# Patient Record
Sex: FEMALE | Race: WHITE | NOT HISPANIC OR LATINO | Employment: OTHER | URBAN - METROPOLITAN AREA
[De-identification: names, ages, dates, MRNs, and addresses within clinical notes are randomized per-mention and may not be internally consistent; named-entity substitution may affect disease eponyms.]

---

## 2017-01-05 ENCOUNTER — ALLSCRIPTS OFFICE VISIT (OUTPATIENT)
Dept: OTHER | Facility: OTHER | Age: 67
End: 2017-01-05

## 2017-01-05 DIAGNOSIS — M32.9 SYSTEMIC LUPUS ERYTHEMATOSUS (HCC): ICD-10-CM

## 2017-01-05 DIAGNOSIS — Z79.899 OTHER LONG TERM (CURRENT) DRUG THERAPY: ICD-10-CM

## 2017-01-19 RX ORDER — ACETAMINOPHEN 325 MG/1
650 TABLET ORAL ONCE
Status: COMPLETED | OUTPATIENT
Start: 2017-01-20 | End: 2017-01-20

## 2017-01-19 RX ORDER — DIPHENHYDRAMINE HCL 25 MG
25 TABLET ORAL ONCE
Status: COMPLETED | OUTPATIENT
Start: 2017-01-20 | End: 2017-01-20

## 2017-01-19 RX ORDER — SODIUM CHLORIDE 9 MG/ML
20 INJECTION, SOLUTION INTRAVENOUS CONTINUOUS
Status: DISCONTINUED | OUTPATIENT
Start: 2017-01-20 | End: 2017-01-23 | Stop reason: HOSPADM

## 2017-01-20 ENCOUNTER — HOSPITAL ENCOUNTER (OUTPATIENT)
Dept: INFUSION CENTER | Facility: CLINIC | Age: 67
Discharge: HOME | End: 2017-01-20
Payer: COMMERCIAL

## 2017-01-20 VITALS
HEART RATE: 72 BPM | BODY MASS INDEX: 34.83 KG/M2 | SYSTOLIC BLOOD PRESSURE: 128 MMHG | OXYGEN SATURATION: 96 % | TEMPERATURE: 98 F | DIASTOLIC BLOOD PRESSURE: 82 MMHG | HEIGHT: 60 IN | WEIGHT: 177.4 LBS | RESPIRATION RATE: 16 BRPM

## 2017-01-20 PROCEDURE — 96413 CHEMO IV INFUSION 1 HR: CPT

## 2017-01-20 RX ADMIN — SODIUM CHLORIDE 20 ML/HR: 0.9 INJECTION, SOLUTION INTRAVENOUS at 10:25

## 2017-01-20 RX ADMIN — ACETAMINOPHEN 650 MG: 325 TABLET ORAL at 10:24

## 2017-01-20 RX ADMIN — DIPHENHYDRAMINE HYDROCHLORIDE 25 MG: 25 TABLET ORAL at 10:24

## 2017-01-26 ENCOUNTER — ALLSCRIPTS OFFICE VISIT (OUTPATIENT)
Dept: OTHER | Facility: OTHER | Age: 67
End: 2017-01-26

## 2017-02-20 RX ORDER — ACETAMINOPHEN 325 MG/1
650 TABLET ORAL ONCE
Status: COMPLETED | OUTPATIENT
Start: 2017-02-21 | End: 2017-02-21

## 2017-02-20 RX ORDER — SODIUM CHLORIDE 9 MG/ML
20 INJECTION, SOLUTION INTRAVENOUS CONTINUOUS
Status: DISCONTINUED | OUTPATIENT
Start: 2017-02-21 | End: 2017-02-24 | Stop reason: HOSPADM

## 2017-02-20 RX ORDER — DIPHENHYDRAMINE HCL 25 MG
25 TABLET ORAL ONCE
Status: COMPLETED | OUTPATIENT
Start: 2017-02-21 | End: 2017-02-21

## 2017-02-21 ENCOUNTER — HOSPITAL ENCOUNTER (OUTPATIENT)
Dept: INFUSION CENTER | Facility: CLINIC | Age: 67
Discharge: HOME | End: 2017-02-21
Payer: COMMERCIAL

## 2017-02-21 VITALS
BODY MASS INDEX: 34.18 KG/M2 | SYSTOLIC BLOOD PRESSURE: 167 MMHG | HEART RATE: 85 BPM | WEIGHT: 175 LBS | DIASTOLIC BLOOD PRESSURE: 74 MMHG | TEMPERATURE: 97.8 F | RESPIRATION RATE: 18 BRPM

## 2017-02-21 PROCEDURE — 96413 CHEMO IV INFUSION 1 HR: CPT

## 2017-02-21 RX ADMIN — SODIUM CHLORIDE 20 ML/HR: 0.9 INJECTION, SOLUTION INTRAVENOUS at 11:20

## 2017-02-21 RX ADMIN — DIPHENHYDRAMINE HYDROCHLORIDE 25 MG: 25 TABLET ORAL at 11:32

## 2017-02-21 RX ADMIN — ACETAMINOPHEN 650 MG: 325 TABLET ORAL at 11:32

## 2017-02-21 NOTE — PROGRESS NOTES
Pt tolerated her benlysta without any adverse reactions  Pt was observed for 30 minutes post infusion  Next appointment confirmed   Pt refused avs

## 2017-02-25 ENCOUNTER — APPOINTMENT (EMERGENCY)
Dept: RADIOLOGY | Facility: HOSPITAL | Age: 67
DRG: 194 | End: 2017-02-25
Payer: COMMERCIAL

## 2017-02-25 ENCOUNTER — HOSPITAL ENCOUNTER (INPATIENT)
Facility: HOSPITAL | Age: 67
LOS: 2 days | Discharge: HOME | DRG: 194 | End: 2017-02-27
Attending: EMERGENCY MEDICINE | Admitting: INTERNAL MEDICINE
Payer: COMMERCIAL

## 2017-02-25 DIAGNOSIS — E86.0 DEHYDRATION: ICD-10-CM

## 2017-02-25 DIAGNOSIS — J18.9 PNEUMONIA OF RIGHT MIDDLE LOBE DUE TO INFECTIOUS ORGANISM: ICD-10-CM

## 2017-02-25 DIAGNOSIS — I95.9 HYPOTENSION: Primary | ICD-10-CM

## 2017-02-25 PROBLEM — R19.7 DIARRHEA: Status: ACTIVE | Noted: 2017-02-25

## 2017-02-25 PROBLEM — R55 SYNCOPE: Status: ACTIVE | Noted: 2017-02-25

## 2017-02-25 LAB
ANION GAP SERPL CALCULATED.3IONS-SCNC: 9 MMOL/L (ref 4–13)
ATRIAL RATE: 62 BPM
BACTERIA UR QL AUTO: ABNORMAL /HPF
BASOPHILS # BLD AUTO: 0.02 THOUSANDS/ΜL (ref 0–0.1)
BASOPHILS NFR BLD AUTO: 0 % (ref 0–1)
BILIRUB UR QL STRIP: NEGATIVE
BUN SERPL-MCNC: 15 MG/DL (ref 5–25)
CALCIUM SERPL-MCNC: 7.4 MG/DL (ref 8.3–10.1)
CHLORIDE SERPL-SCNC: 103 MMOL/L (ref 100–108)
CLARITY UR: CLEAR
CLARITY, POC: NORMAL
CO2 SERPL-SCNC: 22 MMOL/L (ref 21–32)
COLOR UR: ABNORMAL
COLOR, POC: YELLOW
CREAT SERPL-MCNC: 1.19 MG/DL (ref 0.6–1.3)
EOSINOPHIL # BLD AUTO: 0.01 THOUSAND/ΜL (ref 0–0.61)
EOSINOPHIL NFR BLD AUTO: 0 % (ref 0–6)
ERYTHROCYTE [DISTWIDTH] IN BLOOD BY AUTOMATED COUNT: 14.6 % (ref 11.6–15.1)
EXT BILIRUBIN, UA: NORMAL
EXT BLOOD URINE: NORMAL
EXT GLUCOSE, UA: NORMAL
EXT KETONES: NORMAL
EXT NITRITE, UA: POSITIVE
EXT PH, UA: 6.5
EXT PROTEIN, UA: NORMAL
EXT SPECIFIC GRAVITY, UA: 1
EXT UROBILINOGEN: 0.2
FLUAV AG SPEC QL IA: NEGATIVE
FLUAV AG SPEC QL: DETECTED
FLUBV AG SPEC QL IA: NEGATIVE
FLUBV AG SPEC QL: ABNORMAL
GFR SERPL CREATININE-BSD FRML MDRD: 45.7 ML/MIN/1.73SQ M
GLUCOSE SERPL-MCNC: 114 MG/DL (ref 65–140)
GLUCOSE UR STRIP-MCNC: NEGATIVE MG/DL
HCT VFR BLD AUTO: 32.9 % (ref 34.8–46.1)
HGB BLD-MCNC: 10.5 G/DL (ref 11.5–15.4)
HGB UR QL STRIP.AUTO: ABNORMAL
KETONES UR STRIP-MCNC: NEGATIVE MG/DL
L PNEUMO1 AG UR QL IA.RAPID: NEGATIVE
LACTATE SERPL-SCNC: 1.4 MMOL/L (ref 0.5–2)
LEUKOCYTE ESTERASE UR QL STRIP: ABNORMAL
LYMPHOCYTES # BLD AUTO: 2 THOUSANDS/ΜL (ref 0.6–4.47)
LYMPHOCYTES NFR BLD AUTO: 25 % (ref 14–44)
MCH RBC QN AUTO: 27.6 PG (ref 26.8–34.3)
MCHC RBC AUTO-ENTMCNC: 31.9 G/DL (ref 31.4–37.4)
MCV RBC AUTO: 87 FL (ref 82–98)
MONOCYTES # BLD AUTO: 0.85 THOUSAND/ΜL (ref 0.17–1.22)
MONOCYTES NFR BLD AUTO: 11 % (ref 4–12)
NEUTROPHILS # BLD AUTO: 4.99 THOUSANDS/ΜL (ref 1.85–7.62)
NEUTS SEG NFR BLD AUTO: 64 % (ref 43–75)
NITRITE UR QL STRIP: NEGATIVE
NON-SQ EPI CELLS URNS QL MICRO: ABNORMAL /HPF
OTHER STN SPEC: ABNORMAL
P AXIS: 68 DEGREES
PH UR STRIP.AUTO: 5.5 [PH] (ref 4.5–8)
PLATELET # BLD AUTO: 228 THOUSANDS/UL (ref 149–390)
PLATELET # BLD AUTO: 232 THOUSANDS/UL (ref 149–390)
PMV BLD AUTO: 10 FL (ref 8.9–12.7)
PMV BLD AUTO: 10.2 FL (ref 8.9–12.7)
POTASSIUM SERPL-SCNC: 3.9 MMOL/L (ref 3.5–5.3)
PR INTERVAL: 174 MS
PROT UR STRIP-MCNC: NEGATIVE MG/DL
QRS AXIS: 78 DEGREES
QRSD INTERVAL: 94 MS
QT INTERVAL: 434 MS
QTC INTERVAL: 440 MS
RBC # BLD AUTO: 3.8 MILLION/UL (ref 3.81–5.12)
RBC #/AREA URNS AUTO: ABNORMAL /HPF
RSV B RNA SPEC QL NAA+PROBE: ABNORMAL
S PNEUM AG UR QL: NEGATIVE
SODIUM SERPL-SCNC: 134 MMOL/L (ref 136–145)
SP GR UR STRIP.AUTO: 1.01 (ref 1–1.03)
T WAVE AXIS: 34 DEGREES
TROPONIN I SERPL-MCNC: <0.02 NG/ML
UROBILINOGEN UR QL STRIP.AUTO: 0.2 E.U./DL
VENTRICULAR RATE: 62 BPM
WBC # BLD AUTO: 7.87 THOUSAND/UL (ref 4.31–10.16)
WBC # BLD EST: NORMAL 10*3/UL
WBC #/AREA URNS AUTO: ABNORMAL /HPF

## 2017-02-25 PROCEDURE — 87798 DETECT AGENT NOS DNA AMP: CPT | Performed by: EMERGENCY MEDICINE

## 2017-02-25 PROCEDURE — 94760 N-INVAS EAR/PLS OXIMETRY 1: CPT

## 2017-02-25 PROCEDURE — 84484 ASSAY OF TROPONIN QUANT: CPT | Performed by: PHYSICIAN ASSISTANT

## 2017-02-25 PROCEDURE — 87086 URINE CULTURE/COLONY COUNT: CPT | Performed by: PHYSICIAN ASSISTANT

## 2017-02-25 PROCEDURE — 87040 BLOOD CULTURE FOR BACTERIA: CPT | Performed by: EMERGENCY MEDICINE

## 2017-02-25 PROCEDURE — 80048 BASIC METABOLIC PNL TOTAL CA: CPT | Performed by: EMERGENCY MEDICINE

## 2017-02-25 PROCEDURE — 36415 COLL VENOUS BLD VENIPUNCTURE: CPT | Performed by: EMERGENCY MEDICINE

## 2017-02-25 PROCEDURE — 96374 THER/PROPH/DIAG INJ IV PUSH: CPT

## 2017-02-25 PROCEDURE — 81001 URINALYSIS AUTO W/SCOPE: CPT | Performed by: PHYSICIAN ASSISTANT

## 2017-02-25 PROCEDURE — 87493 C DIFF AMPLIFIED PROBE: CPT | Performed by: PHYSICIAN ASSISTANT

## 2017-02-25 PROCEDURE — 96361 HYDRATE IV INFUSION ADD-ON: CPT

## 2017-02-25 PROCEDURE — 81002 URINALYSIS NONAUTO W/O SCOPE: CPT | Performed by: EMERGENCY MEDICINE

## 2017-02-25 PROCEDURE — 85049 AUTOMATED PLATELET COUNT: CPT | Performed by: PHYSICIAN ASSISTANT

## 2017-02-25 PROCEDURE — 87449 NOS EACH ORGANISM AG IA: CPT | Performed by: PHYSICIAN ASSISTANT

## 2017-02-25 PROCEDURE — 99285 EMERGENCY DEPT VISIT HI MDM: CPT

## 2017-02-25 PROCEDURE — 93005 ELECTROCARDIOGRAM TRACING: CPT | Performed by: EMERGENCY MEDICINE

## 2017-02-25 PROCEDURE — 71010 HB CHEST X-RAY 1 VIEW FRONTAL: CPT

## 2017-02-25 PROCEDURE — 87400 INFLUENZA A/B EACH AG IA: CPT | Performed by: EMERGENCY MEDICINE

## 2017-02-25 PROCEDURE — 84484 ASSAY OF TROPONIN QUANT: CPT | Performed by: EMERGENCY MEDICINE

## 2017-02-25 PROCEDURE — 83605 ASSAY OF LACTIC ACID: CPT | Performed by: EMERGENCY MEDICINE

## 2017-02-25 PROCEDURE — 94664 DEMO&/EVAL PT USE INHALER: CPT

## 2017-02-25 PROCEDURE — 85025 COMPLETE CBC W/AUTO DIFF WBC: CPT | Performed by: EMERGENCY MEDICINE

## 2017-02-25 RX ORDER — SODIUM CHLORIDE 9 MG/ML
75 INJECTION, SOLUTION INTRAVENOUS CONTINUOUS
Status: DISCONTINUED | OUTPATIENT
Start: 2017-02-25 | End: 2017-02-26

## 2017-02-25 RX ORDER — B-COMPLEX WITH VITAMIN C
1 TABLET ORAL
Status: DISCONTINUED | OUTPATIENT
Start: 2017-02-25 | End: 2017-02-27 | Stop reason: HOSPADM

## 2017-02-25 RX ORDER — BUPROPION HYDROCHLORIDE 100 MG/1
100 TABLET ORAL 3 TIMES DAILY
Status: CANCELLED | OUTPATIENT
Start: 2017-02-25

## 2017-02-25 RX ORDER — GUAIFENESIN 600 MG
600 TABLET, EXTENDED RELEASE 12 HR ORAL 2 TIMES DAILY
Status: DISCONTINUED | OUTPATIENT
Start: 2017-02-25 | End: 2017-02-27 | Stop reason: HOSPADM

## 2017-02-25 RX ORDER — DIPHENHYDRAMINE HCL 25 MG
25 TABLET ORAL
Status: DISCONTINUED | OUTPATIENT
Start: 2017-02-25 | End: 2017-02-27 | Stop reason: HOSPADM

## 2017-02-25 RX ORDER — LEVOTHYROXINE SODIUM 0.03 MG/1
25 TABLET ORAL DAILY
Status: CANCELLED | OUTPATIENT
Start: 2017-02-25

## 2017-02-25 RX ORDER — ONDANSETRON 2 MG/ML
4 INJECTION INTRAMUSCULAR; INTRAVENOUS EVERY 6 HOURS PRN
Status: DISCONTINUED | OUTPATIENT
Start: 2017-02-25 | End: 2017-02-27 | Stop reason: HOSPADM

## 2017-02-25 RX ORDER — LEVOTHYROXINE SODIUM 0.03 MG/1
25 TABLET ORAL
Status: DISCONTINUED | OUTPATIENT
Start: 2017-02-25 | End: 2017-02-27 | Stop reason: HOSPADM

## 2017-02-25 RX ORDER — QUETIAPINE FUMARATE 100 MG/1
300 TABLET, FILM COATED ORAL
Status: DISCONTINUED | OUTPATIENT
Start: 2017-02-25 | End: 2017-02-27 | Stop reason: HOSPADM

## 2017-02-25 RX ORDER — LORAZEPAM 0.5 MG/1
0.5 TABLET ORAL EVERY 6 HOURS PRN
Status: DISCONTINUED | OUTPATIENT
Start: 2017-02-25 | End: 2017-02-26

## 2017-02-25 RX ORDER — ASPIRIN 81 MG/1
81 TABLET, CHEWABLE ORAL DAILY
Status: DISCONTINUED | OUTPATIENT
Start: 2017-02-25 | End: 2017-02-27 | Stop reason: HOSPADM

## 2017-02-25 RX ORDER — ONDANSETRON 2 MG/ML
4 INJECTION INTRAMUSCULAR; INTRAVENOUS EVERY 6 HOURS PRN
Status: DISCONTINUED | OUTPATIENT
Start: 2017-02-25 | End: 2017-02-25 | Stop reason: SDUPTHER

## 2017-02-25 RX ORDER — BUPROPION HYDROCHLORIDE 100 MG/1
100 TABLET ORAL 3 TIMES DAILY
Status: DISCONTINUED | OUTPATIENT
Start: 2017-02-25 | End: 2017-02-27 | Stop reason: HOSPADM

## 2017-02-25 RX ORDER — UBIDECARENONE 75 MG
50 CAPSULE ORAL DAILY
Status: DISCONTINUED | OUTPATIENT
Start: 2017-02-25 | End: 2017-02-27 | Stop reason: HOSPADM

## 2017-02-25 RX ADMIN — CEFTRIAXONE SODIUM 1000 MG: 10 INJECTION, POWDER, FOR SOLUTION INTRAVENOUS at 03:02

## 2017-02-25 RX ADMIN — DIPHENHYDRAMINE HYDROCHLORIDE 25 MG: 25 TABLET ORAL at 23:20

## 2017-02-25 RX ADMIN — LEVOTHYROXINE SODIUM 25 MCG: 25 TABLET ORAL at 07:35

## 2017-02-25 RX ADMIN — QUETIAPINE FUMARATE 300 MG: 100 TABLET ORAL at 21:18

## 2017-02-25 RX ADMIN — ENOXAPARIN SODIUM 40 MG: 40 INJECTION SUBCUTANEOUS at 08:03

## 2017-02-25 RX ADMIN — LORAZEPAM 0.5 MG: 0.5 TABLET ORAL at 22:06

## 2017-02-25 RX ADMIN — Medication 1 TABLET: at 08:04

## 2017-02-25 RX ADMIN — ASPIRIN 81 MG: 81 TABLET, CHEWABLE ORAL at 08:03

## 2017-02-25 RX ADMIN — SODIUM CHLORIDE 1000 ML: 0.9 INJECTION, SOLUTION INTRAVENOUS at 02:06

## 2017-02-25 RX ADMIN — SODIUM CHLORIDE 75 ML/HR: 0.9 INJECTION, SOLUTION INTRAVENOUS at 05:04

## 2017-02-25 RX ADMIN — OYSTER SHELL CALCIUM WITH VITAMIN D 1 TABLET: 500; 200 TABLET, FILM COATED ORAL at 07:35

## 2017-02-25 RX ADMIN — GUAIFENESIN 600 MG: 600 TABLET, EXTENDED RELEASE ORAL at 08:04

## 2017-02-25 RX ADMIN — VITAM B12 50 MCG: 100 TAB at 08:05

## 2017-02-25 RX ADMIN — BUPROPION HYDROCHLORIDE 100 MG: 100 TABLET, FILM COATED ORAL at 08:04

## 2017-02-25 RX ADMIN — BUPROPION HYDROCHLORIDE 100 MG: 100 TABLET, FILM COATED ORAL at 17:09

## 2017-02-25 RX ADMIN — GUAIFENESIN 600 MG: 600 TABLET, EXTENDED RELEASE ORAL at 17:09

## 2017-02-25 RX ADMIN — SODIUM CHLORIDE 75 ML/HR: 0.9 INJECTION, SOLUTION INTRAVENOUS at 18:50

## 2017-02-25 RX ADMIN — AZITHROMYCIN FOR INJECTION INJECTION, POWDER, LYOPHILIZED, FOR SOLUTION 500 MG: 500 INJECTION INTRAVENOUS at 03:35

## 2017-02-25 RX ADMIN — BUPROPION HYDROCHLORIDE 100 MG: 100 TABLET, FILM COATED ORAL at 21:18

## 2017-02-25 RX ADMIN — SODIUM CHLORIDE 1000 ML: 0.9 INJECTION, SOLUTION INTRAVENOUS at 02:05

## 2017-02-26 ENCOUNTER — APPOINTMENT (INPATIENT)
Dept: RADIOLOGY | Facility: HOSPITAL | Age: 67
DRG: 194 | End: 2017-02-26
Payer: COMMERCIAL

## 2017-02-26 PROBLEM — J10.1 INFLUENZA A: Status: ACTIVE | Noted: 2017-02-26

## 2017-02-26 LAB
ANION GAP SERPL CALCULATED.3IONS-SCNC: 7 MMOL/L (ref 4–13)
BACTERIA UR CULT: NORMAL
BUN SERPL-MCNC: 3 MG/DL (ref 5–25)
C DIFF TOX GENS STL QL NAA+PROBE: NORMAL
CALCIUM SERPL-MCNC: 7.5 MG/DL (ref 8.3–10.1)
CHLORIDE SERPL-SCNC: 109 MMOL/L (ref 100–108)
CO2 SERPL-SCNC: 23 MMOL/L (ref 21–32)
CREAT SERPL-MCNC: 0.75 MG/DL (ref 0.6–1.3)
ERYTHROCYTE [DISTWIDTH] IN BLOOD BY AUTOMATED COUNT: 14.8 % (ref 11.6–15.1)
GFR SERPL CREATININE-BSD FRML MDRD: >60 ML/MIN/1.73SQ M
GLUCOSE SERPL-MCNC: 81 MG/DL (ref 65–140)
GLUCOSE SERPL-MCNC: 87 MG/DL (ref 65–140)
HCT VFR BLD AUTO: 30.5 % (ref 34.8–46.1)
HGB BLD-MCNC: 9.6 G/DL (ref 11.5–15.4)
MCH RBC QN AUTO: 27.3 PG (ref 26.8–34.3)
MCHC RBC AUTO-ENTMCNC: 31.5 G/DL (ref 31.4–37.4)
MCV RBC AUTO: 87 FL (ref 82–98)
PLATELET # BLD AUTO: 236 THOUSANDS/UL (ref 149–390)
PMV BLD AUTO: 9.9 FL (ref 8.9–12.7)
POTASSIUM SERPL-SCNC: 3.6 MMOL/L (ref 3.5–5.3)
RBC # BLD AUTO: 3.52 MILLION/UL (ref 3.81–5.12)
SODIUM SERPL-SCNC: 139 MMOL/L (ref 136–145)
WBC # BLD AUTO: 4.98 THOUSAND/UL (ref 4.31–10.16)

## 2017-02-26 PROCEDURE — 71020 HB CHEST X-RAY 2VW FRONTAL&LATL: CPT

## 2017-02-26 PROCEDURE — 82948 REAGENT STRIP/BLOOD GLUCOSE: CPT

## 2017-02-26 PROCEDURE — 80048 BASIC METABOLIC PNL TOTAL CA: CPT | Performed by: INTERNAL MEDICINE

## 2017-02-26 PROCEDURE — 85027 COMPLETE CBC AUTOMATED: CPT | Performed by: INTERNAL MEDICINE

## 2017-02-26 RX ORDER — TEMAZEPAM 15 MG/1
15 CAPSULE ORAL
Status: DISCONTINUED | OUTPATIENT
Start: 2017-02-26 | End: 2017-02-27 | Stop reason: HOSPADM

## 2017-02-26 RX ORDER — OSELTAMIVIR PHOSPHATE 6 MG/ML
75 FOR SUSPENSION ORAL EVERY 12 HOURS SCHEDULED
Status: DISCONTINUED | OUTPATIENT
Start: 2017-02-26 | End: 2017-02-26

## 2017-02-26 RX ORDER — OSELTAMIVIR PHOSPHATE 75 MG/1
75 CAPSULE ORAL EVERY 12 HOURS SCHEDULED
Status: DISCONTINUED | OUTPATIENT
Start: 2017-02-26 | End: 2017-02-27 | Stop reason: HOSPADM

## 2017-02-26 RX ADMIN — CEFTRIAXONE SODIUM 1000 MG: 10 INJECTION, POWDER, FOR SOLUTION INTRAVENOUS at 02:36

## 2017-02-26 RX ADMIN — BUPROPION HYDROCHLORIDE 100 MG: 100 TABLET, FILM COATED ORAL at 17:11

## 2017-02-26 RX ADMIN — Medication 1 TABLET: at 08:45

## 2017-02-26 RX ADMIN — GUAIFENESIN 600 MG: 600 TABLET, EXTENDED RELEASE ORAL at 08:45

## 2017-02-26 RX ADMIN — ENOXAPARIN SODIUM 40 MG: 40 INJECTION SUBCUTANEOUS at 08:45

## 2017-02-26 RX ADMIN — BUPROPION HYDROCHLORIDE 100 MG: 100 TABLET, FILM COATED ORAL at 08:45

## 2017-02-26 RX ADMIN — ASPIRIN 81 MG: 81 TABLET, CHEWABLE ORAL at 08:45

## 2017-02-26 RX ADMIN — BUPROPION HYDROCHLORIDE 100 MG: 100 TABLET, FILM COATED ORAL at 21:35

## 2017-02-26 RX ADMIN — QUETIAPINE FUMARATE 300 MG: 100 TABLET ORAL at 21:35

## 2017-02-26 RX ADMIN — AZITHROMYCIN MONOHYDRATE 500 MG: 500 INJECTION, POWDER, LYOPHILIZED, FOR SOLUTION INTRAVENOUS at 02:36

## 2017-02-26 RX ADMIN — TEMAZEPAM 15 MG: 15 CAPSULE ORAL at 22:05

## 2017-02-26 RX ADMIN — OYSTER SHELL CALCIUM WITH VITAMIN D 1 TABLET: 500; 200 TABLET, FILM COATED ORAL at 08:45

## 2017-02-26 RX ADMIN — GUAIFENESIN 600 MG: 600 TABLET, EXTENDED RELEASE ORAL at 17:11

## 2017-02-26 RX ADMIN — VITAM B12 50 MCG: 100 TAB at 08:46

## 2017-02-26 RX ADMIN — OSELTAMIVIR PHOSPHATE 75 MG: 75 CAPSULE ORAL at 12:47

## 2017-02-26 RX ADMIN — OSELTAMIVIR PHOSPHATE 75 MG: 75 CAPSULE ORAL at 21:35

## 2017-02-26 RX ADMIN — LEVOTHYROXINE SODIUM 25 MCG: 25 TABLET ORAL at 05:17

## 2017-02-27 VITALS
BODY MASS INDEX: 33.22 KG/M2 | RESPIRATION RATE: 16 BRPM | HEIGHT: 61 IN | DIASTOLIC BLOOD PRESSURE: 78 MMHG | OXYGEN SATURATION: 92 % | WEIGHT: 175.93 LBS | SYSTOLIC BLOOD PRESSURE: 128 MMHG | HEART RATE: 69 BPM | TEMPERATURE: 98.3 F

## 2017-02-27 PROBLEM — E86.0 DEHYDRATION: Status: RESOLVED | Noted: 2017-02-25 | Resolved: 2017-02-27

## 2017-02-27 PROBLEM — I95.9 HYPOTENSION: Status: RESOLVED | Noted: 2017-02-25 | Resolved: 2017-02-27

## 2017-02-27 PROBLEM — J18.9 RIGHT MIDDLE LOBE PNEUMONIA: Status: RESOLVED | Noted: 2017-02-25 | Resolved: 2017-02-27

## 2017-02-27 PROBLEM — R19.7 DIARRHEA: Status: RESOLVED | Noted: 2017-02-25 | Resolved: 2017-02-27

## 2017-02-27 PROBLEM — R55 SYNCOPE: Status: RESOLVED | Noted: 2017-02-25 | Resolved: 2017-02-27

## 2017-02-27 RX ORDER — GUAIFENESIN 600 MG
600 TABLET, EXTENDED RELEASE 12 HR ORAL 2 TIMES DAILY
Qty: 10 TABLET | Refills: 0 | Status: SHIPPED | OUTPATIENT
Start: 2017-02-27 | End: 2017-03-04

## 2017-02-27 RX ORDER — GUAIFENESIN/DEXTROMETHORPHAN 100-10MG/5
10 SYRUP ORAL EVERY 4 HOURS PRN
Qty: 118 ML | Refills: 0 | Status: SHIPPED | OUTPATIENT
Start: 2017-02-27 | End: 2017-03-29

## 2017-02-27 RX ORDER — OSELTAMIVIR PHOSPHATE 75 MG/1
75 CAPSULE ORAL EVERY 12 HOURS SCHEDULED
Qty: 8 CAPSULE | Refills: 0 | Status: SHIPPED | OUTPATIENT
Start: 2017-02-27 | End: 2017-03-03

## 2017-02-27 RX ORDER — GUAIFENESIN/DEXTROMETHORPHAN 100-10MG/5
10 SYRUP ORAL EVERY 4 HOURS PRN
Status: DISCONTINUED | OUTPATIENT
Start: 2017-02-27 | End: 2017-02-27 | Stop reason: HOSPADM

## 2017-02-27 RX ADMIN — ASPIRIN 81 MG: 81 TABLET, CHEWABLE ORAL at 09:01

## 2017-02-27 RX ADMIN — Medication 1 TABLET: at 09:01

## 2017-02-27 RX ADMIN — OSELTAMIVIR PHOSPHATE 75 MG: 75 CAPSULE ORAL at 09:01

## 2017-02-27 RX ADMIN — BUPROPION HYDROCHLORIDE 100 MG: 100 TABLET, FILM COATED ORAL at 09:01

## 2017-02-27 RX ADMIN — LEVOTHYROXINE SODIUM 25 MCG: 25 TABLET ORAL at 05:44

## 2017-02-27 RX ADMIN — VITAM B12 50 MCG: 100 TAB at 09:02

## 2017-02-27 RX ADMIN — CEFTRIAXONE SODIUM 1000 MG: 10 INJECTION, POWDER, FOR SOLUTION INTRAVENOUS at 03:17

## 2017-02-27 RX ADMIN — OYSTER SHELL CALCIUM WITH VITAMIN D 1 TABLET: 500; 200 TABLET, FILM COATED ORAL at 09:01

## 2017-02-27 RX ADMIN — GUAIFENESIN 600 MG: 600 TABLET, EXTENDED RELEASE ORAL at 09:01

## 2017-02-27 RX ADMIN — AZITHROMYCIN MONOHYDRATE 500 MG: 500 INJECTION, POWDER, LYOPHILIZED, FOR SOLUTION INTRAVENOUS at 03:54

## 2017-03-02 LAB
BACTERIA BLD CULT: NORMAL
BACTERIA BLD CULT: NORMAL

## 2017-03-16 RX ORDER — SODIUM CHLORIDE 9 MG/ML
20 INJECTION, SOLUTION INTRAVENOUS CONTINUOUS
Status: DISCONTINUED | OUTPATIENT
Start: 2017-03-17 | End: 2017-03-20 | Stop reason: HOSPADM

## 2017-03-16 RX ORDER — ACETAMINOPHEN 325 MG/1
650 TABLET ORAL ONCE
Status: COMPLETED | OUTPATIENT
Start: 2017-03-17 | End: 2017-03-17

## 2017-03-16 RX ORDER — DIPHENHYDRAMINE HCL 25 MG
25 TABLET ORAL ONCE
Status: COMPLETED | OUTPATIENT
Start: 2017-03-17 | End: 2017-03-17

## 2017-03-17 ENCOUNTER — HOSPITAL ENCOUNTER (OUTPATIENT)
Dept: INFUSION CENTER | Facility: CLINIC | Age: 67
Discharge: HOME | End: 2017-03-17
Payer: COMMERCIAL

## 2017-03-17 VITALS
DIASTOLIC BLOOD PRESSURE: 80 MMHG | HEART RATE: 68 BPM | TEMPERATURE: 98 F | SYSTOLIC BLOOD PRESSURE: 131 MMHG | RESPIRATION RATE: 16 BRPM

## 2017-03-17 PROCEDURE — 96413 CHEMO IV INFUSION 1 HR: CPT

## 2017-03-17 RX ADMIN — SODIUM CHLORIDE 20 ML/HR: 0.9 INJECTION, SOLUTION INTRAVENOUS at 11:30

## 2017-03-17 RX ADMIN — ACETAMINOPHEN 650 MG: 325 TABLET ORAL at 11:34

## 2017-03-17 RX ADMIN — DIPHENHYDRAMINE HYDROCHLORIDE 25 MG: 25 TABLET ORAL at 11:35

## 2017-03-17 NOTE — PROGRESS NOTES
Pt is here for her benlysta  She offers no complaints at this time  She states she was admitted to the hospital several weeks ago for pneumonia and the flu and was discharged on February 28  Anastacio Osuna  She has no signs or symptoms of infections at this time

## 2017-03-20 ENCOUNTER — HOSPITAL ENCOUNTER (OUTPATIENT)
Dept: RADIOLOGY | Facility: HOSPITAL | Age: 67
Discharge: HOME | End: 2017-03-20
Payer: COMMERCIAL

## 2017-03-20 ENCOUNTER — OFFICE VISIT (OUTPATIENT)
Dept: URGENT CARE | Facility: MEDICAL CENTER | Age: 67
End: 2017-03-20
Payer: COMMERCIAL

## 2017-03-20 DIAGNOSIS — Z12.31 ENCOUNTER FOR SCREENING MAMMOGRAM FOR MALIGNANT NEOPLASM OF BREAST: ICD-10-CM

## 2017-03-20 DIAGNOSIS — Z12.39 ENCOUNTER FOR OTHER SCREENING FOR MALIGNANT NEOPLASM OF BREAST: ICD-10-CM

## 2017-03-20 PROCEDURE — G0202 SCR MAMMO BI INCL CAD: HCPCS

## 2017-03-20 PROCEDURE — G0382 LEV 3 HOSP TYPE B ED VISIT: HCPCS

## 2017-04-19 RX ORDER — SODIUM CHLORIDE 9 MG/ML
20 INJECTION, SOLUTION INTRAVENOUS CONTINUOUS
Status: DISCONTINUED | OUTPATIENT
Start: 2017-04-20 | End: 2017-04-23 | Stop reason: HOSPADM

## 2017-04-19 RX ORDER — ACETAMINOPHEN 325 MG/1
650 TABLET ORAL ONCE
Status: DISCONTINUED | OUTPATIENT
Start: 2017-04-20 | End: 2017-04-23 | Stop reason: HOSPADM

## 2017-04-19 RX ORDER — DIPHENHYDRAMINE HCL 25 MG
25 TABLET ORAL ONCE
Status: DISCONTINUED | OUTPATIENT
Start: 2017-04-20 | End: 2017-04-23 | Stop reason: HOSPADM

## 2017-04-20 ENCOUNTER — HOSPITAL ENCOUNTER (OUTPATIENT)
Dept: INFUSION CENTER | Facility: CLINIC | Age: 67
Discharge: HOME | End: 2017-04-20
Payer: COMMERCIAL

## 2017-04-20 VITALS
TEMPERATURE: 97.7 F | SYSTOLIC BLOOD PRESSURE: 112 MMHG | HEART RATE: 74 BPM | RESPIRATION RATE: 22 BRPM | OXYGEN SATURATION: 95 % | DIASTOLIC BLOOD PRESSURE: 70 MMHG

## 2017-05-08 ENCOUNTER — GENERIC CONVERSION - ENCOUNTER (OUTPATIENT)
Dept: OTHER | Facility: OTHER | Age: 67
End: 2017-05-08

## 2017-05-26 ENCOUNTER — ALLSCRIPTS OFFICE VISIT (OUTPATIENT)
Dept: OTHER | Facility: OTHER | Age: 67
End: 2017-05-26

## 2017-05-26 DIAGNOSIS — M32.9 SYSTEMIC LUPUS ERYTHEMATOSUS (HCC): ICD-10-CM

## 2017-05-26 DIAGNOSIS — M35.00 SICCA SYNDROME (HCC): ICD-10-CM

## 2017-05-26 DIAGNOSIS — M15.9 POLYOSTEOARTHRITIS: ICD-10-CM

## 2017-05-26 DIAGNOSIS — Z79.899 OTHER LONG TERM (CURRENT) DRUG THERAPY: ICD-10-CM

## 2017-07-18 ENCOUNTER — ALLSCRIPTS OFFICE VISIT (OUTPATIENT)
Dept: OTHER | Facility: OTHER | Age: 67
End: 2017-07-18

## 2017-07-18 ENCOUNTER — TRANSCRIBE ORDERS (OUTPATIENT)
Dept: ADMINISTRATIVE | Facility: HOSPITAL | Age: 67
End: 2017-07-18

## 2017-07-18 DIAGNOSIS — M35.00 SICCA SYNDROME (HCC): ICD-10-CM

## 2017-07-18 DIAGNOSIS — R06.09 OTHER DYSPNEA AND RESPIRATORY ABNORMALITY: Primary | ICD-10-CM

## 2017-07-18 DIAGNOSIS — R70.0 ELEVATED ERYTHROCYTE SEDIMENTATION RATE: ICD-10-CM

## 2017-07-18 DIAGNOSIS — R06.09 OTHER FORMS OF DYSPNEA: ICD-10-CM

## 2017-07-18 DIAGNOSIS — M32.9 SYSTEMIC LUPUS ERYTHEMATOSUS (HCC): ICD-10-CM

## 2017-07-18 DIAGNOSIS — R09.89 OTHER DYSPNEA AND RESPIRATORY ABNORMALITY: Primary | ICD-10-CM

## 2017-08-29 ENCOUNTER — ALLSCRIPTS OFFICE VISIT (OUTPATIENT)
Dept: OTHER | Facility: OTHER | Age: 67
End: 2017-08-29

## 2017-09-14 ENCOUNTER — ALLSCRIPTS OFFICE VISIT (OUTPATIENT)
Dept: OTHER | Facility: OTHER | Age: 67
End: 2017-09-14

## 2017-09-14 DIAGNOSIS — D64.9 ANEMIA: ICD-10-CM

## 2017-09-14 DIAGNOSIS — K91.2 POSTSURGICAL MALABSORPTION, NOT ELSEWHERE CLASSIFIED: ICD-10-CM

## 2017-09-18 ENCOUNTER — LAB CONVERSION - ENCOUNTER (OUTPATIENT)
Dept: OTHER | Facility: OTHER | Age: 67
End: 2017-09-18

## 2017-09-18 LAB
ALBUMIN SERPL BCP-MCNC: 3.4 G/DL (ref 3.8–4.8)
ALPHA 1 (HISTORICAL): 0.3 G/DL (ref 0.2–0.3)
ALPHA 2 (HISTORICAL): 0.8 G/DL (ref 0.5–0.9)
BETA-1 (HISTORICAL): 0.4 G/DL (ref 0.4–0.6)
BETA-2 (HISTORICAL): 0.4 G/DL (ref 0.2–0.5)
FERRITIN SERPL-MCNC: 13 NG/ML (ref 20–288)
FOLATE SERPL-MCNC: 13.9 NG/ML
GAMMA GLOBULIN (HISTORICAL): 4.3 G/DL (ref 0.8–1.7)
IMMUNOGLOBULIN KAPPA FREE LIGHT CHAIN (HISTORICAL): 174.4 MG/L (ref 3.3–19.4)
IMMUNOGLOBULIN LAMBDA FREE LIGHT CHAIN (HISTORICAL): 96.4 MG/L (ref 5.7–26.3)
INTERPRETATION (HISTORICAL): ABNORMAL
IRON SERPL-MCNC: 36 MCG/DL (ref 45–160)
KAPPA/LAMBDA FREE LIGHT CHAIN RATIO (HISTORICAL): 1.81 (ref 0.26–1.65)
LDH (HISTORICAL): 144 U/L (ref 120–250)
TOTAL PROTEIN (HISTORICAL): 9.6 G/DL (ref 6.1–8.1)
VIT B12 SERPL-MCNC: 330 PG/ML (ref 200–1100)

## 2017-09-25 ENCOUNTER — HOSPITAL ENCOUNTER (OUTPATIENT)
Dept: NON INVASIVE DIAGNOSTICS | Facility: CLINIC | Age: 67
Discharge: HOME | End: 2017-09-25
Attending: INTERNAL MEDICINE
Payer: COMMERCIAL

## 2017-09-25 DIAGNOSIS — R06.09 OTHER FORMS OF DYSPNEA: ICD-10-CM

## 2017-09-25 LAB
CHEST PAIN STATEMENT: NORMAL
MAX DIASTOLIC BP: 82 MMHG
MAX HEART RATE: 136 BPM
MAX PREDICTED HEART RATE: 155 BPM
MAX. SYSTOLIC BP: 150 MMHG
PROTOCOL NAME: NORMAL
TARGET HR FORMULA: NORMAL
TEST INDICATION: NORMAL
TIME IN EXERCISE PHASE: 255 S

## 2017-09-25 PROCEDURE — 93306 TTE W/DOPPLER COMPLETE: CPT

## 2017-09-25 PROCEDURE — 93350 STRESS TTE ONLY: CPT

## 2017-09-26 ENCOUNTER — GENERIC CONVERSION - ENCOUNTER (OUTPATIENT)
Dept: OTHER | Facility: OTHER | Age: 67
End: 2017-09-26

## 2017-09-27 ENCOUNTER — TRANSCRIBE ORDERS (OUTPATIENT)
Dept: ADMINISTRATIVE | Facility: HOSPITAL | Age: 67
End: 2017-09-27

## 2017-09-27 ENCOUNTER — GENERIC CONVERSION - ENCOUNTER (OUTPATIENT)
Dept: OTHER | Facility: OTHER | Age: 67
End: 2017-09-27

## 2017-09-27 DIAGNOSIS — C64.9 ADENOCARCINOMA, RENAL CELL, UNSPECIFIED LATERALITY (HCC): Primary | ICD-10-CM

## 2017-10-04 ENCOUNTER — HOSPITAL ENCOUNTER (OUTPATIENT)
Dept: CT IMAGING | Facility: HOSPITAL | Age: 67
Discharge: HOME | End: 2017-10-04
Attending: INTERNAL MEDICINE
Payer: COMMERCIAL

## 2017-10-04 DIAGNOSIS — D64.9 ANEMIA: ICD-10-CM

## 2017-10-04 PROCEDURE — 74177 CT ABD & PELVIS W/CONTRAST: CPT

## 2017-10-04 PROCEDURE — 71260 CT THORAX DX C+: CPT

## 2017-10-04 RX ADMIN — IOHEXOL 100 ML: 350 INJECTION, SOLUTION INTRAVENOUS at 16:44

## 2017-10-10 ENCOUNTER — ALLSCRIPTS OFFICE VISIT (OUTPATIENT)
Dept: OTHER | Facility: OTHER | Age: 67
End: 2017-10-10

## 2017-10-11 ENCOUNTER — GENERIC CONVERSION - ENCOUNTER (OUTPATIENT)
Dept: OTHER | Facility: OTHER | Age: 67
End: 2017-10-11

## 2017-10-11 ENCOUNTER — TRANSCRIBE ORDERS (OUTPATIENT)
Dept: ADMINISTRATIVE | Facility: HOSPITAL | Age: 67
End: 2017-10-11

## 2017-10-11 DIAGNOSIS — R93.89 ABNORMAL RADIOLOGICAL FINDINGS IN SKIN AND SUBCUTANEOUS TISSUE: Primary | ICD-10-CM

## 2017-10-11 DIAGNOSIS — R06.89 HYPOVENTILATION, IDIOPATHIC: ICD-10-CM

## 2017-10-12 RX ORDER — SODIUM CHLORIDE 9 MG/ML
20 INJECTION, SOLUTION INTRAVENOUS CONTINUOUS
Status: DISCONTINUED | OUTPATIENT
Start: 2017-10-13 | End: 2017-10-16 | Stop reason: HOSPADM

## 2017-10-13 ENCOUNTER — HOSPITAL ENCOUNTER (OUTPATIENT)
Dept: INFUSION CENTER | Facility: CLINIC | Age: 67
Discharge: HOME | End: 2017-10-13
Payer: COMMERCIAL

## 2017-10-13 VITALS
DIASTOLIC BLOOD PRESSURE: 72 MMHG | OXYGEN SATURATION: 96 % | RESPIRATION RATE: 18 BRPM | SYSTOLIC BLOOD PRESSURE: 112 MMHG | TEMPERATURE: 97.7 F | HEART RATE: 81 BPM

## 2017-10-13 PROCEDURE — 96365 THER/PROPH/DIAG IV INF INIT: CPT

## 2017-10-13 RX ADMIN — FERUMOXYTOL 510 MG: 510 INJECTION INTRAVENOUS at 14:10

## 2017-10-13 RX ADMIN — SODIUM CHLORIDE 20 ML/HR: 0.9 INJECTION, SOLUTION INTRAVENOUS at 13:54

## 2017-10-18 ENCOUNTER — GENERIC CONVERSION - ENCOUNTER (OUTPATIENT)
Dept: OTHER | Facility: OTHER | Age: 67
End: 2017-10-18

## 2017-10-19 RX ORDER — SODIUM CHLORIDE 9 MG/ML
20 INJECTION, SOLUTION INTRAVENOUS CONTINUOUS
Status: DISCONTINUED | OUTPATIENT
Start: 2017-10-20 | End: 2017-10-23 | Stop reason: HOSPADM

## 2017-10-20 ENCOUNTER — HOSPITAL ENCOUNTER (OUTPATIENT)
Dept: INFUSION CENTER | Facility: CLINIC | Age: 67
Discharge: HOME | End: 2017-10-20
Payer: COMMERCIAL

## 2017-10-20 VITALS
HEART RATE: 80 BPM | DIASTOLIC BLOOD PRESSURE: 67 MMHG | SYSTOLIC BLOOD PRESSURE: 139 MMHG | TEMPERATURE: 98.3 F | RESPIRATION RATE: 18 BRPM

## 2017-10-20 PROCEDURE — 96365 THER/PROPH/DIAG IV INF INIT: CPT

## 2017-10-20 RX ADMIN — SODIUM CHLORIDE 20 ML/HR: 0.9 INJECTION, SOLUTION INTRAVENOUS at 15:30

## 2017-10-20 RX ADMIN — FERUMOXYTOL 510 MG: 510 INJECTION INTRAVENOUS at 15:38

## 2017-10-20 NOTE — PROGRESS NOTES
Pt states she has been feeling fatigued  She is currently resting comfortably  Vitals stable  Call bell in reach, will continue to monitor

## 2017-10-20 NOTE — PROGRESS NOTES
Pt tolerated treatment well without any adverse reactions  Aware of next appointments  AVS printed and given to patient

## 2017-10-23 ENCOUNTER — HOSPITAL ENCOUNTER (OUTPATIENT)
Dept: PULMONOLOGY | Facility: HOSPITAL | Age: 67
Discharge: HOME | End: 2017-10-23
Attending: INTERNAL MEDICINE
Payer: COMMERCIAL

## 2017-10-23 ENCOUNTER — GENERIC CONVERSION - ENCOUNTER (OUTPATIENT)
Dept: OTHER | Facility: OTHER | Age: 67
End: 2017-10-23

## 2017-10-23 DIAGNOSIS — R93.89 ABNORMAL RADIOLOGICAL FINDINGS IN SKIN AND SUBCUTANEOUS TISSUE: ICD-10-CM

## 2017-10-23 DIAGNOSIS — R06.89 HYPOVENTILATION, IDIOPATHIC: ICD-10-CM

## 2017-10-23 PROCEDURE — 94060 EVALUATION OF WHEEZING: CPT

## 2017-10-23 PROCEDURE — 94729 DIFFUSING CAPACITY: CPT

## 2017-10-23 PROCEDURE — 94726 PLETHYSMOGRAPHY LUNG VOLUMES: CPT

## 2017-10-23 PROCEDURE — 94760 N-INVAS EAR/PLS OXIMETRY 1: CPT

## 2017-10-23 RX ORDER — ALBUTEROL SULFATE 2.5 MG/3ML
2.5 SOLUTION RESPIRATORY (INHALATION) ONCE
Status: COMPLETED | OUTPATIENT
Start: 2017-10-23 | End: 2017-10-23

## 2017-10-23 RX ADMIN — ALBUTEROL SULFATE 2.5 MG: 2.5 SOLUTION RESPIRATORY (INHALATION) at 13:43

## 2017-10-26 NOTE — CONSULTS
Assessment  1  Anemia (285 9) (D64 9)    Plan  Anemia    · Drink plenty of fluids ; Status:Complete;   Done: 74AMC6737   Ordered; Eilleen Fu; Ordered By:Chantel Schilling;   · Follow-up visit in 3 weeks Evaluation and Treatment  Follow-up  [place] office  Status: Hold For - Scheduling  Requested for: 87Rdj6632   Ordered; For: Anemia; Ordered By: Burgess Stuart Performed:  Due: 26JEL1033   · (1) FERRITIN; Status:Active; Requested for:41Rsw9398;    Perform:[facility]; Due:62Oxf3101; Ordered; Eilleen Fu; Ordered By:Nate Schilling;   · (1) FREE LIGHT CHAINS, SERUM; Status:Active; Requested for:35Ysk6799;    Perform:[facility]; Due:67Alq7194; Ordered; Eilleen Fu; Ordered By:Nate Schilling;   · (1) IRON SATURATION %, TIBC; Status:Active; Requested for:87Wom2068;    Perform:[facility]; Due:89Bak3858; Ordered; Eilleen Fu; Ordered By:Nate Schilling;   · (1) LD (LDH); Status:Active; Requested for:20Tdn6048;    Perform:[facility]; Due:42Yir6807; Ordered; Eilleen Fu; Ordered By:Nate Schilling;   · (1) PROTEIN ELECTRO, SERUM; Status:Active; Requested for:29Mpj4892;    Perform:[facility]; Due:44Bie7105; Ordered; Eilleen Fu; Ordered By:Chantel Schilling; Anemia, Postgastrectomy malabsorption    · (1) FOLATE; Status:Active; Requested for:65Gih9629;    Perform:[facility]; Due:96Dod3660; Ordered; For:Anemia, Postgastrectomy malabsorption; Ordered By:Nate Schilling;   · (1) VITAMIN B12; Status:Active; Requested for:88Rnj6854;    Perform:[facility]; Due:04Zzd0999; Ordered; For:Anemia, Postgastrectomy malabsorption; Ordered By:Chantel Schilling;    Discussion/Summary    In summary, this is a 63-year-old female history of elevated sedimentation rate as outlined  certainly could be due to her back on rheumatologic condition, although based on disease activity it seems a little bit high  Part of this is also attributed to age  she has anemia with hemoglobin in the 9  0?10 0 range   This could be easily attributed to anemia of chronic inflammation  her status post bariatric surgery  Substrate deficiency is considered and evaluated  Replacement would follow accordingly  Hopefully this would have some positive impact on quality of life, energy, etc or other lymphoproliferative disorders are certainly a consideration  LDH is requested  Physical exam showed no palpable adenopathy or organomegaly  CAT scan could be considered  reviewed the above considerations with the patient and her   They voiced understanding and agreement  We made arrangements for blood work  I'll see her back thereafter to review results and make further recommendations  The patient was counseled regarding diagnostic results,-- instructions for management,-- patient and family education,-- impressions  total time of encounter was 40 minutes  Chief Complaint  Evaluation regarding elevated ESR  History of Present Illness  Patient has had lupus for some years  She was treated in 2016 with improvement in disease-related fatigue  this year  She had a sedimentation rate of greater than 100  Repeat in August 2017, was 74 she's had hemoglobin in the 9  0?10 0 range with normal white count, differential and platelets  Review of Systems    Constitutional: fever,-- recent 20 lbs past 6 mos  lb weight gain-- and-- intermittent fevers to 102 for a day , recurs every few weeks  Eyes: No complaints of eye pain, no red eyes, no eyesight problems, no discharge, no dry eyes, no itching of eyes  ENT: no complaints of earache, no loss of hearing, no nose bleeds, no nasal discharge, no sore throat, no hoarseness  Cardiovascular: No complaints of slow heart rate, no fast heart rate, no chest pain, no palpitations, no leg claudication, no lower extremity edema  The patient presents with complaints of intermittent episodes of cough  Episodes started about 4 months ago  Gastrointestinal: nausea-- and-- [id number] polyps  Active Problems  1  Acid reflux disease (530 81) (K21 9)   2  Bipolar I disorder, single manic episode (296 00) (F30 9)   3  Depression (311) (F32 9)   4  Dyspnea on exertion (786 09) (R06 09)   5  Elevated sedimentation rate (790 1) (R70 0)   6  Encounter for long term current azathioprine therapy (V58 69) (Z79 899)   7  Encounter for screening for malignant neoplasm of breast (V76 10) (Z12 39)   8  Generalized osteoarthritis (715 00) (M15 9)   9  Heart burn (787 1) (R12)   10  Obesity (278 00) (E66 9)   11  On belimumab therapy (V58 69) (Z79 899)   12  Osteoporosis screening (V82 81) (Z13 820)   13  Periorbital edema (782 3) (R60 0)   14  Polyarthralgia (719 49) (M25 50)   15  Postgastrectomy malabsorption (579 3) (K91 2,Z90 3)   16  Right hamstring muscle strain (843 8) (S76 311A)   17  S/P gastric bypass (V45 86) (Z98 84)   18  Sjogren's syndrome (710 2) (M35 00)   19  Systemic lupus erythematosus (710 0) (M32 9)   20  Thyroid disorder (246 9) (E07 9)   21  Vaginal atrophy (627 3) (N95 2)   22  Visit for screening mammogram (V76 12) (Z12 31)   23  Vitamin D deficiency (268 9) (E55 9)   24  Well female exam with routine gynecological exam (V72 31) (Z01 419)    Past Medical History  1  History of Closed head injury (959 01) (S09 90XA)   2  History of Contusion of chest wall (922 1) (S20 219A)   3  History of Contusion of right knee (924 11) (S80 01XA)   4  History of Contusion of right leg (924 5) (S80 11XA)   5  History of Contusion of right lower leg (924 10) (S80 11XA)   6  History of Elbow contusion (923 11) (S50 00XA)   7  History of Morbid or severe obesity due to excess calories (278 01) (E66 01)   8  History of Pain in elbow joint (719 42) (M25 529)    Surgical History  1  History of Ear Surgery   2  History of Elbow Surgery   3  History of Exploratory Laparoscopy   4  History of Gastric Surgery For Morbid Obesity Gastric Bypass   5  History of Hernia Repair   6  History of Hysterectomy   7   History of Tonsillectomy    Family History  Mother    1  Family history of Congenital Heart Disease   2  Family history of Congestive Heart Failure   3  Family history of Diabetes Mellitus (V18 0)   4  Family history of Hypertension (V17 49)  Father    5  Family history of Congenital Heart Disease   6  Family history of Diabetes Mellitus (V18 0)   7  Family history of Stroke Syndrome (V17 1)  Son    8  Family history of kidney stones (V18 69) (Z84 1)  Maternal Half Sister    5  Family history of leukemia (V16 6) (Z80 6)  Brother    8  Family history of Congenital Heart Disease   11  Family history of Congestive Heart Failure  Maternal Half Brother    15  Family history of CAD (coronary artery disease)  Family History    13  Family history of Crohn's disease (V18 59) (Z83 79)   14  Family history of psoriasis (V19 4) (Z84 0)   15  Family history of rheumatoid arthritis (V17 7) (Z82 61)   16  Family history of systemic lupus erythematosus (V19 4) (Z82 69)    The family history was reviewed and updated today  Social History   · Denied: History of Drug Use   · Never A Smoker   · Never Drank Alcohol  The social history was reviewed and updated today  Current Meds   1  Acetaminophen-Codeine #3 300-30 MG Oral Tablet; take 1 tablet 3 times daily as   needed for pain; Therapy: 34OLJ7466 to (Evaluate:62Urt6105); Last Rx:07Oxt9491 Ordered   2  Aspirin 81 MG TABS; TAKE 1 TABLET DAILY; Therapy: (Recorded:65Gxf3717) to Recorded   3  Biotin 5000 MCG Oral Tablet Recorded   4  BuPROPion HCl - 100 MG Oral Tablet; TAKE 3 TABLET Daily; Therapy: 92GXU0085 to Recorded   5  Citracal Petites/Vitamin D TABS; Take 1 tablet twice daily; Therapy: (Recorded:36Wnq3796) to Recorded   6  Fiber Select Gummies CHEW; Take 1 tablet daily; Therapy: (Recorded:75Qdd6833) to Recorded   7  Restoril 30 MG Oral Capsule; TAKE 1 CAPSULE AT BEDTIME AS NEEDED FOR SLEEP; Therapy: (Recorded:90Glf5674) to Recorded   8   RisperiDONE 0 5 MG Oral Tablet; take 1-2 tabs at night; Therapy: (Recorded:47Opu6250) to Recorded   9  SEROquel 25 MG Oral Tablet; TAKE TABLET  PRN; Therapy: 32MWY8431 to Recorded   10  SEROquel 300 MG Oral Tablet; TAKE TABLET Bedtime; Therapy: (Recorded:40Iiz7501) to Recorded   11  Synthroid 75 MCG Oral Tablet; TAKE 1 TABLET DAILY AS DIRECTED; Therapy: (Recorded:40Dwl2149) to Recorded    Allergies  1  Cipro TABS   2  Cymbalta CPEP    Vitals  Vital Signs    Recorded: 14Sep2017 11:45AM   Temperature 99 F   Heart Rate 83   Respiration 16   Systolic 104   Diastolic 76   Height 5 ft 0 24 in   Weight 184 lb    BMI Calculated 35 65   BSA Calculated 1 81   O2 Saturation 94   Pain Scale 0     Physical Exam    Constitutional   General appearance: No acute distress, well appearing and well nourished  Eyes   Conjunctiva and lids: No swelling, erythema or discharge  Ears, Nose, Mouth, and Throat   External inspection of ears and nose: Normal     Oropharynx: Normal with no erythema, edema, exudate or lesions  Pulmonary   Auscultation of lungs: Clear to auscultation  Cardiovascular   Auscultation of heart: Normal rate and rhythm, normal S1 and S2, without murmurs  Examination of extremities for edema and/or varicosities: Normal     Abdomen   Abdomen: Non-tender, no masses  Liver and spleen: No hepatomegaly or splenomegaly  Lymphatic   Palpation of lymph nodes in neck: No lymphadenopathy  Musculoskeletal   Gait and station: Normal     Skin   Skin and subcutaneous tissue: Normal without rashes or lesions  Neurologic   Cranial nerves: Cranial nerves 2-12 intact  Psychiatric   Orientation to person, place, and time: Normal          Future Appointments    Date/Time Provider Specialty Site   10/10/2017 03:20 PM KIRK Ricci , PhD [facility]   Electronically signed by : KIRK Perkins  Sep 14 2017 12:28PM EST                       (Author)

## 2017-10-29 NOTE — PROGRESS NOTES
Plan  Abnormal chest CT, Dyspnea on exertion    · Complete PFT; Status:Active; Requested for:10Oct2017;   Perform:[facility]; Due:56Bem4100; Last Updated Deep Mosqueda; 10/10/2017 3:59:35 PM;Ordered; For:Abnormal chest CT, Dyspnea on exertion; Ordered By:Prasanna Barnard; Abnormal chest CT, Sjogren's syndrome, Systemic lupus erythematosus    · *1 -  [facility]  Areas of groundglass density in the lungwith associated the few lung cysts and scattered small lung nodules  Hx of SLE andSjogren's  Status: Active  Requested for: 22PUD5971  Ordered; For: Abnormal chest CT, Sjogren's syndrome, Systemic lupus erythematosus; Ordered By: Ronan Posadas  Performed:   Due: 48PTG6748; Last Updated By: Marylen Carbo; 10/10/2017 3:59:35 PM  () Care Summary provided  : Yes    Discussion/Summary  Cardiology Discussion Summary Free Text Note Form [facility]:   73 y/o woman w/ SLE and Sjogren's p/f evaluation of HASKINS  HASKINS: Likely multifactorial including deconditioning, obesity, and interstitial pulmonary process  Given her family history and inflammatory disease, we did a stress echo with exercise which showed no WMA, no arrhythmia w/ exercise, and normal RV w/ and w/o exercise  She achieved 6 1 METS and stopped due to moderate fatigue, leg pain, and mild dyspnea  TTE showed normal LVEF w/ normal RV and grade 1 diastolic dysfunction  There is no indication for further cardiac testing  her inflammatory disease (SLE and Sjogren's) history w/ elevated ESR, worsening HASKINS hx and benign cardiac workup with abnormal CT chest (groundglass density in the lungs with associated few lung cysts and scattered small lung nodules) we will refer her to pulmonary for further workup and management  patient in the hallway for several minutes, HR increased to 120s, but O2 saturation went from 97% at start to 93% at the end  Patient exhibited mild cough, fatigue, and HASKINS   diet and exercise as toleratedreferral givenwith   Tonie (enlarged nodes likely inflammatory, but will defer to Dr Kevin Hickman, Rheumatology, and Pulmonary)  ESR: Unclear etiologywith rheum and heme/onc    # Sjogren's: per RheumSLE: per RheumAnemia: Per Heme/Onc    1  F/U prn       1 Amended By: Kelsey Cortes; Oct 10 2017 4:03 PM EST    Chief Complaint  Chief Complaint Free Text Note Form: F/U      History of Present Illness  Cardiology HPI Free Text Note Form [facility]: Very pleasant 74year old woman w/ PMHx of systemic lupus, Sjogren's syndrome and elevated ESR>120 who is referred for cardiac evaluation  She was started on belimumab therapy, but this has only helped a little  In addition, on therapy, per the patient, her ESR did not improve dramatically  Her SLE is considered to be mildly active currently  Remote history of steroids, but none within the past 10 years  states that her mother  of heart problems at 61 and father had CVA, both had HTN and diabetes  She states that she gets SOB going up 8 of her 13 steps at home  This has been steadily getting worse over the past year  She could go up all 13 steps without too much difficulty a year ago  She does not stop, but does slow done  She states she can walk about 1/4 mile, but one year ago could walk about 2 to 3x that distance  She reports recent onset of mild chest discomfort at rest  She does not believe it is with exercise  She endorses mild LH with rapid positional changes  She denies palpitations, presyncope, or syncope  She also denies orthopnea, PND, or LE edema  returns for f/u  She had stress echo and TTE  She achieved 6 1 METS and stopped due to moderate fatigue, leg pain, and mild dyspnea  TTE showed normal LVEF w/ normal RV and grade 1 diastolic dysfunction  Today she states her Sjogren's is really bothering her (dry eyes, nose, and mouth)   She saw Dr Kevin Hickman who started her on IV iron and did a CT C/A/P  CT chest shows groundglass density in the lung with associated the few lung cysts and scattered small lung nodules  Review of Systems  Cardiology Female ROS:    Cardiac: as noted in HPI  Skin: No complaints of nonhealing sores or skin rash  Genitourinary: No complaints of recurrent urinary tract infections, frequent urination at night, difficult urination, blood in urine, kidney stones, loss of bladder control, kidney problems, denies any birth control or hormone replacement, is not post menopausal, not currently pregnant  Psychological: No complaints of feeling depressed, anxiety, panic attacks, or difficulty concentrating  General: No complaints of trouble sleeping, lack of energy, fatigue, appetite changes, weight changes, fever, frequent infections, or night sweats  Respiratory: shortness of breath  HEENT: No complaints of serious problems, hearing problems, nose problems, throat problems, or snoring  Gastrointestinal: No complaints of liver problems, nausea, vomiting, heartburn, constipation, bloody stools, diarrhea, problems swallowing, adbominal pain, or rectal bleeding  Hematologic: No complaints of bleeding disorders, anemia, blood clots, or excessive brusing  Neurological: No complaints of numbness, tingling, dizziness, weakness, seizures, headaches, syncope or fainting, AM fatigue, daytime sleepiness, no witnessed apnea episodes  Musculoskeletal: No complaints of arthritis, back pain, or painfull swelling  ROS Reviewed:   ROS reviewed  Active Problems  Problems   1  Acid reflux disease (530 81) (K21 9)  2  Anemia (285 9) (D64 9)  3  Bipolar I disorder, single manic episode (296 00) (F30 9)  4  Depression (311) (F32 9)  5  Dyspnea on exertion (786 09) (R06 09)  6  Elevated sedimentation rate (790 1) (R70 0)  7  Encounter for long term current azathioprine therapy (V58 69) (Z79 899)  8  Encounter for screening for malignant neoplasm of breast (V76 10) (Z12 39)  9  Generalized osteoarthritis (715 00) (M15 9)  10  Heart burn (787 1) (R12)  11   Obesity (278 00) (E66 9)  12  On belimumab therapy (V58 69) (Z79 899)  13  Osteoporosis screening (V82 81) (Z13 820)  14  Periorbital edema (782 3) (R60 0)  15  Polyarthralgia (719 49) (M25 50)  16  Postgastrectomy malabsorption (579 3) (K91 2,Z90 3)  17  Right hamstring muscle strain (843 8) (S76 311A)  18  S/P gastric bypass (V45 86) (Z98 84)  19  Sjogren's syndrome (710 2) (M35 00)  20  Systemic lupus erythematosus (710 0) (M32 9)  21  Thyroid disorder (246 9) (E07 9)  22  Vaginal atrophy (627 3) (N95 2)  23  Visit for screening mammogram (V76 12) (Z12 31)  24  Vitamin D deficiency (268 9) (E55 9)  25  Well female exam with routine gynecological exam (V72 31) (Z01 419)    Past Medical History  Problems   1  History of Closed head injury (959 01) (S09 90XA)  2  History of Contusion of chest wall (922 1) (S20 219A)  3  History of Contusion of right knee (924 11) (S80 01XA)  4  History of Contusion of right leg (924 5) (S80 11XA)  5  History of Contusion of right lower leg (924 10) (S80 11XA)  6  History of Elbow contusion (923 11) (S50 00XA)  7  History of Morbid or severe obesity due to excess calories (278 01) (E66 01)  8  History of Pain in elbow joint (719 42) (M25 529)  Active Problems And Past Medical History Reviewed: The active problems and past medical history were reviewed and updated today  Surgical History  Problems   1  History of Ear Surgery  2  History of Elbow Surgery  3  History of Exploratory Laparoscopy  4  History of Gastric Surgery For Morbid Obesity Gastric Bypass  5  History of Hernia Repair  6  History of Hysterectomy  7  History of Tonsillectomy  Surgical History Reviewed: The surgical history was reviewed and updated today  Family History  Mother   1  Family history of Congenital Heart Disease  2  Family history of Congestive Heart Failure  3  Family history of Diabetes Mellitus (V18 0)  4  Family history of Hypertension (V17 49)  Father   5  Family history of Congenital Heart Disease  6  Family history of Diabetes Mellitus (V18 0)  7  Family history of Stroke Syndrome (V17 1)  Son   8  Family history of kidney stones (V18 69) (Z84 1)  Maternal Half Sister   5  Family history of leukemia (V16 6) (Z80 6)  Brother   8  Family history of Congenital Heart Disease  11  Family history of Congestive Heart Failure  Maternal Half Brother   15  Family history of CAD (coronary artery disease)  Family History   13  Family history of Crohn's disease (V18 59) (Z83 79)  14  Family history of psoriasis (V19 4) (Z84 0)  15  Family history of rheumatoid arthritis (V17 7) (Z82 61)  16  Family history of systemic lupus erythematosus (V19 4) (Z82 69)  Family History Reviewed: The family history was reviewed and updated today  Social History  Problems    · Denied: History of Drug Use   · Never A Smoker   · Never Drank Alcohol  Social History Reviewed: The social history was reviewed and updated today  The social history was reviewed and is unchanged  Current Meds  1  Acetaminophen-Codeine #3 300-30 MG Oral Tablet; take 1 tablet 3 times daily as needed for pain; Therapy: 98TZD1543 to (Evaluate:21Lso2451); Last Rx:15Hns8903 Ordered  2  Aspirin 81 MG TABS; TAKE 1 TABLET DAILY; Therapy: (Recorded:30Nvr2571) to Recorded  3  Biotin 5000 MCG Oral Tablet Recorded  4  BuPROPion HCl - 100 MG Oral Tablet; TAKE 3 TABLET Daily; Therapy: 53ASJ9255 to Recorded  5  Citracal Petites/Vitamin D TABS; Take 1 tablet twice daily; Therapy: (Recorded:79Luy5765) to Recorded  6  Fiber Select Gummies CHEW; Take 1 tablet daily; Therapy: (Recorded:99Jqr2661) to Recorded  7  Restoril 30 MG Oral Capsule; TAKE 1 CAPSULE AT BEDTIME AS NEEDED FOR SLEEP; Therapy: (Recorded:60Mkm4817) to Recorded  8  RisperiDONE 0 5 MG Oral Tablet; take 1-2 tabs at night; Therapy: (Recorded:31Hke7773) to Recorded  9  SEROquel 25 MG Oral Tablet; TAKE TABLET  PRN; Therapy: 31SKT7114 to Recorded  10  SEROquel 300 MG Oral Tablet; TAKE TABLET Bedtime;   Therapy: (Recorded:66Nwo7886) to Recorded  11  Synthroid 75 MCG Oral Tablet; TAKE 1 TABLET DAILY AS DIRECTED; Therapy: (Recorded:26Glu7695) to Recorded  Medication List Reviewed: The medication list was reviewed and updated today  Allergies  Medication   1  Cipro TABS  2  Cymbalta CPEP    Vitals  Vital Signs    Recorded: 27TWN7365 03:33PM   Heart Rate 76, R Radial   Pulse Quality Normal, R Radial   Systolic 438, LUE, Sitting   Diastolic 76, LUE, Sitting   Height 5 ft    Weight 184 lb 4 oz   BMI Calculated 35 98   BSA Calculated 1 8   O2 Saturation 95       Physical Exam   Constitutional  General appearance: No acute distress, well appearing and well nourished  Eyes  Conjunctiva and Sclera examination: Conjunctiva pink, sclera anicteric  Ears, Nose, Mouth, and Throat - Oropharynx: Clear, nares are clear, mucous membranes are moist   Neck  Neck and thyroid: Normal, supple, trachea midline, no thyromegaly  Pulmonary  Respiratory effort: No increased work of breathing or signs of respiratory distress  Auscultation of lungs: Clear to auscultation, no rales, no rhonchi, no wheezing, good air movement  Cardiovascular  Auscultation of heart: Normal rate and rhythm, normal S1 and S2, no murmurs  Carotid pulses: Normal, 2+ bilaterally  Peripheral vascular exam: Normal pulses throughout, no tenderness, erythema or swelling  Pedal pulses: Normal, 2+ bilaterally  Examination of extremities for edema and/or varicosities: Normal    Abdomen  Abdomen: Non-tender and no distention  Liver and spleen: No hepatomegaly or splenomegaly  Musculoskeletal Gait and station: Normal gait  -- Digits and nails: Normal without clubbing or cyanosis  -- Inspection/palpation of joints, bones, and muscles: Normal, ROM normal    Skin - Skin and subcutaneous tissue: Normal without rashes or lesions  Skin is warm and well perfused, normal turgor  Neurologic - Cranial nerves: II - XII intact  -- Speech: Normal    Psychiatric - Orientation to person, place, and time: Normal -- Mood and affect: Normal       Results/Data  Diagnostic Studies Reviewed Cardio: I personally reviewed the recording/images in the office today  My interpretation follows  ECG Report: NSR w/ LAD and iRBBB      Future Appointments    Date/Time Provider Specialty Site   01/31/2018 01:00 PM Rosalinda Todd, [facility] [address] [facility] [facility]       Signatures   Electronically signed by : KIRK East  Oct 10 2017  4:03PM EST                       (Author)    Electronically signed by : KIRK East  Oct 10 2017  4:04PM EST                       (Author)

## 2017-11-22 ENCOUNTER — ALLSCRIPTS OFFICE VISIT (OUTPATIENT)
Dept: OTHER | Facility: OTHER | Age: 67
End: 2017-11-22

## 2017-11-23 NOTE — CONSULTS
Plan  Breath, shortness    · Proventil  (90 Base) MCG/ACT Inhalation Aerosol Solution; INHALE 1 TO 2PUFFS EVERY 4 TO 6 HOURS AS NEEDED   Rx By: Nae Hernandez; Dispense: 0 Days ; #:1 X 6 7 GM Inhaler; Refill: 6;For: Breath, shortness; PRASHANT = N; Verified Transmission to [id number]; Last Updated By: System[employer]; 11/22/2017 9:10:24 AM   · Bronchoscopy Diagnostic; Status:Active; Requested for:22Nov2017;    Perform:[facility]; [phone]; Ordered;shortness; Ordered By:Chaitanya Luna; Results/Data  Results Free Text Form [facility]:   Results  CT Scan LUNGS: There is groundglass density seen within the left lingular region, in the right upper lobe in the central aspect  A few lung cysts are seen with the cyst seen in the left lingular region, right upper lobe, in the right lower lobe  There are small lung nodules  A left upper lobe lung nodule seen measuring about 4 mm, located at the level of the main pulmonary artery  A right upper lobe lung nodule seen in image 19 of series 2additional nodule seen in the right upper lobe in image 18 of series 2is a suggestion of beading of the peribronchovascular interstitialThere is no pleural plaquing seen in the no pleural thickening seen  PFT Results v2:     Spirometry:   Post Bronchodilator Spirometry:   Lung Volumes:   DLCO:   PFT Interpretation:  mild restrictionto complete dlco       Discussion/Summary  Discussion Summary:   Patient is 53-year-old female with past medical history significant for Sjogren syndrome, lupus who presents for evaluation of shortness of Breath  It is unclear at this time exactly what is causing her shortness of breath that she has multiple possibilities including the Sjogren syndrome and the lupus  It is interesting to note that she reports her symptoms have been present for approximately 1 year which corresponds to her discontinuation of the methotrexate  Patient denies correlation but there is the possibility   On the patient's imaging she has multilobar ground-glass opacities concerning for inflammation versus infection  Less likely infection given the relative stability of the patient in the 6 weeks since the scan is completed  The pulmonary function testing was reviewed with the patient which demonstrated restrictive lung disease but unfortunately DLCO was unable to be completed  This is possibly consistent with a vanishing lung syndrome associated with lupus but unfortunate no previous pulmonary function tests available  There is also possibility of interstitial lung disease versus infection in setting of immunocompromised  this time will proceed with bronchoscopy with BAL to rule out infectious process in setting of ground-glass opacities  If there is no signs infection will initiate trial of prednisone and monitor her symptoms  patient did report significant proven her ability to exhale after taking albuterol during the pulmonary function testing  Will give patient a prescription for albuterol with instructions to use as needed  in 2 weeks with follow-up in office 7-10 days later  Goals and Barriers: The patient has the current Goals: Improvement in breathing  The patent has the current Barriers: Denies  Patient's Capacity to Self-Care: Patient is able to Self-Care  Medication SE Review and Pt Understands Tx: The treatment plan was reviewed with the patient/guardian   The patient/guardian understands and agrees with the treatment plan   Self Referrals:   Self Referrals: No      Active Problems     · Abnormal chest CT (793 2) (R93 8)   · Acid reflux disease (530 81) (K21 9)   · Anemia (285 9) (D64 9)   · Bipolar I disorder, single manic episode (296 00) (F30 9)   · Depression (311) (F32 9)   · Dyspnea on exertion (786 09) (R06 09)   · Elevated sedimentation rate (790 1) (R70 0)   · Encounter for long term current azathioprine therapy (V58 69) (E82 529)   · Encounter for screening for malignant neoplasm of breast (V76 10) (Z12 39)   · Generalized osteoarthritis (715 00) (M15 9)   · Heart burn (787 1) (R12)   · Obesity (278 00) (E66 9)   · On belimumab therapy (V58 69) (E02 948)   · Osteoporosis screening (V82 81) (R05 393)   · Periorbital edema (782 3) (R60 0)   · Polyarthralgia (719 49) (M25 50)   · Postgastrectomy malabsorption (579 3) (K91 2,Z90 3)   · Right hamstring muscle strain (843 8) (S76 311A)   · S/P gastric bypass (V45 86) (Z98 84)   · Sjogren's syndrome (710 2) (M35 00)   · Systemic lupus erythematosus (710 0) (M32 9)   · Thyroid disorder (246 9) (E07 9)   · Vaginal atrophy (627 3) (N95 2)   · Visit for screening mammogram (V76 12) (Z12 31)   · Vitamin D deficiency (268 9) (E55 9)   · Well female exam with routine gynecological exam (V72 31) (Z01 419)    Chief Complaint  Chief Complaint Free Text Note Form: having a lot of SOB      History of Present Illness  HPI: Pt is a 74yo F w/ pmhx sig SLE, sjogrens syndrome who presents for follow up of shortness of breath   patient reports being short of breath for about the past year, prior to that she did not notice any difficulties  She notices the dyspnea primarily going up stairs - she can make up 8/13 stairs  She is able to walk about 1/4 mile now and feels like her breathing is getting worse; each day is constant w/o much variation  She has a chronic, dry cough for the past 3-4 months but denies hemoptysis or wheezing  Pt reports only occasional chest pain associated with walking increased distances or climbing stairs  Denies pleuritic pain  She denies congestion  She has a fevers, chills but denies night sweats,weight loss, rashes, skin problems, edema  She has dry mouth,eyes and nose   has a remote use of inhaler and reported good results from her PFTs  She has no triggers for her shortness of breath  her autoimmune condition, she was previously taking methotrexate which she stopped about a year ago   She is unsure if there is a relationship between discontinuing methotrexate and worsening of her shortness of breath  Her last steroid usage was 5-6 years ago  She currently does not have a rheumatologist   of 12 point review of systems is negative  - never smoker-denies- denies  HR/gift shop: denies dust/gas/fumescrafting/reading; denies exposuresdenieslocaldeniesHistory: denies history of lung or autoimmune disease      Review of Systems  ROS Reviewed:   ROS reviewed  Past Medical History  1  History of Closed head injury (959 01) (S09 90XA)   2  History of Contusion of chest wall (922 1) (S20 219A)   3  History of Contusion of right knee (924 11) (S80 01XA)   4  History of Contusion of right leg (924 5) (S80 11XA)   5  History of Contusion of right lower leg (924 10) (S80 11XA)   6  History of Elbow contusion (923 11) (S50 00XA)   7  History of Morbid or severe obesity due to excess calories (278 01) (E66 01)   8  History of Pain in elbow joint (719 42) (M25 529)  Active Problems And Past Medical History Reviewed: The active problems and past medical history were reviewed and updated today  Surgical History    1  History of Ear Surgery   2  History of Elbow Surgery   3  History of Exploratory Laparoscopy   4  History of Gastric Surgery For Morbid Obesity Gastric Bypass   5  History of Hernia Repair   6  History of Hysterectomy   7  History of Tonsillectomy  Surgical History Reviewed: The surgical history was reviewed and updated today  Family History  Mother    1  Family history of Congenital Heart Disease   2  Family history of Congestive Heart Failure   3  Family history of Diabetes Mellitus (V18 0)   4  Family history of Hypertension (V17 49)  Father    5  Family history of Congenital Heart Disease   6  Family history of Diabetes Mellitus (V18 0)   7  Family history of Stroke Syndrome (V17 1)  Son    8  Family history of kidney stones (V18 69) (Z84 1)  Maternal Half Sister    5  Family history of leukemia (V16 6) (Z80 6)  Brother    8   Family history of Congenital Heart Disease   11  Family history of Congestive Heart Failure  Maternal Half Brother    15  Family history of CAD (coronary artery disease)  Family History    13  Family history of Crohn's disease (V18 59) (Z83 79)   14  Family history of psoriasis (V19 4) (Z84 0)   15  Family history of rheumatoid arthritis (V17 7) (Z82 61)   16  Family history of systemic lupus erythematosus (V19 4) (Z82 69)  Family History Reviewed: The family history was reviewed and updated today  Social History     · Denied: History of Drug Use   · Never A Smoker   · Never Drank Alcohol  Social History Reviewed: The social history was reviewed and updated today  The social history was reviewed and is unchanged  Current Meds   1  Acetaminophen-Codeine #3 300-30 MG Oral Tablet; take 1 tablet 3 times daily as needed for pain; Therapy: 59ADL8764 to (Evaluate:26Qqy2261); Last Rx:48Rvf4231 Ordered   2  Aspirin 81 MG TABS; TAKE 1 TABLET DAILY; Therapy: (Recorded:64Zyq8871) to Recorded   3  Biotin 5000 MCG Oral Tablet Recorded   4  BuPROPion HCl - 100 MG Oral Tablet; TAKE 3 TABLET Daily; Therapy: 12YJH3265 to Recorded   5  Citracal Petites/Vitamin D TABS; Take 1 tablet twice daily; Therapy: (Recorded:74Zfz5186) to Recorded   6  Fiber Select Gummies CHEW; Take 1 tablet daily; Therapy: (Recorded:07Oiy5973) to Recorded   7  Restoril 30 MG Oral Capsule; TAKE 1 CAPSULE AT BEDTIME AS NEEDED FOR SLEEP; Therapy: (Recorded:23Atg9408) to Recorded   8  RisperiDONE 0 5 MG Oral Tablet; take 1-2 tabs at night; Therapy: (Recorded:18Beh0994) to Recorded   9  SEROquel 25 MG Oral Tablet; TAKE TABLET  PRN; Therapy: 67ACH1846 to Recorded   10  SEROquel 300 MG Oral Tablet; TAKE TABLET Bedtime; Therapy: (Recorded:41Tos6922) to Recorded   11  Synthroid 75 MCG Oral Tablet; TAKE 1 TABLET DAILY AS DIRECTED; Therapy: (Recorded:63Bkr3049) to Recorded  Medication List Reviewed: The medication list was reviewed and updated today  Allergies  1  Cipro TABS   2  Cymbalta CPEP    Vitals  Vital Signs    Recorded: 22Nov2017 08:09AM   Temperature 98 5 F   Heart Rate 70   Respiration 16   Systolic 684   Diastolic 80   Height 5 ft    Weight 178 lb    BMI Calculated 34 76   BSA Calculated 1 78   O2 Saturation 94, RA       Physical Exam   Constitutional  General appearance: No acute distress, well appearing and well nourished  Eyes  Examination of pupil and irises: Abnormal-- dry eyes  Ears, Nose, Mouth, and Throat petechia  Oropharynx: Abnormal  -- previous cleft palate surgery  Neck  Neck: Supple, symmetric, trachea midline, no masses  Pulmonary  Chest: Normal    Respiratory effort: No increased work of breathing or signs of respiratory distress  Auscultation of lungs: Clear to auscultation, no rales, no crackles, no wheezing  Cardiovascular  Auscultation of heart: Normal rate and rhythm, normal S1 and S2, no murmurs  Examination of extremities for edema and/or varicosities: Normal    Abdomen  Abdomen: Soft, non-tender  Lymphatic  Palpation of lymph nodes in neck: No lymphadenopathy  Musculoskeletal  Gait and station: Normal    Digits and nails: Normal without clubbing or cyanosis  Neurologic  Mental Status: Normal  Not confused, no evidence of dementia, good comprehension, good concentration  Motor: Abnormal  -- 4+/5  Skin  Skin and subcutaneous tissue: Limited exam shows no rash  Palpation of skin and subcutaneous tissue: Normal turgor  Psychiatric  Orientation to person, place and time: Normal    Mood and affect: Normal        Procedure  6 minutes walk test: No episodes of desaturation maintain saturations between 95 and 97%  Distance covered was 270 meters        Future Appointments    Date/Time Provider Specialty Site   01/31/2018 01:00 PM Sudha Ferrer [facility] Hematology Oncology [facility] [place]       Signatures   Electronically signed by : KIRK Mendoza ; Nov 22 2017  9:34AM EST (Author)

## 2017-12-07 ENCOUNTER — ANESTHESIA EVENT (OUTPATIENT)
Dept: GASTROENTEROLOGY | Facility: HOSPITAL | Age: 67
End: 2017-12-07
Payer: COMMERCIAL

## 2017-12-08 ENCOUNTER — HOSPITAL ENCOUNTER (OUTPATIENT)
Facility: HOSPITAL | Age: 67
Setting detail: OUTPATIENT SURGERY
Discharge: HOME | End: 2017-12-08
Attending: INTERNAL MEDICINE | Admitting: INTERNAL MEDICINE
Payer: COMMERCIAL

## 2017-12-08 ENCOUNTER — ANESTHESIA (OUTPATIENT)
Dept: GASTROENTEROLOGY | Facility: HOSPITAL | Age: 67
End: 2017-12-08
Payer: COMMERCIAL

## 2017-12-08 VITALS
HEIGHT: 61 IN | SYSTOLIC BLOOD PRESSURE: 118 MMHG | TEMPERATURE: 97.6 F | HEART RATE: 66 BPM | BODY MASS INDEX: 32.85 KG/M2 | RESPIRATION RATE: 18 BRPM | DIASTOLIC BLOOD PRESSURE: 66 MMHG | WEIGHT: 174 LBS | OXYGEN SATURATION: 97 %

## 2017-12-08 DIAGNOSIS — R91.1 LUNG NODULE: ICD-10-CM

## 2017-12-08 PROCEDURE — 88184 FLOWCYTOMETRY/ TC 1 MARKER: CPT | Performed by: INTERNAL MEDICINE

## 2017-12-08 PROCEDURE — 87070 CULTURE OTHR SPECIMN AEROBIC: CPT | Performed by: INTERNAL MEDICINE

## 2017-12-08 PROCEDURE — 87015 SPECIMEN INFECT AGNT CONCNTJ: CPT | Performed by: INTERNAL MEDICINE

## 2017-12-08 PROCEDURE — 87116 MYCOBACTERIA CULTURE: CPT | Performed by: INTERNAL MEDICINE

## 2017-12-08 PROCEDURE — 88112 CYTOPATH CELL ENHANCE TECH: CPT | Performed by: INTERNAL MEDICINE

## 2017-12-08 PROCEDURE — 87102 FUNGUS ISOLATION CULTURE: CPT | Performed by: INTERNAL MEDICINE

## 2017-12-08 PROCEDURE — 88305 TISSUE EXAM BY PATHOLOGIST: CPT | Performed by: INTERNAL MEDICINE

## 2017-12-08 PROCEDURE — 87206 SMEAR FLUORESCENT/ACID STAI: CPT | Performed by: INTERNAL MEDICINE

## 2017-12-08 PROCEDURE — 87252 VIRUS INOCULATION TISSUE: CPT | Performed by: INTERNAL MEDICINE

## 2017-12-08 PROCEDURE — 88185 FLOWCYTOMETRY/TC ADD-ON: CPT | Performed by: INTERNAL MEDICINE

## 2017-12-08 PROCEDURE — 88185 FLOWCYTOMETRY/TC ADD-ON: CPT

## 2017-12-08 PROCEDURE — 87106 FUNGI IDENTIFICATION YEAST: CPT | Performed by: INTERNAL MEDICINE

## 2017-12-08 RX ORDER — LIDOCAINE HYDROCHLORIDE 10 MG/ML
INJECTION, SOLUTION EPIDURAL; INFILTRATION; INTRACAUDAL; PERINEURAL AS NEEDED
Status: DISCONTINUED | OUTPATIENT
Start: 2017-12-08 | End: 2017-12-08 | Stop reason: HOSPADM

## 2017-12-08 RX ORDER — SODIUM CHLORIDE, SODIUM LACTATE, POTASSIUM CHLORIDE, CALCIUM CHLORIDE 600; 310; 30; 20 MG/100ML; MG/100ML; MG/100ML; MG/100ML
125 INJECTION, SOLUTION INTRAVENOUS CONTINUOUS
Status: DISCONTINUED | OUTPATIENT
Start: 2017-12-08 | End: 2017-12-08 | Stop reason: HOSPADM

## 2017-12-08 RX ORDER — PROPOFOL 10 MG/ML
INJECTION, EMULSION INTRAVENOUS AS NEEDED
Status: DISCONTINUED | OUTPATIENT
Start: 2017-12-08 | End: 2017-12-08 | Stop reason: SURG

## 2017-12-08 RX ADMIN — PROPOFOL 20 MG: 10 INJECTION, EMULSION INTRAVENOUS at 08:15

## 2017-12-08 RX ADMIN — SODIUM CHLORIDE, SODIUM LACTATE, POTASSIUM CHLORIDE, AND CALCIUM CHLORIDE: .6; .31; .03; .02 INJECTION, SOLUTION INTRAVENOUS at 08:11

## 2017-12-08 RX ADMIN — PROPOFOL 10 MG: 10 INJECTION, EMULSION INTRAVENOUS at 08:20

## 2017-12-08 RX ADMIN — PROPOFOL 100 MG: 10 INJECTION, EMULSION INTRAVENOUS at 08:10

## 2017-12-08 NOTE — ANESTHESIA PREPROCEDURE EVALUATION
Review of Systems/Medical History  Patient summary reviewed  Chart reviewed      Cardiovascular  Exercise tolerance: good,     Pulmonary  Pneumonia, ,        GI/Hepatic    No GERD ,        Negative  ROS        Endo/Other  History of thyroid disease , hyperthyroidism,      GYN  Negative gynecology ROS          Hematology  Negative hematology ROS      Musculoskeletal  Negative musculoskeletal ROS        Neurology  Negative neurology ROS      Psychology   No anxiety, Depression , bipolar disorder,            Physical Exam    Airway    Mallampati score: II  TM Distance: >3 FB  Neck ROM: full     Dental   No notable dental hx     Cardiovascular  Cardiovascular exam normal    Pulmonary  Pulmonary exam normal     Other Findings        Anesthesia Plan  ASA Score- 3       Anesthesia Type- IV sedation with anesthesia with ASA Monitors  Additional Monitors:   Airway Plan: ETT  Induction- intravenous  Informed Consent- Anesthetic plan and risks discussed with patient

## 2017-12-08 NOTE — ANESTHESIA POSTPROCEDURE EVALUATION
Post-Op Assessment Note      CV Status:  Stable    Mental Status:  Alert and awake    Hydration Status:  Stable    PONV Controlled:  None    Airway Patency:  Patent    Post Op Vitals Reviewed: Yes          Staff: Anesthesiologist           BP      Temp     Pulse     Resp      SpO2

## 2017-12-08 NOTE — DISCHARGE INSTRUCTIONS
Flexible Bronchoscopy   WHAT YOU NEED TO KNOW:   A flexible bronchoscopy is a procedure to look inside your respiratory system  Healthcare providers use a bronchoscope, which is a soft, bendable tube with a light and tiny camera on the end  Pictures of your respiratory system appear on a monitor during the procedure  DISCHARGE INSTRUCTIONS:   Medicines:   · Pain medicine: You may be given medicine to take away or decrease pain  Do not wait until the pain is severe before you take your medicine  · Take your medicine as directed  Contact your healthcare provider if you think your medicine is not helping or if you have side effects  Tell him or her if you are allergic to any medicine  Keep a list of the medicines, vitamins, and herbs you take  Include the amounts, and when and why you take them  Bring the list or the pill bottles to follow-up visits  Carry your medicine list with you in case of an emergency  Follow up with your healthcare provider as directed: Ask when you should expect the results of your procedure  Write down your questions so you remember to ask them during your visits  Self-care:   · Do not cough or clear your throat for a few days  This will help prevent bleeding  · Do not smoke  Smoking can cause severe damage to your airway  Ask your healthcare provider for information if you need help quitting  Contact your healthcare provider if:   · You have a fever  · Your pain does not go away, even after you take medicine  · You have new symptoms or your symptoms are worse than before  · You have questions or concerns about your condition or care  Seek care immediately or call 911 if:   · You cannot swallow  · You cough up blood  · You are confused and dizzy or feel like you are going to faint  · You have shortness of breath  · Your lips or nails turn blue      · You have chest pain or feel like your heart is beating faster than normal   © 2017 [address] [facility] is for End User's use only and may not be sold, redistributed or otherwise used for commercial purposes  All illustrations and images included in CareNotes® are the copyrighted property of A D A M , Inc  or Jacky Bog[employer]  The above information is an  only  It is not intended as medical advice for individual conditions or treatments  Talk to your doctor, nurse or pharmacist before following any medical regimen to see if it is safe and effective for you

## 2017-12-08 NOTE — OP NOTE
**** PULMONARY REPORT ****     PATIENT NAME: Jennifer Gillette ------- VISIT ID :  Patient ID:   [id number] YOB: 1950     INTRODUCTION: Bronchoscopy - A 74 female patient presents for outpatient   Bronchoscopy at [place] [address]  INDICATIONS:  Abnormal CT scan  CONSENT: The benefits, risks, and alternatives to the procedure were   discussed and informed consent was obtained from the patient  PREPARATION: EKG, pulse, pulse oximetry and blood pressure were monitored   throughout the procedure  Airway Assessment Classification: Airway class 2   - Visualization of the soft palate, fauces and uvula  ASA Classification:   Class 3 - Patient has severe systemic disturbance that may or may not be   related to the disorder requiring surgery  MEDICATIONS: See anesthesiologist's report  PROCEDURE:  The bronchoscope was passed via the left naris and advanced   into the tracheobronchial tree  The vocal cords are symmetric and move   normally with respiration  The trachea is of normal caliber  The main   dalia is full in appearance and normal  The tracheobronchial tree was   examined bilaterally to at least the first subsegmental level  Anatomy is   normal  Mucosa is normal  There are no secretions noted  There are no   endobronchial abnormalities  The bronchoscope was passed with ease through   the oral cavity; it was extended to the pharynx, larynx, trachea, right   bronchial tree, and left bronchial tree  The views were excellent  The   patient's toleration of the procedure was excellent  FINDINGS:   Right bronchial tree: There was a diffuse erythema present in   the right anterior segment (RB3) and right anterior segment (Sammuel Echevaria)  A   bronchoalveolar lavage was performed in the right upper lobe dalia (RC1)  Left bronchial tree: There was a diffuse erythema present in the left   bronchial tree  ESTIMATED BLOOD LOSS:  None       COMPLICATIONS:  There were no unplanned events  IMPRESSIONS: Erythema in the right anterior segment (RB3) and right   anterior segment (RB3a), bronchoalveolar lavage  Erythema in the left   bronchial tree  Full posterior oropharynx  Diffuse erythema throughout   airway, some thick secretions noted  BAL completed in RUL w/ cloudy return  RECOMMENDATIONS: Will wait for culture and laboratry results  Will refer   for sleep study  PROCEDURE CODES:     ICD-9 Codes: 695 9 Unspecified erythematous conditions 695 9 Unspecified   erythematous conditions     ICD-10 Codes: R09 89 Other specified symptoms and signs involving the   circulatory and respiratory systems R09 89 Other specified symptoms and   signs involving the circulatory and respiratory systems     PERFORMED BY:KIRK Richard  on 12/08/2017  Assisted By: The procedure was assisted by [facility]  Version 1, electronically signed by KIRK Ambrocio  on 12/08/2017   at 08:32

## 2017-12-10 LAB
BACTERIA BRONCH AEROBE CULT: NORMAL
BACTERIA BRONCH AEROBE CULT: NORMAL
GRAM STN SPEC: NORMAL
P JIROVECII AG SPEC QL IF: NORMAL

## 2017-12-11 LAB
SCAN RESULT: NORMAL
SCAN RESULT: NORMAL

## 2017-12-15 LAB — FUNGUS SPEC CULT: ABNORMAL

## 2017-12-18 LAB
VIRUS SPEC CULT: NORMAL
VIRUS SPEC CULT: NORMAL

## 2017-12-26 LAB — LEGIONELLA SPEC CULT: NORMAL

## 2018-01-09 NOTE — RESULT NOTES
Verified Results  ECHO COMPLETE WITH CONTRAST IF INDICATED 2017 01:46PM Maureen Ma Order Number: [id number]    - Patient Instructions: To schedule this appointment, please contact Central Scheduling at [phone]  Test Name Result Flag Reference   ECHO COMPLETE WITH CONTRAST IF INDICATED (Report)     [facility], [place], [address] [place] [address]   [phone]     Transthoracic Echocardiogram   2D, M-mode, Doppler, and Color Doppler     Study date: 25-Sep-2017     Patient: Layvonne Shone   MR number: [id number]   Account number: [de-identified]   : 1950   Age: 74 years   Gender: Female   Status: Outpatient   Location: [address] [place] [address]   Height: 61 in   Weight: 175 lb   BP: 124/ 84 mmHg     Indications: Dyspnea     Diagnoses: R06 00 - Dyspnea, unspecified     Sonographer: YANI Kingsley   Primary Physician: Mireya Cheema MD   Referring Physician: Mary Knox MD PhD   Group: [id number]   Interpreting Physician: Sylvie Palacio MD     SUMMARY     LEFT VENTRICLE:   Systolic function was normal  Ejection fraction was estimated to be 65 %  There were no regional wall motion abnormalities  Wall thickness was increased  Concentric hypertrophy was present  Doppler parameters were consistent with abnormal left ventricular relaxation (grade 1 diastolic dysfunction)  MITRAL VALVE:   There was trace regurgitation  AORTIC VALVE:   There was trace regurgitation  HISTORY: PRIOR HISTORY: Lupus, Sjogren's, GERD, Gastric Bypass     PROCEDURE: The study was performed in the [address] [place] [address]  This was a routine study  The transthoracic approach was used  The study included complete 2D imaging, M-mode, complete spectral Doppler, and color Doppler  The   heart rate was 76 bpm, at the start of the study  Images were obtained from the parasternal, apical, subcostal, and suprasternal notch acoustic windows  Echocardiographic views were limited due to lung interference  This was a technically   difficult study  LEFT VENTRICLE: Size was normal  Systolic function was normal  Ejection fraction was estimated to be 65 %  There were no regional wall motion abnormalities  Wall thickness was increased  Concentric hypertrophy was present  DOPPLER: There   was an increased relative contribution of atrial contraction to ventricular filling  Doppler parameters were consistent with abnormal left ventricular relaxation (grade 1 diastolic dysfunction)  VENTRICULAR SEPTUM: There was sigmoid septal appearance  RIGHT VENTRICLE: The size was normal  Systolic function was normal      LEFT ATRIUM: Size was normal      RIGHT ATRIUM: Size was normal      MITRAL VALVE: Valve structure was normal  There was mild-moderate thickening  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  There was normal leaflet separation  There was systolic bowing, but without diagnostic evidence   for prolapse  DOPPLER: There was no evidence for stenosis  There was trace regurgitation  AORTIC VALVE: The valve was trileaflet  Leaflets exhibited mildly increased thickness, normal cuspal separation, and sclerosis  DOPPLER: There was no evidence for stenosis  There was trace regurgitation  TRICUSPID VALVE: The valve structure was normal  There was normal leaflet separation  DOPPLER: There was no evidence for stenosis  There was no regurgitation  PULMONIC VALVE: Leaflets exhibited normal thickness, no calcification, and normal cuspal separation  DOPPLER: The transpulmonic velocity was within the normal range  There was trace regurgitation  PERICARDIUM: There was no pericardial effusion  The pericardium was normal in appearance  AORTA: The root exhibited normal size  SYSTEMIC VEINS: IVC: The inferior vena cava was normal in size and course   Respirophasic changes were normal      SYSTEM MEASUREMENT TABLES     2D   %FS: 37 37 %   AV Diam: 3 08 cm   EDV(Teich): 63 94 ml   EF Biplane: 63 79 %   EF(Cube): 75 43 %   EF(Teich): 68 08 %   ESV(Cube): 14 03 ml   ESV(Teich): 20 41 ml   IVSd: 1 18 cm   LA Area: 14 78 cm2   LA Diam: 3 92 cm   LVEDV MOD A2C: 87 44 ml   LVEDV MOD A4C: 86 07 ml   LVEDV MOD BP: 87 13 ml   LVEF MOD A2C: 54 96 %   LVEF MOD A4C: 73 2 %   LVESV MOD A2C: 39 38 ml   LVESV MOD A4C: 23 07 ml   LVESV MOD BP: 31 55 ml   LVIDd: 3 85 cm   LVIDs: 2 41 cm   LVLd A2C: 7 28 cm   LVLd A4C: 7 17 cm   LVLs A2C: 5 96 cm   LVLs A4C: 5 4 cm   LVPWd: 1 17 cm   RA Area: 10 38 cm2   RV Diam: 2 67 cm   SV MOD A2C: 48 05 ml   SV MOD A4C: 63 ml   SV(Cube): 43 07 ml   SV(Teich): 43 53 ml     MM   TAPSE: 1 93 cm     PW   E': 0 06 m/s   E/E': 8 37   MV A Anthony: 0 56 m/s   MV Dec Fleming: 2 24 m/s2   MV DecT: 221 3 ms   MV E Anthony: 0 5 m/s   MV E/A Ratio: 0 89     [employer] [facility] Accredited [facility]     Prepared and electronically signed by     Zora Mello MD   Signed 25-Sep-2017 15:32:16     ECHO STRESS TEST W CONTRAST IF INDICATED [id number] 01:45PM [facility] Order Number: [id number]    - Patient Instructions: To schedule this appointment, please contact Central Scheduling at [phone]  Test Name Result Flag Reference   ECHO STRESS TEST W CONTRAST IF INDICATED (Report)     [facility] [address] [place] [address]   [phone]     Exercise Stress Echocardiography     Study date: 25-Sep-2017     Patient: Yuri Valdovinos   MR number: [id number]   Account number: [de-identified]   : 1950   Age: 74 years   Gender: Female   Study date: 25-Sep-2017   Status: Outpatient   [facility]   Height: 61 in   Weight: 182 lb   BP: 110// 82 mmHg     Indications: Detection of coronary artery disease       Diagnosis: R06 09 - Other forms of dyspnea     Sonographer: YANI Crum   Primary Physician: Sofya Da Silva MD   Referring Physician: Anny Lagunas MD PhD   Group: [id number]   RN: Jackson Watts RN   Report Prepared By: Jackson Watts RN   EKG Technician: Erna Suarez   Interpreting Physician: Iveth Rosen MD     IMPRESSIONS:   Normal study after maximal exercise  SUMMARY     STRESS RESULTS:   Duration of exercise was 4 min and 15 sec  Maximal work rate was 6 1 METs  Functional capacity was decreased  Maximal heart rate during stress was 136 bpm ( 87 % of maximal predicted heart rate)  Target heart rate was achieved  There was no chest pain during stress  ECG CONCLUSIONS:   The stress ECG was negative for ischemia  BASELINE:   There were no regional wall motion abnormalities  PEAK STRESS:   There were no regional wall motion abnormalities  ECHO CONCLUSIONS:   There was no echocardiographic evidence for stress-induced ischemia  HISTORY: pulse ox pre stress test 98%   pulse ox post stress test 95% The patient is a 74year old  female  Chest pain status: no chest pain  Other symptoms: decreased effort tolerance  Coronary artery disease risk factors: family history of coronary artery disease and   post-menopausal state  Cardiovascular history: none significant  Co-morbidity: history of lung disease ( recent pneumonia) and obesity  Medications: aspirin and thyroid medications  PHYSICAL EXAM: Baseline physical exam screening: normal and no wheezes audible  REST ECG: Normal sinus rhythm with nonspecific T wave inversions  PROCEDURE: The study was performed in the stress lab  The study was performed in the [address] [place] [address]  Treadmill exercise testing was performed, using the Franck protocol  Stress and rest echocardiographic evaluation was   performed from multiple acoustic windows for evaluation of ventricular function       FRANCK PROTOCOL:   HR bpm SBP mmHg DBP mmHg Symptoms   Baseline 93 110 82 none   Stage 1 109 140 82 mild dyspnea, moderate fatigue, leg pain   Stage 2 125 -- -- same as above   Recovery 1 120 -- -- --   Recovery 2 109 150 82 subsiding   Recovery 3 97 104 90 subsiding   Recovery 5 88 130 80 none     MEDICATIONS GIVEN: No medications or fluids given  STRESS RESULTS: Duration of exercise was 4 min and 15 sec  The patient exercised to protocol stage 2  Maximal work rate was 6 1 METs  Functional capacity was decreased  Maximal heart rate during stress was 136 bpm ( 87 % of maximal   predicted heart rate)  Target heart rate was achieved  The heart rate response to stress was normal  Maximal systolic blood pressure during stress was 150 mmHg  There was normal resting blood pressure with an appropriate response to   stress  The rate-pressure product for the peak heart rate and blood pressure was 02180  There was no chest pain during stress  The stress test was terminated due to moderate dyspnea and moderate fatigue and leg pain  ECG CONCLUSIONS: The stress ECG was negative for ischemia  There were no stress arrhythmias or conduction abnormalities  STRESS 2D ECHO RESULTS:     BASELINE: There were no regional wall motion abnormalities  Left ventricular size was normal  Overall left ventricular systolic function was normal      PEAK STRESS: There were no regional wall motion abnormalities  There was an appropriate reduction in left ventricular size  There was an appropriate augmentation in LV function  ECHO CONCLUSIONS: There was no echocardiographic evidence for stress-induced ischemia  The image quality was good       Prepared and electronically signed by     Lianna Mendoza MD   Signed [id number] 14:53:48

## 2018-01-10 NOTE — MISCELLANEOUS
Message   Recorded as Task   Date: 10/17/2017 01:45 PM, Created By: Vincent Umanzor   Task Name: Call Back   Assigned To: Salena Torres   Regarding Patient: Marcie Spaulding, Status: Active   Comment:    Sheila Kaye - 17 Oct 3985 7:86 PM     TASK CREATED  pt called because she spoke with you regarding dr Favian Gil discussing her case with other doctors and she said she is waiting for your call to see if she needs to see dr Favian Gil and what is going to happen next  please call pt on [phone]   Spoke with patient on 10/17/17 - Dr Isabela Finley did discuss [facility]  They agreed that no intervention was needed at this time  Patient will have repeat scan in 2-3 months prior to her next visit  Pending those results plan will be made  Observation at this time  Patient agreeable and verbalized understanding      Active Problems    1  Abnormal chest CT (793 2) (R93 8)   2  Acid reflux disease (530 81) (K21 9)   3  Anemia (285 9) (D64 9)   4  Bipolar I disorder, single manic episode (296 00) (F30 9)   5  Depression (311) (F32 9)   6  Dyspnea on exertion (786 09) (R06 09)   7  Elevated sedimentation rate (790 1) (R70 0)   8  Encounter for long term current azathioprine therapy (V58 69) (Z79 899)   9  Encounter for screening for malignant neoplasm of breast (V76 10) (Z12 39)   10  Generalized osteoarthritis (715 00) (M15 9)   11  Heart burn (787 1) (R12)   12  Obesity (278 00) (E66 9)   13  On belimumab therapy (V58 69) (Z79 899)   14  Osteoporosis screening (V82 81) (Z13 820)   15  Periorbital edema (782 3) (R60 0)   16  Polyarthralgia (719 49) (M25 50)   17  Postgastrectomy malabsorption (579 3) (K91 2,Z90 3)   18  Right hamstring muscle strain (843 8) (S76 311A)   19  S/P gastric bypass (V45 86) (Z98 84)   20  Sjogren's syndrome (710 2) (M35 00)   21  Systemic lupus erythematosus (710 0) (M32 9)   22  Thyroid disorder (246 9) (E07 9)   23  Vaginal atrophy (627 3) (N95 2)   24   Visit for screening mammogram (V76 12) (Z12 31)   25  Vitamin D deficiency (268 9) (E55 9)   26  Well female exam with routine gynecological exam (V72 31) (Z01 419)    Current Meds   1  Acetaminophen-Codeine #3 300-30 MG Oral Tablet; take 1 tablet 3 times daily as   needed for pain; Therapy: 57HWN1812 to (Evaluate:44Wut2349); Last Rx:[id number] Ordered   2  Aspirin 81 MG TABS; TAKE 1 TABLET DAILY; Therapy: (Recorded:58Rqk1495) to Recorded   3  Biotin 5000 MCG Oral Tablet Recorded   4  BuPROPion HCl - 100 MG Oral Tablet; TAKE 3 TABLET Daily; Therapy: 85GXB2527 to Recorded   5  Citracal Petites/Vitamin D TABS; Take 1 tablet twice daily; Therapy: (Recorded:25Oli6186) to Recorded   6  Fiber Select Gummies CHEW; Take 1 tablet daily; Therapy: (Recorded:80Ljn8014) to Recorded   7  Restoril 30 MG Oral Capsule (Temazepam); TAKE 1 CAPSULE AT BEDTIME AS   NEEDED FOR SLEEP; Therapy: (Recorded:60Pgz3512) to Recorded   8  RisperiDONE 0 5 MG Oral Tablet; take 1-2 tabs at night; Therapy: (Recorded:53Gdw7810) to Recorded   9  SEROquel 25 MG Oral Tablet (QUEtiapine Fumarate); TAKE TABLET  PRN; Therapy: 93DHR3393 to Recorded   10  SEROquel 300 MG Oral Tablet (QUEtiapine Fumarate); TAKE TABLET Bedtime; Therapy: (Recorded:38Loc3293) to Recorded   11  Synthroid 75 MCG Oral Tablet (Levothyroxine Sodium); TAKE 1 TABLET DAILY AS    DIRECTED; Therapy: (Recorded:07Lme2820) to Recorded    Allergies    1  Cipro TABS   2   Cymbalta CPEP    Signatures   Electronically signed by : Alberto Alves, ; Oct 18 2017  8:15AM EST                       (Author)

## 2018-01-10 NOTE — PROGRESS NOTES
Assessment    1  Systemic lupus erythematosus (710 0) (M32 9)   2  Sjogren's syndrome (710 2) (M35 00)   3  Encounter for long term current azathioprine therapy (V58 69) (Z79 899)   4  Generalized osteoarthritis (715 00) (M15 9)   5  Vitamin D deficiency (268 9) (E55 9)   6  Bipolar I disorder, single manic episode (296 00) (F30 9)   7  Acid reflux disease (530 81) (K21 9)   8  Thyroid disorder (246 9) (E07 9)    Plan    1  Benlysta 120 MG Intravenous Solution Reconstituted; Infuse week 0,2,4 then every   1 month   2  (1) C3 COMPLEMENT; Status:Active; Requested for:72Ymo6413;    3  (1) C4 COMPLEMENT; Status:Active; Requested for:07Vvc1414;    4  (1) CBC/PLT/DIFF; Status:Active; Requested for:09Ooa4536;    5  (1) CHRONIC HEPATITIS PANEL; Status:Active; Requested for:61Ulm4806;    6  (1) COMPREHENSIVE METABOLIC PANEL; Status:Active; Requested for:17Ghv9487;    7  (1) C-REACTIVE PROTEIN; Status:Active; Requested for:84Icc3849;    8  (1) DNA (DS) ANTIBODY; Status:Active; Requested for:63Qac7035;    9  (1) HIV AG/AB COMBO, 4TH GEN; [Do Not Release]; Status:Active; Requested   for:38Nsx5185;    10  (1) QUANTIFERON - TB GOLD; Status:Active; Requested for:34Ufh8065;    11  (1) SED RATE; Status:Active; Requested for:64Zzd3211;     12  TraMADol HCl - 50 MG Oral Tablet; Take 1 to 2 tablets up to 4 times a day as    needed for pain (maximum 8 tablets/day)    13  *1 - [facility] Physical Therapy  Please evaluate and treat 2-3 times a week    for 4-6 weeks, transition to home exercise program  Right quad strain  Thank you     Status: Active  Requested for: 57Nun5632  Care Summary provided  : Yes    14  Call [phone] if: The symptoms seem worse ; Status:Complete;   Done:    70ANO7744   15  Call [phone] if: You have questions or concerns about your problem ;    Status:Complete;   Done: 08WLJ4100   16  Call [phone] if: Your ankle swelling is getting worse ; Status:Complete;   Done:    41ENT6027   17  Follow-up visit in 2 months Evaluation and Treatment  Follow-up  Status: Complete     Done: 38RCI1744    Discussion/Summary    This is a 30-year-old female presenting today for follow-up for lupus  At this time patient states that she's been having significant fatigue  She states that she has been utilizing azathioprine however has resulted in nausea and dizziness  She is not noticed any changes in her symptoms since starting this medication  She does utilize tramadol however takes this rarely for her pain  It has not provided much pain relief  Patient is complaining of right anterior thigh pain and she did follow-up with Dr Katie Pimentel who recommended physical therapy  She also has pain in both knees, ankles, wrists, and hands  She does notice swelling in her hands  She also has morning stiffness lasting 3-4 hours as well as difficulty with sleep  She states that she is fatigue throughout the whole day  She states that she has been trialed on prednisone as well as methotrexate and hydroxychloroquine in the past without relief  Patient's physical exam reveals tenderness of bilateral shoulders, wrists, hips, and ankles  She also has tenderness with palpation of the MCPs and PIPs bilaterally  She does have crepitus of bilateral knees  Patient's labs reveal a sedimentation rate is elevated at 109 however normal CRP  She does have low C4  CMP reveals an elevated total protein  Patient's last ds DNA was 2  Patient's CBC was within normal range  At this time, patient's history and physical is most consistent with lupus which appears to be very active at this time despite the use of azathioprine  Because patient has had failure of methotrexate, prednisone, as well as Plaquenil, we will plan to proceed with Benlysta infusions  We will however obtain a CBC, CMP, CRP, ESR, HIV and hepatitis panel, double-stranded DNA, C3, C4, and a QuantiFERON TB Gold before proceeding  We will discontinue azathioprine at this time   We will plan to see patient back in 2 months time for further evaluation however she will call in the interim if she has any further questions or concerns  The patient, patient's family was counseled regarding diagnostic results, instructions for management, prognosis, patient and family education, risks and benefits of treatment options, importance of compliance with treatment  Counseling  Rheumatology Counseling Documentation: The following educational handouts were provided: Benlysta  Chief Complaint  F/U SLE   Patient is here today for follow up of chronic conditions described in HPI  History of Present Illness  Patient is in the office today for follow up for SLE  Feeling fatigue significantly  uses Tramadol rarely as it does not help much  Pain in the right anterior thigh and saw Dr Felicia Morris and started PT  Pain in the knees, ankles, wrists,and and hands  Swelling in the hands  +Am stiffness x 3-4 hours  +Difficulty sleeping  +No non restorative sleep  AZA causes nausea and dizziness  Was on prednisone in the past, MTX, HCQ  RAPID3: 18 5 /30      Review of Systems    Constitutional: fatigue, chills and anorexia, but no fever, no recent weight gain and no recent weight loss  HEENT: dryness of the eyes, dryness mouth, mouth sores and feeling congested, but no blurred vision, no double vision and no sore throat    The patient presents with complaints of eye pain, described as sharp  Cardiovascular: swelling in the arms or legs, but no chest pain or pressure and no dyspnea on exertion  Respiratory: no unusual or persistent cough, no shortness of breath and no pleurisy  Gastrointestinal: nausea and heartburn, but no vomiting, no diarrhea, no constipation, no melena and no BRBPR    The patient presents with complaints of bilateral lower quadrant abdominal pain  Genitourinary: No foamy urine, but no dysuria and no hematuria  Musculoskeletal: as noted in HPI     Integumentary rash and alopecia, but no Raynaud's, no nail changes and no photosensitivity  Endocrine no polyuria and no polydipsia  Hematologic/Lymphatic: a tendency for easy bruising, but no unusual bleeding  Neurological: weakness, but no headache and no tingling    The patient presents with complaints of vertigo or dizziness, described as lightheadedness  Psychiatric: insomnia and non-restorative sleep  Active Problems    1  Acid reflux disease (530 81) (K21 9)   2  Bipolar I disorder, single manic episode (296 00) (F30 9)   3  Depression (311) (F32 9)   4  Elevated sedimentation rate (790 1) (R70 0)   5  Encounter for long term current azathioprine therapy (V58 69) (Z79 899)   6  Encounter for screening for malignant neoplasm of breast (V76 10) (Z12 39)   7  Generalized osteoarthritis (715 00) (M15 9)   8  Heart burn (787 1) (R12)   9  Obesity (278 00) (E66 9)   10  Osteoporosis screening (V82 81) (Z13 820)   11  Polyarthralgia (719 49) (M25 50)   12  Postgastrectomy malabsorption (579 3) (K91 2,Z90 3)   13  Right hamstring muscle strain (843 8) (S76 311A)   14  S/P gastric bypass (V45 86) (Z98 84)   15  Sjogren's syndrome (710 2) (M35 00)   16  Systemic lupus erythematosus (710 0) (M32 9)   17  Thyroid disorder (246 9) (E07 9)   18  Vaginal atrophy (627 3) (N95 2)   19  Vitamin D deficiency (268 9) (E55 9)   20  Well female exam with routine gynecological exam (V72 31) (Z01 419)    Past Medical History    1  History of Closed head injury (959 01) (S09 90XA)   2  History of Contusion of chest wall (922 1) (S20 219A)   3  History of Contusion of right knee (924 11) (S80 01XA)   4  History of Contusion of right leg (924 5) (S80 11XA)   5  History of Contusion of right lower leg (924 10) (S80 11XA)   6  History of Elbow contusion (923 11) (S50 00XA)   7  History of Morbid or severe obesity due to excess calories (278 01) (E66 01)   8   History of Pain in elbow joint (679 42) (M28 779)    The active problems and past medical history were reviewed and updated today  Surgical History    1  History of Ear Surgery   2  History of Elbow Surgery   3  History of Exploratory Laparoscopy   4  History of Gastric Surgery For Morbid Obesity Gastric Bypass   5  History of Hernia Repair   6  History of Hysterectomy   7  History of Tonsillectomy    The surgical history was reviewed and updated today  Family History  Mother    1  Family history of Congenital Heart Disease   2  Family history of Congestive Heart Failure   3  Family history of Diabetes Mellitus (V18 0)   4  Family history of Hypertension (V17 49)  Father    5  Family history of Congenital Heart Disease   6  Family history of Diabetes Mellitus (V18 0)   7  Family history of Stroke Syndrome (V17 1)  Maternal Half Sister    8  Family history of leukemia (V16 6) (Z80 6)  Brother    5  Family history of Congenital Heart Disease   10  Family history of Congestive Heart Failure  Maternal Half Brother    6  Family history of CAD (coronary artery disease)  Family History    12  Family history of Crohn's disease (V18 59) (Z83 79)   13  Family history of psoriasis (V19 4) (Z84 0)   14  Family history of rheumatoid arthritis (V17 7) (Z82 61)   15  Family history of systemic lupus erythematosus (V19 4) (Z82 69)    The family history was reviewed and updated today  Social History    · Denied: History of Drug Use   · Never A Smoker   · Never Drank Alcohol  The social history was reviewed and updated today  The social history was reviewed and is unchanged  Current Meds   1  Aspirin 81 MG TABS; TAKE 1 TABLET DAILY; Therapy: (Recorded:46Unt6559) to Recorded   2  BuPROPion HCl - 100 MG Oral Tablet; TAKE 1 TABLET EVERY 12 HOURS DAILY; Therapy: 13PUZ3118 to (Evaluate:76Mxa1803) Recorded   3  Citracal Petites/Vitamin D TABS; Take 1 tablet twice daily; Therapy: (Recorded:96Rvp1668) to Recorded   4   Estrace 0 1 MG/GM Vaginal Cream; Apply pea size amount to vaginal area, 1-3 times per   week; Therapy: 01Aug2016 to (Last Rx:20Mls9742)  Requested for: 01Aug2016 Ordered   5  Fiber Select Gummies CHEW; Take 1 tablet daily; Therapy: (Recorded:57Vfg3590) to Recorded   6  Multiple Vitamins Oral Tablet; TAKE 1 TABLET DAILY; Therapy: (Recorded:51Gjk2293) to Recorded   7  Restoril 30 MG Oral Capsule; TAKE 1 CAPSULE AT BEDTIME AS NEEDED FOR SLEEP; Therapy: (Recorded:03Wxz2769) to Recorded   8  SEROquel 300 MG Oral Tablet; TAKE 1 TABLET AT BEDTIME; Therapy: (Recorded:17Bao3378) to Recorded   9  Synthroid 75 MCG Oral Tablet; TAKE 1 TABLET DAILY AS DIRECTED; Therapy: (Recorded:35Fti0418) to Recorded   10  TraMADol HCl - 50 MG Oral Tablet; Take 1 to 2 tablets up to 4 times a day as needed    for pain (maximum 8 tablets/day); Therapy: 24Aug2015 to (Evaluate:22Nov2015); Last Rx:82Esb0243 Ordered   11  Vagifem 10 MCG Vaginal Tablet; Use as directed 3x per week; Therapy: 24TJC4280 to (Evaluate:88Wus2580) Recorded   12  Vitamin D 2000 UNIT Oral Capsule; take 1 capsule daily; Therapy: (Recorded:88Qir3287) to Recorded    The medication list was reviewed and updated today  Allergies    1  Cipro TABS   2  Cymbalta CPEP    Vitals  Signs   Recorded: 97BNE2467 72:80KS   Systolic: 567  Diastolic: 80  Heart Rate: 84  Weight: 174 lb 2 oz  BMI Calculated: 33 45  BSA Calculated: 1 77    Physical Exam    Constitutional   General appearance: Abnormal   overweight  Eyes   Conjunctiva and lids: Abnormal   Conjunctiva Findings: bilateral hyperemia  Pupils and irises: Equal, round and reactive to light  Ears, Nose, Mouth, and Throat   External inspection of ears and nose: Normal     Oropharynx: Abnormal   Oral mucosa was dry and was erythematous  Pulmonary   Respiratory effort: No increased work of breathing or signs of respiratory distress  Auscultation of lungs: Clear to auscultation  Cardiovascular   Auscultation of heart: Normal rate and rhythm, normal S1 and S2, without murmurs      Examination of extremities for edema and/or varicosities: Normal     Lymphatic   Palpation of lymph nodes in neck: No lymphadenopathy  Psychiatric   Orientation to person, place, and time: Normal     Mood and affect: Normal         Right elbow tenderness  Right glenohumeral joint tenderness  Left wrist tenderness  Left glenohumeral joint tenderness  Right ankle tenderness and swelling  Right hip tenderness  Left ankle tenderness and swelling  Left hip tenderness  Right Upper Extremity: Right Hand: Right Hand Appearance: Vivian's node at the all PIPs digit  Right Wrist: Right Elbow: Right Shoulder:   Left Upper Extremity: Left Hand: Appearance: all PIPs PIP Vivian's node(s)  Left Wrist: Left Elbow: Left Shoulder:   Musculoskeletal - Joints, bones, and muscles: Abnormal  Palpation - bilateral knee crepitus  The patient has tenderness in all MCP and PIP joints of the right hand  The patient has tenderness in all MCP and PIP joints of the left hand  The patient has tenderness in all MTP joints of the right foot  The patient has tenderness in all MTP joints of the left foot  Attending Note  Collaborating Physician: I interviewed and examined the patient, I discussed the case with the Advanced Practitioner and reviewed the note and I agree with the Advanced Practitioner note  Agree with Advanced Practitioner Note Except: If she has an inadequate response to ARELI E  [facility], we may consider adding CellCept to her regimen  This was avoided as an oral DMARD at this time because of her ongoing GI issues  We will proceed with a precertification for the Benlysta infusions  Future Appointments    Date/Time Provider Specialty Site   01/26/2017 10:30 AM KIRK Chester   General Surgery [facility]   09/29/2016 11:00 AM ALEXANDRU Victor Rheumatology [facility]     Signatures   Electronically signed by : ALEXANDRU Recinos; Aug  4 2016  4:12PM EST (Author)    Electronically signed by : Jennifer Jamison DO; Aug  4 2016  4:20PM EST                       (Author)

## 2018-01-12 VITALS
DIASTOLIC BLOOD PRESSURE: 78 MMHG | BODY MASS INDEX: 32.94 KG/M2 | HEART RATE: 70 BPM | SYSTOLIC BLOOD PRESSURE: 100 MMHG | RESPIRATION RATE: 17 BRPM | HEIGHT: 61 IN | TEMPERATURE: 98.3 F | WEIGHT: 174.5 LBS

## 2018-01-12 VITALS
TEMPERATURE: 98.5 F | BODY MASS INDEX: 34.95 KG/M2 | RESPIRATION RATE: 16 BRPM | HEART RATE: 70 BPM | SYSTOLIC BLOOD PRESSURE: 118 MMHG | DIASTOLIC BLOOD PRESSURE: 80 MMHG | HEIGHT: 60 IN | WEIGHT: 178 LBS | OXYGEN SATURATION: 94 %

## 2018-01-12 NOTE — RESULT NOTES
Verified Results  * DXA BONE DENSITY SPINE HIP AND PELVIS 27Oct2016 03:37PM Debra Zimmerman Order Number: [id number]    - Patient Instructions: To schedule this appointment, please contact Central Scheduling at [phone]   Order Number: [id number]    - Patient Instructions: To schedule this appointment, please contact Central Scheduling at [phone]  Test Name Result Flag Reference   DXA BONE DENSITY SPINE HIP AND PELVIS (Report)     CENTRAL DXA SCAN     CLINICAL HISTORY:  61year old post-menopausal  female risk factors include history of steroid use for Sjogren's and lupus  Hypothyroidism  Gastric bypass  TECHNIQUE: Bone densitometry was performed using a [employer] Horizon A bone densitometer  Regions of interest appear properly placed  There are no obvious fractures or other confounding variables which could limit the study  Degenerative changes of the    lumbar spine and hip  This will falsely elevate the bone mineral densities in these regions  COMPARISON: Several, most recent March 31, 2014     RESULTS:    LUMBAR SPINE: L1-L4:   BMD 0 841 gm/cm2   T-score -1 9   Z-score -0 2     LEFT TOTAL HIP:   BMD 0 777 gm/cm2   T-score -1 4   Z-score -0 2     LEFT FEMORAL NECK:   BMD 0 677 gm/cm2   T-score -1 6   Z-score -0 1             IMPRESSION:   1  Based on the [facility] classification, the T-score of -1 9 in the lumbar spine is consistent with low bone mineral density  2  When compared to the prior examination, there has been a 2 % DECREASE in the total bone mineral density of the lumbar spine and a 14 % DECREASE in the total bone mineral density of the left hip  3  Any secondary causes of low bone mineral density should be excluded prior to treatment, if clinically indicated     4  A daily intake of at least 1200 mg calcium and 800 to 1000 IU of Vitamin D, as well as weight bearing and muscle strengthening exercise, fall prevention and avoidance of tobacco and excessive alcohol intake as basic preventive measures are suggested  5  Repeat DXA in 18 - 24 months, on the same machine, as clinically indicated  The 10 year risk of hip fracture is 1%, with the 10 year risk of major osteoporotic fracture being 10%, as calculated by the [facility] fracture risk assessment tool (FRAX)  The current NOF guidelines recommend treating patients with FRAX 10 year risk score of   >3% for hip fracture and >20% for major osteoporotic fracture        WHO CLASSIFICATION:   Normal (a T-score of -1 0 or higher)   Low bone mineral density (a T-score of less than -1 0 but higher than -2 5)   Osteoporosis (a T-score of -2 5 or less)   Severe osteoporosis (a T-score of -2 5 or less with a fragility fracture)             Workstation performed: [id number]     Signed by:   Urban Barrett DO   10/28/16       Signatures   Electronically signed by : KIRK Jarvis ; Oct 31 2016  9:35AM EST                       (Author)

## 2018-01-12 NOTE — MISCELLANEOUS
Message   Recorded as Task   Date: 10/11/2017 10:11 AM, Created By: Jose M Broussard   Task Name: Call Back   Assigned To: Salena Torres   Regarding Patient: Vahe Baird, Status: Active   Comment:    Alivia Hurst - 11 Oct 2017 10:11 AM     TASK CREATED  Caller: Self; Other; 21 [phone] (Mobile Phone)  PT'S HEART CHECKED OUT OF WITH DR ORTIZ [facility]; HOWEVER, HE TOLD HER THAT THE CT SCAN SHE HAD DONE LAST WEEK AT [facility] SHOWS ABNORMALITIES ON HER LUNG AND LYMPHNODES  IS DR BARLOW AWARE AND HOW SOON DOES HE WANT TO SEE HER?  PLEASE CALL, TXS   Returned call to patient - Dr Joe Allen will be discussing [facility] CT [facility] - will update patient after I hear from Dr Paty Koenig    1  Abnormal chest CT (793 2) (R93 8)   2  Acid reflux disease (530 81) (K21 9)   3  Anemia (285 9) (D64 9)   4  Bipolar I disorder, single manic episode (296 00) (F30 9)   5  Depression (311) (F32 9)   6  Dyspnea on exertion (786 09) (R06 09)   7  Elevated sedimentation rate (790 1) (R70 0)   8  Encounter for long term current azathioprine therapy (V58 69) (Z79 899)   9  Encounter for screening for malignant neoplasm of breast (V76 10) (Z12 39)   10  Generalized osteoarthritis (715 00) (M15 9)   11  Heart burn (787 1) (R12)   12  Obesity (278 00) (E66 9)   13  On belimumab therapy (V58 69) (Z79 899)   14  Osteoporosis screening (V82 81) (Z13 820)   15  Periorbital edema (782 3) (R60 0)   16  Polyarthralgia (719 49) (M25 50)   17  Postgastrectomy malabsorption (579 3) (K91 2,Z90 3)   18  Right hamstring muscle strain (843 8) (S76 311A)   19  S/P gastric bypass (V45 86) (Z98 84)   20  Sjogren's syndrome (710 2) (M35 00)   21  Systemic lupus erythematosus (710 0) (M32 9)   22  Thyroid disorder (246 9) (E07 9)   23  Vaginal atrophy (627 3) (N95 2)   24  Visit for screening mammogram (V76 12) (Z12 31)   25  Vitamin D deficiency (268 9) (E55 9)   26   Well female exam with routine gynecological exam (V72 31) (Z01 419)    Current Meds   1  Acetaminophen-Codeine #3 300-30 MG Oral Tablet; take 1 tablet 3 times daily as   needed for pain; Therapy: 61XIG5029 to (Evaluate:13Nds4433); Last Rx:71Shm7337 Ordered   2  Aspirin 81 MG TABS; TAKE 1 TABLET DAILY; Therapy: (Recorded:47Pne3428) to Recorded   3  Biotin 5000 MCG Oral Tablet Recorded   4  BuPROPion HCl - 100 MG Oral Tablet; TAKE 3 TABLET Daily; Therapy: 10QYR9595 to Recorded   5  Citracal Petites/Vitamin D TABS; Take 1 tablet twice daily; Therapy: (Recorded:88Ncx0627) to Recorded   6  Fiber Select Gummies CHEW; Take 1 tablet daily; Therapy: (Recorded:75Duo7340) to Recorded   7  Restoril 30 MG Oral Capsule (Temazepam); TAKE 1 CAPSULE AT BEDTIME AS   NEEDED FOR SLEEP; Therapy: (Recorded:04Lxo4622) to Recorded   8  RisperiDONE 0 5 MG Oral Tablet; take 1-2 tabs at night; Therapy: (Recorded:75Cyc4720) to Recorded   9  SEROquel 25 MG Oral Tablet (QUEtiapine Fumarate); TAKE TABLET  PRN; Therapy: 55YZS3733 to Recorded   10  SEROquel 300 MG Oral Tablet (QUEtiapine Fumarate); TAKE TABLET Bedtime; Therapy: (Recorded:60Ygg9619) to Recorded   11  Synthroid 75 MCG Oral Tablet (Levothyroxine Sodium); TAKE 1 TABLET DAILY AS    DIRECTED; Therapy: (Recorded:65Enf3172) to Recorded    Allergies    1  Cipro TABS   2   Cymbalta CPEP    Signatures   Electronically signed by : María Burton, ; Oct 11 2017 12:12PM EST                       (Author)

## 2018-01-13 VITALS
BODY MASS INDEX: 34.36 KG/M2 | WEIGHT: 182 LBS | HEIGHT: 61 IN | SYSTOLIC BLOOD PRESSURE: 124 MMHG | DIASTOLIC BLOOD PRESSURE: 84 MMHG | HEART RATE: 90 BPM

## 2018-01-13 NOTE — CONSULTS
Chief Complaint  Evaluation regarding elevated ESR  History of Present Illness  Patient has had lupus for some years  She was treated in 2016 with improvement in disease-related fatigue  Earlier this year  She had a sedimentation rate of greater than 100  Repeat in August 2017, was 72  Additionally she's had hemoglobin in the 9 0-10 0 range with normal white count, differential and platelets  Review of Systems    Constitutional: fever, recent 20 lbs past 6 mos  lb weight gain and intermittent fevers to 102 for a day , recurs every few weeks  Eyes: No complaints of eye pain, no red eyes, no eyesight problems, no discharge, no dry eyes, no itching of eyes  ENT: no complaints of earache, no loss of hearing, no nose bleeds, no nasal discharge, no sore throat, no hoarseness  Cardiovascular: No complaints of slow heart rate, no fast heart rate, no chest pain, no palpitations, no leg claudication, no lower extremity edema  The patient presents with complaints of intermittent episodes of cough  Episodes started about 4 months ago  Gastrointestinal: nausea and 3019-imjzqzdtpct-op polyps  Active Problems    1  Acid reflux disease (530 81) (K21 9)   2  Bipolar I disorder, single manic episode (296 00) (F30 9)   3  Depression (311) (F32 9)   4  Dyspnea on exertion (786 09) (R06 09)   5  Elevated sedimentation rate (790 1) (R70 0)   6  Encounter for long term current azathioprine therapy (V58 69) (Z79 899)   7  Encounter for screening for malignant neoplasm of breast (V76 10) (Z12 39)   8  Generalized osteoarthritis (715 00) (M15 9)   9  Heart burn (787 1) (R12)   10  Obesity (278 00) (E66 9)   11  On belimumab therapy (V58 69) (Z79 899)   12  Osteoporosis screening (V82 81) (Z13 820)   13  Periorbital edema (782 3) (R60 0)   14  Polyarthralgia (719 49) (M25 50)   15  Postgastrectomy malabsorption (579 3) (K91 2,Z90 3)   16  Right hamstring muscle strain (843 8) (S76 311A)   17   S/P gastric bypass (V45 86) (Z98 84)   18  Sjogren's syndrome (710 2) (M35 00)   19  Systemic lupus erythematosus (710 0) (M32 9)   20  Thyroid disorder (246 9) (E07 9)   21  Vaginal atrophy (627 3) (N95 2)   22  Visit for screening mammogram (V76 12) (Z12 31)   23  Vitamin D deficiency (268 9) (E55 9)   24   Well female exam with routine gynecological exam (V72 31) (Z01 419)    Past Medical History    · History of Closed head injury (959 01) (S09 90XA)   · History of Contusion of chest wall (922 1) (S20 219A)   · History of Contusion of right knee (924 11) (S80 01XA)   · History of Contusion of right leg (924 5) (S80 11XA)   · History of Contusion of right lower leg (924 10) (S80 11XA)   · History of Elbow contusion (923 11) (S50 00XA)   · History of Morbid or severe obesity due to excess calories (278 01) (E66 01)   · History of Pain in elbow joint (719 42) (M25 529)    Surgical History    · History of Ear Surgery   · History of Elbow Surgery   · History of Exploratory Laparoscopy   · History of Gastric Surgery For Morbid Obesity Gastric Bypass   · History of Hernia Repair   · History of Hysterectomy   · History of Tonsillectomy    Family History    · Family history of Congenital Heart Disease   · Family history of Congestive Heart Failure   · Family history of Diabetes Mellitus (V18 0)   · Family history of Hypertension (V17 49)    · Family history of Congenital Heart Disease   · Family history of Diabetes Mellitus (V18 0)   · Family history of Stroke Syndrome (V17 1)    · Family history of kidney stones (V18 69) (Z84 1)    · Family history of leukemia (V16 6) (Z80 6)    · Family history of Congenital Heart Disease   · Family history of Congestive Heart Failure    · Family history of CAD (coronary artery disease)    · Family history of Crohn's disease (V18 59) (Z83 79)   · Family history of psoriasis (V19 4) (Z84 0)   · Family history of rheumatoid arthritis (V17 7) (Z82 61)   · Family history of systemic lupus erythematosus (V19 4) (Z82 69)    The family history was reviewed and updated today  Social History    · Denied: History of Drug Use   · Never A Smoker   · Never Drank Alcohol  The social history was reviewed and updated today  Current Meds   1  Acetaminophen-Codeine #3 300-30 MG Oral Tablet; take 1 tablet 3 times daily as   needed for pain; Therapy: 74XTR8900 to (Evaluate:85Ulj0285); Last Rx:67Erh9270 Ordered   2  Aspirin 81 MG TABS; TAKE 1 TABLET DAILY; Therapy: (Recorded:10Bmu5115) to Recorded   3  Biotin 5000 MCG Oral Tablet Recorded   4  BuPROPion HCl - 100 MG Oral Tablet; TAKE 3 TABLET Daily; Therapy: 92NGY7974 to Recorded   5  Citracal Petites/Vitamin D TABS; Take 1 tablet twice daily; Therapy: (Recorded:12Tzn2651) to Recorded   6  Fiber Select Gummies CHEW; Take 1 tablet daily; Therapy: (Recorded:85Kst0459) to Recorded   7  Restoril 30 MG Oral Capsule; TAKE 1 CAPSULE AT BEDTIME AS NEEDED FOR SLEEP; Therapy: (Recorded:12Uov3685) to Recorded   8  RisperiDONE 0 5 MG Oral Tablet; take 1-2 tabs at night; Therapy: (Recorded:08Qhy6630) to Recorded   9  SEROquel 25 MG Oral Tablet; TAKE TABLET  PRN; Therapy: 26UAY4850 to Recorded   10  SEROquel 300 MG Oral Tablet; TAKE TABLET Bedtime; Therapy: (Recorded:10Ecy7712) to Recorded   11  Synthroid 75 MCG Oral Tablet; TAKE 1 TABLET DAILY AS DIRECTED; Therapy: (Recorded:48Ogx9306) to Recorded    Allergies    1  Cipro TABS   2  Cymbalta CPEP    Vitals   Recorded: 14Sep2017 11:45AM   Temperature 99 F   Heart Rate 83   Respiration 16   Systolic 540   Diastolic 76   Height 5 ft 0 24 in   Weight 184 lb    BMI Calculated 35 65   BSA Calculated 1 81   O2 Saturation 94   Pain Scale 0     Physical Exam    Constitutional   General appearance: No acute distress, well appearing and well nourished  Eyes   Conjunctiva and lids: No swelling, erythema or discharge      Ears, Nose, Mouth, and Throat   External inspection of ears and nose: Normal     Oropharynx: Normal with no erythema, edema, exudate or lesions  Pulmonary   Auscultation of lungs: Clear to auscultation  Cardiovascular   Auscultation of heart: Normal rate and rhythm, normal S1 and S2, without murmurs  Examination of extremities for edema and/or varicosities: Normal     Abdomen   Abdomen: Non-tender, no masses  Liver and spleen: No hepatomegaly or splenomegaly  Lymphatic   Palpation of lymph nodes in neck: No lymphadenopathy  Musculoskeletal   Gait and station: Normal     Skin   Skin and subcutaneous tissue: Normal without rashes or lesions  Neurologic   Cranial nerves: Cranial nerves 2-12 intact  Psychiatric   Orientation to person, place, and time: Normal          Assessment    1  Anemia (285 9) (D64 9)    Plan  Anemia    · Drink plenty of fluids ; Status:Complete;   Done: 30LMA0920   Ordered; Jacqueline Valdovinos; Ordered By:Leigh Ann Schillnig;   · Follow-up visit in 3 weeks Evaluation and Treatment  Follow-up  [place] office  Status: Hold For - Scheduling  Requested for: 06Ist4914   Ordered; For: Anemia; Ordered By: Berto France Performed:  Due: 06JZR9322   · (1) FERRITIN; Status:Active; Requested for:84Nil4709;    Perform:[facility]; Due:86Axd5584; Ordered; Jacqueline Aruna; Ordered By:Nate Schilling;   · (1) FREE LIGHT CHAINS, SERUM; Status:Active; Requested for:84Gzb1221;    Perform:[facility]; Due:23Aka4054; Ordered; Jacqueline Valdovinos; Ordered By:Nate Schilling;   · (1) IRON SATURATION %, TIBC; Status:Active; Requested for:65Zcy6978;    Perform:[facility]; Due:09Tgn3033; Ordered; Jacqueline Valdovinos; Ordered By:Nate Schilling;   · (1) LD (LDH); Status:Active; Requested for:00Oen0905;    Perform:[facility]; Due:10Gvg2224; Ordered; Jacqueline Juniper; Ordered By:Nate Schilling;   · (1) PROTEIN ELECTRO, SERUM; Status:Active; Requested for:21Tln8896;    Perform:[facility]; Due:22Bty6133; Ordered; Jacquelniejosephine Valdovinos; Ordered By:Leigh Ann Schilling;   Anemia, Postgastrectomy malabsorption    · (1) FOLATE; Status:Active; Requested for:94Kff7220;    Perform:[facility]; Due:69Thp2808; Ordered; For:Anemia, Postgastrectomy malabsorption; Ordered By:Nate Schilling;   · (1) VITAMIN B12; Status:Active; Requested for:66Kre9178;    Perform:[facility]; Due:34Xed7568; Ordered; For:Anemia, Postgastrectomy malabsorption; Ordered By:Cheryl Schilling;    Discussion/Summary    In summary, this is a 51-year-old female history of elevated sedimentation rate as outlined  This certainly could be due to her back on rheumatologic condition, although based on disease activity it seems a little bit high  Part of this is also attributed to age  Further, she has anemia with hemoglobin in the 9 0-10 0 range  This could be easily attributed to anemia of chronic inflammation  Given her status post bariatric surgery  Substrate deficiency is considered and evaluated  Replacement would follow accordingly  Hopefully this would have some positive impact on quality of life, energy, etc   Myeloma or other lymphoproliferative disorders are certainly a consideration  LDH is requested  Physical exam showed no palpable adenopathy or organomegaly  CAT scan could be considered  I reviewed the above considerations with the patient and her   They voiced understanding and agreement  We made arrangements for blood work  I'll see her back thereafter to review results and make further recommendations  The patient was counseled regarding diagnostic results, instructions for management, patient and family education, impressions  total time of encounter was 40 minutes        Signatures   Electronically signed by : KIRK Soriano ,DO; Sep 14 2017 12:28PM EST                       (Author)

## 2018-01-14 VITALS
WEIGHT: 184 LBS | SYSTOLIC BLOOD PRESSURE: 138 MMHG | DIASTOLIC BLOOD PRESSURE: 76 MMHG | RESPIRATION RATE: 16 BRPM | HEIGHT: 60 IN | HEART RATE: 83 BPM | TEMPERATURE: 99 F | BODY MASS INDEX: 36.12 KG/M2 | OXYGEN SATURATION: 94 %

## 2018-01-14 VITALS
OXYGEN SATURATION: 95 % | SYSTOLIC BLOOD PRESSURE: 124 MMHG | HEART RATE: 76 BPM | BODY MASS INDEX: 36.17 KG/M2 | WEIGHT: 184.25 LBS | DIASTOLIC BLOOD PRESSURE: 76 MMHG | HEIGHT: 60 IN

## 2018-01-15 NOTE — MISCELLANEOUS
Provider Comments  Provider Comments:   Pt  was a no show for her Rheumatology re-evaluation today, [birth year]        Signatures   Electronically signed by : Brandy Brooks DO; Aug 29 2017  1:30PM EST                       (Author)

## 2018-01-16 NOTE — PROGRESS NOTES
Assessment    1  Systemic lupus erythematosus (710 0) (M32 9)   2  Sjogren's syndrome (710 2) (M35 00)   3  Encounter for long term current azathioprine therapy (V58 69) (Z79 899)   4  Generalized osteoarthritis (715 00) (M15 9)   5  Vitamin D deficiency (268 9) (E55 9)   6  Bipolar I disorder, single manic episode (296 00) (F30 9)   7  Acid reflux disease (530 81) (K21 9)   8  Thyroid disorder (246 9) (E07 9)    Plan    1  Benlysta 120 MG Intravenous Solution Reconstituted; INFUSE 790MG   INTRAVENOUSLY AT WEEK 2   2  Acetaminophen-Codeine #3 300-30 MG Oral Tablet; TAKE 1 TABLET 3 TIMES DAILY   AS NEEDED FOR PAIN  ONGOING THERAPY   3  1 - Shona Gutierrez MD  (Otolaryngology) Physician Referral  Consult  Status: Hold For -   Scheduling  Requested for: 32NIU5533  Care Summary provided  : Yes    4  Call [phone] if: The pain seems worse ; Status:Complete;   Done: 29GQU4526   5  Call [phone] if: The symptoms seem worse ; Status:Complete;   Done:   41QZS2310   6  Call [phone] if: You have questions or concerns about your problem ;   Status:Complete;   Done: 76LOC6139   7  (1) C3 COMPLEMENT; Status:Active; Requested for:06Oct2016;    8  (1) C4 COMPLEMENT; Status:Active; Requested for:06Oct2016;    9  (1) CBC/PLT/DIFF; Status:Active; Requested for:06Oct2016;    10  (1) C-REACTIVE PROTEIN; Status:Active; Requested for:06Oct2016;    11  (1) DNA (DS) ANTIBODY; Status:Active; Requested for:06Oct2016;    12  (1) SED RATE; Status:Active; Requested for:06Oct2016;    13  (1) URINALYSIS (will reflex a microscopy if leukocytes, occult blood, protein or nitrites are    not within normal limits); Status:Active; Requested for:06Oct2016;    14  (1) URINE PROTEIN CREATININE RATIO; Status:Active; Requested for:06Oct2016;     Discussion/Summary    This is a 79-year-old female presenting today for follow-up for systemic lupus as well as Sjogren's syndrome  The patient states that she has had 3 loading doses of Benlysta   She states that she did have delayed in the infusions due to insurance coverage and cost  Her next infusion will be in late October  She states that she has noticed some improvement of her dry mouth and dry nose  She continues to have joint pain depending on the day primarily in the hands, ankles, knees, and wrists  She does notice swelling in the hands and the ankles on occasion  She states that she did experience some nausea and diarrhea with infusions however this did resolve  She states that she has not noticed any change in her fatigue since starting the infusions however  She does have difficulty with sleep as well as nonrestorative sleep  At this time she is utilizing tramadol as needed for pain with limited relief  On physical examination, patient does have synovitis and tenderness of the ankles bilaterally  She also has tenderness of the MCPs and PIPs bilaterally as well as tenderness of the right CMC and left wrist  She also has tenderness of the right shoulder as well as the lumbar spine, bilateral greater trochanteric bursae, and bilateral knees  She has tenderness of her feet as well as crepitus of bilateral knees  At this time patient's history and physical examination is most consistent with lupus as well as Sjogren's syndrome which appears to be active  She will continue with Benlysta infusions at this time  Patient was given Tylenol with Codeine for pain as needed and place of tramadol  We will plan to continue with her infusions however if patient does not receive relief of her pain or improvement of her fatigue symptoms we will consider addition of medication such as CellCept  We will plan to obtain a C3, C4, double-stranded DNA, CBC, CMP, CRP, ESR, UA and urine protein creatinine ratio  She will return in 2 months time however she will call in the interim if she has any further questions or concerns        Chief Complaint  F/U SLE/Sjogren's   Patient is here today for follow up of chronic conditions described in HPI  History of Present Illness  Patient is in the office today for follow up for SLE/Sjogren's  Had 3 loading Benlysta treatments  There was a delay in loading doses due to insurance and cost  Last infusion on September 30  Has noticed improved with dryness mostly including dry mouth and dry nose  Pain may have improved depending on the day  Had some nausea and diarrhea with infusions  Has done nothing fro fatigue thus far  +Difficulty with sleep  +non restorative sleep  +Severe fatigue  pain in the hands, ankles, knees, and wrists  Swelling in the hands and the ankles as well  Tramadol with minimal relief  RAPID3: /30      Review of Systems    Constitutional: recent 13 lb weight gain, fatigue, chills and anorexia, but no fever and no recent weight loss  HEENT: erythema eye(s), dryness mouth, feeling congested and sore throat, but no blurred vision, no double vision, no amaurosis fugax and no eye pain    The patient presents with complaints of mild dryness of the eyes  Cardiovascular: dyspnea on exertion and swelling in the arms or legs, but no chest pain or pressure  Respiratory: no shortness of breath and no pleurisy    The patient presents with complaints of unusual or persistent cough, described as dry  Gastrointestinal: diarrhea, but no abdominal pain, no nausea, no vomiting, no heartburn, no constipation, no melena and no BRBPR  Genitourinary: No foamy urine, but no hematuria    The patient presents with complaints of occasional episodes of dysuria  Musculoskeletal: as noted in HPI  Integumentary rash, but no Raynaud's, no alopecia, no nail changes and no photosensitivity  Endocrine no polyuria and no polydipsia  Hematologic/Lymphatic: no unusual bleeding    The patient presents with complaints of a tendency for easy bruising  Symptoms are unchanged  Neurological: weakness, but no headache, no vertigo or dizziness and no tingling     Psychiatric: insomnia and non-restorative sleep  Active Problems    1  Acid reflux disease (530 81) (K21 9)   2  Bipolar I disorder, single manic episode (296 00) (F30 9)   3  Depression (311) (F32 9)   4  Elevated sedimentation rate (790 1) (R70 0)   5  Encounter for long term current azathioprine therapy (V58 69) (Z79 899)   6  Encounter for screening for malignant neoplasm of breast (V76 10) (Z12 39)   7  Generalized osteoarthritis (715 00) (M15 9)   8  Heart burn (787 1) (R12)   9  Obesity (278 00) (E66 9)   10  Osteoporosis screening (V82 81) (Z13 820)   11  Polyarthralgia (719 49) (M25 50)   12  Postgastrectomy malabsorption (579 3) (K91 2,Z90 3)   13  Right hamstring muscle strain (843 8) (S76 311A)   14  S/P gastric bypass (V45 86) (Z98 84)   15  Sjogren's syndrome (710 2) (M35 00)   16  Systemic lupus erythematosus (710 0) (M32 9)   17  Thyroid disorder (246 9) (E07 9)   18  Vaginal atrophy (627 3) (N95 2)   19  Vitamin D deficiency (268 9) (E55 9)   20  Well female exam with routine gynecological exam (V72 31) (Z01 419)    Past Medical History    1  History of Closed head injury (959 01) (S09 90XA)   2  History of Contusion of chest wall (922 1) (S20 219A)   3  History of Contusion of right knee (924 11) (S80 01XA)   4  History of Contusion of right leg (924 5) (S80 11XA)   5  History of Contusion of right lower leg (924 10) (S80 11XA)   6  History of Elbow contusion (923 11) (S50 00XA)   7  History of Morbid or severe obesity due to excess calories (278 01) (E66 01)   8  History of Pain in elbow joint (719 42) (M25 529)    The active problems and past medical history were reviewed and updated today  Surgical History    1  History of Ear Surgery   2  History of Elbow Surgery   3  History of Exploratory Laparoscopy   4  History of Gastric Surgery For Morbid Obesity Gastric Bypass   5  History of Hernia Repair   6  History of Hysterectomy   7  History of Tonsillectomy    The surgical history was reviewed and updated today  Family History  Mother    1  Family history of Congenital Heart Disease   2  Family history of Congestive Heart Failure   3  Family history of Diabetes Mellitus (V18 0)   4  Family history of Hypertension (V17 49)  Father    5  Family history of Congenital Heart Disease   6  Family history of Diabetes Mellitus (V18 0)   7  Family history of Stroke Syndrome (V17 1)  Maternal Half Sister    8  Family history of leukemia (V16 6) (Z80 6)  Brother    5  Family history of Congenital Heart Disease   10  Family history of Congestive Heart Failure  Maternal Half Brother    6  Family history of CAD (coronary artery disease)  Family History    12  Family history of Crohn's disease (V18 59) (Z83 79)   13  Family history of psoriasis (V19 4) (Z84 0)   14  Family history of rheumatoid arthritis (V17 7) (Z82 61)   15  Family history of systemic lupus erythematosus (V19 4) (Z82 69)    The family history was reviewed and updated today  Social History    · Denied: History of Drug Use   · Never A Smoker   · Never Drank Alcohol  The social history was reviewed and updated today  The social history was reviewed and is unchanged  Current Meds   1  Aspirin 81 MG TABS; TAKE 1 TABLET DAILY; Therapy: (Recorded:12Lky7626) to Recorded   2  Benlysta 120 MG Intravenous Solution Reconstituted; INFUSE 790MG INTRAVENOUSLY   AT WEEK 2;   Therapy: 61XLM7606 to (Last Rx:86Knw7327)  Requested for: 80CJQ9637 Ordered   3  BuPROPion HCl - 100 MG Oral Tablet; TAKE 1 TABLET EVERY 12 HOURS DAILY; Therapy: 53TNA5832 to (Evaluate:35Uva6652) Recorded   4  Citracal Petites/Vitamin D TABS; Take 1 tablet twice daily; Therapy: (Recorded:51Vnb5130) to Recorded   5  Estrace 0 1 MG/GM Vaginal Cream; Apply pea size amount to vaginal area, 1-3 times per   week; Therapy: 17Nrw0890 to (Last Rx:27Gyk1681)  Requested for: 92Jah6934 Ordered   6  Fiber Select Gummies CHEW; Take 1 tablet daily; Therapy: (Recorded:93Cjs0997) to Recorded   7   Multiple Vitamins Oral Tablet; TAKE 1 TABLET DAILY; Therapy: (Recorded:09Ajx9736) to Recorded   8  Restoril 30 MG Oral Capsule; TAKE 1 CAPSULE AT BEDTIME AS NEEDED FOR SLEEP; Therapy: (Recorded:30Ryp2795) to Recorded   9  SEROquel 300 MG Oral Tablet; TAKE 1 TABLET AT BEDTIME; Therapy: (Recorded:87Bkv7302) to Recorded   10  Synthroid 75 MCG Oral Tablet; TAKE 1 TABLET DAILY AS DIRECTED; Therapy: (Recorded:12Byf8873) to Recorded   11  Vagifem 10 MCG Vaginal Tablet; Use as directed 3x per week; Therapy: 05LBW0981 to (Evaluate:89Vdf3759) Recorded   12  Vitamin D 2000 UNIT Oral Capsule; take 1 capsule daily; Therapy: (Recorded:29Nzs9027) to Recorded    The medication list was reviewed and updated today  Allergies    1  Cipro TABS   2  Cymbalta CPEP    Vitals  Signs   Recorded: 35QKT4740 03:29YL   Systolic: 354  Diastolic: 84  Heart Rate: 82  Weight: 177 lb   BMI Calculated: 34  BSA Calculated: 1 78    Physical Exam    Constitutional   General appearance: Abnormal   overweight  Eyes   Conjunctiva and lids: No swelling, erythema or discharge  Pupils and irises: Equal, round and reactive to light  Ears, Nose, Mouth, and Throat   External inspection of ears and nose: Normal     Oropharynx: Abnormal   The posterior pharynx was erythematous  Oral mucosa was dry  Pulmonary   Respiratory effort: No increased work of breathing or signs of respiratory distress  Auscultation of lungs: Abnormal   diffuse rhonchi bilaterally  Cardiovascular   Auscultation of heart: Normal rate and rhythm, normal S1 and S2, without murmurs  Examination of extremities for edema and/or varicosities: Normal     Lymphatic   Palpation of lymph nodes in neck: No lymphadenopathy  Psychiatric   Orientation to person, place, and time: Normal     Mood and affect: Normal       Right thumb CMC tenderness  Right glenohumeral joint tenderness  Left wrist tenderness  Right ankle tenderness and swelling  Right knee tenderness  Right hip tenderness  Left ankle tenderness and swelling  Left knee tenderness  Left hip tenderness  Musculoskeletal - Joints, bones, and muscles: Abnormal  Palpation - left lower lumbar tenderness and bilateral knee crepitus  The patient has tenderness in all MCP and PIP joints of the right hand  The patient has tenderness in all MCP and PIP joints of the left hand  The patient has tenderness in all MTP joints of the right foot  The patient has tenderness in all MTP joints of the left foot  Attending Note  Collaborating Physician: I did not interview and examine the patient, I discussed the case with the Advanced Practitioner and reviewed the note and I agree with the Advanced Practitioner note  Future Appointments    Date/Time Provider Specialty Site   01/26/2017 10:30 AM KIRK Adame   General Surgery [facility] [place]   12/09/2016 10:20 AM Ronel Mcallister DO Rheumatology  [address] [facility]     Signatures   Electronically signed by : ALEXANDRU Madrigal; Oct  6 2016 11:35AM EST                       (Author)    Electronically signed by : Madisyn Santiago DO; Oct 11 2016  9:22AM EST                       (Author)

## 2018-01-16 NOTE — PROGRESS NOTES
Assessment    1  Systemic lupus erythematosus (710 0) (M32 9)   2  Sjogren's syndrome (710 2) (M35 00)   3  Generalized osteoarthritis (715 00) (M15 9)   4  Vitamin D deficiency (268 9) (E55 9)   5  On belimumab therapy (V58 69) (Z79 899)   6  Bipolar I disorder, single manic episode (296 00) (F30 9)   7  Acid reflux disease (530 81) (K21 9)    Plan    1  (1) C3 COMPLEMENT; Status:Active; Requested MQF:15MMS4571;    2  (1) C4 COMPLEMENT; Status:Active; Requested OUO:25HVL4836;    3  (1) CBC/PLT/DIFF; Status:Active; Requested EMC:30ZJJ7982;    4  (1) COMPREHENSIVE METABOLIC PANEL; Status:Active; Requested CKR:16RMI3171;    5  (1) C-REACTIVE PROTEIN; Status:Active; Requested OBK:46ZEU1919;    6  (1) DNA (DS) ANTIBODY; Status:Active; Requested PEN:44UXS3898;    7  (1) SED RATE; Status:Active; Requested THN:30JJI9126;    8  (1) TSH WITH FT4 REFLEX; Status:Active; Requested IQO:68WYY9527;    9  (1) URINALYSIS (will reflex a microscopy if leukocytes, occult blood, protein or nitrites are   not within normal limits); Status:Active; Requested UML:71LRF1321;    10  (1) URINE PROTEIN CREATININE RATIO; Status:Active; Requested IBU:96EXM8825;    11  Follow-up visit in 3 months Evaluation and Treatment  Follow-up  Status: Complete     Done: 28OSZ2828    16  Call [phone] if: The pain seems worse ; Status:Complete;   Done: 58LJC1167   36  Call [phone] if: The symptoms seem worse ; Status:Complete;   Done:    92QTS5519   82  Call [phone] if: You have questions or concerns about your problem ;    Status:Complete;   Done: 95IAR1108    Discussion/Summary    Ms Mary Kinsey has had significant improvement in her fatigue since starting on Benlysta  She does report a little bit of nausea and diarrhea following the infusion, but this is quite tolerable  She does feel that the infusion begins to wear off approximately 3-3-1/2 weeks after her previous dose   She reports also significant improvement in her sicca symptoms, as well as swelling in her joints  She denies any significant joint pain at this time  She denies any morning stiffness  She does report difficulty sleeping, but this is not due to pain  She also reports nonrestorative sleep  On exam, there is no active synovitis  She does have mild osteoarthritic changes of the hands, as well as crepitus of the bilateral knees  Her mucus memories do appear dry  No recent rheumatologic labs were available for review today  At this time, her SLE does appear well controlled with the use of Benlysta infusions 10 mg/kg every 4 weeks  I will not make any changes to her medications at this time  I would like to obtain an updated CBC, CMP, ESR, CRP, double-stranded DNA, C3, C4, urinalysis, urine protein creatinine ratio, and TSH  I will reevaluate her in 3 months  She will call in the interim if there are any questions or concerns  Patient is able to Self-Care  Counseling  Rheumatology Counseling Documentation: The patient and patient's family was counseled regarding instructions for management, impressions and risks and benefits of treatment options  Chief Complaint  F/U SLE   Patient is here today for follow up of chronic conditions described in HPI  History of Present Illness  Pt  returns for F/U for SLE  Loves the [place]  Feels a little nausea and diarrhea after infusion, but has improved fatigue significantly  Feels like she is due for the next infusion at week 3  Not as much swelling in joints either  Sicca symptoms have improved as well  Did not have labs since starting Benlysta  No significant joint pain at this time  No AM stiffness  + difficulty sleeping -> not due to pain  + non-restorative sleep  RAPID3: 11 7/30      Review of Systems    Constitutional: recent 17 lb weight gain and chills, but no fever, no recent weight loss and no anorexia    The patient presents with complaints of fatigue  Symptoms are improving     HEENT: + nasal sore, dryness of the eyes, erythema eye(s), dryness mouth and feeling congested, but no blurred vision, no double vision, no amaurosis fugax, no mouth sores and no sore throat    The patient presents with complaints of bilateral eye pain  Cardiovascular: dyspnea on exertion, but no swelling in the arms or legs    The patient presents with complaints of daytime episodes of anterior mid-chest chest pain or pressure, radiating to the right arm (? due to sleep position)  Respiratory: pleurisy, but no sputum and no shortness of breath    The patient presents with complaints of occasional episodes of unusual or persistent cough, described as loose and dry  Gastrointestinal: heartburn, but no abdominal pain, no vomiting, no constipation, no melena and no BRBPR    The patient presents with complaints of occasional episodes of nausea (after Benlysta)  The patient presents with complaints of occasional episodes of diarrhea (after Benlysta)   Genitourinary: no foamy urine, but no dysuria and no hematuria  Musculoskeletal: as noted in HPI  Integumentary alopecia, but no rash, no Raynaud's, no nail changes and no photosensitivity  Endocrine no polyuria and no polydipsia  Hematologic/Lymphatic: a tendency for easy bruising, but no unusual bleeding  Neurological: no headache, no tingling and no weakness    The patient presents with complaints of occasional episodes of vertigo or dizziness, described as lightheadedness  Symptoms are made worse by standing and getting up quickly  Psychiatric: insomnia and non-restorative sleep  Active Problems    1  Acid reflux disease (530 81) (K21 9)   2  Bipolar I disorder, single manic episode (296 00) (F30 9)   3  Depression (311) (F32 9)   4  Elevated sedimentation rate (790 1) (R70 0)   5  Encounter for long term current azathioprine therapy (V58 69) (Z79 899)   6  Encounter for screening for malignant neoplasm of breast (V76 10) (Z12 39)   7  Generalized osteoarthritis (715 00) (M15 9)   8  Heart burn (787 1) (R12)   9  Obesity (278 00) (E66 9)   10  On belimumab therapy (V58 69) (Z79 899)   11  Osteoporosis screening (V82 81) (Z13 820)   12  Polyarthralgia (719 49) (M25 50)   13  Postgastrectomy malabsorption (579 3) (K91 2,Z90 3)   14  Right hamstring muscle strain (843 8) (S76 311A)   15  S/P gastric bypass (V45 86) (Z98 84)   16  Sjogren's syndrome (710 2) (M35 00)   17  Systemic lupus erythematosus (710 0) (M32 9)   18  Thyroid disorder (246 9) (E07 9)   19  Vaginal atrophy (627 3) (N95 2)   20  Vitamin D deficiency (268 9) (E55 9)   21  Well female exam with routine gynecological exam (V72 31) (Z01 419)    Past Medical History    1  History of Closed head injury (959 01) (S09 90XA)   2  History of Contusion of chest wall (922 1) (S20 219A)   3  History of Contusion of right knee (924 11) (S80 01XA)   4  History of Contusion of right leg (924 5) (S80 11XA)   5  History of Contusion of right lower leg (924 10) (S80 11XA)   6  History of Elbow contusion (923 11) (S50 00XA)   7  History of Morbid or severe obesity due to excess calories (278 01) (E66 01)   8  History of Pain in elbow joint (719 42) (M25 529)    The active problems and past medical history were reviewed and updated today  Surgical History    1  History of Ear Surgery   2  History of Elbow Surgery   3  History of Exploratory Laparoscopy   4  History of Gastric Surgery For Morbid Obesity Gastric Bypass   5  History of Hernia Repair   6  History of Hysterectomy   7  History of Tonsillectomy    The surgical history was reviewed and updated today  Family History  Mother    1  Family history of Congenital Heart Disease   2  Family history of Congestive Heart Failure   3  Family history of Diabetes Mellitus (V18 0)   4  Family history of Hypertension (V17 49)  Father    5  Family history of Congenital Heart Disease   6  Family history of Diabetes Mellitus (V18 0)   7   Family history of Stroke Syndrome (V17 1)  Maternal [de-identified] Sister 8  Family history of leukemia (V16 6) (Z80 6)  Brother    5  Family history of Congenital Heart Disease   10  Family history of Congestive Heart Failure  Maternal Half Brother    6  Family history of CAD (coronary artery disease)  Family History    12  Family history of Crohn's disease (V18 59) (Z83 79)   13  Family history of psoriasis (V19 4) (Z84 0)   14  Family history of rheumatoid arthritis (V17 7) (Z82 61)   15  Family history of systemic lupus erythematosus (V19 4) (Z82 69)    The family history was reviewed and updated today  Social History    · Denied: History of Drug Use   · Never A Smoker   · Never Drank Alcohol  The social history was reviewed and updated today  The social history was reviewed and is unchanged  Current Meds   1  Acetaminophen-Codeine #3 300-30 MG Oral Tablet; TAKE 1 TABLET 3 TIMES DAILY AS   NEEDED FOR PAIN  ONGOING THERAPY; Therapy: 50XJX6878 to (Evaluate:05Nov2016); Last Rx:06Oct2016 Ordered   2  Aspirin 81 MG TABS; TAKE 1 TABLET DAILY; Therapy: (Recorded:76Dmw3179) to Recorded   3  Benlysta 400 MG Intravenous Solution Reconstituted; INFUSE 790MG INTRAVENOUSLY   AT WEEK 4 THEN EVERY 4 WEEKS; Therapy: 35DED4601 to (Evaluate:25Apr2017)  Requested for: 51VIV2027; Last   Rx:03Jan2017 Ordered   4  BuPROPion HCl - 100 MG Oral Tablet; TAKE 1 TABLET EVERY 12 HOURS DAILY; Therapy: 35JCR4288 to (Evaluate:28Moe4089) Recorded   5  Citracal Petites/Vitamin D TABS; Take 1 tablet twice daily; Therapy: (Recorded:08Yxf9377) to Recorded   6  Estrace 0 1 MG/GM Vaginal Cream; Apply pea size amount to vaginal area, 1-3 times per   week; Therapy: 87Qpc2184 to (Last Rx:01Aug2016)  Requested for: 01Aug2016 Ordered   7  Fiber Select Gummies CHEW; Take 1 tablet daily; Therapy: (Recorded:54Uas4920) to Recorded   8  Multiple Vitamins Oral Tablet; TAKE 1 TABLET DAILY; Therapy: (Recorded:46Foh4969) to Recorded   9   Restoril 30 MG Oral Capsule; TAKE 1 CAPSULE AT BEDTIME AS NEEDED FOR SLEEP; Therapy: (Recorded:34Ilt3861) to Recorded   10  SEROquel 300 MG Oral Tablet; TAKE 1 TABLET AT BEDTIME; Therapy: (Recorded:28Ahh8571) to Recorded   11  Synthroid 75 MCG Oral Tablet; TAKE 1 TABLET DAILY AS DIRECTED; Therapy: (Recorded:43Fct4539) to Recorded   12  Vagifem 10 MCG Vaginal Tablet; Use as directed 3x per week; Therapy: 98HNU8586 to (Evaluate:32Aak6895) Recorded   13  Vitamin D 2000 UNIT Oral Capsule; take 1 capsule daily; Therapy: (Recorded:23Fuo4468) to Recorded    The medication list was reviewed and updated today  Allergies    1  Cipro TABS   2  Cymbalta CPEP    Vitals  Signs   Recorded: 41LIH6276 03:26PM   Heart Rate: 80  Systolic: 974  Diastolic: 82  Weight: 814 lb   BMI Calculated: 34 58  BSA Calculated: 1 8    Physical Exam    Constitutional   General appearance: Abnormal   obese  Eyes   Conjunctiva and lids: Abnormal   Eye Lids: right upper eyelid blepharitis and right lower eyelid blepharitis  Pupils and irises: Equal, round and reactive to light  Ears, Nose, Mouth, and Throat   External inspection of ears and nose: Normal     Oropharynx: Abnormal   (+ poor dentition) Oral mucosa was dry  Pulmonary   Respiratory effort: No increased work of breathing or signs of respiratory distress  Auscultation of lungs: Clear to auscultation  Cardiovascular   Auscultation of heart: Normal rate and rhythm, normal S1 and S2, without murmurs  Examination of extremities for edema and/or varicosities: Normal     Lymphatic   Palpation of lymph nodes in neck: No lymphadenopathy  Psychiatric   Orientation to person, place, and time: Normal     Mood and affect: Normal         Right elbow restricted ROM  Right Upper Extremity: Right Hand: Right Hand Appearance: Vivian's node at the all PIPs digit  Right Wrist: Right Elbow: Right Shoulder:   Left Upper Extremity: Left Hand: Appearance: all PIPs PIP Vivian's node(s)   Left Wrist: Left Elbow: Left Shoulder: Musculoskeletal - Joints, bones, and muscles: Abnormal  Palpation - bilateral knee crepitus  Skin - Skin and subcutaneous tissue: Abnormal  Scalp and Hair: thin hair  Neurologic - Sensation: Normal      The patient has tenderness in all MTP joints of the right foot  The patient has tenderness in all MTP joints of the left foot  Future Appointments    Date/Time Provider Specialty Site   01/26/2017 10:30 AM KIRK Begum   General Surgery [facility] [place]   04/06/2017 11:00 AM Peg Mondragon DO Rheumatology [facility]     Signatures   Electronically signed by : India Alves DO; Jan 5 2017  5:16PM EST                       (Author)

## 2018-01-17 NOTE — RESULT NOTES
Dear Nicki Bowling,   Your test results have returned and are listed below:      Plan  Systemic lupus erythematosus    · (1) CBC/ PLT (NO DIFF); Status:Active; Requested for:27Apr2016;    · (1) COMPREHENSIVE METABOLIC PANEL; Status:Active; Requested for:28Apr2016;     Discussion/Summary  Your results have some minor abnormalities, but nothing that is concerning  Unfortunately, we never received the results of your complete blood count (CBC) or comprehensive metabolix panel(CMP)  Enclosed is a lab slip for these two labs  Please get this does as soon as possible  Thank you Please keep your regularly scheduled follow-up appointment  If you do not have a follow-up scheduled, please call the office to schedule a follow-up visit  If you have any questions, please don't hesitate to call the office  Sincerely,      Signatures   Electronically signed by : SAJI Snyder; Apr 28 2016  2:49PM EST                       (Author)    Electronically signed by :  KIRK Desai ; May  5 2016  1:31PM EST

## 2018-01-17 NOTE — MISCELLANEOUS
Provider Comments  Provider Comments:   Dear Lana Hernandez had a scheduled appointment at our office for 05/08/2017 but were unable to keep  We attempted to call you back but were unable to reach you  It is very important that you follow up with us so that we can assess your physical and nutritional safety after your surgery  Please call our office at [phone] to reschedule your appointment         Sincerely,     [facility]          Signatures   Electronically signed by : Silke Erazo, ; May  8 2017 10:11AM EST                       (Author)

## 2018-01-17 NOTE — PROGRESS NOTES
Assessment    1  Systemic lupus erythematosus (710 0) (M32 9)   2  Sjogren's syndrome (710 2) (M35 00)   3  Generalized osteoarthritis (715 00) (M15 9)   4  Vitamin D deficiency (268 9) (E55 9)   5  Elevated sedimentation rate (790 1) (R70 0)   6  On belimumab therapy (V58 69) (Z79 899)   7  Bipolar I disorder, single manic episode (296 00) (F30 9)   8  Acid reflux disease (530 81) (K21 9)    Plan     1  (1) C3 COMPLEMENT; Status:Active; Requested for:70Pql1221;    2  (1) C4 COMPLEMENT; Status:Active; Requested for:35Jrl0023;    3  (1) CBC/PLT/DIFF; Status:Active; Requested for:77Mac7720;    4  (1) COMPREHENSIVE METABOLIC PANEL; Status:Active; Requested for:17Xub6218;    5  (1) C-REACTIVE PROTEIN; Status:Active; Requested for:85Gbn4264;    6  (1) DNA (DS) ANTIBODY; Status:Active; Requested for:75Euk6500;    7  (1) SED RATE; Status:Active; Requested for:16Dix9721;     8  Call [phone] if: The symptoms seem worse ; Status:Complete;   Done:   56KKW8565   9  Call [phone] if: You have questions or concerns about your problem ;   Status:Complete;   Done: 50HTV4880    *1 - [facility]  History of SLE with markedly elevated ESR despite treatment  Please evaluate for occult malignancy  Status: Hold For - Scheduling  Requested for: [id number]  Ordered; For: Generalized osteoarthritis, On belimumab therapy, Sjogren's syndrome, Systemic lupus erythematosus; Ordered By: Estrellita Ma  Performed:   Due: 07OWQ5074  Follow-up visit in 3 months Evaluation and Treatment  Follow-up  Status: Hold For - Scheduling  Requested for: 32BWG0371  Ordered; For: Generalized osteoarthritis, On belimumab therapy, Sjogren's syndrome, Systemic lupus erythematosus; Ordered By: Estrellita Ma  Performed:   Due: 96NUK6944     Discussion/Summary    Ms Omid Romero has not been utilizing Tylenol 3 for her joint pain, as she does report it worsens her dry mouth   She continues to have pain in her bilateral knees, right more so than left, as well as occasionally in her ankles and wrists  She states that she was in the hospital in February for influenza and pneumonia  She did not receive her Benlysta infusion in April due to an ulceration on her eyelid  She states that she is now turned 74, and can no longer receive the financial assistance from [employer]  She continues to feel the Ray works somewhat for her symptoms, but they are not as well-controlled as before  She does report swelling in her wrists and her hands  She denies any other obvious joint swelling  She reports morning stiffness typically lasting one and a half hours before improvement  She does report difficulty sleeping at night, but she does not attribute this to pain  She also reports nonrestorative sleep and fatigue  On exam, there is mild synovitis of the bilateral ankles and wrists  There is no other active synovitis  She does have crepitus of the bilateral knees  Review of laboratory studies from January 2017 revealed an essentially normal CBC, CMP, and TSH  ESR was markedly elevated at 122, but CRP was normal  Urinalysis was negative for blood or protein  Urine protein to creatinine ratio was normal  Double-stranded DNA antibody was normal, as were C3 and C4  At this time, her SLE does appear mildly active despite Benlysta infusions  She continues to have a markedly elevated ESR, which has never improved with Benlysta, so I do question whether or not something else is contributing to its elevation  I will refer her to Hematology and Oncology for further evaluation to evaluate for a possible underlying malignancy  If no other cause for elevated ESR is found, we may consider further systemic therapy for her SLE with CellCept  I would like to recheck a CBC, CMP, ESR, CRP, double-stranded DNA, C3, and C4 at this time  I will reevaluate her in 3 months or sooner if necessary  She will call in the interim if she has any questions or concerns  Patient is able to Self-Care  Counseling  Rheumatology Counseling Documentation: The patient was counseled regarding diagnostic results, instructions for management, impressions and risks and benefits of treatment options  The following educational handouts were provided: Mycophenolate  Chief Complaint  F/U SLE   Patient is here today for follow up of chronic conditions described in HPI  History of Present Illness  Pt  returns for F/U for SLE  Not taking T#3 as it increases her dry mouth  c/o pain in knees (R > L) and occasionally ankles  Also with occasional pain in wrists  Was in hospital in February for influenza and PNA  Did not receive Benlysta in April due to issue with eye  Turned 74 in May and now cut off from financial assistance  Still feels Benlysta works as well, but fatigue not as well controlled as before  Not sure if Edel Duckworth is as helpful as she hoped it would be  + swelling in wrists and hands  No other obvious joint swelling  + AM stiffness x 1 5 hours  + difficulty sleeping -> not due to pain  + non-restorative sleep  + fatigue  RAPID3: not completed      Review of Systems    Constitutional: recent 22 lb weight gain, but no fever, no chills and no recent weight loss  HEENT: + nasal sore, dryness of the eyes, erythema eye(s) and dryness mouth, but no blurred vision, no double vision, no amaurosis fugax, no eye pain, no mouth sores, not feeling congested and no sore throat  Cardiovascular: dyspnea on exertion, but no chest pain or pressure and no swelling in the arms or legs  Respiratory: no shortness of breath and no pleurisy    The patient presents with complaints of unusual or persistent cough (+ green/yellow sputum)  Gastrointestinal: abdominal pain, nausea and heartburn, but no vomiting, no constipation, no melena and no BRBPR    The patient presents with complaints of occasional episodes of diarrhea     Genitourinary: no foamy urine, but no hematuria    The patient presents with complaints of episodes of dysuria  Symptoms are resolved  Musculoskeletal: as noted in HPI  Integumentary rash and alopecia, but no Raynaud's, no nail changes and no photosensitivity  Endocrine heat or cold intolerance, but no polyuria and no polydipsia  Hematologic/Lymphatic: a tendency for easy bruising, but no unusual bleeding  Neurological: tingling, but no headache and no weakness    The patient presents with complaints of vertigo or dizziness, described as lightheadedness  Symptoms are made worse by getting up quickly  Psychiatric: insomnia and non-restorative sleep  Active Problems    1  Acid reflux disease (530 81) (K21 9)   2  Bipolar I disorder, single manic episode (296 00) (F30 9)   3  Depression (311) (F32 9)   4  Elevated sedimentation rate (790 1) (R70 0)   5  Encounter for long term current azathioprine therapy (V58 69) (Z79 899)   6  Encounter for screening for malignant neoplasm of breast (V76 10) (Z12 39)   7  Generalized osteoarthritis (715 00) (M15 9)   8  Heart burn (787 1) (R12)   9  Obesity (278 00) (E66 9)   10  On belimumab therapy (V58 69) (Z79 899)   11  Osteoporosis screening (V82 81) (Z13 820)   12  Periorbital edema (782 3) (R60 0)   13  Polyarthralgia (719 49) (M25 50)   14  Postgastrectomy malabsorption (579 3) (K91 2,Z90 3)   15  Right hamstring muscle strain (843 8) (S76 311A)   16  S/P gastric bypass (V45 86) (Z98 84)   17  Sjogren's syndrome (710 2) (M35 00)   18  Systemic lupus erythematosus (710 0) (M32 9)   19  Thyroid disorder (246 9) (E07 9)   20  Vaginal atrophy (627 3) (N95 2)   21  Visit for screening mammogram (V76 12) (Z12 31)   22  Vitamin D deficiency (268 9) (E55 9)   23  Well female exam with routine gynecological exam (V72 31) (Z01 419)    Past Medical History    1  History of Closed head injury (959 01) (S09 90XA)   2  History of Contusion of chest wall (922 1) (S20 219A)   3   History of Contusion of right knee (654 11) (S80 01XA)   4  History of Contusion of right leg (924 5) (S80 11XA)   5  History of Contusion of right lower leg (924 10) (S80 11XA)   6  History of Elbow contusion (923 11) (S50 00XA)   7  History of Morbid or severe obesity due to excess calories (278 01) (E66 01)   8  History of Pain in elbow joint (719 42) (M25 529)    The active problems and past medical history were reviewed and updated today  Surgical History    1  History of Ear Surgery   2  History of Elbow Surgery   3  History of Exploratory Laparoscopy   4  History of Gastric Surgery For Morbid Obesity Gastric Bypass   5  History of Hernia Repair   6  History of Hysterectomy   7  History of Tonsillectomy    The surgical history was reviewed and updated today  Family History  Mother    1  Family history of Congenital Heart Disease   2  Family history of Congestive Heart Failure   3  Family history of Diabetes Mellitus (V18 0)   4  Family history of Hypertension (V17 49)  Father    5  Family history of Congenital Heart Disease   6  Family history of Diabetes Mellitus (V18 0)   7  Family history of Stroke Syndrome (V17 1)  Son    8  Family history of kidney stones (V18 69) (Z84 1)  Maternal Half Sister    5  Family history of leukemia (V16 6) (Z80 6)  Brother    8  Family history of Congenital Heart Disease   11  Family history of Congestive Heart Failure  Maternal Half Brother    15  Family history of CAD (coronary artery disease)  Family History    13  Family history of Crohn's disease (V18 59) (Z83 79)   14  Family history of psoriasis (V19 4) (Z84 0)   15  Family history of rheumatoid arthritis (V17 7) (Z82 61)   16  Family history of systemic lupus erythematosus (V19 4) (Z82 69)    The family history was reviewed and updated today  Social History    · Denied: History of Drug Use   · Never A Smoker   · Never Drank Alcohol  The social history was reviewed and updated today  The social history was reviewed and is unchanged        Current Meds   1  Acetaminophen-Codeine #3 300-30 MG Oral Tablet; TAKE 1 TABLET 3 TIMES DAILY AS   NEEDED FOR PAIN  ONGOING THERAPY; Therapy: 15RUI1560 to (Evaluate:05Nov2016); Last Rx:06Oct2016 Ordered   2  Aspirin 81 MG TABS; TAKE 1 TABLET DAILY; Therapy: (Recorded:16Ruj0022) to Recorded   3  Benlysta 400 MG Intravenous Solution Reconstituted; INFUSE 820MG INTRAVENOUSLY   EVERY 4 WEEKS; Therapy: 81HLC4901 to (Evaluate:58Upl9776)  Requested for: 59BDE9228; Last   Rx:18Jan2017 Ordered   4  Biotin 5000 MCG Oral Tablet Recorded   5  BuPROPion HCl - 100 MG Oral Tablet; TAKE 1 TABLET EVERY 12 HOURS DAILY; Therapy: 13MXZ3451 to (Evaluate:50Gov2906) Recorded   6  Citracal Petites/Vitamin D TABS; Take 1 tablet twice daily; Therapy: (Recorded:99Pdp8224) to Recorded   7  Fiber Select Gummies CHEW; Take 1 tablet daily; Therapy: (Recorded:25Czc5493) to Recorded   8  Multiple Vitamins Oral Tablet; TAKE 1 TABLET DAILY; Therapy: (Recorded:43Wjm2477) to Recorded   9  Omeprazole 20 MG Oral Capsule Delayed Release; TAKE 1 CAPSULE DAILY EVERY   MORNING BEFORE BREAKFAST; Therapy: 12WQE8523 to (Evaluate:37Kwk8534); Last Rx:26Jan2017 Ordered   10  Restoril 30 MG Oral Capsule; TAKE 1 CAPSULE AT BEDTIME AS NEEDED FOR SLEEP; Therapy: (Recorded:41Trz0231) to Recorded   11  SEROquel 300 MG Oral Tablet; TAKE 1 TABLET AT BEDTIME; Therapy: (Recorded:43Szz4748) to Recorded   12  Synthroid 75 MCG Oral Tablet; TAKE 1 TABLET DAILY AS DIRECTED; Therapy: (Recorded:00Yir3032) to Recorded   13  Vitamin D 2000 UNIT Oral Capsule; take 1 capsule daily; Therapy: (Recorded:51Gyr7842) to Recorded    The medication list was reviewed and updated today  Allergies    1  Cipro TABS   2  Cymbalta CPEP    Vitals  Signs   Recorded: 48NOB8720 01:17PM   Heart Rate: 80  Systolic: 442  Diastolic: 72  Weight: 665 lb   BMI Calculated: 34 58  BSA Calculated: 1 82    Physical Exam    Constitutional   General appearance: Abnormal   obese     Eyes Conjunctiva and lids: Abnormal   Eye Lids: right upper eyelid blepharitis and right lower eyelid blepharitis  Pupils and irises: Equal, round and reactive to light  Ears, Nose, Mouth, and Throat   External inspection of ears and nose: Normal     Oropharynx: Abnormal   (+ poor dentition) Oral mucosa was dry  Pulmonary   Respiratory effort: No increased work of breathing or signs of respiratory distress  Auscultation of lungs: Clear to auscultation  Cardiovascular   Auscultation of heart: Normal rate and rhythm, normal S1 and S2, without murmurs  Examination of extremities for edema and/or varicosities: Normal     Lymphatic   Palpation of lymph nodes in neck: No lymphadenopathy  Psychiatric   Orientation to person, place, and time: Normal     Mood and affect: Normal         Right wrist tenderness  Right elbow restricted ROM  Left wrist tenderness  Right Upper Extremity: Right Hand: Right Hand Appearance: Vivian's node at the all PIPs digit  Right Wrist: Right Elbow: Right Shoulder:   Left Upper Extremity: Left Hand: Appearance: all PIPs PIP Vivian's node(s)  Left Wrist: Left Elbow: Left Shoulder:   Musculoskeletal - Joints, bones, and muscles: Abnormal  Palpation - bilateral knee crepitus  Muscle strength/tone: Normal  Motor Strength Findings: normal upper extremity strength and normal lower extremity strength  Right upper extremity: shoulder flexion 5/5, shoulder extension 5/5, biceps 5/5, triceps 5/5, but normal hand   Left upper extremity shoulder flexion 5/5, shoulder extension 5/5, biceps 5/5, triceps 5/5, but normal hand   Skin - Skin and subcutaneous tissue: Abnormal  Scalp and Hair: thin hair     Neurologic - Sensation: Normal       Future Appointments    Date/Time Provider Specialty Site   06/12/2017 01:15 PM KIRK Rojo DO, [facility] Hematology Oncology [address] [place] [address] ONCOLOGY   08/29/2017 01:00 PM Karla Page DO [facility]     Signatures   Electronically signed by : Philippe Gonzalez DO; May 26 2017  4:21PM EST                       (Author)

## 2018-01-18 ENCOUNTER — GENERIC CONVERSION - ENCOUNTER (OUTPATIENT)
Dept: OTHER | Facility: OTHER | Age: 68
End: 2018-01-18

## 2018-01-19 DIAGNOSIS — D64.9 ANEMIA: ICD-10-CM

## 2018-01-22 VITALS
DIASTOLIC BLOOD PRESSURE: 72 MMHG | SYSTOLIC BLOOD PRESSURE: 100 MMHG | HEART RATE: 80 BPM | WEIGHT: 183 LBS | BODY MASS INDEX: 34.58 KG/M2

## 2018-01-22 VITALS
BODY MASS INDEX: 34.57 KG/M2 | WEIGHT: 180 LBS | HEART RATE: 80 BPM | DIASTOLIC BLOOD PRESSURE: 82 MMHG | SYSTOLIC BLOOD PRESSURE: 128 MMHG

## 2018-01-22 VITALS
RESPIRATION RATE: 15 BRPM | TEMPERATURE: 100.2 F | DIASTOLIC BLOOD PRESSURE: 86 MMHG | HEIGHT: 60 IN | BODY MASS INDEX: 35.93 KG/M2 | OXYGEN SATURATION: 95 % | SYSTOLIC BLOOD PRESSURE: 124 MMHG | HEART RATE: 84 BPM | WEIGHT: 183 LBS

## 2018-01-23 LAB
MYCOBACTERIUM SPEC CULT: NORMAL
RHODAMINE-AURAMINE STN SPEC: NORMAL

## 2018-01-24 VITALS
BODY MASS INDEX: 34.18 KG/M2 | DIASTOLIC BLOOD PRESSURE: 78 MMHG | OXYGEN SATURATION: 94 % | SYSTOLIC BLOOD PRESSURE: 104 MMHG | HEIGHT: 60 IN | TEMPERATURE: 99.1 F | RESPIRATION RATE: 16 BRPM | WEIGHT: 174.13 LBS | HEART RATE: 82 BPM

## 2018-01-25 LAB
ALBUMIN SERPL-MCNC: 3.4 G/DL (ref 3.6–5.1)
ALBUMIN/GLOB SERPL: 0.5 (CALC) (ref 1–2.5)
ALP SERPL-CCNC: 99 U/L (ref 33–130)
ALT SERPL-CCNC: 9 U/L (ref 6–29)
AST SERPL-CCNC: 21 U/L (ref 10–35)
BASOPHILS # BLD AUTO: 61 CELLS/UL (ref 0–200)
BASOPHILS NFR BLD AUTO: 1.1 %
BILIRUB SERPL-MCNC: 0.5 MG/DL (ref 0.2–1.2)
BUN SERPL-MCNC: 14 MG/DL (ref 7–25)
BUN/CREAT SERPL: ABNORMAL (CALC) (ref 6–22)
CALCIUM SERPL-MCNC: 8.6 MG/DL (ref 8.6–10.4)
CHLORIDE SERPL-SCNC: 102 MMOL/L (ref 98–110)
CO2 SERPL-SCNC: 24 MMOL/L (ref 20–31)
CREAT SERPL-MCNC: 0.81 MG/DL (ref 0.5–0.99)
EOSINOPHIL # BLD AUTO: 99 CELLS/UL (ref 15–500)
EOSINOPHIL NFR BLD AUTO: 1.8 %
ERYTHROCYTE [DISTWIDTH] IN BLOOD BY AUTOMATED COUNT: 14.8 % (ref 11–15)
FERRITIN SERPL-MCNC: 53 NG/ML (ref 20–288)
GLOBULIN SER CALC-MCNC: 6.3 G/DL (CALC) (ref 1.9–3.7)
GLUCOSE SERPL-MCNC: 85 MG/DL (ref 65–99)
HCT VFR BLD AUTO: 35.8 % (ref 35–45)
HGB BLD-MCNC: 11.9 G/DL (ref 11.7–15.5)
IRON SATN MFR SERPL: 42 % (CALC) (ref 11–50)
IRON SERPL-MCNC: 99 MCG/DL (ref 45–160)
LYMPHOCYTES # BLD AUTO: 2151 CELLS/UL (ref 850–3900)
LYMPHOCYTES NFR BLD AUTO: 39.1 %
MCH RBC QN AUTO: 30.6 PG (ref 27–33)
MCHC RBC AUTO-ENTMCNC: 33.2 G/DL (ref 32–36)
MCV RBC AUTO: 92 FL (ref 80–100)
MONOCYTES # BLD AUTO: 594 CELLS/UL (ref 200–950)
MONOCYTES NFR BLD AUTO: 10.8 %
NEUTROPHILS # BLD AUTO: 2596 CELLS/UL (ref 1500–7800)
NEUTROPHILS NFR BLD AUTO: 47.2 %
PLATELET # BLD AUTO: 282 THOUSAND/UL (ref 140–400)
PMV BLD REES-ECKER: 10.4 FL (ref 7.5–12.5)
POTASSIUM SERPL-SCNC: 4.2 MMOL/L (ref 3.5–5.3)
PROT SERPL-MCNC: 9.7 G/DL (ref 6.1–8.1)
RBC # BLD AUTO: 3.89 MILLION/UL (ref 3.8–5.1)
SL AMB EGFR AFRICAN AMERICAN: 88 ML/MIN/1.73M2
SL AMB EGFR NON AFRICAN AMERICAN: 76 ML/MIN/1.73M2
SODIUM SERPL-SCNC: 131 MMOL/L (ref 135–146)
TIBC SERPL-MCNC: 233 MCG/DL (CALC) (ref 250–450)
WBC # BLD AUTO: 5.5 THOUSAND/UL (ref 3.8–10.8)

## 2018-02-01 ENCOUNTER — OFFICE VISIT (OUTPATIENT)
Dept: HEMATOLOGY ONCOLOGY | Facility: CLINIC | Age: 68
End: 2018-02-01
Payer: COMMERCIAL

## 2018-02-01 VITALS
TEMPERATURE: 98.8 F | HEART RATE: 88 BPM | HEIGHT: 60 IN | RESPIRATION RATE: 15 BRPM | WEIGHT: 177.6 LBS | DIASTOLIC BLOOD PRESSURE: 74 MMHG | BODY MASS INDEX: 34.87 KG/M2 | SYSTOLIC BLOOD PRESSURE: 122 MMHG

## 2018-02-01 DIAGNOSIS — R59.1 LYMPHADENOPATHY: ICD-10-CM

## 2018-02-01 DIAGNOSIS — D50.9 IRON DEFICIENCY ANEMIA, UNSPECIFIED IRON DEFICIENCY ANEMIA TYPE: Primary | ICD-10-CM

## 2018-02-01 PROCEDURE — 99214 OFFICE O/P EST MOD 30 MIN: CPT | Performed by: INTERNAL MEDICINE

## 2018-02-01 RX ORDER — RISPERIDONE 0.5 MG/1
TABLET, FILM COATED ORAL AS NEEDED
COMMUNITY
End: 2020-07-11

## 2018-02-01 RX ORDER — ACETAMINOPHEN AND CODEINE PHOSPHATE 300; 30 MG/1; MG/1
1 TABLET ORAL 3 TIMES DAILY PRN
COMMUNITY
Start: 2016-10-06 | End: 2018-12-21

## 2018-02-01 RX ORDER — QUETIAPINE FUMARATE 25 MG/1
25 TABLET, FILM COATED ORAL AS NEEDED
COMMUNITY
Start: 2017-07-18 | End: 2020-10-21 | Stop reason: ALTCHOICE

## 2018-02-01 RX ORDER — QUETIAPINE FUMARATE 300 MG/1
TABLET, FILM COATED ORAL
COMMUNITY
End: 2018-12-20 | Stop reason: ALTCHOICE

## 2018-02-01 RX ORDER — TEMAZEPAM 30 MG/1
2 CAPSULE ORAL
COMMUNITY

## 2018-02-01 NOTE — PROGRESS NOTES
Aziza Perales  1950  [address] [facility] [place]  [address] [facility] [address] [facility] [address] [phone]    No chief complaint on file  No history exists  History of Present Illness:  -Kay Castleman, MD:  -Previous Therapy (if not listed in Oncology History):  -Current Therapy (if not listed in Oncology History):  -Disease Status:  -Interval History:  -Tumor Markers:    Review of Systems:  Review of Systems   Constitutional: Negative for appetite change, diaphoresis, fatigue and fever  HENT: Negative for sinus pain  Dry eyes and dry mouth attributed to Sjogren's  Eyes: Positive for visual disturbance  Negative for discharge  Respiratory: Negative for cough and shortness of breath  Cardiovascular: Negative for chest pain  Gastrointestinal: Negative for abdominal pain, constipation and diarrhea  Endocrine: Negative for cold intolerance  Genitourinary: Negative for difficulty urinating and hematuria  Musculoskeletal: Negative for joint swelling  Skin: Negative for rash  Allergic/Immunologic: Negative for environmental allergies  Neurological: Negative for dizziness and headaches  Hematological: Negative for adenopathy  Psychiatric/Behavioral: Negative for agitation  Patient Active Problem List   Diagnosis    Bipolar 1 disorder ([facility] [address] [facility]    Hypothyroidism    Systemic lupus erythematosus ([facility] [facility]    Pneumonia of right middle lobe due to infectious organism ([facility] [address] )    Influenza A     Past Medical History:   Diagnosis Date    Bipolar 1 disorder ([facility] [facility]     Hypothyroidism     Psychiatric disorder     Sjogren's syndrome ([facility] [facility]     Systemic lupus erythematosus ([facility] [address] )      Past Surgical History:   Procedure Laterality Date    FRACTURE SURGERY Right     elbow    GASTRIC BYPASS      OH BRONCHOSCOPY,DIAGNOSTIC N/A 12/8/2017    Procedure: BRONCHOSCOPY;  Surgeon: Daniel Antoine MD;  [facility];   Service: Pulmonary     Family History   Problem Relation Age of Onset    Heart disease Mother     Diabetes Mother     Heart disease Father     Stroke Father     Diabetes Father     Cancer Father      Social History     Social History    Marital status: /Civil Union     Spouse name: N/A    Number of children: N/A    Years of education: N/A     Occupational History    Not on file  Social History Main Topics    Smoking status: Never Smoker    Smokeless tobacco: Never Used    Alcohol use No    Drug use: No    Sexual activity: Not on file     Other Topics Concern    Not on file     Social History Narrative    No narrative on file       Current Outpatient Prescriptions:     acetaminophen-codeine (TYLENOL #3) 300-30 mg per tablet, Take 1 tablet by mouth 3 (three) times a day as needed, Disp: , Rfl:     aspirin 81 MG tablet, Take 81 mg by mouth daily  , Disp: , Rfl:     buPROPion (WELLBUTRIN) 100 mg tablet, Take 100 mg by mouth 3 (three) times a day , Disp: , Rfl:     calcium citrate-vitamin D (CITRACAL+D) 315-200 MG-UNIT per tablet, Take 1 tablet by mouth daily  , Disp: , Rfl:     levothyroxine 25 mcg tablet, Take 25 mcg by mouth daily  , Disp: , Rfl:     LORazepam (ATIVAN) 0 5 mg tablet, Take 0 5 mg by mouth every 6 (six) hours as needed for anxiety  , Disp: , Rfl:     multivitamin-iron-minerals-folic acid (CENTRUM) chewable tablet, Chew 1 tablet daily  , Disp: , Rfl:     QUEtiapine (SEROQUEL) 25 mg tablet, Take by mouth, Disp: , Rfl:     QUEtiapine (SEROquel) 300 mg tablet, Take 300 mg by mouth daily at bedtime  , Disp: , Rfl:     QUEtiapine (SEROQUEL) 300 mg tablet, Take by mouth, Disp: , Rfl:     risperiDONE (RisperDAL) 0 5 mg tablet, Take by mouth, Disp: , Rfl:     temazepam (RESTORIL) 30 mg capsule, Take 1 capsule by mouth, Disp: , Rfl:     VENTOLIN  (90 Base) MCG/ACT inhaler, Inhale 90 puffs, Disp: , Rfl: 6  Allergies   Allergen Reactions    Ciprofloxacin Hives and Swelling    Cymbalta [Duloxetine Hcl] Other (See Comments)     Hallucinations, fatigue, faints     Vitals:    02/01/18 1203   BP: 122/74   Pulse: 88   Resp: 15   Temp: 98 8 °F (37 1 °C)         Physical Exam   Constitutional: She is oriented to person, place, and time  She appears well-developed  HENT:   Head: Normocephalic  Eyes: Pupils are equal, round, and reactive to light  Neck: Neck supple  Cardiovascular: Normal rate  No murmur heard  Pulmonary/Chest: No respiratory distress  She has no wheezes  She has no rales  Abdominal: Soft  She exhibits no distension  There is no tenderness  There is no rebound  Musculoskeletal: She exhibits no edema  Lymphadenopathy:     She has no cervical adenopathy  Neurological: She is alert and oriented to person, place, and time  She displays normal reflexes  Skin: Skin is warm  No rash noted  Psychiatric: She has a normal mood and affect  Thought content normal            Performance Status: ECOG/Zubrod/WHO: 0 - Asymptomatic    Labs:  CBC, Coags, BMP, Mg, Phos     Imaging  No results found  I reviewed the above laboratory and imaging data  Discussion/Summary:  In summary, this is a 72-year-old female history of iron deficiency anemia as outlined  Anemia has improved  Iron studies have normalized  Status post parental iron replacement, Feraheme  Clinically she is doing fairly well  She had a recent bronchoscopy with bronchial lavage which was nondiagnostic  The etiology of her lymphadenopathy remains unclear  This could be representative of a lymphoproliferative disorder or autoimmune process  Unfortunately her rheumatologist has left the area  We will take the liberty of making arrangements for new rheumatologist evaluation  Repeat iron studies and CT scan just prior to her next visit in 3 months is requested  We reviewed that if her lymphadenopathy increased biopsy either by EBUS or mediastinoscopy might be applicable

## 2018-05-05 LAB
ALBUMIN SERPL-MCNC: 3.2 G/DL (ref 3.6–5.1)
ALBUMIN/GLOB SERPL: 0.5 (CALC) (ref 1–2.5)
ALP SERPL-CCNC: 115 U/L (ref 33–130)
ALT SERPL-CCNC: 12 U/L (ref 6–29)
AST SERPL-CCNC: 23 U/L (ref 10–35)
BASOPHILS # BLD AUTO: 83 CELLS/UL (ref 0–200)
BASOPHILS NFR BLD AUTO: 1.5 %
BILIRUB SERPL-MCNC: 0.6 MG/DL (ref 0.2–1.2)
BUN SERPL-MCNC: 17 MG/DL (ref 7–25)
BUN/CREAT SERPL: ABNORMAL (CALC) (ref 6–22)
CALCIUM SERPL-MCNC: 8.4 MG/DL (ref 8.6–10.4)
CHLORIDE SERPL-SCNC: 101 MMOL/L (ref 98–110)
CO2 SERPL-SCNC: 23 MMOL/L (ref 20–31)
CREAT SERPL-MCNC: 0.75 MG/DL (ref 0.5–0.99)
EOSINOPHIL # BLD AUTO: 143 CELLS/UL (ref 15–500)
EOSINOPHIL NFR BLD AUTO: 2.6 %
ERYTHROCYTE [DISTWIDTH] IN BLOOD BY AUTOMATED COUNT: 13.2 % (ref 11–15)
GLOBULIN SER CALC-MCNC: 6.4 G/DL (CALC) (ref 1.9–3.7)
GLUCOSE SERPL-MCNC: 89 MG/DL (ref 65–99)
HCT VFR BLD AUTO: 35.2 % (ref 35–45)
HGB BLD-MCNC: 11.9 G/DL (ref 11.7–15.5)
IRON SATN MFR SERPL: 43 % (CALC) (ref 11–50)
IRON SERPL-MCNC: 101 MCG/DL (ref 45–160)
LYMPHOCYTES # BLD AUTO: 2184 CELLS/UL (ref 850–3900)
LYMPHOCYTES NFR BLD AUTO: 39.7 %
MCH RBC QN AUTO: 31.2 PG (ref 27–33)
MCHC RBC AUTO-ENTMCNC: 33.8 G/DL (ref 32–36)
MCV RBC AUTO: 92.1 FL (ref 80–100)
MONOCYTES # BLD AUTO: 523 CELLS/UL (ref 200–950)
MONOCYTES NFR BLD AUTO: 9.5 %
NEUTROPHILS # BLD AUTO: 2569 CELLS/UL (ref 1500–7800)
NEUTROPHILS NFR BLD AUTO: 46.7 %
PLATELET # BLD AUTO: 255 THOUSAND/UL (ref 140–400)
PMV BLD REES-ECKER: 10.4 FL (ref 7.5–12.5)
POTASSIUM SERPL-SCNC: 4.6 MMOL/L (ref 3.5–5.3)
PROT SERPL-MCNC: 9.6 G/DL (ref 6.1–8.1)
RBC # BLD AUTO: 3.82 MILLION/UL (ref 3.8–5.1)
SL AMB EGFR AFRICAN AMERICAN: 97 ML/MIN/1.73M2
SL AMB EGFR NON AFRICAN AMERICAN: 84 ML/MIN/1.73M2
SODIUM SERPL-SCNC: 129 MMOL/L (ref 135–146)
TIBC SERPL-MCNC: 233 MCG/DL (CALC) (ref 250–450)
WBC # BLD AUTO: 5.5 THOUSAND/UL (ref 3.8–10.8)

## 2018-05-24 ENCOUNTER — APPOINTMENT (EMERGENCY)
Dept: RADIOLOGY | Facility: HOSPITAL | Age: 68
End: 2018-05-24
Payer: COMMERCIAL

## 2018-05-24 ENCOUNTER — HOSPITAL ENCOUNTER (EMERGENCY)
Facility: HOSPITAL | Age: 68
Discharge: HOME | End: 2018-05-24
Attending: EMERGENCY MEDICINE | Admitting: EMERGENCY MEDICINE
Payer: COMMERCIAL

## 2018-05-24 VITALS
DIASTOLIC BLOOD PRESSURE: 58 MMHG | HEART RATE: 74 BPM | RESPIRATION RATE: 17 BRPM | TEMPERATURE: 97.4 F | OXYGEN SATURATION: 93 % | SYSTOLIC BLOOD PRESSURE: 121 MMHG

## 2018-05-24 DIAGNOSIS — S92.909A FOOT FRACTURE: ICD-10-CM

## 2018-05-24 DIAGNOSIS — S82.899A ANKLE FRACTURE: Primary | ICD-10-CM

## 2018-05-24 PROCEDURE — 73610 X-RAY EXAM OF ANKLE: CPT

## 2018-05-24 PROCEDURE — 99283 EMERGENCY DEPT VISIT LOW MDM: CPT

## 2018-05-24 RX ORDER — ACETAMINOPHEN 325 MG/1
975 TABLET ORAL ONCE
Status: COMPLETED | OUTPATIENT
Start: 2018-05-24 | End: 2018-05-24

## 2018-05-24 RX ORDER — HYDROCODONE BITARTRATE AND ACETAMINOPHEN 5; 325 MG/1; MG/1
1 TABLET ORAL 3 TIMES DAILY
Qty: 15 TABLET | Refills: 0 | Status: SHIPPED | OUTPATIENT
Start: 2018-05-24 | End: 2018-06-03

## 2018-05-24 RX ADMIN — ACETAMINOPHEN 975 MG: 325 TABLET ORAL at 12:18

## 2018-05-24 NOTE — ED NOTES
Attempted to use crutches  Pt was unable to use crutches  Attempted to use walker  Pt was successful with walker  Dr Rolando Sullivan aware that pt is able to use walker  Pt given instructions on walker and verbalized understanding       [facility]  05/24/18 9376

## 2018-05-30 ENCOUNTER — OFFICE VISIT (OUTPATIENT)
Dept: OBGYN CLINIC | Facility: CLINIC | Age: 68
End: 2018-05-30
Payer: COMMERCIAL

## 2018-05-30 VITALS
BODY MASS INDEX: 32.28 KG/M2 | DIASTOLIC BLOOD PRESSURE: 63 MMHG | WEIGHT: 171 LBS | SYSTOLIC BLOOD PRESSURE: 96 MMHG | HEIGHT: 61 IN | HEART RATE: 87 BPM

## 2018-05-30 DIAGNOSIS — S92.352A CLOSED FRACTURE OF BASE OF FIFTH METATARSAL BONE OF LEFT FOOT: ICD-10-CM

## 2018-05-30 DIAGNOSIS — S82.62XA CLOSED AVULSION FRACTURE OF LATERAL MALLEOLUS OF LEFT FIBULA, INITIAL ENCOUNTER: Primary | ICD-10-CM

## 2018-05-30 PROBLEM — R06.00 DYSPNEA ON EXERTION: Status: ACTIVE | Noted: 2017-07-18

## 2018-05-30 PROBLEM — D64.9 ANEMIA: Status: ACTIVE | Noted: 2017-09-14

## 2018-05-30 PROBLEM — R06.09 DYSPNEA ON EXERTION: Status: ACTIVE | Noted: 2017-07-18

## 2018-05-30 PROBLEM — R60.0 PERIORBITAL EDEMA: Status: ACTIVE | Noted: 2017-03-20

## 2018-05-30 PROBLEM — R93.89 ABNORMAL CHEST CT: Status: ACTIVE | Noted: 2017-10-10

## 2018-05-30 PROBLEM — R06.02 BREATH, SHORTNESS: Status: ACTIVE | Noted: 2017-11-22

## 2018-05-30 PROCEDURE — 99214 OFFICE O/P EST MOD 30 MIN: CPT | Performed by: ORTHOPAEDIC SURGERY

## 2018-05-30 PROCEDURE — 27786 TREATMENT OF ANKLE FRACTURE: CPT | Performed by: ORTHOPAEDIC SURGERY

## 2018-05-30 PROCEDURE — 28470 CLTX METATARSAL FX WO MNP EA: CPT | Performed by: ORTHOPAEDIC SURGERY

## 2018-05-30 RX ORDER — LATANOPROST 50 UG/ML
SOLUTION/ DROPS OPHTHALMIC
Refills: 3 | COMMUNITY
Start: 2018-04-27 | End: 2020-09-22 | Stop reason: ALTCHOICE

## 2018-05-30 RX ORDER — CHLORHEXIDINE GLUCONATE 0.12 MG/ML
RINSE ORAL
Refills: 0 | COMMUNITY
Start: 2018-04-05 | End: 2020-07-16 | Stop reason: ALTCHOICE

## 2018-05-30 RX ORDER — BUPROPION HYDROCHLORIDE 300 MG/1
300 TABLET ORAL
COMMUNITY
End: 2018-12-20 | Stop reason: ALTCHOICE

## 2018-05-30 RX ORDER — HYDROCODONE BITARTRATE AND ACETAMINOPHEN 5; 325 MG/1; MG/1
1 TABLET ORAL EVERY 12 HOURS PRN
Qty: 15 TABLET | Refills: 0 | Status: SHIPPED | OUTPATIENT
Start: 2018-05-30 | End: 2018-06-21 | Stop reason: ALTCHOICE

## 2018-05-30 NOTE — PROGRESS NOTES
Patient Name:  Sveta North  MRN:  [id number]    Assessment & Plan    Left distal fibula avulsion fracture and fifth metatarsal fracture 5/24/18  1  Short-leg cast applied  2  Nonweightbearing left lower extremity with assistive device to ambulate  3  Follow-up in 4 weeks with x-rays left foot and left ankle out of cast   Plan to transition to Cam walker at that time as appropriate  Chief Complaint    Left ankle and foot pain      History of the Present Illness    16-year-old female presents with left ankle and foot pain after a fall on 5/24/18  She reports sudden onset of severe pain in the lateral aspect of her ankle and midfoot with associated swelling  She has developed bruising since the injury as well  Pain is worse with weight-bearing  No numbness or tingling  She was initially evaluated in the ER where x-ray showed for 2 fractures in her ankle and foot  She was placed in a short-leg posterior splint and has been nonweightbearing  Physical Exam    BP 96/63   Pulse 87   Ht 5' 1" (1 549 m)   Wt 77 6 kg (171 lb)   LMP  (LMP Unknown)   BMI 32 31 kg/m²     Left ankle/foot:  Mild soft tissue swelling  Moderate ecchymosis  Skin is intact  Tenderness to palpation distal fibula and base of the fifth metatarsal   Range of motion limited by pain  No gross instability  Constitutional:  Well-developed and well-nourished  Eyes:  Anicteric sclerae  Neck:  Supple  Lungs:  Unlabored breathing  Cardiovascular:  Capillary refill is less than 2 seconds in the left foot  Neurologic:  Sensation intact to light touch SP, DP, and tibial nerve distribution on the left  Psychiatric:  Mood and affect are appropriate  Data Review    I have personally reviewed pertinent films in PACS, and my interpretation follows  X-rays left ankle 5/24/18:  Nondisplaced distal fibula avulsion fracture and nondisplaced base of the fifth metatarsal fracture        Past Medical History:   Diagnosis Date    [facility] )     Hypothyroidism     Psychiatric disorder     Sjogren's syndrome ([facility] )     [facility] lupus erythematosus ([facility] )        Past Surgical History:   Procedure Laterality Date    FRACTURE SURGERY Right     elbow    GASTRIC BYPASS      [place] BRONCHOSCOPY,DIAGNOSTIC N/A 12/8/2017    Procedure: BRONCHOSCOPY;  Surgeon: Maico Argueta MD;  [facility]; Service: Pulmonary       Allergies   Allergen Reactions    Ciprofloxacin Hives and Swelling    Cymbalta [Duloxetine Hcl] Other (See Comments)     Hallucinations, fatigue, faints       Current Outpatient Prescriptions on File Prior to Visit   Medication Sig Dispense Refill    acetaminophen-codeine (TYLENOL #3) 300-30 mg per tablet Take 1 tablet by mouth 3 (three) times a day as needed      aspirin 81 MG tablet Take 81 mg by mouth daily   buPROPion (WELLBUTRIN) 100 mg tablet Take 100 mg by mouth 3 (three) times a day   calcium citrate-vitamin D (CITRACAL+D) 315-200 MG-UNIT per tablet Take 1 tablet by mouth daily   HYDROcodone-acetaminophen (NORCO) 5-325 mg per tablet Take 1 tablet by mouth 3 (three) times a day for 10 days Max Daily Amount: 3 tablets 15 tablet 0    levothyroxine 25 mcg tablet Take 25 mcg by mouth daily   LORazepam (ATIVAN) 0 5 mg tablet Take 0 5 mg by mouth every 6 (six) hours as needed for anxiety   multivitamin-iron-minerals-folic acid (CENTRUM) chewable tablet Chew 1 tablet daily   QUEtiapine (SEROQUEL) 25 mg tablet Take by mouth      QUEtiapine (SEROquel) 300 mg tablet Take 300 mg by mouth daily at bedtime   QUEtiapine (SEROQUEL) 300 mg tablet Take by mouth      risperiDONE (RisperDAL) 0 5 mg tablet Take by mouth      temazepam (RESTORIL) 30 mg capsule Take 1 capsule by mouth daily at bedtime         No current facility-administered medications on file prior to visit          Social History   Substance Use Topics    Smoking status: Never Smoker    Smokeless tobacco: Never Used  Alcohol use No       Family History   Problem Relation Age of Onset    Heart disease Mother     Diabetes Mother     Heart disease Father     Stroke Father     Diabetes Father     Cancer Father          Review of Systems    General:  Negative for fever, lethargy/malaise, or night sweats  Eyes:  Negative for blurry vision or double vision  ENT:  Negative for hearing change, nasal discharge, or sore throat  Hematological:  Negative for bleeding problems or blood clots  Endocrine:  Negative for excessive thirst or temperature intolerance  Respiratory:  Negative for cough or wheezing  Cardiovascular:  Negative for chest pain, dyspnea on exertion, or palpitations  Gastrointestinal:  Negative for abdominal pain, diarrhea, or nausea/vomiting  Musculoskeletal:  As stated in the HPI and otherwise negative  Neurological:  Negative for confusion, headaches, or seizures  Psychological:  Negative for hallucinations or mood swings  Dermatological:  Negative for itching or rash        Fracture / Dislocation Treatment  Date/Time: 5/30/2018 9:18 AM  Performed by: Henrique Dumont by: Flavio Flowers   Injury location: ankle  Location details: left ankle  Injury type: fracture  Fracture type: lateral malleolus  Manipulation performed: no    Cast type: short leg  Fracture / Dislocation Treatment  Date/Time: 5/30/2018 9:19 AM  Performed by: Henrique Dumont by: Flavio Flowers   Injury location: foot  Location details: left foot  Injury type: fracture  Fracture type: fifth metatarsal  Manipulation performed: no    Cast type: short leg

## 2018-06-07 ENCOUNTER — HOSPITAL ENCOUNTER (OUTPATIENT)
Dept: CT IMAGING | Facility: HOSPITAL | Age: 68
Discharge: HOME | End: 2018-06-07
Attending: INTERNAL MEDICINE
Payer: COMMERCIAL

## 2018-06-07 DIAGNOSIS — R59.1 LYMPHADENOPATHY: ICD-10-CM

## 2018-06-07 PROCEDURE — 74177 CT ABD & PELVIS W/CONTRAST: CPT

## 2018-06-07 PROCEDURE — 71260 CT THORAX DX C+: CPT

## 2018-06-07 RX ADMIN — IOHEXOL 100 ML: 350 INJECTION, SOLUTION INTRAVENOUS at 16:07

## 2018-06-21 ENCOUNTER — OFFICE VISIT (OUTPATIENT)
Dept: HEMATOLOGY ONCOLOGY | Facility: CLINIC | Age: 68
End: 2018-06-21
Payer: COMMERCIAL

## 2018-06-21 VITALS
RESPIRATION RATE: 17 BRPM | SYSTOLIC BLOOD PRESSURE: 132 MMHG | BODY MASS INDEX: 32.59 KG/M2 | DIASTOLIC BLOOD PRESSURE: 76 MMHG | HEART RATE: 90 BPM | WEIGHT: 172.6 LBS | OXYGEN SATURATION: 93 % | HEIGHT: 61 IN | TEMPERATURE: 97.4 F

## 2018-06-21 DIAGNOSIS — D50.8 IRON DEFICIENCY ANEMIA FOLLOWING BARIATRIC SURGERY: ICD-10-CM

## 2018-06-21 DIAGNOSIS — K95.89 IRON DEFICIENCY ANEMIA FOLLOWING BARIATRIC SURGERY: ICD-10-CM

## 2018-06-21 DIAGNOSIS — R59.1 LYMPHADENOPATHY: Primary | ICD-10-CM

## 2018-06-21 PROCEDURE — 99215 OFFICE O/P EST HI 40 MIN: CPT | Performed by: INTERNAL MEDICINE

## 2018-06-21 RX ORDER — LORAZEPAM 1 MG/1
1 TABLET ORAL AS NEEDED
Refills: 0 | COMMUNITY
Start: 2018-06-12 | End: 2020-10-21 | Stop reason: ALTCHOICE

## 2018-06-21 NOTE — LETTER
June 21, 2018     [address] [place] [id number]    Patient: Kaya Hanna   YOB: 1950   Date of Visit: 6/21/2018       Dear Dr Marilu Barrett:    Thank you for referring Kaya Hanna to me for evaluation  Below are my notes for this consultation  If you have questions, please do not hesitate to call me  I look forward to following your patient along with you  Sincerely,        Wally Peck DO        CC: MD Wally Blake DO  6/21/2018  6:01 PM  Sign at close encounter  [place]  [address] [place] [id number] [address] [facility], [phone]  06/21/18    Discussion:   In summary, this is a 63-year-old female history of iron deficiency anemia secondary to status post bariatric surgery as well as diffuse lymphadenopathy  Recent CT scan of the chest abdomen pelvis shows some progression in pulmonary findings, nodularity, etc   No change in diffuse lymphadenopathy is noted, however compared to the prior study of 8 months earlier  I suspect her lymphadenopathy is secondary to autoimmune process  We reviewed that lymphoma could not be ruled out at this time, however  The patient favors a wait and watch approach and I think that that is reasonable given her recent stability  I suggested that she confer with pulmonary and Rheumatology regarding her pulmonary nodules and any treatment that may be applicable for this process  Previous bronchoscopy showed inflammatory changes without specific diagnosis established  I presume that this is felt to be consistent with a diagnosis of Sjogren's/autoimmune process although the cytologic findings seem nonspecific  CBC chemistry and iron studies are currently normal  She does not require parental iron replacement at this time  I discussed the above with the patient    The patient and her  voiced understanding and agreement   ______________________________________________________________________    Chief Complaint   Patient presents with    Follow-up       HPI:   No history exists  Interval History:  ***  {performance status:19948}    Review of Systems    Past Medical History:   Diagnosis Date    [facility] disorder ([facility] )     Hypothyroidism     Psychiatric disorder     Sjogren's syndrome ([facility] )     Systemic lupus erythematosus ([facility] )      Patient Active Problem List   Diagnosis    Bipolar 1 disorder ([facility] )    Hypothyroidism    Systemic lupus erythematosus ([facility] )    Pneumonia of right middle lobe due to infectious organism ([facility] )    Influenza A    Abnormal chest CT    Acid reflux disease    Anemia    Breath, shortness    Depression    Dyspnea on exertion    ESR raised    Generalized osteoarthritis    Heart burn    Obesity    Periorbital edema    Polyarthralgia    Postgastrectomy malabsorption    Sjogren's syndrome (HCC)    Vaginal atrophy    Vitamin D deficiency    Closed avulsion fracture of lateral malleolus of left fibula    Closed fracture of base of fifth metatarsal bone of left foot       Current Outpatient Prescriptions:     acetaminophen-codeine (TYLENOL #3) 300-30 mg per tablet, Take 1 tablet by mouth 3 (three) times a day as needed, Disp: , Rfl:     aspirin 81 MG tablet, Take 81 mg by mouth daily  , Disp: , Rfl:     buPROPion (WELLBUTRIN XL) 300 mg 24 hr tablet, Take 300 mg by mouth, Disp: , Rfl:     buPROPion (WELLBUTRIN) 100 mg tablet, Take 100 mg by mouth 3 (three) times a day , Disp: , Rfl:     calcium citrate-vitamin D (CITRACAL+D) 315-200 MG-UNIT per tablet, Take 1 tablet by mouth daily  , Disp: , Rfl:     chlorhexidine (PERIDEX) 0 12 % solution, Use as directed, Disp: , Rfl: 0    latanoprost (XALATAN) 0 005 % ophthalmic solution, INSTILL 1 DROP BY OPHTHALMIC ROUTE EVERY DAY INTO BOTH EYES IN THE EVENING, Disp: , Rfl: 3    levothyroxine 25 mcg tablet, Take 25 mcg by mouth daily  , Disp: , Rfl:     LORazepam (ATIVAN) 1 mg tablet, Take 1 mg by mouth daily at bedtime, Disp: , Rfl: 0    multivitamin-iron-minerals-folic acid (CENTRUM) chewable tablet, Chew 1 tablet daily  , Disp: , Rfl:     QUEtiapine (SEROQUEL) 25 mg tablet, Take by mouth, Disp: , Rfl:     QUEtiapine (SEROquel) 300 mg tablet, Take 300 mg by mouth daily at bedtime  , Disp: , Rfl:     QUEtiapine (SEROQUEL) 300 mg tablet, Take by mouth, Disp: , Rfl:     risperiDONE (RisperDAL) 0 5 mg tablet, Take by mouth, Disp: , Rfl:     temazepam (RESTORIL) 30 mg capsule, Take 2 capsules by mouth daily at bedtime  , Disp: , Rfl:     VENTOLIN  (90 Base) MCG/ACT inhaler, Inhale 1 puff, Disp: , Rfl: 6  Allergies   Allergen Reactions    Ciprofloxacin Hives and Swelling    Cymbalta [Duloxetine Hcl] Other (See Comments)     Hallucinations, fatigue, faints     Past Surgical History:   Procedure Laterality Date    FRACTURE SURGERY Right     elbow    GASTRIC BYPASS      [place] BRONCHOSCOPY,DIAGNOSTIC N/A 12/8/2017    Procedure: BRONCHOSCOPY;  Surgeon: Yasmine Rogers MD;  [facility]; Service: Pulmonary     Social History     Objective:  Vitals:    06/21/18 1541   BP: 132/76   BP Location: Left arm   Patient Position: Sitting   Pulse: 90   Resp: 17   Temp: (!) 97 4 °F (36 3 °C)   TempSrc: Tympanic   SpO2: 93%   Weight: 78 3 kg (172 lb 9 6 oz)   Height: 5' 1" (1 549 m)     Physical Exam      Labs: I personally reviewed the labs and imaging pertinent to this patient care

## 2018-06-21 NOTE — LETTER
June 21, 2018     [address] [place] [id number]    Patient: Ivon Chery   YOB: 1950   Date of Visit: 6/21/2018       Dear Dr Selwyn Diamond:    Thank you for referring Ivon Chery to me for evaluation  Below are my notes for this consultation  If you have questions, please do not hesitate to call me  I look forward to following your patient along with you  Sincerely,        Abdirahman Rooney DO        CC: MD Abdirahman Howard DO  6/21/2018  6:01 PM  Sign at close encounter  [address] [id number] [address] [place] [address], [phone]  06/21/18    Discussion:   In summary, this is a 62-year-old female history of iron deficiency anemia secondary to status post bariatric surgery as well as diffuse lymphadenopathy  Recent CT scan of the chest abdomen pelvis shows some progression in pulmonary findings, nodularity, etc   No change in diffuse lymphadenopathy is noted, however compared to the prior study of 8 months earlier  I suspect her lymphadenopathy is secondary to autoimmune process  We reviewed that lymphoma could not be ruled out at this time, however  The patient favors a wait and watch approach and I think that that is reasonable given her recent stability  I suggested that she confer with pulmonary and Rheumatology regarding her pulmonary nodules and any treatment that may be applicable for this process  Previous bronchoscopy showed inflammatory changes without specific diagnosis established  I presume that this is felt to be consistent with a diagnosis of Sjogren's/autoimmune process although the cytologic findings seem nonspecific  CBC chemistry and iron studies are currently normal  She does not require parental iron replacement at this time  I discussed the above with the patient    The patient and her  voiced understanding and agreement   ______________________________________________________________________    Chief Complaint   Patient presents with    Follow-up       HPI:   No history exists  Interval History:  ***  {performance status:19948}    Review of Systems    Past Medical History:   Diagnosis Date    [facility] ([facility] )     Hypothyroidism     Psychiatric disorder     Sjogren's syndrome ([facility] )     Systemic lupus erythematosus ([facility] )      Patient Active Problem List   Diagnosis    Bipolar 1 disorder ([facility] )    Hypothyroidism    Systemic lupus erythematosus ([facility] )    Pneumonia of right middle lobe due to infectious organism ([facility] )    Influenza A    Abnormal chest CT    Acid reflux disease    Anemia    Breath, shortness    Depression    Dyspnea on exertion    ESR raised    Generalized osteoarthritis    Heart burn    Obesity    Periorbital edema    Polyarthralgia    Postgastrectomy malabsorption    Sjogren's syndrome (HCC)    Vaginal atrophy    Vitamin D deficiency    Closed avulsion fracture of lateral malleolus of left fibula    Closed fracture of base of fifth metatarsal bone of left foot       Current Outpatient Prescriptions:     acetaminophen-codeine (TYLENOL #3) 300-30 mg per tablet, Take 1 tablet by mouth 3 (three) times a day as needed, Disp: , Rfl:     aspirin 81 MG tablet, Take 81 mg by mouth daily  , Disp: , Rfl:     buPROPion (WELLBUTRIN XL) 300 mg 24 hr tablet, Take 300 mg by mouth, Disp: , Rfl:     buPROPion (WELLBUTRIN) 100 mg tablet, Take 100 mg by mouth 3 (three) times a day , Disp: , Rfl:     calcium citrate-vitamin D (CITRACAL+D) 315-200 MG-UNIT per tablet, Take 1 tablet by mouth daily  , Disp: , Rfl:     chlorhexidine (PERIDEX) 0 12 % solution, Use as directed, Disp: , Rfl: 0    latanoprost (XALATAN) 0 005 % ophthalmic solution, INSTILL 1 DROP BY OPHTHALMIC ROUTE EVERY DAY INTO BOTH EYES IN THE EVENING, Disp: , Rfl: 3    levothyroxine 25 mcg tablet, Take 25 mcg by mouth daily  , Disp: , Rfl:     LORazepam (ATIVAN) 1 mg tablet, Take 1 mg by mouth daily at bedtime, Disp: , Rfl: 0    multivitamin-iron-minerals-folic acid (CENTRUM) chewable tablet, Chew 1 tablet daily  , Disp: , Rfl:     QUEtiapine (SEROQUEL) 25 mg tablet, Take by mouth, Disp: , Rfl:     QUEtiapine (SEROquel) 300 mg tablet, Take 300 mg by mouth daily at bedtime  , Disp: , Rfl:     QUEtiapine (SEROQUEL) 300 mg tablet, Take by mouth, Disp: , Rfl:     risperiDONE (RisperDAL) 0 5 mg tablet, Take by mouth, Disp: , Rfl:     temazepam (RESTORIL) 30 mg capsule, Take 2 capsules by mouth daily at bedtime  , Disp: , Rfl:     VENTOLIN  (90 Base) MCG/ACT inhaler, Inhale 1 puff, Disp: , Rfl: 6  Allergies   Allergen Reactions    Ciprofloxacin Hives and Swelling    Cymbalta [Duloxetine Hcl] Other (See Comments)     Hallucinations, fatigue, faints     Past Surgical History:   Procedure Laterality Date    FRACTURE SURGERY Right     elbow    GASTRIC BYPASS      [place] BRONCHOSCOPY,DIAGNOSTIC N/A 12/8/2017    Procedure: BRONCHOSCOPY;  Surgeon: Dahiana Mullen MD;  [facility]; Service: Pulmonary     Social History     Objective:  Vitals:    06/21/18 1541   BP: 132/76   BP Location: Left arm   Patient Position: Sitting   Pulse: 90   Resp: 17   Temp: (!) 97 4 °F (36 3 °C)   TempSrc: Tympanic   SpO2: 93%   Weight: 78 3 kg (172 lb 9 6 oz)   Height: 5' 1" (1 549 m)     Physical Exam      Labs: I personally reviewed the labs and imaging pertinent to this patient care

## 2018-06-21 NOTE — PROGRESS NOTES
[facility] HEMATOLOGY ONCOLOGY Vladislav Yuen  [address] [place] [id number] [phone]    [address] [place] [phone]  06/21/18    Discussion:   In summary, this is a 61-year-old female history of iron deficiency anemia secondary to status post bariatric surgery as well as diffuse lymphadenopathy  Recent CT scan of the chest abdomen pelvis shows some progression in pulmonary findings, nodularity, etc   No change in diffuse lymphadenopathy is noted, however compared to the prior study of 8 months earlier  I suspect her lymphadenopathy is secondary to autoimmune process  We reviewed that lymphoma could not be ruled out at this time, however  The patient favors a wait and watch approach and I think that that is reasonable given her recent stability  I suggested that she confer with pulmonary and Rheumatology regarding her pulmonary nodules and any treatment that may be applicable for this process  Previous bronchoscopy showed inflammatory changes without specific diagnosis established  I presume that this is felt to be consistent with a diagnosis of Sjogren's/autoimmune process although the cytologic findings seem nonspecific  CBC chemistry and iron studies are currently normal  She does not require parental iron replacement at this time  I discussed the above with the patient  The patient and her  voiced understanding and agreement   ______________________________________________________________________    Chief Complaint   Patient presents with    Follow-up       HPI:   No history exists  Interval History:  Clinically stable  Mechanical fracture of the foot  1 - Symptomatic but completely ambulatory    Review of Systems   Constitutional: Negative for appetite change, diaphoresis, fatigue and fever  HENT: Negative for sinus pain  Eyes: Negative for discharge  Respiratory: Negative for cough and shortness of breath  Cardiovascular: Negative for chest pain  Gastrointestinal: Negative for abdominal pain, constipation and diarrhea  Endocrine: Negative for cold intolerance  Genitourinary: Negative for difficulty urinating and hematuria  Musculoskeletal: Negative for joint swelling  Skin: Negative for rash  Allergic/Immunologic: Negative for environmental allergies  Neurological: Negative for dizziness and headaches  Hematological: Negative for adenopathy  Psychiatric/Behavioral: Negative for agitation  Past Medical History:   Diagnosis Date    Bipolar 1 disorder ([place] [address] )     Hypothyroidism     Psychiatric disorder     Sjogren's syndrome ([place] [address] )     Systemic lupus erythematosus ([place] [address] )      Patient Active Problem List   Diagnosis    Bipolar 1 disorder ([place] [address] )    Hypothyroidism    Systemic lupus erythematosus ([place] [address] )    Pneumonia of right middle lobe due to infectious organism ([place] [address] )    Influenza A    Abnormal chest CT    Acid reflux disease    Anemia    Breath, shortness    Depression    Dyspnea on exertion    ESR raised    Generalized osteoarthritis    Heart burn    Obesity    Periorbital edema    Polyarthralgia    Postgastrectomy malabsorption    Sjogren's syndrome (HCC)    Vaginal atrophy    Vitamin D deficiency    Closed avulsion fracture of lateral malleolus of left fibula    Closed fracture of base of fifth metatarsal bone of left foot       Current Outpatient Prescriptions:     acetaminophen-codeine (TYLENOL #3) 300-30 mg per tablet, Take 1 tablet by mouth 3 (three) times a day as needed, Disp: , Rfl:     aspirin 81 MG tablet, Take 81 mg by mouth daily  , Disp: , Rfl:     buPROPion (WELLBUTRIN XL) 300 mg 24 hr tablet, Take 300 mg by mouth, Disp: , Rfl:     buPROPion (WELLBUTRIN) 100 mg tablet, Take 100 mg by mouth 3 (three) times a day , Disp: , Rfl:     calcium citrate-vitamin D (CITRACAL+D) 315-200 MG-UNIT per tablet, Take 1 tablet by mouth daily  , Disp: , Rfl:    chlorhexidine (PERIDEX) 0 12 % solution, Use as directed, Disp: , Rfl: 0    latanoprost (XALATAN) 0 005 % ophthalmic solution, INSTILL 1 DROP BY OPHTHALMIC ROUTE EVERY DAY INTO BOTH EYES IN THE EVENING, Disp: , Rfl: 3    levothyroxine 25 mcg tablet, Take 25 mcg by mouth daily  , Disp: , Rfl:     LORazepam (ATIVAN) 1 mg tablet, Take 1 mg by mouth daily at bedtime, Disp: , Rfl: 0    multivitamin-iron-minerals-folic acid (CENTRUM) chewable tablet, Chew 1 tablet daily  , Disp: , Rfl:     QUEtiapine (SEROQUEL) 25 mg tablet, Take by mouth, Disp: , Rfl:     QUEtiapine (SEROquel) 300 mg tablet, Take 300 mg by mouth daily at bedtime  , Disp: , Rfl:     QUEtiapine (SEROQUEL) 300 mg tablet, Take by mouth, Disp: , Rfl:     risperiDONE (RisperDAL) 0 5 mg tablet, Take by mouth, Disp: , Rfl:     temazepam (RESTORIL) 30 mg capsule, Take 2 capsules by mouth daily at bedtime  , Disp: , Rfl:     VENTOLIN  (90 Base) MCG/ACT inhaler, Inhale 1 puff, Disp: , Rfl: 6  Allergies   Allergen Reactions    Ciprofloxacin Hives and Swelling    Cymbalta [Duloxetine Hcl] Other (See Comments)     Hallucinations, fatigue, faints     Past Surgical History:   Procedure Laterality Date    FRACTURE SURGERY Right     elbow    GASTRIC BYPASS      [place] BRONCHOSCOPY,DIAGNOSTIC N/A 12/8/2017    Procedure: BRONCHOSCOPY;  Surgeon: Evelyn Ramirez MD;  [facility]; Service: Pulmonary     Social History     Objective:  Vitals:    06/21/18 1541   BP: 132/76   BP Location: Left arm   Patient Position: Sitting   Pulse: 90   Resp: 17   Temp: (!) 97 4 °F (36 3 °C)   TempSrc: Tympanic   SpO2: 93%   Weight: 78 3 kg (172 lb 9 6 oz)   Height: 5' 1" (1 549 m)     Physical Exam   Constitutional: She is oriented to person, place, and time  She appears well-developed  HENT:   Head: Normocephalic  Eyes: Pupils are equal, round, and reactive to light  Neck: Neck supple  Cardiovascular: Normal rate  No murmur heard    Pulmonary/Chest: No respiratory distress  She has no wheezes  She has no rales  Abdominal: Soft  She exhibits no distension  There is no tenderness  There is no rebound  Musculoskeletal: She exhibits no edema  Lymphadenopathy:     She has no cervical adenopathy  Neurological: She is alert and oriented to person, place, and time  She displays normal reflexes  Skin: Skin is warm  No rash noted  Psychiatric: She has a normal mood and affect  Thought content normal          Labs: I personally reviewed the labs and imaging pertinent to this patient care

## 2018-06-21 NOTE — LETTER
June 21, 2018     [address] [place] [address]    Patient: King Celestino   YOB: 1950   Date of Visit: 6/21/2018       Dear Dr Juan Hollingsworth:    Thank you for referring King Celestino to me for evaluation  Below are my notes for this consultation  If you have questions, please do not hesitate to call me  I look forward to following your patient along with you  Sincerely,        Peg Walsh DO        CC: MD Peg Melo DO  6/21/2018  6:03 PM  Sign at close encounter  [address] [address] [address] [place] [address], [phone]  06/21/18    Discussion:   In summary, this is a 61-year-old female history of iron deficiency anemia secondary to status post bariatric surgery as well as diffuse lymphadenopathy  Recent CT scan of the chest abdomen pelvis shows some progression in pulmonary findings, nodularity, etc   No change in diffuse lymphadenopathy is noted, however compared to the prior study of 8 months earlier  I suspect her lymphadenopathy is secondary to autoimmune process  We reviewed that lymphoma could not be ruled out at this time, however  The patient favors a wait and watch approach and I think that that is reasonable given her recent stability  I suggested that she confer with pulmonary and Rheumatology regarding her pulmonary nodules and any treatment that may be applicable for this process  Previous bronchoscopy showed inflammatory changes without specific diagnosis established  I presume that this is felt to be consistent with a diagnosis of Sjogren's/autoimmune process although the cytologic findings seem nonspecific  CBC chemistry and iron studies are currently normal  She does not require parental iron replacement at this time  I discussed the above with the patient    The patient and her  voiced understanding and agreement   ______________________________________________________________________    Chief Complaint   Patient presents with    Follow-up       HPI:   No history exists  Interval History:  Clinically stable  Mechanical fracture of the foot  1 - Symptomatic but completely ambulatory    Review of Systems   Constitutional: Negative for appetite change, diaphoresis, fatigue and fever  HENT: Negative for sinus pain  Eyes: Negative for discharge  Respiratory: Negative for cough and shortness of breath  Cardiovascular: Negative for chest pain  Gastrointestinal: Negative for abdominal pain, constipation and diarrhea  Endocrine: Negative for cold intolerance  Genitourinary: Negative for difficulty urinating and hematuria  Musculoskeletal: Negative for joint swelling  Skin: Negative for rash  Allergic/Immunologic: Negative for environmental allergies  Neurological: Negative for dizziness and headaches  Hematological: Negative for adenopathy  Psychiatric/Behavioral: Negative for agitation         Past Medical History:   Diagnosis Date    [facility]ipolar [facility] disorder ([facility] )     Hypothyroidism     Psychiatric disorder     Sjogren's syndrome ([facility] )     Systemic lupus erythematosus ([facility] )      Patient Active Problem List   Diagnosis    Bipolar 1 disorder ([facility] )    Hypothyroidism    [facility]    Pneumonia of right middle lobe due to infectious organism ([facility] )    Influenza A    Abnormal chest CT    Acid reflux disease    Anemia    Breath, shortness    Depression    Dyspnea on exertion    ESR raised    Generalized osteoarthritis    Heart burn    Obesity    Periorbital edema    Polyarthralgia    Postgastrectomy malabsorption    Sjogren's syndrome (HCC)    Vaginal atrophy    Vitamin D deficiency    Closed avulsion fracture of lateral malleolus of left fibula    Closed fracture of base of fifth metatarsal bone of left foot       Current Outpatient Prescriptions:     acetaminophen-codeine (TYLENOL #3) 300-30 mg per tablet, Take 1 tablet by mouth 3 (three) times a day as needed, Disp: , Rfl:     aspirin 81 MG tablet, Take 81 mg by mouth daily  , Disp: , Rfl:     buPROPion (WELLBUTRIN XL) 300 mg 24 hr tablet, Take 300 mg by mouth, Disp: , Rfl:     buPROPion (WELLBUTRIN) 100 mg tablet, Take 100 mg by mouth 3 (three) times a day , Disp: , Rfl:     calcium citrate-vitamin D (CITRACAL+D) 315-200 MG-UNIT per tablet, Take 1 tablet by mouth daily  , Disp: , Rfl:     chlorhexidine (PERIDEX) 0 12 % solution, Use as directed, Disp: , Rfl: 0    latanoprost (XALATAN) 0 005 % ophthalmic solution, INSTILL 1 DROP BY OPHTHALMIC ROUTE EVERY DAY INTO BOTH EYES IN THE EVENING, Disp: , Rfl: 3    levothyroxine 25 mcg tablet, Take 25 mcg by mouth daily  , Disp: , Rfl:     LORazepam (ATIVAN) 1 mg tablet, Take 1 mg by mouth daily at bedtime, Disp: , Rfl: 0    multivitamin-iron-minerals-folic acid (CENTRUM) chewable tablet, Chew 1 tablet daily  , Disp: , Rfl:     QUEtiapine (SEROQUEL) 25 mg tablet, Take by mouth, Disp: , Rfl:     QUEtiapine (SEROquel) 300 mg tablet, Take 300 mg by mouth daily at bedtime  , Disp: , Rfl:     QUEtiapine (SEROQUEL) 300 mg tablet, Take by mouth, Disp: , Rfl:     risperiDONE (RisperDAL) 0 5 mg tablet, Take by mouth, Disp: , Rfl:     temazepam (RESTORIL) 30 mg capsule, Take 2 capsules by mouth daily at bedtime  , Disp: , Rfl:     VENTOLIN  (90 Base) MCG/ACT inhaler, Inhale 1 puff, Disp: , Rfl: 6  Allergies   Allergen Reactions    Ciprofloxacin Hives and Swelling    Cymbalta [Duloxetine Hcl] Other (See Comments)     Hallucinations, fatigue, faints     Past Surgical History:   Procedure Laterality Date    FRACTURE SURGERY Right     elbow    GASTRIC BYPASS      VT BRONCHOSCOPY,DIAGNOSTIC N/A 12/8/2017    Procedure: BRONCHOSCOPY;  Surgeon: Earl Munoz MD;  [facility];   Service: Pulmonary     Social History Objective:  Vitals:    06/21/18 1541   BP: 132/76   BP Location: Left arm   Patient Position: Sitting   Pulse: 90   Resp: 17   Temp: (!) 97 4 °F (36 3 °C)   TempSrc: Tympanic   SpO2: 93%   Weight: 78 3 kg (172 lb 9 6 oz)   Height: 5' 1" (1 549 m)     Physical Exam   Constitutional: She is oriented to person, place, and time  She appears well-developed  HENT:   Head: Normocephalic  Eyes: Pupils are equal, round, and reactive to light  Neck: Neck supple  Cardiovascular: Normal rate  No murmur heard  Pulmonary/Chest: No respiratory distress  She has no wheezes  She has no rales  Abdominal: Soft  She exhibits no distension  There is no tenderness  There is no rebound  Musculoskeletal: She exhibits no edema  Lymphadenopathy:     She has no cervical adenopathy  Neurological: She is alert and oriented to person, place, and time  She displays normal reflexes  Skin: Skin is warm  No rash noted  Psychiatric: She has a normal mood and affect  Thought content normal          Labs: I personally reviewed the labs and imaging pertinent to this patient care

## 2018-06-26 ENCOUNTER — OFFICE VISIT (OUTPATIENT)
Dept: OBGYN CLINIC | Facility: CLINIC | Age: 68
End: 2018-06-26

## 2018-06-26 ENCOUNTER — APPOINTMENT (OUTPATIENT)
Dept: RADIOLOGY | Facility: CLINIC | Age: 68
End: 2018-06-26
Payer: COMMERCIAL

## 2018-06-26 DIAGNOSIS — S92.352A CLOSED FRACTURE OF BASE OF FIFTH METATARSAL BONE OF LEFT FOOT: Primary | ICD-10-CM

## 2018-06-26 DIAGNOSIS — M25.572 PAIN, JOINT, ANKLE AND FOOT, LEFT: ICD-10-CM

## 2018-06-26 DIAGNOSIS — S82.62XD CLOSED AVULSION FRACTURE OF LATERAL MALLEOLUS OF LEFT FIBULA WITH ROUTINE HEALING, SUBSEQUENT ENCOUNTER: ICD-10-CM

## 2018-06-26 PROCEDURE — 73630 X-RAY EXAM OF FOOT: CPT

## 2018-06-26 PROCEDURE — 99024 POSTOP FOLLOW-UP VISIT: CPT | Performed by: ORTHOPAEDIC SURGERY

## 2018-06-26 PROCEDURE — 73610 X-RAY EXAM OF ANKLE: CPT

## 2018-06-26 NOTE — PROGRESS NOTES
Patient Name:  Micheal Hicks  MRN:  [id number]    Assessment & Plan    Left distal fibula avulsion fracture and left fifth metatarsal fracture 5/24/18  1  Short leg cast removed  2  CAM walker for ambulation  3  Gradually increase weight-bearing as tolerated in the boot  Avoid painful weight-bearing  4  Follow-up in 5 weeks with x-rays left foot  Subjective    Patient returns today for her left ankle and foot  She was placed in a short-leg cast on 5/30/18 which she has been tolerating well  She has been nonweightbearing on her left lower extremity  She has minimal pain in the cast     General ROS:  Negative for fever, lethargy/malaise, or night sweats  Neurological ROS:  Negative for confusion or numbness/tingling  Objective    LMP  (LMP Unknown)     Left ankle/foot:  No excessive soft tissue swelling  Mild tenderness to palpation base of the fifth metatarsal   Non tender to palpation over the ankle  Skin is intact without erythema  Sensation intact to light touch throughout the left foot  Capillary refill is brisk distally  Data Review    I have personally reviewed pertinent films in PACS, and my interpretation follows  X-rays left ankle and foot:  Stable alignment of distal fibula avulsion fracture and base of the fifth metatarsal fracture

## 2018-08-03 ENCOUNTER — OFFICE VISIT (OUTPATIENT)
Dept: OBGYN CLINIC | Facility: CLINIC | Age: 68
End: 2018-08-03

## 2018-08-03 ENCOUNTER — APPOINTMENT (OUTPATIENT)
Dept: RADIOLOGY | Facility: CLINIC | Age: 68
End: 2018-08-03
Payer: COMMERCIAL

## 2018-08-03 VITALS
WEIGHT: 174 LBS | DIASTOLIC BLOOD PRESSURE: 87 MMHG | SYSTOLIC BLOOD PRESSURE: 135 MMHG | BODY MASS INDEX: 32.85 KG/M2 | HEIGHT: 61 IN | HEART RATE: 83 BPM

## 2018-08-03 DIAGNOSIS — S82.62XD CLOSED AVULSION FRACTURE OF LATERAL MALLEOLUS OF LEFT FIBULA WITH ROUTINE HEALING, SUBSEQUENT ENCOUNTER: Primary | ICD-10-CM

## 2018-08-03 DIAGNOSIS — S82.62XD CLOSED AVULSION FRACTURE OF LATERAL MALLEOLUS OF LEFT FIBULA WITH ROUTINE HEALING, SUBSEQUENT ENCOUNTER: ICD-10-CM

## 2018-08-03 DIAGNOSIS — S92.352A CLOSED FRACTURE OF BASE OF FIFTH METATARSAL BONE OF LEFT FOOT: ICD-10-CM

## 2018-08-03 PROCEDURE — 73630 X-RAY EXAM OF FOOT: CPT

## 2018-08-03 PROCEDURE — 99024 POSTOP FOLLOW-UP VISIT: CPT | Performed by: ORTHOPAEDIC SURGERY

## 2018-08-03 NOTE — PROGRESS NOTES
Patient Name:  Brandon Wilson  MRN:  [id number]    Assessment & Plan    Left distal fibula avulsion fracture and left fifth metatarsal fracture 5/24/18  1  Weightbearing as tolerated left lower extremity in normal shoes  2  Activities as tolerated  3  Follow-up as needed      Subjective    51-year-old female returns to the office today for follow-up regarding her Left distal fibula avulsion fracture and left fifth metatarsal fracture 5/24/18  Today she notes continued improvement with regards to her left foot and ankle  She notes soreness with prolonged standing  She denies any significant swelling  She notes mild weakness  She denies numbness and tingling  No fevers or chills  She has returned to normal shoes without difficulty      Objective    /87   Pulse 83   Ht 5' 1" (1 549 m)   Wt 78 9 kg (174 lb)   LMP  (LMP Unknown)   BMI 32 88 kg/m²     Left foot and ankle:  No gross deformity  Skin intact  No erythema ecchymosis or swelling  No tenderness to palpation at the base of the fifth metatarsal   No tenderness to palpation lateral malleolus  Ankle range of motion is intact and full without discomfort  Ankle joint is stable  Toes warm and mobile  Sensation intact left lower extremity  2+ DP pulse  Data Review    I have personally reviewed pertinent films in PACS, and my interpretation follows        X-rays performed today of the left foot reveals a fully healed fifth metatarsal base fracture      Scribe Attestation    I,:   Julio Bone PA-C am acting as a scribe while in the presence of the attending physician :        I,:   Bakari Ramirez MD personally performed the services described in this documentation    as scribed in my presence :

## 2018-10-03 ENCOUNTER — TELEPHONE (OUTPATIENT)
Dept: BARIATRICS | Facility: CLINIC | Age: 68
End: 2018-10-03

## 2018-10-03 NOTE — TELEPHONE ENCOUNTER
----- Message from Dipak Beard RN sent at 10/3/2018 11:08 AM EDT -----  Regardin year follow up  Please contact patient to schedule follow up appointment and document  Thank You

## 2018-12-04 ENCOUNTER — HOSPITAL ENCOUNTER (OUTPATIENT)
Dept: CT IMAGING | Facility: HOSPITAL | Age: 68
Discharge: HOME | End: 2018-12-04
Attending: INTERNAL MEDICINE
Payer: COMMERCIAL

## 2018-12-04 DIAGNOSIS — R59.1 LYMPHADENOPATHY: ICD-10-CM

## 2018-12-04 PROCEDURE — 74177 CT ABD & PELVIS W/CONTRAST: CPT

## 2018-12-04 PROCEDURE — 71260 CT THORAX DX C+: CPT

## 2018-12-04 RX ADMIN — IOHEXOL 100 ML: 350 INJECTION, SOLUTION INTRAVENOUS at 13:19

## 2018-12-20 ENCOUNTER — OFFICE VISIT (OUTPATIENT)
Dept: HEMATOLOGY ONCOLOGY | Facility: CLINIC | Age: 68
End: 2018-12-20
Payer: COMMERCIAL

## 2018-12-20 ENCOUNTER — OFFICE VISIT (OUTPATIENT)
Dept: URGENT CARE | Facility: CLINIC | Age: 68
End: 2018-12-20
Payer: COMMERCIAL

## 2018-12-20 VITALS
RESPIRATION RATE: 18 BRPM | DIASTOLIC BLOOD PRESSURE: 72 MMHG | TEMPERATURE: 99.6 F | SYSTOLIC BLOOD PRESSURE: 119 MMHG | OXYGEN SATURATION: 97 % | HEART RATE: 86 BPM

## 2018-12-20 VITALS
HEIGHT: 61 IN | HEART RATE: 78 BPM | OXYGEN SATURATION: 97 % | RESPIRATION RATE: 17 BRPM | BODY MASS INDEX: 31.45 KG/M2 | DIASTOLIC BLOOD PRESSURE: 72 MMHG | TEMPERATURE: 98.2 F | SYSTOLIC BLOOD PRESSURE: 132 MMHG | WEIGHT: 166.6 LBS

## 2018-12-20 DIAGNOSIS — M25.571 ACUTE RIGHT ANKLE PAIN: Primary | ICD-10-CM

## 2018-12-20 DIAGNOSIS — R59.1 LYMPHADENOPATHY: Primary | ICD-10-CM

## 2018-12-20 DIAGNOSIS — S82.61XA CLOSED AVULSION FRACTURE OF LATERAL MALLEOLUS OF RIGHT FIBULA, INITIAL ENCOUNTER: ICD-10-CM

## 2018-12-20 PROBLEM — R93.89 ABNORMAL CHEST CT: Status: RESOLVED | Noted: 2017-10-10 | Resolved: 2018-12-20

## 2018-12-20 PROCEDURE — 99214 OFFICE O/P EST MOD 30 MIN: CPT | Performed by: INTERNAL MEDICINE

## 2018-12-20 PROCEDURE — 29515 APPLICATION SHORT LEG SPLINT: CPT | Performed by: PHYSICIAN ASSISTANT

## 2018-12-20 PROCEDURE — G0382 LEV 3 HOSP TYPE B ED VISIT: HCPCS | Performed by: PHYSICIAN ASSISTANT

## 2018-12-20 NOTE — LETTER
December 20, 2018     [address] [place] [address]    Patient: Masood Arceo   YOB: 1950   Date of Visit: 12/20/2018       Dear Dr Abdifatah Sabillon:    Thank you for referring Masood Arceo to me for evaluation  Below are my notes for this consultation  If you have questions, please do not hesitate to call me  I look forward to following your patient along with you  Sincerely,        Joes Hidalgo DO        CC: MD Jose Pugh DO  12/20/2018 10:05 AM  Sign at close encounter  [address] [facility]  [address]  [address] [address] [phone]  [address] [place] [address], [phone]  12/20/18    Discussion:   In summary, this is a 82-year-old female history of autoimmune processes as well as pulmonary nodules, pulmonary infiltrates,  lymphadenopathy  Recent CT chest abdomen pelvis shows stable lymphadenopathy but increasing bilateral ground-glass opacities  She has not had any blood work recently  We reviewed the above findings and their significance  We reviewed that lymphoma could not entirely be ruled out at this time but given the stability of her lymphadenopathy and progression of pulmonary findings, I feel that it is more likely that this represents autoimmune processes, atypical infections less likely  I encouraged her to set up an appointment with Pulmonary and Rheumatology for further evaluation  Once a diagnosis is established, treatment would follow accordingly  She was agreeable to follow-up in our office on an as-needed basis, should a heme aunt diagnosis be established through this process  I discussed the above with the patient  The patient and her  voiced understanding and agreement   ______________________________________________________________________    Chief Complaint   Patient presents with    Follow-up       HPI:   No history exists         Interval History:  Clinically stable  1 - Symptomatic but completely ambulatory    Review of Systems   Constitutional: Negative for appetite change, diaphoresis, fatigue and fever  HENT: Negative for sinus pain  Eyes: Negative for discharge  Respiratory: Negative for cough and shortness of breath  Cardiovascular: Negative for chest pain  Gastrointestinal: Negative for abdominal pain, constipation and diarrhea  Endocrine: Negative for cold intolerance  Genitourinary: Negative for difficulty urinating and hematuria  Musculoskeletal: Negative for joint swelling  Skin: Negative for rash  Allergic/Immunologic: Negative for environmental allergies  Neurological: Negative for dizziness and headaches  Hematological: Negative for adenopathy  Psychiatric/Behavioral: Negative for agitation         Past Medical History:   Diagnosis Date    [facility] disorder ([facility] )     Hypothyroidism     Psychiatric disorder     Sjogren's syndrome ([facility] )     Systemic lupus erythematosus ([facility] )      Patient Active Problem List   Diagnosis    Bipolar 1 disorder ([place] [address] )    Hypothyroidism    Systemic lupus erythematosus ([facility] )    Pneumonia of right middle lobe due to infectious organism ([facility] )    Influenza A    Abnormal chest CT    Acid reflux disease    Anemia    Breath, shortness    Depression    Dyspnea on exertion    ESR raised    Generalized osteoarthritis    Heart burn    Obesity    Periorbital edema    Polyarthralgia    Postgastrectomy malabsorption    Sjogren's syndrome (HCC)    Vaginal atrophy    Vitamin D deficiency    Closed avulsion fracture of lateral malleolus of left fibula    Closed fracture of base of fifth metatarsal bone of left foot    Lymphadenopathy    Iron deficiency anemia following bariatric surgery       Current Outpatient Prescriptions:     acetaminophen-codeine (TYLENOL #3) 300-30 mg per tablet, Take 1 tablet by mouth 3 (three) times a day as needed, Disp: , Rfl:     aspirin 81 MG tablet, Take 81 mg by mouth daily  , Disp: , Rfl:     buPROPion (WELLBUTRIN) 100 mg tablet, Take 100 mg by mouth 3 (three) times a day , Disp: , Rfl:     calcium citrate-vitamin D (CITRACAL+D) 315-200 MG-UNIT per tablet, Take 1 tablet by mouth daily  , Disp: , Rfl:     chlorhexidine (PERIDEX) 0 12 % solution, Use as directed, Disp: , Rfl: 0    latanoprost (XALATAN) 0 005 % ophthalmic solution, INSTILL 1 DROP BY OPHTHALMIC ROUTE EVERY DAY INTO BOTH EYES IN THE EVENING, Disp: , Rfl: 3    levothyroxine 25 mcg tablet, Take 25 mcg by mouth daily  , Disp: , Rfl:     LORazepam (ATIVAN) 1 mg tablet, Take 1 mg by mouth daily at bedtime, Disp: , Rfl: 0    multivitamin-iron-minerals-folic acid (CENTRUM) chewable tablet, Chew 1 tablet daily  , Disp: , Rfl:     QUEtiapine (SEROQUEL) 25 mg tablet, Take 25 mg by mouth as needed  , Disp: , Rfl:     QUEtiapine (SEROquel) 300 mg tablet, Take 300 mg by mouth daily at bedtime  , Disp: , Rfl:     risperiDONE (RisperDAL) 0 5 mg tablet, Take by mouth as needed  , Disp: , Rfl:     temazepam (RESTORIL) 30 mg capsule, Take 2 capsules by mouth daily at bedtime  , Disp: , Rfl:     VENTOLIN  (90 Base) MCG/ACT inhaler, Inhale 1 puff, Disp: , Rfl: 6  Allergies   Allergen Reactions    Ciprofloxacin Hives and Swelling    Cymbalta [Duloxetine Hcl] Other (See Comments)     Hallucinations, fatigue, faints     Past Surgical History:   Procedure Laterality Date    FRACTURE SURGERY Right     elbow    GASTRIC BYPASS      ND BRONCHOSCOPY,DIAGNOSTIC N/A 12/8/2017    Procedure: BRONCHOSCOPY;  Surgeon: Thong Vaca MD;  [facility];   Service: Pulmonary     Social History     Objective:  Vitals:    12/20/18 0920   BP: 132/72   BP Location: Left arm   Patient Position: Sitting   Pulse: 78   Resp: 17   Temp: 98 2 °F (36 8 °C)   TempSrc: Tympanic   SpO2: 97%   Weight: 75 6 kg (166 lb 9 6 oz)   Height: 5' 1" (1 549 m)     Physical Exam   Constitutional: She is oriented to person, place, and time  She appears well-developed  HENT:   Head: Normocephalic  Eyes: Pupils are equal, round, and reactive to light  Neck: Neck supple  Cardiovascular: Normal rate  No murmur heard  Pulmonary/Chest: No respiratory distress  She has no wheezes  She has no rales  Abdominal: Soft  She exhibits no distension  There is no tenderness  There is no rebound  Musculoskeletal: She exhibits no edema  Lymphadenopathy:     She has no cervical adenopathy  Neurological: She is alert and oriented to person, place, and time  She displays normal reflexes  Skin: Skin is warm  No rash noted  Psychiatric: She has a normal mood and affect  Thought content normal          Labs: I personally reviewed the labs and imaging pertinent to this patient care

## 2018-12-20 NOTE — PROGRESS NOTES
[facility] HEMATOLOGY ONCOLOGY [place] [facility]  [address] [address] [phone]  [facility], [phone]  12/20/18    Discussion:   In summary, this is a 80-year-old female history of autoimmune processes as well as pulmonary nodules, pulmonary infiltrates,  lymphadenopathy  Recent CT chest abdomen pelvis shows stable lymphadenopathy but increasing bilateral ground-glass opacities  She has not had any blood work recently  We reviewed the above findings and their significance  We reviewed that lymphoma could not entirely be ruled out at this time but given the stability of her lymphadenopathy and progression of pulmonary findings, I feel that it is more likely that this represents autoimmune processes, atypical infections less likely  I encouraged her to set up an appointment with Pulmonary and Rheumatology for further evaluation  Once a diagnosis is established, treatment would follow accordingly  She was agreeable to follow-up in our office on an as-needed basis, should a heme aunt diagnosis be established through this process  I discussed the above with the patient  The patient and her  voiced understanding and agreement   ______________________________________________________________________    Chief Complaint   Patient presents with    Follow-up       HPI:   No history exists  Interval History:  Clinically stable  1 - Symptomatic but completely ambulatory    Review of Systems   Constitutional: Negative for appetite change, diaphoresis, fatigue and fever  HENT: Negative for sinus pain  Eyes: Negative for discharge  Respiratory: Negative for cough and shortness of breath  Cardiovascular: Negative for chest pain  Gastrointestinal: Negative for abdominal pain, constipation and diarrhea  Endocrine: Negative for cold intolerance  Genitourinary: Negative for difficulty urinating and hematuria     Musculoskeletal: Negative for joint swelling  Skin: Negative for rash  Allergic/Immunologic: Negative for environmental allergies  Neurological: Negative for dizziness and headaches  Hematological: Negative for adenopathy  Psychiatric/Behavioral: Negative for agitation  Past Medical History:   Diagnosis Date    Bipolar 1 disorder ([place] [address] )     Hypothyroidism     Psychiatric disorder     Sjogren's syndrome ([place] [address] )     Systemic lupus erythematosus ([place] [address] )      Patient Active Problem List   Diagnosis    Bipolar 1 disorder ([place] [address] )    Hypothyroidism    Systemic lupus erythematosus ([place] [address] )    Pneumonia of right middle lobe due to infectious organism ([place] [address] )    Influenza A    Abnormal chest CT    Acid reflux disease    Anemia    Breath, shortness    Depression    Dyspnea on exertion    ESR raised    Generalized osteoarthritis    Heart burn    Obesity    Periorbital edema    Polyarthralgia    Postgastrectomy malabsorption    Sjogren's syndrome (HCC)    Vaginal atrophy    Vitamin D deficiency    Closed avulsion fracture of lateral malleolus of left fibula    Closed fracture of base of fifth metatarsal bone of left foot    Lymphadenopathy    Iron deficiency anemia following bariatric surgery       Current Outpatient Prescriptions:     acetaminophen-codeine (TYLENOL #3) 300-30 mg per tablet, Take 1 tablet by mouth 3 (three) times a day as needed, Disp: , Rfl:     aspirin 81 MG tablet, Take 81 mg by mouth daily  , Disp: , Rfl:     buPROPion (WELLBUTRIN) 100 mg tablet, Take 100 mg by mouth 3 (three) times a day , Disp: , Rfl:     calcium citrate-vitamin D (CITRACAL+D) 315-200 MG-UNIT per tablet, Take 1 tablet by mouth daily  , Disp: , Rfl:     chlorhexidine (PERIDEX) 0 12 % solution, Use as directed, Disp: , Rfl: 0    latanoprost (XALATAN) 0 005 % ophthalmic solution, INSTILL 1 DROP BY OPHTHALMIC ROUTE EVERY DAY INTO BOTH EYES IN THE EVENING, Disp: , Rfl: 3    levothyroxine 25 mcg tablet, Take 25 mcg by mouth daily , Disp: , Rfl:     LORazepam (ATIVAN) 1 mg tablet, Take 1 mg by mouth daily at bedtime, Disp: , Rfl: 0    multivitamin-iron-minerals-folic acid (CENTRUM) chewable tablet, Chew 1 tablet daily  , Disp: , Rfl:     QUEtiapine (SEROQUEL) 25 mg tablet, Take 25 mg by mouth as needed  , Disp: , Rfl:     QUEtiapine (SEROquel) 300 mg tablet, Take 300 mg by mouth daily at bedtime  , Disp: , Rfl:     risperiDONE (RisperDAL) 0 5 mg tablet, Take by mouth as needed  , Disp: , Rfl:     temazepam (RESTORIL) 30 mg capsule, Take 2 capsules by mouth daily at bedtime  , Disp: , Rfl:     VENTOLIN  (90 Base) MCG/ACT inhaler, Inhale 1 puff, Disp: , Rfl: 6  Allergies   Allergen Reactions    Ciprofloxacin Hives and Swelling    Cymbalta [Duloxetine Hcl] Other (See Comments)     Hallucinations, fatigue, faints     Past Surgical History:   Procedure Laterality Date    FRACTURE SURGERY Right     elbow    GASTRIC BYPASS      MO BRONCHOSCOPY,DIAGNOSTIC N/A 12/8/2017    Procedure: BRONCHOSCOPY;  Surgeon: Beto Hancock MD;  [facility]; Service: Pulmonary     Social History     Objective:  Vitals:    12/20/18 0920   BP: 132/72   BP Location: Left arm   Patient Position: Sitting   Pulse: 78   Resp: 17   Temp: 98 2 °F (36 8 °C)   TempSrc: Tympanic   SpO2: 97%   Weight: 75 6 kg (166 lb 9 6 oz)   Height: 5' 1" (1 549 m)     Physical Exam   Constitutional: She is oriented to person, place, and time  She appears well-developed  HENT:   Head: Normocephalic  Eyes: Pupils are equal, round, and reactive to light  Neck: Neck supple  Cardiovascular: Normal rate  No murmur heard  Pulmonary/Chest: No respiratory distress  She has no wheezes  She has no rales  Abdominal: Soft  She exhibits no distension  There is no tenderness  There is no rebound  Musculoskeletal: She exhibits no edema  Lymphadenopathy:     She has no cervical adenopathy  Neurological: She is alert and oriented to person, place, and time  She displays normal reflexes  Skin: Skin is warm  No rash noted  Psychiatric: She has a normal mood and affect  Thought content normal          Labs: I personally reviewed the labs and imaging pertinent to this patient care

## 2018-12-20 NOTE — PATIENT INSTRUCTIONS
X-ray right ankle performed in office-there appears to be a closed avulsion fracture of the lateral malleolus of the right fibula  Official radiology report pending at this time  Short leg splint applied in office  Follow up with orthopedic physician as soon as possible  Rest, elevate leg  Ibuprofen as needed for pain  Follow up with PCP in 3-5 days  Proceed to  ER if symptoms worsen

## 2018-12-20 NOTE — PROGRESS NOTES
[address] [facility]        NAME: Palmer Quinones is a 79 y o  female  : 1950    MRN: [id number]  DATE: 2018  TIME: 2:49 PM    Assessment and Plan   Acute right ankle pain [M25 571]  1  Acute right ankle pain  XR ankle 3+ vw right   2  Closed avulsion fracture of lateral malleolus of right fibula, initial encounter  Splint application         Patient Instructions   X-ray right ankle performed in office-there appears to be a closed avulsion fracture of the lateral malleolus of the right fibula  Official radiology report pending at this time  Short leg splint applied in office  Follow up with orthopedic physician as soon as possible  Rest, elevate leg  Ibuprofen as needed for pain  Follow up with PCP in 3-5 days  Proceed to  ER if symptoms worsen  Chief Complaint     Chief Complaint   Patient presents with    Ankle Injury     fell this morning foot and ankle pain          History of Present Illness   The patient is a 60-year-old female who presents with right ankle pain and swelling after an injury that occurred approximately 1 hr ago  She states that she was leaning forward to give someone a hug and fell twisting her right ankle  She has pain and swelling of the right ankle  Negative wound or abrasion  She is able to ambulate but with difficulty secondary to the pain  She denies numbness or tingling  Negative weakness  She does admit to a recent left foot fracture but otherwise denies recent injury to her right lower extremity  She denies head injury  Negative syncope or loss of consciousness  HPI    Review of Systems   Review of Systems   Constitutional: Negative for fever  Musculoskeletal:        Right ankle pain  Skin: Negative for wound  All other systems reviewed and are negative  Current Medications       Current Outpatient Prescriptions:     aspirin 81 MG tablet, Take 81 mg by mouth daily  , Disp: , Rfl:     buPROPion (WELLBUTRIN) 100 mg tablet, Take 100 mg by mouth 3 (three) times a day , Disp: , Rfl:     calcium citrate-vitamin D (CITRACAL+D) 315-200 MG-UNIT per tablet, Take 1 tablet by mouth daily  , Disp: , Rfl:     levothyroxine 25 mcg tablet, Take 25 mcg by mouth daily  , Disp: , Rfl:     LORazepam (ATIVAN) 1 mg tablet, Take 1 mg by mouth daily at bedtime, Disp: , Rfl: 0    multivitamin-iron-minerals-folic acid (CENTRUM) chewable tablet, Chew 1 tablet daily  , Disp: , Rfl:     QUEtiapine (SEROQUEL) 25 mg tablet, Take 25 mg by mouth as needed  , Disp: , Rfl:     QUEtiapine (SEROquel) 300 mg tablet, Take 300 mg by mouth daily at bedtime  , Disp: , Rfl:     risperiDONE (RisperDAL) 0 5 mg tablet, Take by mouth as needed  , Disp: , Rfl:     temazepam (RESTORIL) 30 mg capsule, Take 2 capsules by mouth daily at bedtime  , Disp: , Rfl:     VENTOLIN  (90 Base) MCG/ACT inhaler, Inhale 1 puff, Disp: , Rfl: 6    acetaminophen-codeine (TYLENOL #3) 300-30 mg per tablet, Take 1 tablet by mouth 3 (three) times a day as needed, Disp: , Rfl:     chlorhexidine (PERIDEX) 0 12 % solution, Use as directed, Disp: , Rfl: 0    latanoprost (XALATAN) 0 005 % ophthalmic solution, INSTILL 1 DROP BY OPHTHALMIC ROUTE EVERY DAY INTO BOTH EYES IN THE EVENING, Disp: , Rfl: 3    Current Allergies     Allergies as of 12/20/2018 - Reviewed 12/20/2018   Allergen Reaction Noted    Ciprofloxacin Hives and Swelling 08/23/2016    Cymbalta [duloxetine hcl] Other (See Comments) 08/23/2016            The following portions of the patient's history were reviewed and updated as appropriate: allergies, current medications, past family history, past medical history, past social history, past surgical history and problem list      Past Medical History:   Diagnosis Date    Bipolar 1 disorder ([address] )     Hypothyroidism     Psychiatric disorder     S[facility]ogren's syndrome [facility] [facility]     Systemic lupus erythematosus ([facility] [address] )        Past Surgical History:   Procedure Laterality Date    FRACTURE SURGERY Right     elbow    GASTRIC BYPASS      VT BRONCHOSCOPY,DIAGNOSTIC N/A 12/8/2017    Procedure: BRONCHOSCOPY;  Surgeon: Arpit Medel MD;  [facility]; Service: Pulmonary       Family History   Problem Relation Age of Onset    Heart disease Mother     Diabetes Mother     Heart disease Father     Stroke Father     Diabetes Father     Cancer Father          Medications have been verified  Objective   /72 (BP Location: Right arm, Patient Position: Sitting, Cuff Size: Standard)   Pulse 86   Temp 99 6 °F (37 6 °C) (Tympanic)   Resp 18   LMP  (LMP Unknown)   SpO2 97%          Physical Exam     Physical Exam   Constitutional: She is oriented to person, place, and time  She appears well-developed and well-nourished  She appears distressed (She appears to be in pain )  HENT:   Head: Normocephalic and atraumatic  Pulmonary/Chest: Effort normal  No respiratory distress  Musculoskeletal:        Right ankle: She exhibits decreased range of motion and swelling  She exhibits no ecchymosis, no deformity, no laceration and normal pulse  Tenderness  Lateral malleolus and medial malleolus tenderness found  No AITFL, no CF ligament, no posterior TFL, no head of 5th metatarsal and no proximal fibula tenderness found  Achilles tendon normal  Achilles tendon exhibits no pain and no defect  Neurological: She is alert and oriented to person, place, and time  Skin: Skin is warm and dry  No rash noted  She is not diaphoretic  No erythema  No pallor  Psychiatric: She has a normal mood and affect  Her behavior is normal    Nursing note and vitals reviewed      Splint application  Date/Time: 12/20/2018 2:48 PM  Performed by: John Sommers by: Jeffrey Saha     Consent:     Consent obtained:  Verbal    Consent given by:  Patient    Risks discussed:  Discoloration, numbness, pain and swelling    Alternatives discussed:  No treatment  Pre-procedure details:     Sensation: Normal  Procedure details:     Laterality:  Right    Location:  Ankle    Ankle:  R ankle    Splint type:  Short leg    Supplies:  Ortho-Glass and skin protective strip

## 2018-12-21 ENCOUNTER — TELEPHONE (OUTPATIENT)
Dept: HEMATOLOGY ONCOLOGY | Facility: CLINIC | Age: 68
End: 2018-12-21

## 2018-12-21 ENCOUNTER — OFFICE VISIT (OUTPATIENT)
Dept: OBGYN CLINIC | Facility: CLINIC | Age: 68
End: 2018-12-21
Payer: COMMERCIAL

## 2018-12-21 VITALS
HEIGHT: 61 IN | DIASTOLIC BLOOD PRESSURE: 85 MMHG | BODY MASS INDEX: 31.48 KG/M2 | HEART RATE: 76 BPM | SYSTOLIC BLOOD PRESSURE: 125 MMHG

## 2018-12-21 DIAGNOSIS — S82.62XD CLOSED AVULSION FRACTURE OF LATERAL MALLEOLUS OF LEFT FIBULA WITH ROUTINE HEALING, SUBSEQUENT ENCOUNTER: Primary | ICD-10-CM

## 2018-12-21 PROCEDURE — 99213 OFFICE O/P EST LOW 20 MIN: CPT | Performed by: ORTHOPAEDIC SURGERY

## 2018-12-21 NOTE — TELEPHONE ENCOUNTER
Patient called to let us know that what she thought it was just a twist ankle turned into a broken ankle and she has a cast  She made a joke she said "at least  it is a beautiful green one for li " She also asked me to say  Li to Dr Grey Jones and his Team

## 2018-12-21 NOTE — PROGRESS NOTES
KIRK Angel  Attending, Orthopaedic Surgery  [facility] [address] [facility]        ORTHOPAEDIC FOOT AND ANKLE CLINIC VISIT     Assessment:     Encounter Diagnosis   Name Primary?  Closed avulsion fracture of lateral malleolus of left fibula with routine healing, subsequent encounter Yes              Plan:   · The patient verbalized understanding of exam findings and treatment plan  We engaged in the shared decision-making process and treatment options were discussed at length with the patient  Surgical and conservative management discussed today along with risks and benefits  · NWB in a [facility] for 3 weeks then 3 weeks in a boot  · 3 week follow-up              History of Present Illness:   Chief Complaint:   Chief Complaint   Patient presents with    Right Ankle - Pain     Jeremías Lepe is a 79 y o  female who is being seen for right ankle injury  She twisted it 5 days ago  Pain is localized at lateral ankle with minimal radiating and described as sharp and severe  Patient denies numbness, tingling or radicular pain  Denies history of neuropathy  Patient does not smoke, does not have diabetes and does not take blood thinners  Patient denies family history of anesthesia complications and has not had any complications with anesthesia  Pain/symptom timing:  Worse during the day when active  Pain/symptom context:  Worse with activites and work  Pain/symptom modifying factors:  Rest makes better, activities make worse  Pain/symptom associated signs/symptoms: none    Prior treatment   · NSAIDsYes    · Injections No   · Bracing/Orthotics Yes   · Physical Therapy No     Orthopedic Surgical History:   None    Past Medical, Surgical and Social History:  Past Medical History:  has a past medical history of Bipolar 1 disorder ([facility] [address] ); Hypothyroidism; Psychiatric disorder; Sjogren's syndrome ([facility] [address] ); and Systemic lupus erythematosus ([facility] [address] )    Problem List:  does not have any pertinent problems on file  Past Surgical History:  has a past surgical history that includes Gastric bypass; Fracture surgery (Right); and pr bronchoscopy,diagnostic (N/A, 12/8/2017)  Family History: family history includes Cancer in her father; Diabetes in her father and mother; Heart disease in her father and mother; Stroke in her father  Social History:  reports that she has never smoked  She has never used smokeless tobacco  She reports that she does not drink alcohol or use drugs  Current Medications: has a current medication list which includes the following prescription(s): aspirin, bupropion, calcium citrate-vitamin d, chlorhexidine, latanoprost, levothyroxine, lorazepam, multivitamin-iron-minerals-folic acid, quetiapine, quetiapine, risperidone, temazepam, and ventolin hfa  Allergies: is allergic to ciprofloxacin and cymbalta [duloxetine hcl]  Review of Systems:  General- denies fever/chills  HEENT- denies hearing loss or sore throat  Eyes- denies eye pain or visual disturbances, denies red eyes  Respiratory- denies cough or SOB  Cardio- denies chest pain or palpitations  GI- denies abdominal pain  Endocrine- denies urinary frequency  Urinary- denies pain with urination  Musculoskeletal- Negative except noted above  Skin- denies rashes or wounds  Neurological- denies dizziness or headache  Psychiatric- denies anxiety or difficulty concentrating    Physical Exam:   /85 (BP Location: Right arm, Patient Position: Sitting, Cuff Size: Adult)   Pulse 76   Ht 5' 1" (1 549 m)   LMP  (LMP Unknown)   BMI 31 48 kg/m²   General/Constitutional: No apparent distress: well-nourished and well developed    Eyes: normal ocular motion  Lymphatic: No appreciable lymphadenopathy  Respiratory: Non-labored breathing  Vascular: No edema, swelling or tenderness, except as noted in detailed exam   Integumentary: No impressive skin lesions present, except as noted in detailed exam   Neuro: No ataxia or tremors noted  Psych: Normal mood and affect, oriented to person, place and time  Appropriate affect  Musculoskeletal: Normal, except as noted in detailed exam and in HPI  Examination    Right    Gait Antalgic   Musculoskeletal Tender to palpation at lateral ankle    Skin Normal       Nails Normal    Range of Motion  0 degrees dorsiflexion, 20 degrees plantarflexion  Subtalar motion: normal    Stability Stable    Muscle Strength 5/5 tibialis anterior  5/5 gastrocnemius-soleus  5/5 posterior tibialis  5/5 peroneal/eversion strength  5/5 EHL  5/5 FHL    Neurologic Normal    Sensation Intact to light touch throughout sural, saphenous, superficial peroneal, deep peroneal and medial/lateral plantar nerve distributions  [place]-Chaya 5 07 filament (10g) testing deferred  Cardiovascular Brisk capillary refill < 2 seconds,intact DP and PT pulses    Special Tests None      Imaging Studies:   3 views of the right ankle were taken, reviewed and interpreted independently that demonstrate avulsion fracture distal fibula with minimal displacement  Reviewed by me personally  Jay Rower Lachman, MD  Foot & Ankle Surgery   Department of [address] [place] [address]      I personally performed the service  Jay Rower Lachman, MD

## 2018-12-27 ENCOUNTER — OFFICE VISIT (OUTPATIENT)
Dept: URGENT CARE | Facility: CLINIC | Age: 68
End: 2018-12-27
Payer: COMMERCIAL

## 2018-12-27 VITALS
BODY MASS INDEX: 31.34 KG/M2 | HEIGHT: 61 IN | HEART RATE: 83 BPM | SYSTOLIC BLOOD PRESSURE: 167 MMHG | RESPIRATION RATE: 16 BRPM | DIASTOLIC BLOOD PRESSURE: 94 MMHG | OXYGEN SATURATION: 95 % | TEMPERATURE: 99.2 F | WEIGHT: 166 LBS

## 2018-12-27 DIAGNOSIS — J06.9 ACUTE URI: Primary | ICD-10-CM

## 2018-12-27 PROCEDURE — G0382 LEV 3 HOSP TYPE B ED VISIT: HCPCS | Performed by: FAMILY MEDICINE

## 2018-12-27 NOTE — PROGRESS NOTES
[id number] [facility]        NAME: Bautista Al is a 79 y o  female  : 1950    MRN: [id number]  DATE: 2018  TIME: 4:13 PM    Assessment and Plan   Acute URI [J06 9]  1  Acute URI       Bacterial sinusitis, strep pharyngitis and pneumonia unlikely per clinical evaluation  Likely secondary to postnasal drip from rhinitis  Patient advised on supportive therapy including remaining well hydrated, avoiding cold fluids and gargling with warm salt water twice daily  Instructed to use neti pot nasal rinse immediately followed by Flonase; both twice daily for the next 5 days  May f/u with PCP in a few days if Sxs worsen  Patient Instructions     Follow up with PCP in 3-5 days  Proceed to  ER if symptoms worsen  Chief Complaint     Chief Complaint   Patient presents with    Earache     bilateral ear pain and chest congestion x 1 month         History of Present Illness       68-year-old female with pertinent history of lupus who presents today due to 2 weeks of cough associated with nasal congestion, otalgia, rhinorrhea and sinus pressure  No obvious sick contacts  Was evaluated about a month ago by her PCP and given amoxicillin and a cough suppressant  Reports that her symptoms progressed from the chest towards the head  Otherwise denies any dizziness, headaches, nausea, chest pain, shortness of breath or environmental allergies  Review of Systems   Review of Systems   Constitutional: Positive for chills  Negative for fever  HENT: Positive for congestion, ear pain, rhinorrhea and sinus pressure  Respiratory: Positive for cough  Negative for shortness of breath  Cardiovascular: Negative for chest pain  Gastrointestinal: Negative for nausea  Allergic/Immunologic: Negative for environmental allergies  Neurological: Negative for dizziness and headaches       68-year-old female who    Current Medications       Current Outpatient Prescriptions:     aspirin 81 MG tablet, Take 81 mg by mouth daily  , Disp: , Rfl:     buPROPion (WELLBUTRIN) 100 mg tablet, Take 100 mg by mouth 3 (three) times a day , Disp: , Rfl:     calcium citrate-vitamin D (CITRACAL+D) 315-200 MG-UNIT per tablet, Take 1 tablet by mouth daily  , Disp: , Rfl:     chlorhexidine (PERIDEX) 0 12 % solution, Use as directed, Disp: , Rfl: 0    latanoprost (XALATAN) 0 005 % ophthalmic solution, INSTILL 1 DROP BY OPHTHALMIC ROUTE EVERY DAY INTO BOTH EYES IN THE EVENING, Disp: , Rfl: 3    levothyroxine 25 mcg tablet, Take 25 mcg by mouth daily  , Disp: , Rfl:     LORazepam (ATIVAN) 1 mg tablet, Take 1 mg by mouth daily at bedtime, Disp: , Rfl: 0    multivitamin-iron-minerals-folic acid (CENTRUM) chewable tablet, Chew 1 tablet daily  , Disp: , Rfl:     QUEtiapine (SEROQUEL) 25 mg tablet, Take 25 mg by mouth as needed  , Disp: , Rfl:     QUEtiapine (SEROquel) 300 mg tablet, Take 300 mg by mouth daily at bedtime  , Disp: , Rfl:     risperiDONE (RisperDAL) 0 5 mg tablet, Take by mouth as needed  , Disp: , Rfl:     temazepam (RESTORIL) 30 mg capsule, Take 2 capsules by mouth daily at bedtime  , Disp: , Rfl:     VENTOLIN  (90 Base) MCG/ACT inhaler, Inhale 1 puff, Disp: , Rfl: 6    Current Allergies     Allergies as of 12/27/2018 - Reviewed 12/27/2018   Allergen Reaction Noted    Ciprofloxacin Hives and Swelling 08/23/2016    Cymbalta [duloxetine hcl] Other (See Comments) 08/23/2016            The following portions of the patient's history were reviewed and updated as appropriate: allergies, current medications, past family history, past medical history, past social history, past surgical history and problem list      Past Medical History:   Diagnosis Date    B[facility] 1 disorder ([facility] [address] )     Hypothyroidism     Psychiatric disorder     Sjogren's syndrome ([facility] [address] )     Systemic lupus erythematosus ([facility] [address] )        Past Surgical History:   Procedure Laterality Date    FRACTURE SURGERY Right     elbow    GASTRIC BYPASS      ND BRONCHOSCOPY,DIAGNOSTIC N/A 12/8/2017    Procedure: BRONCHOSCOPY;  Surgeon: Karla Saucedo MD;  [facility]; Service: Pulmonary       Family History   Problem Relation Age of Onset    Heart disease Mother     Diabetes Mother     Heart disease Father     Stroke Father     Diabetes Father     Cancer Father          Medications have been verified  Objective   /94   Pulse 83   Temp 99 2 °F (37 3 °C) (Tympanic)   Resp 16   Ht 5' 1" (1 549 m)   Wt 75 3 kg (166 lb)   LMP  (LMP Unknown)   SpO2 95%   BMI 31 37 kg/m²        Physical Exam     Physical Exam   Constitutional: She is oriented to person, place, and time  She appears well-developed and well-nourished  No distress  HENT:   Head: Normocephalic and atraumatic  Left Ear: External ear normal    Eyes: Pupils are equal, round, and reactive to light  Conjunctivae are normal  Right eye exhibits no discharge  Left eye exhibits no discharge  Neck: No thyromegaly present  Cardiovascular: Normal rate and normal heart sounds  No murmur heard  Pulmonary/Chest: Effort normal and breath sounds normal  No respiratory distress  She has no wheezes  She has no rales  Lymphadenopathy:     She has no cervical adenopathy  Neurological: She is alert and oriented to person, place, and time  Skin: Skin is warm  She is not diaphoretic  No erythema  Psychiatric: She has a normal mood and affect  Her behavior is normal  Judgment and thought content normal    Vitals reviewed

## 2019-01-18 ENCOUNTER — APPOINTMENT (OUTPATIENT)
Dept: RADIOLOGY | Facility: CLINIC | Age: 69
End: 2019-01-18
Payer: COMMERCIAL

## 2019-01-18 ENCOUNTER — OFFICE VISIT (OUTPATIENT)
Dept: OBGYN CLINIC | Facility: CLINIC | Age: 69
End: 2019-01-18
Payer: COMMERCIAL

## 2019-01-18 VITALS
SYSTOLIC BLOOD PRESSURE: 138 MMHG | HEART RATE: 80 BPM | HEIGHT: 61 IN | DIASTOLIC BLOOD PRESSURE: 82 MMHG | BODY MASS INDEX: 31.37 KG/M2

## 2019-01-18 DIAGNOSIS — M25.571 RIGHT ANKLE PAIN, UNSPECIFIED CHRONICITY: ICD-10-CM

## 2019-01-18 DIAGNOSIS — M25.571 RIGHT ANKLE PAIN, UNSPECIFIED CHRONICITY: Primary | ICD-10-CM

## 2019-01-18 DIAGNOSIS — S82.61XD CLOSED AVULSION FRACTURE OF LATERAL MALLEOLUS OF RIGHT FIBULA WITH ROUTINE HEALING, SUBSEQUENT ENCOUNTER: ICD-10-CM

## 2019-01-18 PROCEDURE — 73610 X-RAY EXAM OF ANKLE: CPT

## 2019-01-18 PROCEDURE — 99213 OFFICE O/P EST LOW 20 MIN: CPT | Performed by: ORTHOPAEDIC SURGERY

## 2019-01-18 NOTE — PROGRESS NOTES
KIRK Pierre  Attending, Orthopaedic Surgery  [facility] [address] [facility]      ORTHOPAEDIC FOOT AND ANKLE CLINIC VISIT     Assessment:     Encounter Diagnoses   Name Primary?  Right ankle pain, unspecified chronicity Yes    Closed avulsion fracture of lateral malleolus of right fibula with routine healing, subsequent encounter             Plan:   · The patient verbalized understanding of exam findings and treatment plan  We engaged in the shared decision-making process and treatment options were discussed at length with the patient  Surgical and conservative management discussed today along with risks and benefits  · Patient is to  continue wearing the cam  walking boot for 2 more weeks, then start to  weaning out of the cam  boot for 1 week, then start transitioning into a sneaker for 1 week  · Physical therapy should continue  Return in about 4 weeks (around 2/15/2019) for Recheck right ankle   History of Present Illness:   Chief Complaint:   Chief Complaint   Patient presents with    Right Ankle - Follow-up     Thi Moon is a 79 y o  female who is being seen in follow-up for closed avulsion fracture malleolus  Right fibula  When we last saw she we recommended NWB and in a [address]  And patient states she has been WB 50% on her cast for 5 days now  Pain has  improved  Residual pain is localized at right lateral malleolus of the fibula with minimal radiating and described as sharp and severe        Pain/symptom timing:  Worse during the day when active  Pain/symptom context:  Worse with activites and work  Pain/symptom modifying factors:  Rest makes better, activities make worse  Pain/symptom associated signs/symptoms: none    Prior treatment   · NSAIDs Yes   · Injections No   · Bracing/Orthotics Yes  · Physical Therapy No    Orthopedic Surgical History:   None     Past Medical, Surgical and Social History:  Past Medical History:  has a past medical history of Bipolar 1 disorder ([facility] ); Hypothyroidism; Psychiatric disorder; Sjogren's syndrome ([facility] ); and Systemic lupus erythematosus ([facility] )  Problem List:  does not have any pertinent problems on file  Past Surgical History:  has a past surgical history that includes Gastric bypass; Fracture surgery (Right); and pr bronchoscopy,diagnostic (N/A, 12/8/2017)  Family History: family history includes Cancer in her father; Diabetes in her father and mother; Heart disease in her father and mother; Stroke in her father  Social History:  reports that she has never smoked  She has never used smokeless tobacco  She reports that she does not drink alcohol or use drugs  Current Medications: has a current medication list which includes the following prescription(s): aspirin, bupropion, calcium citrate-vitamin d, chlorhexidine, latanoprost, levothyroxine, lorazepam, multivitamin-iron-minerals-folic acid, quetiapine, quetiapine, risperidone, temazepam, and ventolin hfa  Allergies: is allergic to ciprofloxacin and cymbalta [duloxetine hcl]  Review of Systems:  General- denies fever/chills  HEENT- denies hearing loss or sore throat  Eyes- denies eye pain or visual disturbances, denies red eyes  Respiratory- denies cough or SOB  Cardio- denies chest pain or palpitations  GI- denies abdominal pain  Endocrine- denies urinary frequency  Urinary- denies pain with urination  Musculoskeletal- Negative except noted above  Skin- denies rashes or wounds  Neurological- denies dizziness or headache  Psychiatric- denies anxiety or difficulty concentrating    Physical Exam:   /82 (BP Location: Right arm, Patient Position: Sitting, Cuff Size: Adult)   Pulse 80   Ht 5' 1" (1 549 m)   LMP  (LMP Unknown)   BMI 31 37 kg/m²   General/Constitutional: No apparent distress: well-nourished and well developed    Eyes: normal ocular motion  Lymphatic: No appreciable lymphadenopathy  Respiratory: Non-labored breathing  Vascular: No edema, swelling or tenderness, except as noted in detailed exam   Integumentary: No impressive skin lesions present, except as noted in detailed exam   Neuro: No ataxia or tremors noted  Psych: Normal mood and affect, oriented to person, place and time  Appropriate affect  Musculoskeletal: Normal, except as noted in detailed exam and in HPI  Examination    Right    Gait Normal and Antalgic   Musculoskeletal Tender to palpation at lateral ankle    Skin Normal       Nails Normal    Range of Motion  20 degrees dorsiflexion, 30 degrees plantarflexion  Subtalar motion: normal    Stability Stable    Muscle Strength 5/5 tibialis anterior  5/5 gastrocnemius-soleus  5/5 posterior tibialis  5/5 peroneal/eversion strength  5/5 EHL  5/5 FHL    Neurologic Normal    Sensation Intact to light touch throughout sural, saphenous, superficial peroneal, deep peroneal and medial/lateral plantar nerve distributions  [place]-Chaya 5 07 filament (10g) testing deferred  Cardiovascular Brisk capillary refill < 2 seconds,intact DP and PT pulses    Special Tests None      Imaging Studies:     3 view of the Right ankle were taken, reviewed and interpreted independently that demonstrates sign of healing over the fracture site        Celedonio Lemmings Lachman, MD  Foot & Ankle Surgery   Department of [address] [place] [address]      I personally performed the service  Celedonio Lemmings Lachman, MD

## 2019-01-22 ENCOUNTER — EVALUATION (OUTPATIENT)
Dept: PHYSICAL THERAPY | Facility: CLINIC | Age: 69
End: 2019-01-22
Payer: COMMERCIAL

## 2019-01-22 DIAGNOSIS — S82.61XD CLOSED AVULSION FRACTURE OF LATERAL MALLEOLUS OF RIGHT FIBULA WITH ROUTINE HEALING, SUBSEQUENT ENCOUNTER: Primary | ICD-10-CM

## 2019-01-22 PROCEDURE — 97161 PT EVAL LOW COMPLEX 20 MIN: CPT

## 2019-01-22 NOTE — PROGRESS NOTES
PT Evaluation     Today's date: 2019  Patient name: Kristina Cardoza  : 1950  MRN: [id number]  Referring provider: Bob Welch MD  Dx:   Encounter Diagnosis     ICD-10-CM    1  Closed avulsion fracture of lateral malleolus of right fibula with routine healing, subsequent encounter S82  61XD Ambulatory referral to Physical Therapy       Start Time: 5007  Stop Time: 1700  Total time in clinic (min): 45 minutes    Assessment  Assessment details: Patient is a 80 yo female s/p R closed avulsion fx of lateral malleolus of R fibula about 5 weeks ago after a fall  Patient presents with decreased R ankle ROM, R ankle strength, decreased balance and increased swelling  Patient does not have pain with a majority of ADLs, however feels increased difficulty with sit<>stands and balance activities  PT will address the noted impairments by performing eccentric strengthening, stretching, balance, functional activities and manual techniques to allow the patient to return to her PLOF  PT recommended 2x/week for 3-4 weeks c a good prognosis 2* PLOF  Impairments: abnormal or restricted ROM, activity intolerance, impaired balance, impaired physical strength, lacks appropriate home exercise program, pain with function, safety issue and weight-bearing intolerance    Symptom irritability: lowUnderstanding of Dx/Px/POC: good   Prognosis: good    Goals  STG: In two weeks the patient will:    1  Be (I) with her HEP  2  Increase R ankle strength to 5/5 MMT score to assist c prolonged activities  3  Increase R ankle ROM by 5 degrees to assist c stairs and amb  4  Walk 200ft out of the boot without pain  LTG: In four weeks, the patient will:    1  Increase FOTO score to 78 to demonstrate improvements in symptoms and function  2  Demonstrate full R ankle AROM without pain  3  Perform 30-45 mins of activity without pain     4  Increase hip, knee and ankle strength to 5/5 MMT score to assist c work related activities  5  Participate in a full day of work without of pain  Plan  Patient would benefit from: skilled physical therapy  Referral necessary: No  Planned modality interventions: cryotherapy  Planned therapy interventions: abdominal trunk stabilization, balance, balance/weight bearing training, home exercise program, therapeutic exercise, therapeutic activities, stretching, strengthening, postural training, patient education, neuromuscular re-education, Betancourt taping, massage, manual therapy and joint mobilization  Frequency: 2x week  Duration in visits: 8  Duration in weeks: 4  Plan of Care beginning date: 2019  Plan of Care expiration date: 2019  Treatment plan discussed with: patient        Subjective Evaluation    History of Present Illness  Onset date: 5 weeks ago  Mechanism of injury: Patient noted about 5 weeks ago she went to shake a doctors hand and fell as she stood up from the chair  Patient noted swelling after, however thought it was a sprain  Patient then noted increased swelling after an hour and went to the urgent care  They noted a fx of the R distal fibula after taking an x-ray  Patient was in a boot for 4 weeks and has directions to wean out of the boot within 4 weeks  Patient noted she does not have much pain when weight bearing and is able to move the ankle without pain  Patient does not have pain with ADLs, however feels uncomfortable with her balance 2* the 2nd ankle fracture in 6 months     Not a recurrent problem   Quality of life: good    Pain  Current pain ratin (seated)  At best pain ratin  At worst pain ratin (prolonged activities)  Location: R distal fibula  Quality: dull ache (no numbness or tingling)  Relieving factors: rest  Aggravating factors: standing and walking  Progression: improved    Social Support  Steps to enter house: no  Lives in: [facility]  Lives with: spouse    Employment status: working ([employer])  Hand dominance: right      Diagnostic Tests  Abnormal x-ray: improving  Patient Goals  Patient goals for therapy: improved balance, increased motion, increased strength and return to work  Patient goal: "improve balance and learn to sit <>stand with proper balance and mechanics "        Objective     Palpation     Right Tenderness of the peroneus and posterior tibialis  Tenderness     Right Ankle/Foot   Tenderness in the fibula, lateral malleolus and medial malleolus  Neurological Testing     Sensation     Ankle/Foot   Left Ankle/Foot   Intact: light touch    Right Ankle/Foot   Intact: light touch     Active Range of Motion   Left Ankle/Foot   Normal active range of motion    Right Ankle/Foot   Dorsiflexion (ke): 8 degrees   Plantar flexion: 20 degrees   Inversion: 18 degrees   Eversion: 22 degrees     Passive Range of Motion   Left Ankle/Foot  Normal passive range of motion    Right Ankle/Foot    Dorsiflexion (ke): 10 degrees   Plantar flexion: 40 degrees   Inversion: 30 degrees   Eversion: 30 degrees     Strength/Myotome Testing     Left Ankle/Foot   Dorsiflexion: 4+  Plantar flexion: 4+  Inversion: 4+  Eversion: 4+    Right Ankle/Foot   Dorsiflexion: 4+  Plantar flexion: 4  Inversion: 4+  Eversion: 4- (Pain)    Additional Strength Details  Hip flexion: 4-/5  Hip abd: 4/5  Hip add: 4+/5  Knee extension: 4+/5  Knee flexion: 4/5    Swelling   Left Ankle/Foot   Figure 8: 49 cm    Right Ankle/Foot   Figure 8: 51 5 cm    General Comments     Ankle/Foot Comments   FTEO: 30 seconds no sway   FTEC: 30 second no sway on firm surface    Tandem stance on firm surface, eyes open: 8 seconds with moderate sway         Flowsheet Rows      Most Recent Value   PT/OT G-Codes   Current Score  68   Projected Score  78   FOTO information reviewed  Yes   Assessment Type  Evaluation   G code set  Mobility: Walking & Moving Around          Precautions: Lupus, R closed avulsion fx of lateral malleolus of R fibula (5 weeks ago)    Patient is to continue wearing the cam walking boot for 2 more weeks, then start to  weaning out of the cam  boot for 1 week, then start transitioning into a sneaker for 1 week    Daily Treatment Diary     Manual  1/22            STM to R post tib and fib long nv                                                                    Exercise Diary  1/22            bike nv            Gastroc/soleus stretch nv            Heel raises nv            Ankle ABCs nv            Ankle 4 way nv            Tandem stance nv            FTEO/FTEC nv                                                                                                                                                                                         Modalities  1/22            CP PRN np

## 2019-01-29 ENCOUNTER — OFFICE VISIT (OUTPATIENT)
Dept: PHYSICAL THERAPY | Facility: CLINIC | Age: 69
End: 2019-01-29
Payer: COMMERCIAL

## 2019-01-29 DIAGNOSIS — S82.61XD CLOSED AVULSION FRACTURE OF LATERAL MALLEOLUS OF RIGHT FIBULA WITH ROUTINE HEALING, SUBSEQUENT ENCOUNTER: Primary | ICD-10-CM

## 2019-01-29 PROCEDURE — 97140 MANUAL THERAPY 1/> REGIONS: CPT

## 2019-01-29 PROCEDURE — 97110 THERAPEUTIC EXERCISES: CPT

## 2019-01-29 PROCEDURE — 97112 NEUROMUSCULAR REEDUCATION: CPT

## 2019-01-29 NOTE — PROGRESS NOTES
Daily Note     Today's date: 2019  Patient name: Amaris Kenney  : 1950  MRN: [id number]  Referring provider: Nati Jarrett MD  Dx:   Encounter Diagnosis     ICD-10-CM    1  Closed avulsion fracture of lateral malleolus of right fibula with routine healing, subsequent encounter S82  61XD         1:1 with PTA CR 10:05-10:50  Subjective: Patient arrives to PT without CAM boot  Advised to continue use when weightbearing for another week  Patient has also been performing balance exercises that she found on the Internet including SLS without CAM  Medial ankle pain reported  Instructed to perform exercises that are provided by PT only  Objective: See treatment diary below      Assessment: Tolerated treatment well  Patient demonstrated fatigue post treatment and would benefit from continued PT  Pain free manuals and exercise  Assess an progress as able as weight bearing status progresses  "Ache" reported post treatment as this has been the longest she has been weightbearing out of CAM         Plan: Continue per plan of care  Precautions: Lupus, R closed avulsion fx of lateral malleolus of R fibula (5 weeks ago)    Patient is to  continue wearing the cam walking boot for 2 more weeks, then start to  weaning out of the cam  boot for 1 week, then start transitioning into a sneaker for 1 week    Daily Treatment Diary     Manual              STM to R post tib and fib long 10 mins                                          Exercise Diary             bike nv 5 mins           Gastroc/soleus stretch nv 30"x3 ea  Heel raises nv 2x10           Ankle ABCs nv x1           Ankle 4 way nv RTB  2x10           Tandem stance nv 20"x4 ea           FTEO/FTEC nv 30"x3 ea                                                                                                                                                                                          Modalities              CP PRN np

## 2019-01-31 ENCOUNTER — OFFICE VISIT (OUTPATIENT)
Dept: PHYSICAL THERAPY | Facility: CLINIC | Age: 69
End: 2019-01-31
Payer: COMMERCIAL

## 2019-01-31 DIAGNOSIS — S82.61XD CLOSED AVULSION FRACTURE OF LATERAL MALLEOLUS OF RIGHT FIBULA WITH ROUTINE HEALING, SUBSEQUENT ENCOUNTER: Primary | ICD-10-CM

## 2019-01-31 PROCEDURE — 97112 NEUROMUSCULAR REEDUCATION: CPT

## 2019-01-31 PROCEDURE — 97140 MANUAL THERAPY 1/> REGIONS: CPT

## 2019-01-31 PROCEDURE — 97110 THERAPEUTIC EXERCISES: CPT

## 2019-01-31 NOTE — PROGRESS NOTES
Daily Note     Today's date: 2019  Patient name: Aziza Perales  : 1950  MRN: [id number]  Referring provider: Nikki Bob MD  Dx:   Encounter Diagnosis     ICD-10-CM    1  Closed avulsion fracture of lateral malleolus of right fibula with routine healing, subsequent encounter S82  61XD         1:1 with PTA CR 3:30-4:30  Subjective: Continues to report medial pain versus lateral        Objective: See treatment diary below      Assessment: Tolerated treatment well  Patient demonstrated fatigue post treatment and would benefit from continued PT  Progressed TB resistance without issue  No sway with FT/EO and minimal with FT/EC therefore progressed to foam        Plan: Continue per plan of care  Precautions: Lupus, R closed avulsion fx of lateral malleolus of R fibula (5 weeks ago)    Patient is to  continue wearing the cam walking boot for 2 more weeks, then start to  weaning out of the cam  boot for 1 week, then start transitioning into a sneaker for 1 week    Daily Treatment Diary   Manual            STM to R post tib and fib long 10 mins 10 mins 10 mins                           Exercise Diary            bike nv 5 mins 5 mins          Gastroc/soleus stretch nv 30"x3 ea  30"x3 ea  Heel raises nv 2x10 3x10          Ankle ABCs nv x1 x1          Ankle 4 way nv RTB  2x10 GTB  2x10 ea  Tandem stance nv 20"x4 ea 20"x4 ea  FTEO/FTEC nv 30"x3 ea   FT/EC  30"x3          FT/EO on foam   30"x3          FA/EC on foam   30"x3                                                                                                                                                             Modalities              CP PRN np

## 2019-02-05 ENCOUNTER — APPOINTMENT (OUTPATIENT)
Dept: PHYSICAL THERAPY | Facility: CLINIC | Age: 69
End: 2019-02-05
Payer: COMMERCIAL

## 2019-02-07 ENCOUNTER — APPOINTMENT (OUTPATIENT)
Dept: PHYSICAL THERAPY | Facility: CLINIC | Age: 69
End: 2019-02-07
Payer: COMMERCIAL

## 2019-02-07 ENCOUNTER — OFFICE VISIT (OUTPATIENT)
Dept: PHYSICAL THERAPY | Facility: CLINIC | Age: 69
End: 2019-02-07
Payer: COMMERCIAL

## 2019-02-07 DIAGNOSIS — S82.61XD CLOSED AVULSION FRACTURE OF LATERAL MALLEOLUS OF RIGHT FIBULA WITH ROUTINE HEALING, SUBSEQUENT ENCOUNTER: Primary | ICD-10-CM

## 2019-02-07 PROCEDURE — 97112 NEUROMUSCULAR REEDUCATION: CPT

## 2019-02-07 PROCEDURE — 97110 THERAPEUTIC EXERCISES: CPT

## 2019-02-07 NOTE — PROGRESS NOTES
Daily Note     Today's date: 2019  Patient name: Emmanuel Collins  : 1950  MRN: [id number]  Referring provider: Lenard Obando MD  Dx:   Encounter Diagnosis     ICD-10-CM    1  Closed avulsion fracture of lateral malleolus of right fibula with routine healing, subsequent encounter S82  61XD        Start Time: 09  Stop Time: 1000  Total time in clinic (min): 30 minutes    Subjective: Patient noted no pain at the start of the session and presented to therapy without the cam boot  Patient noted she wore the boot this morning for 3 hours  She stated she is doing a rotating cycle of wearing the boot and regular shoes  Patient noted she does not feel that well today  Objective: See treatment diary below      Precautions: Lupus, R closed avulsion fx of lateral malleolus of R fibula (5 weeks ago)    Patient is to  continue wearing the cam walking boot for 2 more weeks, then start to  weaning out of the cam  boot for 1 week, then start transitioning into a sneaker for 1 week    Daily Treatment Diary   Manual           STM to R post tib and fib long 10 mins 10 mins 10 mins np                          Exercise Diary   27         bike nv 5 mins 5 mins 5 mins         Gastroc/soleus stretch nv 30"x3 ea  30"x3 ea  3x30" ea         Heel raises nv 2x10 3x10 3x10         Ankle ABCs nv x1 x1 nv         Ankle 4 way nv RTB  2x10 GTB  2x10 ea  nv         Tandem stance nv 20"x4 ea 20"x4 ea  30"x2 ea         FTEO/FTEC nv 30"x3 ea  FT/EC  30"x3 3x30"         FT/EO on foam   30"x3 nv         FA/EC on foam   30"x3 3x30"                                                                                                                                                            Modalities              CP PRN np                                                Assessment: PT held manual techniques 2* patient not feeling well   PT moderately educated the patient on proper exercises to perform at home and how to wean off the boot  Patient agreed that she was only going to perform exercises at home prescribed by PT  Patient would benefit from continued PT to allow the patient to return to her PLOF  Plan: Continue per plan of care

## 2019-02-12 ENCOUNTER — OFFICE VISIT (OUTPATIENT)
Dept: PHYSICAL THERAPY | Facility: CLINIC | Age: 69
End: 2019-02-12
Payer: COMMERCIAL

## 2019-02-12 DIAGNOSIS — S82.61XD CLOSED AVULSION FRACTURE OF LATERAL MALLEOLUS OF RIGHT FIBULA WITH ROUTINE HEALING, SUBSEQUENT ENCOUNTER: Primary | ICD-10-CM

## 2019-02-12 DIAGNOSIS — S82.62XD CLOSED AVULSION FRACTURE OF LATERAL MALLEOLUS OF LEFT FIBULA WITH ROUTINE HEALING, SUBSEQUENT ENCOUNTER: ICD-10-CM

## 2019-02-12 PROCEDURE — 97112 NEUROMUSCULAR REEDUCATION: CPT | Performed by: PHYSICAL THERAPIST

## 2019-02-12 PROCEDURE — G8979 MOBILITY GOAL STATUS: HCPCS | Performed by: PHYSICAL THERAPIST

## 2019-02-12 PROCEDURE — 97110 THERAPEUTIC EXERCISES: CPT | Performed by: PHYSICAL THERAPIST

## 2019-02-12 PROCEDURE — G8980 MOBILITY D/C STATUS: HCPCS | Performed by: PHYSICAL THERAPIST

## 2019-02-12 NOTE — PROGRESS NOTES
PT Discharge    Today's date: 2019  Patient name: Herbert Knox  : 1950  MRN: [id number]  Referring provider: Cortney Schwab MD  Dx:   Encounter Diagnosis     ICD-10-CM    1  Closed avulsion fracture of lateral malleolus of right fibula with routine healing, subsequent encounter S82  61XD                   Assessment  Assessment details: Herbert Knox has been compliant with attending PT and home exercise program since initial eval   Isabela Henny  has made improvements in objective data since initial evalulation and has achieved all goals  Patient reports having returned to their prior level of function  Patient provided with updated Home Exercise Program, all questions answered, and verbalized understanding, agreeing to plan of care  Thus it was mutually decided to discontinue this episode of care and transition to Home Exercise Program       Symptom irritability: lowUnderstanding of Dx/Px/POC: good   Prognosis: good    Goals  STG: In two weeks the patient will:    1  Be (I) with her HEP  -MET  2  Increase R ankle strength to 5/5 MMT score to assist c prolonged activities  -MET  3  Increase R ankle ROM by 5 degrees to assist c stairs and amb  -MET  4  Walk 200ft out of the boot without pain  -MET      LTG: In four weeks, the patient will:    1  Increase FOTO score to 78 to demonstrate improvements in symptoms and function  -MET  2  Demonstrate full R ankle AROM without pain  -MET  3  Perform 30-45 mins of activity without pain  -MET  4  Increase hip, knee and ankle strength to 5/5 MMT score to assist c work related activities  -MET  5  Participate in a full day of work without of pain  -MET      Plan  Referral necessary: No  Planned therapy interventions: home exercise program  Treatment plan discussed with: patient        Subjective Evaluation    History of Present Illness  Onset date: 5 weeks ago  Mechanism of injury: Patient reports that she has no pain currently    She reports that she feels her balance is no longer an issue  Not a recurrent problem   Quality of life: good    Pain  Current pain ratin (seated)  At best pain ratin  At worst pain ratin (prolonged activities)  Location: R distal fibula    Patient Goals  Patient goal: "improve balance and learn to sit <>stand with proper balance and mechanics  " -MET        Objective     Palpation     Right   No palpable tenderness to the peroneus and posterior tibialis  Neurological Testing     Sensation     Ankle/Foot   Left Ankle/Foot   Intact: light touch    Right Ankle/Foot   Intact: light touch     Active Range of Motion   Left Ankle/Foot   Normal active range of motion    Right Ankle/Foot   Dorsiflexion (ke): 12 degrees   Plantar flexion: 50 degrees   Inversion: 60 degrees   Eversion: 18 degrees     Passive Range of Motion   Left Ankle/Foot  Normal passive range of motion    Right Ankle/Foot  Normal passive range of motion    Strength/Myotome Testing     Left Ankle/Foot   Dorsiflexion: 4+  Plantar flexion: 4+  Inversion: 4+  Eversion: 4+    Right Ankle/Foot   Dorsiflexion: 5  Plantar flexion: 4+  Inversion: 5  Eversion: 5 (Pain)    Additional Strength Details  Hip flexion: 4/5  Hip abd: 4/5  Hip add: 4+/5  Knee extension: 4+/5  Knee flexion: 4/5    Swelling   Left Ankle/Foot   Figure 8: 49 cm    Right Ankle/Foot   Figure 8: 51 5 cm    General Comments: Ankle/Foot Comments   FTEO: 30 seconds no sway   FTEC: 30 second no sway on firm surface    Tandem stance on firm surface, eyes open: 8 seconds with moderate sway  SLB Right: 10 sec   SLB Left: 10 sec          Precautions: Lupus, R closed avulsion fx of lateral malleolus of R fibula    Manual          STM to R post tib and fib long 10 mins 10 mins 10 mins np np                         Exercise Diary           bike nv 5 mins 5 mins 5 mins         Gastroc/soleus stretch nv 30"x3 ea  30"x3 ea   3x30" ea         Heel raises nv 2x10 3x10 3x10         Ankle ABCs nv x1 x1 nv         Ankle 4 way nv RTB  2x10 GTB  2x10 ea  nv         Tandem stance nv 20"x4 ea 20"x4 ea  30"x2 ea         FTEO/FTEC nv 30"x3 ea   FT/EC  30"x3 3x30"         FT/EO on foam   30"x3 nv         FA/EC on foam   30"x3 3x30"                                                                                                                                           HEP reviewed extensively     HS            Modalities  1/22            CP PRN np

## 2019-02-14 ENCOUNTER — APPOINTMENT (OUTPATIENT)
Dept: PHYSICAL THERAPY | Facility: CLINIC | Age: 69
End: 2019-02-14
Payer: COMMERCIAL

## 2019-02-15 ENCOUNTER — OFFICE VISIT (OUTPATIENT)
Dept: OBGYN CLINIC | Facility: CLINIC | Age: 69
End: 2019-02-15
Payer: COMMERCIAL

## 2019-02-15 VITALS
WEIGHT: 166 LBS | HEIGHT: 61 IN | SYSTOLIC BLOOD PRESSURE: 117 MMHG | DIASTOLIC BLOOD PRESSURE: 78 MMHG | HEART RATE: 80 BPM | BODY MASS INDEX: 31.34 KG/M2

## 2019-02-15 DIAGNOSIS — S82.61XD CLOSED AVULSION FRACTURE OF LATERAL MALLEOLUS OF RIGHT FIBULA WITH ROUTINE HEALING, SUBSEQUENT ENCOUNTER: Primary | ICD-10-CM

## 2019-02-15 DIAGNOSIS — S92.352A CLOSED FRACTURE OF BASE OF FIFTH METATARSAL BONE OF LEFT FOOT: ICD-10-CM

## 2019-02-15 DIAGNOSIS — S82.62XD CLOSED AVULSION FRACTURE OF LATERAL MALLEOLUS OF LEFT FIBULA WITH ROUTINE HEALING, SUBSEQUENT ENCOUNTER: ICD-10-CM

## 2019-02-15 PROCEDURE — 99213 OFFICE O/P EST LOW 20 MIN: CPT | Performed by: ORTHOPAEDIC SURGERY

## 2019-02-15 NOTE — PROGRESS NOTES
KIRK Winters  Attending, Orthopaedic Surgery  [facility] [address] [facility]      ORTHOPAEDIC FOOT AND ANKLE CLINIC VISIT     Assessment:     Encounter Diagnosis   Name Primary?  Closed avulsion fracture of lateral malleolus of right fibula with routine healing, subsequent encounter Yes            Plan:   · The patient verbalized understanding of exam findings and treatment plan  We engaged in the shared decision-making process and treatment options were discussed at length with the patient  Surgical and conservative management discussed today along with risks and benefits  · Continue HEP for strengthening, handouts were provided today as well  · Gradual ease back to full activities was also discussed with the patient today  Return if symptoms worsen or fail to improve  History of Present Illness:   Chief Complaint:   Chief Complaint   Patient presents with    Left Ankle - Follow-up     Herbert Knox is a 79 y o  female who is being seen in follow-up for Right closed avulsion fracture malleolus of fibula  When we last saw she we recommended continuing to wear the cam boot and transition to a sneaker  Pain has improved and states that she is currently pain free  Patient denies any radiating pain and numbness and tingling today  Patient just started working at [employer] and has been working 3x a week 4 hours a day  She c/o increased achy pain to her right ankle after her shifts  She has been in physical therapy however as of this week they have dc her therapy due to reaching maximal improvement     Pain/symptom timing:  Worse during the day when active  Pain/symptom context:  Worse with activites and work  Pain/symptom modifying factors:  Rest makes better, activities make worse  Pain/symptom associated signs/symptoms: none    Prior treatment   · NSAIDsYes and No   · Injections Yes and No   · Bracing/Orthotics Yes and No    · Physical Therapy Yes     Orthopedic Surgical History:   none    Past Medical, Surgical and Social History:  Past Medical History:  has a past medical history of Bipolar 1 disorder ([facility] [address] ), Hypothyroidism, Psychiatric disorder, Sjogren's syndrome ([facility] [address] ), and Systemic lupus erythematosus ([facility] )  Problem List: does not have any pertinent problems on file  Past Surgical History:  has a past surgical history that includes Gastric bypass; Fracture surgery (Right); and pr bronchoscopy,diagnostic (N/A, 12/8/2017)  Family History: family history includes Cancer in her father; Diabetes in her father and mother; Heart disease in her father and mother; Stroke in her father  Social History:  reports that she has never smoked  She has never used smokeless tobacco  She reports that she does not drink alcohol or use drugs  Current Medications: has a current medication list which includes the following prescription(s): aspirin, bupropion, calcium citrate-vitamin d, chlorhexidine, latanoprost, levothyroxine, lorazepam, multivitamin-iron-minerals-folic acid, quetiapine, quetiapine, risperidone, temazepam, and ventolin hfa  Allergies: is allergic to ciprofloxacin and cymbalta [duloxetine hcl]       Review of Systems:  General- denies fever/chills  HEENT- denies hearing loss or sore throat  Eyes- denies eye pain or visual disturbances, denies red eyes  Respiratory- denies cough or SOB  Cardio- denies chest pain or palpitations  GI- denies abdominal pain  Endocrine- denies urinary frequency  Urinary- denies pain with urination  Musculoskeletal- Negative except noted above  Skin- denies rashes or wounds  Neurological- denies dizziness or headache  Psychiatric- denies anxiety or difficulty concentrating    Physical Exam:   /78 (BP Location: Right arm, Patient Position: Sitting, Cuff Size: Adult)   Pulse 80   Ht 5' 1" (1 549 m)   Wt 75 3 kg (166 lb)   LMP  (LMP Unknown)   BMI 31 37 kg/m²   General/Constitutional: No apparent distress: well-nourished and well developed  Eyes: normal ocular motion  Lymphatic: No appreciable lymphadenopathy  Respiratory: Non-labored breathing  Vascular: No edema, swelling or tenderness, except as noted in detailed exam   Integumentary: No impressive skin lesions present, except as noted in detailed exam   Neuro: No ataxia or tremors noted  Psych: Normal mood and affect, oriented to person, place and time  Appropriate affect  Musculoskeletal: Normal, except as noted in detailed exam and in HPI  Examination    Right    Gait Normal   Musculoskeletal No tenderness to palpation     Skin Normal      Nails Normal    Range of Motion  30 degrees dorsiflexion, 60 degrees plantarflexion    Stability Stable    Muscle Strength 5/5 tibialis anterior  5/5 gastrocnemius-soleus  5/5 posterior tibialis  5/5 peroneal/eversion strength  5/5 EHL  5/5 FHL    Neurologic Normal    Sensation Intact to light touch throughout sural, saphenous, superficial peroneal, deep peroneal and medial/lateral plantar nerve distributions  Crowell-Chaya 5 07 filament (10g) testing deferred  Cardiovascular Brisk capillary refill < 2 seconds,intact DP and PT pulses    Special Tests None      Imaging Studies:   No new imaging      James R Lachman, MD  Foot & Ankle Surgery   Department of [address] [place] [address]      I personally performed the service  Burtis Levan Lachman, MD    Scribe Attestation    I,:   Ramsey Nelson am acting as a scribe while in the presence of the attending physician :        I,:   Arlys Cogan, MD personally performed the services described in this documentation    as scribed in my presence :

## 2019-02-15 NOTE — PATIENT INSTRUCTIONS
How to Strengthen Your Ankle After a Sprain     Following an ankle sprain, strengthening exercises should be performed once you can bear weight comfortably and your range of motion is near full  There are several types of strengthening exercises  The easiest to begin with are isometric exercises that you do by pushing against a fixed object with your ankle  Once this has been mastered, you can progress to isotonic exercises, which involve using your ankle's range of motion against some form of resistance  The photos below show isotonic exercises performed with a resistance band, which you can get from your local therapist or a [employer] store       Place your ankle in the "down and in" position against a fixed object such as a couch    Hold this position for a count of 10     Repeat 10 times                       Place your ankle in the "up and out" position against the same object     Hold this position for a count of 10     Repeat 10 times                         Push your ankle down against a fixed object and hold for a count of 10  Repeat 10 times          Push your ankle up against a fixed object and hold for a count of 10   Repeat 10 times           Using a resistance band around your forefoot, hold the ends of the band with your hand    and gently push your ankle down as far as you can and then back to the starting position     Repeat 10 times                       Tie the resistance bands around a fixed object and wrap the ends around your forefoot     Start with your foot pointing down and pull your ankle up as far as you can     Return to the starting position and cycle your ankle 10 times                       Tie the bands around an object to the outer side of your ankle     Start with the foot relaxed and then move your ankle down and in     Return to the relaxed position and repeat 10 times                                Tie the ends of the bands around an object to the inside of your ankle and hold your foot relaxed     Bring your foot up and out and then back to the resting position     Repeat 10 times                          Once you have regained the motion and strength in your ankle, you are ready for sporting activities such as gentle jogging and biking  After you feel your ankle strength is approximately 80% of your other side, then you can begin cutting or twisting sports  Proprioceptive Exercises for Balance, Coordination and Agility      Stand with your affected leg on a pillow     Hold this position for a count of 10     Repeat 10 times                            Stand on your affected leg with the resistance band applied to your unaffected leg     Bring your unaffected leg forward and then back to the starting position     Repeat 10 times     Start slowly and progress to a faster speed for a more difficult workout                       Again, start slowly and increase your speed at your own pace, moving the unaffected leg   forward and then back to the starting position                                   For a more advanced exercise, swing your unaffected leg behind you and then back                                    How to Strengthen Your Ankle After a Sprain     Following an ankle sprain, strengthening exercises should be performed once you can bear weight comfortably and your range of motion is near full  There are several types of strengthening exercises  The easiest to begin with are isometric exercises that you do by pushing against a fixed object with your ankle  Once this has been mastered, you can progress to isotonic exercises, which involve using your ankle's range of motion against some form of resistance   The photos below show isotonic exercises performed with a resistance band, which you can get from your local therapist or a sporting [employer] store       Place your ankle in the "down and in" position against a fixed object such as a couch    Hold this position for a count of 10     Repeat 10 times                       Place your ankle in the "up and out" position against the same object     Hold this position for a count of 10     Repeat 10 times                         Push your ankle down against a fixed object and hold for a count of 10  Repeat 10 times          Push your ankle up against a fixed object and hold for a count of 10  Repeat 10 times           Using a resistance band around your forefoot, hold the ends of the band with your hand    and gently push your ankle down as far as you can and then back to the starting position     Repeat 10 times                       Tie the resistance bands around a fixed object and wrap the ends around your forefoot     Start with your foot pointing down and pull your ankle up as far as you can     Return to the starting position and cycle your ankle 10 times                       Tie the bands around an object to the outer side of your ankle     Start with the foot relaxed and then move your ankle down and in     Return to the relaxed position and repeat 10 times                                Tie the ends of the bands around an object to the inside of your ankle and hold your foot relaxed     Bring your foot up and out and then back to the resting position     Repeat 10 times                          Once you have regained the motion and strength in your ankle, you are ready for sporting activities such as gentle jogging and biking  After you feel your ankle strength is approximately 80% of your other side, then you can begin cutting or twisting sports      Proprioceptive Exercises for Balance, Coordination and Agility      Stand with your affected leg on a pillow     Hold this position for a count of 10     Repeat 10 times                            Stand on your affected leg with the resistance band applied to your unaffected leg     Bring your unaffected leg forward and then back to the starting position     Repeat 10 times    Dc Bloom slowly and progress to a faster speed for a more difficult workout                       Again, start slowly and increase your speed at your own pace, moving the unaffected leg   forward and then back to the starting position                                   For a more advanced exercise, swing your unaffected leg behind you and then back

## 2019-10-07 ENCOUNTER — TELEPHONE (OUTPATIENT)
Dept: BARIATRICS | Facility: CLINIC | Age: 69
End: 2019-10-07

## 2019-10-07 NOTE — TELEPHONE ENCOUNTER
----- Message from Yue Unger RN sent at 10/7/2019  4:22 AM EDT -----  Regardin year follow up appointment  Please contact patient via telephone AND send letter to schedule 7 year follow up appointment with our office  Thank you

## 2019-11-05 ENCOUNTER — APPOINTMENT (OUTPATIENT)
Dept: RADIOLOGY | Facility: AMBULARY SURGERY CENTER | Age: 69
End: 2019-11-05
Attending: ORTHOPAEDIC SURGERY
Payer: COMMERCIAL

## 2019-11-05 ENCOUNTER — OFFICE VISIT (OUTPATIENT)
Dept: OBGYN CLINIC | Facility: CLINIC | Age: 69
End: 2019-11-05
Payer: COMMERCIAL

## 2019-11-05 VITALS
WEIGHT: 161 LBS | HEIGHT: 61 IN | BODY MASS INDEX: 30.4 KG/M2 | HEART RATE: 94 BPM | DIASTOLIC BLOOD PRESSURE: 84 MMHG | SYSTOLIC BLOOD PRESSURE: 123 MMHG

## 2019-11-05 DIAGNOSIS — M25.572 PAIN, JOINT, ANKLE AND FOOT, LEFT: ICD-10-CM

## 2019-11-05 DIAGNOSIS — M76.72 PERONEAL TENDINITIS OF LEFT LOWER EXTREMITY: Primary | ICD-10-CM

## 2019-11-05 PROCEDURE — 73610 X-RAY EXAM OF ANKLE: CPT

## 2019-11-05 PROCEDURE — 99213 OFFICE O/P EST LOW 20 MIN: CPT | Performed by: ORTHOPAEDIC SURGERY

## 2019-11-05 NOTE — PROGRESS NOTES
Jabier Cogan, M D  Attending, Orthopaedic Surgery  [facility] [address] [facility]      ORTHOPAEDIC FOOT AND ANKLE CLINIC VISIT     Assessment:     Encounter Diagnoses   Name Primary?  Pain, joint, ankle and foot, left     Peroneal tendinitis of left lower extremity Yes            Plan:   · The patient verbalized understanding of exam findings and treatment plan  We engaged in the shared decision-making process and treatment options were discussed at length with the patient  Surgical and conservative management discussed today along with risks and benefits  · She has pain and weakness consistent with peroneal tendinitis  · Begin physical therapy to work on peroneal strengthening, gait and proprioception, and generalized limb conditioning  · Recommend NSAIDs, ice, and elevation as needed for pain and swelling  · If she is no better after at least 7 weeks of PT, we will consider ordering an MRI for further evaluation  Return in about 7 weeks (around 12/24/2019)  History of Present Illness:   Chief Complaint:   Chief Complaint   Patient presents with    Left Ankle - Pain     David Buenrostro is a 81 y o  female who is being seen for left ankle pain  She had multiple injuries most recently an ankle sprain which has led to weakness  Pain is localized at lateral ankle with minimal radiating and described as sharp and severe  Patient denies numbness, tingling or radicular pain  Denies history of neuropathy  Patient does not smoke, does not have diabetes and does not take blood thinners  Patient denies family history of anesthesia complications and has not had any complications with anesthesia       Pain/symptom timing:  Worse during the day when active  Pain/symptom context:  Worse with activites and work  Pain/symptom modifying factors:  Rest makes better, activities make worse  Pain/symptom associated signs/symptoms: none    Prior treatment   · NSAIDsYes · Injections No   · Bracing/Orthotics No    · Physical Therapy No     Orthopedic Surgical History:   See below    Past Medical, Surgical and Social History:  Past Medical History:  has a past medical history of Bipolar 1 disorder ([facility] [address] ), Hypothyroidism, Psychiatric disorder, Sjogren's syndrome ([facility] [address] ), and Systemic lupus erythematosus ([facility] )  Problem List: does not have any pertinent problems on file  Past Surgical History:  has a past surgical history that includes Gastric bypass; Fracture surgery (Right); and pr bronchoscopy,diagnostic (N/A, 12/8/2017)  Family History: family history includes Cancer in her father; Diabetes in her father and mother; Heart disease in her father and mother; Stroke in her father  Social History:  reports that she has never smoked  She has never used smokeless tobacco  She reports that she does not drink alcohol or use drugs  Current Medications: has a current medication list which includes the following prescription(s): aspirin, bupropion, calcium citrate-vitamin d, chlorhexidine, latanoprost, levothyroxine, lorazepam, multivitamin-iron-minerals-folic acid, quetiapine, quetiapine, risperidone, temazepam, and ventolin hfa  Allergies: is allergic to ciprofloxacin and cymbalta [duloxetine hcl]       Review of Systems:  General- denies fever/chills  HEENT- denies hearing loss or sore throat  Eyes- denies eye pain or visual disturbances, denies red eyes  Respiratory- denies cough or SOB  Cardio- denies chest pain or palpitations  GI- denies abdominal pain  Endocrine- denies urinary frequency  Urinary- denies pain with urination  Musculoskeletal- Negative except noted above  Skin- denies rashes or wounds  Neurological- denies dizziness or headache  Psychiatric- denies anxiety or difficulty concentrating    Physical Exam:   /84 (BP Location: Left arm, Patient Position: Sitting, Cuff Size: Adult)   Pulse 94   Ht 5' 1" (1 549 m)   Wt 73 kg (161 lb)   LMP  (LMP Unknown)   BMI 30 42 kg/m²   General/Constitutional: No apparent distress: well-nourished and well developed  Eyes: normal ocular motion  Lymphatic: No appreciable lymphadenopathy  Respiratory: Non-labored breathing  Vascular: No edema, swelling or tenderness, except as noted in detailed exam   Integumentary: No impressive skin lesions present, except as noted in detailed exam   Neuro: No ataxia or tremors noted  Psych: Normal mood and affect, oriented to person, place and time  Appropriate affect  Musculoskeletal: Normal, except as noted in detailed exam and in HPI  Examination    Left    Gait Normal   Musculoskeletal Tender to palpation at ATFL and peroneal tendons    Skin Normal       Nails Normal    Range of Motion  20 degrees dorsiflexion, 40 degrees plantarflexion  Subtalar motion: normal    Stability Stable    Muscle Strength 5/5 tibialis anterior  5/5 gastrocnemius-soleus  5/5 posterior tibialis  3/5 peroneal/eversion strength  5/5 EHL  5/5 FHL    Neurologic Normal    Sensation Intact to light touch throughout sural, saphenous, superficial peroneal, deep peroneal and medial/lateral plantar nerve distributions  [place] 5 07 filament (10g) testing deferred  Cardiovascular Brisk capillary refill < 2 seconds,intact DP and PT pulses    Special Tests Clicking present with anterior drawer but stable compared to contralateral side  Imaging Studies:   3 views of the left ankle were taken, reviewed and interpreted independently that demonstrate no evidence of fracture, dislocation, or any other osseous abnormalities  Reviewed by me personally  Scribe Attestation    I,:   Kenneth HusbandsISACC am acting as a scribe while in the presence of the attending physician :        I,:   Jovita Dowd MD personally performed the services described in this documentation    as scribed in my presence :              Franceen Rear Lachman, MD  Foot & Ankle Surgery   Department of [address] [place] [address] [facility]      I personally performed the service  Liam Bridgeman Lachman, MD

## 2019-11-26 ENCOUNTER — EVALUATION (OUTPATIENT)
Dept: PHYSICAL THERAPY | Facility: CLINIC | Age: 69
End: 2019-11-26
Payer: COMMERCIAL

## 2019-11-26 DIAGNOSIS — M76.72 PERONEAL TENDINITIS OF LEFT LOWER EXTREMITY: ICD-10-CM

## 2019-11-26 PROCEDURE — 97110 THERAPEUTIC EXERCISES: CPT | Performed by: PHYSICAL THERAPIST

## 2019-11-26 PROCEDURE — 97162 PT EVAL MOD COMPLEX 30 MIN: CPT | Performed by: PHYSICAL THERAPIST

## 2019-11-26 NOTE — PROGRESS NOTES
PT Evaluation     Today's date: 2019  Patient name: Zane Chaidez  : 1950  MRN: [id number]  Referring provider: Nathanael Lazaro  Dx:   Encounter Diagnosis     ICD-10-CM    1  Peroneal tendinitis of left lower extremity M76 74 Ambulatory referral to Physical Therapy       Start Time: 1220  Stop Time: 1300  Total time in clinic (min): 40 minutes    Assessment  Assessment details: Zane Chaidez is a 81 y o  female who presents to the clinic with signs and symptoms consistent with a left peroneal tendonitis  The patient presents with the above listed impairments  The patient is tender to palpation over the peroneal insertion and describes pain with resisted eversion  She is eager to decrease her pain and should benefit from skilled physical therapy  Thank you for the referral       Impairments: abnormal muscle firing, abnormal or restricted ROM, activity intolerance, impaired physical strength, lacks appropriate home exercise program and pain with function  Understanding of Dx/Px/POC: good   Prognosis: good    Goals  Impairment Goals:  1  Patient will decrease complaints of pain by 50% at worst in 4 weeks  2  Patient will increase ankle strength by 1/2 grade eversion in 4 weeks    Functional Goals:  1  Patient will be independent with HEP by discharge  2  Patient will increase FOTO to average norms by discharge  3    Patient will be able to stand for over 45 minutes with decreased pain in 4 weeks      Plan  Patient would benefit from: skilled physical therapy  Planned modality interventions: cryotherapy, TENS and thermotherapy: hydrocollator packs  Planned therapy interventions: flexibility, functional ROM exercises, graded activity, graded exercise, home exercise program, therapeutic exercise, stretching, strengthening, therapeutic activities, patient education, neuromuscular re-education, muscle pump exercises, motor coordination training, Betancourt taping, manual therapy, joint mobilization and massage  Frequency: 2x week  Duration in weeks: 4  Treatment plan discussed with: patient        Subjective Evaluation    History of Present Illness  Mechanism of injury: HPI:  Patient notes left lower extremity/ankle pain starting Spring 2019  The paitent noted that the pain would "come and go"  The pain did not subside over time and the patient went see Dr Alicia Rojas as she wanted to figure out "what was going on"  X-rays were (-) for any bony pathology  She was then referred to physical therapy  Pain Location:  L Lateral Ankle    Occupation:  Retired  Prior Functional Limitations:   Independent prior   AGG:  Standing 30-45 minutes, prolonged weight-bearing   Ease:  Elevate  Patient Goals:  "I would love to get rid of this pain all together"     Pain  Current pain ratin  At best pain ratin  At worst pain ratin  Quality: throbbing and dull ache          Objective     Tenderness     Additional Tenderness Details  Tender to palpation left peroneal insertion    Active Range of Motion   Left Ankle/Foot   Dorsiflexion (ke): 9 degrees   Plantar flexion: 40 degrees   Inversion: 20 degrees with pain  Eversion: 10 degrees with pain    Right Ankle/Foot   Normal active range of motion    Strength/Myotome Testing     Left Ankle/Foot   Dorsiflexion: 5  Plantar flexion: 5  Inversion: 5  Eversion: 4    Right Ankle/Foot   Normal strength              Diagnosis:    Precautions:    Manuals        PROM        Mobs                        Exercise Diary        Bike                Ankle 4-ways        Towel Calf Stretch        Standing Gastroc S        Baps Board        Wobble Board        SLS        Clamshells Red 10x       Ankle Circles 10x       Ankle Pumps  10x                                                                                       Modalities             CP PRN

## 2019-12-03 ENCOUNTER — APPOINTMENT (OUTPATIENT)
Dept: PHYSICAL THERAPY | Facility: CLINIC | Age: 69
End: 2019-12-03
Payer: COMMERCIAL

## 2019-12-05 ENCOUNTER — APPOINTMENT (OUTPATIENT)
Dept: PHYSICAL THERAPY | Facility: CLINIC | Age: 69
End: 2019-12-05
Payer: COMMERCIAL

## 2019-12-09 ENCOUNTER — APPOINTMENT (OUTPATIENT)
Dept: URGENT CARE | Facility: MEDICAL CENTER | Age: 69
End: 2019-12-09

## 2019-12-09 ENCOUNTER — TRANSCRIBE ORDERS (OUTPATIENT)
Dept: URGENT CARE | Facility: MEDICAL CENTER | Age: 69
End: 2019-12-09

## 2019-12-09 DIAGNOSIS — Z02.1 PRE-EMPLOYMENT EXAMINATION: Primary | ICD-10-CM

## 2019-12-09 DIAGNOSIS — Z02.1 PRE-EMPLOYMENT EXAMINATION: ICD-10-CM

## 2019-12-09 LAB
HBV SURFACE AB SER-ACNC: <3.1 MIU/ML
RUBV IGG SERPL IA-ACNC: >175 IU/ML

## 2019-12-09 PROCEDURE — 86735 MUMPS ANTIBODY: CPT

## 2019-12-09 PROCEDURE — 86765 RUBEOLA ANTIBODY: CPT

## 2019-12-09 PROCEDURE — 86706 HEP B SURFACE ANTIBODY: CPT

## 2019-12-09 PROCEDURE — 86787 VARICELLA-ZOSTER ANTIBODY: CPT

## 2019-12-09 PROCEDURE — 86762 RUBELLA ANTIBODY: CPT

## 2019-12-09 PROCEDURE — 86480 TB TEST CELL IMMUN MEASURE: CPT

## 2019-12-10 ENCOUNTER — OFFICE VISIT (OUTPATIENT)
Dept: PHYSICAL THERAPY | Facility: CLINIC | Age: 69
End: 2019-12-10
Payer: COMMERCIAL

## 2019-12-10 DIAGNOSIS — M76.72 PERONEAL TENDINITIS OF LEFT LOWER EXTREMITY: Primary | ICD-10-CM

## 2019-12-10 LAB
MEV IGG SER QL: NORMAL
MUV IGG SER QL: NORMAL
VZV IGG SER IA-ACNC: NORMAL

## 2019-12-10 PROCEDURE — 97110 THERAPEUTIC EXERCISES: CPT | Performed by: PHYSICAL THERAPIST

## 2019-12-10 PROCEDURE — 97140 MANUAL THERAPY 1/> REGIONS: CPT | Performed by: PHYSICAL THERAPIST

## 2019-12-10 PROCEDURE — 97112 NEUROMUSCULAR REEDUCATION: CPT | Performed by: PHYSICAL THERAPIST

## 2019-12-10 NOTE — PROGRESS NOTES
Daily Note     Today's date: 12/10/2019  Patient name: Anjali Benoit  : 1950  MRN: [id number]  Referring provider: Roderick Lindsay  Dx:   Encounter Diagnosis     ICD-10-CM    1  Peroneal tendinitis of left lower extremity M76 74        Start Time: 1003  Stop Time: 1045  Total time in clinic (min): 42 minutes    Subjective: "I've been following my exercises and I feel really good"       Objective: See treatment diary below      Assessment: Tolerated treatment well  Patient would benefit from continued PT  Patient has been able to report decreased pain since her initial evaluation  No tenderness to palpation noted  Tolerated increases in PREs well  Continue to focus on ankle stabilization with further visits  Plan: Progress treatment as tolerated         Diagnosis:    Precautions:    Manuals  12/10      PROM  DF, INV, EV      Mobs                        Exercise Diary        Bike                Ankle 4-ways  Green x15 ea      Towel Calf Stretch        Standing Gastroc S        Baps Board        Wobble Board  30" x 3 AP / 30" x 1 Lat      SLS        Clamshells Red 10x       Ankle Circles 10x       Ankle Pumps  10x       Leg Press  45# x10                                                                              Modalities             CP PRN

## 2019-12-11 LAB
GAMMA INTERFERON BACKGROUND BLD IA-ACNC: 0.25 IU/ML
M TB IFN-G BLD-IMP: NEGATIVE
M TB IFN-G CD4+ BCKGRND COR BLD-ACNC: -0.01 IU/ML
M TB IFN-G CD4+ BCKGRND COR BLD-ACNC: 0 IU/ML
MITOGEN IGNF BCKGRD COR BLD-ACNC: 5.84 IU/ML

## 2019-12-12 ENCOUNTER — OFFICE VISIT (OUTPATIENT)
Dept: PHYSICAL THERAPY | Facility: CLINIC | Age: 69
End: 2019-12-12
Payer: COMMERCIAL

## 2019-12-12 DIAGNOSIS — M76.72 PERONEAL TENDINITIS OF LEFT LOWER EXTREMITY: Primary | ICD-10-CM

## 2019-12-12 PROCEDURE — 97112 NEUROMUSCULAR REEDUCATION: CPT | Performed by: PHYSICAL THERAPIST

## 2019-12-12 PROCEDURE — 97140 MANUAL THERAPY 1/> REGIONS: CPT | Performed by: PHYSICAL THERAPIST

## 2019-12-12 PROCEDURE — 97110 THERAPEUTIC EXERCISES: CPT | Performed by: PHYSICAL THERAPIST

## 2019-12-12 NOTE — PROGRESS NOTES
Daily Note     Today's date: 2019  Patient name: Louise Londono  : 1950  MRN: [id number]  Referring provider: Kim Chowdhury  Dx:   Encounter Diagnosis     ICD-10-CM    1  Peroneal tendinitis of left lower extremity M76 74        Start Time: 1215  Stop Time: 1300  Total time in clinic (min): 45 minutes    Subjective: The patient stated that her ankle was feeling "fine "     Objective: See treatment diary below      Assessment: Tolerated treatment well  Patient would benefit from continued PT  Patient had no complaints of pain and performed the exercises with proper form and required minimal verbal cueing  Continue to work on progressing balance exercises   Plan: Progress treatment as tolerated         Diagnosis:    Precautions:    Manuals  12/10 12/12     PROM  DF, INV, EV DF, INV, EV     Mobs                        Exercise Diary        Bike   5'             Ankle 4-ways  Green x15 ea Green x20 ea     Towel Calf Stretch        Standing Gastroc S        Baps Board        Wobble Board  30" x 3 AP / 30" x 1 Lat 30" x 3 AP / 30" x 3 Lat     SLS        Clamshells Red 10x       Ankle Circles 10x       Ankle Pumps  10x       Leg Press  45# x10 45# x10     Airex Marches    2x20     Standing heel raises   20x       Airex standing feet together       1'                                                  Modalities             CP PRN

## 2019-12-16 ENCOUNTER — APPOINTMENT (OUTPATIENT)
Dept: PHYSICAL THERAPY | Facility: CLINIC | Age: 69
End: 2019-12-16
Payer: COMMERCIAL

## 2019-12-19 ENCOUNTER — APPOINTMENT (OUTPATIENT)
Dept: PHYSICAL THERAPY | Facility: CLINIC | Age: 69
End: 2019-12-19
Payer: COMMERCIAL

## 2020-01-06 ENCOUNTER — ANNUAL EXAM (OUTPATIENT)
Dept: OBGYN CLINIC | Facility: CLINIC | Age: 70
End: 2020-01-06
Payer: COMMERCIAL

## 2020-01-06 VITALS — BODY MASS INDEX: 29.06 KG/M2 | DIASTOLIC BLOOD PRESSURE: 84 MMHG | SYSTOLIC BLOOD PRESSURE: 110 MMHG | WEIGHT: 153.8 LBS

## 2020-01-06 DIAGNOSIS — N95.2 VAGINAL ATROPHY: ICD-10-CM

## 2020-01-06 DIAGNOSIS — I49.9 IRREGULAR HEART RHYTHM: ICD-10-CM

## 2020-01-06 DIAGNOSIS — Z13.820 OSTEOPOROSIS SCREENING: ICD-10-CM

## 2020-01-06 DIAGNOSIS — Z12.31 SCREENING MAMMOGRAM, ENCOUNTER FOR: ICD-10-CM

## 2020-01-06 DIAGNOSIS — Z01.419 WOMEN'S ANNUAL ROUTINE GYNECOLOGICAL EXAMINATION: Primary | ICD-10-CM

## 2020-01-06 PROBLEM — Z79.620: Status: ACTIVE | Noted: 2020-01-06

## 2020-01-06 PROBLEM — Z98.84 S/P GASTRIC BYPASS: Status: ACTIVE | Noted: 2020-01-06

## 2020-01-06 PROBLEM — Z79.899: Status: ACTIVE | Noted: 2020-01-06

## 2020-01-06 PROCEDURE — 99397 PER PM REEVAL EST PAT 65+ YR: CPT | Performed by: OBSTETRICS & GYNECOLOGY

## 2020-01-06 RX ORDER — ESTRADIOL 0.1 MG/G
1 CREAM VAGINAL DAILY
Qty: 42.5 G | Refills: 3 | Status: SHIPPED | OUTPATIENT
Start: 2020-01-06 | End: 2020-07-16 | Stop reason: ALTCHOICE

## 2020-01-06 NOTE — PROGRESS NOTES
ASSESSMENT & PLAN: Lillian Pena is a 81 y o   with normal gynecologic exam     1   Routine well woman exam done today  2  Pap and HPV: Current ASCCP Guidelines reviewed - Pap not indicated  3   Mammogram ordered, yearly mammography recommended  4   Colonoscopy recommended per guidelines  5   DEXA scan is due, order placed  6  The following were reviewed in today's visit: breast self exam, mammography screening ordered, menopause, osteoporosis, adequate intake of calcium and vitamin D, exercise and healthy diet  7  Irreg heart beat - every 3rd beat appears to be missed - asymptomatic - pt will make appt with PCP  8  Vaginal atrophy - will restart vaginal estrogen  9  Patient to return to office in 24 months for routine gyn exam    CC:  Annual Gynecologic Examination    HPI: Lillian Pena is a 81 y o  Everlealyce Hernandez who presents for annual gynecologic examination  She has the following concerns:  None     Health Maintenance:    She exercises 0 days per week  She wears her seatbelt routinely  She does not perform regular monthly self breast exams  She feels safe at home  Patients does follow a bariatric diet  Past Medical History:   Diagnosis Date    Bipolar 1 disorder ([facility] )     Hypothyroidism     Psychiatric disorder     Sjogren's syndrome ([facility] )     Systemic lupus erythematosus ([facility] )        Past Surgical History:   Procedure Laterality Date    FRACTURE SURGERY Right     elbow    GASTRIC BYPASS      [place] BRONCHOSCOPY,DIAGNOSTIC N/A 2017    Procedure: BRONCHOSCOPY;  Surgeon: Sveta Grey MD;  [facility]; Service: Pulmonary       Past OB/Gyn History:  OB History        2    Para   2    Term   2            AB        Living   1       SAB        TAB        Ectopic        Multiple        Live Births   2               Menstrual history: Patient is post menopausal    History of abnormal pap smears  No     Patient is currently sexually active  heterosexual       Family History   Problem Relation Age of Onset    Heart disease Mother     Diabetes Mother     Heart disease Father     Stroke Father     Diabetes Father     Cancer Father        Social History:  Social History     Socioeconomic History    Marital status: /Civil Union     Spouse name: Not on file    Number of children: Not on file    Years of education: Not on file    Highest education level: Not on file   Occupational History    Not on file   Social Needs    Financial resource strain: Not on file    Food insecurity:     Worry: Not on file     Inability: Not on file    Transportation needs:     Medical: Not on file     Non-medical: Not on file   Tobacco Use    Smoking status: Never Smoker    Smokeless tobacco: Never Used   Substance and Sexual Activity    Alcohol use: No    Drug use: No    Sexual activity: Yes     Partners: Male     Birth control/protection: None     Comment:    Lifestyle    Physical activity:     Days per week: Not on file     Minutes per session: Not on file    Stress: Not on file   Relationships    Social connections:     Talks on phone: Not on file     Gets together: Not on file     Attends Rastafarian service: Not on file     Active member of club or organization: Not on file     Attends meetings of clubs or organizations: Not on file     Relationship status: Not on file    Intimate partner violence:     Fear of current or ex partner: Not on file     Emotionally abused: Not on file     Physically abused: Not on file     Forced sexual activity: Not on file   Other Topics Concern    Not on file   Social History Narrative    Not on file     Presently lives with her   Patient is     Patient is currently employed - [employer]    Allergies   Allergen Reactions    Ciprofloxacin Hives and Swelling    Cymbalta [Duloxetine Hcl] Other (See Comments)     Hallucinations, fatigue, faints         Current Outpatient Medications:   aspirin 81 MG tablet, Take 81 mg by mouth daily  , Disp: , Rfl:     buPROPion (WELLBUTRIN) 100 mg tablet, Take 100 mg by mouth 3 (three) times a day , Disp: , Rfl:     calcium citrate-vitamin D (CITRACAL+D) 315-200 MG-UNIT per tablet, Take 1 tablet by mouth daily  , Disp: , Rfl:     chlorhexidine (PERIDEX) 0 12 % solution, Use as directed, Disp: , Rfl: 0    latanoprost (XALATAN) 0 005 % ophthalmic solution, INSTILL 1 DROP BY OPHTHALMIC ROUTE EVERY DAY INTO BOTH EYES IN THE EVENING, Disp: , Rfl: 3    levothyroxine 25 mcg tablet, Take 25 mcg by mouth daily  , Disp: , Rfl:     LORazepam (ATIVAN) 1 mg tablet, Take 1 mg by mouth daily at bedtime, Disp: , Rfl: 0    multivitamin-iron-minerals-folic acid (CENTRUM) chewable tablet, Chew 1 tablet daily  , Disp: , Rfl:     QUEtiapine (SEROQUEL) 25 mg tablet, Take 25 mg by mouth as needed  , Disp: , Rfl:     QUEtiapine (SEROquel) 300 mg tablet, Take 300 mg by mouth daily at bedtime  , Disp: , Rfl:     risperiDONE (RisperDAL) 0 5 mg tablet, Take by mouth as needed  , Disp: , Rfl:     temazepam (RESTORIL) 30 mg capsule, Take 2 capsules by mouth daily at bedtime  , Disp: , Rfl:     VENTOLIN  (90 Base) MCG/ACT inhaler, Inhale 1 puff, Disp: , Rfl: 6    estradiol (ESTRACE) 0 1 mg/g vaginal cream, Insert 1 g into the vagina daily, Disp: 42 5 g, Rfl: 3    Review of Systems:  Review of Systems   Constitutional: Negative  HENT: Negative  Respiratory: Negative  Cardiovascular: Negative  Gastrointestinal: Negative  Genitourinary: Negative  Negative for menstrual problem, vaginal discharge and vaginal pain  Neurological: Negative  Psychiatric/Behavioral: Negative  Physical Exam:  /84   Wt 69 8 kg (153 lb 12 8 oz)   LMP  (LMP Unknown)   BMI 29 06 kg/m²    Physical Exam   Constitutional: She is oriented to person, place, and time  She appears well-developed and well-nourished  No distress     Genitourinary: Vagina normal  There is no rash, tenderness, lesion or injury on the right labia  There is no rash, tenderness, lesion or injury on the left labia  Vagina exhibits no rugosity  No tenderness or bleeding in the vagina  No vaginal discharge found  Genitourinary Comments: Surgically absent uterus, cervix, and adnexa   HENT:   Head: Normocephalic and atraumatic  Eyes: Pupils are equal, round, and reactive to light  Conjunctivae and EOM are normal  No scleral icterus  Neck: Normal range of motion  Neck supple  Cardiovascular: Normal rate and normal heart sounds  No murmur (skipped every 3rd beat) heard  Pulmonary/Chest: Effort normal and breath sounds normal  No stridor  No respiratory distress  She has no wheezes  She has no rales  Right breast exhibits no inverted nipple, no mass, no nipple discharge, no skin change and no tenderness  Left breast exhibits no inverted nipple, no mass, no nipple discharge, no skin change and no tenderness  Abdominal: Soft  She exhibits no distension  There is no tenderness  There is no guarding  Neurological: She is alert and oriented to person, place, and time  Skin: Skin is warm and dry  She is not diaphoretic  No erythema  No pallor  Nursing note and vitals reviewed

## 2020-01-23 NOTE — PROGRESS NOTES
PT Discharge    Today's date: 2020  Patient name: Zane Chaidez  : 1950  MRN: [id number]  Referring provider: Nathanael Lazaro  Dx:   Encounter Diagnosis     ICD-10-CM    1  Peroneal tendinitis of left lower extremity M76 72      Patient has not returned for any further physical therapy sessions  Due to this, she will be discharged at this time        Assessment/Plan    Subjective    Objective

## 2020-03-18 ENCOUNTER — HOSPITAL ENCOUNTER (OUTPATIENT)
Dept: RADIOLOGY | Age: 70
Discharge: HOME | End: 2020-03-18
Payer: COMMERCIAL

## 2020-03-18 DIAGNOSIS — Z13.820 OSTEOPOROSIS SCREENING: ICD-10-CM

## 2020-03-18 PROCEDURE — 77080 DXA BONE DENSITY AXIAL: CPT

## 2020-03-23 ENCOUNTER — TELEPHONE (OUTPATIENT)
Dept: OBGYN CLINIC | Facility: HOSPITAL | Age: 70
End: 2020-03-23

## 2020-03-23 NOTE — TELEPHONE ENCOUNTER
Patient works at [employer] is calling because due to her lupus & lung issues, she has decided to take a leave of absence  Patient is not being seen until May  Patient needs information filled out on the form & doesn't want to bother her pcp  I did let her know that without us seeing her that we can probably not fill out paper work but then she stated that she did previously see Dr Maty Diaz  Patient is asking if she can be seen sooner so we can do this paper work for her  Per criteria patient should actually be a follow up since she saw our Rheumatology previously  Please call patient with further information at [phone]

## 2020-03-23 NOTE — TELEPHONE ENCOUNTER
Since she probably has an established relationship with her primary care physician, can she please check with them if she can get these form signed by them  I would need to see her in the office as she has a complex history, and we are trying to avoid office visits at this time  She should keep her appointment with me for May  Thanks

## 2020-04-02 ENCOUNTER — TELEMEDICINE (OUTPATIENT)
Dept: GASTROENTEROLOGY | Facility: AMBULARY SURGERY CENTER | Age: 70
End: 2020-04-02
Payer: COMMERCIAL

## 2020-04-02 DIAGNOSIS — Z98.84 HISTORY OF GASTRIC BYPASS: ICD-10-CM

## 2020-04-02 DIAGNOSIS — K90.9 DIARRHEA DUE TO MALABSORPTION: ICD-10-CM

## 2020-04-02 DIAGNOSIS — R19.7 DIARRHEA DUE TO MALABSORPTION: ICD-10-CM

## 2020-04-02 DIAGNOSIS — Z80.0 FAMILY HISTORY OF COLON CANCER: Primary | ICD-10-CM

## 2020-04-02 PROCEDURE — 99203 OFFICE O/P NEW LOW 30 MIN: CPT | Performed by: INTERNAL MEDICINE

## 2020-04-03 ENCOUNTER — TELEPHONE (OUTPATIENT)
Dept: GASTROENTEROLOGY | Facility: AMBULARY SURGERY CENTER | Age: 70
End: 2020-04-03

## 2020-04-22 ENCOUNTER — TELEPHONE (OUTPATIENT)
Dept: GASTROENTEROLOGY | Facility: AMBULARY SURGERY CENTER | Age: 70
End: 2020-04-22

## 2020-05-07 ENCOUNTER — TELEPHONE (OUTPATIENT)
Dept: OBGYN CLINIC | Facility: CLINIC | Age: 70
End: 2020-05-07

## 2020-06-27 DIAGNOSIS — Z11.59 SPECIAL SCREENING EXAMINATION FOR UNSPECIFIED VIRAL DISEASE: ICD-10-CM

## 2020-06-27 DIAGNOSIS — Z11.59 SPECIAL SCREENING EXAMINATION FOR UNSPECIFIED VIRAL DISEASE: Primary | ICD-10-CM

## 2020-06-27 PROCEDURE — U0003 INFECTIOUS AGENT DETECTION BY NUCLEIC ACID (DNA OR RNA); SEVERE ACUTE RESPIRATORY SYNDROME CORONAVIRUS 2 (SARS-COV-2) (CORONAVIRUS DISEASE [COVID-19]), AMPLIFIED PROBE TECHNIQUE, MAKING USE OF HIGH THROUGHPUT TECHNOLOGIES AS DESCRIBED BY CMS-2020-01-R: HCPCS

## 2020-06-30 LAB — SARS-COV-2 RNA SPEC QL NAA+PROBE: NOT DETECTED

## 2020-07-02 ENCOUNTER — ANESTHESIA EVENT (OUTPATIENT)
Dept: GASTROENTEROLOGY | Facility: AMBULARY SURGERY CENTER | Age: 70
End: 2020-07-02

## 2020-07-09 DIAGNOSIS — Z11.59 SCREENING FOR VIRAL DISEASE: ICD-10-CM

## 2020-07-09 PROCEDURE — U0003 INFECTIOUS AGENT DETECTION BY NUCLEIC ACID (DNA OR RNA); SEVERE ACUTE RESPIRATORY SYNDROME CORONAVIRUS 2 (SARS-COV-2) (CORONAVIRUS DISEASE [COVID-19]), AMPLIFIED PROBE TECHNIQUE, MAKING USE OF HIGH THROUGHPUT TECHNOLOGIES AS DESCRIBED BY CMS-2020-01-R: HCPCS | Performed by: OBSTETRICS & GYNECOLOGY

## 2020-07-10 LAB
INPATIENT: NORMAL
SARS-COV-2 RNA SPEC QL NAA+PROBE: NOT DETECTED

## 2020-07-11 ENCOUNTER — OFFICE VISIT (OUTPATIENT)
Dept: URGENT CARE | Facility: CLINIC | Age: 70
End: 2020-07-11

## 2020-07-11 VITALS
WEIGHT: 157 LBS | HEIGHT: 61 IN | SYSTOLIC BLOOD PRESSURE: 117 MMHG | TEMPERATURE: 97.1 F | DIASTOLIC BLOOD PRESSURE: 60 MMHG | BODY MASS INDEX: 29.64 KG/M2 | RESPIRATION RATE: 18 BRPM | OXYGEN SATURATION: 96 % | HEART RATE: 77 BPM

## 2020-07-11 DIAGNOSIS — J06.9 UPPER RESPIRATORY TRACT INFECTION, UNSPECIFIED TYPE: Primary | ICD-10-CM

## 2020-07-11 RX ORDER — ALBUTEROL SULFATE 90 UG/1
1 AEROSOL, METERED RESPIRATORY (INHALATION) EVERY 4 HOURS PRN
Qty: 18 G | Refills: 0 | Status: SHIPPED | OUTPATIENT
Start: 2020-07-11 | End: 2020-10-29 | Stop reason: SDUPTHER

## 2020-07-11 NOTE — PROGRESS NOTES
[facility]        NAME: Fermin Siegel is a 78 y o  female  : 1950    MRN: [id number]  DATE: 2020  TIME: 3:17 PM    Assessment and Plan   Upper respiratory tract infection, unspecified type [J06 9]  1  Upper respiratory tract infection, unspecified type  albuterol (Ventolin HFA) 90 mcg/act inhaler         Patient Instructions   Albuterol inhaler as directed  Nasal saline as needed   Delsym every 12 hours as needed for cough   Follow up with your PCP for worsening symptoms     Follow up with PCP in 3-5 days  Proceed to  ER if symptoms worsen  Chief Complaint     Chief Complaint   Patient presents with    Cough     cough for 6 weeks, has been tested for covid x3 , neg results, needs ventolin inhaler but cant get to pulmonary for 2 months,          History of Present Illness       Patient is a 44-year-old female presenting with a cough for the past 6-8 weeks  The cough is moist and nonproductive  She reports congestion, ear congestion, rhinorrhea, and postnasal drip  Her symptoms are worse at night  She was tested for COVID 3 times and was negative each time  She denies fever or chills  She took Mucinex with no improvement  She was using the Neti pot with improvement but was unsure if she can continue to use it  She is a nonsmoker  She had an albuterol inhaler but ran out of it  She is requesting a refill  Review of Systems   Review of Systems   Constitutional: Negative for activity change, chills and fever  HENT: Positive for congestion, postnasal drip and rhinorrhea  Negative for ear discharge, ear pain, sinus pressure and sinus pain  Respiratory: Positive for cough  Negative for shortness of breath  Gastrointestinal: Negative for abdominal pain, diarrhea, nausea and vomiting  Musculoskeletal: Negative for back pain  Skin: Negative for rash  Neurological: Negative for headaches           Current Medications       Current Outpatient Medications:     aspirin 81 MG tablet, Take 81 mg by mouth daily  , Disp: , Rfl:     buPROPion (WELLBUTRIN) 100 mg tablet, Take 100 mg by mouth 3 (three) times a day , Disp: , Rfl:     calcium citrate-vitamin D (CITRACAL+D) 315-200 MG-UNIT per tablet, Take 1 tablet by mouth daily  , Disp: , Rfl:     chlorhexidine (PERIDEX) 0 12 % solution, Use as directed, Disp: , Rfl: 0    latanoprost (XALATAN) 0 005 % ophthalmic solution, INSTILL 1 DROP BY OPHTHALMIC ROUTE EVERY DAY INTO BOTH EYES IN THE EVENING, Disp: , Rfl: 3    levothyroxine 25 mcg tablet, Take 25 mcg by mouth daily  , Disp: , Rfl:     LORazepam (ATIVAN) 1 mg tablet, Take 1 mg by mouth daily at bedtime, Disp: , Rfl: 0    multivitamin-iron-minerals-folic acid (CENTRUM) chewable tablet, Chew 1 tablet daily  , Disp: , Rfl:     QUEtiapine (SEROQUEL) 25 mg tablet, Take 25 mg by mouth as needed  , Disp: , Rfl:     QUEtiapine (SEROquel) 300 mg tablet, Take 300 mg by mouth daily at bedtime  , Disp: , Rfl:     temazepam (RESTORIL) 30 mg capsule, Take 2 capsules by mouth daily at bedtime  , Disp: , Rfl:     VENTOLIN  (90 Base) MCG/ACT inhaler, Inhale 1 puff, Disp: , Rfl: 6    albuterol (Ventolin HFA) 90 mcg/act inhaler, Inhale 1 puff every 4 (four) hours as needed for wheezing, Disp: 18 g, Rfl: 0    estradiol (ESTRACE) 0 1 mg/g vaginal cream, Insert 1 g into the vagina daily (Patient not taking: Reported on 7/11/2020), Disp: 42 5 g, Rfl: 3    polyethylene glycol (GOLYTELY) 4000 mL solution, Take 4,000 mL by mouth once for 1 dose, Disp: 4000 mL, Rfl: 0    Current Allergies     Allergies as of 07/11/2020 - Reviewed 07/11/2020   Allergen Reaction Noted    Ciprofloxacin Hives and Swelling 08/23/2016    Cymbalta [duloxetine hcl] Other (See Comments) 08/23/2016            The following portions of the patient's history were reviewed and updated as appropriate: allergies, current medications, past family history, past medical history, past social history, past surgical history and problem list      Past Medical History:   Diagnosis Date    [facility] )     Hypothyroidism     Psychiatric disorder     [facility]     [facility] lupus erythematosus [facility] )        Past Surgical History:   Procedure Laterality Date    FRACTURE SURGERY Right     elbow    GASTRIC BYPASS      [place] BRONCHOSCOPY,DIAGNOSTIC N/A 12/8/2017    Procedure: BRONCHOSCOPY;  Surgeon: Raleigh Boo MD;  [facility]; Service: Pulmonary       Family History   Problem Relation Age of Onset    Heart disease Mother     Diabetes Mother     Heart disease Father     Stroke Father     Diabetes Father     Cancer Father          Medications have been verified  Objective   /60   Pulse 77   Temp (!) 97 1 °F (36 2 °C)   Resp 18   Ht 5' 1" (1 549 m)   Wt 71 2 kg (157 lb)   LMP  (LMP Unknown)   SpO2 96%   BMI 29 66 kg/m²        Physical Exam     Physical Exam   Constitutional: She is oriented to person, place, and time  Vital signs are normal  She appears well-developed and well-nourished  She is active  No distress  HENT:   Head: Normocephalic  Right Ear: Hearing, tympanic membrane, external ear and ear canal normal    Left Ear: Hearing, tympanic membrane, external ear and ear canal normal    Nose: Nose normal    Mouth/Throat: Uvula is midline, oropharynx is clear and moist and mucous membranes are normal    Cardiovascular: Normal rate, regular rhythm, S1 normal, S2 normal and normal heart sounds  Pulmonary/Chest: Effort normal and breath sounds normal  No respiratory distress  She has no decreased breath sounds  She has no wheezes  She has no rhonchi  She has no rales  Neurological: She is alert and oriented to person, place, and time  She is not disoriented  Skin: Skin is warm and dry

## 2020-07-11 NOTE — PATIENT INSTRUCTIONS
Albuterol inhaler as directed  Nasal saline as needed   Delsym every 12 hours as needed for cough   Follow up with your PCP for worsening symptoms     Upper Respiratory Infection   WHAT YOU NEED TO KNOW:   An upper respiratory infection is also called the common cold  It is an infection that can affect your nose, throat, ears, and sinuses  For healthy people, the common cold is usually not serious and does not need special treatment  Cold symptoms are usually worst for the first 3 to 5 days  Most people get better in 7 to 14 days  You may continue to cough for 2 to 3 weeks  Colds are caused by viruses and do not get better with antibiotics  DISCHARGE INSTRUCTIONS:   Return to the emergency department if:   · You have chest pain or trouble breathing  Contact your healthcare provider if:   · You have a fever over 102ºF (39°C)  · Your sore throat gets worse or you see white or yellow spots in your throat  · Your symptoms get worse after 3 to 5 days or your cold is not better in 14 days  · You have a rash anywhere on your skin  · You have large, tender lumps in your neck  · You have thick, green or yellow drainage from your nose  · You cough up thick yellow, green, or bloody mucus  · You have vomiting for more than 24 hours and cannot keep fluids down  · You have a bad earache  · You have questions or concerns about your condition or care  Medicines: You may need any of the following:  · Decongestants  help reduce nasal congestion and help you breathe more easily  If you take decongestant pills, they may make you feel restless or cause problems with your sleep  Do not use decongestant sprays for more than a few days  · Cough suppressants  help reduce coughing  Ask your healthcare provider which type of cough medicine is best for you  · NSAIDs , such as ibuprofen, help decrease swelling, pain, and fever  NSAIDs can cause stomach bleeding or kidney problems in certain people   If you take blood thinner medicine, always ask your healthcare provider if NSAIDs are safe for you  Always read the medicine label and follow directions  · Acetaminophen  decreases pain and fever  It is available without a doctor's order  Ask how much to take and how often to take it  Follow directions  Read the labels of all other medicines you are using to see if they also contain acetaminophen, or ask your doctor or pharmacist  Acetaminophen can cause liver damage if not taken correctly  Do not use more than 4 grams (4,000 milligrams) total of acetaminophen in one day  · Take your medicine as directed  Contact your healthcare provider if you think your medicine is not helping or if you have side effects  Tell him or her if you are allergic to any medicine  Keep a list of the medicines, vitamins, and herbs you take  Include the amounts, and when and why you take them  Bring the list or the pill bottles to follow-up visits  Carry your medicine list with you in case of an emergency  Follow up with your healthcare provider as directed:  Write down your questions so you remember to ask them during your visits  Self-care:   · Rest as much as possible  Slowly start to do more each day  · Drink more liquids as directed  Liquids will help thin and loosen mucus so you can cough it up  Liquids will also help prevent dehydration  Liquids that help prevent dehydration include water, fruit juice, and broth  Do not drink liquids that contain caffeine  Caffeine can increase your risk for dehydration  Ask your healthcare provider how much liquid to drink each day  · Soothe a sore throat  Gargle with warm salt water  This helps your sore throat feel better  Make salt water by dissolving ¼ teaspoon salt in 1 cup warm water  You may also suck on hard candy or throat lozenges  You may use a sore throat spray  · Use a humidifier or vaporizer    Use a cool mist humidifier or a vaporizer to increase air moisture in your home  This may make it easier for you to breathe and help decrease your cough  · Use saline nasal drops as directed  These help relieve congestion  · Apply petroleum-based jelly around the outside of your nostrils  This can decrease irritation from blowing your nose  · Do not smoke  Nicotine and other chemicals in cigarettes and cigars can make your symptoms worse  They can also cause infections such as bronchitis or pneumonia  Ask your healthcare provider for information if you currently smoke and need help to quit  E-cigarettes or smokeless tobacco still contain nicotine  Talk to your healthcare provider before you use these products  Prevent spreading your cold to others:   · Try to stay away from other people during the first 2 to 3 days of your cold when it is more easily spread  · Do not share food or drinks  · Do not share hand towels with household members  · Wash your hands often, especially after you blow your nose  Turn away from other people and cover your mouth and nose with a tissue when you sneeze or cough  © 2017 [facility] Information is for End User's use only and may not be sold, redistributed or otherwise used for commercial purposes  All illustrations and images included in CareNotes® are the copyrighted property of A D [employer] M [employer] Inc  or [employer]  The above information is an  only  It is not intended as medical advice for individual conditions or treatments  Talk to your doctor, nurse or pharmacist before following any medical regimen to see if it is safe and effective for you

## 2020-07-16 ENCOUNTER — ANESTHESIA (OUTPATIENT)
Dept: GASTROENTEROLOGY | Facility: AMBULARY SURGERY CENTER | Age: 70
End: 2020-07-16

## 2020-07-16 ENCOUNTER — HOSPITAL ENCOUNTER (OUTPATIENT)
Dept: GASTROENTEROLOGY | Facility: AMBULARY SURGERY CENTER | Age: 70
Setting detail: OUTPATIENT SURGERY
Discharge: HOME | End: 2020-07-16
Attending: INTERNAL MEDICINE
Payer: COMMERCIAL

## 2020-07-16 VITALS
DIASTOLIC BLOOD PRESSURE: 77 MMHG | HEIGHT: 61 IN | OXYGEN SATURATION: 93 % | HEART RATE: 68 BPM | SYSTOLIC BLOOD PRESSURE: 136 MMHG | BODY MASS INDEX: 29.07 KG/M2 | WEIGHT: 154 LBS | TEMPERATURE: 97 F | RESPIRATION RATE: 20 BRPM

## 2020-07-16 DIAGNOSIS — Z98.84 HISTORY OF GASTRIC BYPASS: ICD-10-CM

## 2020-07-16 DIAGNOSIS — R19.7 DIARRHEA DUE TO MALABSORPTION: ICD-10-CM

## 2020-07-16 DIAGNOSIS — Z80.0 FAMILY HISTORY OF COLON CANCER: ICD-10-CM

## 2020-07-16 DIAGNOSIS — K90.9 DIARRHEA DUE TO MALABSORPTION: ICD-10-CM

## 2020-07-16 PROCEDURE — G0105 COLORECTAL SCRN; HI RISK IND: HCPCS | Performed by: INTERNAL MEDICINE

## 2020-07-16 RX ORDER — SODIUM CHLORIDE, SODIUM LACTATE, POTASSIUM CHLORIDE, CALCIUM CHLORIDE 600; 310; 30; 20 MG/100ML; MG/100ML; MG/100ML; MG/100ML
50 INJECTION, SOLUTION INTRAVENOUS CONTINUOUS
Status: DISCONTINUED | OUTPATIENT
Start: 2020-07-16 | End: 2020-07-20 | Stop reason: HOSPADM

## 2020-07-16 RX ORDER — LIDOCAINE HYDROCHLORIDE 10 MG/ML
INJECTION, SOLUTION EPIDURAL; INFILTRATION; INTRACAUDAL; PERINEURAL AS NEEDED
Status: DISCONTINUED | OUTPATIENT
Start: 2020-07-16 | End: 2020-07-16 | Stop reason: SURG

## 2020-07-16 RX ORDER — PROPOFOL 10 MG/ML
INJECTION, EMULSION INTRAVENOUS AS NEEDED
Status: DISCONTINUED | OUTPATIENT
Start: 2020-07-16 | End: 2020-07-16 | Stop reason: SURG

## 2020-07-16 RX ORDER — PROPOFOL 10 MG/ML
INJECTION, EMULSION INTRAVENOUS CONTINUOUS PRN
Status: DISCONTINUED | OUTPATIENT
Start: 2020-07-16 | End: 2020-07-16 | Stop reason: SURG

## 2020-07-16 RX ADMIN — PROPOFOL 50 MG: 10 INJECTION, EMULSION INTRAVENOUS at 09:05

## 2020-07-16 RX ADMIN — PROPOFOL 50 MG: 10 INJECTION, EMULSION INTRAVENOUS at 09:09

## 2020-07-16 RX ADMIN — PROPOFOL 50 MG: 10 INJECTION, EMULSION INTRAVENOUS at 09:01

## 2020-07-16 RX ADMIN — SODIUM CHLORIDE, SODIUM LACTATE, POTASSIUM CHLORIDE, AND CALCIUM CHLORIDE: .6; .31; .03; .02 INJECTION, SOLUTION INTRAVENOUS at 08:20

## 2020-07-16 RX ADMIN — LIDOCAINE HYDROCHLORIDE 50 MG: 10 INJECTION, SOLUTION EPIDURAL; INFILTRATION; INTRACAUDAL; PERINEURAL at 09:01

## 2020-07-16 RX ADMIN — PROPOFOL 100 MCG/KG/MIN: 10 INJECTION, EMULSION INTRAVENOUS at 09:03

## 2020-07-16 NOTE — ANESTHESIA POSTPROCEDURE EVALUATION
Post-Op Assessment Note    CV Status:  Stable  Pain Score: 0    Pain management: adequate     Mental Status:  Awake   Hydration Status:  Euvolemic   PONV Controlled:  Controlled   Airway Patency:  Patent   Post Op Vitals Reviewed: Yes      Staff: CRNA           BP   164/67   Temp      Pulse  64   Resp   22   SpO2   99

## 2020-07-16 NOTE — ANESTHESIA PREPROCEDURE EVALUATION
Review of Systems/Medical History  Patient summary reviewed  Chart reviewed  No history of anesthetic complications     Cardiovascular  Exercise tolerance (METS): good,     Pulmonary  Asthma ,        GI/Hepatic    GERD , Bariatric surgery,        Negative  ROS        Endo/Other  History of thyroid disease , hyperthyroidism,   Comment: Sjogren's syndrome     GYN  Negative gynecology ROS          Hematology  Negative hematology ROS      Musculoskeletal    Comment: Systemic lupus erythematosus       Neurology  Negative neurology ROS      Psychology   Anxiety, Depression , bipolar disorder,              Physical Exam    Airway    Mallampati score: II  TM Distance: >3 FB  Neck ROM: full     Dental       Cardiovascular      Pulmonary      Other Findings        Anesthesia Plan  ASA Score- 2     Anesthesia Type- IV sedation with anesthesia with ASA Monitors  Additional Monitors:   Airway Plan:         Plan Factors-    Induction- intravenous  Postoperative Plan-     Informed Consent- Anesthetic plan and risks discussed with patient  I personally reviewed this patient with the CRNA  Discussed and agreed on the Anesthesia Plan with the LOYR Taveras

## 2020-07-16 NOTE — H&P
History and Physical -  [facility]  Ronaldo Dukes 78 y o  female MRN: [id number]                  HPI: Ronaldo Dukes is a 78y o  year old female who presents for Colonoscopy for family history of colon cancer  Co-morbidities:  gastric bypass , thyroid disorder, osteoarthritis, Bipolar disorder, SLE, anemia, Sjogren's  Last colonoscopy 5 years ago was negative      REVIEW OF SYSTEMS: Per the HPI, and otherwise unremarkable  Historical Information   Past Medical History:   Diagnosis Date    [facility] disorder ([facility] )     Hypothyroidism     Psychiatric disorder     Sjogren's syndrome ([facility] )     Systemic lupus erythematosus ([facility] )      Past Surgical History:   Procedure Laterality Date    FRACTURE SURGERY Right     elbow    GASTRIC BYPASS      [place] BRONCHOSCOPY,DIAGNOSTIC N/A 12/8/2017    Procedure: BRONCHOSCOPY;  Surgeon: Nat Steiner MD;  [facility];   Service: Pulmonary     Social History   Social History     Substance and Sexual Activity   Alcohol Use No     Social History     Substance and Sexual Activity   Drug Use No     Social History     Tobacco Use   Smoking Status Never Smoker   Smokeless Tobacco Never Used     Family History   Problem Relation Age of Onset    Heart disease Mother     Diabetes Mother     Heart disease Father    Ainsley Pall Stroke Father     Diabetes Father     Cancer Father        Meds/Allergies       (Not in a hospital admission)    Allergies   Allergen Reactions    Ciprofloxacin Hives and Swelling    Cymbalta [Duloxetine Hcl] Other (See Comments)     Hallucinations, fatigue, faints       Objective     /66   Pulse 75   Temp 97 6 °F (36 4 °C) (Temporal)   Resp 16   Ht 5' 1" (1 549 m)   Wt 69 9 kg (154 lb)   LMP  (LMP Unknown)   SpO2 95%   BMI 29 10 kg/m²       PHYSICAL EXAM    Gen: NAD  CV: RRR  CHEST: Clear  ABD: soft, NT/ND  EXT: no edema      ASSESSMENT/PLAN:  This is a 78y o  year old female here for Colonoscopy for family history of colon cancer, and she is stable and optimized for her procedure      Jasmyn Geronimo MD

## 2020-07-27 ENCOUNTER — OFFICE VISIT (OUTPATIENT)
Dept: URGENT CARE | Facility: CLINIC | Age: 70
End: 2020-07-27
Payer: COMMERCIAL

## 2020-07-27 VITALS — HEART RATE: 88 BPM | TEMPERATURE: 98.5 F | OXYGEN SATURATION: 94 %

## 2020-07-27 DIAGNOSIS — Z11.59 SCREENING FOR VIRAL DISEASE: Primary | ICD-10-CM

## 2020-07-27 PROCEDURE — G0382 LEV 3 HOSP TYPE B ED VISIT: HCPCS | Performed by: PHYSICIAN ASSISTANT

## 2020-07-27 PROCEDURE — U0003 INFECTIOUS AGENT DETECTION BY NUCLEIC ACID (DNA OR RNA); SEVERE ACUTE RESPIRATORY SYNDROME CORONAVIRUS 2 (SARS-COV-2) (CORONAVIRUS DISEASE [COVID-19]), AMPLIFIED PROBE TECHNIQUE, MAKING USE OF HIGH THROUGHPUT TECHNOLOGIES AS DESCRIBED BY CMS-2020-01-R: HCPCS | Performed by: PHYSICIAN ASSISTANT

## 2020-07-27 NOTE — PROGRESS NOTES
[address]        NAME: Amaris Kenney is a 78 y o  female  : 1950    MRN: [id number]  DATE: 2020  TIME: 12:17 PM    Assessment and Plan   Screening for viral disease [Z11 59]  1  Screening for viral disease  MISC COVID-19 TEST- Office Collection         Patient Instructions   Car side evaluation and COVID-19 swab performed  Our office will call you with your test results  In the meantime, you should self isolate at home until you receive the results  If positive, you should remain on self-quarantine until 10 days after the time of the initial symptoms onset OR 72 hours after resolution of fever (without antipyretics) and symptoms  Proceed to  ER if symptoms worsen  Chief Complaint     Chief Complaint   Patient presents with    COVID-19     temp 101 2, slight cough         History of Present Illness   The patient is a 72-year-old female who presents with a fever and mild cough that has been present for approximately 2 days  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 [employer]   Unknown exposure to a COVID-19 positive patient  She states that she has a mild cough without shortness of breath  Positive fever 101 F without chills  Negative headache  Negative sore throat  Negative abdominal pain, nausea, vomiting or diarrhea  Negative myalgias  No loss of smell or taste  HPI    Review of Systems   Review of Systems   Constitutional: Positive for fever  Negative for activity change, chills and fatigue  HENT: Negative for congestion, ear discharge, ear pain, facial swelling, mouth sores, postnasal drip, rhinorrhea, sinus pressure, sinus pain, sneezing, sore throat and trouble swallowing  Eyes: Negative for pain, discharge, redness and itching  Respiratory: Positive for cough  Negative for apnea, chest tightness, shortness of breath, wheezing and stridor  Cardiovascular: Negative for chest pain     Gastrointestinal: Negative for abdominal distention, abdominal pain, diarrhea, nausea and vomiting  Genitourinary: Negative for difficulty urinating  Musculoskeletal: Negative for arthralgias and myalgias  Skin: Negative for pallor and rash  Allergic/Immunologic: Negative  Neurological: Negative for dizziness, light-headedness and headaches  Hematological: Negative  Psychiatric/Behavioral: Negative  All other systems reviewed and are negative  Current Medications       Current Outpatient Medications:     buPROPion (WELLBUTRIN) 100 mg tablet, Take 100 mg by mouth 3 (three) times a day , Disp: , Rfl:     QUEtiapine (SEROQUEL) 25 mg tablet, Take 25 mg by mouth as needed  , Disp: , Rfl:     QUEtiapine (SEROquel) 300 mg tablet, Take 300 mg by mouth daily at bedtime  , Disp: , Rfl:     temazepam (RESTORIL) 30 mg capsule, Take 2 capsules by mouth daily at bedtime  , Disp: , Rfl:     albuterol (Ventolin HFA) 90 mcg/act inhaler, Inhale 1 puff every 4 (four) hours as needed for wheezing, Disp: 18 g, Rfl: 0    aspirin 81 MG tablet, Take 81 mg by mouth daily  , Disp: , Rfl:     calcium citrate-vitamin D (CITRACAL+D) 315-200 MG-UNIT per tablet, Take 1 tablet by mouth daily  , Disp: , Rfl:     latanoprost (XALATAN) 0 005 % ophthalmic solution, INSTILL 1 DROP BY OPHTHALMIC ROUTE EVERY DAY INTO BOTH EYES IN THE EVENING, Disp: , Rfl: 3    levothyroxine 25 mcg tablet, Take 25 mcg by mouth daily  , Disp: , Rfl:     LORazepam (ATIVAN) 1 mg tablet, Take 1 mg by mouth daily at bedtime, Disp: , Rfl: 0    multivitamin-iron-minerals-folic acid (CENTRUM) chewable tablet, Chew 1 tablet daily  , Disp: , Rfl:     VENTOLIN  (90 Base) MCG/ACT inhaler, Inhale 1 puff, Disp: , Rfl: 6    Current Allergies     Allergies as of 07/27/2020 - Reviewed 07/27/2020   Allergen Reaction Noted    Ciprofloxacin Hives and Swelling 08/23/2016    Cymbalta [duloxetine hcl] Other (See Comments) 08/23/2016            The following portions of the patient's history were reviewed and updated as appropriate: allergies, current medications, past family history, past medical history, past social history, past surgical history and problem list      Past Medical History:   Diagnosis Date    [facility]     Hypothyroidism     Psychiatric disorder     S[facility]ren's syndrome ([facility] )     System[facility] lupus erythematosus ([facility] )        Past Surgical History:   Procedure Laterality Date    FRACTURE SURGERY Right     elbow    GASTRIC BYPASS      TN BRONCHOSCOPY,DIAGNOSTIC N/A 12/8/2017    Procedure: BRONCHOSCOPY;  Surgeon: Raleigh Boo MD;  [facility]; Service: Pulmonary       Family History   Problem Relation Age of Onset    Heart disease Mother     Diabetes Mother     Heart disease Father     Stroke Father     Diabetes Father     Cancer Father          Medications have been verified  Objective   Pulse 88   Temp 98 5 °F (36 9 °C) (Temporal)   LMP  (LMP Unknown)   SpO2 94%        Physical Exam     Physical Exam   Constitutional: She appears well-developed and well-nourished  No distress  HENT:   Head: Normocephalic and atraumatic  Right Ear: External ear normal    Left Ear: External ear normal    Nose: Nose normal    Mouth/Throat: Oropharynx is clear and moist  No oropharyngeal exudate  Eyes: Pupils are equal, round, and reactive to light  Conjunctivae and EOM are normal  Right eye exhibits no discharge  Left eye exhibits no discharge  No scleral icterus  Cardiovascular: Normal rate, regular rhythm and normal heart sounds  Exam reveals no gallop and no friction rub  No murmur heard  Pulmonary/Chest: Effort normal and breath sounds normal  No stridor  No respiratory distress  She has no wheezes  She has no rales  Abdominal: Soft  Bowel sounds are normal  She exhibits no distension  There is no tenderness  There is no guarding  Neurological: She is alert  Skin: Skin is warm and dry  No rash noted  She is not diaphoretic  No erythema  No pallor  Psychiatric: She has a normal mood and affect  Her behavior is normal    Nursing note and vitals reviewed

## 2020-07-27 NOTE — PATIENT INSTRUCTIONS
Car side evaluation and COVID-19 swab performed  Our office will call you with your test results  In the meantime, you should self isolate at home until you receive the results  If positive, you should remain on self-quarantine until 10 days after the time of the initial symptoms onset OR 72 hours after resolution of fever (without antipyretics) and symptoms  Proceed to  ER if symptoms worsen  [address]    Your healthcare provider and/or public health staff have evaluated you and have determined that you do not need to remain in the hospital at this time  At this time you can be isolated at home where you will be monitored by staff from your local or state health department  You should carefully follow the prevention and isolation steps below until a healthcare provider or local or state health department says that you can return to your normal activities  Stay home except to get medical care    People who are mildly ill with COVID-19 are able to isolate at home during their illness  You should restrict activities outside your home, except for getting medical care  Do not go to work, school, or public areas  Avoid using public transportation, ride-sharing, or taxis  Separate yourself from other people and animals in your home    People: As much as possible, you should stay in a specific room and away from other people in your home  Also, you should use a separate bathroom, if available  Animals: You should restrict contact with pets and other animals while you are sick with COVID-19, just like you would around other people  Although there have not been reports of pets or other animals becoming sick with COVID-19, it is still recommended that people sick with COVID-19 limit contact with animals until more information is known about the virus  When possible, have another member of your household care for your animals while you are sick   If you are sick with COVID-19, avoid contact with your pet, including petting, snuggling, being kissed or licked, and sharing food  If you must care for your pet or be around animals while you are sick, wash your hands before and after you interact with pets and wear a facemask  See COVID-19 and Animals for more information  Call ahead before visiting your doctor    If you have a medical appointment, call the healthcare provider and tell them that you have or may have COVID-19  This will help the healthcare providers office take steps to keep other people from getting infected or exposed  Wear a facemask    You should wear a facemask when you are around other people (e g , sharing a room or vehicle) or pets and before you enter a healthcare providers office  If you are not able to wear a facemask (for example, because it causes trouble breathing), then people who live with you should not stay in the same room with you, or they should wear a facemask if they enter your room  Cover your coughs and sneezes    Cover your mouth and nose with a tissue when you cough or sneeze  Throw used tissues in a lined trash can  Immediately wash your hands with soap and water for at least 20 seconds or, if soap and water are not available, clean your hands with an alcohol-based hand  that contains at least 60% alcohol  Clean your hands often    Wash your hands often with soap and water for at least 20 seconds, especially after blowing your nose, coughing, or sneezing; going to the bathroom; and before eating or preparing food  If soap and water are not readily available, use an alcohol-based hand  with at least 60% alcohol, covering all surfaces of your hands and rubbing them together until they feel dry  Soap and water are the best option if hands are visibly dirty  Avoid touching your eyes, nose, and mouth with unwashed hands      Avoid sharing personal household items    You should not share dishes, drinking glasses, cups, eating utensils, towels, or bedding with other people or pets in your home  After using these items, they should be washed thoroughly with soap and water  Clean all high-touch surfaces everyday    High touch surfaces include counters, tabletops, doorknobs, bathroom fixtures, toilets, phones, keyboards, tablets, and bedside tables  Also, clean any surfaces that may have blood, stool, or body fluids on them  Use a household cleaning spray or wipe, according to the label instructions  Labels contain instructions for safe and effective use of the cleaning product including precautions you should take when applying the product, such as wearing gloves and making sure you have good ventilation during use of the product  Monitor your symptoms    Seek prompt medical attention if your illness is worsening (e g , difficulty breathing)  Before seeking care, call your healthcare provider and tell them that you have, or are being evaluated for, COVID-19  Put on a facemask before you enter the facility  These steps will help the healthcare providers office to keep other people in the office or waiting room from getting infected or exposed  Ask your healthcare provider to call the local or [facility] health department  Persons who are placed under active monitoring or facilitated self-monitoring should follow instructions provided by their local health department or occupational health professionals, as appropriate  If you have a medical emergency and need to call 911, notify the dispatch personnel that you have, or are being evaluated for COVID-19  If possible, put on a facemask before emergency medical services arrive      Discontinuing home isolation    Patients with confirmed COVID-19 should remain under home isolation precautions until the following conditions are met:   - They have had no fever for at least 72 hours (that is three full days of no fever without the use medicine that reduces fevers)  AND  - other symptoms have improved (for example, when their cough or shortness of breath have improved)  AND  - at least 10 days have passed since their symptoms first appeared  Patients with confirmed COVID-19 should also notify close contacts (including their workplace) and ask that they self-quarantine  Currently, close contact is defined as being within 6 feet for 10 minutes or more from the period 48 hours before symptom onset to the time at which the patient went into isolation  Close contacts of patients diagnosed with COVID-19 should be instructed by the patient to self-quarantine for 14 days from the last time of their last contact with the patient       Source: [employer]

## 2020-07-27 NOTE — LETTER
July 27, 2020     Patient: Palmer Quinones   YOB: 1950   Date of Visit: 7/27/2020       To Whom it May Concern:    Palmer Quinones is under my professional care  She was seen in my office on 7/27/2020  She should not return to work until she receives negative test results and is fever and symptom free  If you have any questions or concerns, please don't hesitate to call           Sincerely,          Swetha Estrada PA-C        CC: Palmer Boyerner

## 2020-07-29 LAB — SARS-COV-2 RNA SPEC QL NAA+PROBE: NOT DETECTED

## 2020-08-06 ENCOUNTER — OFFICE VISIT (OUTPATIENT)
Dept: BARIATRICS | Facility: CLINIC | Age: 70
End: 2020-08-06
Payer: COMMERCIAL

## 2020-08-06 VITALS
WEIGHT: 156.5 LBS | BODY MASS INDEX: 29.55 KG/M2 | TEMPERATURE: 96.9 F | HEART RATE: 64 BPM | HEIGHT: 61 IN | DIASTOLIC BLOOD PRESSURE: 64 MMHG | SYSTOLIC BLOOD PRESSURE: 124 MMHG

## 2020-08-06 DIAGNOSIS — R10.13 EPIGASTRIC ABDOMINAL PAIN: Primary | ICD-10-CM

## 2020-08-06 DIAGNOSIS — K91.2 POSTGASTRECTOMY MALABSORPTION: ICD-10-CM

## 2020-08-06 DIAGNOSIS — E66.3 OVERWEIGHT: ICD-10-CM

## 2020-08-06 DIAGNOSIS — Z98.84 BARIATRIC SURGERY STATUS: ICD-10-CM

## 2020-08-06 DIAGNOSIS — K21.9 ACID REFLUX DISEASE: ICD-10-CM

## 2020-08-06 DIAGNOSIS — Z48.815 ENCOUNTER FOR SURGICAL AFTERCARE FOLLOWING SURGERY OF DIGESTIVE SYSTEM: ICD-10-CM

## 2020-08-06 DIAGNOSIS — Z90.3 POSTGASTRECTOMY MALABSORPTION: ICD-10-CM

## 2020-08-06 PROCEDURE — 99204 OFFICE O/P NEW MOD 45 MIN: CPT | Performed by: PHYSICIAN ASSISTANT

## 2020-08-06 PROCEDURE — 1036F TOBACCO NON-USER: CPT | Performed by: PHYSICIAN ASSISTANT

## 2020-08-06 PROCEDURE — 1160F RVW MEDS BY RX/DR IN RCRD: CPT | Performed by: PHYSICIAN ASSISTANT

## 2020-08-06 PROCEDURE — 3008F BODY MASS INDEX DOCD: CPT | Performed by: PHYSICIAN ASSISTANT

## 2020-08-06 RX ORDER — OMEPRAZOLE 20 MG/1
20 CAPSULE, DELAYED RELEASE ORAL DAILY
Qty: 90 CAPSULE | Refills: 1 | Status: SHIPPED | OUTPATIENT
Start: 2020-08-06 | End: 2022-04-20

## 2020-08-06 NOTE — ASSESSMENT & PLAN NOTE
Improved from her visit with us in 2017 when bmi of 33-obesity I to current overweight with bmi of 29 6-she has maintained a 78% EWL which is very good weight loss

## 2020-08-06 NOTE — ASSESSMENT & PLAN NOTE
She is complaining of intermittent heart burn with associated with epigastric abdominal pain  Pain typically is dull and aching and comes on after 20-30 minutes after a meal and sometimes associated with nausea and  diarrhea  No associated fever or chills  Symptoms not worsened with activity    Reports she is up-to-date with her colonoscopies  She had one episode last night after eating fast foods/fried foods after her evening meal last night after eating this she felt like she could almost pass out but did not- (likely at least a component of dumping/and/or biliary dyskinesia-- Also SEE HPI    She also notes she has history of a couple episodes of nausea/abdominal pain with near syncope over the past several months  (one episode she reports was related to diagnosis of pneumonia)      advised on diet to avoid dumping  On exam she has mild tenderness to palpation to epigastric area  Negative mart's sign, no rebound or guarding  Since pain is post prandial- and appears at least in part related to high fat meals- will start work-up-  Will check ultrasound of gallbladder to rule out biliary dyskinesia  She does take daily baby ASA, does not smoke and no second-hand smoke exposure   No alcohol intakes  Concern would also be for pouchitis/ulcer      PLAN: restart daily PPI    Will check ultrasound of gallbladder to rule out biliary dyskinesia  Will see her back in 6 weeks-if no better and if ultrasound is unrevealing will schedule for EGD  Will also refer her to RD for further diet education-she is agreeable to he plan (she appears to have a lactose intolerance by diet recall as well)    I encouraged her to also follow-up with PCP with pre-syncope symptoms

## 2020-08-06 NOTE — ASSESSMENT & PLAN NOTE
She notes she is getting heart burn on average every other day around 1/2 hour after a meal-not worsened with activity  She is on a daily baby ASA-does not take anything for the heart burn-  Heart burn is associated with mid-epigastric abdominal pain

## 2020-08-06 NOTE — ASSESSMENT & PLAN NOTE
-s/p Misael-En-Y Gastric Bypass with Dr Isabel Walsh on 5/1/2012  Overall doing Well with her weight loss  She is here for late routine [id number] to follow-up with us since 2017  She notes a couple times per year she gets near syncope around 1/2 hour or 45 minutes after a meal-associated with nausea and diarrhea-she may have a component of dumping based on diet recall and advised on diet for same but also encouraged her to follow-up with PCP as she also may need further evaluation for this-had this last night after eating fast foods/fried foods    Will also refer her to RD for diet review  But advised she should see PCP as well    Initial:  Current:156 5 lb  EWL:78%  Eliud:  Current BMI is Body mass index is 29 57 kg/m²      Tolerating a regular diet-yes-but avoids dairy and tomato  Eating at least 60 grams of protein per day-yes  Following 30/60 minute rule with liquids-not consistent -but doesn't drink with meals  Drinking at least 64 ounces of fluid per day-yes  Drinking carbonated beverages-no  Sufficient exercise-walking primarily   Using NSAIDs regularly-yes-baby ASA on her own-per patient cardiologist advised she does not need this-and I advised if she does not need this then she should stop it unless advised by them otherwise-as this places her at risk for an ulcer  Using nicotine-no  Using alcohol-no    Colonsocopy/colon cancer screening is up-to-date as reported by patient

## 2020-08-06 NOTE — ASSESSMENT & PLAN NOTE
Malabsorption- patient is at risk for malabsorption of vitamins/minerals secondary to malabsorption from her procedure and restriction of intakes  Reviewed current supplements and advised on same    She is taking one gummie mvi   And taking hair skin and nails  And taking 2-caltrate per day    Advised she should not use gummie vitamin-inadequate in b vitamins

## 2020-08-06 NOTE — PATIENT INSTRUCTIONS
It was good to see you again today  Keep up the good work  Avoid fried foods and high sugar foods -which can cause dumping  High fat foods can also irritate the gallbladder  Get ultrasound of gallbladder done  Check with cardiologist to see if you need aspirin-if they want you on it-stay on it-if not -stop since this can lead to an ulcer    Also follow-up with primary care provider for near passing out episodes  Take omeprazole daily in the morning for breakfast    Follow-up in one year  We kindly ask that you arrive 15 minutes before your scheduled appointment time with your provider to allow you to be roomed, have your vital signs checked and your chart updated by our staff  We thank you for your patience at your visit  Your appointment time is reserved only for you  If you are unable to make your scheduled visit, we would request that you call to cancel and reschedule it at your earliest convenience  Follow diet as discussed  Follow  vitamin and mineral recommendations as reviewed with you  Bariatric vitamins are highly recommended  Vitamins are important for a life-time  Low levels can lead to deficiency which can lead to other medical problems  Exercise as tolerated    If you have gotten a lab slip at this visit, please note that most labs are FASTING-but you need to drink water the night before and the morning before your labs are done  It is HIGHLY RECOMMENDED that you check with your insurance to make sure all the labs ordered are covered by your individual insurance policy  This is especially important if you also get labs done by other providers outside of [facility]  You want to avoid having duplicate labs done  Note you will be given a lab slip AFTER  your annual visit next year to check your vitamin and mineral levels  You will not need to make a second appointment after the labs are received/reviewed  You will receive a letter/and or phone call with your results    Most labs do NOT honor a lab slip dated one year in advance now  IMPORTANT if you have a [facility]" account, you will receive a letter of your vitamin/mineral results through the computer  Please watch for an update to your chart-since recommendations for supplement adjustments will be sent to you this way  Call our office if he have any problems with abdominal pain especially if associated with fever, chills, nausea, vomiting or any other concerns  All  Post-bariatric surgery patients should be aware that very small quantities of any alcohol  can cause impairment and it is very possible not to feel the effect  The effect can be in the system for several hours  It is also a stomach irritant  It is advised to AVOID alcohol, Nonsteroidal antiinflammatory drugs (NSAIDS) and nicotine of all forms   Any of these can cause stomach irritation/pain

## 2020-08-06 NOTE — PROGRESS NOTES
Assessment/Plan:    Overweight  Improved from her visit with us in 2017 when bmi of 33-obesity I to current overweight with bmi of 29 6-she has maintained a 78% EWL which is very good weight loss    Encounter for surgical aftercare following surgery of digestive system  -s/p Misael-En-Y Gastric Bypass with Dr Maryellen Martínez on 5/1/2012  Overall doing Well with her weight loss  She is here for late routine [id number] to follow-up with us since 2017  She notes a couple times per year she gets near syncope around 1/2 hour or 45 minutes after a meal-associated with nausea and diarrhea-she may have a component of dumping based on diet recall and advised on diet for same but also encouraged her to follow-up with PCP as she also may need further evaluation for this-had this last night after eating fast foods/fried foods    Will also refer her to RD for diet review  But advised she should see PCP as well    Initial:  Current:156 5 lb  EWL:78%  Eliud:  Current BMI is Body mass index is 29 57 kg/m²      Tolerating a regular diet-yes-but avoids dairy and tomato  Eating at least 60 grams of protein per day-yes  Following 30/60 minute rule with liquids-not consistent -but doesn't drink with meals  Drinking at least 64 ounces of fluid per day-yes  Drinking carbonated beverages-no  Sufficient exercise-walking primarily   Using NSAIDs regularly-yes-baby ASA on her own-per patient cardiologist advised she does not need this-and I advised if she does not need this then she should stop it unless advised by them otherwise-as this places her at risk for an ulcer  Using nicotine-no  Using alcohol-no    Colonsocopy/colon cancer screening is up-to-date as reported by patient        Postgastrectomy malabsorption  Malabsorption- patient is at risk for malabsorption of vitamins/minerals secondary to malabsorption from her procedure and restriction of intakes  Reviewed current supplements and advised on same    She is taking one gummie mvi And taking hair skin and nails  And taking 2-caltrate per day    Advised she should not use gummie vitamin-inadequate in b vitamins    Acid reflux disease  She notes she is getting heart burn on average every other day around 1/2 hour after a meal-not worsened with activity  She is on a daily baby ASA-does not take anything for the heart burn-  Heart burn is associated with mid-epigastric abdominal pain    Epigastric abdominal pain  She is complaining of intermittent heart burn with associated with epigastric abdominal pain  Pain typically is dull and aching and comes on after 20-30 minutes after a meal and sometimes associated with nausea and  diarrhea  No associated fever or chills  Symptoms not worsened with activity    Reports she is up-to-date with her colonoscopies  She had one episode last night after eating fast foods/fried foods after her evening meal last night after eating this she felt like she could almost pass out but did not- (likely at least a component of dumping/and/or biliary dyskinesia-- Also SEE HPI    She also notes she has history of a couple episodes of nausea/abdominal pain with near syncope over the past several months  (one episode she reports was related to diagnosis of pneumonia)      advised on diet to avoid dumping  On exam she has mild tenderness to palpation to epigastric area  Negative mart's sign, no rebound or guarding  Since pain is post prandial- and appears at least in part related to high fat meals- will start work-up-  Will check ultrasound of gallbladder to rule out biliary dyskinesia  She does take daily baby ASA, does not smoke and no second-hand smoke exposure   No alcohol intakes  Concern would also be for pouchitis/ulcer      PLAN: restart daily PPI    Will check ultrasound of gallbladder to rule out biliary dyskinesia  Will see her back in 6 weeks-if no better and if ultrasound is unrevealing will schedule for EGD  Will also refer her to RD for further diet education-she is agreeable to he plan (she appears to have a lactose intolerance by diet recall as well)    I encouraged her to also follow-up with PCP with pre-syncope symptoms       Diagnoses and all orders for this visit:    Epigastric abdominal pain  -     US gallbladder; Future    Overweight  -     CBC and Platelet; Future  -     Comprehensive metabolic panel; Future  -     Ferritin; Future  -     Copper Level; Future  -     Folate; Future  -     Iron Saturation %; Future  -     PTH, intact; Future  -     Vitamin A; Future  -     Vitamin B1, whole blood; Future  -     Vitamin B12; Future  -     Vitamin D 25 hydroxy; Future  -     Zinc; Future    Encounter for surgical aftercare following surgery of digestive system  -     CBC and Platelet; Future  -     Comprehensive metabolic panel; Future  -     Ferritin; Future  -     Copper Level; Future  -     Folate; Future  -     Iron Saturation %; Future  -     PTH, intact; Future  -     Vitamin A; Future  -     Vitamin B1, whole blood; Future  -     Vitamin B12; Future  -     Vitamin D 25 hydroxy; Future  -     Zinc; Future    Postgastrectomy malabsorption  -     CBC and Platelet; Future  -     Comprehensive metabolic panel; Future  -     Ferritin; Future  -     Copper Level; Future  -     Folate; Future  -     Iron Saturation %; Future  -     PTH, intact; Future  -     Vitamin A; Future  -     Vitamin B1, whole blood; Future  -     Vitamin B12; Future  -     Vitamin D 25 hydroxy; Future  -     Zinc; Future    Acid reflux disease  -     omeprazole (PriLOSEC) 20 mg delayed release capsule; Take 1 capsule (20 mg total) by mouth daily  -     CBC and Platelet; Future  -     Comprehensive metabolic panel; Future  -     Ferritin; Future  -     Copper Level; Future  -     Folate; Future  -     Iron Saturation %; Future  -     PTH, intact; Future  -     Vitamin A; Future  -     Vitamin B1, whole blood;  Future  -     Vitamin B12; Future  -     Vitamin D 25 hydroxy; Future  -     Zinc; Future    Bariatric surgery status  -     US gallbladder; Future          Subjective:      Patient ID: Ishaan Gordillo is a 78 y o  female  78year old female status post laparoscopic hunter-en-y gastric bypass surgery 5/1/2012 by Dr Arely Orta  She has been lost to follow-up in our office since January 2017  She is returning today to  re-establish  routine follow-up-"want to get all my vitamin levels checked"  She is also complaining of several month history of intermittent heart burn associated with epigastric abdominal pain around every other day associated with some nausea but without vomiting  She denies any associated fever,chills  She has not taken anything for her symptoms  She notes she gets a dull ache pain that typically comes on 20-30 minutes after a meal-not with a meal   She is eating a regular consistency diet but avoids milk and some but not all dairy  She avoids tomatoes because she feels this gives her more heart burn  She is moving her bowels well and reports she is up-to-date on her colonoscopies    She has done very well with her weight loss and at 8 years post-op she has maintained a 78% EWL      The following portions of the patient's history were reviewed and updated as appropriate: allergies, current medications, past family history, past medical history, past social history, past surgical history and problem list     Review of Systems   Constitutional: Negative for appetite change, chills, fatigue and fever  HENT: Positive for dental problem  Negative for trouble swallowing  Hss some missing teeth   Eyes:        No night vision problems   Respiratory: Positive for cough  Negative for shortness of breath  Notes she coughs when she first arises in the morning-and resolves after she is up   Cardiovascular: Negative for chest pain and palpitations  Gastrointestinal: Positive for abdominal pain  Endocrine: Positive for cold intolerance   Negative for heat intolerance, polydipsia, polyphagia and polyuria  Genitourinary: Negative for difficulty urinating, dysuria and urgency  Musculoskeletal: Negative for arthralgias and back pain  Skin: Positive for rash  Light rashes   Allergic/Immunologic:        Med allergies noted   Neurological: Negative for weakness, light-headedness and numbness  Hematological: Bruises/bleeds easily  Bruises easily-but not bleeding easily   Psychiatric/Behavioral:        C/o anxiety but not depressed         Objective:      /64 (BP Location: Right arm, Patient Position: Sitting)   Pulse 64   Temp (!) 96 9 °F (36 1 °C)   Ht 5' 1" (1 549 m)   Wt 71 kg (156 lb 8 oz)   LMP  (LMP Unknown)   BMI 29 57 kg/m²          Physical Exam   Constitutional:   overweight   HENT:   Mouth/Throat: Mucous membranes are moist  Abnormal dentition  Neck: Neck supple  No cervical adenopathy appreciated; no tracheal deviation   Cardiovascular: Normal rate, regular rhythm and normal heart sounds  Pulmonary/Chest: Effort normal and breath sounds normal    Abdominal: Soft  Normal appearance and bowel sounds are normal  She exhibits no distension  There is abdominal tenderness  There is no rebound and no guarding  No hernia    + tenderness to palpation to epigastric region; negative mart's sign   Neurological: She is alert  Skin: Skin is warm and dry     Psychiatric:   Mood: ok; affect: acceptable/very talkative

## 2020-08-12 ENCOUNTER — HOSPITAL ENCOUNTER (OUTPATIENT)
Dept: MAMMOGRAPHY | Facility: HOSPITAL | Age: 70
Discharge: HOME | End: 2020-08-12
Attending: OBSTETRICS & GYNECOLOGY
Payer: COMMERCIAL

## 2020-08-12 VITALS — HEIGHT: 61 IN | BODY MASS INDEX: 29.45 KG/M2 | WEIGHT: 156 LBS

## 2020-08-12 DIAGNOSIS — Z12.31 SCREENING MAMMOGRAM, ENCOUNTER FOR: ICD-10-CM

## 2020-08-12 PROCEDURE — 77063 BREAST TOMOSYNTHESIS BI: CPT

## 2020-08-12 PROCEDURE — 77067 SCR MAMMO BI INCL CAD: CPT

## 2020-08-13 ENCOUNTER — TELEPHONE (OUTPATIENT)
Dept: OBGYN CLINIC | Facility: CLINIC | Age: 70
End: 2020-08-13

## 2020-08-13 NOTE — TELEPHONE ENCOUNTER
----- Message from Radha Seay MD sent at 8/13/2020  2:59 PM EDT -----  Please call pt  Mammo showed no issues in the breast, but there was an enlarged lymph node  I would like her to see Dr Karissa Merlos again with hematology

## 2020-08-13 NOTE — TELEPHONE ENCOUNTER
LM on voicemail - mammogram showed no issues in the breast but there was an enlarged lymph node - please call and schedule an appt with Dr Vaishnavi Vasquez in Hematology  Call office with any questions

## 2020-08-14 ENCOUNTER — TELEPHONE (OUTPATIENT)
Dept: HEMATOLOGY ONCOLOGY | Facility: MEDICAL CENTER | Age: 70
End: 2020-08-14

## 2020-08-14 ENCOUNTER — TELEPHONE (OUTPATIENT)
Dept: HEMATOLOGY ONCOLOGY | Facility: CLINIC | Age: 70
End: 2020-08-14

## 2020-08-14 NOTE — TELEPHONE ENCOUNTER
Lm for pt that she did not have an masses in her breats, though she did have an enlarged lymph node  I scheduled her with [facility] [employer] [facility] for 8/20/20 in [place] @ 2:30  I asked her to call to office if she cannot make this appt

## 2020-08-14 NOTE — TELEPHONE ENCOUNTER
Reschedule Appointment     Who is calling in Patient   Doctor Appointment Scheduled with Viktoria Hobbs date and time 8-20-20 @ 2:30pm   New date and time 9-10-20 @ 3:00pm   Location [place]   Patient verbalized understanding    Yes

## 2020-08-14 NOTE — TELEPHONE ENCOUNTER
Patient called in stated that she did a mammogram recently 08/11/2020 and was told Dr Rosalva Melvin that she was to schedule an appointment with Dr Mary Jo sands as there was a mass on her breast  I schedule patient for 10/01/2020 at 9:40pm

## 2020-08-18 ENCOUNTER — TRANSCRIBE ORDERS (OUTPATIENT)
Dept: LAB | Facility: CLINIC | Age: 70
End: 2020-08-18

## 2020-08-18 ENCOUNTER — APPOINTMENT (OUTPATIENT)
Dept: LAB | Facility: CLINIC | Age: 70
End: 2020-08-18
Payer: COMMERCIAL

## 2020-08-18 DIAGNOSIS — K91.2 POSTGASTRECTOMY MALABSORPTION: ICD-10-CM

## 2020-08-18 DIAGNOSIS — E66.3 OVERWEIGHT: ICD-10-CM

## 2020-08-18 DIAGNOSIS — Z48.815 ENCOUNTER FOR SURGICAL AFTERCARE FOLLOWING SURGERY OF DIGESTIVE SYSTEM: ICD-10-CM

## 2020-08-18 DIAGNOSIS — Z90.3 POSTGASTRECTOMY MALABSORPTION: ICD-10-CM

## 2020-08-18 DIAGNOSIS — K21.9 ACID REFLUX DISEASE: ICD-10-CM

## 2020-08-18 LAB
25(OH)D3 SERPL-MCNC: 15.8 NG/ML (ref 30–100)
ALBUMIN SERPL BCP-MCNC: 3 G/DL (ref 3.5–5)
ALP SERPL-CCNC: 100 U/L (ref 46–116)
ALT SERPL W P-5'-P-CCNC: 17 U/L (ref 12–78)
ANION GAP SERPL CALCULATED.3IONS-SCNC: 5 MMOL/L (ref 4–13)
AST SERPL W P-5'-P-CCNC: 32 U/L (ref 5–45)
BILIRUB SERPL-MCNC: 0.57 MG/DL (ref 0.2–1)
BUN SERPL-MCNC: 15 MG/DL (ref 5–25)
CALCIUM SERPL-MCNC: 8.2 MG/DL (ref 8.3–10.1)
CHLORIDE SERPL-SCNC: 102 MMOL/L (ref 100–108)
CO2 SERPL-SCNC: 24 MMOL/L (ref 21–32)
CREAT SERPL-MCNC: 1.03 MG/DL (ref 0.6–1.3)
ERYTHROCYTE [DISTWIDTH] IN BLOOD BY AUTOMATED COUNT: 15.4 % (ref 11.6–15.1)
FERRITIN SERPL-MCNC: 15 NG/ML (ref 8–388)
FOLATE SERPL-MCNC: 15.7 NG/ML (ref 3.1–17.5)
GFR SERPL CREATININE-BSD FRML MDRD: 56 ML/MIN/1.73SQ M
GLUCOSE P FAST SERPL-MCNC: 84 MG/DL (ref 65–99)
HCT VFR BLD AUTO: 33.6 % (ref 34.8–46.1)
HGB BLD-MCNC: 10.8 G/DL (ref 11.5–15.4)
IRON SATN MFR SERPL: 25 %
IRON SERPL-MCNC: 54 UG/DL (ref 50–170)
MCH RBC QN AUTO: 29 PG (ref 26.8–34.3)
MCHC RBC AUTO-ENTMCNC: 32.1 G/DL (ref 31.4–37.4)
MCV RBC AUTO: 90 FL (ref 82–98)
PLATELET # BLD AUTO: 320 THOUSANDS/UL (ref 149–390)
PMV BLD AUTO: 9.8 FL (ref 8.9–12.7)
POTASSIUM SERPL-SCNC: 4.1 MMOL/L (ref 3.5–5.3)
PROT SERPL-MCNC: 11 G/DL (ref 6.4–8.2)
PTH-INTACT SERPL-MCNC: 25.9 PG/ML (ref 18.4–80.1)
RBC # BLD AUTO: 3.72 MILLION/UL (ref 3.81–5.12)
SODIUM SERPL-SCNC: 131 MMOL/L (ref 136–145)
TIBC SERPL-MCNC: 216 UG/DL (ref 250–450)
VIT B12 SERPL-MCNC: 136 PG/ML (ref 100–900)
WBC # BLD AUTO: 7.55 THOUSAND/UL (ref 4.31–10.16)

## 2020-08-18 PROCEDURE — 82306 VITAMIN D 25 HYDROXY: CPT

## 2020-08-18 PROCEDURE — 82525 ASSAY OF COPPER: CPT

## 2020-08-18 PROCEDURE — 85027 COMPLETE CBC AUTOMATED: CPT

## 2020-08-18 PROCEDURE — 82728 ASSAY OF FERRITIN: CPT

## 2020-08-18 PROCEDURE — 83540 ASSAY OF IRON: CPT

## 2020-08-18 PROCEDURE — 82746 ASSAY OF FOLIC ACID SERUM: CPT

## 2020-08-18 PROCEDURE — 80053 COMPREHEN METABOLIC PANEL: CPT

## 2020-08-18 PROCEDURE — 84590 ASSAY OF VITAMIN A: CPT

## 2020-08-18 PROCEDURE — 83550 IRON BINDING TEST: CPT

## 2020-08-18 PROCEDURE — 83970 ASSAY OF PARATHORMONE: CPT

## 2020-08-18 PROCEDURE — 84425 ASSAY OF VITAMIN B-1: CPT

## 2020-08-18 PROCEDURE — 84630 ASSAY OF ZINC: CPT

## 2020-08-18 PROCEDURE — 36415 COLL VENOUS BLD VENIPUNCTURE: CPT

## 2020-08-18 PROCEDURE — 82607 VITAMIN B-12: CPT

## 2020-08-20 LAB
COPPER SERPL-MCNC: 130 UG/DL (ref 72–166)
ZINC SERPL-MCNC: 89 UG/DL (ref 56–134)

## 2020-08-22 ENCOUNTER — OFFICE VISIT (OUTPATIENT)
Dept: URGENT CARE | Facility: CLINIC | Age: 70
End: 2020-08-22
Payer: COMMERCIAL

## 2020-08-22 VITALS
RESPIRATION RATE: 16 BRPM | HEIGHT: 61 IN | WEIGHT: 156 LBS | SYSTOLIC BLOOD PRESSURE: 125 MMHG | DIASTOLIC BLOOD PRESSURE: 85 MMHG | HEART RATE: 80 BPM | OXYGEN SATURATION: 96 % | BODY MASS INDEX: 29.45 KG/M2 | TEMPERATURE: 98.1 F

## 2020-08-22 DIAGNOSIS — B02.9 HERPES ZOSTER WITHOUT COMPLICATION: Primary | ICD-10-CM

## 2020-08-22 LAB — VIT B1 BLD-SCNC: 124.5 NMOL/L (ref 66.5–200)

## 2020-08-22 PROCEDURE — G0382 LEV 3 HOSP TYPE B ED VISIT: HCPCS | Performed by: PHYSICIAN ASSISTANT

## 2020-08-22 RX ORDER — ACYCLOVIR 800 MG/1
800 TABLET ORAL
Qty: 35 TABLET | Refills: 0 | Status: SHIPPED | OUTPATIENT
Start: 2020-08-22 | End: 2020-09-22 | Stop reason: ALTCHOICE

## 2020-08-22 NOTE — PROGRESS NOTES
[facility]        NAME: Zane Prado is a 78 y o  female  : 1950    MRN: [id number]  DATE: 2020  TIME: 1:00 PM    Assessment and Plan   Herpes zoster without complication [O46 2]  1  Herpes zoster without complication  acyclovir (ZOVIRAX) 800 mg tablet         Patient Instructions   Prescription sent to the pharmacy for acyclovir-use as directed  May alternate lidocaine cream and triple antibiotic ointment  Closely monitor for signs of infection  Follow up with PCP in 3-5 days  Proceed to  ER if symptoms worsen  Chief Complaint     Chief Complaint   Patient presents with    Rash     Pt presents with painful and blistery rash on his left side since last week         History of Present Illness   The patient is a 19-year-old female who presents with a rash on the left side of her torso that has been present for a little under week  She states that initially the area started as itching and burning  Shortly afterwards vesicles appeared that now opened  She also feels surrounding pain  Negative fever or chills  HPI    Review of Systems   Review of Systems   Constitutional: Negative for chills and fever  Skin: Positive for rash  All other systems reviewed and are negative  Current Medications       Current Outpatient Medications:     albuterol (Ventolin HFA) 90 mcg/act inhaler, Inhale 1 puff every 4 (four) hours as needed for wheezing, Disp: 18 g, Rfl: 0    buPROPion (WELLBUTRIN) 100 mg tablet, Take 100 mg by mouth 3 (three) times a day , Disp: , Rfl:     calcium citrate-vitamin D (CITRACAL+D) 315-200 MG-UNIT per tablet, Take 1 tablet by mouth daily  , Disp: , Rfl:     latanoprost (XALATAN) 0 005 % ophthalmic solution, INSTILL 1 DROP BY OPHTHALMIC ROUTE EVERY DAY INTO BOTH EYES IN THE EVENING, Disp: , Rfl: 3    levothyroxine 25 mcg tablet, Take 25 mcg by mouth daily  , Disp: , Rfl:     LORazepam (ATIVAN) 1 mg tablet, Take 1 mg by mouth as needed , Disp: , Rfl: 0    omeprazole (PriLOSEC) 20 mg delayed release capsule, Take 1 capsule (20 mg total) by mouth daily, Disp: 90 capsule, Rfl: 1    QUEtiapine (SEROQUEL) 25 mg tablet, Take 25 mg by mouth as needed  , Disp: , Rfl:     QUEtiapine (SEROquel) 300 mg tablet, Take 300 mg by mouth daily at bedtime  , Disp: , Rfl:     temazepam (RESTORIL) 30 mg capsule, Take 2 capsules by mouth daily at bedtime  , Disp: , Rfl:     VENTOLIN  (90 Base) MCG/ACT inhaler, Inhale 1 puff, Disp: , Rfl: 6    acyclovir (ZOVIRAX) 800 mg tablet, Take 1 tablet (800 mg total) by mouth 5 (five) times a day for 7 days, Disp: 35 tablet, Rfl: 0    aspirin 81 MG tablet, Take 81 mg by mouth daily  , Disp: , Rfl:     multivitamin-iron-minerals-folic acid (CENTRUM) chewable tablet, Chew 1 tablet daily  , Disp: , Rfl:     Current Allergies     Allergies as of 08/22/2020 - Reviewed 08/22/2020   Allergen Reaction Noted    Ciprofloxacin Hives and Swelling 08/23/2016    Cymbalta [duloxetine hcl] Other (See Comments) 08/23/2016    Percocet [oxycodone-acetaminophen]  08/22/2020            The following portions of the patient's history were reviewed and updated as appropriate: allergies, current medications, past family history, past medical history, past social history, past surgical history and problem list      Past Medical History:   Diagnosis Date    Bipolar 1 disorder ([facility] [address] )     Hypothyroidism     Psychiatric disorder     Sjogren's syndrome ([facility] [address] [facility]     Systemic lupus erythematosus ([facility] [address] )        Past Surgical History:   Procedure Laterality Date    BREAST BIOPSY Right     FRACTURE SURGERY Right     elbow    GASTRIC BYPASS      HYSTERECTOMY Bilateral 1975    RI BRONCHOSCOPY,DIAGNOSTIC N/A 12/8/2017    Procedure: BRONCHOSCOPY;  Surgeon: Theron Franco MD;  [facility];   Service: Pulmonary       Family History   Problem Relation Age of Onset    Heart disease Mother     Diabetes Mother     Kidney cancer Mother     Heart disease Father     Stroke Father     Diabetes Father     Cancer Father     Leukemia Half-Sister 48         Medications have been verified  Objective   /85   Pulse 80   Temp 98 1 °F (36 7 °C)   Resp 16   Ht 5' 1" (1 549 m)   Wt 70 8 kg (156 lb)   LMP  (LMP Unknown)   SpO2 96%   BMI 29 48 kg/m²        Physical Exam     Physical Exam  Vitals signs and nursing note reviewed  Constitutional:       General: She is not in acute distress  Appearance: Normal appearance  She is not ill-appearing, toxic-appearing or diaphoretic  HENT:      Head: Normocephalic and atraumatic  Skin:     General: Skin is warm and dry  Capillary Refill: Capillary refill takes less than 2 seconds  Coloration: Skin is not jaundiced or pale  Findings: Erythema and rash present  No bruising or lesion  Neurological:      General: No focal deficit present  Mental Status: She is alert and oriented to person, place, and time     Psychiatric:         Mood and Affect: Mood normal          Behavior: Behavior normal

## 2020-08-22 NOTE — PATIENT INSTRUCTIONS
Prescription sent to the pharmacy for acyclovir-use as directed  May alternate lidocaine cream and triple antibiotic ointment  Closely monitor for signs of infection  Follow up with PCP in 3-5 days  Proceed to  ER if symptoms worsen  Shingles   AMBULATORY CARE:   Shingles  is a painful rash  Shingles is caused by the same virus that causes chickenpox (varicella-zoster virus)  After you get chickenpox, the virus stays in your body for several years without causing any symptoms  Shingles occurs when the virus becomes active again  Once active, the virus will travel along a nerve to your skin and cause a rash  Common signs and symptoms include the following:  Shingles often starts with pain in the back, chest, neck, or face  A rash then develops in the same area  The rash is usually found on only one side of the body  The rash may feel itchy or painful  It starts as red dots that become blisters filled with fluid  The blisters usually grow bigger, become filled with pus, and then crust over after a few days  You may also have any of the following:  · Fatigue and muscle weakness    · Pain when your skin is lightly touched    · Headache    · Fever    · Eye pain when exposed to light  Seek care immediately if:   · You have painful, red, warm skin around the blisters, or the blisters drain pus  · Your neck is stiff or you have trouble moving it  · You have trouble moving your arms, legs, or face  · You have a seizure  · You have weakness in an arm or leg  · You become confused, or have difficulty speaking  · You have dizziness, a severe headache, or hearing or vision loss  Contact your healthcare provider if:   · You feel weak or have a headache  · You have a cough, chills, or a fever  · You have abdominal pain or nausea, or you are vomiting  · Your rash becomes more itchy or painful  · Your rash spreads to other parts of your body      · Your pain worsens and does not go away even after you take medicine  · You have questions or concerns about your condition or care  Medicines:   · Antiviral medicine  helps decrease symptoms and healing time  They may also decrease your risk of developing nerve pain  You will need to start taking them within 3 days of the start of symptoms to prevent nerve pain  · Pain medicine  may be prescribed or suggested by your healthcare provider  You may need NSAIDs, acetaminophen, or opioid medicine depending on how much pain you are in  Do not wait until the pain is severe before you take more pain medicine  · Topical anesthetics  are used to numb the skin and decrease pain  They can be a cream, gel, spray, or patch  · Anticonvulsants  decrease nerve pain and may help you sleep at night  · Antidepressants  may be used to decrease nerve pain  Follow up with your healthcare provider as directed:  Write down your questions so you remember to ask them during your visits  Self-care:  Keep your rash clean and dry  Cover your rash with a bandage or clothing  Do not use bandages that stick to your skin  The sticky part may irritate your skin and make your rash last longer  Prevent the spread of shingles: The virus can be passed to a person who has never had chickenpox  This person may get chickenpox, but not shingles  You may pass the virus to others as long as you have a rash  The virus is spread by direct contact with the fluid from the blisters  Usually, you cannot spread the virus once the blisters dry up  Prevent shingles or another shingles outbreak:  A vaccine may be given to help prevent shingles  Ask for more information about this vaccine  © 2017 [employer] Information is for End User's use only and may not be sold, redistributed or otherwise used for commercial purposes  All illustrations and images included in CareNotes® are the copyrighted property of [employer]  or [employer]    The above information is an  only  It is not intended as medical advice for individual conditions or treatments  Talk to your doctor, nurse or pharmacist before following any medical regimen to see if it is safe and effective for you

## 2020-08-23 LAB — VIT A SERPL-MCNC: 27.5 UG/DL (ref 22–69.5)

## 2020-08-24 ENCOUNTER — HOSPITAL ENCOUNTER (OUTPATIENT)
Dept: ULTRASOUND IMAGING | Facility: HOSPITAL | Age: 70
Discharge: HOME | End: 2020-08-24
Payer: COMMERCIAL

## 2020-08-24 DIAGNOSIS — Z98.84 BARIATRIC SURGERY STATUS: ICD-10-CM

## 2020-08-24 DIAGNOSIS — R10.13 EPIGASTRIC ABDOMINAL PAIN: ICD-10-CM

## 2020-08-24 PROCEDURE — 76705 ECHO EXAM OF ABDOMEN: CPT

## 2020-08-26 DIAGNOSIS — E55.9 VITAMIN D DEFICIENCY: Primary | ICD-10-CM

## 2020-08-26 RX ORDER — ERGOCALCIFEROL 1.25 MG/1
50000 CAPSULE ORAL 2 TIMES WEEKLY
Qty: 8 CAPSULE | Refills: 3 | Status: SHIPPED | OUTPATIENT
Start: 2020-08-27 | End: 2021-06-07

## 2020-09-09 ENCOUNTER — TELEPHONE (OUTPATIENT)
Dept: HEMATOLOGY ONCOLOGY | Facility: CLINIC | Age: 70
End: 2020-09-09

## 2020-09-09 NOTE — TELEPHONE ENCOUNTER
Patient returned a call pertaining to COVID-19 Screening questions, Screening questions have been asked,  patient has answered no for all questions, please see questions below:       1  Are you currently experiencing any symptoms of fever, cough, shortness of breath, chills, repeated shaking with chills, muscle pain, headache, sore throat, or new loss of taste/smell? ? Yes - Please wear a mask and wait in your car until the visit is complete  Contact your Primary Care Provider for further instructions   ? No - continue to the next question     2  In the last 14 days, have you traveled to any of the currently restricted states that would need a self-quarantine (please ask  for a list of the current states)? ? Yes - Please wear a mask and wait in your car until the visit is complete  ? No - continue    3  Have you been tested for COVID-19?  ? Yes (positive) - Please wear a mask and wait in your car until the visit is complete    ? Yes (negative) - Please wear a mask  ? Yes (awaiting results - no symptoms) - Please wear a mask  ? Yes (awaiting results - has symptoms) - Please wear a mask and wait in your car until the visit is complete    ?  No - please wear a mask

## 2020-09-10 ENCOUNTER — OFFICE VISIT (OUTPATIENT)
Dept: HEMATOLOGY ONCOLOGY | Facility: CLINIC | Age: 70
End: 2020-09-10
Payer: COMMERCIAL

## 2020-09-10 VITALS
OXYGEN SATURATION: 97 % | RESPIRATION RATE: 18 BRPM | HEIGHT: 61 IN | BODY MASS INDEX: 29.38 KG/M2 | DIASTOLIC BLOOD PRESSURE: 80 MMHG | HEART RATE: 80 BPM | SYSTOLIC BLOOD PRESSURE: 128 MMHG | WEIGHT: 155.6 LBS | TEMPERATURE: 97.6 F

## 2020-09-10 DIAGNOSIS — R59.1 LYMPHADENOPATHY: Primary | ICD-10-CM

## 2020-09-10 PROCEDURE — 99214 OFFICE O/P EST MOD 30 MIN: CPT | Performed by: NURSE PRACTITIONER

## 2020-09-10 NOTE — PROGRESS NOTES
[address] [facility] [place]  [address] [facility] [address] [phone]  Hematology Ambulatory Follow-Up  Aziza Perales, 1950, [id number]  9/10/2020    Assessment/Plan:    1  Lymphadenopathy   Patient is a 71-year-old female with a history of lymphadenopathy dating back to at least 2018  Patient had previously been seen by our office for pulmonary nodules, pulmonary infiltrates, and lymphadenopathy, however a workup did not reveal any significant findings  Patient had a recent mammogram on 08/12/2020 no intramammary evidence of breast cancer was noted however enlarged axillary lymph nodes were present therefore patient was referred back to our office  Mammogram report does not state which side axillary nodes were noted  I reviewed the images however I was not able to tell which side axillary node was noted  I will request a review of the mammogram to determine laterality  Patient states she was not aware of any enlarged lymph nodes specifically on her left axilla, however a few days after patient's mammogram she did have a breakout of shingles by her left breast slightly distal from her left axilla  This shingles are healing and scabbed over at this time  On palpation I did note approximately a 2 cm mobile nodule in her left axilla  I did not appreciate any cervical , right axillary, abdominal or inguinal lymphadenopathy  Patient denies any fevers chills weight loss fatigue or other signs of infection  Overall she feels well  Patient does have a history of lupus which may explain her lymphadenopathy  We reviewed her recent labs from 08/18/2020 including a CBC  Her white blood cell count is within normal limits but she has a hemoglobin of 10 8 however her hemoglobin has been in the range of 9 6-10 5 since 2017  Patient's iron saturation is 25% and ferritin level is 15  Patient does have a history of bariatric surgery    Patient also has an elevated protein level of 11 which has been consistent since 2017  Other substrates are within normal limits  I will request a CBC with differential and request a biopsy of the left axillary node  We will see patient in approximately 3 weeks  Patient verbalized understanding and is in agreement with the plan  - Ambulatory referral to Interventional Radiology; Future  - CBC and differential; Future  - Comprehensive metabolic panel; Future      The patient is scheduled for follow-up in approximately 3 weeks with Dr John Latham or me  Patient and her  voiced agreement and understanding to the above  Patient and her  knows to call the Hematology/Oncology office with any questions and concerns regarding the above  Barrier(s) to care: None  The patient is able to self care   ------------------------------------------------------------------------------------------------------    Review of Systems   Constitutional: Negative for activity change, appetite change, fatigue, fever and unexpected weight change  Respiratory: Negative for cough and shortness of breath  Cardiovascular: Negative for chest pain and leg swelling  Gastrointestinal: Negative for abdominal pain, constipation, diarrhea and nausea  Endocrine: Negative for cold intolerance and heat intolerance  Musculoskeletal: Negative for arthralgias and myalgias  Skin: Negative  Neurological: Negative for dizziness, weakness and headaches  Hematological: Negative for adenopathy  Does not bruise/bleed easily         Patient Active Problem List   Diagnosis    [facility] disorder, single manic episode ([facility]    Thyroid disorder    Systemic lupus erythematosus ([facility] )    Pneumonia of right middle lobe due to infectious organism    Influenza A    Acid reflux disease    Anemia    Breath, shortness    Depression    Dyspnea on exertion    Elevated sedimentation rate    Generalized osteoarthritis    Heart burn    Obesity    Periorbital edema    Polyarthralgia    Postgastrectomy malabsorption    [facility]ogren's syndrome ([facility]    Vaginal atrophy    Vitamin D deficiency    Closed avulsion fracture of lateral malleolus of left fibula    Closed fracture of base of fifth metatarsal bone of left foot    Lymphadenopathy    Iron deficiency anemia following bariatric surgery    On belimumab therapy    S/P gastric bypass    Overweight    Encounter for surgical aftercare following surgery of digestive system    Epigastric abdominal pain    Bariatric surgery status       Past Medical History:   Diagnosis Date    Bipolar 1 disorder ([facility] )     Hypothyroidism     Psychiatric disorder     Sjogren's syndrome ([facility] )     Systemic lupus erythematosus ([facility] )        Past Surgical History:   Procedure Laterality Date    BREAST BIOPSY Right     FRACTURE SURGERY Right     elbow    GASTRIC BYPASS      HYSTERECTOMY Bilateral 1975    OH BRONCHOSCOPY,DIAGNOSTIC N/A 12/8/2017    Procedure: BRONCHOSCOPY;  Surgeon: Tammy Vasquez MD;  [facility];   Service: Pulmonary       Family History   Problem Relation Age of Onset    Heart disease Mother     Diabetes Mother     Kidney cancer Mother     Heart disease Father     Stroke Father     Diabetes Father     Cancer Father     Leukemia Half-Sister 48       Social History     Socioeconomic History    Marital status: /Civil Union     Spouse name: None    Number of children: None    Years of education: None    Highest education level: None   Occupational History    None   Social Needs    Financial resource strain: None    Food insecurity     Worry: None     Inability: None    Transportation needs     Medical: None     Non-medical: None   Tobacco Use    Smoking status: Never Smoker    Smokeless tobacco: Never Used   Substance and Sexual Activity    Alcohol use: No    Drug use: No    Sexual activity: Yes     Partners: Male     Birth control/protection: None     Comment:    Lifestyle    Physical activity     Days per week: None     Minutes per session: None    Stress: None   Relationships    Social connections     Talks on phone: None     Gets together: None     Attends Church service: None     Active member of club or organization: None     Attends meetings of clubs or organizations: None     Relationship status: None    Intimate partner violence     Fear of current or ex partner: None     Emotionally abused: None     Physically abused: None     Forced sexual activity: None   Other Topics Concern    None   Social History Narrative    None         Current Outpatient Medications:     albuterol (Ventolin HFA) 90 mcg/act inhaler, Inhale 1 puff every 4 (four) hours as needed for wheezing, Disp: 18 g, Rfl: 0    buPROPion (WELLBUTRIN) 100 mg tablet, Take 100 mg by mouth 3 (three) times a day , Disp: , Rfl:     calcium citrate-vitamin D (CITRACAL+D) 315-200 MG-UNIT per tablet, Take 1 tablet by mouth daily  , Disp: , Rfl:     ergocalciferol (VITAMIN D2) 50,000 units, Take 1 capsule (50,000 Units total) by mouth 2 (two) times a week for 32 doses, Disp: 8 capsule, Rfl: 3    latanoprost (XALATAN) 0 005 % ophthalmic solution, INSTILL 1 DROP BY OPHTHALMIC ROUTE EVERY DAY INTO BOTH EYES IN THE EVENING, Disp: , Rfl: 3    LORazepam (ATIVAN) 1 mg tablet, Take 1 mg by mouth as needed , Disp: , Rfl: 0    omeprazole (PriLOSEC) 20 mg delayed release capsule, Take 1 capsule (20 mg total) by mouth daily, Disp: 90 capsule, Rfl: 1    QUEtiapine (SEROQUEL) 25 mg tablet, Take 25 mg by mouth as needed  , Disp: , Rfl:     QUEtiapine (SEROquel) 300 mg tablet, Take 300 mg by mouth daily at bedtime  , Disp: , Rfl:     temazepam (RESTORIL) 30 mg capsule, Take 2 capsules by mouth daily at bedtime  , Disp: , Rfl:     VENTOLIN  (90 Base) MCG/ACT inhaler, Inhale 1 puff, Disp: , Rfl: 6    acyclovir (ZOVIRAX) 800 mg tablet, Take 1 tablet (800 mg total) by mouth 5 (five) times a day for 7 days, Disp: 35 tablet, Rfl: 0    aspirin 81 MG tablet, Take 81 mg by mouth daily  , Disp: , Rfl:     levothyroxine 25 mcg tablet, Take 25 mcg by mouth daily  , Disp: , Rfl:     multivitamin-iron-minerals-folic acid (CENTRUM) chewable tablet, Chew 1 tablet daily  , Disp: , Rfl:     Allergies   Allergen Reactions    Ciprofloxacin Hives and Swelling    Cymbalta [Duloxetine Hcl] Other (See Comments)     Hallucinations, fatigue, faints    Percocet [Oxycodone-Acetaminophen]      Dry mouth       Objective:  /80 (BP Location: Right arm, Patient Position: Sitting, Cuff Size: Adult)   Pulse 80   Temp 97 6 °F (36 4 °C)   Resp 18   Ht 5' 1 25" (1 556 m)   Wt 70 6 kg (155 lb 9 6 oz)   LMP  (LMP Unknown)   SpO2 97%   BMI 29 16 kg/m²    Physical Exam  Constitutional:       Appearance: Normal appearance  She is well-developed  HENT:      Head: Normocephalic and atraumatic  Eyes:      Conjunctiva/sclera: Conjunctivae normal       Pupils: Pupils are equal, round, and reactive to light  Neck:      Musculoskeletal: Normal range of motion and neck supple  Cardiovascular:      Rate and Rhythm: Normal rate and regular rhythm  Heart sounds: Normal heart sounds  No murmur  Pulmonary:      Effort: No respiratory distress  Breath sounds: Normal breath sounds  Abdominal:      General: Bowel sounds are normal       Palpations: Abdomen is soft  Musculoskeletal: Normal range of motion  Comments: Palpable and mobile Left axillary node approximately 2 cm   Lymphadenopathy:      Cervical: No cervical adenopathy  Skin:     General: Skin is warm and dry  Capillary Refill: Capillary refill takes less than 2 seconds  Neurological:      Mental Status: She is alert and oriented to person, place, and time     Psychiatric:         Behavior: Behavior normal          Result Review  Labs:  Appointment on 08/18/2020   Component Date Value Ref Range Status    WBC 08/18/2020 7 55  4 31 - 10 16 Thousand/uL Final    RBC 08/18/2020 3 72* 3 81 - 5 12 Million/uL Final    Hemoglobin 08/18/2020 10 8* 11 5 - 15 4 g/dL Final    Hematocrit 08/18/2020 33 6* 34 8 - 46 1 % Final    MCV 08/18/2020 90  82 - 98 fL Final    MCH 08/18/2020 29 0  26 8 - 34 3 pg Final    MCHC 08/18/2020 32 1  31 4 - 37 4 g/dL Final    RDW 08/18/2020 15 4* 11 6 - 15 1 % Final    Platelets 96/18/7303 320  149 - 390 Thousands/uL Final    MPV 08/18/2020 9 8  8 9 - 12 7 fL Final    Sodium 08/18/2020 131* 136 - 145 mmol/L Final    Potassium 08/18/2020 4 1  3 5 - 5 3 mmol/L Final    Chloride 08/18/2020 102  100 - 108 mmol/L Final    CO2 08/18/2020 24  21 - 32 mmol/L Final    ANION GAP 08/18/2020 5  4 - 13 mmol/L Final    BUN 08/18/2020 15  5 - 25 mg/dL Final    Creatinine 08/18/2020 1 03  0 60 - 1 30 mg/dL Final    Standardized to IDMS reference method    Glucose, Fasting 08/18/2020 84  65 - 99 mg/dL Final    Specimen collection should occur prior to Sulfasalazine administration due to the potential for falsely depressed results  Specimen collection should occur prior to Sulfapyridine administration due to the potential for falsely elevated results   Calcium 08/18/2020 8 2* 8 3 - 10 1 mg/dL Final    AST 08/18/2020 32  5 - 45 U/L Final    Specimen collection should occur prior to Sulfasalazine administration due to the potential for falsely depressed results   ALT 08/18/2020 17  12 - 78 U/L Final    Specimen collection should occur prior to Sulfasalazine administration due to the potential for falsely depressed results   Alkaline Phosphatase 08/18/2020 100  46 - 116 U/L Final    Total Protein 08/18/2020 11 0* 6 4 - 8 2 g/dL Final    Albumin 08/18/2020 3 0* 3 5 - 5 0 g/dL Final    Total Bilirubin 08/18/2020 0 57  0 20 - 1 00 mg/dL Final    Use of this assay is not recommended for patients undergoing treatment with eltrombopag due to the potential for falsely elevated results      eGFR 08/18/2020 56  ml/min/1 73sq m Final    Ferritin 08/18/2020 15  8 - 388 ng/mL Final    Copper 08/18/2020 130  72 - 166 ug/dL Final                                    Detection Limit = 5    Folate 08/18/2020 15 7  3 1 - 17 5 ng/mL Final    Iron Saturation 08/18/2020 25  % Final    TIBC 08/18/2020 216* 250 - 450 ug/dL Final    Iron 08/18/2020 54  50 - 170 ug/dL Final    Patients treated with metal-binding drugs (ie  Deferoxamine) may have depressed iron values   PTH 08/18/2020 25 9  18 4 - 80 1 pg/mL Final    Vitamin A 08/18/2020 27 5  22 0 - 69 5 ug/dL Final    Reference intervals for vitamin A determined from [facility] internal  studies  Individuals with vitamin A less than 20 ug/dL are considered  vitamin A deficient and those with serum concentrations less than  10 ug/dL are considered severely deficient  This test was developed and its performance characteristics  determined by [facility]  It has not been cleared or approved  by the Food and Drug Administration   Vitamin B1, Whole Blood 08/18/2020 124 5  66 5 - 200 0 nmol/L Final    Vitamin B-12 08/18/2020 136  100 - 900 pg/mL Final    Vit D, 25-Hydroxy 08/18/2020 15 8* 30 0 - 100 0 ng/mL Final    Zinc 08/18/2020 89  56 - 134 ug/dL Final                                    Detection Limit = 5         Please note: This report has been generated by a voice recognition software system  Therefore there may be syntax, spelling, and/or grammatical errors  Please call if you have any questions

## 2020-09-11 ENCOUNTER — TELEPHONE (OUTPATIENT)
Dept: HEMATOLOGY ONCOLOGY | Facility: CLINIC | Age: 70
End: 2020-09-11

## 2020-09-11 DIAGNOSIS — R59.1 LYMPHADENOPATHY: Primary | ICD-10-CM

## 2020-09-11 NOTE — TELEPHONE ENCOUNTER
Gabriela Hdez from [employer] called stating that the they received the order for a  biopsy by Maria Teresa Denney, but states a ct scan will need to be done prior before a biopsy

## 2020-09-17 ENCOUNTER — APPOINTMENT (OUTPATIENT)
Dept: LAB | Facility: CLINIC | Age: 70
End: 2020-09-17
Payer: COMMERCIAL

## 2020-09-17 ENCOUNTER — TELEPHONE (OUTPATIENT)
Dept: HEMATOLOGY ONCOLOGY | Facility: CLINIC | Age: 70
End: 2020-09-17

## 2020-09-17 DIAGNOSIS — R59.1 LYMPHADENOPATHY: ICD-10-CM

## 2020-09-17 LAB
ALBUMIN SERPL BCP-MCNC: 3 G/DL (ref 3.5–5)
ALP SERPL-CCNC: 98 U/L (ref 46–116)
ALT SERPL W P-5'-P-CCNC: 17 U/L (ref 12–78)
ANION GAP SERPL CALCULATED.3IONS-SCNC: 7 MMOL/L (ref 4–13)
AST SERPL W P-5'-P-CCNC: 29 U/L (ref 5–45)
BASOPHILS # BLD AUTO: 0.11 THOUSANDS/ΜL (ref 0–0.1)
BASOPHILS NFR BLD AUTO: 1 % (ref 0–1)
BILIRUB SERPL-MCNC: 0.44 MG/DL (ref 0.2–1)
BUN SERPL-MCNC: 13 MG/DL (ref 5–25)
CALCIUM ALBUM COR SERPL-MCNC: 8.8 MG/DL (ref 8.3–10.1)
CALCIUM SERPL-MCNC: 8 MG/DL (ref 8.3–10.1)
CHLORIDE SERPL-SCNC: 102 MMOL/L (ref 100–108)
CO2 SERPL-SCNC: 23 MMOL/L (ref 21–32)
CREAT SERPL-MCNC: 0.95 MG/DL (ref 0.6–1.3)
EOSINOPHIL # BLD AUTO: 0.16 THOUSAND/ΜL (ref 0–0.61)
EOSINOPHIL NFR BLD AUTO: 2 % (ref 0–6)
ERYTHROCYTE [DISTWIDTH] IN BLOOD BY AUTOMATED COUNT: 15.2 % (ref 11.6–15.1)
GFR SERPL CREATININE-BSD FRML MDRD: 62 ML/MIN/1.73SQ M
GLUCOSE P FAST SERPL-MCNC: 85 MG/DL (ref 65–99)
HCT VFR BLD AUTO: 33.4 % (ref 34.8–46.1)
HGB BLD-MCNC: 10.6 G/DL (ref 11.5–15.4)
IMM GRANULOCYTES # BLD AUTO: 0.02 THOUSAND/UL (ref 0–0.2)
IMM GRANULOCYTES NFR BLD AUTO: 0 % (ref 0–2)
LYMPHOCYTES # BLD AUTO: 3.98 THOUSANDS/ΜL (ref 0.6–4.47)
LYMPHOCYTES NFR BLD AUTO: 52 % (ref 14–44)
MCH RBC QN AUTO: 29 PG (ref 26.8–34.3)
MCHC RBC AUTO-ENTMCNC: 31.7 G/DL (ref 31.4–37.4)
MCV RBC AUTO: 91 FL (ref 82–98)
MONOCYTES # BLD AUTO: 0.8 THOUSAND/ΜL (ref 0.17–1.22)
MONOCYTES NFR BLD AUTO: 10 % (ref 4–12)
NEUTROPHILS # BLD AUTO: 2.77 THOUSANDS/ΜL (ref 1.85–7.62)
NEUTS SEG NFR BLD AUTO: 35 % (ref 43–75)
NRBC BLD AUTO-RTO: 0 /100 WBCS
PLATELET # BLD AUTO: 255 THOUSANDS/UL (ref 149–390)
PMV BLD AUTO: 10 FL (ref 8.9–12.7)
POTASSIUM SERPL-SCNC: 4.3 MMOL/L (ref 3.5–5.3)
PROT SERPL-MCNC: 10.8 G/DL (ref 6.4–8.2)
RBC # BLD AUTO: 3.66 MILLION/UL (ref 3.81–5.12)
SODIUM SERPL-SCNC: 132 MMOL/L (ref 136–145)
WBC # BLD AUTO: 7.84 THOUSAND/UL (ref 4.31–10.16)

## 2020-09-17 PROCEDURE — 85025 COMPLETE CBC W/AUTO DIFF WBC: CPT

## 2020-09-17 PROCEDURE — 80053 COMPREHEN METABOLIC PANEL: CPT

## 2020-09-17 PROCEDURE — 36415 COLL VENOUS BLD VENIPUNCTURE: CPT

## 2020-09-17 NOTE — TELEPHONE ENCOUNTER
Verified that lab work was appropriate to be done, phlebotomist questioned timing    Patient verbalizes understanding

## 2020-09-18 ENCOUNTER — PREP FOR PROCEDURE (OUTPATIENT)
Dept: INTERVENTIONAL RADIOLOGY/VASCULAR | Facility: CLINIC | Age: 70
End: 2020-09-18

## 2020-09-18 ENCOUNTER — TELEPHONE (OUTPATIENT)
Dept: HEMATOLOGY ONCOLOGY | Facility: CLINIC | Age: 70
End: 2020-09-18

## 2020-09-18 DIAGNOSIS — R59.1 LYMPHADENOPATHY: Primary | ICD-10-CM

## 2020-09-18 NOTE — TELEPHONE ENCOUNTER
LM for pt that I beltsering the initial bx was supposed to be scheduled for today but [place] Amy spoke with pt on 9/11 and stated that she needed CT scan before bx  That was never scheduled  I told pt I will have a  call her to set up CT an bx asap

## 2020-09-18 NOTE — TELEPHONE ENCOUNTER
Appointment Confirmation     Appointment with  CT   Appointment date & time 9-24-20 @ 9:00am    Location [place]    Patient verbilized Understanding  Yes

## 2020-09-18 NOTE — TELEPHONE ENCOUNTER
Spoke with IR they asked that a new referral be placed since they closed out the pervious one  They will be looking out for it and will call the patient to schedule after the CT date  Patient is aware

## 2020-09-18 NOTE — TELEPHONE ENCOUNTER
Patient called stating that she she thought she would be having a biopsy today but was told she isn't scheduled for a biopsy, patient called to follow up on what is going on  Best call back [phone]

## 2020-09-18 NOTE — TELEPHONE ENCOUNTER
Patient is scheduled for CT at [facility] for 9/24  However, I can not reach out to IR for biopsy until patient confirms this appointment  I left a message for the patient and asked that they call back  Once Ct is confirmed, biopsy can be scheduled

## 2020-09-22 ENCOUNTER — OFFICE VISIT (OUTPATIENT)
Dept: FAMILY MEDICINE CLINIC | Facility: CLINIC | Age: 70
End: 2020-09-22
Payer: COMMERCIAL

## 2020-09-22 VITALS
DIASTOLIC BLOOD PRESSURE: 78 MMHG | OXYGEN SATURATION: 97 % | WEIGHT: 156 LBS | BODY MASS INDEX: 29.45 KG/M2 | SYSTOLIC BLOOD PRESSURE: 126 MMHG | TEMPERATURE: 98.8 F | HEIGHT: 61 IN | HEART RATE: 82 BPM | RESPIRATION RATE: 16 BRPM

## 2020-09-22 DIAGNOSIS — M35.00 SJOGREN'S SYNDROME, WITH UNSPECIFIED ORGAN INVOLVEMENT (HCC): ICD-10-CM

## 2020-09-22 DIAGNOSIS — E03.9 HYPOTHYROIDISM, UNSPECIFIED TYPE: Primary | ICD-10-CM

## 2020-09-22 DIAGNOSIS — E66.3 OVERWEIGHT: ICD-10-CM

## 2020-09-22 DIAGNOSIS — D50.8 IRON DEFICIENCY ANEMIA FOLLOWING BARIATRIC SURGERY: ICD-10-CM

## 2020-09-22 DIAGNOSIS — R59.1 LYMPHADENOPATHY: ICD-10-CM

## 2020-09-22 DIAGNOSIS — F31.9 BIPOLAR 1 DISORDER (HCC): ICD-10-CM

## 2020-09-22 DIAGNOSIS — K95.89 IRON DEFICIENCY ANEMIA FOLLOWING BARIATRIC SURGERY: ICD-10-CM

## 2020-09-22 DIAGNOSIS — M32.9 SYSTEMIC LUPUS ERYTHEMATOSUS, UNSPECIFIED SLE TYPE, UNSPECIFIED ORGAN INVOLVEMENT STATUS (HCC): ICD-10-CM

## 2020-09-22 DIAGNOSIS — Z98.84 S/P GASTRIC BYPASS: ICD-10-CM

## 2020-09-22 PROBLEM — J10.1 INFLUENZA A: Status: RESOLVED | Noted: 2017-02-26 | Resolved: 2020-09-22

## 2020-09-22 PROBLEM — J18.9 PNEUMONIA OF RIGHT MIDDLE LOBE DUE TO INFECTIOUS ORGANISM: Status: RESOLVED | Noted: 2017-02-25 | Resolved: 2020-09-22

## 2020-09-22 PROCEDURE — 3725F SCREEN DEPRESSION PERFORMED: CPT | Performed by: NURSE PRACTITIONER

## 2020-09-22 PROCEDURE — 1101F PT FALLS ASSESS-DOCD LE1/YR: CPT | Performed by: NURSE PRACTITIONER

## 2020-09-22 PROCEDURE — 99203 OFFICE O/P NEW LOW 30 MIN: CPT | Performed by: NURSE PRACTITIONER

## 2020-09-22 PROCEDURE — 3288F FALL RISK ASSESSMENT DOCD: CPT | Performed by: NURSE PRACTITIONER

## 2020-09-22 RX ORDER — LEVOTHYROXINE SODIUM 0.03 MG/1
25 TABLET ORAL DAILY
Qty: 90 TABLET | Refills: 0 | Status: SHIPPED | OUTPATIENT
Start: 2020-09-22 | End: 2020-12-03

## 2020-09-22 NOTE — PROGRESS NOTES
Assessment/Plan:      Diagnoses and all orders for this visit:    Hypothyroidism, unspecified type  -     TSH, 3rd generation with Free T4 reflex; Future  -     levothyroxine 25 mcg tablet; Take 1 tablet (25 mcg total) by mouth daily    Lymphadenopathy  Continue to follow-up with hematology  Patient reports that hematology orders her lab work  Systemic lupus erythematosus, unspecified SLE type, unspecified organ involvement status ([facility] )  Continue to follow-up with rheumatology  Iron deficiency anemia following bariatric surgery  Continue to follow-up with hematology  Sjogren's syndrome, with unspecified organ involvement ([facility] )  Continue to follow-up with rheumatology  Bipolar 1 disorder ([facility] )  Denies any suicidal thoughts  Patient reports that she is stable  Continue to follow-up with her psychiatrist      S/P gastric bypass  Continue to follow-up with the weight       Overweight  Continue to follow-up with the weight       Patient instructed to follow-up in 1 month for a wellness visit or sooner prn  Subjective:     Patient ID: Ainsley Guadarrama is a 78 y o  female  Patient is here to establish care  Patient reports that she follows up with hematology for anemia and lymphadenopathy  Patient reports that she has a CT scan ordered for next week  Patient reports that she follows up with rheumatology for lupus and Sjogren's syndrome  Patient reports that she follows up with a psychiatrist for bipolar disorder  Patient reports that she is stable  Denies any suicidal thoughts  Patient reports that she takes her medications as prescribed  Patient reports that she takes levothyroxine 25mcg daily for hypothyroidism  Denies any chest pain, SOB, palpitations, dizziness, or constipation  Patient reports that she follows up with the weight   Patient reports that she has had gastric bypass surgery   Patient reports that she takes a vitamin D supplement for vitamin D deficiency  Review of Systems   Constitutional: Negative for chills and fever  HENT: Negative for congestion, ear pain, sore throat and trouble swallowing  Respiratory: Negative for cough, chest tightness, shortness of breath and wheezing  Cardiovascular: Negative for chest pain, palpitations and leg swelling  Gastrointestinal: Negative for abdominal pain, blood in stool, diarrhea, nausea and vomiting  Genitourinary: Negative for dysuria, frequency and hematuria  Skin: Negative for rash  Neurological: Negative for dizziness, seizures, syncope, light-headedness and headaches  Psychiatric/Behavioral: Negative for suicidal ideas  As noted in HPI  Objective:     Physical Exam  Vitals signs reviewed  Constitutional:       General: She is not in acute distress  Appearance: She is not ill-appearing or diaphoretic  HENT:      Right Ear: External ear normal       Left Ear: External ear normal       Nose: Nose normal       Mouth/Throat:      Mouth: Mucous membranes are moist       Pharynx: Oropharynx is clear  No posterior oropharyngeal erythema  Eyes:      Conjunctiva/sclera: Conjunctivae normal       Pupils: Pupils are equal, round, and reactive to light  Cardiovascular:      Rate and Rhythm: Normal rate and regular rhythm  Pulses: Normal pulses  Heart sounds: Normal heart sounds  Comments: No edema noted  Pulmonary:      Effort: Pulmonary effort is normal  No respiratory distress  Breath sounds: Normal breath sounds  No wheezing  Abdominal:      General: There is no distension  Palpations: Abdomen is soft  Tenderness: There is no abdominal tenderness  Musculoskeletal:      Comments: Gait wnl  Skin:     Findings: No rash  Neurological:      Mental Status: She is alert and oriented to person, place, and time  Psychiatric:         Mood and Affect: Mood normal        BMI Counseling:  Body mass index is 29 24 kg/m²  The BMI is above normal  Nutrition recommendations include 3-5 servings of fruits/vegetables daily, consuming healthier snacks and reducing intake of saturated fat and trans fat  Exercise recommendations include exercising 3-5 times per week

## 2020-09-24 ENCOUNTER — HOSPITAL ENCOUNTER (OUTPATIENT)
Dept: CT IMAGING | Facility: HOSPITAL | Age: 70
Discharge: HOME | End: 2020-09-24
Payer: COMMERCIAL

## 2020-09-24 DIAGNOSIS — R59.1 LYMPHADENOPATHY: ICD-10-CM

## 2020-09-24 PROCEDURE — 71250 CT THORAX DX C-: CPT

## 2020-09-24 PROCEDURE — G1004 CDSM NDSC: HCPCS

## 2020-09-30 ENCOUNTER — TELEPHONE (OUTPATIENT)
Dept: HEMATOLOGY ONCOLOGY | Facility: MEDICAL CENTER | Age: 70
End: 2020-09-30

## 2020-09-30 NOTE — TELEPHONE ENCOUNTER
Patient called in stated that she is verifying her appointment 10/21/2020 - patient also wants to know if she needs anything else before next appointment besides that biopsy a good call back number [phone]

## 2020-10-01 NOTE — TELEPHONE ENCOUNTER
LM for pt letting her know that she does not need anything else done prior to her appt on 10/21 except the lymph node bx that's being done on 10/8

## 2020-10-05 ENCOUNTER — TELEPHONE (OUTPATIENT)
Dept: RADIOLOGY | Facility: HOSPITAL | Age: 70
End: 2020-10-05

## 2020-10-08 ENCOUNTER — HOSPITAL ENCOUNTER (OUTPATIENT)
Dept: RADIOLOGY | Facility: HOSPITAL | Age: 70
Discharge: HOME | End: 2020-10-08
Attending: RADIOLOGY | Admitting: RADIOLOGY
Payer: COMMERCIAL

## 2020-10-08 VITALS
DIASTOLIC BLOOD PRESSURE: 67 MMHG | HEART RATE: 94 BPM | OXYGEN SATURATION: 94 % | SYSTOLIC BLOOD PRESSURE: 116 MMHG | RESPIRATION RATE: 20 BRPM

## 2020-10-08 DIAGNOSIS — R59.1 LYMPHADENOPATHY: ICD-10-CM

## 2020-10-08 PROCEDURE — 88342 IMHCHEM/IMCYTCHM 1ST ANTB: CPT | Performed by: PATHOLOGY

## 2020-10-08 PROCEDURE — 76942 ECHO GUIDE FOR BIOPSY: CPT

## 2020-10-08 PROCEDURE — 88364 INSITU HYBRIDIZATION (FISH): CPT | Performed by: PATHOLOGY

## 2020-10-08 PROCEDURE — 88185 FLOWCYTOMETRY/TC ADD-ON: CPT

## 2020-10-08 PROCEDURE — 81342 TRG GENE REARRANGEMENT ANAL: CPT | Performed by: PATHOLOGY

## 2020-10-08 PROCEDURE — 88305 TISSUE EXAM BY PATHOLOGIST: CPT | Performed by: PATHOLOGY

## 2020-10-08 PROCEDURE — 38505 NEEDLE BIOPSY LYMPH NODES: CPT | Performed by: RADIOLOGY

## 2020-10-08 PROCEDURE — 88341 IMHCHEM/IMCYTCHM EA ADD ANTB: CPT | Performed by: PATHOLOGY

## 2020-10-08 PROCEDURE — 88368 INSITU HYBRIDIZATION MANUAL: CPT | Performed by: PATHOLOGY

## 2020-10-08 PROCEDURE — 88333 PATH CONSLTJ SURG CYTO XM 1: CPT | Performed by: PATHOLOGY

## 2020-10-08 PROCEDURE — 76942 ECHO GUIDE FOR BIOPSY: CPT | Performed by: RADIOLOGY

## 2020-10-08 PROCEDURE — 88365 INSITU HYBRIDIZATION (FISH): CPT | Performed by: PATHOLOGY

## 2020-10-08 PROCEDURE — 88184 FLOWCYTOMETRY/ TC 1 MARKER: CPT | Performed by: PATHOLOGY

## 2020-10-08 PROCEDURE — 38505 NEEDLE BIOPSY LYMPH NODES: CPT

## 2020-10-09 ENCOUNTER — TELEPHONE (OUTPATIENT)
Dept: RHEUMATOLOGY | Facility: CLINIC | Age: 70
End: 2020-10-09

## 2020-10-12 LAB — SCAN RESULT: NORMAL

## 2020-10-15 ENCOUNTER — LAB (OUTPATIENT)
Dept: LAB | Facility: CLINIC | Age: 70
End: 2020-10-15
Payer: COMMERCIAL

## 2020-10-15 ENCOUNTER — TRANSCRIBE ORDERS (OUTPATIENT)
Dept: LAB | Facility: CLINIC | Age: 70
End: 2020-10-15

## 2020-10-15 DIAGNOSIS — E03.9 HYPOTHYROIDISM, UNSPECIFIED TYPE: ICD-10-CM

## 2020-10-15 LAB — TSH SERPL DL<=0.05 MIU/L-ACNC: 1.28 UIU/ML (ref 0.36–3.74)

## 2020-10-15 PROCEDURE — 84443 ASSAY THYROID STIM HORMONE: CPT

## 2020-10-15 PROCEDURE — 36415 COLL VENOUS BLD VENIPUNCTURE: CPT

## 2020-10-19 ENCOUNTER — TELEPHONE (OUTPATIENT)
Dept: HEMATOLOGY ONCOLOGY | Facility: CLINIC | Age: 70
End: 2020-10-19

## 2020-10-21 ENCOUNTER — OFFICE VISIT (OUTPATIENT)
Dept: HEMATOLOGY ONCOLOGY | Facility: CLINIC | Age: 70
End: 2020-10-21
Payer: COMMERCIAL

## 2020-10-21 VITALS
RESPIRATION RATE: 18 BRPM | DIASTOLIC BLOOD PRESSURE: 74 MMHG | WEIGHT: 154.5 LBS | TEMPERATURE: 98.1 F | HEART RATE: 78 BPM | BODY MASS INDEX: 29.17 KG/M2 | SYSTOLIC BLOOD PRESSURE: 126 MMHG | HEIGHT: 61 IN | OXYGEN SATURATION: 93 %

## 2020-10-21 DIAGNOSIS — R59.1 LYMPHADENOPATHY: Primary | ICD-10-CM

## 2020-10-21 PROCEDURE — 99215 OFFICE O/P EST HI 40 MIN: CPT | Performed by: INTERNAL MEDICINE

## 2020-10-21 PROCEDURE — 1160F RVW MEDS BY RX/DR IN RCRD: CPT | Performed by: INTERNAL MEDICINE

## 2020-10-21 PROCEDURE — 1036F TOBACCO NON-USER: CPT | Performed by: INTERNAL MEDICINE

## 2020-10-28 ENCOUNTER — OFFICE VISIT (OUTPATIENT)
Dept: RHEUMATOLOGY | Facility: CLINIC | Age: 70
End: 2020-10-28
Payer: COMMERCIAL

## 2020-10-28 ENCOUNTER — TELEPHONE (OUTPATIENT)
Dept: PULMONOLOGY | Facility: CLINIC | Age: 70
End: 2020-10-28

## 2020-10-28 VITALS
SYSTOLIC BLOOD PRESSURE: 132 MMHG | TEMPERATURE: 99.6 F | HEART RATE: 143 BPM | WEIGHT: 154 LBS | BODY MASS INDEX: 28.91 KG/M2 | DIASTOLIC BLOOD PRESSURE: 88 MMHG

## 2020-10-28 DIAGNOSIS — M35.00 HISTORY OF SJOGREN'S DISEASE (HCC): ICD-10-CM

## 2020-10-28 DIAGNOSIS — R59.0 MEDIASTINAL ADENOPATHY: ICD-10-CM

## 2020-10-28 DIAGNOSIS — M85.80 OSTEOPENIA, UNSPECIFIED LOCATION: ICD-10-CM

## 2020-10-28 DIAGNOSIS — Z98.84 HISTORY OF GASTRIC BYPASS: ICD-10-CM

## 2020-10-28 DIAGNOSIS — D50.9 IRON DEFICIENCY ANEMIA, UNSPECIFIED IRON DEFICIENCY ANEMIA TYPE: ICD-10-CM

## 2020-10-28 DIAGNOSIS — J84.2 LYMPHOCYTIC INTERSTITIAL PNEUMONIA (HCC): ICD-10-CM

## 2020-10-28 DIAGNOSIS — M17.0 BILATERAL PRIMARY OSTEOARTHRITIS OF KNEE: ICD-10-CM

## 2020-10-28 DIAGNOSIS — E55.9 VITAMIN D DEFICIENCY: ICD-10-CM

## 2020-10-28 DIAGNOSIS — M32.9 SYSTEMIC LUPUS ERYTHEMATOSUS, UNSPECIFIED SLE TYPE, UNSPECIFIED ORGAN INVOLVEMENT STATUS (HCC): Primary | ICD-10-CM

## 2020-10-28 PROCEDURE — 99205 OFFICE O/P NEW HI 60 MIN: CPT | Performed by: INTERNAL MEDICINE

## 2020-10-28 RX ORDER — HYDROXYCHLOROQUINE SULFATE 200 MG/1
200 TABLET, FILM COATED ORAL 2 TIMES DAILY WITH MEALS
Qty: 60 TABLET | Refills: 5 | Status: SHIPPED | OUTPATIENT
Start: 2020-10-28 | End: 2021-02-10 | Stop reason: SDUPTHER

## 2020-10-29 ENCOUNTER — OFFICE VISIT (OUTPATIENT)
Dept: PULMONOLOGY | Facility: CLINIC | Age: 70
End: 2020-10-29
Payer: COMMERCIAL

## 2020-10-29 ENCOUNTER — TELEPHONE (OUTPATIENT)
Dept: PULMONOLOGY | Facility: CLINIC | Age: 70
End: 2020-10-29

## 2020-10-29 VITALS
TEMPERATURE: 97.6 F | SYSTOLIC BLOOD PRESSURE: 110 MMHG | HEIGHT: 61 IN | DIASTOLIC BLOOD PRESSURE: 76 MMHG | OXYGEN SATURATION: 97 % | WEIGHT: 155.2 LBS | BODY MASS INDEX: 29.3 KG/M2 | HEART RATE: 78 BPM

## 2020-10-29 DIAGNOSIS — R93.89 ABNORMAL CT OF THE CHEST: ICD-10-CM

## 2020-10-29 DIAGNOSIS — M32.9 SYSTEMIC LUPUS ERYTHEMATOSUS, UNSPECIFIED SLE TYPE, UNSPECIFIED ORGAN INVOLVEMENT STATUS (HCC): ICD-10-CM

## 2020-10-29 DIAGNOSIS — R59.1 LYMPHADENOPATHY: Primary | ICD-10-CM

## 2020-10-29 DIAGNOSIS — J69.1: ICD-10-CM

## 2020-10-29 DIAGNOSIS — J06.9 UPPER RESPIRATORY TRACT INFECTION, UNSPECIFIED TYPE: ICD-10-CM

## 2020-10-29 PROCEDURE — 99215 OFFICE O/P EST HI 40 MIN: CPT | Performed by: NURSE PRACTITIONER

## 2020-10-29 RX ORDER — ALBUTEROL SULFATE 90 UG/1
1 AEROSOL, METERED RESPIRATORY (INHALATION) EVERY 4 HOURS PRN
Qty: 3 INHALER | Refills: 3 | Status: SHIPPED | OUTPATIENT
Start: 2020-10-29 | End: 2021-03-29 | Stop reason: SDUPTHER

## 2020-11-04 ENCOUNTER — OFFICE VISIT (OUTPATIENT)
Dept: FAMILY MEDICINE CLINIC | Facility: CLINIC | Age: 70
End: 2020-11-04
Payer: COMMERCIAL

## 2020-11-04 VITALS
WEIGHT: 154.6 LBS | TEMPERATURE: 99.8 F | OXYGEN SATURATION: 98 % | HEART RATE: 82 BPM | SYSTOLIC BLOOD PRESSURE: 118 MMHG | BODY MASS INDEX: 29.19 KG/M2 | HEIGHT: 61 IN | RESPIRATION RATE: 18 BRPM | DIASTOLIC BLOOD PRESSURE: 76 MMHG

## 2020-11-04 DIAGNOSIS — E03.9 HYPOTHYROIDISM, UNSPECIFIED TYPE: ICD-10-CM

## 2020-11-04 DIAGNOSIS — J69.1: ICD-10-CM

## 2020-11-04 DIAGNOSIS — M32.9 SYSTEMIC LUPUS ERYTHEMATOSUS, UNSPECIFIED SLE TYPE, UNSPECIFIED ORGAN INVOLVEMENT STATUS (HCC): ICD-10-CM

## 2020-11-04 DIAGNOSIS — Z00.00 ANNUAL PHYSICAL EXAM: Primary | ICD-10-CM

## 2020-11-04 DIAGNOSIS — M35.00 SJOGREN'S SYNDROME, WITH UNSPECIFIED ORGAN INVOLVEMENT (HCC): ICD-10-CM

## 2020-11-04 DIAGNOSIS — R59.1 LYMPHADENOPATHY: ICD-10-CM

## 2020-11-04 PROCEDURE — 3725F SCREEN DEPRESSION PERFORMED: CPT | Performed by: NURSE PRACTITIONER

## 2020-11-04 PROCEDURE — 3008F BODY MASS INDEX DOCD: CPT | Performed by: FAMILY MEDICINE

## 2020-11-04 PROCEDURE — 99397 PER PM REEVAL EST PAT 65+ YR: CPT | Performed by: NURSE PRACTITIONER

## 2020-11-12 ENCOUNTER — TELEMEDICINE (OUTPATIENT)
Dept: FAMILY MEDICINE CLINIC | Facility: CLINIC | Age: 70
End: 2020-11-12
Payer: COMMERCIAL

## 2020-11-12 DIAGNOSIS — B34.9 VIRAL INFECTION, UNSPECIFIED: ICD-10-CM

## 2020-11-12 DIAGNOSIS — B34.9 VIRAL INFECTION, UNSPECIFIED: Primary | ICD-10-CM

## 2020-11-12 DIAGNOSIS — E03.9 HYPOTHYROIDISM, UNSPECIFIED TYPE: ICD-10-CM

## 2020-11-12 PROCEDURE — 1160F RVW MEDS BY RX/DR IN RCRD: CPT | Performed by: FAMILY MEDICINE

## 2020-11-12 PROCEDURE — 87637 SARSCOV2&INF A&B&RSV AMP PRB: CPT | Performed by: FAMILY MEDICINE

## 2020-11-12 PROCEDURE — 1036F TOBACCO NON-USER: CPT | Performed by: FAMILY MEDICINE

## 2020-11-12 PROCEDURE — 99213 OFFICE O/P EST LOW 20 MIN: CPT | Performed by: FAMILY MEDICINE

## 2020-11-14 LAB
FLUAV RNA NPH QL NAA+PROBE: NOT DETECTED
FLUBV RNA NPH QL NAA+PROBE: NOT DETECTED
RSV RNA NPH QL NAA+PROBE: NOT DETECTED
SARS-COV-2 RNA NPH QL NAA+PROBE: NOT DETECTED

## 2020-11-23 ENCOUNTER — TRANSCRIBE ORDERS (OUTPATIENT)
Dept: LAB | Facility: CLINIC | Age: 70
End: 2020-11-23

## 2020-12-03 DIAGNOSIS — E03.9 HYPOTHYROIDISM, UNSPECIFIED TYPE: ICD-10-CM

## 2020-12-03 RX ORDER — LEVOTHYROXINE SODIUM 0.03 MG/1
TABLET ORAL
Qty: 90 TABLET | Refills: 1 | Status: SHIPPED | OUTPATIENT
Start: 2020-12-03 | End: 2021-02-22 | Stop reason: SDUPTHER

## 2020-12-24 ENCOUNTER — TELEPHONE (OUTPATIENT)
Dept: OBGYN CLINIC | Facility: HOSPITAL | Age: 70
End: 2020-12-24

## 2020-12-30 ENCOUNTER — LAB (OUTPATIENT)
Dept: LAB | Facility: CLINIC | Age: 70
End: 2020-12-30
Payer: COMMERCIAL

## 2020-12-30 DIAGNOSIS — M32.9 SYSTEMIC LUPUS ERYTHEMATOSUS, UNSPECIFIED SLE TYPE, UNSPECIFIED ORGAN INVOLVEMENT STATUS (HCC): ICD-10-CM

## 2020-12-30 DIAGNOSIS — J84.2 LYMPHOCYTIC INTERSTITIAL PNEUMONIA (HCC): ICD-10-CM

## 2020-12-30 LAB
25(OH)D3 SERPL-MCNC: 13.5 NG/ML (ref 30–100)
BACTERIA UR QL AUTO: ABNORMAL /HPF
BILIRUB UR QL STRIP: NEGATIVE
C3 SERPL-MCNC: 101 MG/DL (ref 90–180)
C4 SERPL-MCNC: 23 MG/DL (ref 10–40)
CK SERPL-CCNC: 43 U/L (ref 26–192)
CLARITY UR: ABNORMAL
COLOR UR: YELLOW
CREAT UR-MCNC: 84 MG/DL
CRP SERPL QL: 5.4 MG/L
ERYTHROCYTE [SEDIMENTATION RATE] IN BLOOD: 130 MM/HOUR (ref 0–29)
GLUCOSE UR STRIP-MCNC: NEGATIVE MG/DL
HBV CORE AB SER QL: NORMAL
HBV CORE IGM SER QL: NORMAL
HBV SURFACE AG SER QL: NORMAL
HCV AB SER QL: NORMAL
HGB UR QL STRIP.AUTO: ABNORMAL
IGA SERPL-MCNC: 369 MG/DL (ref 70–400)
IGG SERPL-MCNC: 4880 MG/DL (ref 700–1600)
IGM SERPL-MCNC: 292 MG/DL (ref 40–230)
KETONES UR STRIP-MCNC: NEGATIVE MG/DL
LEUKOCYTE ESTERASE UR QL STRIP: ABNORMAL
NITRITE UR QL STRIP: POSITIVE
NON-SQ EPI CELLS URNS QL MICRO: ABNORMAL /HPF
PH UR STRIP.AUTO: 6 [PH]
PROT UR STRIP-MCNC: NEGATIVE MG/DL
PROT UR-MCNC: 23 MG/DL
PROT/CREAT UR: 0.27 MG/G{CREAT} (ref 0–0.1)
RBC #/AREA URNS AUTO: ABNORMAL /HPF
SP GR UR STRIP.AUTO: 1.02 (ref 1–1.03)
UROBILINOGEN UR QL STRIP.AUTO: 0.2 E.U./DL
WBC #/AREA URNS AUTO: ABNORMAL /HPF

## 2020-12-30 PROCEDURE — 82784 ASSAY IGA/IGD/IGG/IGM EACH: CPT

## 2020-12-30 PROCEDURE — 82550 ASSAY OF CK (CPK): CPT

## 2020-12-30 PROCEDURE — 86146 BETA-2 GLYCOPROTEIN ANTIBODY: CPT

## 2020-12-30 PROCEDURE — 85652 RBC SED RATE AUTOMATED: CPT

## 2020-12-30 PROCEDURE — 86038 ANTINUCLEAR ANTIBODIES: CPT

## 2020-12-30 PROCEDURE — 84156 ASSAY OF PROTEIN URINE: CPT | Performed by: INTERNAL MEDICINE

## 2020-12-30 PROCEDURE — 82164 ANGIOTENSIN I ENZYME TEST: CPT

## 2020-12-30 PROCEDURE — 85732 THROMBOPLASTIN TIME PARTIAL: CPT

## 2020-12-30 PROCEDURE — 36415 COLL VENOUS BLD VENIPUNCTURE: CPT

## 2020-12-30 PROCEDURE — 86430 RHEUMATOID FACTOR TEST QUAL: CPT

## 2020-12-30 PROCEDURE — 82306 VITAMIN D 25 HYDROXY: CPT

## 2020-12-30 PROCEDURE — 86705 HEP B CORE ANTIBODY IGM: CPT

## 2020-12-30 PROCEDURE — 86160 COMPLEMENT ANTIGEN: CPT

## 2020-12-30 PROCEDURE — 86334 IMMUNOFIX E-PHORESIS SERUM: CPT

## 2020-12-30 PROCEDURE — 86147 CARDIOLIPIN ANTIBODY EA IG: CPT

## 2020-12-30 PROCEDURE — 87340 HEPATITIS B SURFACE AG IA: CPT

## 2020-12-30 PROCEDURE — 86235 NUCLEAR ANTIGEN ANTIBODY: CPT

## 2020-12-30 PROCEDURE — 88185 FLOWCYTOMETRY/TC ADD-ON: CPT

## 2020-12-30 PROCEDURE — 86255 FLUORESCENT ANTIBODY SCREEN: CPT

## 2020-12-30 PROCEDURE — 86039 ANTINUCLEAR ANTIBODIES (ANA): CPT

## 2020-12-30 PROCEDURE — 86803 HEPATITIS C AB TEST: CPT

## 2020-12-30 PROCEDURE — 88184 FLOWCYTOMETRY/ TC 1 MARKER: CPT

## 2020-12-30 PROCEDURE — 85705 THROMBOPLASTIN INHIBITION: CPT

## 2020-12-30 PROCEDURE — 86334 IMMUNOFIX E-PHORESIS SERUM: CPT | Performed by: PATHOLOGY

## 2020-12-30 PROCEDURE — 86431 RHEUMATOID FACTOR QUANT: CPT

## 2020-12-30 PROCEDURE — 87389 HIV-1 AG W/HIV-1&-2 AB AG IA: CPT

## 2020-12-30 PROCEDURE — 85613 RUSSELL VIPER VENOM DILUTED: CPT

## 2020-12-30 PROCEDURE — 85670 THROMBIN TIME PLASMA: CPT

## 2020-12-30 PROCEDURE — 81001 URINALYSIS AUTO W/SCOPE: CPT | Performed by: INTERNAL MEDICINE

## 2020-12-30 PROCEDURE — 86225 DNA ANTIBODY NATIVE: CPT

## 2020-12-30 PROCEDURE — 82570 ASSAY OF URINE CREATININE: CPT | Performed by: INTERNAL MEDICINE

## 2020-12-30 PROCEDURE — 86704 HEP B CORE ANTIBODY TOTAL: CPT

## 2020-12-30 PROCEDURE — 86140 C-REACTIVE PROTEIN: CPT

## 2020-12-30 PROCEDURE — 86200 CCP ANTIBODY: CPT

## 2020-12-30 PROCEDURE — 84165 PROTEIN E-PHORESIS SERUM: CPT

## 2020-12-30 PROCEDURE — 84165 PROTEIN E-PHORESIS SERUM: CPT | Performed by: PATHOLOGY

## 2020-12-31 LAB
ACE SERPL-CCNC: 60 U/L (ref 14–82)
ALBUMIN SERPL ELPH-MCNC: 3.87 G/DL (ref 3.5–5)
ALBUMIN SERPL ELPH-MCNC: 36.9 % (ref 52–65)
ALPHA1 GLOB SERPL ELPH-MCNC: 0.3 G/DL (ref 0.1–0.4)
ALPHA1 GLOB SERPL ELPH-MCNC: 2.9 % (ref 2.5–5)
ALPHA2 GLOB SERPL ELPH-MCNC: 0.67 G/DL (ref 0.4–1.2)
ALPHA2 GLOB SERPL ELPH-MCNC: 6.4 % (ref 7–13)
BETA GLOB ABNORMAL SERPL ELPH-MCNC: 0.4 G/DL (ref 0.4–0.8)
BETA1 GLOB SERPL ELPH-MCNC: 3.8 % (ref 5–13)
BETA2 GLOB SERPL ELPH-MCNC: 3.4 % (ref 2–8)
BETA2+GAMMA GLOB SERPL ELPH-MCNC: 0.36 G/DL (ref 0.2–0.5)
CARDIOLIPIN IGA SER IA-ACNC: <9 APL U/ML (ref 0–11)
CARDIOLIPIN IGG SER IA-ACNC: <9 GPL U/ML (ref 0–14)
CARDIOLIPIN IGM SER IA-ACNC: 10 MPL U/ML (ref 0–12)
CENTROMERE B AB SER-ACNC: <0.2 AI (ref 0–0.9)
CRYOGLOB RF SER-ACNC: ABNORMAL [IU]/ML
DSDNA AB SER-ACNC: 2 IU/ML (ref 0–9)
ENA JO1 AB SER-ACNC: <0.2 AI (ref 0–0.9)
ENA RNP AB SER-ACNC: 6.8 AI (ref 0–0.9)
ENA SCL70 AB SER-ACNC: <0.2 AI (ref 0–0.9)
ENA SM AB SER-ACNC: 0.5 AI (ref 0–0.9)
ENA SS-A AB SER-ACNC: >8 AI (ref 0–0.9)
ENA SS-B AB SER-ACNC: >8 AI (ref 0–0.9)
GAMMA GLOB ABNORMAL SERPL ELPH-MCNC: 4.89 G/DL (ref 0.5–1.6)
GAMMA GLOB SERPL ELPH-MCNC: 46.6 % (ref 12–22)
HIV 1+2 AB+HIV1 P24 AG SERPL QL IA: NORMAL
IGG/ALB SER: 0.58 {RATIO} (ref 1.1–1.8)
INTERPRETATION UR IFE-IMP: NORMAL
PROT PATTERN SERPL ELPH-IMP: ABNORMAL
PROT SERPL-MCNC: 10.5 G/DL (ref 6.4–8.2)
RHEUMATOID FACT SER QL LA: POSITIVE

## 2021-01-01 LAB
APTT SCREEN TO CONFIRM RATIO: 0.78 RATIO (ref 0–1.4)
CCP IGA+IGG SERPL IA-ACNC: 6 UNITS (ref 0–19)
CONFIRM APTT/NORMAL: 33.2 SEC (ref 0–55)
LA PPP-IMP: NORMAL
SCREEN APTT: 35.4 SEC (ref 0–51.9)
SCREEN DRVVT: 30.5 SEC (ref 0–47)
THROMBIN TIME: 18.8 SEC (ref 0–23)

## 2021-01-04 LAB
ANA HOMOGEN SER QL IF: NORMAL
ANA HOMOGEN TITR SER: NORMAL {TITER}
B2 GLYCOPROT1 IGA SER-ACNC: <9 GPI IGA UNITS (ref 0–25)
B2 GLYCOPROT1 IGG SER-ACNC: <9 GPI IGG UNITS (ref 0–20)
B2 GLYCOPROT1 IGM SER-ACNC: 12 GPI IGM UNITS (ref 0–32)
RYE IGE QN: POSITIVE

## 2021-01-05 ENCOUNTER — TELEPHONE (OUTPATIENT)
Dept: HEMATOLOGY ONCOLOGY | Facility: CLINIC | Age: 71
End: 2021-01-05

## 2021-01-05 ENCOUNTER — TELEPHONE (OUTPATIENT)
Dept: RHEUMATOLOGY | Facility: CLINIC | Age: 71
End: 2021-01-05

## 2021-01-05 DIAGNOSIS — E55.9 VITAMIN D DEFICIENCY: Primary | ICD-10-CM

## 2021-01-05 LAB
C-ANCA TITR SER IF: NORMAL TITER
MYELOPEROXIDASE AB SER IA-ACNC: <9 U/ML (ref 0–9)
P-ANCA ATYPICAL TITR SER IF: NORMAL TITER
P-ANCA TITR SER IF: NORMAL TITER
PROTEINASE3 AB SER IA-ACNC: <3.5 U/ML (ref 0–3.5)

## 2021-01-05 RX ORDER — ERGOCALCIFEROL 1.25 MG/1
50000 CAPSULE ORAL WEEKLY
Qty: 12 CAPSULE | Refills: 0 | Status: SHIPPED | OUTPATIENT
Start: 2021-01-05 | End: 2021-03-29 | Stop reason: ALTCHOICE

## 2021-01-05 NOTE — TELEPHONE ENCOUNTER
I phoned the patient and left a voicemail message regarding RS her appt with Dr Lizandro Ortega on 4/28/21 to 5/5/21 at 1140, as Dr Lizandro Ortega will not be in the office on 4/28  The [facility] phone number was provided in the event the patient would have to RS

## 2021-01-06 LAB — SCAN RESULT: NORMAL

## 2021-01-09 ENCOUNTER — IMMUNIZATIONS (OUTPATIENT)
Dept: FAMILY MEDICINE CLINIC | Facility: HOSPITAL | Age: 71
End: 2021-01-09

## 2021-01-09 DIAGNOSIS — Z23 ENCOUNTER FOR IMMUNIZATION: ICD-10-CM

## 2021-01-09 PROCEDURE — 0001A SARS-COV-2 / COVID-19 MRNA VACCINE (PFIZER-BIONTECH) 30 MCG: CPT

## 2021-01-09 PROCEDURE — 91300 SARS-COV-2 / COVID-19 MRNA VACCINE (PFIZER-BIONTECH) 30 MCG: CPT

## 2021-01-11 LAB — MISCELLANEOUS LAB TEST RESULT: NORMAL

## 2021-01-13 ENCOUNTER — HOSPITAL ENCOUNTER (EMERGENCY)
Facility: HOSPITAL | Age: 71
Discharge: HOME | End: 2021-01-13
Attending: EMERGENCY MEDICINE | Admitting: EMERGENCY MEDICINE
Payer: COMMERCIAL

## 2021-01-13 VITALS
SYSTOLIC BLOOD PRESSURE: 178 MMHG | TEMPERATURE: 98 F | RESPIRATION RATE: 18 BRPM | HEART RATE: 67 BPM | DIASTOLIC BLOOD PRESSURE: 67 MMHG | OXYGEN SATURATION: 97 %

## 2021-01-13 DIAGNOSIS — N39.0 UTI (URINARY TRACT INFECTION): ICD-10-CM

## 2021-01-13 DIAGNOSIS — N17.9 AKI (ACUTE KIDNEY INJURY) (HCC): Primary | ICD-10-CM

## 2021-01-13 LAB
ALBUMIN SERPL BCP-MCNC: 3.1 G/DL (ref 3.5–5)
ALP SERPL-CCNC: 85 U/L (ref 46–116)
ALT SERPL W P-5'-P-CCNC: 19 U/L (ref 12–78)
ANION GAP SERPL CALCULATED.3IONS-SCNC: 8 MMOL/L (ref 4–13)
APTT PPP: 23 SECONDS (ref 23–37)
AST SERPL W P-5'-P-CCNC: 34 U/L (ref 5–45)
BACTERIA UR QL AUTO: ABNORMAL /HPF
BASOPHILS # BLD AUTO: 0.1 THOUSANDS/ΜL (ref 0–0.1)
BASOPHILS NFR BLD AUTO: 1 % (ref 0–1)
BILIRUB SERPL-MCNC: 0.41 MG/DL (ref 0.2–1)
BILIRUB UR QL STRIP: NEGATIVE
BUN SERPL-MCNC: 29 MG/DL (ref 5–25)
CALCIUM ALBUM COR SERPL-MCNC: 8.8 MG/DL (ref 8.3–10.1)
CALCIUM SERPL-MCNC: 8.1 MG/DL (ref 8.3–10.1)
CHLORIDE SERPL-SCNC: 102 MMOL/L (ref 100–108)
CLARITY UR: ABNORMAL
CO2 SERPL-SCNC: 21 MMOL/L (ref 21–32)
COLOR UR: ABNORMAL
CREAT SERPL-MCNC: 1.44 MG/DL (ref 0.6–1.3)
EOSINOPHIL # BLD AUTO: 0.28 THOUSAND/ΜL (ref 0–0.61)
EOSINOPHIL NFR BLD AUTO: 3 % (ref 0–6)
ERYTHROCYTE [DISTWIDTH] IN BLOOD BY AUTOMATED COUNT: 16.4 % (ref 11.6–15.1)
GFR SERPL CREATININE-BSD FRML MDRD: 37 ML/MIN/1.73SQ M
GLUCOSE SERPL-MCNC: 106 MG/DL (ref 65–140)
GLUCOSE UR STRIP-MCNC: NEGATIVE MG/DL
HCT VFR BLD AUTO: 30 % (ref 34.8–46.1)
HGB BLD-MCNC: 9.3 G/DL (ref 11.5–15.4)
HGB UR QL STRIP.AUTO: NEGATIVE
IMM GRANULOCYTES # BLD AUTO: 0.03 THOUSAND/UL (ref 0–0.2)
IMM GRANULOCYTES NFR BLD AUTO: 0 % (ref 0–2)
INR PPP: 1.04 (ref 0.84–1.19)
KETONES UR STRIP-MCNC: NEGATIVE MG/DL
LEUKOCYTE ESTERASE UR QL STRIP: ABNORMAL
LYMPHOCYTES # BLD AUTO: 4.53 THOUSANDS/ΜL (ref 0.6–4.47)
LYMPHOCYTES NFR BLD AUTO: 47 % (ref 14–44)
MCH RBC QN AUTO: 27.9 PG (ref 26.8–34.3)
MCHC RBC AUTO-ENTMCNC: 31 G/DL (ref 31.4–37.4)
MCV RBC AUTO: 90 FL (ref 82–98)
MONOCYTES # BLD AUTO: 0.49 THOUSAND/ΜL (ref 0.17–1.22)
MONOCYTES NFR BLD AUTO: 5 % (ref 4–12)
NEUTROPHILS # BLD AUTO: 4.29 THOUSANDS/ΜL (ref 1.85–7.62)
NEUTS SEG NFR BLD AUTO: 44 % (ref 43–75)
NITRITE UR QL STRIP: NEGATIVE
NON-SQ EPI CELLS URNS QL MICRO: ABNORMAL /HPF
NRBC BLD AUTO-RTO: 0 /100 WBCS
PH UR STRIP.AUTO: 5.5 [PH]
PLATELET # BLD AUTO: 254 THOUSANDS/UL (ref 149–390)
PMV BLD AUTO: 9.6 FL (ref 8.9–12.7)
POTASSIUM SERPL-SCNC: 4.3 MMOL/L (ref 3.5–5.3)
PROT SERPL-MCNC: 10.3 G/DL (ref 6.4–8.2)
PROT UR STRIP-MCNC: NEGATIVE MG/DL
PROTHROMBIN TIME: 13.7 SECONDS (ref 11.6–14.5)
RBC # BLD AUTO: 3.33 MILLION/UL (ref 3.81–5.12)
RBC #/AREA URNS AUTO: ABNORMAL /HPF
SODIUM SERPL-SCNC: 131 MMOL/L (ref 136–145)
SP GR UR STRIP.AUTO: 1.01 (ref 1–1.03)
UROBILINOGEN UR QL STRIP.AUTO: 0.2 E.U./DL
WBC # BLD AUTO: 9.72 THOUSAND/UL (ref 4.31–10.16)
WBC #/AREA URNS AUTO: ABNORMAL /HPF

## 2021-01-13 PROCEDURE — 81001 URINALYSIS AUTO W/SCOPE: CPT | Performed by: EMERGENCY MEDICINE

## 2021-01-13 PROCEDURE — 96360 HYDRATION IV INFUSION INIT: CPT

## 2021-01-13 PROCEDURE — 85025 COMPLETE CBC W/AUTO DIFF WBC: CPT | Performed by: EMERGENCY MEDICINE

## 2021-01-13 PROCEDURE — 99284 EMERGENCY DEPT VISIT MOD MDM: CPT | Performed by: EMERGENCY MEDICINE

## 2021-01-13 PROCEDURE — 80053 COMPREHEN METABOLIC PANEL: CPT | Performed by: EMERGENCY MEDICINE

## 2021-01-13 PROCEDURE — 85610 PROTHROMBIN TIME: CPT | Performed by: EMERGENCY MEDICINE

## 2021-01-13 PROCEDURE — 99283 EMERGENCY DEPT VISIT LOW MDM: CPT

## 2021-01-13 PROCEDURE — 36415 COLL VENOUS BLD VENIPUNCTURE: CPT | Performed by: EMERGENCY MEDICINE

## 2021-01-13 PROCEDURE — 85730 THROMBOPLASTIN TIME PARTIAL: CPT | Performed by: EMERGENCY MEDICINE

## 2021-01-13 RX ORDER — CEPHALEXIN 500 MG/1
500 CAPSULE ORAL EVERY 12 HOURS SCHEDULED
Qty: 14 CAPSULE | Refills: 0 | Status: SHIPPED | OUTPATIENT
Start: 2021-01-13 | End: 2021-01-20

## 2021-01-13 RX ORDER — CEPHALEXIN 250 MG/1
500 CAPSULE ORAL ONCE
Status: COMPLETED | OUTPATIENT
Start: 2021-01-13 | End: 2021-01-13

## 2021-01-13 RX ADMIN — SODIUM CHLORIDE 500 ML: 0.9 INJECTION, SOLUTION INTRAVENOUS at 12:13

## 2021-01-13 RX ADMIN — CEPHALEXIN 500 MG: 250 CAPSULE ORAL at 14:03

## 2021-01-13 NOTE — ED PROVIDER NOTES
History  Chief Complaint   Patient presents with    Bleeding/Bruising     patient reports hvaing petechiae on left lower leg and right shin that appeared this am  no thinners and no injury        History provided by:  Patient   used: No      Patient is a 60-year-old female presenting to emergency department with petechia over the lower extremities bilaterally  Started last night  Not painful or itchy  No drainage  No fevers or chills  No chest pain  No shortness of breath  No nausea vomiting  No diarrhea  No abdominal pain  No pain with urination  No urine complaints  Patient has lupus  On hydrochloroquine  Patient also got the COVID shot vaccine, 1st one 4 days ago  MDM will check electrolytes, CBC, coags to evaluate for cause of petechia    Patient with new a KI  She states she has been drinking a lot of coffee and could be dehydrated  Also concern for potential lupus activation after vaccine  I did discuss case with Dr Kenya Mei from rheumatology  They will follow-up with patient as outpatient  They will get repeat blood work as outpatient  Will check urine here  Will give fluids in case this is related to dehydration  Discussed patient  Agreeable with plan  Prior to Admission Medications   Prescriptions Last Dose Informant Patient Reported? Taking? QUEtiapine (SEROquel) 300 mg tablet  Self Yes No   Sig: Take 300 mg by mouth daily at bedtime  albuterol (Ventolin HFA) 90 mcg/act inhaler   No No   Sig: Inhale 1 puff every 4 (four) hours as needed for wheezing   aspirin 81 MG tablet  Self Yes No   Sig: Take 81 mg by mouth daily  buPROPion (WELLBUTRIN) 100 mg tablet  Self Yes No   Sig: Take 100 mg by mouth 3 (three) times a day  calcium citrate-vitamin D (CITRACAL+D) 315-200 MG-UNIT per tablet  Self Yes No   Sig: Take 1 tablet by mouth daily     ergocalciferol (VITAMIN D2) 50,000 units  Self No No   Sig: Take 1 capsule (50,000 Units total) by mouth 2 (two) times a week for 32 doses   ergocalciferol (VITAMIN D2) 50,000 units   No No   Sig: Take 1 capsule (50,000 Units total) by mouth once a week   hydroxychloroquine (PLAQUENIL) 200 mg tablet  Self No No   Sig: Take 1 tablet (200 mg total) by mouth 2 (two) times a day with meals   levothyroxine 25 mcg tablet   No No   Sig: TAKE 1 TABLET BY MOUTH DAILY   multivitamin-iron-minerals-folic acid (CENTRUM) chewable tablet  Self Yes No   Sig: Chew 1 tablet daily  omeprazole (PriLOSEC) 20 mg delayed release capsule  Self No No   Sig: Take 1 capsule (20 mg total) by mouth daily   temazepam (RESTORIL) 30 mg capsule  Self Yes No   Sig: Take 2 capsules by mouth daily at bedtime        Facility-Administered Medications: None       Past Medical History:   Diagnosis Date    [facility] 1 disorder ([facility] )     Hypothyroidism     Psychiatric disorder     Sjogren's syndrome ([facility] )     Systemic lupus erythematosus ([facility] )        Past Surgical History:   Procedure Laterality Date    BREAST BIOPSY Right     FRACTURE SURGERY Right     elbow    GASTRIC BYPASS      HYSTERECTOMY Bilateral 1975    IR BIOPSY LYMPH NODE  10/8/2020    OK BRONCHOSCOPY,DIAGNOSTIC N/A 12/8/2017    Procedure: BRONCHOSCOPY;  Surgeon: Javi Kern MD;  Location: [facility]; Service: Pulmonary       Family History   Problem Relation Age of Onset    Heart disease Mother     Diabetes Mother     Kidney cancer Mother     Heart disease Father     Stroke Father     Diabetes Father     Cancer Father     Leukemia Half-Sister 48     I have reviewed and agree with the history as documented  E-Cigarette/Vaping    E-Cigarette Use Never User      E-Cigarette/Vaping Substances     Social History     Tobacco Use    Smoking status: Never Smoker    Smokeless tobacco: Never Used   Substance Use Topics    Alcohol use: No    Drug use: No       Review of Systems   Constitutional: Negative for chills, diaphoresis and fever     HENT: Negative for congestion and sore throat  Respiratory: Negative for cough, shortness of breath, wheezing and stridor  Cardiovascular: Negative for chest pain, palpitations and leg swelling  Gastrointestinal: Negative for abdominal pain, blood in stool, diarrhea, nausea and vomiting  Genitourinary: Negative for dysuria, frequency and urgency  Musculoskeletal: Negative for neck stiffness  Skin: Positive for rash  Negative for pallor  Neurological: Negative for dizziness, syncope, weakness, light-headedness and headaches  All other systems reviewed and are negative  Physical Exam  Physical Exam  Vitals signs reviewed  Constitutional:       Appearance: She is well-developed  HENT:      Head: Normocephalic and atraumatic  Eyes:      Extraocular Movements: Extraocular movements intact  Pupils: Pupils are equal, round, and reactive to light  Neck:      Musculoskeletal: Neck supple  Cardiovascular:      Rate and Rhythm: Normal rate and regular rhythm  Pulmonary:      Effort: Pulmonary effort is normal  No respiratory distress  Musculoskeletal: Normal range of motion  General: No swelling or tenderness  Skin:     General: Skin is warm and dry  Capillary Refill: Capillary refill takes less than 2 seconds  Findings: Rash present  Comments: Petechia noted left and right lower extremities  Below-knee bilaterally  Worse on the left  Neurological:      General: No focal deficit present  Mental Status: She is alert and oriented to person, place, and time           Vital Signs  ED Triage Vitals [01/13/21 1040]   Temperature Pulse Respirations Blood Pressure SpO2   98 °F (36 7 °C) 67 18 (!) 178/67 97 %      Temp Source Heart Rate Source Patient Position - Orthostatic VS BP Location FiO2 (%)   Oral Monitor Lying Right arm --      Pain Score       --           Vitals:    01/13/21 1040   BP: (!) 178/67   Pulse: 67   Patient Position - Orthostatic VS: Lying         Visual Acuity      ED Medications  Medications   sodium chloride 0 9 % bolus 500 mL (0 mL Intravenous Stopped 1/13/21 1325)   cephalexin (KEFLEX) capsule 500 mg (500 mg Oral Given 1/13/21 1403)       Diagnostic Studies  Results Reviewed     Procedure Component Value Units Date/Time    Urine Microscopic [[id number]]  (Abnormal) Collected: 01/13/21 1322    Lab Status: Final result Specimen: Urine, Clean Catch Updated: 01/13/21 1353     RBC, UA None Seen /hpf      WBC, UA 4-10 /hpf      Epithelial Cells None Seen /hpf      Bacteria, UA Moderate /hpf     UA w Reflex to Microscopic w Reflex to Culture [[id number]]  (Abnormal) Collected: 01/13/21 1322    Lab Status: Final result Specimen: Urine, Clean Catch Updated: 01/13/21 1332     Color, UA Light Yellow     Clarity, UA Slightly Cloudy     Specific [place], UA 1 010     pH, UA 5 5     Leukocytes, UA Small     Nitrite, UA Negative     Protein, UA Negative mg/dl      Glucose, UA Negative mg/dl      Ketones, UA Negative mg/dl      Urobilinogen, UA 0 2 E U /dl      Bilirubin, UA Negative     Blood, UA Negative    CBC and differential [[id number]]  (Abnormal) Collected: 01/13/21 1109    Lab Status: Final result Specimen: Blood from Arm, Left Updated: 01/13/21 1200     WBC 9 72 Thousand/uL      RBC 3 33 Million/uL      Hemoglobin 9 3 g/dL      Hematocrit 30 0 %      MCV 90 fL      MCH 27 9 pg      MCHC 31 0 g/dL      RDW 16 4 %      MPV 9 6 fL      Platelets 308 Thousands/uL      nRBC 0 /100 WBCs      Neutrophils Relative 44 %      Immat GRANS % 0 %      Lymphocytes Relative 47 %      Monocytes Relative 5 %      Eosinophils Relative 3 %      Basophils Relative 1 %      Neutrophils Absolute 4 29 Thousands/µL      Immature Grans Absolute 0 03 Thousand/uL      Lymphocytes Absolute 4 53 Thousands/µL      Monocytes Absolute 0 49 Thousand/µL      Eosinophils Absolute 0 28 Thousand/µL      Basophils Absolute 0 10 Thousands/µL     Narrative: This is an appended report    These results have been appended to a previously verified report      Comprehensive metabolic panel [[id number]]  (Abnormal) Collected: 01/13/21 1109    Lab Status: Final result Specimen: Blood from Arm, Left Updated: 01/13/21 1142     Sodium 131 mmol/L      Potassium 4 3 mmol/L      Chloride 102 mmol/L      CO2 21 mmol/L      ANION GAP 8 mmol/L      BUN 29 mg/dL      Creatinine 1 44 mg/dL      Glucose 106 mg/dL      Calcium 8 1 mg/dL      Corrected Calcium 8 8 mg/dL      AST 34 U/L      ALT 19 U/L      Alkaline Phosphatase 85 U/L      Total Protein 10 3 g/dL      Albumin 3 1 g/dL      Total Bilirubin 0 41 mg/dL      eGFR 37 ml/min/1 73sq m     Narrative:      [place] guidelines for Chronic Kidney Disease (CKD):     Stage 1 with normal or high GFR (GFR > 90 mL/min/1 73 square meters)    Stage 2 Mild CKD (GFR = 60-89 mL/min/1 73 square meters)    Stage 3A Moderate CKD (GFR = 45-59 mL/min/1 73 square meters)    Stage 3B Moderate CKD (GFR = 30-44 mL/min/1 73 square meters)    Stage 4 Severe CKD (GFR = 15-29 mL/min/1 73 square meters)    Stage 5 End Stage CKD (GFR <15 mL/min/1 73 square meters)  Note: GFR calculation is accurate only with a steady state creatinine    Protime-INR [[id number]]  (Normal) Collected: 01/13/21 1109    Lab Status: Final result Specimen: Blood from Arm, Left Updated: 01/13/21 1126     Protime 13 7 seconds      INR 1 04    APTT [[id number]]  (Normal) Collected: 01/13/21 1109    Lab Status: Final result Specimen: Blood from Arm, Left Updated: 01/13/21 1126     PTT 23 seconds                  No orders to display              Procedures  Procedures         ED Course                                           MDM    Disposition  Final diagnoses:   ANNIE (acute kidney injury) ([facility] )   UTI (urinary tract infection)     Time reflects when diagnosis was documented in both MDM as applicable and the Disposition within this note     Time User Action Codes Description Comment    1/13/2021  1:58 PM Claudia Blunt Add [N17 9] ANNIE (acute kidney injury) ([facility] )     1/13/2021  1:58 PM Claudia Blunt Add [N39 0] UTI (urinary tract infection)       ED Disposition     ED Disposition Condition Date/Time Comment    Discharge Stable Wed Jan 13, 2021  1:58 PM Jakob Daniels discharge to home/self care  Follow-up Information     Follow up With Specialties Details Why Contact Info Additional Information    Yesi Aguiar MD Family Medicine In 3 days Re-evaluation, blood work to recheck kidney function and UA 2003 [address] [facility] [phone]       [facility] Emergency Department Emergency Medicine  As needed, If symptoms worsen [address] [facility] [address] [facility] Emergency Department, [address], [place], [place], [address]    Rheumatologist  Schedule an appointment as soon as possible for a visit  Please schedule appointment with your rheumatologist for next week            Discharge Medication List as of 1/13/2021  1:59 PM      START taking these medications    Details   cephalexin (KEFLEX) 500 mg capsule Take 1 capsule (500 mg total) by mouth every 12 (twelve) hours for 7 days, Starting Wed 1/13/2021, Until Wed 1/20/2021, Normal         CONTINUE these medications which have NOT CHANGED    Details   albuterol (Ventolin HFA) 90 mcg/act inhaler Inhale 1 puff every 4 (four) hours as needed for wheezing, Starting Thu 10/29/2020, Normal      aspirin 81 MG tablet Take 81 mg by mouth daily  , Historical Med      buPROPion (WELLBUTRIN) 100 mg tablet Take 100 mg by mouth 3 (three) times a day , Historical Med      calcium citrate-vitamin D (CITRACAL+D) 315-200 MG-UNIT per tablet Take 1 tablet by mouth daily  , Historical Med      ergocalciferol (VITAMIN D2) 50,000 units Take 1 capsule (50,000 Units total) by mouth once a week, Starting Tue 1/5/2021, Normal      hydroxychloroquine (PLAQUENIL) 200 mg tablet Take 1 tablet (200 mg total) by mouth 2 (two) times a day with meals, Starting Wed 10/28/2020, Until Mon 4/26/2021, Normal      levothyroxine 25 mcg tablet TAKE 1 TABLET BY MOUTH DAILY, Normal      multivitamin-iron-minerals-folic acid (CENTRUM) chewable tablet Chew 1 tablet daily  , Historical Med      omeprazole (PriLOSEC) 20 mg delayed release capsule Take 1 capsule (20 mg total) by mouth daily, Starting Thu 8/6/2020, Until Wed 11/4/2020, Normal      QUEtiapine (SEROquel) 300 mg tablet Take 300 mg by mouth daily at bedtime  , Historical Med      temazepam (RESTORIL) 30 mg capsule Take 2 capsules by mouth daily at bedtime  , Historical Med           No discharge procedures on file      PDMP Review     None          ED Provider  Electronically Signed by           Christie Villa MD  01/13/21 3392

## 2021-01-14 ENCOUNTER — TELEPHONE (OUTPATIENT)
Dept: OBGYN CLINIC | Facility: HOSPITAL | Age: 71
End: 2021-01-14

## 2021-01-14 NOTE — TELEPHONE ENCOUNTER
I did review her pictures and recommend she monitor the symptoms over the next few days  If her symptoms do not improve by Monday or she develops worsening rash/any new complaints, please ask her to call us back, thanks

## 2021-01-14 NOTE — TELEPHONE ENCOUNTER
Patient is calling stating that she has had a rash that broke out on her legs  She went to the hospital yesterday and they did take images of it which you can look at  She wanted to make you aware

## 2021-01-27 ENCOUNTER — TELEPHONE (OUTPATIENT)
Dept: HEMATOLOGY ONCOLOGY | Facility: CLINIC | Age: 71
End: 2021-01-27

## 2021-01-27 NOTE — TELEPHONE ENCOUNTER
NEW PATIENT INTAKE   REFERRED TO WHICH SURGEON? Dr Seaman   REFERRING PROVIDER'S NAME   Dr Carey Rheumatologist   NAME OF PERSON CALL AND THEIR RELATIONSHIP TO PATIENT PT    REFERRING PROVIDER'S OFFICE PHONE #   [phone]   REASON FOR REFERRAL/CONSULT     CT was doneenlarged nodes in her lungs   DID PATIENT HAVE TESTING COMPLETED? CT and labs   FACILITY TESTING PERFORMED  (EX  [facility], [facility], ETC)   [facility]   IS INSURANCE REFERRAL NEEDED?    No    BEST NUMBER TO REACH PATIENT   [phone]   COMMENTS:

## 2021-01-28 ENCOUNTER — IMMUNIZATIONS (OUTPATIENT)
Dept: FAMILY MEDICINE CLINIC | Facility: HOSPITAL | Age: 71
End: 2021-01-28

## 2021-01-28 DIAGNOSIS — Z23 ENCOUNTER FOR IMMUNIZATION: Primary | ICD-10-CM

## 2021-01-28 PROCEDURE — 0002A SARS-COV-2 / COVID-19 MRNA VACCINE (PFIZER-BIONTECH) 30 MCG: CPT

## 2021-01-28 PROCEDURE — 91300 SARS-COV-2 / COVID-19 MRNA VACCINE (PFIZER-BIONTECH) 30 MCG: CPT

## 2021-01-29 NOTE — TELEPHONE ENCOUNTER
I called patient this morning for a 2nd attempt  The phone rang but unable to leave VM as mailbox is full  Will try again next week

## 2021-02-08 NOTE — PROGRESS NOTES
Assessment and Plan:   Ms Nicole Eng is a 60-year-old female with history significant for systemic lupus erythematosus and Sjogren's syndrome diagnosed in her early 45s by Rheumatology in [place], who presents for a follow-up  She is currently on hydroxychloroquine 200 mg twice daily that was started in October 2020  # SLE and Sjogren's syndrome  # Sjogren's related ILD  - Tu Puentes presents today for a follow-up of systemic lupus erythematosus and Sjogren's syndrome which has primarily presented with chronic fatigue, sicca complaints, the possible lower extremity skin rash and likely the Sjogren's syndrome related interstitial lung disease  She was started on hydroxychloroquine 200 mg twice daily at her last office visit in October 2020 and I recommend she continue this for now as well as follow up for annual eye exams  To help with the sicca complaints I also offered her a trial with pilocarpine 5 mg 3 times daily and she will continue follow-up with her ophthalmologist as well  Once her insurance changes we can try to represcribe the cyclosporine eyedrops  - It is reassuring to note over the years she has not presented with progressively worsening complaints and overall appears to be stable at this time, with the most pertinent issues that need to be addressed including the interstitial lung disease and extensive intrathoracic and intra-abdominal lymphadenopathy  She did have a repeat CT chest done yesterday and I will wait to review the results, but this seems concerning for lymphocytic interstitial pneumonia that may be secondary to the Sjogren's syndrome  It is reassuring to note that she has been stable from a respiratory standpoint and only complains of dyspnea on exertion which is not debilitating to her  She is still due to schedule the pulmonary function testing    The plan from pulmonology is to continue monitoring for now without the indication to start her on immunosuppressives      - It is concerning to note such extensive lymphadenopathy which appears to have worsened on the recent CT chest from September 2020  This is possibly secondary to the underlying autoimmune diseases (which may also predispose to lymphoproliferative disorders), but given the concerns for the B-cell lymphocytosis with a CLL phenotype on the peripheral flow cytometry as well as the worsening lymphadenopathy, I would recommend establishing with thoracic surgery for consideration of a lymph node biopsy  This will help with a diagnosis and determining the next steps in treatment  She does have a follow-up with Hematology/Oncology in May      - Other then the hydroxychloroquine if additional rheumatic medications are required I would likely consider mycophenolate or belimumab  I will try to avoid use of methotrexate given the underlying lung disease as well as azathioprine which can be associated with an increased risk of lymphoma which she is anyways predisposed to due to the underlying Sjogren's syndrome  # Skin rash  - The appearance of this seem suggestive of small-vessel vasculitis, but it is interesting to note that her symptoms resolve spontaneously within a few days and she has only had a few occurrences per year  I requested she contact me if this recurs as I will try to get her in for a skin biopsy with Dermatology  Plan:  Diagnoses and all orders for this visit:    Systemic lupus erythematosus, unspecified SLE type, unspecified organ involvement status ([facility] [address] )  -     CBC and differential; Future  -     Comprehensive metabolic panel; Future  -     C-reactive protein; Future  -     Sedimentation rate, automated; Future  -     C4 complement; Future  -     C3 complement; Future  -     Urinalysis with microscopic  -     Protein / creatinine ratio, urine  -     hydroxychloroquine (PLAQUENIL) 200 mg tablet;  Take 1 tablet (200 mg total) by mouth 2 (two) times a day with meals    Systemic lupus erythematosus, unspecified SLE type, unspecified organ involvement status ([facility] [address] )  Comments:  Previously been on HCQ, AZA, MTX and Benlysta  Orders:  -     CBC and differential; Future  -     Comprehensive metabolic panel; Future  -     C-reactive protein; Future  -     Sedimentation rate, automated; Future  -     C4 complement; Future  -     C3 complement; Future  -     Urinalysis with microscopic  -     Protein / creatinine ratio, urine  -     hydroxychloroquine (PLAQUENIL) 200 mg tablet; Take 1 tablet (200 mg total) by mouth 2 (two) times a day with meals    Sjogren's syndrome, with unspecified organ involvement (HCC)  -     pilocarpine (SALAGEN) 5 mg tablet; Take 1 tablet (5 mg total) by mouth 3 (three) times a day    Long-term use of Plaquenil    Elevated rheumatoid factor    Lymphocytic interstitial pneumonia (HCC)    Mediastinal adenopathy    Osteopenia, unspecified location    Bilateral primary osteoarthritis of knee    Vitamin D deficiency    Stage 3b chronic kidney disease    Other orders  -     LORazepam (ATIVAN) 1 mg tablet      Activities as tolerated  Exercise: try to maintain a low impact exercise regimen as much as possible  Walk for 30 minutes a day for at least 3 days a week  Continue other medications as prescribed by PCP and other specialists  RTC in 3 months  HPI    INITIAL VISIT NOTE (10/2020):  Ms Reggie Sage is a 71-year-old female with history significant for systemic lupus erythematosus and Sjogren's syndrome diagnosed in her early 45s by Rheumatology in [place], who presents to reestablish with [facility]  She is currently not on DMARDs  She is referred today by Dr Michael Loyola for a rheumatology consult      Patient reports in her early 45s she started to develop various joint pains which eventually led to the diagnosis of systemic lupus erythematosus and Sjogren's syndrome    She reports surrounding the time of her diagnosis is when the joint pains were most prevalent, but appear to have spontaneously resolved  She did experience joint pains again a few years later but states that this also resolved and recently she has not had any joint related issues  At the time of her diagnosis it appears like she was treated with steroids as well as hydroxychloroquine  She was on the hydroxychloroquine for less than a year at which time it was discontinued due to nausea and other side effects which she cannot recall  She has never been restarted on the hydroxychloroquine following that      The timeline of her rheumatology visits is unclear as we do not have her complete prior records  Based on chart review as well as patient's history, she has been on multiple DMARDs including methotrexate, azathioprine and Benlysta  She states that the methotrexate and azathioprine were prescribed in the past 10-15 years  It is unclear what these medications for specifically prescribed for and if they helped with any of her symptoms  Most recently she was on Benlysta approximately 3 years ago when she was being seen by Dr Micheal Crisostomo  She states that this helped significantly with how she was feeling overall and her general well-being, as well as significantly helped with the fatigue  She was on this for about 7-8 months and unfortunately it was discontinued when she turned 74 as her insurance no longer covered it  I do not see any of her lupus related labs in our system, but on review of her prior rheumatologist's note from 2015 she appeared to have a positive MALATHI at 1:1280, with positive SSA, SSB and RNP antibodies  A rheumatoid factor was also elevated at 105 with an ESR of 84  Her antiphospholipid antibodies were negative        She states over the years it has primarily been the excessive fatigue that has bothered her and only recently has she been dealing with chronic respiratory symptoms as well as intrathoracic and abdominal lymphadenopathy that has been monitored by Oncology    In terms of her respiratory symptoms she reports shortness of breath with exertion, usually with walking about a block, but at times she may experience shortness of breath with day-to-day activities  She also has a chronic productive cough  On review of her CT chest and abdomen as far back as 2017, she has had abnormal findings concerning for interstitial lung disease as well as the extensive lymphadenopathy  On her most recent CT chest from September 2020 this showed asymmetric bilateral areas of ground-glass opacities and lung cysts likely representing a spectrum of lymphocytic interstitial pneumonia as well as worsening adenopathy in the mediastinum and bilateral hilar regions  She did undergo an IR guided biopsy of an axillary lymph node on 10/08/2020 which did not show any evidence of a lymphoproliferative disorder  The biopsy was not diagnostic  On her most recent follow-up with Hematology/Oncology, as there are no clear features to suggest an underlying malignancy the plan will be to continue monitoring  She has not seen pulmonology yet and is actually establishing for her initial appointment tomorrow  She did undergo a bronchoscopy in December 2017 with cytology negative for malignancy and showing an abundant mixed but predominantly chronic inflammatory infiltrate  Flow cytometry as well as cultures at that time were unrevealing      As mentioned, she is primarily dealing with the abnormal CT chest and abdomen findings at this time  Symptom wise she overall appears to be stable  She does report chronic dry eyes, dry nose and dry mouth which she manages with topical lubricants  Due to the dry nose as well as recent face masking she has been noticing nose bleeds and feels like there may be a "hole" in her nasal septum  She has not yet established with ENT  On occasion she may notice a "butterfly rash" and states that she is photosensitive but strictly avoids sun exposure and utilizes daily sunscreen    She has also been experiencing blister-like skin lesions scattered on her extremities, which heal spontaneously  She has not had any mouth ulcerations in years  She denies fevers, chills, night sweats, unintentional weight loss, focal alopecia, inflammatory eye disease, psoriasis, swollen glands, pleuritic chest pain, hemoptysis, abdominal pain, vomiting, diarrhea, blood in stools (is up-to-date with a screening colonoscopy), blood clots, miscarriages, Raynaud's, joint pain/swelling/stiffness, renal disease, neurological disease/seizures or family history of autoimmune disease  2/10/2021:  Patient presents for a follow-up of systemic lupus erythematosus and Sjogren's syndrome  She is currently on hydroxychloroquine 200 mg twice daily that was started at the last office visit in October 2020  We reviewed her testing done following the last visit which showed a positive MALATHI 1:1280 speckled pattern with an SSA and SSB antibody greater than 8  An RNP antibody was 6 8  A rheumatoid factor was elevated at 80  An SPEP showed a possible monoclonal gammopathy but this was not clearly determined on serum immunofixation  An immunoglobulin assay showed hypergammaglobulinemia  A peripheral flow cytometry showed 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  A vitamin-D level was low at 13 5  An ESR and CRP were elevated at 130 and 5 4, respectively  The remainder of the MALATHI specificity, C3, C4, antiphospholipid antibody testing, hepatitis panel, HIV, urinalysis, urine protein creatinine ratio, angiotensin-converting enzyme, anti neutrophilic cytoplasmic antibody, CK and anti CCP antibody were unremarkable  She had a CT chest done yesterday with the results still pending  She is going to be establishing with thoracic surgery soon to consider the mediastinal lymphadenopathy biopsy  She reports overall she has been stable in terms of her symptoms    She mostly describes the dry eyes worse on the right side and unfortunately the Restasis has not been approved by her insurance  She also reports dry nose and dry mouth  She will have nose bleeds due to the dryness  She reports chronic fatigue without any improvement seen with starting the hydroxychloroquine  She also reports within the past year she has had approximately 4 episodes of a pinpoint, red and itchy skin rash arise on her bilateral lower extremities  At times she may use topical steroids but has never been prescribed oral steroids for this  The rash will usually resolve spontaneously within 3-4 days  She has not been seen by Dermatology for this  She was recently seen in the emergency room for an occurrence of the skin rash after receiving the COVID vaccine without any specific treatment and states that the rash eventually resolved spontaneously  There is no rash today  No other complaints that she describes today  The following portions of the patient's history were reviewed and updated as appropriate: allergies, current medications, past family history, past medical history, past social history, past surgical history and problem list       Review of Systems  Constitutional: Negative for weight change, fevers, chills, night sweats  Positive for fatigue  ENT/Mouth: Negative for hearing changes, ear pain, nasal congestion, sinus pain, hoarseness, sore throat, rhinorrhea, swallowing difficulty  Eyes: Negative for pain, redness, discharge, vision changes  Cardiovascular: Negative for chest pain, SOB, palpitations  Respiratory: Negative for cough, sputum, wheezing, dyspnea  Gastrointestinal: Negative for nausea, vomiting, diarrhea, constipation, pain, heartburn  Genitourinary: Negative for dysuria, urinary frequency, hematuria  Musculoskeletal: As per HPI  Skin: Negative for skin rash, color changes  Neuro: Negative for weakness, numbness, tingling, loss of consciousness  Psych: Negative for anxiety, depression     Heme/Lymph: Negative for easy bruising, bleeding, lymphadenopathy  Past Medical History:   Diagnosis Date    Bipolar 1 disorder ([facility] )     Hypothyroidism     Psychiatric disorder     Sjogren's syndrome ([facility] )     Systemic lupus erythematosus ([facility] )        Past Surgical History:   Procedure Laterality Date    BREAST BIOPSY Right     FRACTURE SURGERY Right     elbow    GASTRIC BYPASS      HYSTERECTOMY Bilateral 1975    IR BIOPSY LYMPH NODE  10/8/2020    ND BRONCHOSCOPY,DIAGNOSTIC N/A 12/8/2017    Procedure: BRONCHOSCOPY;  Surgeon: Cristóbal Bansal MD;  Location: [facility];   Service: Pulmonary       Social History     Socioeconomic History    Marital status: /Civil Union     Spouse name: Not on file    Number of children: Not on file    Years of education: Not on file    Highest education level: Not on file   Occupational History    Not on file   Social Needs    Financial resource strain: Not on file    Food insecurity     Worry: Not on file     Inability: Not on file    Transportation needs     Medical: Not on file     Non-medical: Not on file   Tobacco Use    Smoking status: Never Smoker    Smokeless tobacco: Never Used   Substance and Sexual Activity    Alcohol use: No    Drug use: No    Sexual activity: Yes     Partners: Male     Birth control/protection: None     Comment:    Lifestyle    Physical activity     Days per week: Not on file     Minutes per session: Not on file    Stress: Not on file   Relationships    Social connections     Talks on phone: Not on file     Gets together: Not on file     Attends [employer] service: Not on file     Active member of club or organization: Not on file     Attends meetings of clubs or organizations: Not on file     Relationship status: Not on file    Intimate partner violence     Fear of current or ex partner: Not on file     Emotionally abused: Not on file     Physically abused: Not on file     Forced sexual activity: Not on file   Other Topics Concern  Not on file   Social History Narrative    Not on file       Family History   Problem Relation Age of Onset    Heart disease Mother     Diabetes Mother     Kidney cancer Mother     Heart disease Father     Stroke Father     Diabetes Father     Cancer Father     Leukemia Half-Sister 48       Allergies   Allergen Reactions    Ciprofloxacin Hives and Swelling    Cymbalta [Duloxetine Hcl] Other (See Comments)     Hallucinations, fatigue, faints    Percocet [Oxycodone-Acetaminophen]      Dry mouth       Current Outpatient Medications:     albuterol (Ventolin HFA) 90 mcg/act inhaler, Inhale 1 puff every 4 (four) hours as needed for wheezing, Disp: 3 Inhaler, Rfl: 3    aspirin 81 MG tablet, Take 81 mg by mouth daily  , Disp: , Rfl:     buPROPion (WELLBUTRIN) 100 mg tablet, Take 100 mg by mouth 3 (three) times a day , Disp: , Rfl:     calcium citrate-vitamin D (CITRACAL+D) 315-200 MG-UNIT per tablet, Take 1 tablet by mouth daily  , Disp: , Rfl:     ergocalciferol (VITAMIN D2) 50,000 units, Take 1 capsule (50,000 Units total) by mouth 2 (two) times a week for 32 doses, Disp: 8 capsule, Rfl: 3    ergocalciferol (VITAMIN D2) 50,000 units, Take 1 capsule (50,000 Units total) by mouth once a week, Disp: 12 capsule, Rfl: 0    hydroxychloroquine (PLAQUENIL) 200 mg tablet, Take 1 tablet (200 mg total) by mouth 2 (two) times a day with meals, Disp: 60 tablet, Rfl: 5    levothyroxine 25 mcg tablet, TAKE 1 TABLET BY MOUTH DAILY, Disp: 90 tablet, Rfl: 1    LORazepam (ATIVAN) 1 mg tablet, , Disp: , Rfl:     multivitamin-iron-minerals-folic acid (CENTRUM) chewable tablet, Chew 1 tablet daily  , Disp: , Rfl:     omeprazole (PriLOSEC) 20 mg delayed release capsule, Take 1 capsule (20 mg total) by mouth daily, Disp: 90 capsule, Rfl: 1    pilocarpine (SALAGEN) 5 mg tablet, Take 1 tablet (5 mg total) by mouth 3 (three) times a day, Disp: 90 tablet, Rfl: 2    QUEtiapine (SEROquel) 300 mg tablet, Take 300 mg by mouth daily at bedtime  , Disp: , Rfl:     temazepam (RESTORIL) 30 mg capsule, Take 2 capsules by mouth daily at bedtime  , Disp: , Rfl:       Objective:    Vitals:    02/10/21 1126   BP: 124/86   Pulse: (!) 110       Physical Exam  General: Well appearing, well nourished, in no distress  Oriented x 3, normal mood and affect  Ambulating without difficulty  Skin: Good turgor, no rash, unusual bruising or prominent lesions  Hair: Normal texture and distribution  Nails: Normal color, no deformities  HEENT:  Head: Normocephalic, atraumatic  Eyes: Conjunctiva clear, sclera non-icteric, EOM intact  Extremities: No amputations or deformities, cyanosis, edema  Musculoskeletal: There is no peripheral joint soft tissue swelling noted  Neurologic: Alert and oriented  No focal neurological deficits appreciated  Psychiatric: Normal mood and affect  KIRK Solo    Rheumatology

## 2021-02-09 ENCOUNTER — HOSPITAL ENCOUNTER (OUTPATIENT)
Dept: CT IMAGING | Facility: HOSPITAL | Age: 71
Discharge: HOME | End: 2021-02-09
Payer: COMMERCIAL

## 2021-02-09 ENCOUNTER — TELEPHONE (OUTPATIENT)
Dept: HEMATOLOGY ONCOLOGY | Facility: CLINIC | Age: 71
End: 2021-02-09

## 2021-02-09 DIAGNOSIS — R59.1 LYMPHADENOPATHY: ICD-10-CM

## 2021-02-09 PROCEDURE — G1004 CDSM NDSC: HCPCS

## 2021-02-09 PROCEDURE — 71250 CT THORAX DX C-: CPT

## 2021-02-09 NOTE — TELEPHONE ENCOUNTER
Mailbox is full to let pt know about her change in locationfor her appt 5/521 at 11:20 am in [facility] a letter was sent

## 2021-02-10 ENCOUNTER — OFFICE VISIT (OUTPATIENT)
Dept: RHEUMATOLOGY | Facility: CLINIC | Age: 71
End: 2021-02-10
Payer: MEDICARE

## 2021-02-10 VITALS — HEART RATE: 110 BPM | SYSTOLIC BLOOD PRESSURE: 124 MMHG | DIASTOLIC BLOOD PRESSURE: 86 MMHG

## 2021-02-10 DIAGNOSIS — R76.8 ELEVATED RHEUMATOID FACTOR: ICD-10-CM

## 2021-02-10 DIAGNOSIS — Z79.899 LONG-TERM USE OF PLAQUENIL: ICD-10-CM

## 2021-02-10 DIAGNOSIS — M85.80 OSTEOPENIA, UNSPECIFIED LOCATION: ICD-10-CM

## 2021-02-10 DIAGNOSIS — M17.0 BILATERAL PRIMARY OSTEOARTHRITIS OF KNEE: ICD-10-CM

## 2021-02-10 DIAGNOSIS — E55.9 VITAMIN D DEFICIENCY: ICD-10-CM

## 2021-02-10 DIAGNOSIS — N18.32 STAGE 3B CHRONIC KIDNEY DISEASE (HCC): ICD-10-CM

## 2021-02-10 DIAGNOSIS — M32.9 SYSTEMIC LUPUS ERYTHEMATOSUS, UNSPECIFIED SLE TYPE, UNSPECIFIED ORGAN INVOLVEMENT STATUS (HCC): Primary | ICD-10-CM

## 2021-02-10 DIAGNOSIS — R59.0 MEDIASTINAL ADENOPATHY: ICD-10-CM

## 2021-02-10 DIAGNOSIS — M35.00 SJOGREN'S SYNDROME, WITH UNSPECIFIED ORGAN INVOLVEMENT (HCC): ICD-10-CM

## 2021-02-10 DIAGNOSIS — J84.2 LYMPHOCYTIC INTERSTITIAL PNEUMONIA (HCC): ICD-10-CM

## 2021-02-10 DIAGNOSIS — M32.9 SYSTEMIC LUPUS ERYTHEMATOSUS, UNSPECIFIED SLE TYPE, UNSPECIFIED ORGAN INVOLVEMENT STATUS (HCC): ICD-10-CM

## 2021-02-10 PROCEDURE — 99215 OFFICE O/P EST HI 40 MIN: CPT | Performed by: INTERNAL MEDICINE

## 2021-02-10 RX ORDER — PILOCARPINE HYDROCHLORIDE 5 MG/1
5 TABLET, FILM COATED ORAL 3 TIMES DAILY
Qty: 90 TABLET | Refills: 2 | Status: SHIPPED | OUTPATIENT
Start: 2021-02-10 | End: 2021-06-08 | Stop reason: SDUPTHER

## 2021-02-10 RX ORDER — HYDROXYCHLOROQUINE SULFATE 200 MG/1
200 TABLET, FILM COATED ORAL 2 TIMES DAILY WITH MEALS
Qty: 60 TABLET | Refills: 5 | Status: SHIPPED | OUTPATIENT
Start: 2021-02-10 | End: 2021-09-08 | Stop reason: SDUPTHER

## 2021-02-10 RX ORDER — LORAZEPAM 1 MG/1
0.5 TABLET ORAL AS NEEDED
COMMUNITY
Start: 2021-01-05 | End: 2021-09-24 | Stop reason: HOSPADM

## 2021-02-16 ENCOUNTER — OFFICE VISIT (OUTPATIENT)
Dept: CARDIAC SURGERY | Facility: CLINIC | Age: 71
End: 2021-02-16
Payer: MEDICARE

## 2021-02-16 VITALS
TEMPERATURE: 97.7 F | DIASTOLIC BLOOD PRESSURE: 84 MMHG | HEART RATE: 79 BPM | SYSTOLIC BLOOD PRESSURE: 130 MMHG | WEIGHT: 154.1 LBS | OXYGEN SATURATION: 98 % | RESPIRATION RATE: 16 BRPM | HEIGHT: 61 IN | BODY MASS INDEX: 29.09 KG/M2

## 2021-02-16 DIAGNOSIS — R59.1 LYMPHADENOPATHY: Primary | ICD-10-CM

## 2021-02-16 PROCEDURE — 99205 OFFICE O/P NEW HI 60 MIN: CPT | Performed by: PHYSICIAN ASSISTANT

## 2021-02-16 RX ORDER — CEFAZOLIN SODIUM 1 G/50ML
1000 SOLUTION INTRAVENOUS ONCE
Status: CANCELLED | OUTPATIENT
Start: 2021-02-16 | End: 2021-02-16

## 2021-02-16 NOTE — ASSESSMENT & PLAN NOTE
We had a discussion with Mrs Mayra Underwood regarding her imaging  She has minimally enlarged mediastinal adenopathy, but marked bilateral enlarging bilateral axillary lymph nodes  It would be easier to perform a left axillary lymph node biopsy, as opposed to a cervical mediastinoscopy  These two processes are likely related  Therefore, Dr Mathew Hernandez is recommending a left axillary lymph node biopsy, which would yield more tissue than an IR biopsy  The procedure, possible risks, and post op course were explained  She is in agreement with the plan

## 2021-02-16 NOTE — PROGRESS NOTES
Thoracic Consult  Assessment/Plan:    Lymphadenopathy  We had a discussion with Mrs Lore Kaba regarding her imaging  She has minimally enlarged mediastinal adenopathy, but marked bilateral enlarging bilateral axillary lymph nodes  It would be easier to perform a left axillary lymph node biopsy, as opposed to a cervical mediastinoscopy  These two processes are likely related  Therefore, Dr Wanda Hill is recommending a left axillary lymph node biopsy, which would yield more tissue than an IR biopsy  The procedure, possible risks, and post op course were explained  She is in agreement with the plan  Diagnoses and all orders for this visit:    Lymphadenopathy  -     Case request operating room: EXCISION BIOPSY LYMPH NODE: left axillary lymph node biopsy; Standing  -     EKG 12 lead; Future  -     APTT; Future  -     Protime-INR; Future  -     Type and screen; Future  -     Case request operating room: EXCISION BIOPSY LYMPH NODE: left axillary lymph node biopsy    Other orders  -     Diet NPO; Sips with meds; Standing  -     Void on call to OR; Standing  -     Insert peripheral IV; Standing  -     Place sequential compression device; Standing  -     ceFAZolin (ANCEF) IVPB (premix in dextrose) 1,000 mg 50 mL             Thoracic History   Diagnosis: Mediastinal adenopathy    Procedures/Surgeries:    Pathology:    Adjuvant Therapy:           Patient ID: Jay Jay Benito is a 78 y o  female  HPI      Ms Lore Kaba is a 79 yo female with a history of SLE/Sjogren's syndrome, GERD, hypothyroidism and gastric bypass 9 yrs ago who was referred by her rheumatologist, Dr Lester Ruiz,  for mediastinal adenopathy  She underwent a CT scan of her chest on 9/24/20 which revealed bilateral ground glass opacities and worsening mediastinal and bilateral hilar adenopathy  She underwent a left axillary lymph node biopsy on 10/8/20, which revealed lymph node tissue with vaguely nodular small B-lymphocyte distribution   No definitive evidence of B or T cell lymphoma  A repeat CT scan of the chest from 2/9/21 revealed increasing bilateral axillary lymphadenopathy, bilateral ground glass opacities, a stable 4 mm left upper lobe lung nodule  Mediastinal adenopathy consists of a 1 cm lower right paratracheal, 1 1 cm carinal node, and a 1 cm subcarinal node  On discussion, she is followed by Dr Angelita Anderson for her anemia  She has an appointment with Dr Lyndon Sicard at the end March to discuss an EBUS  She denies fever, no recent weight loss, excessive itching  She does have chills and night sweats at night  The following portions of the patient's history were reviewed and updated as appropriate: allergies, current medications, past family history, past medical history, past social history, past surgical history and problem list     Past Medical History:   Diagnosis Date    [facility] [facility]     Hypothyroidism     Psychiatric disorder     [facility]'s [facility] [facility]     [facility] lupus erythematosus ([facility] )       Past Surgical History:   Procedure Laterality Date    BREAST BIOPSY Right     FRACTURE SURGERY Right     elbow    GASTRIC BYPASS      HYSTERECTOMY Bilateral 1975    IR BIOPSY LYMPH NODE  10/8/2020    [place] BRONCHOSCOPY,DIAGNOSTIC N/A 12/8/2017    Procedure: BRONCHOSCOPY;  Surgeon: Otto Emmanuel MD;  [facility];   Service: Pulmonary      Family History   Problem Relation Age of Onset    Heart disease Mother     Diabetes Mother     Kidney cancer Mother     Heart disease Father     Stroke Father     Diabetes Father     Cancer Father     Leukemia Half-Sister 48      Social History     Socioeconomic History    Marital status: /Civil Union     Spouse name: Not on file    Number of children: Not on file    Years of education: Not on file    Highest education level: Not on file   Occupational History    Not on file   Social Needs    Financial resource strain: Not on file    Food insecurity     Worry: Not on file Inability: Not on file    Transportation needs     Medical: Not on file     Non-medical: Not on file   Tobacco Use    Smoking status: Never Smoker    Smokeless tobacco: Never Used   Substance and Sexual Activity    Alcohol use: No    Drug use: No    Sexual activity: Yes     Partners: Male     Birth control/protection: None     Comment:    Lifestyle    Physical activity     Days per week: Not on file     Minutes per session: Not on file    Stress: Not on file   Relationships    Social connections     Talks on phone: Not on file     Gets together: Not on file     Attends [employer] service: Not on file     Active member of club or organization: Not on file     Attends meetings of clubs or organizations: Not on file     Relationship status: Not on file    Intimate partner violence     Fear of current or ex partner: Not on file     Emotionally abused: Not on file     Physically abused: Not on file     Forced sexual activity: Not on file   Other Topics Concern    Not on file   Social History Narrative    Not on file        Allergies   Allergen Reactions    Ciprofloxacin Hives and Swelling    Cymbalta [Duloxetine Hcl] Other (See Comments)     Hallucinations, fatigue, faints    Percocet [Oxycodone-Acetaminophen]      Dry mouth     Current Outpatient Medications on File Prior to Visit   Medication Sig Dispense Refill    albuterol (Ventolin HFA) 90 mcg/act inhaler Inhale 1 puff every 4 (four) hours as needed for wheezing 3 Inhaler 3    aspirin 81 MG tablet Take 81 mg by mouth daily   buPROPion (WELLBUTRIN) 100 mg tablet Take 100 mg by mouth 3 (three) times a day   calcium citrate-vitamin D (CITRACAL+D) 315-200 MG-UNIT per tablet Take 1 tablet by mouth daily        ergocalciferol (VITAMIN D2) 50,000 units Take 1 capsule (50,000 Units total) by mouth once a week 12 capsule 0    hydroxychloroquine (PLAQUENIL) 200 mg tablet Take 1 tablet (200 mg total) by mouth 2 (two) times a day with meals 60 tablet 5    levothyroxine 25 mcg tablet TAKE 1 TABLET BY MOUTH DAILY 90 tablet 1    LORazepam (ATIVAN) 1 mg tablet       multivitamin-iron-minerals-folic acid (CENTRUM) chewable tablet Chew 1 tablet daily   pilocarpine (SALAGEN) 5 mg tablet Take 1 tablet (5 mg total) by mouth 3 (three) times a day 90 tablet 2    QUEtiapine (SEROquel) 300 mg tablet Take 300 mg by mouth daily at bedtime   temazepam (RESTORIL) 30 mg capsule Take 2 capsules by mouth daily at bedtime        ergocalciferol (VITAMIN D2) 50,000 units Take 1 capsule (50,000 Units total) by mouth 2 (two) times a week for 32 doses 8 capsule 3    omeprazole (PriLOSEC) 20 mg delayed release capsule Take 1 capsule (20 mg total) by mouth daily 90 capsule 1     No current facility-administered medications on file prior to visit  Review of Systems   Constitutional: Positive for chills and fatigue  Negative for fever and unexpected weight change  HENT: Negative for sore throat, trouble swallowing and voice change  Respiratory: Positive for cough and shortness of breath  Gastrointestinal: Positive for abdominal pain and nausea  Negative for vomiting  Musculoskeletal: Negative for arthralgias, back pain and gait problem  Neurological: Positive for dizziness and syncope (1 5 yrs ago )  Negative for headaches  Psychiatric/Behavioral: Negative for agitation, behavioral problems and confusion  All other systems reviewed and are negative  Objective:   Physical Exam  Vitals signs reviewed  Constitutional:       Appearance: Normal appearance  She is not ill-appearing  HENT:      Head: Normocephalic and atraumatic  Mouth/Throat:      Comments: Masked   Eyes:      General: No scleral icterus  Extraocular Movements: Extraocular movements intact  Comments: glasses   Neck:      Musculoskeletal: Normal range of motion and neck supple  Cardiovascular:      Rate and Rhythm: Normal rate and regular rhythm        Heart sounds: Normal heart sounds  No murmur  Pulmonary:      Effort: Pulmonary effort is normal  No respiratory distress  Breath sounds: Normal breath sounds  No wheezing  Musculoskeletal: Normal range of motion  Lymphadenopathy:      Cervical: No cervical adenopathy  Skin:     General: Skin is warm and dry  Comments: Palpable left axillary lymph node   Neurological:      Mental Status: She is alert and oriented to person, place, and time  Cranial Nerves: No cranial nerve deficit  Psychiatric:         Mood and Affect: Mood normal          Behavior: Behavior normal      /84   Pulse 79   Temp 97 7 °F (36 5 °C) (Tympanic)   Resp 16   Ht 5' 1" (1 549 m)   Wt 69 9 kg (154 lb 1 6 oz)   LMP  (LMP Unknown)   SpO2 98%   BMI 29 12 kg/m²   /84   Pulse 79   Temp 97 7 °F (36 5 °C) (Tympanic)   Resp 16   Ht 5' 1" (1 549 m)   Wt 69 9 kg (154 lb 1 6 oz)   LMP  (LMP Unknown)   SpO2 98%   BMI 29 12 kg/m²       Ct Chest Wo Contrast    Result Date: 2/15/2021  Narrative CT CHEST WITHOUT IV CONTRAST INDICATION:   R59 1: Generalized enlarged lymph nodes  COMPARISON:  September 24, 2020 TECHNIQUE: CT examination of the chest was performed without intravenous contrast   Axial, sagittal, and coronal 2D reformatted images were created from the source data and submitted for interpretation  Radiation dose length product (DLP) for this visit:  164 mGy-cm   This examination, like all CT scans performed in the [facility], was performed utilizing techniques to minimize radiation dose exposure, including the use of iterative reconstruction and automated exposure control  FINDINGS: LUNGS:  Again noted are groundglass areas in the both upper lobes with interspersed small lung cysts  ,  Seen in the left upper lobe, right upper lobe, right lower lobe   A left upper lobe lung nodule, measuring about 4 mm, stable PLEURA:  No pleural effusion seen No pneumothorax HEART/GREAT VESSELS: Ascending aorta measures 3 4 cm, stable MEDIASTINUM AND KACEY:  Mediastinal lymphadenopathy seen  Lower right paratracheal lymph node measuring 1 cm Carinal lymph nodes a 1 1 cm, subcarinal lymph nodes, measuring about 1 cm, stable Small anterior mediastinal lymph nodes, stable CHEST WALL AND LOWER NECK:   Small lymph nodes are seen in the lower neck, measuring 1 cm, stable Lymph node seen in the lower left neck measuring about 1 cm, stable Bilateral axillary lymphadenopathy noted A left axillary lymph node seen in image 7 series 2 and in image 61 series 601, measuring 3 5 x 2 1 cm  The previous study this was measuring 2 5 x 2 cm An additional lymph node is seen in the left axilla in image 14 series 2 which measures 2 7 x 1 6 cm the previous study this was measuring 1 9 x 2 2 cm Right axilla; there are multiple liver right axillary lymph nodes a dominant right axillary lymph node now measures about 4 4X 2 7 x 3 7 cm  On the previous study this was measuring 4 5 x 2 3 x 3 cm VISUALIZED STRUCTURES IN THE UPPER ABDOMEN:  Spleen, pancreas, adrenals appear unremarkable The liver appear unremarkable Gallbladder appear unremarkable Vertebral hemangioma seen T7 vertebra Postsurgical changes from gastric surgery Renal cyst, incompletely evaluated OSSEOUS STRUCTURES:  No acute compression collapse vertebra     Impression There is increasing enlargement of the bilateral axillary lymphadenopathy as compared to the previous study from September 24, 2020  Further management on the clinical basis, consider evaluation to exclude lymphoproliferative disorder/lymphoma Mediastinal lymphadenopathy remains unchanged Areas of groundglass density in the both lungs with interspersed lung cyst as seen on the previous study with no worsening Again noted is a nodule in the left upper lung, unchanged from the previous study of June 17, 2018 The study was marked in [facility] for significant notification   Workstation performed: [id number]

## 2021-02-16 NOTE — H&P (VIEW-ONLY)
Thoracic Consult  Assessment/Plan:    Lymphadenopathy  We had a discussion with Mrs Nas Hankins regarding her imaging  She has minimally enlarged mediastinal adenopathy, but marked bilateral enlarging bilateral axillary lymph nodes  It would be easier to perform a left axillary lymph node biopsy, as opposed to a cervical mediastinoscopy  These two processes are likely related  Therefore, Dr Stella Huitron is recommending a left axillary lymph node biopsy, which would yield more tissue than an IR biopsy  The procedure, possible risks, and post op course were explained  She is in agreement with the plan  Diagnoses and all orders for this visit:    Lymphadenopathy  -     Case request operating room: EXCISION BIOPSY LYMPH NODE: left axillary lymph node biopsy; Standing  -     EKG 12 lead; Future  -     APTT; Future  -     Protime-INR; Future  -     Type and screen; Future  -     Case request operating room: EXCISION BIOPSY LYMPH NODE: left axillary lymph node biopsy    Other orders  -     Diet NPO; Sips with meds; Standing  -     Void on call to OR; Standing  -     Insert peripheral IV; Standing  -     Place sequential compression device; Standing  -     ceFAZolin (ANCEF) IVPB (premix in dextrose) 1,000 mg 50 mL             Thoracic History   Diagnosis: Mediastinal adenopathy    Procedures/Surgeries:    Pathology:    Adjuvant Therapy:           Patient ID: Leroy Mart is a 78 y o  female  HPI      Ms Nas Hankins is a 77 yo female with a history of SLE/Sjogren's syndrome, GERD, hypothyroidism and gastric bypass 9 yrs ago who was referred by her rheumatologist, Dr Per Miles,  for mediastinal adenopathy  She underwent a CT scan of her chest on 9/24/20 which revealed bilateral ground glass opacities and worsening mediastinal and bilateral hilar adenopathy  She underwent a left axillary lymph node biopsy on 10/8/20, which revealed lymph node tissue with vaguely nodular small B-lymphocyte distribution   No definitive evidence of B or T cell lymphoma  A repeat CT scan of the chest from 2/9/21 revealed increasing bilateral axillary lymphadenopathy, bilateral ground glass opacities, a stable 4 mm left upper lobe lung nodule  Mediastinal adenopathy consists of a 1 cm lower right paratracheal, 1 1 cm carinal node, and a 1 cm subcarinal node  On discussion, she is followed by Dr Gareth Crump for her anemia  She has an appointment with Dr Messina Favorite at the end March to discuss an EBUS  She denies fever, no recent weight loss, excessive itching  She does have chills and night sweats at night  The following portions of the patient's history were reviewed and updated as appropriate: allergies, current medications, past family history, past medical history, past social history, past surgical history and problem list     Past Medical History:   Diagnosis Date    [facility] [address] [facility]     Hypothyroidism     Psychiatric disorder     Sjogren's syndrome ([facility] [address] [facility]     System[facility] lupus erythematosus ([facility] )       Past Surgical History:   Procedure Laterality Date    BREAST BIOPSY Right     FRACTURE SURGERY Right     elbow    GASTRIC BYPASS      HYSTERECTOMY Bilateral 1975    IR BIOPSY LYMPH NODE  10/8/2020    [place] BRONCHOSCOPY,DIAGNOSTIC N/A 12/8/2017    Procedure: BRONCHOSCOPY;  Surgeon: Earl Velazco MD;  [facility];   Service: Pulmonary      Family History   Problem Relation Age of Onset    Heart disease Mother     Diabetes Mother     Kidney cancer Mother     Heart disease Father     Stroke Father     Diabetes Father     Cancer Father     Leukemia Half-Sister 48      Social History     Socioeconomic History    Marital status: /Civil Union     Spouse name: Not on file    Number of children: Not on file    Years of education: Not on file    Highest education level: Not on file   Occupational History    Not on file   Social Needs    Financial resource strain: Not on file    Food insecurity     Worry: Not on file Inability: Not on file    Transportation needs     Medical: Not on file     Non-medical: Not on file   Tobacco Use    Smoking status: Never Smoker    Smokeless tobacco: Never Used   Substance and Sexual Activity    Alcohol use: No    Drug use: No    Sexual activity: Yes     Partners: Male     Birth control/protection: None     Comment:    Lifestyle    Physical activity     Days per week: Not on file     Minutes per session: Not on file    Stress: Not on file   Relationships    Social connections     Talks on phone: Not on file     Gets together: Not on file     Attends Taoist service: Not on file     Active member of club or organization: Not on file     Attends meetings of clubs or organizations: Not on file     Relationship status: Not on file    Intimate partner violence     Fear of current or ex partner: Not on file     Emotionally abused: Not on file     Physically abused: Not on file     Forced sexual activity: Not on file   Other Topics Concern    Not on file   Social History Narrative    Not on file        Allergies   Allergen Reactions    Ciprofloxacin Hives and Swelling    Cymbalta [Duloxetine Hcl] Other (See Comments)     Hallucinations, fatigue, faints    Percocet [Oxycodone-Acetaminophen]      Dry mouth     Current Outpatient Medications on File Prior to Visit   Medication Sig Dispense Refill    albuterol (Ventolin HFA) 90 mcg/act inhaler Inhale 1 puff every 4 (four) hours as needed for wheezing 3 Inhaler 3    aspirin 81 MG tablet Take 81 mg by mouth daily   buPROPion (WELLBUTRIN) 100 mg tablet Take 100 mg by mouth 3 (three) times a day   calcium citrate-vitamin D (CITRACAL+D) 315-200 MG-UNIT per tablet Take 1 tablet by mouth daily        ergocalciferol (VITAMIN D2) 50,000 units Take 1 capsule (50,000 Units total) by mouth once a week 12 capsule 0    hydroxychloroquine (PLAQUENIL) 200 mg tablet Take 1 tablet (200 mg total) by mouth 2 (two) times a day with meals 60 tablet 5    levothyroxine 25 mcg tablet TAKE 1 TABLET BY MOUTH DAILY 90 tablet 1    LORazepam (ATIVAN) 1 mg tablet       multivitamin-iron-minerals-folic acid (CENTRUM) chewable tablet Chew 1 tablet daily   pilocarpine (SALAGEN) 5 mg tablet Take 1 tablet (5 mg total) by mouth 3 (three) times a day 90 tablet 2    QUEtiapine (SEROquel) 300 mg tablet Take 300 mg by mouth daily at bedtime   temazepam (RESTORIL) 30 mg capsule Take 2 capsules by mouth daily at bedtime        ergocalciferol (VITAMIN D2) 50,000 units Take 1 capsule (50,000 Units total) by mouth 2 (two) times a week for 32 doses 8 capsule 3    omeprazole (PriLOSEC) 20 mg delayed release capsule Take 1 capsule (20 mg total) by mouth daily 90 capsule 1     No current facility-administered medications on file prior to visit  Review of Systems   Constitutional: Positive for chills and fatigue  Negative for fever and unexpected weight change  HENT: Negative for sore throat, trouble swallowing and voice change  Respiratory: Positive for cough and shortness of breath  Gastrointestinal: Positive for abdominal pain and nausea  Negative for vomiting  Musculoskeletal: Negative for arthralgias, back pain and gait problem  Neurological: Positive for dizziness and syncope (1 5 yrs ago )  Negative for headaches  Psychiatric/Behavioral: Negative for agitation, behavioral problems and confusion  All other systems reviewed and are negative  Objective:   Physical Exam  Vitals signs reviewed  Constitutional:       Appearance: Normal appearance  She is not ill-appearing  HENT:      Head: Normocephalic and atraumatic  Mouth/Throat:      Comments: Masked   Eyes:      General: No scleral icterus  Extraocular Movements: Extraocular movements intact  Comments: glasses   Neck:      Musculoskeletal: Normal range of motion and neck supple  Cardiovascular:      Rate and Rhythm: Normal rate and regular rhythm        Heart sounds: Normal heart sounds  No murmur  Pulmonary:      Effort: Pulmonary effort is normal  No respiratory distress  Breath sounds: Normal breath sounds  No wheezing  Musculoskeletal: Normal range of motion  Lymphadenopathy:      Cervical: No cervical adenopathy  Skin:     General: Skin is warm and dry  Comments: Palpable left axillary lymph node   Neurological:      Mental Status: She is alert and oriented to person, place, and time  Cranial Nerves: No cranial nerve deficit  Psychiatric:         Mood and Affect: Mood normal          Behavior: Behavior normal      /84   Pulse 79   Temp 97 7 °F (36 5 °C) (Tympanic)   Resp 16   Ht 5' 1" (1 549 m)   Wt 69 9 kg (154 lb 1 6 oz)   LMP  (LMP Unknown)   SpO2 98%   BMI 29 12 kg/m²   /84   Pulse 79   Temp 97 7 °F (36 5 °C) (Tympanic)   Resp 16   Ht 5' 1" (1 549 m)   Wt 69 9 kg (154 lb 1 6 oz)   LMP  (LMP Unknown)   SpO2 98%   BMI 29 12 kg/m²       Ct Chest Wo Contrast    Result Date: 2/15/2021  Narrative CT CHEST WITHOUT IV CONTRAST INDICATION:   R59 1: Generalized enlarged lymph nodes  COMPARISON:  September 24, 2020 TECHNIQUE: CT examination of the chest was performed without intravenous contrast   Axial, sagittal, and coronal 2D reformatted images were created from the source data and submitted for interpretation  Radiation dose length product (DLP) for this visit:  164 mGy-cm   This examination, like all CT scans performed in the [facility], was performed utilizing techniques to minimize radiation dose exposure, including the use of iterative reconstruction and automated exposure control  FINDINGS: LUNGS:  Again noted are groundglass areas in the both upper lobes with interspersed small lung cysts  ,  Seen in the left upper lobe, right upper lobe, right lower lobe   A left upper lobe lung nodule, measuring about 4 mm, stable PLEURA:  No pleural effusion seen No pneumothorax HEART/GREAT VESSELS: Ascending aorta measures 3 4 cm, stable MEDIASTINUM AND KACEY:  Mediastinal lymphadenopathy seen  Lower right paratracheal lymph node measuring 1 cm Carinal lymph nodes a 1 1 cm, subcarinal lymph nodes, measuring about 1 cm, stable Small anterior mediastinal lymph nodes, stable CHEST WALL AND LOWER NECK:   Small lymph nodes are seen in the lower neck, measuring 1 cm, stable Lymph node seen in the lower left neck measuring about 1 cm, stable Bilateral axillary lymphadenopathy noted A left axillary lymph node seen in image 7 series 2 and in image 61 series 601, measuring 3 5 x 2 1 cm  The previous study this was measuring 2 5 x 2 cm An additional lymph node is seen in the left axilla in image 14 series 2 which measures 2 7 x 1 6 cm the previous study this was measuring 1 9 x 2 2 cm Right axilla; there are multiple liver right axillary lymph nodes a dominant right axillary lymph node now measures about 4 4X 2 7 x 3 7 cm  On the previous study this was measuring 4 5 x 2 3 x 3 cm VISUALIZED STRUCTURES IN THE UPPER ABDOMEN:  Spleen, pancreas, adrenals appear unremarkable The liver appear unremarkable Gallbladder appear unremarkable Vertebral hemangioma seen T7 vertebra Postsurgical changes from gastric surgery Renal cyst, incompletely evaluated OSSEOUS STRUCTURES:  No acute compression collapse vertebra     Impression There is increasing enlargement of the bilateral axillary lymphadenopathy as compared to the previous study from September 24, 2020  Further management on the clinical basis, consider evaluation to exclude lymphoproliferative disorder/lymphoma Mediastinal lymphadenopathy remains unchanged Areas of groundglass density in the both lungs with interspersed lung cyst as seen on the previous study with no worsening Again noted is a nodule in the left upper lung, unchanged from the previous study of June 17, 2018 The study was marked in [facility] for significant notification   Workstation performed: [id number]

## 2021-02-18 ENCOUNTER — APPOINTMENT (OUTPATIENT)
Dept: LAB | Facility: CLINIC | Age: 71
End: 2021-02-18
Payer: MEDICARE

## 2021-02-18 ENCOUNTER — LAB REQUISITION (OUTPATIENT)
Dept: LAB | Facility: HOSPITAL | Age: 71
End: 2021-02-18
Payer: MEDICARE

## 2021-02-18 ENCOUNTER — TRANSCRIBE ORDERS (OUTPATIENT)
Dept: LAB | Facility: CLINIC | Age: 71
End: 2021-02-18

## 2021-02-18 ENCOUNTER — LAB (OUTPATIENT)
Dept: LAB | Facility: CLINIC | Age: 71
End: 2021-02-18
Payer: MEDICARE

## 2021-02-18 DIAGNOSIS — Z01.818 ENCOUNTER FOR OTHER PREPROCEDURAL EXAMINATION: ICD-10-CM

## 2021-02-18 DIAGNOSIS — Z01.818 OTHER SPECIFIED PRE-OPERATIVE EXAMINATION: Primary | ICD-10-CM

## 2021-02-18 DIAGNOSIS — R59.1 LYMPHADENOPATHY: ICD-10-CM

## 2021-02-18 DIAGNOSIS — Z01.818 OTHER SPECIFIED PRE-OPERATIVE EXAMINATION: ICD-10-CM

## 2021-02-18 DIAGNOSIS — M32.9 SYSTEMIC LUPUS ERYTHEMATOSUS, UNSPECIFIED SLE TYPE, UNSPECIFIED ORGAN INVOLVEMENT STATUS (HCC): ICD-10-CM

## 2021-02-18 LAB
ABO GROUP BLD: NORMAL
ALBUMIN SERPL BCP-MCNC: 3 G/DL (ref 3.5–5)
ALP SERPL-CCNC: 90 U/L (ref 46–116)
ALT SERPL W P-5'-P-CCNC: 9 U/L (ref 12–78)
ANION GAP SERPL CALCULATED.3IONS-SCNC: 6 MMOL/L (ref 4–13)
APTT PPP: 30 SECONDS (ref 23–37)
AST SERPL W P-5'-P-CCNC: 25 U/L (ref 5–45)
BASOPHILS # BLD MANUAL: 0 THOUSAND/UL (ref 0–0.1)
BASOPHILS NFR MAR MANUAL: 0 % (ref 0–1)
BILIRUB SERPL-MCNC: 0.36 MG/DL (ref 0.2–1)
BLD GP AB SCN SERPL QL: NEGATIVE
BUN SERPL-MCNC: 20 MG/DL (ref 5–25)
C3 SERPL-MCNC: 94.3 MG/DL (ref 90–180)
C4 SERPL-MCNC: 23 MG/DL (ref 10–40)
CALCIUM ALBUM COR SERPL-MCNC: 8.5 MG/DL (ref 8.3–10.1)
CALCIUM SERPL-MCNC: 7.7 MG/DL (ref 8.3–10.1)
CHLORIDE SERPL-SCNC: 102 MMOL/L (ref 100–108)
CO2 SERPL-SCNC: 24 MMOL/L (ref 21–32)
CREAT SERPL-MCNC: 1.19 MG/DL (ref 0.6–1.3)
CRP SERPL QL: 4.2 MG/L
EOSINOPHIL # BLD MANUAL: 0.19 THOUSAND/UL (ref 0–0.4)
EOSINOPHIL NFR BLD MANUAL: 2 % (ref 0–6)
ERYTHROCYTE [DISTWIDTH] IN BLOOD BY AUTOMATED COUNT: 15.5 % (ref 11.6–15.1)
ERYTHROCYTE [SEDIMENTATION RATE] IN BLOOD: 81 MM/HOUR (ref 0–29)
GFR SERPL CREATININE-BSD FRML MDRD: 47 ML/MIN/1.73SQ M
GLUCOSE SERPL-MCNC: 70 MG/DL (ref 65–140)
HCT VFR BLD AUTO: 30.5 % (ref 34.8–46.1)
HGB BLD-MCNC: 9.5 G/DL (ref 11.5–15.4)
INR PPP: 1.11 (ref 0.84–1.19)
LYMPHOCYTES # BLD AUTO: 4.43 THOUSAND/UL (ref 0.6–4.47)
LYMPHOCYTES # BLD AUTO: 46 % (ref 14–44)
MCH RBC QN AUTO: 28.3 PG (ref 26.8–34.3)
MCHC RBC AUTO-ENTMCNC: 31.1 G/DL (ref 31.4–37.4)
MCV RBC AUTO: 91 FL (ref 82–98)
MONOCYTES # BLD AUTO: 1.16 THOUSAND/UL (ref 0–1.22)
MONOCYTES NFR BLD: 12 % (ref 4–12)
NEUTROPHILS # BLD MANUAL: 3.85 THOUSAND/UL (ref 1.85–7.62)
NEUTS SEG NFR BLD AUTO: 40 % (ref 43–75)
NRBC BLD AUTO-RTO: 0 /100 WBCS
PLATELET # BLD AUTO: 261 THOUSANDS/UL (ref 149–390)
PLATELET BLD QL SMEAR: ADEQUATE
PMV BLD AUTO: 9.2 FL (ref 8.9–12.7)
POTASSIUM SERPL-SCNC: 4.4 MMOL/L (ref 3.5–5.3)
PROT SERPL-MCNC: 10.5 G/DL (ref 6.4–8.2)
PROTHROMBIN TIME: 14.4 SECONDS (ref 11.6–14.5)
RBC # BLD AUTO: 3.36 MILLION/UL (ref 3.81–5.12)
RH BLD: POSITIVE
SODIUM SERPL-SCNC: 132 MMOL/L (ref 136–145)
SPECIMEN EXPIRATION DATE: NORMAL
T4 FREE SERPL-MCNC: 0.75 NG/DL (ref 0.76–1.46)
TOTAL CELLS COUNTED SPEC: 100
TSH SERPL DL<=0.05 MIU/L-ACNC: 3.92 UIU/ML (ref 0.36–3.74)
WBC # BLD AUTO: 9.63 THOUSAND/UL (ref 4.31–10.16)

## 2021-02-18 PROCEDURE — 84443 ASSAY THYROID STIM HORMONE: CPT

## 2021-02-18 PROCEDURE — 36415 COLL VENOUS BLD VENIPUNCTURE: CPT

## 2021-02-18 PROCEDURE — 86140 C-REACTIVE PROTEIN: CPT

## 2021-02-18 PROCEDURE — 80053 COMPREHEN METABOLIC PANEL: CPT

## 2021-02-18 PROCEDURE — 86850 RBC ANTIBODY SCREEN: CPT | Performed by: PHYSICIAN ASSISTANT

## 2021-02-18 PROCEDURE — 93005 ELECTROCARDIOGRAM TRACING: CPT

## 2021-02-18 PROCEDURE — 85007 BL SMEAR W/DIFF WBC COUNT: CPT

## 2021-02-18 PROCEDURE — 85610 PROTHROMBIN TIME: CPT

## 2021-02-18 PROCEDURE — 85730 THROMBOPLASTIN TIME PARTIAL: CPT

## 2021-02-18 PROCEDURE — 85652 RBC SED RATE AUTOMATED: CPT

## 2021-02-18 PROCEDURE — 84439 ASSAY OF FREE THYROXINE: CPT

## 2021-02-18 PROCEDURE — 85027 COMPLETE CBC AUTOMATED: CPT

## 2021-02-18 PROCEDURE — 86900 BLOOD TYPING SEROLOGIC ABO: CPT | Performed by: PHYSICIAN ASSISTANT

## 2021-02-18 PROCEDURE — 86901 BLOOD TYPING SEROLOGIC RH(D): CPT | Performed by: PHYSICIAN ASSISTANT

## 2021-02-18 PROCEDURE — 86160 COMPLEMENT ANTIGEN: CPT

## 2021-02-19 ENCOUNTER — LAB (OUTPATIENT)
Dept: LAB | Facility: CLINIC | Age: 71
End: 2021-02-19
Payer: COMMERCIAL

## 2021-02-19 ENCOUNTER — TELEPHONE (OUTPATIENT)
Dept: RHEUMATOLOGY | Facility: CLINIC | Age: 71
End: 2021-02-19

## 2021-02-19 LAB
ATRIAL RATE: 74 BPM
BACTERIA UR QL AUTO: ABNORMAL /HPF
BILIRUB UR QL STRIP: NEGATIVE
CLARITY UR: ABNORMAL
COLOR UR: ABNORMAL
CREAT UR-MCNC: 38 MG/DL
GLUCOSE UR STRIP-MCNC: NEGATIVE MG/DL
HGB UR QL STRIP.AUTO: ABNORMAL
KETONES UR STRIP-MCNC: NEGATIVE MG/DL
LEUKOCYTE ESTERASE UR QL STRIP: ABNORMAL
NITRITE UR QL STRIP: POSITIVE
NON-SQ EPI CELLS URNS QL MICRO: ABNORMAL /HPF
P AXIS: 45 DEGREES
PH UR STRIP.AUTO: 6.5 [PH]
PR INTERVAL: 176 MS
PROT UR STRIP-MCNC: NEGATIVE MG/DL
PROT UR-MCNC: 13 MG/DL
PROT/CREAT UR: 0.34 MG/G{CREAT} (ref 0–0.1)
QRS AXIS: -31 DEGREES
QRSD INTERVAL: 76 MS
QT INTERVAL: 372 MS
QTC INTERVAL: 412 MS
RBC #/AREA URNS AUTO: ABNORMAL /HPF
SP GR UR STRIP.AUTO: 1.01 (ref 1–1.03)
T WAVE AXIS: 23 DEGREES
UROBILINOGEN UR QL STRIP.AUTO: 0.2 E.U./DL
VENTRICULAR RATE: 74 BPM
WBC #/AREA URNS AUTO: ABNORMAL /HPF
WBC CLUMPS # UR AUTO: ABNORMAL /UL

## 2021-02-19 PROCEDURE — 84156 ASSAY OF PROTEIN URINE: CPT | Performed by: INTERNAL MEDICINE

## 2021-02-19 PROCEDURE — 81001 URINALYSIS AUTO W/SCOPE: CPT | Performed by: INTERNAL MEDICINE

## 2021-02-19 PROCEDURE — 93010 ELECTROCARDIOGRAM REPORT: CPT | Performed by: INTERNAL MEDICINE

## 2021-02-19 PROCEDURE — 82570 ASSAY OF URINE CREATININE: CPT | Performed by: INTERNAL MEDICINE

## 2021-02-19 NOTE — TELEPHONE ENCOUNTER
Spoke with patient and relayed information, she is going to have her lymph node removed on Thursday 2/25 with Dr Yamilet Cotton  She just wanted to let you know

## 2021-02-19 NOTE — TELEPHONE ENCOUNTER
----- Message from Kris Major MD sent at 2/19/2021 12:20 PM EST -----  Please let her know the urinalysis looks like she may have a UTI  Please ask her to contact her primary care physician, thanks

## 2021-02-22 DIAGNOSIS — E03.9 HYPOTHYROIDISM, UNSPECIFIED TYPE: ICD-10-CM

## 2021-02-22 RX ORDER — LEVOTHYROXINE SODIUM 0.05 MG/1
50 TABLET ORAL DAILY
Qty: 30 TABLET | Refills: 1 | Status: SHIPPED | OUTPATIENT
Start: 2021-02-22

## 2021-02-23 ENCOUNTER — TELEMEDICINE (OUTPATIENT)
Dept: FAMILY MEDICINE CLINIC | Facility: CLINIC | Age: 71
End: 2021-02-23
Payer: MEDICARE

## 2021-02-23 DIAGNOSIS — R82.90 ABNORMAL URINALYSIS: Primary | ICD-10-CM

## 2021-02-23 PROCEDURE — 1036F TOBACCO NON-USER: CPT | Performed by: FAMILY MEDICINE

## 2021-02-23 PROCEDURE — 1160F RVW MEDS BY RX/DR IN RCRD: CPT | Performed by: FAMILY MEDICINE

## 2021-02-23 PROCEDURE — 99213 OFFICE O/P EST LOW 20 MIN: CPT | Performed by: FAMILY MEDICINE

## 2021-02-23 NOTE — PROGRESS NOTES
Virtual Regular Visit      Assessment/Plan:    Problem List Items Addressed This Visit     None      Visit Diagnoses     Abnormal urinalysis    -  Primary    Relevant Orders    Urinalysis with microscopic    Urine culture               Reason for visit is   Chief Complaint   Patient presents with    Virtual Regular Visit        Encounter provider Marlo Mukherjee MD    Provider located at [address] [address]      Recent Visits  No visits were found meeting these conditions  Showing recent visits within past 7 days and meeting all other requirements     Today's Visits  Date Type Provider Dept   02/23/21 Telemedicine Marlo Mukherjee MD [facility]   Showing today's visits and meeting all other requirements     Future Appointments  No visits were found meeting these conditions  Showing future appointments within next 150 days and meeting all other requirements        The patient was identified by name and date of birth  Haydee Cazares was informed that this is a telemedicine visit and that the visit is being conducted through [address] [place] [address] and patient was informed that this is not a secure, HIPAA-compliant platform  She agrees to proceed     My office door was closed  No one else was in the room  She acknowledged consent and understanding of privacy and security of the video platform  The patient has agreed to participate and understands they can discontinue the visit at any time  Patient is aware this is a billable service  Subjective  Haydee Cazares is a 78 y o  female    HPI   Seeing Rheum found suspected UTI   She is Asx   Never gets UTIs   Would like to repeat it       Past Medical History:   Diagnosis Date    Bipolar 1 disorder ([address] )     Deaf     Pt lost all hearing in right ear after having [place] measles    Hard of hearing     Pt wears a hearing in left ear      Hypothyroidism     Psychiatric disorder     [facility]     Systemic lupus erythematosus [facility]     Wears glasses     Wears partial dentures        Past Surgical History:   Procedure Laterality Date    BREAST BIOPSY Right     COLONOSCOPY      FRACTURE SURGERY Right     elbow    GASTRIC BYPASS      HYSTERECTOMY Bilateral 1975    IR BIOPSY LYMPH NODE  10/8/2020    NH BRONCHOSCOPY,DIAGNOSTIC N/A 12/8/2017    Procedure: BRONCHOSCOPY;  Surgeon: Katlin Anders MD;  [facility]; Service: Pulmonary    TONSILLECTOMY         Current Outpatient Medications   Medication Sig Dispense Refill    albuterol (Ventolin HFA) 90 mcg/act inhaler Inhale 1 puff every 4 (four) hours as needed for wheezing 3 Inhaler 3    aspirin 81 MG tablet Take 81 mg by mouth daily Pt no longer takes   buPROPion (WELLBUTRIN) 100 mg tablet Take 100 mg by mouth 3 (three) times a day   calcium citrate-vitamin D (CITRACAL+D) 315-200 MG-UNIT per tablet Take 1 tablet by mouth daily   ergocalciferol (VITAMIN D2) 50,000 units Take 1 capsule (50,000 Units total) by mouth 2 (two) times a week for 32 doses (Patient not taking: Reported on 2/22/2021) 8 capsule 3    ergocalciferol (VITAMIN D2) 50,000 units Take 1 capsule (50,000 Units total) by mouth once a week 12 capsule 0    hydroxychloroquine (PLAQUENIL) 200 mg tablet Take 1 tablet (200 mg total) by mouth 2 (two) times a day with meals 60 tablet 5    levothyroxine 50 mcg tablet Take 1 tablet (50 mcg total) by mouth daily 30 tablet 1    LORazepam (ATIVAN) 1 mg tablet 0 5 mg as needed       multivitamin-iron-minerals-folic acid (CENTRUM) chewable tablet Chew 1 tablet daily        omeprazole (PriLOSEC) 20 mg delayed release capsule Take 1 capsule (20 mg total) by mouth daily (Patient taking differently: Take 20 mg by mouth as needed ) 90 capsule 1    pilocarpine (SALAGEN) 5 mg tablet Take 1 tablet (5 mg total) by mouth 3 (three) times a day 90 tablet 2    QUEtiapine (SEROquel) 300 mg tablet Take 300 mg by mouth daily at bedtime   temazepam (RESTORIL) 30 mg capsule Take 2 capsules by mouth daily at bedtime as needed        No current facility-administered medications for this visit  Allergies   Allergen Reactions    Ciprofloxacin Hives and Swelling     Pt reports that lips and mouth and face swelled   Cymbalta [Duloxetine Hcl] Other (See Comments)     Hallucinations, fatigue, faints    Percocet [Oxycodone-Acetaminophen] Other (See Comments)     Dry mouth - pt states it kept her up all night  States had to continually drink fluids       Review of Systems   Constitutional: Negative for fever and unexpected weight change  HENT: Negative for nosebleeds and trouble swallowing  Eyes: Negative for visual disturbance  Respiratory: Negative for chest tightness and shortness of breath  Cardiovascular: Negative for chest pain, palpitations and leg swelling  Gastrointestinal: Negative for abdominal pain, constipation, diarrhea and nausea  Endocrine: Negative for cold intolerance  Genitourinary: Negative for dysuria and urgency  Musculoskeletal: Positive for arthralgias  Negative for joint swelling and myalgias  Skin: Negative for rash  Neurological: Negative for tremors, seizures and syncope  Hematological: Does not bruise/bleed easily  Psychiatric/Behavioral: Negative for hallucinations and suicidal ideas  Video Exam    There were no vitals filed for this visit  Physical Exam  Constitutional:       Appearance: She is well-developed  Eyes:      Conjunctiva/sclera: Conjunctivae normal    Neck:      Musculoskeletal: Normal range of motion  Pulmonary:      Effort: Pulmonary effort is normal    Neurological:      Mental Status: She is alert and oriented to person, place, and time  Psychiatric:         Behavior: Behavior normal          Thought Content:  Thought content normal          Judgment: Judgment normal           I spent 10 minutes directly with the patient during this visit      VIRTUAL VISIT DISCLAIMER    Rizwana Serna acknowledges that she has consented to an online visit or consultation  She understands that the online visit is based solely on information provided by her, and that, in the absence of a face-to-face physical evaluation by the physician, the diagnosis she receives is both limited and provisional in terms of accuracy and completeness  This is not intended to replace a full medical face-to-face evaluation by the physician  Rizwana Serna understands and accepts these terms

## 2021-02-24 ENCOUNTER — APPOINTMENT (OUTPATIENT)
Dept: LAB | Facility: CLINIC | Age: 71
End: 2021-02-24
Payer: MEDICARE

## 2021-02-24 ENCOUNTER — ANESTHESIA EVENT (OUTPATIENT)
Dept: PERIOP | Facility: HOSPITAL | Age: 71
End: 2021-02-24
Payer: MEDICARE

## 2021-02-24 DIAGNOSIS — R82.90 ABNORMAL URINALYSIS: ICD-10-CM

## 2021-02-24 LAB
BACTERIA UR QL AUTO: ABNORMAL /HPF
BILIRUB UR QL STRIP: NEGATIVE
CLARITY UR: ABNORMAL
COLOR UR: YELLOW
GLUCOSE UR STRIP-MCNC: NEGATIVE MG/DL
HGB UR QL STRIP.AUTO: NEGATIVE
KETONES UR STRIP-MCNC: NEGATIVE MG/DL
LEUKOCYTE ESTERASE UR QL STRIP: ABNORMAL
NITRITE UR QL STRIP: POSITIVE
NON-SQ EPI CELLS URNS QL MICRO: ABNORMAL /HPF
OTHER STN SPEC: ABNORMAL
PH UR STRIP.AUTO: 6 [PH]
PROT UR STRIP-MCNC: NEGATIVE MG/DL
RBC #/AREA URNS AUTO: ABNORMAL /HPF
SP GR UR STRIP.AUTO: 1.01 (ref 1–1.03)
UROBILINOGEN UR QL STRIP.AUTO: 0.2 E.U./DL
WBC #/AREA URNS AUTO: ABNORMAL /HPF

## 2021-02-24 PROCEDURE — 87086 URINE CULTURE/COLONY COUNT: CPT

## 2021-02-24 PROCEDURE — 87186 SC STD MICRODIL/AGAR DIL: CPT

## 2021-02-24 PROCEDURE — 87077 CULTURE AEROBIC IDENTIFY: CPT

## 2021-02-24 PROCEDURE — 81001 URINALYSIS AUTO W/SCOPE: CPT | Performed by: FAMILY MEDICINE

## 2021-02-25 ENCOUNTER — HOSPITAL ENCOUNTER (OUTPATIENT)
Facility: HOSPITAL | Age: 71
Setting detail: OUTPATIENT SURGERY
Discharge: HOME | End: 2021-02-25
Attending: THORACIC SURGERY (CARDIOTHORACIC VASCULAR SURGERY) | Admitting: THORACIC SURGERY (CARDIOTHORACIC VASCULAR SURGERY)
Payer: MEDICARE

## 2021-02-25 ENCOUNTER — ANESTHESIA (OUTPATIENT)
Dept: PERIOP | Facility: HOSPITAL | Age: 71
End: 2021-02-25
Payer: MEDICARE

## 2021-02-25 VITALS
SYSTOLIC BLOOD PRESSURE: 110 MMHG | OXYGEN SATURATION: 97 % | WEIGHT: 155 LBS | HEIGHT: 61 IN | DIASTOLIC BLOOD PRESSURE: 58 MMHG | TEMPERATURE: 97.5 F | HEART RATE: 69 BPM | RESPIRATION RATE: 18 BRPM | BODY MASS INDEX: 29.27 KG/M2

## 2021-02-25 VITALS — HEART RATE: 77 BPM

## 2021-02-25 DIAGNOSIS — R59.1 LYMPHADENOPATHY: ICD-10-CM

## 2021-02-25 LAB
ABO GROUP BLD: NORMAL
RH BLD: POSITIVE

## 2021-02-25 PROCEDURE — 88341 IMHCHEM/IMCYTCHM EA ADD ANTB: CPT | Performed by: PATHOLOGY

## 2021-02-25 PROCEDURE — 38525 BIOPSY/REMOVAL LYMPH NODES: CPT | Performed by: THORACIC SURGERY (CARDIOTHORACIC VASCULAR SURGERY)

## 2021-02-25 PROCEDURE — 81210 BRAF GENE: CPT

## 2021-02-25 PROCEDURE — 88331 PATH CONSLTJ SURG 1 BLK 1SPC: CPT

## 2021-02-25 PROCEDURE — 81351 TP53 GENE FULL GENE SEQUENCE: CPT

## 2021-02-25 PROCEDURE — 88331 PATH CONSLTJ SURG 1 BLK 1SPC: CPT | Performed by: PATHOLOGY

## 2021-02-25 PROCEDURE — 88305 TISSUE EXAM BY PATHOLOGIST: CPT

## 2021-02-25 PROCEDURE — 88184 FLOWCYTOMETRY/ TC 1 MARKER: CPT | Performed by: THORACIC SURGERY (CARDIOTHORACIC VASCULAR SURGERY)

## 2021-02-25 PROCEDURE — 88341 IMHCHEM/IMCYTCHM EA ADD ANTB: CPT

## 2021-02-25 PROCEDURE — 81120 IDH1 COMMON VARIANTS: CPT

## 2021-02-25 PROCEDURE — 81261 IGH GENE REARRANGE AMP METH: CPT

## 2021-02-25 PROCEDURE — 88342 IMHCHEM/IMCYTCHM 1ST ANTB: CPT

## 2021-02-25 PROCEDURE — 88365 INSITU HYBRIDIZATION (FISH): CPT

## 2021-02-25 PROCEDURE — 81342 TRG GENE REARRANGEMENT ANAL: CPT

## 2021-02-25 PROCEDURE — 88185 FLOWCYTOMETRY/TC ADD-ON: CPT

## 2021-02-25 PROCEDURE — 86900 BLOOD TYPING SEROLOGIC ABO: CPT | Performed by: THORACIC SURGERY (CARDIOTHORACIC VASCULAR SURGERY)

## 2021-02-25 PROCEDURE — 86901 BLOOD TYPING SEROLOGIC RH(D): CPT | Performed by: THORACIC SURGERY (CARDIOTHORACIC VASCULAR SURGERY)

## 2021-02-25 PROCEDURE — 88342 IMHCHEM/IMCYTCHM 1ST ANTB: CPT | Performed by: PATHOLOGY

## 2021-02-25 PROCEDURE — 81479 UNLISTED MOLECULAR PATHOLOGY: CPT

## 2021-02-25 PROCEDURE — 88305 TISSUE EXAM BY PATHOLOGIST: CPT | Performed by: PATHOLOGY

## 2021-02-25 PROCEDURE — 81347 SF3B1 GENE COMMON VARIANTS: CPT

## 2021-02-25 RX ORDER — PROPOFOL 10 MG/ML
INJECTION, EMULSION INTRAVENOUS AS NEEDED
Status: DISCONTINUED | OUTPATIENT
Start: 2021-02-25 | End: 2021-02-25

## 2021-02-25 RX ORDER — LIDOCAINE HYDROCHLORIDE 10 MG/ML
INJECTION, SOLUTION EPIDURAL; INFILTRATION; INTRACAUDAL; PERINEURAL AS NEEDED
Status: DISCONTINUED | OUTPATIENT
Start: 2021-02-25 | End: 2021-02-25

## 2021-02-25 RX ORDER — SODIUM CHLORIDE, SODIUM LACTATE, POTASSIUM CHLORIDE, CALCIUM CHLORIDE 600; 310; 30; 20 MG/100ML; MG/100ML; MG/100ML; MG/100ML
INJECTION, SOLUTION INTRAVENOUS CONTINUOUS PRN
Status: DISCONTINUED | OUTPATIENT
Start: 2021-02-25 | End: 2021-02-25

## 2021-02-25 RX ORDER — FENTANYL CITRATE 50 UG/ML
INJECTION, SOLUTION INTRAMUSCULAR; INTRAVENOUS AS NEEDED
Status: DISCONTINUED | OUTPATIENT
Start: 2021-02-25 | End: 2021-02-25

## 2021-02-25 RX ORDER — FENTANYL CITRATE/PF 50 MCG/ML
12.5 SYRINGE (ML) INJECTION
Status: COMPLETED | OUTPATIENT
Start: 2021-02-25 | End: 2021-02-25

## 2021-02-25 RX ORDER — HYDROMORPHONE HCL/PF 1 MG/ML
0.2 SYRINGE (ML) INJECTION
Status: DISCONTINUED | OUTPATIENT
Start: 2021-02-25 | End: 2021-02-25 | Stop reason: HOSPADM

## 2021-02-25 RX ORDER — ONDANSETRON 2 MG/ML
INJECTION INTRAMUSCULAR; INTRAVENOUS AS NEEDED
Status: DISCONTINUED | OUTPATIENT
Start: 2021-02-25 | End: 2021-02-25

## 2021-02-25 RX ORDER — CEFAZOLIN SODIUM 1 G/50ML
1000 SOLUTION INTRAVENOUS ONCE
Status: COMPLETED | OUTPATIENT
Start: 2021-02-25 | End: 2021-02-25

## 2021-02-25 RX ORDER — ONDANSETRON 2 MG/ML
4 INJECTION INTRAMUSCULAR; INTRAVENOUS ONCE AS NEEDED
Status: DISCONTINUED | OUTPATIENT
Start: 2021-02-25 | End: 2021-02-25 | Stop reason: HOSPADM

## 2021-02-25 RX ORDER — DEXAMETHASONE SODIUM PHOSPHATE 4 MG/ML
8 INJECTION, SOLUTION INTRA-ARTICULAR; INTRALESIONAL; INTRAMUSCULAR; INTRAVENOUS; SOFT TISSUE ONCE AS NEEDED
Status: DISCONTINUED | OUTPATIENT
Start: 2021-02-25 | End: 2021-02-25 | Stop reason: HOSPADM

## 2021-02-25 RX ORDER — ACETAMINOPHEN 325 MG/1
650 TABLET ORAL ONCE
Status: COMPLETED | OUTPATIENT
Start: 2021-02-25 | End: 2021-02-25

## 2021-02-25 RX ORDER — LIDOCAINE HYDROCHLORIDE AND EPINEPHRINE 10; 10 MG/ML; UG/ML
INJECTION, SOLUTION INFILTRATION; PERINEURAL AS NEEDED
Status: DISCONTINUED | OUTPATIENT
Start: 2021-02-25 | End: 2021-02-25 | Stop reason: HOSPADM

## 2021-02-25 RX ADMIN — Medication 12.5 MCG: at 08:50

## 2021-02-25 RX ADMIN — SODIUM CHLORIDE, SODIUM LACTATE, POTASSIUM CHLORIDE, AND CALCIUM CHLORIDE: .6; .31; .03; .02 INJECTION, SOLUTION INTRAVENOUS at 07:17

## 2021-02-25 RX ADMIN — FENTANYL CITRATE 25 MCG: 50 INJECTION INTRAMUSCULAR; INTRAVENOUS at 08:02

## 2021-02-25 RX ADMIN — PHENYLEPHRINE HYDROCHLORIDE 100 MCG: 10 INJECTION INTRAVENOUS at 08:36

## 2021-02-25 RX ADMIN — ACETAMINOPHEN 650 MG: 325 TABLET, FILM COATED ORAL at 10:34

## 2021-02-25 RX ADMIN — ONDANSETRON 4 MG: 2 INJECTION INTRAMUSCULAR; INTRAVENOUS at 08:02

## 2021-02-25 RX ADMIN — PROPOFOL 150 MG: 10 INJECTION, EMULSION INTRAVENOUS at 07:53

## 2021-02-25 RX ADMIN — CEFAZOLIN SODIUM 1000 MG: 1 SOLUTION INTRAVENOUS at 07:46

## 2021-02-25 RX ADMIN — FENTANYL CITRATE 75 MCG: 50 INJECTION INTRAMUSCULAR; INTRAVENOUS at 08:14

## 2021-02-25 RX ADMIN — PHENYLEPHRINE HYDROCHLORIDE 100 MCG: 10 INJECTION INTRAVENOUS at 08:44

## 2021-02-25 RX ADMIN — LIDOCAINE HYDROCHLORIDE 30 MG: 10 INJECTION, SOLUTION EPIDURAL; INFILTRATION; INTRACAUDAL; PERINEURAL at 07:53

## 2021-02-25 RX ADMIN — HYDROMORPHONE HYDROCHLORIDE 0.2 MG: 1 INJECTION, SOLUTION INTRAMUSCULAR; INTRAVENOUS; SUBCUTANEOUS at 09:08

## 2021-02-25 RX ADMIN — Medication 12.5 MCG: at 08:54

## 2021-02-25 NOTE — OP NOTE
OPERATIVE REPORT  PATIENT NAME: Michelle Shay    :  1950  MRN: [id number]  Pt Location: [facility] OR ROOM 08    SURGERY DATE: 2021    Surgeon(s) and Role:     * Kacey Gould MD - Primary     * Giselle Swan MD - Assisting    Preop Diagnosis:  Enlarging axillary and mediastinal Lymphadenopathy [R59 1]    Post-Op Diagnosis Codes:  Enlarging axillary and mediastinal Lymphadenopathy [R59 1]    Procedure(s) (LRB):  EXCISION BIOPSY LYMPH NODE: left axillary lymph node biopsy (Left)    Specimen(s):  ID Type Source Tests Collected by Time Destination   1 : left axillary lymph node Tissue Lymph Node - Lymphoma Prtocol TISSUE EXAM, LEUKEMIA/LYMPHOMA FLOW CYTOMETRY Kacey Gould MD 2021 [id number]        Estimated Blood Loss:   Minimal    Drains:  * No LDAs found *    Anesthesia Type:   General    Operative Indications:  Enlarging axillary and mediastinal Lymphadenopathy [R59 1]  Ms Michael Chapman has collagen vascular disease and enlarging mediastinal and axillary lymph nodes  She had a nondiagnostic core needle biopsy of a left axillary lymph node  The lymph nodes have continued to increase in size and she is brought to the operating room for excisional biopsy  Operative Findings:  Multiple enlarged left axillary lymph nodes    Complications:   None    Procedure and Technique:  Patient was brought to the operating room placed in supine position  After institution of adequate general anesthesia using an LMA, her left axilla was exposed  Her left axilla was prepped and draped into a sterile field and a transverse incision made overlying 1 of these enlarged lymph nodes  Dissection was carried down onto the lymph node which was approximately 2 cm x 1 cm and the lymph node freed from loose surrounding tissue with the electrocautery  The base of the lymph node with its lymphovascular bundle was divided and the base ligated with a 2-0 silk    The lymph node was sent to pathology with the lymphoma protocol in place   Frozen section demonstrated a monotonous group of lymphocytes with no specific diagnosis  Hemostasis was assured  The wound was irrigated  The wound was then closed in layers with running absorbable suture the subcutaneous and subcuticular layers  Surgical glue was then applied  The patient was extubated on the table and brought to the recovery unit in stable condition having tolerated the procedure well  Sponge and instrument counts were correct     I was present for the entire procedure    Patient Disposition:  PACU     SIGNATURE: Myriam Ray MD  DATE: February 25, 2021  TIME: 8:45 AM

## 2021-02-25 NOTE — DISCHARGE INSTRUCTIONS
1  Follow up with Dr Joselin Olson at your scheduled appointment on February 9 at 2:30 PM    2  No lifting with greater than 10lbs with right arm  3  Your incision was closed with stitches underneath of the skin which will dissolve  There is purple surgical glue on top  May shower starting tomorrow  Do not scrub your incision  4  Call with questions or concerns such as fevers, increasing redness of the incision, or swelling

## 2021-02-25 NOTE — ANESTHESIA PREPROCEDURE EVALUATION
Procedure:  EXCISION BIOPSY LYMPH NODE: left axillary lymph node biopsy (Left Neck)    Relevant Problems   CARDIO   (+) Dyspnea on exertion      ENDO   (+) Hypothyroidism      GI/HEPATIC   (+) Acid reflux disease   (+) Bariatric surgery status      HEMATOLOGY   (+) Anemia   (+) Iron deficiency anemia following bariatric surgery      MUSCULOSKELETAL   (+) Generalized osteoarthritis   (+) Systemic lupus erythematosus (HCC)      NEURO/PSYCH   (+) Depression      PULMONARY   (+) Breath, shortness   (+) Dyspnea on exertion        Physical Exam    Airway    Mallampati score: II  TM Distance: >3 FB  Neck ROM: limited     Dental   No notable dental hx     Cardiovascular  Cardiovascular exam normal    Pulmonary  Pulmonary exam normal     Other Findings        Anesthesia Plan  ASA Score- 3     Anesthesia Type- general with ASA Monitors  Additional Monitors:   Airway Plan: LMA  Plan Factors-Exercise tolerance (METS): >4 METS  Chart reviewed  Existing labs reviewed  Patient is not a current smoker  Patient not instructed to abstain from smoking on day of procedure  Patient did not smoke on day of surgery  Induction- intravenous  Postoperative Plan- Plan for postoperative opioid use  Informed Consent- Anesthetic plan and risks discussed with patient  I personally reviewed this patient with the CRNA  Discussed and agreed on the Anesthesia Plan with the CRNA  Haja Cole

## 2021-02-26 ENCOUNTER — TELEPHONE (OUTPATIENT)
Dept: HEMATOLOGY ONCOLOGY | Facility: CLINIC | Age: 71
End: 2021-02-26

## 2021-02-26 DIAGNOSIS — N39.0 URINARY TRACT INFECTION WITHOUT HEMATURIA, SITE UNSPECIFIED: Primary | ICD-10-CM

## 2021-02-26 LAB — BACTERIA UR CULT: ABNORMAL

## 2021-02-26 RX ORDER — SULFAMETHOXAZOLE AND TRIMETHOPRIM 800; 160 MG/1; MG/1
1 TABLET ORAL 2 TIMES DAILY
Qty: 10 TABLET | Refills: 0 | Status: SHIPPED | OUTPATIENT
Start: 2021-02-26 | End: 2021-03-03

## 2021-02-26 NOTE — TELEPHONE ENCOUNTER
Patient is scheduled for Post-op on Tuesday 3/9/2021  She is requested a Monday, Wednesday, or Friday  Please advise  Best call back number, [phone]

## 2021-03-01 LAB — SCAN RESULT: NORMAL

## 2021-03-09 ENCOUNTER — OFFICE VISIT (OUTPATIENT)
Dept: CARDIAC SURGERY | Facility: CLINIC | Age: 71
End: 2021-03-09

## 2021-03-09 VITALS
TEMPERATURE: 96.1 F | RESPIRATION RATE: 16 BRPM | DIASTOLIC BLOOD PRESSURE: 80 MMHG | HEIGHT: 61 IN | OXYGEN SATURATION: 97 % | HEART RATE: 79 BPM | BODY MASS INDEX: 29.09 KG/M2 | SYSTOLIC BLOOD PRESSURE: 130 MMHG | WEIGHT: 154.1 LBS

## 2021-03-09 DIAGNOSIS — R59.1 LYMPHADENOPATHY: Primary | ICD-10-CM

## 2021-03-09 PROCEDURE — 99024 POSTOP FOLLOW-UP VISIT: CPT | Performed by: PHYSICIAN ASSISTANT

## 2021-03-09 NOTE — ASSESSMENT & PLAN NOTE
Ms Alisia Aguilar presents for follow up after left axillary lymph node excision  Pathology does not demonstrate evidence of lymphoma after evaluation at [facility]  They are completing a few more molecular studies  Dr Talisha Gill is recommending she return to Dr Juana Saenz for further discussion of pathology  We suspect she will enter into surveillance for her lymphadenopathy  All of her questions were addressed  I recommended she see her PCP or cardiologist for her heart murmur  Echo in 2017 did not demonstrate any aortic stenosis

## 2021-03-09 NOTE — PROGRESS NOTES
Thoracic Follow-Up  Assessment/Plan:    Lymphadenopathy  Ms  Katlyn Martin presents for follow up after left axillary lymph node excision  Pathology does not demonstrate evidence of lymphoma after evaluation at [facility]  They are completing a few more molecular studies  Dr Nisa Wei is recommending she return to Dr Najma Borrero for further discussion of pathology  We suspect she will enter into surveillance for her lymphadenopathy  All of her questions were addressed  I recommended she see her PCP or cardiologist for her heart murmur  Echo in 2017 did not demonstrate any aortic stenosis  Diagnoses and all orders for this visit:    Lymphadenopathy          Thoracic History    Diagnosis: mediastinal and bilateral axillary lymphadenopathy  Procedure: left axillary lymph node excisional biopsy 2/25/21  Pathology: flow cytometry without evidence of B or T cell lymphoma  [facility] Final Diagnosis- left axillary lymph node, biopsy:     - Atypical lymphoproliferative disorder, see note  NOTE:  The histologic features are, at first pass, quite reminiscent of those seen in chronic lymphocytic leukemia/small  lymphocytic lymphoma (CLL/SLL), and in the setting of a documented circulating monoclonal B-cell lymphocytosis, it seemed reasonable to anticipate that diagnosis  However, the findings are NOT those of CLL/SLL, with the B-cells lacking CD5 expression (by both flow cytometry and immunohistochemistry, LEF1 immunohistochemistry is still pending) and, more importantly, displaying polyclonality by surface light chain expression  There is currently no satisfying explanation for the abnormal findings in this node  It is possible that there is a minor expansion of potentially aberrant marginal zone cells, but without monoclonality it is not appropriate to entertain a consideration of a marginal zone lymphoma   Attempts will be made to assess clonality of both the B-cells and T-cells (given the expansion of the latter) by [facility] and JANE PCR,respectively, as well as by possibly uncovering an oncogenic mutation by targeted sequencing studies  Subjective:    Patient ID: Massiel Sherman is a 78 y o  female  HPI   Ms Zora Broussard is a 78year old female with a history of SLE/Sjogren's syndrome, GERD, hypothyroidism and gastric bypass 9 yrs ago who was initially seen in our office 2/16/21 for evaluation of mediastinal adenopathy  She was referred to us by Dr Melinda Parker, her rheumatologist  She is known to Dr Chad Olivas and has an appointment with him 5/5/21  She underwent a left axillary lymph node biopsy on 10/8/20, which revealed lymph node tissue with vaguely nodular small B-lymphocyte distribution  No definitive evidence of B or T cell lymphoma  A repeat CT scan of the chest from 2/9/21 revealed increasing bilateral axillary lymphadenopathy, bilateral ground glass opacities, a stable 4 mm left upper lobe lung nodule  Mediastinal adenopathy consists of a 1 cm lower right paratracheal, 1 1 cm carinal node, and a 1 cm subcarinal node  Dr Gabrielle Shea performed a left axillary lymph node excisional biopsy 2/25/21  On discussion, she is not having pain, fevers, or chills  She reports sweating, nausea and regurgitation after eating sugar  No night sweats or weight loss  The following portions of the patient's history were reviewed and updated as appropriate: allergies, current medications, past family history, past medical history, past social history, past surgical history and problem list     Review of Systems   Constitutional: Positive for diaphoresis  Negative for activity change, appetite change, chills, fatigue, fever and unexpected weight change  Eyes: Negative for visual disturbance  Respiratory: Negative for cough, chest tightness, shortness of breath and wheezing  Cardiovascular: Negative for chest pain  Gastrointestinal: Positive for nausea  Musculoskeletal: Negative for joint swelling and myalgias     Skin: Negative for color change and rash  Neurological: Negative for weakness and headaches  Hematological: Negative for adenopathy  Does not bruise/bleed easily  Psychiatric/Behavioral: Negative for confusion  Objective:   Physical Exam  Constitutional:       Appearance: Normal appearance  HENT:      Head: Normocephalic and atraumatic  Eyes:      General: No scleral icterus  Conjunctiva/sclera: Conjunctivae normal    Neck:      Musculoskeletal: Neck supple  Cardiovascular:      Rate and Rhythm: Normal rate and regular rhythm  Heart sounds: Murmur (systolic) present  Pulmonary:      Effort: Pulmonary effort is normal  No respiratory distress  Breath sounds: Normal breath sounds  Abdominal:      General: There is no distension  Palpations: Abdomen is soft  Lymphadenopathy:      Cervical: No cervical adenopathy  Skin:     General: Skin is warm and dry  Neurological:      General: No focal deficit present  Mental Status: She is alert and oriented to person, place, and time  Psychiatric:         Mood and Affect: Mood normal      /80   Pulse 79   Temp (!) 96 1 °F (35 6 °C) (Tympanic)   Resp 16   Ht 5' 1" (1 549 m)   Wt 69 9 kg (154 lb 1 6 oz)   LMP  (LMP Unknown)   SpO2 97%   BMI 29 12 kg/m²       Ct Chest Wo Contrast    Result Date: 2/15/2021  Narrative CT CHEST WITHOUT IV CONTRAST INDICATION:   R59 1: Generalized enlarged lymph nodes  COMPARISON:  September 24, 2020 TECHNIQUE: CT examination of the chest was performed without intravenous contrast   Axial, sagittal, and coronal 2D reformatted images were created from the source data and submitted for interpretation  Radiation dose length product (DLP) for this visit:  164 mGy-cm   This examination, like all CT scans performed in the [facility], was performed utilizing techniques to minimize radiation dose exposure, including the use of iterative reconstruction and automated exposure control   FINDINGS: LUNGS: Again noted are groundglass areas in the both upper lobes with interspersed small lung cysts  ,  Seen in the left upper lobe, right upper lobe, right lower lobe  A left upper lobe lung nodule, measuring about 4 mm, stable PLEURA:  No pleural effusion seen No pneumothorax HEART/GREAT VESSELS:  Ascending aorta measures 3 4 cm, stable MEDIASTINUM AND KACEY:  Mediastinal lymphadenopathy seen  Lower right paratracheal lymph node measuring 1 cm Carinal lymph nodes a 1 1 cm, subcarinal lymph nodes, measuring about 1 cm, stable Small anterior mediastinal lymph nodes, stable CHEST WALL AND LOWER NECK:   Small lymph nodes are seen in the lower neck, measuring 1 cm, stable Lymph node seen in the lower left neck measuring about 1 cm, stable Bilateral axillary lymphadenopathy noted A left axillary lymph node seen in image 7 series 2 and in image 61 series 601, measuring 3 5 x 2 1 cm  The previous study this was measuring 2 5 x 2 cm An additional lymph node is seen in the left axilla in image 14 series 2 which measures 2 7 x 1 6 cm the previous study this was measuring 1 9 x 2 2 cm Right axilla; there are multiple liver right axillary lymph nodes a dominant right axillary lymph node now measures about 4 4X 2 7 x 3 7 cm  On the previous study this was measuring 4 5 x 2 3 x 3 cm VISUALIZED STRUCTURES IN THE UPPER ABDOMEN:  Spleen, pancreas, adrenals appear unremarkable The liver appear unremarkable Gallbladder appear unremarkable Vertebral hemangioma seen T7 vertebra Postsurgical changes from gastric surgery Renal cyst, incompletely evaluated OSSEOUS STRUCTURES:  No acute compression collapse vertebra     Impression There is increasing enlargement of the bilateral axillary lymphadenopathy as compared to the previous study from September 24, 2020    Further management on the clinical basis, consider evaluation to exclude lymphoproliferative disorder/lymphoma Mediastinal lymphadenopathy remains unchanged Areas of groundglass density in the both lungs with interspersed lung cyst as seen on the previous study with no worsening Again noted is a nodule in the left upper lung, unchanged from the previous study of June 17, 2018 The study was marked in [facility] for significant notification  Workstation performed: [id number]     Ct Chest Wo Contrast    Result Date: 9/25/2020  Narrative CT CHEST WITHOUT IV CONTRAST INDICATION:   R59 1: Generalized enlarged lymph nodes  History of Sjogren's syndrome  COMPARISON:  CT scan 12/4/2018 TECHNIQUE: CT examination of the chest was performed without intravenous contrast   Axial, sagittal, and coronal 2D reformatted images were created from the source data and submitted for interpretation  Radiation dose length product (DLP) for this visit:  214 1 mGy-cm   This examination, like all CT scans performed in the [facility], was performed utilizing techniques to minimize radiation dose exposure, including the use of iterative reconstruction and automated exposure control  FINDINGS: LUNGS:  Again noted are groundglass opacities in an asymmetric distribution primarily affecting the medial aspects of the right upper and lower lobes, lingula and left lower lobe  Cysts are again noted scattered throughout both lungs the largest in the right lower lobe measuring 4 5 cm  No nodule  No honeycombing or bronchiectasis  No endotracheal or endobronchial lesion  PLEURA:  Unremarkable  HEART/GREAT VESSELS:  Heart is top normal size  No aortic aneurysm  MEDIASTINUM AND KACEY:  Extensive adenopathy again identified including a subcarinal node measuring 1 6 cm (previous 1 4 cm) and a precarinal node measuring 1 3 cm (previous 1 0 cm)  CHEST WALL AND LOWER NECK:   Worsening bilateral axillary adenopathy for example a node in the right axilla on image 201/14 measures 1 7 cm (previous 1 3 cm and a node in the left axilla on image 201/4 measures 1 8 cm (previous 1 4 cm)   VISUALIZED STRUCTURES IN THE UPPER ABDOMEN: Status post gastric bypass  Stable left renal cyst   Visualized portions of the upper abdomen are otherwise unremarkable  OSSEOUS STRUCTURES:  No acute fracture or destructive osseous lesion  Stable T7 vertebral body hemangioma  Impression Asymmetric bilateral areas of groundglass opacities and lung cysts likely representing spectrum of lymphocytic interstitial pneumonia which can be associated with Sjogren's disease  Worsening adenopathy in the mediastinum and bilateral hilar regions as described which could be related to Sjogren's disease or other lymphoproliferative disorders   Workstation performed: [id number]

## 2021-03-29 ENCOUNTER — OFFICE VISIT (OUTPATIENT)
Dept: PULMONOLOGY | Facility: CLINIC | Age: 71
End: 2021-03-29
Payer: COMMERCIAL

## 2021-03-29 VITALS
TEMPERATURE: 96.8 F | HEART RATE: 77 BPM | SYSTOLIC BLOOD PRESSURE: 124 MMHG | WEIGHT: 152 LBS | BODY MASS INDEX: 28.7 KG/M2 | DIASTOLIC BLOOD PRESSURE: 74 MMHG | RESPIRATION RATE: 18 BRPM | HEIGHT: 61 IN | OXYGEN SATURATION: 98 %

## 2021-03-29 DIAGNOSIS — J06.9 UPPER RESPIRATORY TRACT INFECTION, UNSPECIFIED TYPE: ICD-10-CM

## 2021-03-29 DIAGNOSIS — R06.02 BREATH, SHORTNESS: Primary | ICD-10-CM

## 2021-03-29 PROCEDURE — 99213 OFFICE O/P EST LOW 20 MIN: CPT | Performed by: INTERNAL MEDICINE

## 2021-03-29 PROCEDURE — 3008F BODY MASS INDEX DOCD: CPT | Performed by: FAMILY MEDICINE

## 2021-03-29 RX ORDER — ALBUTEROL SULFATE 90 UG/1
1 AEROSOL, METERED RESPIRATORY (INHALATION) EVERY 4 HOURS PRN
Qty: 3 INHALER | Refills: 3 | Status: SHIPPED | OUTPATIENT
Start: 2021-03-29

## 2021-03-29 NOTE — PROGRESS NOTES
Progress Note - Pulmonary   Binta Beranrd 78 y o  female MRN: [id number]   Encounter: [id number]      Assessment/Plan:  Patient is a 33-year-old routine follow-up  Patient reports he has been doing relatively well since last visit  She has undergone axillary node biopsy which was negative for malignancy of unclear cause  Mediastinal has stable as well as her lung nodules  She is being followed by Oncology for her lung nodule and adenopathy may direct the CT scans  For her shortness for breath, she reports this is only intermittent  There was no REM recent as she describes  She may continue to take her albuterol inhaler on an as-needed basis  Patient may follow up in 12 months or sooner as necessary  Orders:  -  Refilled albuterol inhaler    Subjective: The patient had an axillary biopsy for which the findings were negative for lymphoma  She was previously on methotrexate but this was stopped several years ago  She was started on plaquenil  Her primary symptoms were brain fog  She will notice occasional shortness of breath  It is very intermittent  She is unsure if it is associated with the rash  She is trying to walk more but does notice shortness of breath for which she uses her inhaler  The patient has had a lower extremity rash on her bilateral legs wi-th L > R  It typically starts on a Friday and lasts about 48hours without intervention  There is no clear inciting event  There are no new medications or clothes  She is in touch with rheum about further evaluation        Answers for HPI/ROS submitted by the patient on 3/26/2021   Primary symptoms  Do you have shortness of breath?: Yes  Chronicity: recurrent  When did you first notice your symptoms?: more than 1 year ago  How often do your symptoms occur?: intermittently  Since you first noticed this problem, how has it changed?: waxing and waning  Have you had a change in appetite?: Yes  Do you have chest pain?: No  Do you have shortness of breath that occurs with effort or exertion?: Yes  Do you have ear congestion?: Yes  Do you have ear pain?: No  Do you have a fever?: No  Do you have headaches?: No  Do you have heartburn?: Yes  Do you have fatigue?: Yes  Do you have muscle pain?: No  Do you have nasal congestion?: Yes  Do you have shortness of breath when lying flat?: No  Do you have shortness of breath when you wake up?: No  Do you have post-nasal drip?: Yes  Do you have a runny nose?: Yes  Do you have sneezing?: No  Do you have a sore throat?: No  Do you have sweats?: Yes  Do you have trouble swallowing?: No  Have you experienced weight loss?: No  Which of the following makes your symptoms worse?: climbing stairs, exercise, minimal activity  Which of the following makes your symptoms better?: rest      Inhaler Regimen:  Albuterol - 3-4x/week - good benefit    Remainder of review of systems negative except as described in HPI  The following portions of the patient's history were reviewed and updated as appropriate: allergies, current medications, past family history, past medical history, past social history, past surgical history and problem list      Objective:   Vitals: Blood pressure 124/74, pulse 77, temperature (!) 96 8 °F (36 °C), temperature source Tympanic, resp  rate 18, height 5' 1" (1 549 m), weight 68 9 kg (152 lb), SpO2 98 %, not currently breastfeeding , RA, Body mass index is 28 72 kg/m²      Physical Exam  Gen: Pleasant, awake, alert, oriented x 3, no acute distress  HEENT: Mucous membranes moist, no oral lesions, no thrush  NECK: No accessory muscle use, JVP not elevated  Cardiac: RRR, single S1, single S2, no murmurs, no rubs, no gallops  Lungs: CTA b/l  Abdomen: normoactive bowel sounds, soft nontender, nondistended, no rebound or rigidity, no guarding  Extremities: no cyanosis, no clubbing, no Le edema  MSK:  Strength equal in all extremities  Derm:  No rashes/lesions noted  Neuro:  Appropriate mood/affect    Labs: I have personally reviewed pertinent lab results  Lab Results   Component Value Date    WBC 9 63 02/18/2021    HGB 9 5 (L) 02/18/2021     02/18/2021     Lab Results   Component Value Date    CREATININE 1 19 02/18/2021        Imaging and other studies: I have personally reviewed pertinent reports  and I have personally reviewed pertinent films in PACS  CT Chest 2/9/21  My interpretation:  Stable mediastinal adenopathy    Radiology findings:  LUNGS:  Again noted are groundglass areas in the both upper lobes with interspersed small lung cysts  ,  Seen in the left upper lobe, right upper lobe, right lower lobe  A left upper lobe lung nodule, measuring about 4 mm, stable  PLEURA:  No pleural effusion seen  No pneumothorax  HEART/GREAT VESSELS:  Ascending aorta measures 3 4 cm, stable  MEDIASTINUM AND KACEY:  Mediastinal lymphadenopathy seen  Lower right paratracheal lymph node measuring 1 cm  Carinal lymph nodes a 1 1 cm, subcarinal lymph nodes, measuring about 1 cm, stable Small anterior mediastinal lymph nodes, stable  CHEST WALL AND LOWER NECK:   Small lymph nodes are seen in the lower neck, measuring 1 cm, stable  Lymph node seen in the lower left neck measuring about 1 cm, stable Bilateral axillary lymphadenopathy noted  A left axillary lymph node seen in image 7 series 2 and in image 61 series 601, measuring 3 5 x 2 1 cm  The previous study this was measuring 2 5 x 2 cm An additional lymph node is seen in the left axilla in image 14 series 2 which measures 2 7 x 1 6 cm the previous study this was measuring 1 9 x 2 2 cm  Right axilla; there are multiple liver right axillary lymph nodes a dominant right axillary lymph node now measures about 4 4X 2 7 x 3 7 cm  On the previous study this was measuring 4 5 x 2 3 x 3 cm    Pulmonary Function Testing: I have personally reviewed pertinent reports     and I have personally reviewed pertinent films in PACS  10/17/2017:  Normal pulmonary function testing  Spirometry:  FEV1/FVC Ratio is 81%  FEV1 is 64% predicted  FVC is 60% predicted  No change with bronchodilators  Lung volumes: Total lung capacity is 77% predicted  Residual volume is 94% predicted   Flow volume loop:  Normal    Jairo Locke

## 2021-04-15 ENCOUNTER — TRANSCRIBE ORDERS (OUTPATIENT)
Dept: LAB | Facility: CLINIC | Age: 71
End: 2021-04-15

## 2021-04-15 ENCOUNTER — APPOINTMENT (OUTPATIENT)
Dept: LAB | Facility: CLINIC | Age: 71
End: 2021-04-15
Payer: COMMERCIAL

## 2021-04-15 DIAGNOSIS — R59.1 LYMPHADENOPATHY: ICD-10-CM

## 2021-04-15 LAB
ALBUMIN SERPL BCP-MCNC: 3 G/DL (ref 3.5–5)
ALP SERPL-CCNC: 108 U/L (ref 46–116)
ALT SERPL W P-5'-P-CCNC: 18 U/L (ref 12–78)
ANION GAP SERPL CALCULATED.3IONS-SCNC: 7 MMOL/L (ref 4–13)
AST SERPL W P-5'-P-CCNC: 39 U/L (ref 5–45)
BASOPHILS # BLD MANUAL: 0 THOUSAND/UL (ref 0–0.1)
BASOPHILS NFR MAR MANUAL: 0 % (ref 0–1)
BILIRUB SERPL-MCNC: 0.3 MG/DL (ref 0.2–1)
BUN SERPL-MCNC: 26 MG/DL (ref 5–25)
CALCIUM ALBUM COR SERPL-MCNC: 8.7 MG/DL (ref 8.3–10.1)
CALCIUM SERPL-MCNC: 7.9 MG/DL (ref 8.3–10.1)
CHLORIDE SERPL-SCNC: 104 MMOL/L (ref 100–108)
CO2 SERPL-SCNC: 24 MMOL/L (ref 21–32)
CREAT SERPL-MCNC: 1.38 MG/DL (ref 0.6–1.3)
EOSINOPHIL # BLD MANUAL: 0.11 THOUSAND/UL (ref 0–0.4)
EOSINOPHIL NFR BLD MANUAL: 1 % (ref 0–6)
ERYTHROCYTE [DISTWIDTH] IN BLOOD BY AUTOMATED COUNT: 15.2 % (ref 11.6–15.1)
GFR SERPL CREATININE-BSD FRML MDRD: 39 ML/MIN/1.73SQ M
GLUCOSE SERPL-MCNC: 78 MG/DL (ref 65–140)
HCT VFR BLD AUTO: 30.5 % (ref 34.8–46.1)
HGB BLD-MCNC: 9.2 G/DL (ref 11.5–15.4)
LYMPHOCYTES # BLD AUTO: 5.71 THOUSAND/UL (ref 0.6–4.47)
LYMPHOCYTES # BLD AUTO: 52 % (ref 14–44)
MCH RBC QN AUTO: 27.3 PG (ref 26.8–34.3)
MCHC RBC AUTO-ENTMCNC: 30.2 G/DL (ref 31.4–37.4)
MCV RBC AUTO: 91 FL (ref 82–98)
METAMYELOCYTES NFR BLD MANUAL: 1 % (ref 0–1)
MONOCYTES # BLD AUTO: 0.99 THOUSAND/UL (ref 0–1.22)
MONOCYTES NFR BLD: 9 % (ref 4–12)
NEUTROPHILS # BLD MANUAL: 3.63 THOUSAND/UL (ref 1.85–7.62)
NEUTS BAND NFR BLD MANUAL: 3 % (ref 0–8)
NEUTS SEG NFR BLD AUTO: 30 % (ref 43–75)
NRBC BLD AUTO-RTO: 0 /100 WBCS
PLATELET # BLD AUTO: 267 THOUSANDS/UL (ref 149–390)
PLATELET BLD QL SMEAR: ADEQUATE
PMV BLD AUTO: 10 FL (ref 8.9–12.7)
POTASSIUM SERPL-SCNC: 4.3 MMOL/L (ref 3.5–5.3)
PROT SERPL-MCNC: 11 G/DL (ref 6.4–8.2)
RBC # BLD AUTO: 3.37 MILLION/UL (ref 3.81–5.12)
RBC MORPH BLD: NORMAL
SODIUM SERPL-SCNC: 135 MMOL/L (ref 136–145)
TOTAL CELLS COUNTED SPEC: 100
VARIANT LYMPHS # BLD AUTO: 4 %
WBC # BLD AUTO: 10.99 THOUSAND/UL (ref 4.31–10.16)

## 2021-04-15 PROCEDURE — 36415 COLL VENOUS BLD VENIPUNCTURE: CPT

## 2021-04-15 PROCEDURE — 80053 COMPREHEN METABOLIC PANEL: CPT

## 2021-04-15 PROCEDURE — 85027 COMPLETE CBC AUTOMATED: CPT

## 2021-04-15 PROCEDURE — 85007 BL SMEAR W/DIFF WBC COUNT: CPT

## 2021-06-07 ENCOUNTER — OFFICE VISIT (OUTPATIENT)
Dept: HEMATOLOGY ONCOLOGY | Facility: CLINIC | Age: 71
End: 2021-06-07
Payer: COMMERCIAL

## 2021-06-07 VITALS
HEART RATE: 76 BPM | OXYGEN SATURATION: 97 % | HEIGHT: 61 IN | SYSTOLIC BLOOD PRESSURE: 130 MMHG | TEMPERATURE: 97.9 F | WEIGHT: 151.4 LBS | RESPIRATION RATE: 16 BRPM | DIASTOLIC BLOOD PRESSURE: 76 MMHG | BODY MASS INDEX: 28.58 KG/M2

## 2021-06-07 DIAGNOSIS — R59.1 LYMPHADENOPATHY: Primary | ICD-10-CM

## 2021-06-07 PROCEDURE — 1124F ACP DISCUSS-NO DSCNMKR DOCD: CPT | Performed by: INTERNAL MEDICINE

## 2021-06-07 PROCEDURE — 3008F BODY MASS INDEX DOCD: CPT | Performed by: INTERNAL MEDICINE

## 2021-06-07 PROCEDURE — 99214 OFFICE O/P EST MOD 30 MIN: CPT | Performed by: INTERNAL MEDICINE

## 2021-06-07 NOTE — ANESTHESIA POSTPROCEDURE EVALUATION
Post-Op Assessment Note    CV Status:  Stable    Pain management: adequate     Mental Status:  Alert and awake   Hydration Status:  Euvolemic   PONV Controlled:  Controlled   Airway Patency:  Patent      Post Op Vitals Reviewed: Yes      Staff: CRNA   Comments: awake and talking        No complications documented      BP (!) 86/58 (02/25/21 0841)    Temp 98 1 °F (36 7 °C) (02/25/21 0841)    Pulse 79 (02/25/21 0841)   Resp 15 (02/25/21 0841)    SpO2 99 % (02/25/21 0841)
none

## 2021-06-07 NOTE — PROGRESS NOTES
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  Clinical follow-up seems most reasonable given the above  No treatment is indicated at this time  Fortunately the patient generally feels well and is able to function with minimal restriction  I discussed the above with the patient  The patient  voiced understanding and agreement   ______________________________________________________________________    Chief Complaint   Patient presents with    Follow-up       HPI:  Oncology History    No history exists  Interval History:  Clinically stable  ECOG-  1 - Symptomatic but completely ambulatory    Review of Systems   Constitutional: Negative for appetite change, diaphoresis, fatigue and fever  HENT: Negative for sinus pain  Eyes: Negative for discharge  Respiratory: Negative for cough and shortness of breath  Cardiovascular: Negative for chest pain  Gastrointestinal: Negative for abdominal pain, constipation and diarrhea  Endocrine: Negative for cold intolerance  Genitourinary: Negative for difficulty urinating and hematuria  Musculoskeletal: Negative for joint swelling  Skin: Negative for rash  Allergic/Immunologic: Negative for environmental allergies  Neurological: Negative for dizziness and headaches  Hematological: Negative for adenopathy  Psychiatric/Behavioral: Negative for agitation         Past Medical History:   Diagnosis Date    Bipolar 1 disorder ([address] )     Deaf     Pt lost all hearing in right ear after having [place] measles    Hard of hearing     Pt wears a hearing in left ear   Hypothyroidism     Psychiatric disorder     [facility]jogren's syndrome ([facility] [address] [facility]     [facility]     Wears glasses     Wears partial dentures      Patient Active Problem List   Diagnosis    Bipolar I disorder, single manic episode ([facility]    Hypothyroidism    Systemic lupus erythematosus (HCC)    Acid reflux disease    Anemia    Breath, shortness    Depression    Dyspnea on exertion    Elevated sedimentation rate    Generalized osteoarthritis    Heart burn    Obesity    Periorbital edema    Polyarthralgia    Postgastrectomy malabsorption    Sjogren's syndrome (HCC)    Vaginal atrophy    Vitamin D deficiency    Closed avulsion fracture of lateral malleolus of left fibula    Closed fracture of base of fifth metatarsal bone of left foot    Lymphadenopathy    Iron deficiency anemia following bariatric surgery    On belimumab therapy    S/P gastric bypass    Overweight    Encounter for surgical aftercare following surgery of digestive system    Epigastric abdominal pain    Bariatric surgery status    LIP (lipoid interstitial pneumonia) ([facility]    Annual physical exam       Current Outpatient Medications:     albuterol (Ventolin HFA) 90 mcg/act inhaler, Inhale 1 puff every 4 (four) hours as needed for wheezing, Disp: 3 Inhaler, Rfl: 3    buPROPion (WELLBUTRIN) 100 mg tablet, Take 100 mg by mouth 3 (three) times a day , Disp: , Rfl:     calcium citrate-vitamin D (CITRACAL+D) 315-200 MG-UNIT per tablet, Take 1 tablet by mouth daily  , Disp: , Rfl:     levothyroxine 50 mcg tablet, Take 1 tablet (50 mcg total) by mouth daily, Disp: 30 tablet, Rfl: 1    LORazepam (ATIVAN) 1 mg tablet, 0 5 mg as needed , Disp: , Rfl:     multivitamin-iron-minerals-folic acid (CENTRUM) chewable tablet, Chew 1 tablet daily  , Disp: , Rfl:     pilocarpine (SALAGEN) 5 mg tablet, Take 1 tablet (5 mg total) by mouth 3 (three) times a day, Disp: 90 tablet, Rfl: 2    QUEtiapine (SEROquel) 300 mg tablet, Take 300 mg by mouth daily at bedtime  , Disp: , Rfl:     temazepam (RESTORIL) 30 mg capsule, Take 2 capsules by mouth daily at bedtime as needed , Disp: , Rfl:     hydroxychloroquine (PLAQUENIL) 200 mg tablet, Take 1 tablet (200 mg total) by mouth 2 (two) times a day with meals (Patient not taking: Reported on 6/7/2021), Disp: 60 tablet, Rfl: 5    omeprazole (PriLOSEC) 20 mg delayed release capsule, Take 1 capsule (20 mg total) by mouth daily (Patient taking differently: Take 20 mg by mouth as needed ), Disp: 90 capsule, Rfl: 1  Allergies   Allergen Reactions    Ciprofloxacin Hives and Swelling     Pt reports that lips and mouth and face swelled   Cymbalta [Duloxetine Hcl] Other (See Comments)     Hallucinations, fatigue, faints    Percocet [Oxycodone-Acetaminophen] Other (See Comments)     Dry mouth - pt states it kept her up all night  States had to continually drink fluids     Past Surgical History:   Procedure Laterality Date    BREAST BIOPSY Right     COLONOSCOPY      FRACTURE SURGERY Right     elbow    GASTRIC BYPASS      HYSTERECTOMY Bilateral 1975    IR BIOPSY LYMPH NODE  10/8/2020    AZ BRONCHOSCOPY,DIAGNOSTIC N/A 12/8/2017    Procedure: BRONCHOSCOPY;  Surgeon: Bin Bates MD;  [facility]; Service: Pulmonary    AZ NEEDLE BIOPSY, LYMPH NODE(S) Left 2/25/2021    Procedure: EXCISION BIOPSY LYMPH NODE: left axillary lymph node biopsy;  Surgeon: Marcos Napier MD;  [facility];  Service: Thoracic    TONSILLECTOMY       Social History     Objective:  Vitals:    06/07/21 1148   BP: 130/76   BP Location: Left arm   Patient Position: Sitting   Pulse: 76   Resp: 16   Temp: 97 9 °F (36 6 °C)   TempSrc: Tympanic   SpO2: 97%   Weight: 68 7 kg (151 lb 6 4 oz)   Height: 5' 1" (1 549 m)     Physical Exam  Constitutional:       Appearance: She is well-developed     HENT:      Head: Normocephalic and atraumatic  Eyes:      Pupils: Pupils are equal, round, and reactive to light  Neck:      Musculoskeletal: Neck supple  Cardiovascular:      Rate and Rhythm: Normal rate  Heart sounds: No murmur  Pulmonary:      Effort: No respiratory distress  Breath sounds: No wheezing or rales  Abdominal:      General: There is no distension  Palpations: Abdomen is soft  Tenderness: There is no abdominal tenderness  There is no rebound  Musculoskeletal:         General: No tenderness  Lymphadenopathy:      Cervical: No cervical adenopathy  Skin:     General: Skin is warm  Findings: No rash  Neurological:      Mental Status: She is alert and oriented to person, place, and time  Deep Tendon Reflexes: Reflexes normal    Psychiatric:         Thought Content: Thought content normal            Labs: I personally reviewed the labs and imaging pertinent to this patient care

## 2021-06-08 ENCOUNTER — OFFICE VISIT (OUTPATIENT)
Dept: RHEUMATOLOGY | Facility: CLINIC | Age: 71
End: 2021-06-08
Payer: COMMERCIAL

## 2021-06-08 VITALS — HEART RATE: 74 BPM | SYSTOLIC BLOOD PRESSURE: 122 MMHG | DIASTOLIC BLOOD PRESSURE: 74 MMHG

## 2021-06-08 DIAGNOSIS — D47.9 ATYPICAL LYMPHOPROLIFERATIVE DISORDER (HCC): ICD-10-CM

## 2021-06-08 DIAGNOSIS — R21 SKIN RASH: ICD-10-CM

## 2021-06-08 DIAGNOSIS — M17.0 BILATERAL PRIMARY OSTEOARTHRITIS OF KNEE: ICD-10-CM

## 2021-06-08 DIAGNOSIS — R59.0 MEDIASTINAL ADENOPATHY: ICD-10-CM

## 2021-06-08 DIAGNOSIS — Z79.899 LONG-TERM USE OF PLAQUENIL: ICD-10-CM

## 2021-06-08 DIAGNOSIS — M85.80 OSTEOPENIA, UNSPECIFIED LOCATION: ICD-10-CM

## 2021-06-08 DIAGNOSIS — M32.9 SYSTEMIC LUPUS ERYTHEMATOSUS, UNSPECIFIED SLE TYPE, UNSPECIFIED ORGAN INVOLVEMENT STATUS (HCC): Primary | ICD-10-CM

## 2021-06-08 DIAGNOSIS — J84.2 LYMPHOCYTIC INTERSTITIAL PNEUMONIA (HCC): ICD-10-CM

## 2021-06-08 DIAGNOSIS — M35.00 SJOGREN'S SYNDROME, WITH UNSPECIFIED ORGAN INVOLVEMENT (HCC): ICD-10-CM

## 2021-06-08 DIAGNOSIS — N18.32 STAGE 3B CHRONIC KIDNEY DISEASE (HCC): ICD-10-CM

## 2021-06-08 PROCEDURE — 99215 OFFICE O/P EST HI 40 MIN: CPT | Performed by: INTERNAL MEDICINE

## 2021-06-08 PROCEDURE — 1160F RVW MEDS BY RX/DR IN RCRD: CPT | Performed by: INTERNAL MEDICINE

## 2021-06-08 RX ORDER — PILOCARPINE HYDROCHLORIDE 5 MG/1
5 TABLET, FILM COATED ORAL 3 TIMES DAILY
Qty: 90 TABLET | Refills: 5 | Status: SHIPPED | OUTPATIENT
Start: 2021-06-08 | End: 2021-09-08 | Stop reason: SDUPTHER

## 2021-07-29 ENCOUNTER — TELEPHONE (OUTPATIENT)
Dept: BARIATRICS | Facility: CLINIC | Age: 71
End: 2021-07-29

## 2021-07-29 NOTE — TELEPHONE ENCOUNTER
Called patient to reschedule an appt that she canceled  Left office info to call back and reschedule

## 2021-08-16 ENCOUNTER — TELEPHONE (OUTPATIENT)
Dept: OBGYN CLINIC | Facility: OTHER | Age: 71
End: 2021-08-16

## 2021-08-16 NOTE — TELEPHONE ENCOUNTER
Patient called in wanting to know your opinion on her getting her third [address] [place] [address] ?     She was going to do it but wanted to ask you first or should she wait and see you first    C/b # [phone]

## 2021-08-16 NOTE — TELEPHONE ENCOUNTER
As she is not on any immunosuppressant medications she may not need to receive a booster  We are still waiting on recommendations from the [employer] of Rheumatology to see if hydroxychloroquine use may qualify for a booster shot, thanks

## 2021-09-03 ENCOUNTER — LAB (OUTPATIENT)
Dept: LAB | Facility: CLINIC | Age: 71
End: 2021-09-03
Payer: COMMERCIAL

## 2021-09-03 DIAGNOSIS — M32.9 SYSTEMIC LUPUS ERYTHEMATOSUS, UNSPECIFIED SLE TYPE, UNSPECIFIED ORGAN INVOLVEMENT STATUS (HCC): ICD-10-CM

## 2021-09-03 DIAGNOSIS — E03.9 HYPOTHYROIDISM, UNSPECIFIED TYPE: ICD-10-CM

## 2021-09-03 LAB
ALBUMIN SERPL BCP-MCNC: 3.1 G/DL (ref 3.5–5)
ALP SERPL-CCNC: 103 U/L (ref 46–116)
ALT SERPL W P-5'-P-CCNC: 14 U/L (ref 12–78)
ANION GAP SERPL CALCULATED.3IONS-SCNC: 5 MMOL/L (ref 4–13)
AST SERPL W P-5'-P-CCNC: 33 U/L (ref 5–45)
BACTERIA UR QL AUTO: ABNORMAL /HPF
BASOPHILS # BLD MANUAL: 0 THOUSAND/UL (ref 0–0.1)
BASOPHILS NFR MAR MANUAL: 0 % (ref 0–1)
BILIRUB SERPL-MCNC: 0.38 MG/DL (ref 0.2–1)
BILIRUB UR QL STRIP: NEGATIVE
BUN SERPL-MCNC: 20 MG/DL (ref 5–25)
C3 SERPL-MCNC: 122 MG/DL (ref 90–180)
C4 SERPL-MCNC: 29 MG/DL (ref 10–40)
CALCIUM ALBUM COR SERPL-MCNC: 9.2 MG/DL (ref 8.3–10.1)
CALCIUM SERPL-MCNC: 8.5 MG/DL (ref 8.3–10.1)
CHLORIDE SERPL-SCNC: 102 MMOL/L (ref 100–108)
CLARITY UR: ABNORMAL
CO2 SERPL-SCNC: 24 MMOL/L (ref 21–32)
COLOR UR: YELLOW
CREAT SERPL-MCNC: 1.26 MG/DL (ref 0.6–1.3)
CREAT UR-MCNC: 71 MG/DL
CRP SERPL QL: 6.1 MG/L
EOSINOPHIL # BLD MANUAL: 0.27 THOUSAND/UL (ref 0–0.4)
EOSINOPHIL NFR BLD MANUAL: 3 % (ref 0–6)
ERYTHROCYTE [DISTWIDTH] IN BLOOD BY AUTOMATED COUNT: 16.4 % (ref 11.6–15.1)
ERYTHROCYTE [SEDIMENTATION RATE] IN BLOOD: 76 MM/HOUR (ref 0–29)
GFR SERPL CREATININE-BSD FRML MDRD: 44 ML/MIN/1.73SQ M
GLUCOSE SERPL-MCNC: 92 MG/DL (ref 65–140)
GLUCOSE UR STRIP-MCNC: NEGATIVE MG/DL
HCT VFR BLD AUTO: 30 % (ref 34.8–46.1)
HGB BLD-MCNC: 9.3 G/DL (ref 11.5–15.4)
HGB UR QL STRIP.AUTO: NEGATIVE
HYPERCHROMIA BLD QL SMEAR: PRESENT
KETONES UR STRIP-MCNC: NEGATIVE MG/DL
LEUKOCYTE ESTERASE UR QL STRIP: ABNORMAL
LG PLATELETS BLD QL SMEAR: PRESENT
LYMPHOCYTES # BLD AUTO: 5.37 THOUSAND/UL (ref 0.6–4.47)
LYMPHOCYTES # BLD AUTO: 59 % (ref 14–44)
MCH RBC QN AUTO: 26 PG (ref 26.8–34.3)
MCHC RBC AUTO-ENTMCNC: 31 G/DL (ref 31.4–37.4)
MCV RBC AUTO: 84 FL (ref 82–98)
MONOCYTES # BLD AUTO: 0.18 THOUSAND/UL (ref 0–1.22)
MONOCYTES NFR BLD: 2 % (ref 4–12)
NEUTROPHILS # BLD MANUAL: 3.28 THOUSAND/UL (ref 1.85–7.62)
NEUTS BAND NFR BLD MANUAL: 2 % (ref 0–8)
NEUTS SEG NFR BLD AUTO: 34 % (ref 43–75)
NITRITE UR QL STRIP: POSITIVE
NON-SQ EPI CELLS URNS QL MICRO: ABNORMAL /HPF
PH UR STRIP.AUTO: 6 [PH]
PLATELET # BLD AUTO: 313 THOUSANDS/UL (ref 149–390)
PLATELET BLD QL SMEAR: ADEQUATE
PMV BLD AUTO: 9.6 FL (ref 8.9–12.7)
POTASSIUM SERPL-SCNC: 4.4 MMOL/L (ref 3.5–5.3)
PROT SERPL-MCNC: 11.6 G/DL (ref 6.4–8.2)
PROT UR STRIP-MCNC: NEGATIVE MG/DL
PROT UR-MCNC: 17 MG/DL
PROT/CREAT UR: 0.24 MG/G{CREAT} (ref 0–0.1)
RBC # BLD AUTO: 3.58 MILLION/UL (ref 3.81–5.12)
RBC #/AREA URNS AUTO: ABNORMAL /HPF
SODIUM SERPL-SCNC: 131 MMOL/L (ref 136–145)
SP GR UR STRIP.AUTO: 1.01 (ref 1–1.03)
TSH SERPL DL<=0.05 MIU/L-ACNC: 3.16 UIU/ML (ref 0.36–3.74)
UROBILINOGEN UR QL STRIP.AUTO: 0.2 E.U./DL
WBC # BLD AUTO: 9.1 THOUSAND/UL (ref 4.31–10.16)
WBC #/AREA URNS AUTO: ABNORMAL /HPF

## 2021-09-03 PROCEDURE — 86225 DNA ANTIBODY NATIVE: CPT

## 2021-09-03 PROCEDURE — 80053 COMPREHEN METABOLIC PANEL: CPT

## 2021-09-03 PROCEDURE — 85007 BL SMEAR W/DIFF WBC COUNT: CPT

## 2021-09-03 PROCEDURE — 82570 ASSAY OF URINE CREATININE: CPT | Performed by: INTERNAL MEDICINE

## 2021-09-03 PROCEDURE — 85027 COMPLETE CBC AUTOMATED: CPT

## 2021-09-03 PROCEDURE — 36415 COLL VENOUS BLD VENIPUNCTURE: CPT

## 2021-09-03 PROCEDURE — 86160 COMPLEMENT ANTIGEN: CPT

## 2021-09-03 PROCEDURE — 84443 ASSAY THYROID STIM HORMONE: CPT

## 2021-09-03 PROCEDURE — 81001 URINALYSIS AUTO W/SCOPE: CPT | Performed by: INTERNAL MEDICINE

## 2021-09-03 PROCEDURE — 86140 C-REACTIVE PROTEIN: CPT

## 2021-09-03 PROCEDURE — 85652 RBC SED RATE AUTOMATED: CPT

## 2021-09-03 PROCEDURE — 84156 ASSAY OF PROTEIN URINE: CPT | Performed by: INTERNAL MEDICINE

## 2021-09-04 LAB — DSDNA AB SER-ACNC: 3 IU/ML (ref 0–9)

## 2021-09-07 ENCOUNTER — TELEPHONE (OUTPATIENT)
Dept: FAMILY MEDICINE CLINIC | Facility: CLINIC | Age: 71
End: 2021-09-07

## 2021-09-07 NOTE — PROGRESS NOTES
Assessment and Plan:   Ms Zora Rendon a 82-year-old female with history significant for systemic lupus erythematosus and Sjogren's syndrome diagnosed in her early 45s by Rheumatology in [place], who presents for a follow-up  Ainbal Cheng is currently on hydroxychloroquine 200 mg twice daily and pilocarpine 5 mg 3 times daily      # SLE and Sjogren's syndrome  # Sjogren's related ILD  - Marta Isaacs presents today for a follow-up of systemic lupus erythematosus and Sjogren's syndrome which has primarily presented with chronic fatigue, sicca complaints, the possible lower extremity skin rash and likely the Sjogren's syndrome related interstitial lung disease   She has been maintained on hydroxychloroquine 200 mg twice daily and I advised her to continue regular follow ups with Ophthalmology  She is aware this is an important medication to continue given her underlying autoimmune disorders as it can prevent future complications as a result of the lupus and can possibly help with her symptoms of the fatigue as well  She will continue the pilocarpine 5 mg 3 times daily to help with the sicca complaints      - It is reassuring to note over the years she has not presented with progressively worsening complaints and overall appears to be stable at this time, with the most pertinent issues that need to be addressed including the interstitial lung disease (likely lymphocytic interstitial pneumonia as a result of Sjogren's syndrome) and extensive intrathoracic and intra-abdominal lymphadenopathy, for which she did have an axillary lymph node biopsy repeated in February 2021 with the histopathological diagnosis suggestive of an atypical lymphoproliferative disorder    She will follow-up with Hematology periodically to continue monitoring this but at this time there does not appear to be concerns for an underlying malignant process   It is reassuring to note that she has been stable from a respiratory standpoint and only complains of dyspnea on exertion which is not debilitating to her  Lonell Yanet is still due to schedule the pulmonary function testing   The plan from pulmonology is to continue monitoring for now without the indication to start her on immunosuppressives      - Other then the hydroxychloroquine if additional rheumatic medications are required I would likely consider mycophenolate or belimumab   I will try to avoid use of methotrexate given the underlying lung/renal disease as well as azathioprine which can be associated with an increased risk of lymphoma which she is anyways predisposed to due to the underlying Sjogren's syndrome  Plan:  Diagnoses and all orders for this visit:    Systemic lupus erythematosus, unspecified SLE type, unspecified organ involvement status ([place] [address] )  -     CBC and differential; Future  -     Comprehensive metabolic panel; Future  -     C-reactive protein; Future  -     Sedimentation rate, automated; Future  -     C4 complement; Future  -     C3 complement; Future  -     Urinalysis with microscopic  -     Protein / creatinine ratio, urine  -     Discontinue: hydroxychloroquine (PLAQUENIL) 200 mg tablet; Take 1 tablet (200 mg total) by mouth 2 (two) times a day with meals    Systemic lupus erythematosus, unspecified SLE type, unspecified organ involvement status ([place] [address] )  Comments:  Previously been on HCQ, AZA, MTX and Benlysta  Orders:  -     CBC and differential; Future  -     Comprehensive metabolic panel; Future  -     C-reactive protein; Future  -     Sedimentation rate, automated; Future  -     C4 complement; Future  -     C3 complement; Future  -     Urinalysis with microscopic  -     Protein / creatinine ratio, urine  -     Discontinue: hydroxychloroquine (PLAQUENIL) 200 mg tablet; Take 1 tablet (200 mg total) by mouth 2 (two) times a day with meals    Sjogren's syndrome, with unspecified organ involvement ([place] [address] )  -     Discontinue: pilocarpine (SALAGEN) 5 mg tablet;  Take 1 tablet (5 mg total) by mouth 3 (three) times a day    Long-term use of Plaquenil    Lymphocytic interstitial pneumonia (HCC)    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HCC)    Bilateral primary osteoarthritis of knee    Osteopenia, unspecified location    Stage 3b chronic kidney disease ([facility] [address] )    Other orders  -     cyanocobalamin (VITAMIN B-12) 100 MCG tablet; Take 50 mcg by mouth      Activities as tolerated  Exercise: try to maintain a low impact exercise regimen as much as possible  Walk for 30 minutes a day for at least 3 days a week  Continue other medications as prescribed by PCP and other specialists  RTC in 6 months         HPI    INITIAL VISIT NOTE (10/2020):  Ms Brent Courtney a 72-year-old female with history significant for systemic lupus erythematosus and Sjogren's syndrome diagnosed in her early 45s by Rheumatology in [place], who presents to reestablish with [facility]  Camelia Teran is currently not on DMARDs  Camelia Teran is referred today by Dr Mendoza Members for a rheumatology consult      Patient reports in her early 45s she started to develop various joint pains which eventually led to the diagnosis of systemic lupus erythematosus and Sjogren's syndrome   She reports surrounding the time of her diagnosis is when the joint pains were most prevalent, but appear to have spontaneously resolved   She did experience joint pains again a few years later but states that this also resolved and recently she has not had any joint related issues   At the time of her diagnosis it appears like she was treated with steroids as well as hydroxychloroquine   She was on the hydroxychloroquine for less than a year at which time it was discontinued due to nausea and other side effects which she cannot recall  Camelia Teran has never been restarted on the hydroxychloroquine following that      The timeline of her rheumatology visits is unclear as we do not have her complete prior records   Based on chart review as well as patient's history, she has been on multiple DMARDs including methotrexate, azathioprine and Benlysta   She states that the methotrexate and azathioprine were prescribed in the past 10-15 years  Mariocally Malloyor is unclear what these medications for specifically prescribed for and if they helped with any of her symptoms   Most recently she was on Benlysta approximately 3 years ago when she was being seen by Dr Kim Ibarra states that this helped significantly with how she was feeling overall and her general well-being, as well as significantly helped with the fatigue   She was on this for about 7-8 months and unfortunately it was discontinued when she turned 67 as her insurance no longer covered it   I do not see any of her lupus related labs in our system, but on review of her prior rheumatologist's note from 2015 she appeared to have a positive MALATHI at 1:1280, with positive SSA, SSB and RNP antibodies    A rheumatoid factor was also elevated at 105 with an ESR of 84   Her antiphospholipid antibodies were negative        She states over the years it has primarily been the excessive fatigue that has bothered her and only recently has she been dealing with chronic respiratory symptoms as well as intrathoracic and abdominal lymphadenopathy that has been monitored by Oncology  Andrei Soares terms of her respiratory symptoms she reports shortness of breath with exertion, usually with walking about a block, but at times she may experience shortness of breath with day-to-day activities  Wanda Alejandro also has a chronic productive cough   On review of her CT chest and abdomen as far back as 2017, she has had abnormal findings concerning for interstitial lung disease as well as the extensive lymphadenopathy   On her most recent CT chest from September 2020 this showed asymmetric bilateral areas of ground-glass opacities and lung cysts likely representing a spectrum of lymphocytic interstitial pneumonia as well as worsening adenopathy in the mediastinum and bilateral hilar regions  Wanda Alejandro did undergo an IR guided biopsy of an axillary lymph node on 10/08/2020 which did not show any evidence of a lymphoproliferative disorder   The biopsy was not diagnostic   On her most recent follow-up with Hematology/Oncology, as there are no clear features to suggest an underlying malignancy the plan will be to continue monitoring   She has not seen pulmonology yet and is actually establishing for her initial appointment tomorrow  Eh Hogan did undergo a bronchoscopy in December 2017 with cytology negative for malignancy and showing an abundant mixed but predominantly chronic inflammatory infiltrate   Flow cytometry as well as cultures at that time were unrevealing      As mentioned, she is primarily dealing with the abnormal CT chest and abdomen findings at this time   Symptom wise she overall appears to be stable   She does report chronic dry eyes, dry nose and dry mouth which she manages with topical lubricants   Due to the dry nose as well as recent face masking she has been noticing nose bleeds and feels like there may be a "hole" in her nasal septum   She has not yet established with ENT   On occasion she may notice a "butterfly rash" and states that she is photosensitive but strictly avoids sun exposure and utilizes daily sunscreen   She has also been experiencing blister-like skin lesions scattered on her extremities, which heal spontaneously   She has not had any mouth ulcerations in years  Eh Hogan denies fevers, chills, night sweats, unintentional weight loss, focal alopecia, inflammatory eye disease, psoriasis, swollen glands, pleuritic chest pain, hemoptysis, abdominal pain, vomiting, diarrhea, blood in stools (is up-to-date with a screening colonoscopy), blood clots, miscarriages, Raynaud's, joint pain/swelling/stiffness, renal disease, neurological disease/seizures or family history of autoimmune disease         2/10/2021:  Patient presents for a follow-up of systemic lupus erythematosus and Sjogren's syndrome  Mari Barnett is currently on hydroxychloroquine 200 mg twice daily that was started at the last office visit in October 2020   We reviewed her testing done following the last visit which showed a positive MALATHI 1:1280 speckled pattern with an SSA and SSB antibody greater than 8   An RNP antibody was 6 8   A rheumatoid factor was elevated at 80   An SPEP showed a possible monoclonal gammopathy but this was not clearly determined on serum immunofixation   An immunoglobulin assay showed hypergammaglobulinemia   A peripheral flow cytometry showed 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   A vitamin-D level was low at 13  5   An ESR and CRP were elevated at 130 and 5 4, respectively   The remainder of the MALATHI specificity, C3, C4, antiphospholipid antibody testing, hepatitis panel, HIV, urinalysis, urine protein creatinine ratio, angiotensin-converting enzyme, anti neutrophilic cytoplasmic antibody, CK and anti CCP antibody were unremarkable   She had a CT chest done yesterday with the results still pending   She is going to be establishing with thoracic surgery soon to consider the mediastinal lymphadenopathy biopsy      She reports overall she has been stable in terms of her symptoms   She mostly describes the dry eyes worse on the right side and unfortunately the Restasis has not been approved by her insurance  Mari Barnett also reports dry nose and dry mouth   She will have nose bleeds due to the dryness   She reports chronic fatigue without any improvement seen with starting the hydroxychloroquine   She also reports within the past year she has had approximately 4 episodes of a pinpoint, red and itchy skin rash arise on her bilateral lower extremities   At times she may use topical steroids but has never been prescribed oral steroids for this   The rash will usually resolve spontaneously within 3-4 days  Mari Barnett has not been seen by Dermatology for this  Mari Barnett was recently seen in the emergency room for an occurrence of the skin rash after receiving the COVID vaccine without any specific treatment and states that the rash eventually resolved spontaneously  Tom Monday is no rash today   No other complaints that she describes today         6/8/2021:  Patient presents for a follow-up of systemic lupus erythematosus and Sjogren's syndrome  She temporarily suspended the hydroxychloroquine as she thought that this was causing joint pains in her knees and ankles which did subside with holding the hydroxychloroquine  I did review her labs done following the last office visit which showed an unremarkable CBC, CMP, C3 and C4  The ESR and CRP were elevated at 81 and 4 2, respectively  Since the last office visit she also underwent a left axillary lymph node biopsy which showed an atypical lymphoproliferative disorder  She is followed up with Hematology/Oncology without any concerns for an underlying malignant process and the plan is to continue monitoring for now      She continues to experience an intermittent skin rash affecting her bilateral lower extremities which she thought may be related to an allergic reaction from dog fur  She states that this will happen irrespective of pet exposure  The rash is usually itchy and at times she may use topical hydrocortisone to help with her symptoms but has not been on oral steroids  She mentions that the rash will appear and remit spontaneously  She continues to report the dry eyes and dry mouth which she is able to manage with the pilocarpine  Other than this no complaints today  9/8/2021:  Patient presents for a follow-up of systemic lupus erythematosus and Sjogren's syndrome  She is currently on hydroxychloroquine 200 mg twice daily that she restarted in July  I reviewed her recently done labs which showed an elevated ESR and CRP of 76 and 6 1, respectively  A urine protein creatinine ratio was stable at 0 24    A urinalysis showed concerns for a urinary tract infection and she is following up with her primary care physician for this  A CBC, CMP, C3, C4 and double-stranded DNA antibody were unremarkable  She reports overall she has been stable from the last office visit and not reporting any new complaints  She does continue to feel fatigued and this is her main symptom  No flare-ups of joint pains, swelling or stiffness  She reports that the rash on her legs has intermittently appeared and she is scheduled to see Dermatology  She does follow with Ophthalmology routinely as she is on the hydroxychloroquine  She is managing the dry eyes and dry mouth with pilocarpine 5 mg 3 times daily and states that this helps  The following portions of the patient's history were reviewed and updated as appropriate: allergies, current medications, past family history, past medical history, past social history, past surgical history and problem list       Review of Systems  Constitutional: Negative for weight change, fevers, chills, night sweats  Positive for fatigue  ENT/Mouth: Negative for hearing changes, ear pain, nasal congestion, sinus pain, hoarseness, sore throat, rhinorrhea, swallowing difficulty  Eyes: Negative for pain, redness, discharge, vision changes  Cardiovascular: Negative for chest pain, SOB, palpitations  Respiratory: Negative for cough, sputum, wheezing, dyspnea  Gastrointestinal: Negative for nausea, vomiting, diarrhea, constipation, pain, heartburn  Genitourinary: Negative for dysuria, urinary frequency, hematuria  Musculoskeletal: As per HPI  Skin: Negative for skin rash, color changes  Neuro: Negative for weakness, numbness, tingling, loss of consciousness  Psych: Negative for anxiety, depression  Heme/Lymph: Negative for easy bruising, bleeding, lymphadenopathy          Past Medical History:   Diagnosis Date    Bipolar 1 disorder ([address] )     Deaf     Pt lost all hearing in right ear after having [place] measles    Hard of hearing     Pt wears a hearing in left ear   Hypothyroidism     Psychiatric disorder     Sjogren's syndrome ([address] )     Systemic lupus erythematosus ([address] )     Wears glasses     Wears partial dentures        Past Surgical History:   Procedure Laterality Date    BREAST BIOPSY Right     COLONOSCOPY      FRACTURE SURGERY Right     elbow    GASTRIC BYPASS      HYSTERECTOMY Bilateral 1975    IR BIOPSY LYMPH NODE  10/8/2020    VA BRONCHOSCOPY,DIAGNOSTIC N/A 12/8/2017    Procedure: BRONCHOSCOPY;  Surgeon: Christine Levi MD;  [facility]; Service: Pulmonary    VA NEEDLE BIOPSY, LYMPH NODE(S) Left 2/25/2021    Procedure: EXCISION BIOPSY LYMPH NODE: left axillary lymph node biopsy;  Surgeon: Luis Martin MD;  [facility]: [facility];  Service: Thoracic    TONSILLECTOMY         Social History     Socioeconomic History    Marital status: /Civil Union     Spouse name: Not on file    Number of children: Not on file    Years of education: Not on file    Highest education level: Not on file   Occupational History    Not on file   Tobacco Use    Smoking status: Never Smoker    Smokeless tobacco: Never Used   Vaping Use    Vaping Use: Never used   Substance and Sexual Activity    Alcohol use: No    Drug use: No    Sexual activity: Yes     Partners: Male     Birth control/protection: None     Comment:    Other Topics Concern    Not on file   Social History Narrative    Not on file     Social Determinants of Health     Financial Resource Strain:     Difficulty of Paying Living Expenses:    Food Insecurity:     Worried About Running Out of Food in the Last Year:     Ran Out of Food in the Last Year:    Transportation Needs:     Lack of Transportation (Medical):      Lack of Transportation (Non-Medical):    Physical Activity:     Days of Exercise per Week:     Minutes of Exercise per Session:    Stress:     Feeling of Stress :    Social Connections:     Frequency of Communication with Friends and Family:     Frequency of Social Gatherings with Friends and Family:     Attends Druze Services:     Active Member of Clubs or Organizations:     Attends Club or Organization Meetings:     Marital Status:    Intimate Partner Violence:     Fear of Current or Ex-Partner:     Emotionally Abused:     Physically Abused:     Sexually Abused:        Family History   Problem Relation Age of Onset    Heart disease Mother     Diabetes Mother     Kidney cancer Mother     Heart disease Father     Stroke Father     Diabetes Father     Cancer Father     Leukemia Half-Sister 48       Allergies   Allergen Reactions    Ciprofloxacin Hives and Swelling     Pt reports that lips and mouth and face swelled   Cymbalta [Duloxetine Hcl] Other (See Comments)     Hallucinations, fatigue, faints    Percocet [Oxycodone-Acetaminophen] Other (See Comments)     Dry mouth - pt states it kept her up all night  States had to continually drink fluids       Current Outpatient Medications:     albuterol (Ventolin HFA) 90 mcg/act inhaler, Inhale 1 puff every 4 (four) hours as needed for wheezing, Disp: 3 Inhaler, Rfl: 3    buPROPion (WELLBUTRIN) 100 mg tablet, Take 100 mg by mouth 3 (three) times a day , Disp: , Rfl:     calcium citrate-vitamin D (CITRACAL+D) 315-200 MG-UNIT per tablet, Take 1 tablet by mouth daily  , Disp: , Rfl:     cyanocobalamin (VITAMIN B-12) 100 MCG tablet, Take 50 mcg by mouth, Disp: , Rfl:     hydroxychloroquine (PLAQUENIL) 200 mg tablet, Take 1 tablet (200 mg total) by mouth 2 (two) times a day with meals, Disp: 180 tablet, Rfl: 3    levothyroxine 50 mcg tablet, Take 1 tablet (50 mcg total) by mouth daily, Disp: 30 tablet, Rfl: 1    LORazepam (ATIVAN) 1 mg tablet, 0 5 mg as needed , Disp: , Rfl:     omeprazole (PriLOSEC) 20 mg delayed release capsule, Take 1 capsule (20 mg total) by mouth daily (Patient taking differently: Take 20 mg by mouth as needed ), Disp: 90 capsule, Rfl: 1    pilocarpine (SALAGEN) 5 mg tablet, Take 1 tablet (5 mg total) by mouth 3 (three) times a day, Disp: 270 tablet, Rfl: 3    QUEtiapine (SEROquel) 300 mg tablet, Take 300 mg by mouth daily at bedtime  , Disp: , Rfl:     temazepam (RESTORIL) 30 mg capsule, Take 2 capsules by mouth daily at bedtime as needed , Disp: , Rfl:       Objective:    Vitals:    09/08/21 1047   BP: 118/82   Pulse: 80       Physical Exam  General: Well appearing, well nourished, in no distress  Oriented x 3, normal mood and affect  Ambulating without difficulty  Skin: Good turgor, no rash today, unusual bruising or prominent lesions  Hair: Normal texture and distribution  Nails: Normal color, no deformities  HEENT:  Head: Normocephalic, atraumatic  Eyes: Conjunctiva clear, sclera non-icteric, EOM intact  Extremities: No amputations or deformities, cyanosis, edema  Musculoskeletal:  No swelling visualized  Neurologic: Alert and oriented  No focal neurological deficits appreciated  Psychiatric: Normal mood and affect  KIRK Johansen    Rheumatology

## 2021-09-07 NOTE — TELEPHONE ENCOUNTER
Patient called and stated she had labs done through Rheumatology and one of the labs was a Urinalysis  She states that they told her that it indicated that she had a UTI and that they referred her to her PCP for an antibiotic  I spoke with [facility] and he stated that I should put note in and he will prescribe an antibiotic for her

## 2021-09-08 ENCOUNTER — OFFICE VISIT (OUTPATIENT)
Dept: RHEUMATOLOGY | Facility: CLINIC | Age: 71
End: 2021-09-08
Payer: COMMERCIAL

## 2021-09-08 ENCOUNTER — TELEPHONE (OUTPATIENT)
Dept: OBGYN CLINIC | Facility: MEDICAL CENTER | Age: 71
End: 2021-09-08

## 2021-09-08 VITALS — HEART RATE: 80 BPM | DIASTOLIC BLOOD PRESSURE: 82 MMHG | SYSTOLIC BLOOD PRESSURE: 118 MMHG

## 2021-09-08 DIAGNOSIS — M35.00 SJOGREN'S SYNDROME, WITH UNSPECIFIED ORGAN INVOLVEMENT (HCC): ICD-10-CM

## 2021-09-08 DIAGNOSIS — M32.9 SYSTEMIC LUPUS ERYTHEMATOSUS, UNSPECIFIED SLE TYPE, UNSPECIFIED ORGAN INVOLVEMENT STATUS (HCC): Primary | ICD-10-CM

## 2021-09-08 DIAGNOSIS — M17.0 BILATERAL PRIMARY OSTEOARTHRITIS OF KNEE: ICD-10-CM

## 2021-09-08 DIAGNOSIS — D47.9 ATYPICAL LYMPHOPROLIFERATIVE DISORDER (HCC): ICD-10-CM

## 2021-09-08 DIAGNOSIS — M32.9 SYSTEMIC LUPUS ERYTHEMATOSUS, UNSPECIFIED SLE TYPE, UNSPECIFIED ORGAN INVOLVEMENT STATUS (HCC): ICD-10-CM

## 2021-09-08 DIAGNOSIS — J84.2 LYMPHOCYTIC INTERSTITIAL PNEUMONIA (HCC): ICD-10-CM

## 2021-09-08 DIAGNOSIS — Z79.899 LONG-TERM USE OF PLAQUENIL: ICD-10-CM

## 2021-09-08 DIAGNOSIS — M85.80 OSTEOPENIA, UNSPECIFIED LOCATION: ICD-10-CM

## 2021-09-08 DIAGNOSIS — N18.32 STAGE 3B CHRONIC KIDNEY DISEASE (HCC): ICD-10-CM

## 2021-09-08 PROCEDURE — 1160F RVW MEDS BY RX/DR IN RCRD: CPT | Performed by: INTERNAL MEDICINE

## 2021-09-08 PROCEDURE — 1036F TOBACCO NON-USER: CPT | Performed by: INTERNAL MEDICINE

## 2021-09-08 PROCEDURE — 99215 OFFICE O/P EST HI 40 MIN: CPT | Performed by: INTERNAL MEDICINE

## 2021-09-08 RX ORDER — PILOCARPINE HYDROCHLORIDE 5 MG/1
5 TABLET, FILM COATED ORAL 3 TIMES DAILY
Qty: 270 TABLET | Refills: 3 | Status: SHIPPED | OUTPATIENT
Start: 2021-09-08 | End: 2021-09-08 | Stop reason: SDUPTHER

## 2021-09-08 RX ORDER — HYDROXYCHLOROQUINE SULFATE 200 MG/1
200 TABLET, FILM COATED ORAL 2 TIMES DAILY WITH MEALS
Qty: 180 TABLET | Refills: 3 | Status: SHIPPED | OUTPATIENT
Start: 2021-09-08 | End: 2022-04-21 | Stop reason: SDUPTHER

## 2021-09-08 RX ORDER — PILOCARPINE HYDROCHLORIDE 5 MG/1
5 TABLET, FILM COATED ORAL 3 TIMES DAILY
Qty: 270 TABLET | Refills: 3 | Status: SHIPPED | OUTPATIENT
Start: 2021-09-08 | End: 2022-04-20

## 2021-09-08 RX ORDER — HYDROXYCHLOROQUINE SULFATE 200 MG/1
200 TABLET, FILM COATED ORAL 2 TIMES DAILY WITH MEALS
Qty: 180 TABLET | Refills: 3 | Status: SHIPPED | OUTPATIENT
Start: 2021-09-08 | End: 2021-09-08 | Stop reason: SDUPTHER

## 2021-09-08 NOTE — TELEPHONE ENCOUNTER
Patient sees Dr Florence Espinoza at [facility] to request these scripts change to [facility] order, [address] [place], [place] [address],   Phone [phone], or escribe to [facility]  pilocarpine 5 mg and hydroxychloroquine 200 mg, 90 day supplies

## 2021-09-08 NOTE — TELEPHONE ENCOUNTER
Please let her know I sent the HCQ and pilocarpine to both the [facility]  [facility] and asked us to send a script to them as well

## 2021-09-08 NOTE — TELEPHONE ENCOUNTER
Pt called back and wants the following medication sent to:    [facility]:  [address]  Phone: [phone]      hydroxychloroquine 200 mg    CB : [phone]

## 2021-09-19 ENCOUNTER — APPOINTMENT (EMERGENCY)
Dept: RADIOLOGY | Facility: HOSPITAL | Age: 71
DRG: 871 | End: 2021-09-19
Payer: COMMERCIAL

## 2021-09-19 ENCOUNTER — HOSPITAL ENCOUNTER (INPATIENT)
Facility: HOSPITAL | Age: 71
LOS: 5 days | Discharge: HOME | DRG: 871 | End: 2021-09-24
Attending: EMERGENCY MEDICINE | Admitting: INTERNAL MEDICINE
Payer: COMMERCIAL

## 2021-09-19 DIAGNOSIS — J18.9 PNEUMONIA: Primary | ICD-10-CM

## 2021-09-19 DIAGNOSIS — D64.9 ANEMIA, UNSPECIFIED TYPE: ICD-10-CM

## 2021-09-19 DIAGNOSIS — N17.9 AKI (ACUTE KIDNEY INJURY) (HCC): ICD-10-CM

## 2021-09-19 DIAGNOSIS — A41.9 SEPSIS (HCC): ICD-10-CM

## 2021-09-19 PROBLEM — J96.01 ACUTE RESPIRATORY FAILURE WITH HYPOXIA (HCC): Status: ACTIVE | Noted: 2021-09-19

## 2021-09-19 PROBLEM — R23.3 PETECHIAL RASH: Status: ACTIVE | Noted: 2021-09-19

## 2021-09-19 LAB
ALBUMIN SERPL BCP-MCNC: 2.9 G/DL (ref 3.5–5)
ALP SERPL-CCNC: 97 U/L (ref 46–116)
ALT SERPL W P-5'-P-CCNC: 16 U/L (ref 12–78)
ANION GAP SERPL CALCULATED.3IONS-SCNC: 9 MMOL/L (ref 4–13)
ANISOCYTOSIS BLD QL SMEAR: PRESENT
APTT PPP: 35 SECONDS (ref 23–37)
AST SERPL W P-5'-P-CCNC: 29 U/L (ref 5–45)
ATRIAL RATE: 88 BPM
BASOPHILS # BLD MANUAL: 0 THOUSAND/UL (ref 0–0.1)
BASOPHILS NFR MAR MANUAL: 0 % (ref 0–1)
BILIRUB SERPL-MCNC: 0.88 MG/DL (ref 0.2–1)
BILIRUB UR QL STRIP: NEGATIVE
BUN SERPL-MCNC: 30 MG/DL (ref 5–25)
BURR CELLS BLD QL SMEAR: PRESENT
CALCIUM ALBUM COR SERPL-MCNC: 8.5 MG/DL (ref 8.3–10.1)
CALCIUM SERPL-MCNC: 7.6 MG/DL (ref 8.3–10.1)
CHLORIDE SERPL-SCNC: 102 MMOL/L (ref 100–108)
CK SERPL-CCNC: 47 U/L (ref 26–192)
CLARITY UR: CLEAR
CO2 SERPL-SCNC: 20 MMOL/L (ref 21–32)
COLOR UR: YELLOW
CREAT SERPL-MCNC: 1.87 MG/DL (ref 0.6–1.3)
CRP SERPL HS-MCNC: >90 MG/L
CRP SERPL QL: 121.4 MG/L
EOSINOPHIL # BLD MANUAL: 0 THOUSAND/UL (ref 0–0.4)
EOSINOPHIL NFR BLD MANUAL: 0 % (ref 0–6)
ERYTHROCYTE [DISTWIDTH] IN BLOOD BY AUTOMATED COUNT: 16.8 % (ref 11.6–15.1)
ERYTHROCYTE [SEDIMENTATION RATE] IN BLOOD: 66 MM/HOUR (ref 0–29)
FLUAV RNA RESP QL NAA+PROBE: NEGATIVE
FLUBV RNA RESP QL NAA+PROBE: NEGATIVE
GFR SERPL CREATININE-BSD FRML MDRD: 27 ML/MIN/1.73SQ M
GLUCOSE SERPL-MCNC: 95 MG/DL (ref 65–140)
GLUCOSE UR STRIP-MCNC: NEGATIVE MG/DL
HCT VFR BLD AUTO: 26.6 % (ref 34.8–46.1)
HGB BLD-MCNC: 8.4 G/DL (ref 11.5–15.4)
HGB UR QL STRIP.AUTO: NEGATIVE
HYPERCHROMIA BLD QL SMEAR: PRESENT
INR PPP: 1.26 (ref 0.84–1.19)
KETONES UR STRIP-MCNC: NEGATIVE MG/DL
LACTATE SERPL-SCNC: 1.9 MMOL/L (ref 0.5–2)
LEUKOCYTE ESTERASE UR QL STRIP: NEGATIVE
LYMPHOCYTES # BLD AUTO: 21 % (ref 14–44)
LYMPHOCYTES # BLD AUTO: 4.27 THOUSAND/UL (ref 0.6–4.47)
LYMPHOCYTES NFR BLD: 16 % (ref 14–44)
MCH RBC QN AUTO: 26.1 PG (ref 26.8–34.3)
MCHC RBC AUTO-ENTMCNC: 31.6 G/DL (ref 31.4–37.4)
MCV RBC AUTO: 83 FL (ref 82–98)
MICROCYTES BLD QL AUTO: PRESENT
MONOCYTES # BLD AUTO: 0.81 THOUSAND/UL (ref 0–1.22)
MONOCYTES NFR BLD: 4 % (ref 4–12)
NEUTROPHILS # BLD MANUAL: 15.23 THOUSAND/UL (ref 1.85–7.62)
NEUTS BAND NFR BLD MANUAL: 10 THOUSAND/UL
NEUTS BAND NFR BLD MANUAL: 9 % (ref 0–8)
NEUTS SEG NFR BLD AUTO: 66 % (ref 43–75)
NEUTS SEG NFR BLD AUTO: 74 % (ref 45–77)
NITRITE UR QL STRIP: NEGATIVE
NT-PROBNP SERPL-MCNC: 4108 PG/ML
P AXIS: 40 DEGREES
PH UR STRIP.AUTO: 5.5 [PH]
PLATELET # BLD AUTO: 220 THOUSANDS/UL (ref 149–390)
PLATELET # BLD AUTO: 291 THOUSANDS/UL (ref 149–390)
PLATELET BLD QL SMEAR: ADEQUATE
PLATELET BLD QL SMEAR: ADEQUATE
PMV BLD AUTO: 9.5 FL (ref 8.9–12.7)
PMV BLD AUTO: 9.6 FL (ref 8.9–12.7)
POTASSIUM SERPL-SCNC: 4.4 MMOL/L (ref 3.5–5.3)
PR INTERVAL: 134 MS
PROCALCITONIN SERPL-MCNC: 2.55 NG/ML
PROT SERPL-MCNC: 10.2 G/DL (ref 6.4–8.2)
PROT UR STRIP-MCNC: NEGATIVE MG/DL
PROTHROMBIN TIME: 15.7 SECONDS (ref 11.6–14.5)
QRS AXIS: 52 DEGREES
QRSD INTERVAL: 92 MS
QT INTERVAL: 366 MS
QTC INTERVAL: 433 MS
RBC # BLD AUTO: 3.22 MILLION/UL (ref 3.81–5.12)
RBC MORPH BLD: PRESENT
RBC MORPH BLD: PRESENT
RSV RNA RESP QL NAA+PROBE: NEGATIVE
SARS-COV-2 RNA RESP QL NAA+PROBE: NEGATIVE
SODIUM SERPL-SCNC: 131 MMOL/L (ref 136–145)
SP GR UR STRIP.AUTO: 1.01 (ref 1–1.03)
T WAVE AXIS: 60 DEGREES
TOTAL CELLS COUNTED SPEC: 100
TROPONIN I SERPL-MCNC: <0.02 NG/ML
UROBILINOGEN UR QL STRIP.AUTO: 0.2 E.U./DL
VENTRICULAR RATE: 84 BPM
WBC # BLD AUTO: 20.31 THOUSAND/UL (ref 4.31–10.16)
WBC TOXIC VACUOLES BLD QL SMEAR: PRESENT

## 2021-09-19 PROCEDURE — 99285 EMERGENCY DEPT VISIT HI MDM: CPT

## 2021-09-19 PROCEDURE — 81003 URINALYSIS AUTO W/O SCOPE: CPT | Performed by: EMERGENCY MEDICINE

## 2021-09-19 PROCEDURE — 85730 THROMBOPLASTIN TIME PARTIAL: CPT | Performed by: EMERGENCY MEDICINE

## 2021-09-19 PROCEDURE — 96361 HYDRATE IV INFUSION ADD-ON: CPT

## 2021-09-19 PROCEDURE — 85007 BL SMEAR W/DIFF WBC COUNT: CPT | Performed by: EMERGENCY MEDICINE

## 2021-09-19 PROCEDURE — 85007 BL SMEAR W/DIFF WBC COUNT: CPT | Performed by: INTERNAL MEDICINE

## 2021-09-19 PROCEDURE — 85610 PROTHROMBIN TIME: CPT | Performed by: EMERGENCY MEDICINE

## 2021-09-19 PROCEDURE — 99285 EMERGENCY DEPT VISIT HI MDM: CPT | Performed by: EMERGENCY MEDICINE

## 2021-09-19 PROCEDURE — 82550 ASSAY OF CK (CPK): CPT | Performed by: EMERGENCY MEDICINE

## 2021-09-19 PROCEDURE — 96366 THER/PROPH/DIAG IV INF ADDON: CPT

## 2021-09-19 PROCEDURE — 86140 C-REACTIVE PROTEIN: CPT | Performed by: INTERNAL MEDICINE

## 2021-09-19 PROCEDURE — 83605 ASSAY OF LACTIC ACID: CPT | Performed by: EMERGENCY MEDICINE

## 2021-09-19 PROCEDURE — 87040 BLOOD CULTURE FOR BACTERIA: CPT | Performed by: EMERGENCY MEDICINE

## 2021-09-19 PROCEDURE — 84145 PROCALCITONIN (PCT): CPT | Performed by: EMERGENCY MEDICINE

## 2021-09-19 PROCEDURE — 85049 AUTOMATED PLATELET COUNT: CPT | Performed by: INTERNAL MEDICINE

## 2021-09-19 PROCEDURE — 83880 ASSAY OF NATRIURETIC PEPTIDE: CPT | Performed by: EMERGENCY MEDICINE

## 2021-09-19 PROCEDURE — 93010 ELECTROCARDIOGRAM REPORT: CPT | Performed by: INTERNAL MEDICINE

## 2021-09-19 PROCEDURE — 96365 THER/PROPH/DIAG IV INF INIT: CPT

## 2021-09-19 PROCEDURE — 80053 COMPREHEN METABOLIC PANEL: CPT | Performed by: EMERGENCY MEDICINE

## 2021-09-19 PROCEDURE — 99223 1ST HOSP IP/OBS HIGH 75: CPT | Performed by: INTERNAL MEDICINE

## 2021-09-19 PROCEDURE — 93005 ELECTROCARDIOGRAM TRACING: CPT

## 2021-09-19 PROCEDURE — 86141 C-REACTIVE PROTEIN HS: CPT | Performed by: EMERGENCY MEDICINE

## 2021-09-19 PROCEDURE — 84484 ASSAY OF TROPONIN QUANT: CPT | Performed by: EMERGENCY MEDICINE

## 2021-09-19 PROCEDURE — 85027 COMPLETE CBC AUTOMATED: CPT | Performed by: EMERGENCY MEDICINE

## 2021-09-19 PROCEDURE — 87449 NOS EACH ORGANISM AG IA: CPT | Performed by: INTERNAL MEDICINE

## 2021-09-19 PROCEDURE — 96375 TX/PRO/DX INJ NEW DRUG ADDON: CPT

## 2021-09-19 PROCEDURE — 36415 COLL VENOUS BLD VENIPUNCTURE: CPT | Performed by: EMERGENCY MEDICINE

## 2021-09-19 PROCEDURE — 0241U HB NFCT DS VIR RESP RNA 4 TRGT: CPT | Performed by: EMERGENCY MEDICINE

## 2021-09-19 PROCEDURE — 71045 X-RAY EXAM CHEST 1 VIEW: CPT

## 2021-09-19 PROCEDURE — 85652 RBC SED RATE AUTOMATED: CPT | Performed by: INTERNAL MEDICINE

## 2021-09-19 RX ORDER — GUAIFENESIN 600 MG
600 TABLET, EXTENDED RELEASE 12 HR ORAL EVERY 12 HOURS SCHEDULED
Status: DISCONTINUED | OUTPATIENT
Start: 2021-09-19 | End: 2021-09-24 | Stop reason: HOSPADM

## 2021-09-19 RX ORDER — SODIUM CHLORIDE 9 MG/ML
75 INJECTION, SOLUTION INTRAVENOUS CONTINUOUS
Status: DISCONTINUED | OUTPATIENT
Start: 2021-09-19 | End: 2021-09-21

## 2021-09-19 RX ORDER — BUPROPION HYDROCHLORIDE 100 MG/1
100 TABLET ORAL 3 TIMES DAILY
Status: DISCONTINUED | OUTPATIENT
Start: 2021-09-19 | End: 2021-09-19

## 2021-09-19 RX ORDER — SODIUM CHLORIDE 9 MG/ML
3 INJECTION INTRAVENOUS
Status: DISCONTINUED | OUTPATIENT
Start: 2021-09-19 | End: 2021-09-24 | Stop reason: HOSPADM

## 2021-09-19 RX ORDER — TEMAZEPAM 15 MG/1
60 CAPSULE ORAL
Status: DISCONTINUED | OUTPATIENT
Start: 2021-09-19 | End: 2021-09-24 | Stop reason: HOSPADM

## 2021-09-19 RX ORDER — GUAIFENESIN/DEXTROMETHORPHAN 100-10MG/5
10 SYRUP ORAL ONCE
Status: COMPLETED | OUTPATIENT
Start: 2021-09-19 | End: 2021-09-19

## 2021-09-19 RX ORDER — PILOCARPINE HYDROCHLORIDE 5 MG/1
5 TABLET, FILM COATED ORAL 3 TIMES DAILY
Status: DISCONTINUED | OUTPATIENT
Start: 2021-09-20 | End: 2021-09-24 | Stop reason: HOSPADM

## 2021-09-19 RX ORDER — PILOCARPINE HYDROCHLORIDE 5 MG/1
5 TABLET, FILM COATED ORAL 3 TIMES DAILY
Status: DISCONTINUED | OUTPATIENT
Start: 2021-09-19 | End: 2021-09-19

## 2021-09-19 RX ORDER — ONDANSETRON 2 MG/ML
4 INJECTION INTRAMUSCULAR; INTRAVENOUS EVERY 6 HOURS PRN
Status: DISCONTINUED | OUTPATIENT
Start: 2021-09-19 | End: 2021-09-24 | Stop reason: HOSPADM

## 2021-09-19 RX ORDER — DEXAMETHASONE SODIUM PHOSPHATE 4 MG/ML
6 INJECTION, SOLUTION INTRA-ARTICULAR; INTRALESIONAL; INTRAMUSCULAR; INTRAVENOUS; SOFT TISSUE ONCE
Status: COMPLETED | OUTPATIENT
Start: 2021-09-19 | End: 2021-09-19

## 2021-09-19 RX ORDER — IPRATROPIUM BROMIDE AND ALBUTEROL SULFATE 2.5; .5 MG/3ML; MG/3ML
3 SOLUTION RESPIRATORY (INHALATION) EVERY 4 HOURS PRN
Status: DISCONTINUED | OUTPATIENT
Start: 2021-09-19 | End: 2021-09-24 | Stop reason: HOSPADM

## 2021-09-19 RX ORDER — LEVOTHYROXINE SODIUM 0.05 MG/1
50 TABLET ORAL
Status: DISCONTINUED | OUTPATIENT
Start: 2021-09-20 | End: 2021-09-24 | Stop reason: HOSPADM

## 2021-09-19 RX ORDER — ACETAMINOPHEN 325 MG/1
650 TABLET ORAL EVERY 6 HOURS PRN
Status: DISCONTINUED | OUTPATIENT
Start: 2021-09-19 | End: 2021-09-24 | Stop reason: HOSPADM

## 2021-09-19 RX ORDER — ALBUTEROL SULFATE 90 UG/1
1 AEROSOL, METERED RESPIRATORY (INHALATION) ONCE
Status: COMPLETED | OUTPATIENT
Start: 2021-09-19 | End: 2021-09-19

## 2021-09-19 RX ORDER — HYDROXYCHLOROQUINE SULFATE 200 MG/1
200 TABLET, FILM COATED ORAL 2 TIMES DAILY WITH MEALS
Status: DISCONTINUED | OUTPATIENT
Start: 2021-09-19 | End: 2021-09-24 | Stop reason: HOSPADM

## 2021-09-19 RX ORDER — QUETIAPINE FUMARATE 100 MG/1
350 TABLET, FILM COATED ORAL
Status: DISCONTINUED | OUTPATIENT
Start: 2021-09-19 | End: 2021-09-24 | Stop reason: HOSPADM

## 2021-09-19 RX ORDER — BUPROPION HYDROCHLORIDE 100 MG/1
100 TABLET ORAL 3 TIMES DAILY
Status: DISCONTINUED | OUTPATIENT
Start: 2021-09-20 | End: 2021-09-24 | Stop reason: HOSPADM

## 2021-09-19 RX ADMIN — QUETIAPINE FUMARATE 300 MG: 100 TABLET ORAL at 22:29

## 2021-09-19 RX ADMIN — GUAIFENESIN 600 MG: 600 TABLET, EXTENDED RELEASE ORAL at 20:01

## 2021-09-19 RX ADMIN — SODIUM CHLORIDE 1000 ML: 0.9 INJECTION, SOLUTION INTRAVENOUS at 11:34

## 2021-09-19 RX ADMIN — IPRATROPIUM BROMIDE 1 PUFF: 17 AEROSOL, METERED RESPIRATORY (INHALATION) at 13:19

## 2021-09-19 RX ADMIN — CEFTRIAXONE SODIUM 1000 MG: 10 INJECTION, POWDER, FOR SOLUTION INTRAVENOUS at 11:46

## 2021-09-19 RX ADMIN — SODIUM CHLORIDE 1000 ML: 0.9 INJECTION, SOLUTION INTRAVENOUS at 14:28

## 2021-09-19 RX ADMIN — SODIUM CHLORIDE 1000 ML: 0.9 INJECTION, SOLUTION INTRAVENOUS at 19:07

## 2021-09-19 RX ADMIN — HYDROXYCHLOROQUINE SULFATE 200 MG: 200 TABLET, FILM COATED ORAL at 20:00

## 2021-09-19 RX ADMIN — ENOXAPARIN SODIUM 30 MG: 30 INJECTION SUBCUTANEOUS at 19:58

## 2021-09-19 RX ADMIN — DEXAMETHASONE SODIUM PHOSPHATE 6 MG: 4 INJECTION, SOLUTION INTRAMUSCULAR; INTRAVENOUS at 13:18

## 2021-09-19 RX ADMIN — BUPROPION HYDROCHLORIDE 100 MG: 100 TABLET, FILM COATED ORAL at 20:00

## 2021-09-19 RX ADMIN — GUAIFENESIN AND DEXTROMETHORPHAN 10 ML: 100; 10 SYRUP ORAL at 13:19

## 2021-09-19 RX ADMIN — PILOCARPINE HYDROCHLORIDE 5 MG: 5 TABLET, FILM COATED ORAL at 20:00

## 2021-09-19 RX ADMIN — TEMAZEPAM 60 MG: 15 CAPSULE ORAL at 22:26

## 2021-09-19 RX ADMIN — AZITHROMYCIN MONOHYDRATE 500 MG: 500 INJECTION, POWDER, LYOPHILIZED, FOR SOLUTION INTRAVENOUS at 14:28

## 2021-09-19 RX ADMIN — ALBUTEROL SULFATE 1 PUFF: 90 AEROSOL, METERED RESPIRATORY (INHALATION) at 13:19

## 2021-09-19 NOTE — ED NOTES
Pt encouraged to use the bathroom and given bedpan  Pt unable to urinate at this time        Rajendra Smiley RN  09/19/21 8007

## 2021-09-19 NOTE — ASSESSMENT & PLAN NOTE
- presents with fever, chills, cough, shortness of breath  - chest x-ray in the ER demonstrates pneumonia    - continue on IV ceftriaxone and IV azithromycin  - check urine strep and Legionella antigens  - follow-up procalcitonin  - monitor WBC count and fever curve  - monitor blood cultures

## 2021-09-19 NOTE — ED PROVIDER NOTES
History  Chief Complaint   Patient presents with    Fever - 9 weeks to 74 years     chills, fever, cough  65-year-old female comes in for fever chills cough  Fever began yesterday had a temp of 103 2° took some Tylenol felt better but this morning began to have a fever again  Patient states she feels miserable  Patient complains of chest tightness and trouble breathing  Patient did get her COVID vaccination  History provided by:  Patient   used: No    Flu Symptoms  Presenting symptoms: cough, fever, myalgias and rhinorrhea    Presenting symptoms: no diarrhea, no fatigue, no headaches and no shortness of breath    Severity:  Moderate  Onset quality:  Sudden  Duration:  1 day  Progression:  Worsening  Chronicity:  New  Relieved by:  None tried  Ineffective treatments:  None tried  Associated symptoms: chills, decreased appetite, decreased physical activity and nasal congestion    Associated symptoms: no ear pain    Risk factors: being elderly and sick contacts        Prior to Admission Medications   Prescriptions Last Dose Informant Patient Reported? Taking? LORazepam (ATIVAN) 1 mg tablet  Self Yes No   Si 5 mg as needed    QUEtiapine (SEROquel) 300 mg tablet  Self Yes No   Sig: Take 300 mg by mouth daily at bedtime  albuterol (Ventolin HFA) 90 mcg/act inhaler  Self No No   Sig: Inhale 1 puff every 4 (four) hours as needed for wheezing   buPROPion (WELLBUTRIN) 100 mg tablet  Self Yes No   Sig: Take 100 mg by mouth 3 (three) times a day  calcium citrate-vitamin D (CITRACAL+D) 315-200 MG-UNIT per tablet  Self Yes No   Sig: Take 1 tablet by mouth daily     cyanocobalamin (VITAMIN B-12) 100 MCG tablet   Yes No   Sig: Take 50 mcg by mouth   hydroxychloroquine (PLAQUENIL) 200 mg tablet   No No   Sig: Take 1 tablet (200 mg total) by mouth 2 (two) times a day with meals   levothyroxine 50 mcg tablet  Self No No   Sig: Take 1 tablet (50 mcg total) by mouth daily   omeprazole (PriLOSEC) 20 mg delayed release capsule  Self No No   Sig: Take 1 capsule (20 mg total) by mouth daily   Patient taking differently: Take 20 mg by mouth as needed    pilocarpine (SALAGEN) 5 mg tablet   No No   Sig: Take 1 tablet (5 mg total) by mouth 3 (three) times a day   temazepam (RESTORIL) 30 mg capsule  Self Yes No   Sig: Take 2 capsules by mouth daily at bedtime as needed       Facility-Administered Medications: None       Past Medical History:   Diagnosis Date    Bipolar 1 disorder ([address] )     Deaf     Pt lost all hearing in right ear after having [place] measles    Hard of hearing     Pt wears a hearing in left ear   Hypothyroidism     Psychiatric disorder     [facility]     Systemic lupus erythematosus [facility]     Wears glasses     Wears partial dentures        Past Surgical History:   Procedure Laterality Date    BREAST BIOPSY Right     COLONOSCOPY      FRACTURE SURGERY Right     elbow    GASTRIC BYPASS      HYSTERECTOMY Bilateral 1975    IR BIOPSY LYMPH NODE  10/8/2020    MD BRONCHOSCOPY,DIAGNOSTIC N/A 12/8/2017    Procedure: BRONCHOSCOPY;  Surgeon: Andrei De Paz MD;  [facility]; Service: Pulmonary    MD NEEDLE BIOPSY, LYMPH NODE(S) Left 2/25/2021    Procedure: EXCISION BIOPSY LYMPH NODE: left axillary lymph node biopsy;  Surgeon: Maylin Flowers MD;  [facility];  Service: Thoracic    TONSILLECTOMY         Family History   Problem Relation Age of Onset    Heart disease Mother     Diabetes Mother     Kidney cancer Mother     Heart disease Father     Stroke Father     Diabetes Father     Cancer Father     Leukemia Half-Sister 48     I have reviewed and agree with the history as documented      E-Cigarette/Vaping    E-Cigarette Use Never User      E-Cigarette/Vaping Substances     Social History     Tobacco Use    Smoking status: Never Smoker    Smokeless tobacco: Never Used   Vaping Use    Vaping Use: Never used   Substance Use Topics    Alcohol use: No    Drug use: No       Review of Systems   Constitutional: Positive for chills, decreased appetite and fever  Negative for fatigue  HENT: Positive for congestion and rhinorrhea  Negative for ear pain  Eyes: Negative for discharge and redness  Respiratory: Positive for cough  Negative for apnea, shortness of breath and wheezing  Cardiovascular: Negative for chest pain  Gastrointestinal: Negative for abdominal pain and diarrhea  Endocrine: Negative for cold intolerance and polydipsia  Genitourinary: Negative for difficulty urinating and hematuria  Musculoskeletal: Positive for myalgias  Negative for arthralgias and back pain  Skin: Negative for color change and rash  Allergic/Immunologic: Negative for environmental allergies and immunocompromised state  Neurological: Negative for numbness and headaches  Hematological: Negative for adenopathy  Does not bruise/bleed easily  Psychiatric/Behavioral: Negative for agitation and behavioral problems  Physical Exam  Physical Exam  Vitals and nursing note reviewed  Constitutional:       Appearance: Normal appearance  She is well-developed  She is ill-appearing  She is not toxic-appearing  HENT:      Head: Normocephalic and atraumatic  Right Ear: Tympanic membrane and external ear normal       Left Ear: Tympanic membrane and external ear normal       Nose: Nose normal  No nasal deformity or rhinorrhea  Mouth/Throat:      Dentition: Normal dentition  Pharynx: Uvula midline  Eyes:      General: Lids are normal          Right eye: No discharge  Left eye: No discharge  Conjunctiva/sclera: Conjunctivae normal       Pupils: Pupils are equal, round, and reactive to light  Neck:      Vascular: No carotid bruit or JVD  Trachea: Trachea normal    Cardiovascular:      Rate and Rhythm: Normal rate and regular rhythm  No extrasystoles are present  Chest Wall: PMI is not displaced        Pulses: Normal pulses  Pulmonary:      Effort: Pulmonary effort is normal  Tachypnea present  No accessory muscle usage or respiratory distress  Breath sounds: Rhonchi present  No wheezing or rales  Abdominal:      General: Bowel sounds are normal       Palpations: Abdomen is soft  Abdomen is not rigid  There is no mass  Tenderness: There is no abdominal tenderness  There is no guarding or rebound  Musculoskeletal:      Right shoulder: No swelling, deformity or bony tenderness  Normal range of motion  Cervical back: Normal range of motion and neck supple  No deformity, tenderness or bony tenderness  Lymphadenopathy:      Cervical: No cervical adenopathy  Skin:     General: Skin is warm and dry  Findings: No rash  Neurological:      Mental Status: She is alert and oriented to person, place, and time  GCS: GCS eye subscore is 4  GCS verbal subscore is 5  GCS motor subscore is 6  Cranial Nerves: No cranial nerve deficit  Sensory: No sensory deficit  Deep Tendon Reflexes: Reflexes are normal and symmetric     Psychiatric:         Speech: Speech normal          Behavior: Behavior normal          Vital Signs  ED Triage Vitals [09/19/21 1039]   Temperature Pulse Respirations Blood Pressure SpO2   99 5 °F (37 5 °C) 96 18 (!) 79/48 (!) 88 %      Temp Source Heart Rate Source Patient Position - Orthostatic VS BP Location FiO2 (%)   Oral Monitor Sitting Left arm --      Pain Score       --           Vitals:    09/19/21 1330 09/19/21 1415 09/19/21 1528 09/19/21 1545   BP: 96/56 96/52 102/56 99/55   Pulse: 80 82  82   Patient Position - Orthostatic VS:             Visual Acuity  Visual Acuity      Most Recent Value   L Pupil Size (mm)  3   R Pupil Size (mm)  3          ED Medications  Medications   sodium chloride (PF) 0 9 % injection 3 mL (has no administration in time range)   sodium chloride 0 9 % bolus 1,000 mL (0 mL Intravenous Stopped 9/19/21 1428)     Followed by   sodium chloride 0 9 % bolus 1,000 mL (1,000 mL Intravenous New Bag 9/19/21 1428)     Followed by   sodium chloride 0 9 % bolus 1,000 mL (has no administration in time range)   sodium chloride 0 9 % bolus 1,000 mL (has no administration in time range)   ceftriaxone (ROCEPHIN) 1 g/50 mL in dextrose IVPB (has no administration in time range)   azithromycin (ZITHROMAX) 500 mg in sodium chloride 0 9% 250mL IVPB 500 mg (has no administration in time range)   buPROPion (WELLBUTRIN) tablet 100 mg (has no administration in time range)   hydroxychloroquine (PLAQUENIL) tablet 200 mg (has no administration in time range)   levothyroxine tablet 50 mcg (has no administration in time range)   pilocarpine (SALAGEN) tablet 5 mg (has no administration in time range)   QUEtiapine (SEROquel) tablet 300 mg (has no administration in time range)   temazepam (RESTORIL) capsule 60 mg (has no administration in time range)   sodium chloride 0 9 % infusion (has no administration in time range)   acetaminophen (TYLENOL) tablet 650 mg (has no administration in time range)   ondansetron (ZOFRAN) injection 4 mg (has no administration in time range)   enoxaparin (LOVENOX) subcutaneous injection 30 mg (has no administration in time range)   guaiFENesin (MUCINEX) 12 hr tablet 600 mg (has no administration in time range)   ipratropium-albuterol (DUO-NEB) 0 5-2 5 mg/3 mL inhalation solution 3 mL (has no administration in time range)   ceftriaxone (ROCEPHIN) 1 g/50 mL in dextrose IVPB (0 mg Intravenous Stopped 9/19/21 1319)   dexamethasone (DECADRON) injection 6 mg (6 mg Intravenous Given 9/19/21 1318)   albuterol (PROVENTIL HFA,VENTOLIN HFA) inhaler 1 puff (1 puff Inhalation Given 9/19/21 1319)   ipratropium (ATROVENT HFA) inhaler 1 puff (1 puff Inhalation Given 9/19/21 1319)   dextromethorphan-guaiFENesin (ROBITUSSIN DM) oral syrup 10 mL (10 mL Oral Given 9/19/21 1319)   azithromycin (ZITHROMAX) 500 mg in sodium chloride 0 9% 250mL IVPB 500 mg (500 mg Intravenous New Bag 9/19/21 1428)       Diagnostic Studies  Results Reviewed     Procedure Component Value Units Date/Time    NT-BNP PRO [[id number]]  (Abnormal) Collected: 09/19/21 1132    Lab Status: Final result Specimen: Blood from Arm, Right Updated: 09/19/21 1644     NT-proBNP 4,108 pg/mL     CK Total with Reflex CKMB [[id number]]  (Normal) Collected: 09/19/21 1132    Lab Status: Final result Specimen: Blood from Arm, Right Updated: 09/19/21 1644     Total CK 47 U/L     Peripheral Smear [[id number]]     Lab Status: No result Specimen: Blood     C-reactive protein [[id number]] Collected: 09/19/21 1132    Lab Status: In process Specimen: Blood from Arm, Right Updated: 09/19/21 1600    Legionella antigen, urine [[id number]]     Lab Status: No result Specimen: Urine     Strep Pneumoniae, Urine [[id number]]     Lab Status: No result Specimen: Urine     Platelet count [[id number]]     Lab Status: No result Specimen: Blood     Sedimentation rate, automated [[id number]]     Lab Status: No result Specimen: Blood     High sensitivity CRP [[id number]] Collected: 09/19/21 1132    Lab Status: In process Specimen: Blood from Arm, Right Updated: 09/19/21 1513    Troponin I [[id number]]  (Normal) Collected: 09/19/21 1339    Lab Status: Final result Specimen: Blood from Arm, Right Updated: 09/19/21 1414     Troponin I <0 02 ng/mL     COVID19, Influenza A/B, RSV PCR, [facility] [[id number]]  (Normal) Collected: 09/19/21 1132    Lab Status: Final result Specimen: Nares from Nose Updated: 09/19/21 1302     SARS-CoV-2 Negative     INFLUENZA A PCR Negative     INFLUENZA B PCR Negative     RSV PCR Negative    Narrative: This test has been authorized by FDA under an EUA (Emergency Use Assay) for use by authorized laboratories  Clinical caution and judgement should be used with the interpretation of these results with consideration of the clinical impression and other laboratory testing    Testing reported as "Positive" or "Negative" has been proven to be accurate according to standard laboratory validation requirements  All testing is performed with control materials showing appropriate reactivity at standard intervals  CBC and differential [[id number]]  (Abnormal) Collected: 09/19/21 1132    Lab Status: Final result Specimen: Blood from Arm, Right Updated: 09/19/21 1216     WBC 20 31 Thousand/uL      RBC 3 22 Million/uL      Hemoglobin 8 4 g/dL      Hematocrit 26 6 %      MCV 83 fL      MCH 26 1 pg      MCHC 31 6 g/dL      RDW 16 8 %      MPV 9 6 fL      Platelets 439 Thousands/uL     Narrative: This is an appended report  These results have been appended to a previously verified report      Manual Differential(PHLEBS Do Not Order) [[id number]]  (Abnormal) Collected: 09/19/21 1132    Lab Status: Final result Specimen: Blood from Arm, Right Updated: 09/19/21 1216     Segmented % 66 %      Bands % 9 %      Lymphocytes % 21 %      Monocytes % 4 %      Eosinophils, % 0 %      Basophils % 0 %      Absolute Neutrophils 15 23 Thousand/uL      Lymphocytes Absolute 4 27 Thousand/uL      Monocytes Absolute 0 81 Thousand/uL      Eosinophils Absolute 0 00 Thousand/uL      Basophils Absolute 0 00 Thousand/uL      Total Counted --     RBC Morphology Present     Anisocytosis Present     Hypochromia Present     Platelet Estimate Adequate    Comprehensive metabolic panel [[id number]]  (Abnormal) Collected: 09/19/21 1132    Lab Status: Final result Specimen: Blood from Arm, Right Updated: 09/19/21 1213     Sodium 131 mmol/L      Potassium 4 4 mmol/L      Chloride 102 mmol/L      CO2 20 mmol/L      ANION GAP 9 mmol/L      BUN 30 mg/dL      Creatinine 1 87 mg/dL      Glucose 95 mg/dL      Calcium 7 6 mg/dL      Corrected Calcium 8 5 mg/dL      AST 29 U/L      ALT 16 U/L      Alkaline Phosphatase 97 U/L      Total Protein 10 2 g/dL      Albumin 2 9 g/dL      Total Bilirubin 0 88 mg/dL      eGFR 27 ml/min/1 73sq m     Narrative:      [place] guidelines for Chronic Kidney Disease (CKD):     Stage 1 with normal or high GFR (GFR > 90 mL/min/1 73 square meters)    Stage 2 Mild CKD (GFR = 60-89 mL/min/1 73 square meters)    Stage 3A Moderate CKD (GFR = 45-59 mL/min/1 73 square meters)    Stage 3B Moderate CKD (GFR = 30-44 mL/min/1 73 square meters)    Stage 4 Severe CKD (GFR = 15-29 mL/min/1 73 square meters)    Stage 5 End Stage CKD (GFR <15 mL/min/1 73 square meters)  Note: GFR calculation is accurate only with a steady state creatinine    Protime-INR [[id number]]  (Abnormal) Collected: 09/19/21 1132    Lab Status: Final result Specimen: Blood from Arm, Right Updated: 09/19/21 1207     Protime 15 7 seconds      INR 1 26    APTT [[id number]]  (Normal) Collected: 09/19/21 1132    Lab Status: Final result Specimen: Blood from Arm, Right Updated: 09/19/21 1207     PTT 35 seconds     Lactic Acid [[id number]]  (Normal) Collected: 09/19/21 1132    Lab Status: Final result Specimen: Blood from Arm, Right Updated: 09/19/21 1206     LACTIC ACID 1 9 mmol/L     Narrative:      Result may be elevated if tourniquet was used during collection  Procalcitonin with AM Reflex [[id number]] Collected: 09/19/21 1132    Lab Status: In process Specimen: Blood from Arm, Right Updated: 09/19/21 1141    Blood culture #2 [[id number]] Collected: 09/19/21 1132    Lab Status: In process Specimen: Blood from Arm, Right Updated: 09/19/21 1140    Blood culture #1 [[id number]] Collected: 09/19/21 1132    Lab Status:  In process Specimen: Blood from Arm, Left Updated: 09/19/21 1140    UA w Reflex to Microscopic w Reflex to Culture [[id number]]     Lab Status: No result Specimen: Urine                  XR chest 1 view portable    (Results Pending)              Procedures  ECG 12 Lead Documentation Only    Date/Time: 9/19/2021 11:46 AM  Performed by: Vernell Olivares DO  Authorized by: Vernell Olivares DO     Patient location:  ED  Previous ECG:     Previous ECG:  Compared to current  Rate: ECG rate:  84  Rhythm:     Rhythm: sinus rhythm               ED Course  ED Course as of Sep 19 1645   Sun Sep 19, 2021   1233 Severe sepsis alert called based on heart rate greater than 90, elevated white count of 20, and a bandemia  Patient's lactic acid is 1 9 however her creatinine is elevated 0 6 mg/dL from her baseline of 1 2 showing end organ dysfunction  1639 Patient was given 30 mL/kg and her blood pressure went from 79/48 to 102/56  She did not need pressors as she responded to fluid                            Initial Sepsis Screening     Row Name 09/19/21 1227 09/19/21 1130             Is the patient's history suggestive of a new or worsening infection?   (!) Yes (Proceed)  -MT  --       Suspected source of infection  pneumonia  -MT  pneumonia  -MT       Are two or more of the following signs & symptoms of infection both present and new to the patient?  --  --       Indicate SIRS criteria  Tachycardia > 90 bpm;WBC > 10% bands;Leukocytosis (WBC > 34328 IJL)  -MT  Tachycardia > 90 bpm;Tachypnea > 20 resp per min  -MT       If the answer is yes to both questions, suspicion of sepsis is present  --  --       If severe sepsis is present AND tissue hypoperfusion perists in the hour after fluid resuscitation or lactate > 4, the patient meets criteria for SEPTIC SHOCK  --  --       Are any of the following organ dysfunction criteria present within 6 hours of suspected infection and SIRS criteria that are NOT considered to be chronic conditions?  --  --       Organ dysfunction  Creatinine > 0 5 mg/dl ABOVE BASELINE  -MT  --       Date of presentation of severe sepsis  09/19/21  -MT  --       Time of presentation of severe sepsis  1229  -MT  --       Tissue hypoperfusion persists in the hour after crystalloid fluid administration, evidenced, by either:  --  --       Was hypotension present within one hour of the conclusion of crystalloid fluid administration?  --  --       Date of presentation of septic shock  --  --       Time of presentation of septic shock  --  --         User Key  (r) = Recorded By, (t) = Taken By, (c) = Cosigned By    Initials Name Provider Type    STARLA Rosales DO Physician           Default Flowsheet Data (last 720 hours)      Sepsis Reassess     Row Name 09/19/21 1638                   Repeat Volume Status and Tissue Perfusion Assessment Performed    Repeat Volume Status and Tissue Perfusion Assessment Performed  Yes  -MT           Volume Status and Tissue Perfusion Post Fluid Resuscitation * Must Document All *    Vital Signs Reviewed (HR, RR, BP, T)  Yes  -MT        Shock Index Reviewed  --        Arterial Oxygen Saturation Reviewed (POx, SaO2 or SpO2)  --        Cardio  Normal S1/S2  -MT        Pulmonary  Normal effort  -MT        Capillary Refill  Brisk  -MT        Peripheral Pulses  Radial  -MT        Peripheral Pulse  +4  -MT        Skin  Warm  -MT        Urine output assessed  Adequate  -MT           *OR*   Intensive Monitoring- Must Document One of the Following Four *:    Vital Signs Reviewed  --        * Central Venous Pressure (CVP or RAP)  --        * Central Venous Oxygen (SVO2, ScvO2 or Oxygen saturation via central catheter)  --        * Bedside Cardiovascular US in IVC diameter and % collapse  --        * Passive Leg Raise OR Crystalloid Challenge  --        Crystalloid fluid challenge completed  --          User Key  (r) = Recorded By, (t) = Taken By, (c) = Cosigned By    Initials Name Provider Type    STARLA Rosales DO Physician                      MDM  Number of Diagnoses or Management Options  ANNIE (acute kidney injury) [facility]): new and requires workup  Pneumonia: new and requires workup  Sepsis [facility]): new and requires workup     Amount and/or Complexity of Data Reviewed  Clinical lab tests: ordered and reviewed  Tests in the radiology section of CPT®: ordered and reviewed  Tests in the medicine section of CPT®: ordered and reviewed  Independent visualization of images, tracings, or specimens: yes    Patient Progress  Patient progress: stable      Disposition  Final diagnoses:   Pneumonia   ANNIE (acute kidney injury) ([facility] [address] )   [facility]epsis [facility] )     Time reflects when diagnosis was documented in both MDM as applicable and the Disposition within this note     Time User Action Codes Description Comment    9/19/2021  1:20 PM Denisse Collins Add [J18 9] Pneumonia     9/19/2021  1:20 PM Denisse Collins Add [N17 9] ANNIE (acute kidney injury) ([facility] )     9/19/2021  1:20 PM Brijeshdebby Peggy MONTGOMERY Add [A41 9] Sepsis [facility])       ED Disposition     ED Disposition Condition Date/Time Comment    Admit Stable Sun Sep 19, 2021  1:21 PM Case was discussed with dr Karsten Whitaker and the patient's admission status was agreed to be Admission Status: inpatient status to the service of Dr Karsten Whitaker   Follow-up Information    None         Patient's Medications   Discharge Prescriptions    No medications on file     No discharge procedures on file      PDMP Review     None          ED Provider  Electronically Signed by           Manuelito Ceballos, DO  09/19/21 [address], DO  09/19/21 3977

## 2021-09-19 NOTE — ASSESSMENT & PLAN NOTE
- patient has a petechial rash on her bilateral lower extremities up to the knees  She states that she has had this intermittently for quite some time and generally comes and goes  She follows with a rheumatologist who has recommended following up with a dermatologist for this  Unfortunately over time she has gone to see the dermatologist the rash had spontaneously resolved      - will check ESR and CRP   - check peripheral smear for hemolysis  - coags okay  - monitor for now

## 2021-09-19 NOTE — SEPSIS NOTE
Sepsis Note   Brandon Wilson 73 y o  female MRN: [id number]  Unit/Bed#: FT 04 Encounter: [id number]      qSOFA     Row Name 09/19/21 1545 09/19/21 1528 09/19/21 1415 09/19/21 1330 09/19/21 1245    Altered mental status GCS < 15  --  --  --  --  --    Respiratory Rate > / =22  0  --  0  0  0    Systolic BP < / =954  1  0  1  1  1    Q Sofa Score  1  0  1  1  1    Row Name 09/19/21 1215 09/19/21 1200 09/19/21 1145 09/19/21 1100 09/19/21 1039    Altered mental status GCS < 15  --  --  --  0  --    Respiratory Rate > / =22  0  0  0  --  0    Systolic BP < / =181  1  1  1  --  1    Q Sofa Score  1  1  1  1  1        Initial Sepsis Screening     Row Name 09/19/21 1227 09/19/21 1130             Is the patient's history suggestive of a new or worsening infection?   (!) Yes (Proceed)  -MT  --       Suspected source of infection  pneumonia  -MT  pneumonia  -MT       Are two or more of the following signs & symptoms of infection both present and new to the patient?  --  --       Indicate SIRS criteria  Tachycardia > 90 bpm;WBC > 10% bands;Leukocytosis (WBC > [address] IJL)  -MT  Tachycardia > 90 bpm;Tachypnea > 20 resp per min  -MT       If the answer is yes to both questions, suspicion of sepsis is present  --  --       If severe sepsis is present AND tissue hypoperfusion perists in the hour after fluid resuscitation or lactate > 4, the patient meets criteria for SEPTIC SHOCK  --  --       Are any of the following organ dysfunction criteria present within 6 hours of suspected infection and SIRS criteria that are NOT considered to be chronic conditions?  --  --       Organ dysfunction  Creatinine > 0 5 mg/dl ABOVE BASELINE  -MT  --       Date of presentation of severe sepsis  09/19/21  -MT  --       Time of presentation of severe sepsis  1229  -MT  --       Tissue hypoperfusion persists in the hour after crystalloid fluid administration, evidenced, by either:  --  --       Was hypotension present within one hour of the conclusion of crystalloid fluid administration?  --  --       Date of presentation of septic shock  --  --       Time of presentation of septic shock  --  --         User Key  (r) = Recorded By, (t) = Taken By, (c) = Cosigned By    Initials Name Provider Type    STARLA Hartman DO Physician               Default Flowsheet Data (last 720 hours)      Sepsis Reassess     Row Name 09/19/21 1638                   Repeat Volume Status and Tissue Perfusion Assessment Performed    Repeat Volume Status and Tissue Perfusion Assessment Performed  Yes  -MT           Volume Status and Tissue Perfusion Post Fluid Resuscitation * Must Document All *    Vital Signs Reviewed (HR, RR, BP, T)  Yes  -MT        Shock Index Reviewed  --        Arterial Oxygen Saturation Reviewed (POx, SaO2 or SpO2)  --        Cardio  Normal S1/S2  -MT        Pulmonary  Normal effort  -MT        Capillary Refill  Brisk  -MT        Peripheral Pulses  Radial  -MT        Peripheral Pulse  +4  -MT        Skin  Warm  -MT        Urine output assessed  Adequate  -MT           *OR*   Intensive Monitoring- Must Document One of the Following Four *:    Vital Signs Reviewed  --        * Central Venous Pressure (CVP or RAP)  --        * Central Venous Oxygen (SVO2, ScvO2 or Oxygen saturation via central catheter)  --        * Bedside Cardiovascular US in IVC diameter and % collapse  --        * Passive Leg Raise OR Crystalloid Challenge  --        Crystalloid fluid challenge completed  --          User Key  (r) = Recorded By, (t) = Taken By, (c) = Cosigned By    Initials Name Provider Type    STARLA Hartman DO Physician

## 2021-09-19 NOTE — H&P
[facility]  H&P- [facility] Score 1950, 73 y o  female MRN: [id number]  Unit/Bed#: FT 04 Encounter: [id number]  Primary Care Provider: Jose Aldana MD   Date and time admitted to hospital: 9/19/2021 10:54 AM    Pneumonia  Assessment & Plan  - presents with fever, chills, cough, shortness of breath  - chest x-ray in the ER demonstrates pneumonia    - continue on IV ceftriaxone and IV azithromycin  - check urine strep and Legionella antigens  - follow-up procalcitonin  - monitor WBC count and fever curve  - monitor blood cultures    Petechial rash  Assessment & Plan  - patient has a petechial rash on her bilateral lower extremities up to the knees  She states that she has had this intermittently for quite some time and generally comes and goes  She follows with a rheumatologist who has recommended following up with a dermatologist for this  Unfortunately over time she has gone to see the dermatologist the rash had spontaneously resolved  - will check ESR and CRP   - check peripheral smear for hemolysis  - coags okay  - monitor for now      Acute respiratory failure with hypoxia (HCC)  Assessment & Plan  - secondary to pneumonia  - wean as tolerated; treatment as above    Sepsis ([facility] [address] )  Assessment & Plan  - met criteria on admission with tachypnea, hypotension, leukocytosis, and pneumonia  - treatment as above    Sjogren's syndrome ([facility] [address] )  Assessment & Plan  - cont pilocarpine    Systemic lupus erythematosus ([facility] [address] )  Assessment & Plan  - continue home hydroxychloroquine and pilocarpine    Hypothyroidism  Assessment & Plan  - continue home Synthroid    Bipolar I disorder, single manic episode (HCC)  Assessment & Plan  - mood stable; monitor on home Seroquel      VTE Pharmacologic Prophylaxis:   High Risk (Score >/= 5) - Pharmacological DVT Prophylaxis Ordered: enoxaparin (Lovenox)  Sequential Compression Devices Ordered    Code Status: Level 1 - Full Code   Discussion with family: Patient declined call to   Anticipated Length of Stay: Patient will be admitted on an inpatient basis with an anticipated length of stay of greater than 2 midnights secondary to sepsis 2/2 PNA  Total Time for Visit, including Counseling / Coordination of Care: 45 minutes Greater than 50% of this total time spent on direct patient counseling and coordination of care  Chief Complaint:  Cough and shortness of breath    History of Present Illness:  Michelle Shay is a 73 y o  female with a PMH of lupus, Sjogren's, hypertension, bipolar disorder, who presents to the [facility] ER on 09/19/2021 for evaluation of cough and shortness of breath  Patient began experiencing symptoms on the way home from a trip to [address] [place] [address] yesterday  She 1st noticed a cough and shortness of breath  Then overnight she had intermittent fever and rigors  In the ER she was having a difficulty time breathing and required 3 L supplemental nasal cannula O2  She was also found to be hypotensive and required 3 L for resuscitation  Chest x-ray was consistent with pneumonia  COVID testing was negative  Blood pressure has now stabilized with systolics greater than 445  At the time of my evaluation patient was pleasant and conversive  She was still having some difficulty breathing and had rhonchorous breath sounds  She will be admitted to the hospitalist service for further treatment and evaluation of her sepsis and pneumonia  REVIEW OF SYSTEMS  General Positive for generalized fatigue  Positive for fevers  Positive for rigors  HEENT Denies hearing or vision changes  Cardiovascular Denies chest pain  Denies LE swelling  Denies palpitations  Denies dyspnea on exertion  Respiratory Positive for cough  Positive shortness breath  Positive for dyspnea on exertion  Genitourinary Denies hematuria  Denies dysuria  Denies difficulty voiding  Denies incontinence     Gastrointestinal Denies nausea, vomiting, or diarrhea  Denies hematochezia, melena, or hematemesis  Musculoskeletal Denies arthralgias or myalgias  Denies joint swelling  Psychiatric  Denies changes in mood  Denies anxiety or depression  Neurologic Denies headache  Denies lightheadedness/dizziness  Denies numbness/tingling  Denies weakness  Endocrine Denies weight loss or weight gain  Denies excessive thirst, sweating, urination  Past Medical and Surgical History:   Past Medical History:   Diagnosis Date    Bipolar 1 disorder ([address] )     Deaf     Pt lost all hearing in right ear after having [place] measles    Hard of hearing     Pt wears a hearing in left ear   Hypothyroidism     Psychiatric disorder     Sjogren's syndrome ([address] )     Systemic lupus erythematosus ([facility] [address] )     Wears glasses     Wears partial dentures        Past Surgical History:   Procedure Laterality Date    BREAST BIOPSY Right     COLONOSCOPY      FRACTURE SURGERY Right     elbow    GASTRIC BYPASS      HYSTERECTOMY Bilateral 1975    IR BIOPSY LYMPH NODE  10/8/2020    NH BRONCHOSCOPY,DIAGNOSTIC N/A 12/8/2017    Procedure: BRONCHOSCOPY;  Surgeon: Moni Shelton MD;  Location: [facility]; Service: Pulmonary    NH NEEDLE BIOPSY, LYMPH NODE(S) Left 2/25/2021    Procedure: EXCISION BIOPSY LYMPH NODE: left axillary lymph node biopsy;  Surgeon: Jaime Law MD;  Location: [facility];  Service: Thoracic    TONSILLECTOMY         Meds/Allergies:  Prior to Admission medications    Medication Sig Start Date End Date Taking? Authorizing Provider   albuterol (Ventolin HFA) 90 mcg/act inhaler Inhale 1 puff every 4 (four) hours as needed for wheezing 3/29/21   Moni Shelton MD   buPROPion [facility]) 100 mg tablet Take 100 mg by mouth 3 (three) times a day  Historical Provider, MD   calcium citrate-vitamin D (CITRACAL+D) 315-200 MG-UNIT per tablet Take 1 tablet by mouth daily      Historical Provider, MD   cyanocobalamin (VITAMIN B-12) 100 MCG tablet Take 50 mcg by mouth    Historical Provider, MD   hydroxychloroquine (PLAQUENIL) 200 mg tablet Take 1 tablet (200 mg total) by mouth 2 (two) times a day with meals 9/8/21 9/3/22  Jerilynn Cooks, MD   levothyroxine 50 mcg tablet Take 1 tablet (50 mcg total) by mouth daily 2/22/21   KRYSTIN Robertson   LORazepam (ATIVAN) 1 mg tablet 0 5 mg as needed  1/5/21   Historical Provider, MD   omeprazole (PriLOSEC) 20 mg delayed release capsule Take 1 capsule (20 mg total) by mouth daily  Patient taking differently: Take 20 mg by mouth as needed  8/6/20 2/22/21  Sarai Ohara PA-C   pilocarpine (SALAGEN) 5 mg tablet Take 1 tablet (5 mg total) by mouth 3 (three) times a day 9/8/21   Jerilynn Cooks, MD   QUEtiapine (SEROquel) 300 mg tablet Take 300 mg by mouth daily at bedtime  Historical Provider, MD   temazepam (RESTORIL) 30 mg capsule Take 2 capsules by mouth daily at bedtime as needed     Historical Provider, MD     I have reviewed home medications using recent Epic encounter  Allergies: Allergies   Allergen Reactions    Ciprofloxacin Hives and Swelling     Pt reports that lips and mouth and face swelled   Cymbalta [Duloxetine Hcl] Other (See Comments)     Hallucinations, fatigue, faints    Percocet [Oxycodone-Acetaminophen] Other (See Comments)     Dry mouth - pt states it kept her up all night   States had to continually drink fluids       Social History:  Marital Status: /Civil Union   Occupation: [employer]  Patient Pre-hospital Living Situation: Home  Patient Pre-hospital Level of Mobility: walks  Patient Pre-hospital Diet Restrictions: None  Substance Use History:   Social History     Substance and Sexual Activity   Alcohol Use No     Social History     Tobacco Use   Smoking Status Never Smoker   Smokeless Tobacco Never Used     Social History     Substance and Sexual Activity   Drug Use No       Family History:  Family History   Problem Relation Age of Onset    Heart disease Mother     Diabetes Mother     Kidney cancer Mother     Heart disease Father     Stroke Father     Diabetes Father     Cancer Father     Leukemia Half-Sister 48       Physical Exam:     Vitals:   Blood Pressure: 99/55 (09/19/21 1545)  Pulse: 82 (09/19/21 1545)  Temperature: 99 5 °F (37 5 °C) (09/19/21 1039)  Temp Source: Oral (09/19/21 1039)  Respirations: 18 (09/19/21 1545)  SpO2: 96 % (09/19/21 1545)    PHYSICAL EXAM:    Vitals signs reviewed  Constitutional   Awake and cooperative  NAD  Head/Neck   Normocephalic  Atraumatic  HEENT   No scleral icterus  EOMI  Heart   Regular rate and rhythm  No murmers  Lungs   Rhonchorous breath sounds bilaterally  Adequate air movement  Mildly labored breathing  Abdomen   Soft  Nontender  Nondistended  Skin   Petechial rash bilateral lower extremities up to the knees  No open sores or bleeding  Nontender to touch  Lesions are not raised  Extremities   No deformities  No peripheral edema  Neuro   Alert and oriented  No new deficits  Psych   Mood stable   Affect normal          Additional Data:     Lab Results:  Results from last 7 days   Lab Units 09/19/21  1132   WBC Thousand/uL 20 31*   HEMOGLOBIN g/dL 8 4*   HEMATOCRIT % 26 6*   PLATELETS Thousands/uL 291   BANDS PCT % 9*   LYMPHO PCT % 21   MONO PCT % 4   EOS PCT % 0     Results from last 7 days   Lab Units 09/19/21  1132   SODIUM mmol/L 131*   POTASSIUM mmol/L 4 4   CHLORIDE mmol/L 102   CO2 mmol/L 20*   BUN mg/dL 30*   CREATININE mg/dL 1 87*   ANION GAP mmol/L 9   CALCIUM mg/dL 7 6*   ALBUMIN g/dL 2 9*   TOTAL BILIRUBIN mg/dL 0 88   ALK PHOS U/L 97   ALT U/L 16   AST U/L 29   GLUCOSE RANDOM mg/dL 95     Results from last 7 days   Lab Units 09/19/21  1132   INR  1 26*             Results from last 7 days   Lab Units 09/19/21  1132   LACTIC ACID mmol/L 1 9       Imaging: Reviewed radiology reports from this admission including: chest xray  XR chest 1 view portable    (Results Pending)       ** Please Note: This note has been constructed using a voice recognition system   **

## 2021-09-19 NOTE — ASSESSMENT & PLAN NOTE
- met criteria on admission with tachypnea, hypotension, leukocytosis, and pneumonia  - treatment as above

## 2021-09-20 ENCOUNTER — APPOINTMENT (INPATIENT)
Dept: NON INVASIVE DIAGNOSTICS | Facility: HOSPITAL | Age: 71
DRG: 871 | End: 2021-09-20
Payer: COMMERCIAL

## 2021-09-20 PROBLEM — N18.9 ACUTE KIDNEY INJURY SUPERIMPOSED ON CHRONIC KIDNEY DISEASE (HCC): Status: ACTIVE | Noted: 2021-09-20

## 2021-09-20 PROBLEM — N17.9 ACUTE KIDNEY INJURY SUPERIMPOSED ON CHRONIC KIDNEY DISEASE (HCC): Status: ACTIVE | Noted: 2021-09-20

## 2021-09-20 PROBLEM — J15.3 PNEUMONIA DUE TO GROUP B STREPTOCOCCUS (HCC): Status: ACTIVE | Noted: 2021-09-19

## 2021-09-20 LAB
ANION GAP SERPL CALCULATED.3IONS-SCNC: 7 MMOL/L (ref 4–13)
BILIRUB UR QL STRIP: NEGATIVE
BUN SERPL-MCNC: 28 MG/DL (ref 5–25)
CALCIUM SERPL-MCNC: 7.1 MG/DL (ref 8.3–10.1)
CHLORIDE SERPL-SCNC: 110 MMOL/L (ref 100–108)
CLARITY UR: CLEAR
CO2 SERPL-SCNC: 19 MMOL/L (ref 21–32)
COLOR UR: YELLOW
CREAT SERPL-MCNC: 1.26 MG/DL (ref 0.6–1.3)
ERYTHROCYTE [DISTWIDTH] IN BLOOD BY AUTOMATED COUNT: 17.2 % (ref 11.6–15.1)
GFR SERPL CREATININE-BSD FRML MDRD: 44 ML/MIN/1.73SQ M
GLUCOSE SERPL-MCNC: 104 MG/DL (ref 65–140)
GLUCOSE UR STRIP-MCNC: NEGATIVE MG/DL
HCT VFR BLD AUTO: 22 % (ref 34.8–46.1)
HCT VFR BLD AUTO: 23.5 % (ref 34.8–46.1)
HGB BLD-MCNC: 6.9 G/DL (ref 11.5–15.4)
HGB BLD-MCNC: 7.4 G/DL (ref 11.5–15.4)
HGB UR QL STRIP.AUTO: NEGATIVE
KETONES UR STRIP-MCNC: NEGATIVE MG/DL
L PNEUMO1 AG UR QL IA.RAPID: NEGATIVE
LEUKOCYTE ESTERASE UR QL STRIP: NEGATIVE
MCH RBC QN AUTO: 26.3 PG (ref 26.8–34.3)
MCHC RBC AUTO-ENTMCNC: 31.4 G/DL (ref 31.4–37.4)
MCV RBC AUTO: 84 FL (ref 82–98)
NITRITE UR QL STRIP: NEGATIVE
PATHOLOGIST INTERPRETATION: NORMAL
PH UR STRIP.AUTO: 5 [PH] (ref 4.5–8)
PLATELET # BLD AUTO: 219 THOUSANDS/UL (ref 149–390)
PMV BLD AUTO: 10.1 FL (ref 8.9–12.7)
POTASSIUM SERPL-SCNC: 4 MMOL/L (ref 3.5–5.3)
PROCALCITONIN SERPL-MCNC: 5.34 NG/ML
PROT UR STRIP-MCNC: NEGATIVE MG/DL
RBC # BLD AUTO: 2.62 MILLION/UL (ref 3.81–5.12)
S PNEUM AG UR QL: POSITIVE
SARS-COV-2 RNA RESP QL NAA+PROBE: NEGATIVE
SODIUM SERPL-SCNC: 136 MMOL/L (ref 136–145)
SP GR UR STRIP.AUTO: 1.01 (ref 1–1.03)
UROBILINOGEN UR QL STRIP.AUTO: 0.2 E.U./DL
WBC # BLD AUTO: 25.48 THOUSAND/UL (ref 4.31–10.16)

## 2021-09-20 PROCEDURE — 99232 SBSQ HOSP IP/OBS MODERATE 35: CPT | Performed by: PHYSICIAN ASSISTANT

## 2021-09-20 PROCEDURE — 87493 C DIFF AMPLIFIED PROBE: CPT | Performed by: INTERNAL MEDICINE

## 2021-09-20 PROCEDURE — 99223 1ST HOSP IP/OBS HIGH 75: CPT | Performed by: INTERNAL MEDICINE

## 2021-09-20 PROCEDURE — 84145 PROCALCITONIN (PCT): CPT | Performed by: EMERGENCY MEDICINE

## 2021-09-20 PROCEDURE — 85014 HEMATOCRIT: CPT | Performed by: PHYSICIAN ASSISTANT

## 2021-09-20 PROCEDURE — 97163 PT EVAL HIGH COMPLEX 45 MIN: CPT

## 2021-09-20 PROCEDURE — 93306 TTE W/DOPPLER COMPLETE: CPT

## 2021-09-20 PROCEDURE — 85027 COMPLETE CBC AUTOMATED: CPT | Performed by: INTERNAL MEDICINE

## 2021-09-20 PROCEDURE — 93306 TTE W/DOPPLER COMPLETE: CPT | Performed by: INTERNAL MEDICINE

## 2021-09-20 PROCEDURE — U0003 INFECTIOUS AGENT DETECTION BY NUCLEIC ACID (DNA OR RNA); SEVERE ACUTE RESPIRATORY SYNDROME CORONAVIRUS 2 (SARS-COV-2) (CORONAVIRUS DISEASE [COVID-19]), AMPLIFIED PROBE TECHNIQUE, MAKING USE OF HIGH THROUGHPUT TECHNOLOGIES AS DESCRIBED BY CMS-2020-01-R: HCPCS | Performed by: PHYSICIAN ASSISTANT

## 2021-09-20 PROCEDURE — 80048 BASIC METABOLIC PNL TOTAL CA: CPT | Performed by: INTERNAL MEDICINE

## 2021-09-20 PROCEDURE — 85018 HEMOGLOBIN: CPT | Performed by: PHYSICIAN ASSISTANT

## 2021-09-20 PROCEDURE — 81003 URINALYSIS AUTO W/O SCOPE: CPT

## 2021-09-20 PROCEDURE — U0005 INFEC AGEN DETEC AMPLI PROBE: HCPCS | Performed by: PHYSICIAN ASSISTANT

## 2021-09-20 RX ORDER — BENZONATATE 100 MG/1
100 CAPSULE ORAL 3 TIMES DAILY PRN
Status: DISCONTINUED | OUTPATIENT
Start: 2021-09-20 | End: 2021-09-24 | Stop reason: HOSPADM

## 2021-09-20 RX ADMIN — HYDROXYCHLOROQUINE SULFATE 200 MG: 200 TABLET, FILM COATED ORAL at 08:58

## 2021-09-20 RX ADMIN — ENOXAPARIN SODIUM 30 MG: 30 INJECTION SUBCUTANEOUS at 08:57

## 2021-09-20 RX ADMIN — BUPROPION HYDROCHLORIDE 100 MG: 100 TABLET ORAL at 21:09

## 2021-09-20 RX ADMIN — CEFTRIAXONE SODIUM 2000 MG: 10 INJECTION, POWDER, FOR SOLUTION INTRAVENOUS at 13:57

## 2021-09-20 RX ADMIN — LEVOTHYROXINE SODIUM 50 MCG: 50 TABLET ORAL at 04:58

## 2021-09-20 RX ADMIN — PILOCARPINE HYDROCHLORIDE 5 MG: 5 TABLET, FILM COATED ORAL at 21:09

## 2021-09-20 RX ADMIN — GUAIFENESIN 600 MG: 600 TABLET, EXTENDED RELEASE ORAL at 08:58

## 2021-09-20 RX ADMIN — PILOCARPINE HYDROCHLORIDE 5 MG: 5 TABLET, FILM COATED ORAL at 15:42

## 2021-09-20 RX ADMIN — QUETIAPINE FUMARATE 350 MG: 100 TABLET ORAL at 21:10

## 2021-09-20 RX ADMIN — BUPROPION HYDROCHLORIDE 100 MG: 100 TABLET ORAL at 08:58

## 2021-09-20 RX ADMIN — SODIUM CHLORIDE 75 ML/HR: 0.9 INJECTION, SOLUTION INTRAVENOUS at 21:18

## 2021-09-20 RX ADMIN — GUAIFENESIN 600 MG: 600 TABLET, EXTENDED RELEASE ORAL at 21:09

## 2021-09-20 RX ADMIN — BENZONATATE 100 MG: 100 CAPSULE ORAL at 10:08

## 2021-09-20 RX ADMIN — BUPROPION HYDROCHLORIDE 100 MG: 100 TABLET ORAL at 15:42

## 2021-09-20 RX ADMIN — PILOCARPINE HYDROCHLORIDE 5 MG: 5 TABLET, FILM COATED ORAL at 09:48

## 2021-09-20 RX ADMIN — HYDROXYCHLOROQUINE SULFATE 200 MG: 200 TABLET, FILM COATED ORAL at 15:42

## 2021-09-20 RX ADMIN — TEMAZEPAM 60 MG: 15 CAPSULE ORAL at 21:15

## 2021-09-20 NOTE — PLAN OF CARE
Problem: PHYSICAL THERAPY ADULT  Goal: Performs mobility at highest level of function for planned discharge setting  See evaluation for individualized goals  Description: Treatment/Interventions: Functional transfer training, LE strengthening/ROM, Elevations, Therapeutic exercise, Endurance training, Patient/family training, Equipment eval/education, Bed mobility, Gait training  Equipment Recommended: Other (Comment) (none at this time)       See flowsheet documentation for full assessment, interventions and recommendations  9/20/2021 1501 by Simon Munroe PT  Note: Prognosis: Good  Problem List: Decreased strength, Decreased endurance, Impaired balance, Decreased mobility, Pain  Assessment: Joan Landeros is a 73 y o  Female who [facility]21 from home w/ c/o fever, chills, cough and diagnosis of sepsis  Orders for PT eval and treat received, w/ activity orders of up w/ A and R/O C-diff precautions  Pt presents w/ comorbidities of bipolar I disorder, Sjorgren's disorder, lupus, [place], hypothyroidism, and personal factors including: stair(s) to enter home and obesity  At baseline, pt mobilizes independently w/ no AD, and reports 0 falls in the last 6 months  Upon evaluation, pt presents w/ the following deficits: weakness, impaired balance, decreased endurance, and pain limiting functional mobility  Pt requires Mod I for bed mobility, supervision-Mod I for transfers, supervision for gait, and supervision for step trial  Pt's clinical presentation is unstable/unpredictable due to need for assist w/ of mobility when usually mobilizing independently, need for input for mobility technique/safety, ongoing medical monitoring/mangement, decreased endurance, gait deviations, and recent drastic decline in mobility compared to baseline  Pt is at an increased risk of falls due to decreased endurance   Given the above findings, discharge recommendation is for anticipate Home w/ no skilled PT needs at this time  During this admission, pt would benefit from skilled acute inpatient PT in order to address the abovementioned deficits to maximize function and mobility before DC from acute care  PT Discharge Recommendation: No rehabilitation needs (Anticipate home w/o rehabilitation needs at this time)          See flowsheet documentation for full assessment

## 2021-09-20 NOTE — ASSESSMENT & PLAN NOTE
Lab Results   Component Value Date    EGFR 44 09/20/2021    EGFR 27 09/19/2021    EGFR 44 09/03/2021    CREATININE 1 26 09/20/2021    CREATININE 1 87 (H) 09/19/2021    CREATININE 1 26 09/03/2021   · Patient was CKD 3 prior to hospitalization with creatinine 1-1 2, presented with ANNIE likely in the setting of sepsis with hypotension with creatinine 1 87    Now improved back to baseline to 1 2  · Avoid hypotension and nephrotoxic agents

## 2021-09-20 NOTE — UTILIZATION REVIEW
Initial Clinical Review    Admission: Date/Time/Statement:   Admission Orders (From admission, onward)     Ordered        09/19/21 1529  Inpatient Admission  Once                   Orders Placed This Encounter   Procedures    Inpatient Admission     Standing Status:   Standing     Number of Occurrences:   1     Order Specific Question:   Level of Care     Answer:   Med Surg [16]     Order Specific Question:   Estimated length of stay     Answer:   More than 2 Midnights     Order Specific Question:   Certification     Answer:   I certify that inpatient services are medically necessary for this patient for a duration of greater than two midnights  See H&P and MD Progress Notes for additional information about the patient's course of treatment  ED Arrival Information     Expected Arrival Acuity    - 9/19/2021 10:23 Emergent         Means of arrival Escorted by Service Admission type    [facility] Emergency         Arrival complaint    Fever/Chills        Chief Complaint   Patient presents with    Fever - 9 weeks to 74 years     chills, fever, cough  Initial Presentation: 73 y o  female with a PMH of lupus, Sjogren's, hypertension, bipolar disorder,chronic anemia, CKD3 who presents to ED from home after a trip in [address] [place] [address] [address] for evaluation of cough and shortness of breath with fever and rigors that  began yesterday    On exam in ED, pt hypoxic @888% , O2 [email or web address] L, pt hypotensive 79/48  Pt having difficulty breathing  Rhonchorous breath sounds bilaterally  Has a petechial rash on her bilateral lower extremities up to the knees that she's had intermittently for some time  Pt given 3 L IVF  Chest x-ray was consistent with pneumonia  COVID testing was negative  Labs show leukocytosis  Blood pressure has now stabilized with systolics greater than 224  Pt admitted as inpatient with pneumonia and acute hypoxic respiratory fauliure 2/2 PNA, sepsis   Plan- IV abx- ceftriaxone and azithromycin, check urine strep and Legionella antigens, f/u procal, monitor Blood cultures, fever curve, WBC's,Wean supplemental O2 as able check ESR and CRP, monitor rash  Date: 9/20   Day 2:   White count ede further today, possibly in the context of receiving a dose of IV Decadron  Also with acute kidney injury and hypoxia  Trend procal which was noted to be elevated at 2 55    2/2 positive blood cultures - likely streptococcal bacteremia in the setting of strep pneumonia  Noted strep antigen is positive-  Pt continues on IVF and IV Ceftriaxone  ID consult placed today  Pt  tested negative for COVID on admission  She does request repeat testing to ensure reliability   Pt has dry cough which exhausts her  Pt ordered tessalon perles  Significant drop in hemoglobin this am to 6 9,possible element of hemodilution --recheck 7 4  Pt denies any bleeding, check anemia panel 9/21  Creat on admit 1 87 from baseline 1-1 2   NANIE  likely in the setting of sepsis with hypotension , Creat back to baseline     ID-Probable pneumococcal pneumonia with bacteremia and severe sepsis with septic shock on presentation  CXR reviewed personally, only with mild bibasilar infiltrates, worse on left  Patient is clinically improved  Hypotension has resolved  Patient only has mild acute hypoxic respiratory failure, with O2 saturation well controlled on low level O2 support  Plan- increase dose IV abx, check echo 2d  Repeat Blood cultures, check stool C diff as had diarrhea overnight  remote Dermatology review today--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  Formal in-person consultation to be completed tomorrow    Additional studies to be ordered remotely      ED Triage Vitals   Temperature Pulse Respirations Blood Pressure SpO2   09/19/21 1039 09/19/21 1039 09/19/21 1039 09/19/21 1039 09/19/21 1039   99 5 °F (37 5 °C) 96 18 (!) 79/48 (!) 88 %      Temp Source Heart Rate Source Patient Position - Orthostatic VS BP Location FiO2 (%) 09/19/21 1039 09/19/21 1039 09/19/21 1039 09/19/21 1039 --   Oral Monitor Sitting Left arm       Pain Score       09/20/21 0042       No Pain          Wt Readings from Last 1 Encounters:   09/20/21 73 5 kg (162 lb 0 6 oz)     Additional Vital Signs:   Date/Time  Temp  Pulse  Resp  BP  MAP (mmHg)  SpO2  Calculated FIO2 (%) - Nasal Cannula  Nasal Cannula O2 Flow Rate (L/min)  O2 Device    09/20/21 0859  97 8 °F (36 6 °C)  91  18  108/52  75  96 %  --  --  Nasal cannula    09/20/21 0041  --  --  --  --  --  --  28  2 L/min  Nasal cannula    09/20/21 0040  97 8 °F (36 6 °C)  79  18  125/66  --  93 %  --  --  Nasal cannula    09/19/21 2300  --  74  18  109/55  75  96 %  --  --  --    09/19/21 2200  --  76  18  125/62  88  97 %  --  --  --    09/19/21 2100  --  78  --  132/72  89  96 %  --  --  --    09/19/21 1900  --  78  18  120/57  82  96 %  --  --  --    09/19/21 1800  --  80  18  111/72  86  97 %  --  --  --    09/19/21 1645  --  80  18  100/56  75  96 %  --  --  --    09/19/21 1545  --  82  18  99/55  73  96 %  32  3 L/min  --    09/19/21 1528  --  --  --  102/56  --  --  --  --  --    09/19/21 1415  --  82  18  96/52  69  96 %  --  --  --    09/19/21 1330  --  80  18  96/56  71  93 %  --  --  --    09/19/21 1245  --  78  18  88/49Abnormal   63  98 %  --  --  --    09/19/21 1215  --  80  18  84/53Abnormal   63  98 %  --  --  --    09/19/21 1200  --  82  20  82/47Abnormal   62  98 %  32  3 L/min  Nasal cannula    09/19/21 1145  --  84  20  76/47Abnormal   57  96 %  32  3 L/min  --    09/19/21 1131  --  --  --  --  --  92 %  32  3 L/min  Nasal cannula        Pertinent Labs/Diagnostic Test Results:   9/19  ECG-Normal sinus rhythm  Incomplete right bundle branch block  Nonspecific T wave abnormality  CXR-Interval development of suspected Covid pneumonia        Results from last 7 days   Lab Units 09/19/21  1132   SARS-COV-2  Negative     Results from last 7 days   Lab Units 09/20/21  0615 09/19/21  2155 09/19/21  1132 WBC Thousand/uL 25 48*  --  20 31*   HEMOGLOBIN g/dL 6 9*  --  8 4*   HEMATOCRIT % 22 0*  --  26 6*   PLATELETS Thousands/uL 219 220 291   BANDS PCT %  --   --  9*         Results from last 7 days   Lab Units 09/20/21  0615 09/19/21  1132   SODIUM mmol/L 136 131*   POTASSIUM mmol/L 4 0 4 4   CHLORIDE mmol/L 110* 102   CO2 mmol/L 19* 20*   ANION GAP mmol/L 7 9   BUN mg/dL 28* 30*   CREATININE mg/dL 1 26 1 87*   EGFR ml/min/1 73sq m 44 27   CALCIUM mg/dL 7 1* 7 6*     Results from last 7 days   Lab Units 09/19/21  1132   AST U/L 29   ALT U/L 16   ALK PHOS U/L 97   TOTAL PROTEIN g/dL 10 2*   ALBUMIN g/dL 2 9*   TOTAL BILIRUBIN mg/dL 0 88         Results from last 7 days   Lab Units 09/20/21  0615 09/19/21  1132   GLUCOSE RANDOM mg/dL 104 95           Results from last 7 days   Lab Units 09/19/21  1132   CK TOTAL U/L 47     Results from last 7 days   Lab Units 09/19/21  1339   TROPONIN I ng/mL <0 02         Results from last 7 days   Lab Units 09/19/21  1132   PROTIME seconds 15 7*   INR  1 26*   PTT seconds 35         Results from last 7 days   Lab Units 09/19/21  1132   PROCALCITONIN ng/ml 2 55*     Results from last 7 days   Lab Units 09/19/21  1132   LACTIC ACID mmol/L 1 9             Results from last 7 days   Lab Units 09/19/21  1132   NT-PRO BNP pg/mL 4,108*                 Results from last 7 days   Lab Units 09/19/21  2155 09/19/21  1132   CRP mg/L  --  121 4*   SED RATE mm/hour 66*  --          Results from last 7 days   Lab Units 09/20/21  0010 09/19/21  1838   CLARITY UA  Clear Clear   COLOR UA  Yellow Yellow   SPEC GRAV UA  1 015 1 010   PH UA  5 0 5 5   GLUCOSE UA mg/dl Negative Negative   KETONES UA mg/dl Negative Negative   BLOOD UA  Negative Negative   PROTEIN UA mg/dl Negative Negative   NITRITE UA  Negative Negative   BILIRUBIN UA  Negative Negative   UROBILINOGEN UA E U /dl 0 2 0 2   LEUKOCYTES UA  Negative Negative     Results from last 7 days   Lab Units 09/19/21 1929 09/19/21  1132   STREP PNEUMONIAE ANTIGEN, URINE  Positive*  --    LEGIONELLA URINARY ANTIGEN  Negative  --    INFLUENZA A PCR   --  Negative   INFLUENZA B PCR   --  Negative   RSV PCR   --  Negative           Results from last 7 days   Lab Units 09/19/21  1132   GRAM STAIN RESULT  Gram positive cocci in pairs and chains*  Gram positive cocci in pairs and chains*     Results from last 7 days   Lab Units 09/19/21  2155   TOTAL COUNTED  100           ED Treatment:   Medication Administration from 09/19/2021 1022 to 09/20/2021 0023       Date/Time Order Dose Route Action     09/19/2021 1134 sodium chloride 0 9 % bolus 1,000 mL 1,000 mL Intravenous New Bag     09/19/2021 1428 sodium chloride 0 9 % bolus 1,000 mL 1,000 mL Intravenous New Bag     09/19/2021 1907 sodium chloride 0 9 % bolus 1,000 mL 1,000 mL Intravenous New Bag     09/19/2021 1146 ceftriaxone (ROCEPHIN) 1 g/50 mL in dextrose IVPB 1,000 mg Intravenous New Bag     09/19/2021 1318 dexamethasone (DECADRON) injection 6 mg 6 mg Intravenous Given     09/19/2021 1319 albuterol (PROVENTIL HFA,VENTOLIN HFA) inhaler 1 puff 1 puff Inhalation Given     09/19/2021 1319 ipratropium (ATROVENT HFA) inhaler 1 puff 1 puff Inhalation Given     09/19/2021 1319 dextromethorphan-guaiFENesin (ROBITUSSIN DM) oral syrup 10 mL 10 mL Oral Given     09/19/2021 1428 azithromycin (ZITHROMAX) 500 mg in sodium chloride 0 9% 250mL IVPB 500 mg 500 mg Intravenous New Bag     09/19/2021 2000 buPROPion (WELLBUTRIN) tablet 100 mg 100 mg Oral Given     09/19/2021 2000 hydroxychloroquine (PLAQUENIL) tablet 200 mg 200 mg Oral Given     09/19/2021 2000 pilocarpine (SALAGEN) tablet 5 mg 5 mg Oral Given     09/19/2021 2226 temazepam (RESTORIL) capsule 60 mg 60 mg Oral Given     09/19/2021 1958 enoxaparin (LOVENOX) subcutaneous injection 30 mg 30 mg Subcutaneous Given     09/19/2021 2001 guaiFENesin (MUCINEX) 12 hr tablet 600 mg 600 mg Oral Given     09/19/2021 2229 QUEtiapine (SEROquel) tablet 350 mg 300 mg Oral Given Past Medical History:   Diagnosis Date    Bipolar 1 disorder ([place] [address] )     Deaf     Pt lost all hearing in right ear after having [place] measles    Hard of hearing     Pt wears a hearing in left ear      Hypothyroidism     Psychiatric disorder     Sjogren's syndrome ([place] [address] )     Systemic lupus erythematosus ([place] [address] )     Wears glasses     Wears partial dentures      Present on Admission:   Pneumonia due to Streptococcus ([place] [address] )   Sepsis ([place] [address] )   Bipolar I disorder, single manic episode ([place] [address] )   Acute respiratory failure with hypoxia (HCC)   Systemic lupus erythematosus ([place] [address] )   Sjogren's syndrome (HCC)   Petechial rash   Anemia      Admitting Diagnosis: Pneumonia [J18 9]  Fever [R50 9]  ANNIE (acute kidney injury) ([place] [address] ) [N17 9]  Sepsis ([place] [address] ) [A41 9]  Age/Sex: 73 y o  female  Admission Orders:  Scheduled Medications:  buPROPion, 100 mg, Oral, TID  cefTRIAXone, 1,000 mg, Intravenous, Q24H -d/c'ed 9/20  enoxaparin, 30 mg, Subcutaneous, Daily  guaiFENesin, 600 mg, Oral, Q12H CARLOS  hydroxychloroquine, 200 mg, Oral, BID With Meals  levothyroxine, 50 mcg, Oral, Early Morning  pilocarpine, 5 mg, Oral, TID  QUEtiapine, 350 mg, Oral, HS  cefTRIAXone (ROCEPHIN) 2,000 mg in dextrose 5 % 50 mL IVPB   Dose: 2,000 mg  Freq: Every 24 hours Route: IV-started 9/20    Continuous IV Infusions:  sodium chloride, 75 mL/hr, Intravenous, Continuous      PRN Meds:  acetaminophen, 650 mg, Oral, Q6H PRN  benzonatate, 100 mg, Oral, TID PRN x1 9/20  ipratropium-albuterol, 3 mL, Nebulization, Q4H PRN  ondansetron, 4 mg, Intravenous, Q6H PRN  sodium chloride (PF), 3 mL, Intravenous, Q1H PRN  temazepam, 60 mg, Oral, HS PRN      pulse ox q4h  2 nc O2 to keep sat at least 92 %  Up w/ assist as needed  SCD's    IP CONSULT TO INFECTIOUS DISEASES  IP CONSULT TO DERMATOLOGY    Network Utilization Review Department  ATTENTION: Please call with any questions or concerns to [phone] and carefully listen to the prompts so that you are directed to the right person  All voicemails are confidential   Nichole Sauer all requests for admission clinical reviews, approved or denied determinations and any other requests to dedicated fax number below belonging to the campus where the patient is receiving treatment   List of dedicated fax numbers for the Facilities:  [address] DENIALS (Administrative/Medical Necessity) [phone]   [address] (Maternity/NICU/Pediatrics) [phone] [address] [place] [facility] [address] [address] [place] [address] [address] [facility] [place] [address] [phone]

## 2021-09-20 NOTE — CONSULTS
Consultation - Infectious Disease   Ofelia Raul 73 y o  female MRN: [id number]  Unit/Bed#: S -01 Encounter: [id number]      IMPRESSION & RECOMMENDATIONS:   1  Severe Sepsis POA :  With tachycardia, leukocytosis and hypotension  Sources #2 and #3  WBC trending up 20>25 , patient did receive a dose of dexamethasone yesterday  Procalcitonin is elevated 2 55  Fortunately patient is hemodynamically stable  Currently afebrile  Hypotension has improved with IV fluid resuscitation  · Continue with IV ceftriaxone but increase dose to 2g q24h  · Monitor fever/WBC  · Follow final blood cultures  2  Streptococcal pneumonia:  Chest x-ray noted for bibasilar infiltrates  COVID-19 is negative  Urine streptococcal antigen is positive  Urine Legionella is negative  Currently on 1 L supplemental oxygen with SpO2 over 90%  · Antibiotics as above  · Monitor respiratory status  · Second COVID-19 test is pending  · Obtain sputum cultures if possible  3  Gram-positive bacteremia as evident by 2 sets which are showing Gram-positive cocci in pairs and chains likely secondary to #2 ( Likely pneumococcal pneumoniae )  · Antibiotics as above  · Follow final blood cultures and sensitivities then will adjust antibiotics accordingly  · Repeat blood cultures  · Monitor vital signs  If clinically deteriorate then will escalate antibiotics  · Obtain echocardiogram to rule out presence of any vegetations  · If repeat Blood cultures shows clearance of bactreia and TTE with no abnormal valves then will plan for 2 weeks Rx        4  Rash :  Petechial on both lower extremities up to the knee more on the left  She does have history of the same rash in the past which she states that it was more significant after she got her COVID vaccine  Dermatology consulted  Patient denies tick or insect bites  She also denies any significant headaches and myalgias  No transaminitis    There is low suspicion for tick-borne disease at this time  5  Mild acute hypoxic respiratory failure:  Secondary to #2  She required 3 L of oxygen on admission to maintain SpO2 over 90%  She is currently on only 1 L  · Antibiotics as above  · Monitor respiratory status  · Titrate supplemental oxygen to maintain SpO2 over 89%  · Rest of management per primary team        6  Acute on chronic Anemia :  Baseline appears to be in the mid 9s-10  Hemoglobin on admission is 8 4 which dropped to 6 9 this morning with recheck of 7 4  · Workup being done per primary team    · Transfusion per primary team for HGB below 7      7  ANNIE : Likely prerenal secondary to poor oral intake and already improving with IV hydration  · Management per primary team with IV hydration  8  SLE / Sjogren's syndrome  · On Plaquenil and Pilocarpine   · Outpatient rheumatology follow up  Thank you for your consult  We will continue to follow along with you  Discussed with Ethel Merino from primary team      Have discussed the above management plan in detail with the primary service    Extensive review of the medical records in epic including review of the notes, radiographs, and laboratory results     HISTORY OF PRESENT ILLNESS:  Reason for Consult:  Bacteremia  HPI: King Celestino is a 73y o  year old female with past medical history of SLE, Sjogren syndrome, bipolar disorder, hypothyroidism, unclear lymphadenopathy status post excisional biopsy , chronic anemia and CKD stage 3 who presents to [facility] ED on September 19th due to fever, shortness of breath and cough as worsening for 2 days    She reports being in [place] on Friday 9/17 visiting her family in on the day coming back home 9/18, she developed fever which she states was 103F at night and she took Tylenol for that and when she wake up in the morning her temperature was 102°F and she was having rigors, shortness of breath and cough so her  brought her to the ED for further evaluation  On admission she met sepsis criteria with leukocytosis, tachycardia and pneumonia being the source of infection her chest x-ray  COVID test was negative  She received IV fluid resuscitation due to hypotension with blood pressure being 79/48 which she responded well to  Blood tests were noted for leukocytosis, ANNIE, worsening anemia, elevated ESR and CRP and positive urine strept antigen  She was started on IV ceftriaxone  Blood cultures came back today positive for Gram-positive cocci in pairs and chains and ID service was consulted for further management  When I saw the patient today , she states that she is feeling about the same or maybe little bit better from yesterday  She is currently on 1 L oxygen and saturating above 90%  She states that her cough is sometimes productive of yellow phlegm  No fever noted since admission  She also mentioned that she just finished 7 days course of Keflex for presumed urinary tract infection  UA on admission is clear  She denies any urinary complaints including dysuria, hematuria, frequency, flank pain or suprapubic pain  She does report a history of petechiae rash which looks almost the same as which she has now  She states that the rash is not itchy  She denies any arthralgia but does endorse mild intermittent headaches  She denies any sick contacts reports being fully vaccinated for COVID-19  REVIEW OF SYSTEMS:  A complete review of systems is negative other than that noted in the HPI  PAST MEDICAL HISTORY:  Past Medical History:   Diagnosis Date    Acute kidney injury superimposed on chronic kidney disease ([address] ) 9/20/2021    Bipolar 1 disorder ([address] )     Deaf     Pt lost all hearing in right ear after having [place] measles    Hard of hearing     Pt wears a hearing in left ear      Hypothyroidism     Psychiatric disorder     Sjogren's syndrome ([address] )     Systemic lupus erythematosus ([address] )     Wears glasses     Wears partial dentures      Past Surgical History:   Procedure Laterality Date    BREAST BIOPSY Right     COLONOSCOPY      FRACTURE SURGERY Right     elbow    GASTRIC BYPASS      HYSTERECTOMY Bilateral     IR BIOPSY LYMPH NODE  10/8/2020    CT BRONCHOSCOPY,DIAGNOSTIC N/A 2017    Procedure: BRONCHOSCOPY;  Surgeon: Karen Lang MD;  [facility]; Service: Pulmonary    CT NEEDLE BIOPSY, LYMPH NODE(S) Left 2021    Procedure: EXCISION BIOPSY LYMPH NODE: left axillary lymph node biopsy;  Surgeon: Cole Mcginnis MD;  Location: [facility];  Service: Thoracic    TONSILLECTOMY         FAMILY HISTORY:  Non-contributory    SOCIAL HISTORY:  Social History   Social History     Substance and Sexual Activity   Alcohol Use No     Social History     Substance and Sexual Activity   Drug Use No     Social History     Tobacco Use   Smoking Status Never Smoker   Smokeless Tobacco Never Used       ALLERGIES:  Allergies   Allergen Reactions    Ciprofloxacin Hives and Swelling     Pt reports that lips and mouth and face swelled   Cymbalta [Duloxetine Hcl] Other (See Comments)     Hallucinations, fatigue, faints    Percocet [Oxycodone-Acetaminophen] Other (See Comments)     Dry mouth - pt states it kept her up all night  States had to continually drink fluids       MEDICATIONS:  All current active medications have been reviewed        PHYSICAL EXAM:  Temp:  [97 8 °F (36 6 °C)-99 5 °F (37 5 °C)] 97 8 °F (36 6 °C)  HR:  [74-96] 91  Resp:  [18-20] 18  BP: ()/(47-72) 108/52  SpO2:  [88 %-98 %] 96 %  Temp (24hrs), Av 4 °F (36 9 °C), Min:97 8 °F (36 6 °C), Max:99 5 °F (37 5 °C)  Current: Temperature: 97 8 °F (36 6 °C)    Intake/Output Summary (Last 24 hours) at 2021 1017  Last data filed at 2021 1016  Gross per 24 hour   Intake 1965 ml   Output 800 ml   Net 1165 ml       General Appearance:  Appearing well, nontoxic, and in no distress   Head:  Normocephalic, without obvious abnormality, atraumatic   Eyes:  Conjunctiva pale and sclera anicteric, both eyes       Throat: Oropharynx moist without lesions   Neck: Supple, symmetrical, no adenopathy, no tenderness/mass/nodules   Back:   Symmetric, no curvature, ROM normal, no CVA tenderness   Lungs:   Decreased breath sounds bilaterally with rhonchi eyes on the bases  unlabored breathing on 1 L supplemental oxygen   Chest Wall:  No tenderness or deformity   Heart:  RRR; no murmur, rub or gallop   Abdomen:   Soft, non-tender, non-distended, positive bowel sounds    Extremities: No cyanosis, clubbing or edema   Skin: Petechial rash on both lower extremities below the knees more on the left  Non tender  Neurologic: Alert and oriented times 3, extremity strength 5/5 and symmetric       LABS, IMAGING, & OTHER STUDIES:  Lab Results:  I have personally reviewed pertinent labs  Results from last 7 days   Lab Units 09/20/21  0936 09/20/21  0615 09/19/21  2155 09/19/21  1132   WBC Thousand/uL  --  25 48*  --  20 31*   HEMOGLOBIN g/dL 7 4* 6 9*  --  8 4*   PLATELETS Thousands/uL  --  219 220 291     Results from last 7 days   Lab Units 09/20/21  0615 09/19/21  1132   SODIUM mmol/L 136 131*   POTASSIUM mmol/L 4 0 4 4   CHLORIDE mmol/L 110* 102   CO2 mmol/L 19* 20*   BUN mg/dL 28* 30*   CREATININE mg/dL 1 26 1 87*   EGFR ml/min/1 73sq m 44 27   CALCIUM mg/dL 7 1* 7 6*   AST U/L  --  29   ALT U/L  --  16   ALK PHOS U/L  --  97     Results from last 7 days   Lab Units 09/19/21  1929 09/19/21  1132   GRAM STAIN RESULT   --  Gram positive cocci in pairs and chains*  Gram positive cocci in pairs and chains*   LEGIONELLA URINARY ANTIGEN  Negative  --      Results from last 7 days   Lab Units 09/19/21  1132   PROCALCITONIN ng/ml 2 55*     Results from last 7 days   Lab Units 09/19/21  1132   CRP mg/L 121 4*               Imaging Studies:   I have personally reviewed pertinent imaging study reports and images in PACS        Other Studies:   I have personally reviewed pertinent reports

## 2021-09-20 NOTE — PHYSICAL THERAPY NOTE
PHYSICAL THERAPY EVALUATION NOTE          Patient Name: Ivon Chery  Today's Date: 2021     AGE:   73 y o   Mrn:   [id number]  ADMIT DX:  Pneumonia [J18 9]  Fever [R50 9]  ANNIE (acute kidney injury) ([facility] ) [N17 9]  Sepsis ([facility] ) [A41 9]    Past Medical History:   Diagnosis Date    Acute kidney injury superimposed on chronic kidney disease ([facility] ) 2021    Bipolar 1 disorder ([place] [address] )     Deaf     Pt lost all hearing in right ear after having [place] measles    Hard of hearing     Pt wears a hearing in left ear   Hypothyroidism     Psychiatric disorder     Sjogren's syndrome ([facility] )     Systemic lupus erythematosus ([facility] )     Wears glasses     Wears partial dentures      Length Of Stay: 1  PHYSICAL THERAPY EVALUATION :   Patient's identity confirmed via 2 patient identifiers (full name and ) at start of session       21 1222   PT Last Visit   PT Visit Date 21   Note Type   Note type Evaluation   Pain Assessment   Pain Assessment Tool 0-10   Pain Score 8   Pain Location/Orientation Location: Buttocks   Pain Onset/Description Descriptor: [facility] Pain Intervention(s) Repositioned; Ambulation/increased activity  (RN notified)   Home Living   Type of [address] One level;Performs ADLs on one level; Able to live on main level with bedroom/bathroom;Stairs to enter with rails  (2-3 ANANT)   [address] Walker;Cane;Wheelchair-manual  (RW, knee walker)   Prior Function   Level of [place] Independent with ADLs and functional mobility   Lives With Spouse; Alone  (brother and )   Receives Help From Family   ADL Assistance Independent   IADLs Independent   Falls in the last 6 months 0   Vocational Part time employment  (Customer Service)   Comments (+) drives   Restrictions/Precautions   [place] Bearing Precautions Per Order No   Braces or Orthoses Other (Comment)  (none per pt)   Other Precautions Chair Alarm; Bed Alarm;Multiple lines;O2;Fall Risk;Pain;Contact/isolation  (1L O2 via NC; R/O C-diff)   General   Family/Caregiver Present Yes  (Pt's brother )   Cognition   Overall Cognitive Status WFL   Arousal/Participation Cooperative   Orientation Level Oriented X4   Memory Within functional limits   Following Commands Follows all commands and directions without difficulty   Comments Pt ID via name and ; pt agreeable to PT eval   Strength RLE   RLE Overall Strength 4/5  (Grossly assessed w/ functional mobility)   Strength LLE   LLE Overall Strength 4/5  (Grossly assessed w/ functional mobility)   Coordination   Movements are Fluid and Coordinated 1   Sensation WFL   Light Touch   RLE Light Touch Grossly intact   LLE Light Touch Grossly intact   Bed Mobility   Supine to Sit 6  Modified independent   Additional items HOB elevated; Bedrails; Increased time required   Sit to Supine Unable to assess   Additional Comments Pt OOB in recliner chair at end of session   Transfers   Sit to Stand 5  Supervision   Additional items Verbal cues   Stand to Sit 5  Supervision   Additional items Verbal cues   Toilet transfer 6  Modified independent   Additional items Standard toilet  (R grab bar)   Ambulation/Elevation   Gait pattern Decreased foot clearance; Short stride; Inconsistent shola   Gait Assistance 5  Supervision   Assistive Device None   Distance 30'+20'  (into bathroom and to chair)   Stair Management Assistance 5  Supervision   Additional items Verbal cues   Stair Management Technique One rail L;Step to pattern   Number of Stairs 3   Balance   Static Sitting Good   Dynamic Sitting Fair +   Static Standing Fair +   Dynamic Standing Fair +   Ambulatory Fair   Endurance Deficit   Endurance Deficit Yes   Endurance Deficit Description Pt limited due to fatigue    Activity Tolerance   Activity Tolerance Patient limited by fatigue;Patient limited by pain   Medical Staff Made Aware Spoke to [address] confirmed pt appropriate for PT eval and does not require chair alarm; post session, pt OOB in recliner chair w/ all needs w/in reach, in no apparent distress; PCA Bo Borrero present in pt's room; RN updated post   Assessment   Prognosis Good   Problem List Decreased strength;Decreased endurance; Impaired balance;Decreased mobility;Pain   Assessment Patsy Cox is a 73 y o  Female who [facility]2021 from home w/ c/o fever, chills, cough and diagnosis of sepsis  Orders for PT eval and treat received, w/ activity orders of up w/ A and R/O C-diff precautions  Pt presents w/ comorbidities of bipolar I disorder, Sjorgren's disorder, lupus, [place], hypothyroidism, and personal factors including: stair(s) to enter home and obesity  At baseline, pt mobilizes independently w/ no AD, and reports 0 falls in the last 6 months  Upon evaluation, pt presents w/ the following deficits: weakness, impaired balance, decreased endurance, and pain limiting functional mobility  Pt requires Mod I for bed mobility, supervision-Mod I for transfers, supervision for gait, and supervision for step trial  Pt's clinical presentation is unstable/unpredictable due to need for assist w/ of mobility when usually mobilizing independently, need for input for mobility technique/safety, ongoing medical monitoring/mangement, decreased endurance, gait deviations, and recent drastic decline in mobility compared to baseline  Pt is at an increased risk of falls due to decreased endurance  Given the above findings, discharge recommendation is for anticipate Home w/ no skilled PT needs at this time  During this admission, pt would benefit from skilled acute inpatient PT in order to address the abovementioned deficits to maximize function and mobility before DC from acute care      Goals   Patient Goals to go home   STG Expiration Date 09/30/21   Short Term Goal #1 Pt will: perform bed mobility from a flat surface independently to decrease caregiver burden; perform transfers independently to increase OOB mobility; ambulate at least 350' independently to increase pt's ambulatory endurance; perform 3 steps w/ unilateral railing and Mod I to facilitate return to previous living environment; increase LE strength by 1/2 grade to increase pt's tolerance to physical activity; increase all balance ratings by 1 grade to decrease pt's risk of falls    PT Treatment Day 0   Plan   Treatment/Interventions Functional transfer training;LE strengthening/ROM; Elevations; Therapeutic exercise; Endurance training;Patient/family training;Equipment eval/education; Bed mobility;Gait training   PT Frequency 2-3x/wk   Recommendation   PT Discharge Recommendation No rehabilitation needs  (Anticipate home w/o rehabilitation needs at this time)   Equipment Recommended Other (Comment)  (none at this time)   [place] 8 in Bed Without Bedrails 4   Lying on Back to Sitting on Edge of Flat Bed 4   Moving Bed to Chair 3   Standing Up From Chair 3   Walk in Room 3   Climb 3-5 Stairs 3   Basic Mobility Inpatient Raw Score 20   Basic Mobility Standardized Score 43 99       The patient's AM-PAC Basic Mobility Inpatient Short Form Raw Score is 20, Standardized Score is 43 99  A standardized score greater than 42 9 suggests the patient may benefit from discharge to home which may not  coincide with above PT recommendations      Pt would benefit from skilled inpatient PT during this admission in order to facilitate progress towards goals to maximize functional independence    DC rec: anticipate home w/o rehabilitation needs pending progress      Rivka Ernst, PT, DPT  09/20/21      PT eval orders DC after eval was initiated

## 2021-09-20 NOTE — ASSESSMENT & PLAN NOTE
· Appreciate remote dermatology review today--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  Formal in-person consultation to be completed tomorrow    Additional studies to be ordered remotely

## 2021-09-20 NOTE — PROGRESS NOTES
[facility]  Progress Note - [facility] 1950, 73 y o  female MRN: [id number]  Unit/Bed#: S -01 Encounter: [id number]  Primary Care Provider: Martha Frazier MD   Date and time admitted to hospital: 9/19/2021 10:54 AM    * Sepsis [facility])  Assessment & Plan  · met criteria on admission with high fever pre-hospital, tachypnea, hypotension, leukocytosis  Noted white count ede further today, possibly in the context of receiving a dose of IV Decadron  Also with acute kidney injury and hypoxia  · Continue supportive IV fluids  · Trend procal which was noted to be elevated at 2 55  · Source felt to be streptococcal pneumonia  · Also found to have 2/2 positive blood cultures (though they may have been drawn from the same site according to the patient)--likely streptococcal bacteremia in the setting of pneumonia  · Continue ceftriaxone  · Will also ask for Infectious Disease eval    Petechial rash  Assessment & Plan  · Appreciate remote dermatology review today--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  Formal in-person consultation to be completed tomorrow  Additional studies to be ordered remotely      Pneumonia due to Streptococcus [facility])  Assessment & Plan  · Patient presents with fever, chills, cough, shortness of breath  · chest x-ray read as COVID pneumonia however patient is fully vaccinated and tested negative for COVID on admission  She does request repeat testing to ensure reliability  · Continue supportive care with cough medication  · Noted strep antigen is positive--continue ceftriaxone day 2  Acute respiratory failure with hypoxia (HCC)  Assessment & Plan  ·  secondary to pneumonia, monitor O2 sats      Anemia  Assessment & Plan  · Patient with chronic baseline anemia  Significant drop in hemoglobin this am to 6 9,possible element of hemodilution --recheck 7 4    · Patient denies any bleeding at this time    If hemoglobin remains less than 7 would transfuse 1 unit  · Check anemia panel in a m  Acute kidney injury superimposed on chronic kidney disease [facility])  Assessment & Plan  Lab Results   Component Value Date    EGFR 44 2021    EGFR 27 2021    EGFR 44 2021    CREATININE 1 26 2021    CREATININE 1 87 (H) 2021    CREATININE 1 26 2021   · Patient was CKD 3 prior to hospitalization with creatinine 1-1 2, presented with ANNIE likely in the setting of sepsis with hypotension with creatinine 1 87  Now improved back to baseline to 1 2  · Avoid hypotension and nephrotoxic agents      Systemic lupus erythematosus ([facility] )  Assessment & Plan  · Follows with rheumatology-- on hydroxychloroquine    Sjogren's syndrome ([facility] )  Assessment & Plan  · Follows with rheumatology, cont pilocarpine    VTE Pharmacologic Prophylaxis:   Pharmacologic: Enoxaparin (Lovenox)  Mechanical VTE Prophylaxis in Place: No    Patient Centered Rounds: spoke with RN    Discussions with Specialists or Other Care Team Provider: ID, derm    Education and Discussions with Family / Patient:     Time Spent for Care: 30 minutes  More than 50% of total time spent on counseling and coordination of care as described above  Current Length of Stay: 1 day(s)    Current Patient Status: Inpatient   Certification Statement: The patient will continue to require additional inpatient hospital stay due to IV antbx    Discharge Plan: not stable for dc    Code Status: Level 1 - Full Code    Subjective:   Patient complains of significant dry cough which makes her feel exhausted and asks for cough medication  Not reporting any dizziness or lightheadedness this morning  States she is very concerned about her low blood pressure  Also brings to my attention her rash on her left greater than right leg which is worse than when she had in January following her COVID vaccine    Denies any bleeding    Objective:     Vitals:   Temp (24hrs), Av 4 °F (36 9 °C), Min:97 8 °F (36 6 °C), Max:99 5 °F (37 5 °C)    Temp:  [97 8 °F (36 6 °C)-99 5 °F (37 5 °C)] 97 8 °F (36 6 °C)  HR:  [74-96] 91  Resp:  [18-20] 18  BP: ()/(47-72) 108/52  SpO2:  [88 %-98 %] 96 %  Body mass index is 30 62 kg/m²  Input and Output Summary (last 24 hours): Intake/Output Summary (Last 24 hours) at 9/20/2021 1004  Last data filed at 9/20/2021 0500  Gross per 24 hour   Intake 1645 ml   Output 800 ml   Net 845 ml       Physical Exam:     Physical Exam  Vitals reviewed  Constitutional:       General: She is not in acute distress  Appearance: She is obese  She is ill-appearing  She is not diaphoretic  Comments: Appears exhausted, seen in bed   HENT:      Nose: No congestion or rhinorrhea  Eyes:      General: No scleral icterus  Right eye: No discharge  Left eye: No discharge  Conjunctiva/sclera: Conjunctivae normal    Cardiovascular:      Rate and Rhythm: Normal rate and regular rhythm  Heart sounds: No murmur heard  Pulmonary:      Effort: No respiratory distress  Breath sounds: No stridor  Rhonchi present  No wheezing  Comments: Significant cough noted  No dyspnea or tachypnea  Abdominal:      General: Bowel sounds are normal  There is no distension  Palpations: Abdomen is soft  Tenderness: There is no abdominal tenderness  There is no guarding  Musculoskeletal:         General: No swelling, tenderness, deformity or signs of injury  Right lower leg: No edema  Left lower leg: No edema  Skin:     General: Skin is warm and dry  Coloration: Skin is pale  Skin is not jaundiced  Findings: Rash present  No bruising, erythema or lesion  Comments: Left greater than right lower extremity petechial rash   Neurological:      General: No focal deficit present  Mental Status: She is alert  Mental status is at baseline  Comments: No confusion   Psychiatric:         Mood and Affect: Mood normal          Thought Content:  Thought content normal          Additional Data:     Labs:    Results from last 7 days   Lab Units 09/20/21  0936 09/20/21  0615 09/19/21  1132   WBC Thousand/uL  --  25 48* 20 31*   HEMOGLOBIN g/dL 7 4* 6 9* 8 4*   HEMATOCRIT % 23 5* 22 0* 26 6*   PLATELETS Thousands/uL  --  219 291   BANDS PCT %  --   --  9*   LYMPHO PCT %  --   --  21   MONO PCT %  --   --  4   EOS PCT %  --   --  0     Results from last 7 days   Lab Units 09/20/21  0615 09/19/21  1132   SODIUM mmol/L 136 131*   POTASSIUM mmol/L 4 0 4 4   CHLORIDE mmol/L 110* 102   CO2 mmol/L 19* 20*   BUN mg/dL 28* 30*   CREATININE mg/dL 1 26 1 87*   ANION GAP mmol/L 7 9   CALCIUM mg/dL 7 1* 7 6*   ALBUMIN g/dL  --  2 9*   TOTAL BILIRUBIN mg/dL  --  0 88   ALK PHOS U/L  --  97   ALT U/L  --  16   AST U/L  --  29   GLUCOSE RANDOM mg/dL 104 95     Results from last 7 days   Lab Units 09/19/21  1132   INR  1 26*             Results from last 7 days   Lab Units 09/19/21  1132   LACTIC ACID mmol/L 1 9   PROCALCITONIN ng/ml 2 55*     * I Have Reviewed All Lab Data Listed Above  * Additional Pertinent Lab Tests Reviewed:  [facility] Admission Reviewed    Imaging:    Imaging Reports Reviewed Today Include:  Chest x-ray  Imaging Personally Reviewed by Myself Includes:      Recent Cultures (last 7 days):     Results from last 7 days   Lab Units 09/19/21  1929 09/19/21  1132   GRAM STAIN RESULT   --  Gram positive cocci in pairs and chains*  Gram positive cocci in pairs and chains*   LEGIONELLA URINARY ANTIGEN  Negative  --        Last 24 Hours Medication List:   Current Facility-Administered Medications   Medication Dose Route Frequency Provider Last Rate    acetaminophen  650 mg Oral Q6H PRN Aryri Lynnette Padilla, DO      benzonatate  100 mg Oral TID PRN Eva Hickey PA-C      buPROPion  100 mg Oral TID Larelvi Padilla, DO      cefTRIAXone  1,000 mg Intravenous Q24H Akosua Padilla, DO      enoxaparin  30 mg Subcutaneous Daily Sean Casey Hany Baum, DO      guaiFENesin  600 mg Oral Q12H [address] [facility] [place] [place]      hydroxychloroquine  200 mg Oral BID With Meals Reed Fredesmond Starsinic, DO      ipratropium-albuterol  3 mL Nebulization Q4H PRN Reed Fredesmond Starsinic, DO      levothyroxine  50 mcg Oral Early Morning Reed Fredesmond Starsinic, DO      ondansetron  4 mg Intravenous Q6H PRN Reed Fredesmond Starsinic, DO      pilocarpine  5 mg Oral TID Reed Fredesmond Starsinic, DO      QUEtiapine  350 mg Oral HS Reed Fredesmond Starsinic, DO      sodium chloride (PF)  3 mL Intravenous Q1H PRN Yvosanjeev Collins, DO      sodium chloride  75 mL/hr Intravenous Continuous [facility] Starsinic, DO      temazepam  60 mg Oral HS PRN [facility] Padilla, DO          Today, Patient Was Seen By: Zara Mojica PA-C    ** Please Note: Dictation voice to text software may have been used in the creation of this document   **

## 2021-09-20 NOTE — ASSESSMENT & PLAN NOTE
· Patient with chronic baseline anemia  Significant drop in hemoglobin this am to 6 9,possible element of hemodilution --recheck 7 4    · Patient denies any bleeding at this time  If hemoglobin remains less than 7 would transfuse 1 unit  · Check anemia panel in a m

## 2021-09-20 NOTE — PLAN OF CARE
Problem: Potential for Falls  Goal: Patient will remain free of falls  Description: INTERVENTIONS:  - Educate patient/family on patient safety including physical limitations  - Instruct patient to call for assistance with activity   - Consult OT/PT to assist with strengthening/mobility   - Keep Call bell within reach  - Keep bed low and locked with side rails adjusted as appropriate  - Keep care items and personal belongings within reach  - Initiate and maintain comfort rounds  - Make Fall Risk Sign visible to staff  - Offer Toileting every  Hours, in advance of need  - Initiate/Maintain alarm  - Obtain necessary fall risk management equipment:   - Apply yellow socks and bracelet for high fall risk patients  - Consider moving patient to room near nurses station  Outcome: Progressing     Problem: RESPIRATORY - ADULT  Goal: Achieves optimal ventilation and oxygenation  Description: INTERVENTIONS:  - Assess for changes in respiratory status  - Assess for changes in mentation and behavior  - Position to facilitate oxygenation and minimize respiratory effort  - Oxygen administered by appropriate delivery if ordered  - Initiate smoking cessation education as indicated  - Encourage broncho-pulmonary hygiene including cough, deep breathe, Incentive Spirometry  - Assess the need for suctioning and aspirate as needed  - Assess and instruct to report SOB or any respiratory difficulty  - Respiratory Therapy support as indicated  Outcome: Progressing     Problem: METABOLIC, FLUID AND ELECTROLYTES - ADULT  Goal: Electrolytes maintained within normal limits  Description: INTERVENTIONS:  - Monitor labs and assess patient for signs and symptoms of electrolyte imbalances  - Administer electrolyte replacement as ordered  - Monitor response to electrolyte replacements, including repeat lab results as appropriate  - Instruct patient on fluid and nutrition as appropriate  Outcome: Progressing  Goal: Fluid balance maintained  Description: INTERVENTIONS:  - Monitor labs   - Monitor I/O and WT  - Instruct patient on fluid and nutrition as appropriate  - Assess for signs & symptoms of volume excess or deficit  Outcome: Progressing     Problem: SKIN/TISSUE INTEGRITY - ADULT  Goal: Skin Integrity remains intact(Skin Breakdown Prevention)  Description: Assess:  -Perform Isaac assessment every   -Clean and moisturize skin every   -Inspect skin when repositioning, toileting, and assisting with ADLS  -Assess under medical devices such as every   -Assess extremities for adequate circulation and sensation     Bed Management:  -Have minimal linens on bed & keep smooth, unwrinkled  -Change linens as needed when moist or perspiring  -Avoid sitting or lying in one position for more than  hours while in bed  -Keep HOB at degrees     Toileting:  -Offer bedside commode  -Assess for incontinence every   -Use incontinent care products after each incontinent episode such as     Activity:  -Mobilize patient  times a day  -Encourage activity and walks on unit  -Encourage or provide ROM exercises   -Turn and reposition patient every Hours  -Use appropriate equipment to lift or move patient in bed  -Instruct/ Assist with weight shifting every  when out of bed in chair  -Consider limitation of chair time  hour intervals    Skin Care:  -Avoid use of baby powder, tape, friction and shearing, hot water or constrictive clothing  -Relieve pressure over bony prominences using   -Do not massage red bony areas    Next Steps:  -Teach patient strategies to minimize risks such as   -Consider consults to  interdisciplinary teams such as   Outcome: Progressing  Goal: Pressure injury heals and does not worsen  Description: Interventions:  - Implement low air loss mattress or specialty surface (Criteria met)  - Apply silicone foam dressing  - Instruct/assist with weight shifting every  minutes when in chair   - Limit chair time to  hour intervals  - Use special pressure reducing interventions such as  when in chair   - Apply fecal or urinary incontinence containment device   - Perform passive or active ROM every   - Turn and reposition patient & offload bony prominences every  hours   - Utilize friction reducing device or surface for transfers   - Consider consults to  interdisciplinary teams such as   - Use incontinent care products after each incontinent episode such as   - Consider nutrition services referral as needed  Outcome: Progressing     Problem: INFECTION - ADULT  Goal: Absence or prevention of progression during hospitalization  Description: INTERVENTIONS:  - Assess and monitor for signs and symptoms of infection  - Monitor lab/diagnostic results  - Monitor all insertion sites, i e  indwelling lines, tubes, and drains  - Monitor endotracheal if appropriate and nasal secretions for changes in amount and color  - [place] appropriate cooling/warming therapies per order  - Administer medications as ordered  - Instruct and encourage patient and family to use good hand hygiene technique  - Identify and instruct in appropriate isolation precautions for identified infection/condition  Outcome: Progressing     Problem: SAFETY ADULT  Goal: Patient will remain free of falls  Description: INTERVENTIONS:  - Educate patient/family on patient safety including physical limitations  - Instruct patient to call for assistance with activity   - Consult OT/PT to assist with strengthening/mobility   - Keep Call bell within reach  - Keep bed low and locked with side rails adjusted as appropriate  - Keep care items and personal belongings within reach  - Initiate and maintain comfort rounds  - Make Fall Risk Sign visible to staff  - Offer Toileting every  Hours, in advance of need  - Initiate/Maintain alarm  - Obtain necessary fall risk management equipment:  - Apply yellow socks and bracelet for high fall risk patients  - Consider moving patient to room near nurses station  Outcome: Progressing  Goal: Maintain or return to baseline ADL function  Description: INTERVENTIONS:  -  Assess patient's ability to carry out ADLs; assess patient's baseline for ADL function and identify physical deficits which impact ability to perform ADLs (bathing, care of mouth/teeth, toileting, grooming, dressing, etc )  - Assess/evaluate cause of self-care deficits   - Assess range of motion  - Assess patient's mobility; develop plan if impaired  - Assess patient's need for assistive devices and provide as appropriate  - Encourage maximum independence but intervene and supervise when necessary  - Involve family in performance of ADLs  - Assess for home care needs following discharge   - Consider OT consult to assist with ADL evaluation and planning for discharge  - Provide patient education as appropriate  Outcome: Progressing  Goal: Maintains/Returns to pre admission functional level  Description: INTERVENTIONS:  - Perform BMAT or MOVE assessment daily    - Set and communicate daily mobility goal to care team and patient/family/caregiver  - Collaborate with rehabilitation services on mobility goals if consulted  - Perform Range of Motion  times a day  - Reposition patient every  hours    - Dangle patient  times a day  - Stand patient  times a day  - Ambulate patient  times a day  - Out of bed to chair  times a day   - Out of bed for meals  times a day  - Out of bed for toileting  - Record patient progress and toleration of activity level   Outcome: Progressing     Problem: DISCHARGE PLANNING  Goal: Discharge to home or other facility with appropriate resources  Description: INTERVENTIONS:  - Identify barriers to discharge w/patient and caregiver  - Arrange for needed discharge resources and transportation as appropriate  - Identify discharge learning needs (meds, wound care, etc )  - Arrange for interpretive services to assist at discharge as needed  - Refer to Case Management Department for coordinating discharge planning if the patient needs post-hospital services based on physician/advanced practitioner order or complex needs related to functional status, cognitive ability, or social support system  Outcome: Progressing     Problem: Knowledge Deficit  Goal: Patient/family/caregiver demonstrates understanding of disease process, treatment plan, medications, and discharge instructions  Description: Complete learning assessment and assess knowledge base    Interventions:  - Provide teaching at level of understanding  - Provide teaching via preferred learning methods  Outcome: Progressing

## 2021-09-20 NOTE — ASSESSMENT & PLAN NOTE
· met criteria on admission with high fever pre-hospital, tachypnea, hypotension, leukocytosis  Noted white count ede further today, possibly in the context of receiving a dose of IV Decadron  Also with acute kidney injury and hypoxia      · Continue supportive IV fluids  · Trend procal which was noted to be elevated at 2 55  · Source felt to be streptococcal pneumonia  · Also found to have 2/2 positive blood cultures (though they may have been drawn from the same site according to the patient)--likely streptococcal bacteremia in the setting of pneumonia  · Continue ceftriaxone  · Will also ask for Infectious Disease eval

## 2021-09-20 NOTE — ASSESSMENT & PLAN NOTE
· Patient presents with fever, chills, cough, shortness of breath  · chest x-ray read as COVID pneumonia however patient is fully vaccinated and tested negative for COVID on admission  She does request repeat testing to ensure reliability  · Continue supportive care with cough medication  · Noted strep antigen is positive--continue ceftriaxone day 2

## 2021-09-21 PROBLEM — E87.1 HYPONATREMIA: Status: ACTIVE | Noted: 2021-09-21

## 2021-09-21 PROBLEM — R65.20 SEVERE SEPSIS (HCC): Status: ACTIVE | Noted: 2021-09-19

## 2021-09-21 LAB
ABO GROUP BLD: NORMAL
ANION GAP SERPL CALCULATED.3IONS-SCNC: 10 MMOL/L (ref 4–13)
ANISOCYTOSIS BLD QL SMEAR: PRESENT
BASOPHILS # BLD MANUAL: 0 THOUSAND/UL (ref 0–0.1)
BASOPHILS NFR MAR MANUAL: 0 % (ref 0–1)
BLD GP AB SCN SERPL QL: NEGATIVE
BUN SERPL-MCNC: 19 MG/DL (ref 5–25)
BURR CELLS BLD QL SMEAR: PRESENT
C DIFF TOX GENS STL QL NAA+PROBE: NEGATIVE
CALCIUM SERPL-MCNC: 7.4 MG/DL (ref 8.3–10.1)
CHLORIDE SERPL-SCNC: 110 MMOL/L (ref 100–108)
CO2 SERPL-SCNC: 19 MMOL/L (ref 21–32)
CREAT SERPL-MCNC: 1.07 MG/DL (ref 0.6–1.3)
EOSINOPHIL # BLD MANUAL: 0.19 THOUSAND/UL (ref 0–0.4)
EOSINOPHIL NFR BLD MANUAL: 1 % (ref 0–6)
ERYTHROCYTE [DISTWIDTH] IN BLOOD BY AUTOMATED COUNT: 17.1 % (ref 11.6–15.1)
FERRITIN SERPL-MCNC: 46 NG/ML (ref 8–388)
FOLATE SERPL-MCNC: 12.2 NG/ML (ref 3.1–17.5)
GFR SERPL CREATININE-BSD FRML MDRD: 53 ML/MIN/1.73SQ M
GLUCOSE SERPL-MCNC: 71 MG/DL (ref 65–140)
HCT VFR BLD AUTO: 20.7 % (ref 34.8–46.1)
HCT VFR BLD AUTO: 25.3 % (ref 34.8–46.1)
HGB BLD-MCNC: 6.6 G/DL (ref 11.5–15.4)
HGB BLD-MCNC: 7.8 G/DL (ref 11.5–15.4)
HYPERCHROMIA BLD QL SMEAR: PRESENT
IRON SERPL-MCNC: 14 UG/DL (ref 50–170)
LYMPHOCYTES # BLD AUTO: 24 % (ref 14–44)
LYMPHOCYTES # BLD AUTO: 4.52 THOUSAND/UL (ref 0.6–4.47)
MCH RBC QN AUTO: 25.9 PG (ref 26.8–34.3)
MCHC RBC AUTO-ENTMCNC: 31.4 G/DL (ref 31.4–37.4)
MCV RBC AUTO: 83 FL (ref 82–98)
MONOCYTES # BLD AUTO: 0 THOUSAND/UL (ref 0–1.22)
MONOCYTES NFR BLD: 0 % (ref 4–12)
NEUTROPHILS # BLD MANUAL: 14.12 THOUSAND/UL (ref 1.85–7.62)
NEUTS BAND NFR BLD MANUAL: 14 % (ref 0–8)
NEUTS SEG NFR BLD AUTO: 61 % (ref 43–75)
PLATELET # BLD AUTO: 217 THOUSANDS/UL (ref 149–390)
PLATELET BLD QL SMEAR: ADEQUATE
PMV BLD AUTO: 9.6 FL (ref 8.9–12.7)
POIKILOCYTOSIS BLD QL SMEAR: PRESENT
POTASSIUM SERPL-SCNC: 3.5 MMOL/L (ref 3.5–5.3)
PROCALCITONIN SERPL-MCNC: 4.04 NG/ML
RBC # BLD AUTO: 2.51 MILLION/UL (ref 3.81–5.12)
RH BLD: POSITIVE
SODIUM SERPL-SCNC: 139 MMOL/L (ref 136–145)
SPECIMEN EXPIRATION DATE: NORMAL
TIBC SERPL-MCNC: 153 UG/DL (ref 250–450)
VIT B12 SERPL-MCNC: 169 PG/ML (ref 100–900)
WBC # BLD AUTO: 18.82 THOUSAND/UL (ref 4.31–10.16)

## 2021-09-21 PROCEDURE — 85027 COMPLETE CBC AUTOMATED: CPT | Performed by: PHYSICIAN ASSISTANT

## 2021-09-21 PROCEDURE — 88300 SURGICAL PATH GROSS: CPT | Performed by: STUDENT IN AN ORGANIZED HEALTH CARE EDUCATION/TRAINING PROGRAM

## 2021-09-21 PROCEDURE — 83550 IRON BINDING TEST: CPT | Performed by: PHYSICIAN ASSISTANT

## 2021-09-21 PROCEDURE — 85018 HEMOGLOBIN: CPT | Performed by: PHYSICIAN ASSISTANT

## 2021-09-21 PROCEDURE — 84165 PROTEIN E-PHORESIS SERUM: CPT | Performed by: PATHOLOGY

## 2021-09-21 PROCEDURE — 88313 SPECIAL STAINS GROUP 2: CPT | Performed by: STUDENT IN AN ORGANIZED HEALTH CARE EDUCATION/TRAINING PROGRAM

## 2021-09-21 PROCEDURE — 80048 BASIC METABOLIC PNL TOTAL CA: CPT | Performed by: PHYSICIAN ASSISTANT

## 2021-09-21 PROCEDURE — 86850 RBC ANTIBODY SCREEN: CPT | Performed by: PHYSICIAN ASSISTANT

## 2021-09-21 PROCEDURE — 82746 ASSAY OF FOLIC ACID SERUM: CPT | Performed by: PHYSICIAN ASSISTANT

## 2021-09-21 PROCEDURE — 85007 BL SMEAR W/DIFF WBC COUNT: CPT | Performed by: PHYSICIAN ASSISTANT

## 2021-09-21 PROCEDURE — 30233N1 TRANSFUSION OF NONAUTOLOGOUS RED BLOOD CELLS INTO PERIPHERAL VEIN, PERCUTANEOUS APPROACH: ICD-10-PCS | Performed by: GENERAL PRACTICE

## 2021-09-21 PROCEDURE — 83540 ASSAY OF IRON: CPT | Performed by: PHYSICIAN ASSISTANT

## 2021-09-21 PROCEDURE — 86920 COMPATIBILITY TEST SPIN: CPT

## 2021-09-21 PROCEDURE — 84165 PROTEIN E-PHORESIS SERUM: CPT | Performed by: DERMATOLOGY

## 2021-09-21 PROCEDURE — 86334 IMMUNOFIX E-PHORESIS SERUM: CPT | Performed by: DERMATOLOGY

## 2021-09-21 PROCEDURE — 88346 IMFLUOR 1ST 1ANTB STAIN PX: CPT | Performed by: DERMATOLOGY

## 2021-09-21 PROCEDURE — 11104 PUNCH BX SKIN SINGLE LESION: CPT | Performed by: DERMATOLOGY

## 2021-09-21 PROCEDURE — 11105 PUNCH BX SKIN EA SEP/ADDL: CPT | Performed by: DERMATOLOGY

## 2021-09-21 PROCEDURE — 87040 BLOOD CULTURE FOR BACTERIA: CPT | Performed by: INTERNAL MEDICINE

## 2021-09-21 PROCEDURE — NC001 PR NO CHARGE: Performed by: INTERNAL MEDICINE

## 2021-09-21 PROCEDURE — 86900 BLOOD TYPING SEROLOGIC ABO: CPT | Performed by: PHYSICIAN ASSISTANT

## 2021-09-21 PROCEDURE — 88305 TISSUE EXAM BY PATHOLOGIST: CPT | Performed by: STUDENT IN AN ORGANIZED HEALTH CARE EDUCATION/TRAINING PROGRAM

## 2021-09-21 PROCEDURE — 86901 BLOOD TYPING SEROLOGIC RH(D): CPT | Performed by: PHYSICIAN ASSISTANT

## 2021-09-21 PROCEDURE — 82728 ASSAY OF FERRITIN: CPT | Performed by: PHYSICIAN ASSISTANT

## 2021-09-21 PROCEDURE — 0HBLXZX EXCISION OF LEFT LOWER LEG SKIN, EXTERNAL APPROACH, DIAGNOSTIC: ICD-10-PCS | Performed by: DERMATOLOGY

## 2021-09-21 PROCEDURE — 85014 HEMATOCRIT: CPT | Performed by: PHYSICIAN ASSISTANT

## 2021-09-21 PROCEDURE — P9016 RBC LEUKOCYTES REDUCED: HCPCS

## 2021-09-21 PROCEDURE — 88350 IMFLUOR EA ADDL 1ANTB STN PX: CPT

## 2021-09-21 PROCEDURE — 99232 SBSQ HOSP IP/OBS MODERATE 35: CPT | Performed by: PHYSICIAN ASSISTANT

## 2021-09-21 PROCEDURE — 99221 1ST HOSP IP/OBS SF/LOW 40: CPT | Performed by: DERMATOLOGY

## 2021-09-21 PROCEDURE — 84145 PROCALCITONIN (PCT): CPT | Performed by: PHYSICIAN ASSISTANT

## 2021-09-21 PROCEDURE — 82607 VITAMIN B-12: CPT | Performed by: PHYSICIAN ASSISTANT

## 2021-09-21 RX ORDER — FERROUS SULFATE 325(65) MG
325 TABLET ORAL 2 TIMES DAILY WITH MEALS
Status: DISCONTINUED | OUTPATIENT
Start: 2021-09-21 | End: 2021-09-24 | Stop reason: HOSPADM

## 2021-09-21 RX ORDER — CYANOCOBALAMIN 1000 UG/ML
1000 INJECTION INTRAMUSCULAR; SUBCUTANEOUS DAILY
Status: COMPLETED | OUTPATIENT
Start: 2021-09-21 | End: 2021-09-23

## 2021-09-21 RX ORDER — ASCORBIC ACID 500 MG
250 TABLET ORAL 2 TIMES DAILY
Status: DISCONTINUED | OUTPATIENT
Start: 2021-09-21 | End: 2021-09-24 | Stop reason: HOSPADM

## 2021-09-21 RX ORDER — FOLIC ACID 1 MG/1
1 TABLET ORAL DAILY
Status: DISCONTINUED | OUTPATIENT
Start: 2021-09-21 | End: 2021-09-24 | Stop reason: HOSPADM

## 2021-09-21 RX ADMIN — FERROUS SULFATE TAB 325 MG (65 MG ELEMENTAL FE) 325 MG: 325 (65 FE) TAB at 17:44

## 2021-09-21 RX ADMIN — GUAIFENESIN 600 MG: 600 TABLET, EXTENDED RELEASE ORAL at 21:37

## 2021-09-21 RX ADMIN — BENZONATATE 100 MG: 100 CAPSULE ORAL at 15:03

## 2021-09-21 RX ADMIN — MUPIROCIN: 20 OINTMENT TOPICAL at 14:36

## 2021-09-21 RX ADMIN — GUAIFENESIN 600 MG: 600 TABLET, EXTENDED RELEASE ORAL at 07:58

## 2021-09-21 RX ADMIN — BUPROPION HYDROCHLORIDE 100 MG: 100 TABLET ORAL at 17:44

## 2021-09-21 RX ADMIN — QUETIAPINE FUMARATE 350 MG: 100 TABLET ORAL at 21:37

## 2021-09-21 RX ADMIN — CEFTRIAXONE SODIUM 2000 MG: 10 INJECTION, POWDER, FOR SOLUTION INTRAVENOUS at 12:35

## 2021-09-21 RX ADMIN — HYDROXYCHLOROQUINE SULFATE 200 MG: 200 TABLET, FILM COATED ORAL at 17:44

## 2021-09-21 RX ADMIN — FOLIC ACID 1 MG: 1 TABLET ORAL at 12:36

## 2021-09-21 RX ADMIN — OXYCODONE HYDROCHLORIDE AND ACETAMINOPHEN 250 MG: 500 TABLET ORAL at 17:44

## 2021-09-21 RX ADMIN — PILOCARPINE HYDROCHLORIDE 5 MG: 5 TABLET, FILM COATED ORAL at 17:44

## 2021-09-21 RX ADMIN — OXYCODONE HYDROCHLORIDE AND ACETAMINOPHEN 250 MG: 500 TABLET ORAL at 12:36

## 2021-09-21 RX ADMIN — ENOXAPARIN SODIUM 30 MG: 30 INJECTION SUBCUTANEOUS at 07:58

## 2021-09-21 RX ADMIN — HYDROXYCHLOROQUINE SULFATE 200 MG: 200 TABLET, FILM COATED ORAL at 07:58

## 2021-09-21 RX ADMIN — BUPROPION HYDROCHLORIDE 100 MG: 100 TABLET ORAL at 21:37

## 2021-09-21 RX ADMIN — LEVOTHYROXINE SODIUM 50 MCG: 50 TABLET ORAL at 04:56

## 2021-09-21 RX ADMIN — PILOCARPINE HYDROCHLORIDE 5 MG: 5 TABLET, FILM COATED ORAL at 07:58

## 2021-09-21 RX ADMIN — PILOCARPINE HYDROCHLORIDE 5 MG: 5 TABLET, FILM COATED ORAL at 21:38

## 2021-09-21 RX ADMIN — FERROUS SULFATE TAB 325 MG (65 MG ELEMENTAL FE) 325 MG: 325 (65 FE) TAB at 12:36

## 2021-09-21 RX ADMIN — TEMAZEPAM 60 MG: 15 CAPSULE ORAL at 21:39

## 2021-09-21 RX ADMIN — BUPROPION HYDROCHLORIDE 100 MG: 100 TABLET ORAL at 07:59

## 2021-09-21 RX ADMIN — CYANOCOBALAMIN 1000 MCG: 1000 INJECTION, SOLUTION INTRAMUSCULAR; SUBCUTANEOUS at 12:36

## 2021-09-21 NOTE — CONSULTS
Consultation - Dermatology   Keshia Joyce 73 y o  female MRN: [id number]  Unit/Bed#: S -01 Encounter: [id number]      Consults      Assessment/Recommendations   Based on a thorough discussion of this condition and the management approach to it (including a comprehensive discussion of the known risks, side effects and potential benefits of treatment), the patient (family) agrees to implement the following specific plan:     RECURRENT PURPURA OF LOWER EXTREMITIES WITH PALINDROMIC COURSE,  LIKELY  LEUKOCYTOCLASTIC VASCULITIS [address]  DIFFERENTIAL WOULD INCLUDE HYPERGLOBULINEMIC PURPURA  WHICH IS A VASCULOPATHY ASSOCIATED WITH SJÖGRENS ANTIBODY  THE PRESENT ERUPTION WILL LIKELY SUBSIDE WITH ELEVATION AND DOES NOT REQUIRE ACUTE THERAPY  IF VASCULITIS MANAGEMENT SHOULD BE BASED ON OTHER MANIFESTATION SYSTEMICALLY AS HER SKIN DISEASE RELATIVELY INNOCUOUS   A BIOPSY FOR H&E HAS BEEN DONE AND SECOND BIOPSY FOR IMMUNOFLUORESCENCE  SPEP ORDERED LOOKING FOR EVIDENCE OF HYPERGLOBULINEMIA  Please set up discharge follow up by calling our office: [phone] FOR SUTURE REMOVAL  IN 2 WEEKS  Thank you for involving me in the care of your patient  Please call with questions, change in clinical status or if tests recommended above are abnormal      Discussed with the primary service  History:     HISTORY OF PRESENT ILLNESS:    Reason for Consult: Recurrent purpura  HPI: Keshia Joyce is a 73y o  year old female 73 Year old with 20 plus year history of SLE with Sjogrens manifested with dry eyes, dry mouth and fatique  On longstanding hydroxychloroguine  Prior treatment with Benlysta She is followed for intersitiital lung disease and has ongoing evaluation for atypical lymphoproliferative disorder  Chronic stage 3b kidney disease  She describes a recurrent purpuric eruption of legs oftern triggered by prolonged standing  Mild burning and heals without sequelae    Eruption is not triggered by cold      ROS:  Sicca symptoms  Fatique  PAST MEDICAL HISTORY:  Past Medical History:   Diagnosis Date    Acute kidney injury superimposed on chronic kidney disease ([facility] [address] ) 9/20/2021    Bipolar 1 disorder ([facility] [address] )     Deaf     Pt lost all hearing in right ear after having [place] measles    Hard of hearing     Pt wears a hearing in left ear   Hypothyroidism     Psychiatric disorder     Sjogren's syndrome ([facility] [address] [facility]     Systemic lupus erythematosus ([facility] [address] )     Wears glasses     Wears partial dentures      Past Surgical History:   Procedure Laterality Date    BREAST BIOPSY Right     COLONOSCOPY      FRACTURE SURGERY Right     elbow    GASTRIC BYPASS      HYSTERECTOMY Bilateral 1975    IR BIOPSY LYMPH NODE  10/8/2020    WI BRONCHOSCOPY,DIAGNOSTIC N/A 12/8/2017    Procedure: BRONCHOSCOPY;  Surgeon: Leana Watson MD;  [facility]; Service: Pulmonary    WI NEEDLE BIOPSY, LYMPH NODE(S) Left 2/25/2021    Procedure: EXCISION BIOPSY LYMPH NODE: left axillary lymph node biopsy;  Surgeon: Larissa Shah MD;  [facility]: [facility];  Service: Thoracic    TONSILLECTOMY         FAMILY HISTORY:  Non-contributory    SOCIAL HISTORY:  Social History   /Civil Union  Social History     Substance and Sexual Activity   Alcohol Use No     Social History     Substance and Sexual Activity   Drug Use No     Social History     Tobacco Use   Smoking Status Never Smoker   Smokeless Tobacco Never Used       ALLERGIES:  Allergies   Allergen Reactions    Ciprofloxacin Hives and Swelling     Pt reports that lips and mouth and face swelled   Cymbalta [Duloxetine Hcl] Other (See Comments)     Hallucinations, fatigue, faints    Percocet [Oxycodone-Acetaminophen] Other (See Comments)     Dry mouth - pt states it kept her up all night  States had to continually drink fluids       MEDICATIONS:  All current active medications have been reviewed   reviewed      Physical exam:     Temp:  [97 4 °F (36 3 °C)-98 4 °F (36 9 °C)] 98 2 °F (36 8 °C)  HR:  [74-82] 82  Resp:  [18-20] 18  BP: (129-162)/(60-75) 162/74  SpO2:  [93 %-97 %] 96 %  Temp (24hrs), Av 1 °F (36 7 °C), Min:97 4 °F (36 3 °C), Max:98 4 °F (36 9 °C)  Current: Temperature: 98 2 °F (36 8 °C)    PSYCH: Normal mood and affect  EYES: Normal conjunctiva  ENT: Normal lips and oral mucosa  CARDIOVASCULAR: No edema  RESPIRATORY: Normal respirations  HEME/LYMPH/IMMUNO:  No regional lymphadenopathy except as noted below in ASSESSMENT AND PLAN BY DIAGNOSIS    FULL ORGAN SYSTEM SKIN EXAM (SKIN)                                      Left Leg, Foot, Toes Normal except as noted below in Assessment        Purpuric macules and papules to level knees bilaterally with accentuation of medial ankle , left greater than right  No ulceration and no necrosis  Labs, Imaging, & Other Studies     Lab Results:  I have personally reviewed pertinent labs  MAALTHI 1;1280 with positive SSA, SSB, and RNP  Rheumatoid factor positve   Recent ESR 8  UA 9 20 21 no portein and no blood    Results from last 7 days   Lab Units 21  0451 21  0936 21  0615 21  2155 21  1132   WBC Thousand/uL 18 82*  --  25 48*  --  20 31*   HEMOGLOBIN g/dL 6 6* 7 4* 6 9*  --  8 4*   PLATELETS Thousands/uL 217  --  219 220 291     Results from last 7 days   Lab Units 21  0451 21  1132   POTASSIUM mmol/L 3 5 4 4   CHLORIDE mmol/L 110* 102   CO2 mmol/L 19* 20*   BUN mg/dL 19 30*   CREATININE mg/dL 1 07 1 87*   EGFR ml/min/1 73sq m 53 27   CALCIUM mg/dL 7 4* 7 6*   AST U/L  --  29   ALT U/L  --  16   ALK PHOS U/L  --  97     Results from last 7 days   Lab Units 21  0511 21  0506 21  1404 21  1929 21  1132   BLOOD CULTURE  Received in Microbiology Lab  Culture in Progress  Received in Microbiology Lab  Culture in Progress    --   --   --    GRAM STAIN RESULT   --   --   --   --  Gram positive cocci in pairs and chains*  Gram positive cocci in pairs and chains*   LEGIONELLA URINARY ANTIGEN   --   --   --  Negative  --    C DIFF TOXIN B BY PCR   --   --  Negative  --   --            Pathology:   I have personally reviewed pertinent reports  NA      PROCEDURE NOTE:  PUNCH BIOPSY      Performing Physician: Dr De La Fuente    Anatomic Location; Clinical Description with size (cm); Pre-Op Diagnosis:     LEFT LEG LCV versus Vasculopathy  Lesional skin       Anesthesia: 1% xylocaine with epi       Topical anesthesia: None       Indications: To indicate diagnosis and management plan  LCV versus Vasculopathy    Procedure Details     Patient informed of the risks (including bleeding,scaring and infection) and benefits of the procedure explained  Verbal and written informed consent obtained  The area was prepped and draped in the usual fashion  Anesthesia was obtained with 1% lidocaine with epinephrine  The skin was then stretched perpendicular to the skin tension lines and a punch biopsy to an appropriate sampling depth was obtained with a 4 mm punch with a forceps and iris scissors  Hemostasis was obtained with 5-0 Ethilon x 1 sutures  Complications:  None      Specimen has been sent for review by Dermatopathology  Plan:  1  Instructed to keep the wound dry and covered for 24-48h and clean thereafter  2  Warning signs of infection were reviewed  3  Recommended that the patient use acetaminophen as needed for pain  4  Sutures if any should be removed in 14 days      Standard post-procedure care has been explained and has been included in written form within the patient's copy of Informed Consent  PROCEDURE NOTE:  PUNCH BIOPSY      Performing Physician: Dr De La Fuente    Anatomic Location; Clinical Description with size (cm); Pre-Op Diagnosis:     Left leg perilesional skin for DIF  Leukocytoclastic vasculitis versus vasculopathy  Anesthesia: 1% xylocaine with epi       Topical anesthesia: None       Indications:  To indicate diagnosis and management plan  Procedure Details     Patient informed of the risks (including bleeding,scaring and infection) and benefits of the procedure explained  Verbal and written informed consent obtained  The area was prepped and draped in the usual fashion  Anesthesia was obtained with 1% lidocaine with epinephrine  The skin was then stretched perpendicular to the skin tension lines and a punch biopsy to an appropriate sampling depth was obtained with a 4 mm punch with a forceps and iris scissors  Hemostasis was obtained with 5-0 Ethilon x 1 sutures  Complications:  None      Specimen has been sent for review by Dermatopathology  Plan:  1  Instructed to keep the wound dry and covered for 24-48h and clean thereafter  2  Warning signs of infection were reviewed  3  Recommended that the patient use acetaminophen as needed for pain  4  Sutures if any should be removed in 14 days      Standard post-procedure care has been explained and has been included in written form within the patient's copy of Informed Consent

## 2021-09-21 NOTE — ASSESSMENT & PLAN NOTE
· Appreciate formal dermatology consultation today--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  This is due to her Sjogren's and does not require treatment   Additional studies to be ordered for confirmation

## 2021-09-21 NOTE — PLAN OF CARE
Problem: Potential for Falls  Goal: Patient will remain free of falls  Description: INTERVENTIONS:  - Educate patient/family on patient safety including physical limitations  - Instruct patient to call for assistance with activity   - Consult OT/PT to assist with strengthening/mobility   - Keep Call bell within reach  - Keep bed low and locked with side rails adjusted as appropriate  - Keep care items and personal belongings within reach  - Initiate and maintain comfort rounds  - Make Fall Risk Sign visible to staff  - Offer Toileting every 2 Hours, in advance of need  - Initiate/Maintain 2alarm  - Obtain necessary fall risk management equipment: 2  - Apply yellow socks and bracelet for high fall risk patients  - Consider moving patient to room near nurses station  Outcome: Progressing     Problem: RESPIRATORY - ADULT  Goal: Achieves optimal ventilation and oxygenation  Description: INTERVENTIONS:  - Assess for changes in respiratory status  - Assess for changes in mentation and behavior  - Position to facilitate oxygenation and minimize respiratory effort  - Oxygen administered by appropriate delivery if ordered  - Initiate smoking cessation education as indicated  - Encourage broncho-pulmonary hygiene including cough, deep breathe, Incentive Spirometry  - Assess the need for suctioning and aspirate as needed  - Assess and instruct to report SOB or any respiratory difficulty  - Respiratory Therapy support as indicated  Outcome: Progressing     Problem: METABOLIC, FLUID AND ELECTROLYTES - ADULT  Goal: Electrolytes maintained within normal limits  Description: INTERVENTIONS:  - Monitor labs and assess patient for signs and symptoms of electrolyte imbalances  - Administer electrolyte replacement as ordered  - Monitor response to electrolyte replacements, including repeat lab results as appropriate  - Instruct patient on fluid and nutrition as appropriate  Outcome: Progressing  Goal: Fluid balance maintained  Description: INTERVENTIONS:  - Monitor labs   - Monitor I/O and WT  - Instruct patient on fluid and nutrition as appropriate  - Assess for signs & symptoms of volume excess or deficit  Outcome: Progressing     Problem: SKIN/TISSUE INTEGRITY - ADULT  Goal: Skin Integrity remains intact(Skin Breakdown Prevention)  Description: Assess:  -Perform Isaac assessment every 2  -Clean and moisturize skin every 2  -Inspect skin when repositioning, toileting, and assisting with ADLS  -Assess under medical devices such as 2 every 2  -Assess extremities for adequate circulation and sensation     Bed Management:  -Have minimal linens on bed & keep smooth, unwrinkled  -Change linens as needed when moist or perspiring  -Avoid sitting or lying in one position for more than 2 hours while in bed  -Keep HOB at 2degrees     Toileting:  -Offer bedside commode  -Assess for incontinence every 2  -Use incontinent care products after each incontinent episode such as 2    Activity:  -Mobilize patient 2 times a day  -Encourage activity and walks on unit  -Encourage or provide ROM exercises   -Turn and reposition patient every 2 Hours  -Use appropriate equipment to lift or move patient in bed  -Instruct/ Assist with weight shifting every 2 when out of bed in chair  -Consider limitation of chair time 2 hour intervals    Skin Care:  -Avoid use of baby powder, tape, friction and shearing, hot water or constrictive clothing  -Relieve pressure over bony prominences using 2  -Do not massage red bony areas    Next Steps:  -Teach patient strategies to minimize risks such as 2   -Consider consults to  interdisciplinary teams such as 2  Outcome: Progressing  Goal: Pressure injury heals and does not worsen  Description: Interventions:  - Implement low air loss mattress or specialty surface (Criteria met)  - Apply silicone foam dressing  - Instruct/assist with weight shifting every 2 minutes when in chair   - Limit chair time to 2 hour intervals  - Use special pressure reducing interventions such as 2 when in chair   - Apply fecal or urinary incontinence containment device   - Perform passive or active ROM every 2  - Turn and reposition patient & offload bony prominences every 2 hours   - Utilize friction reducing device or surface for transfers   - Consider consults to  interdisciplinary teams such as 2  - Use incontinent care products after each incontinent episode such as 2  - Consider nutrition services referral as needed  Outcome: Progressing     Problem: INFECTION - ADULT  Goal: Absence or prevention of progression during hospitalization  Description: INTERVENTIONS:  - Assess and monitor for signs and symptoms of infection  - Monitor lab/diagnostic results  - Monitor all insertion sites, i e  indwelling lines, tubes, and drains  - Monitor endotracheal if appropriate and nasal secretions for changes in amount and color  - Perry appropriate cooling/warming therapies per order  - Administer medications as ordered  - Instruct and encourage patient and family to use good hand hygiene technique  - Identify and instruct in appropriate isolation precautions for identified infection/condition  Outcome: Progressing     Problem: SAFETY ADULT  Goal: Patient will remain free of falls  Description: INTERVENTIONS:  - Educate patient/family on patient safety including physical limitations  - Instruct patient to call for assistance with activity   - Consult OT/PT to assist with strengthening/mobility   - Keep Call bell within reach  - Keep bed low and locked with side rails adjusted as appropriate  - Keep care items and personal belongings within reach  - Initiate and maintain comfort rounds  - Make Fall Risk Sign visible to staff  - Offer Toileting every 2 Hours, in advance of need  - Initiate/Maintain 2alarm  - Obtain necessary fall risk management equipment: 2  - Apply yellow socks and bracelet for high fall risk patients  - Consider moving patient to room near nurses station  Outcome: Progressing  Goal: Maintain or return to baseline ADL function  Description: INTERVENTIONS:  -  Assess patient's ability to carry out ADLs; assess patient's baseline for ADL function and identify physical deficits which impact ability to perform ADLs (bathing, care of mouth/teeth, toileting, grooming, dressing, etc )  - Assess/evaluate cause of self-care deficits   - Assess range of motion  - Assess patient's mobility; develop plan if impaired  - Assess patient's need for assistive devices and provide as appropriate  - Encourage maximum independence but intervene and supervise when necessary  - Involve family in performance of ADLs  - Assess for home care needs following discharge   - Consider OT consult to assist with ADL evaluation and planning for discharge  - Provide patient education as appropriate  Outcome: Progressing  Goal: Maintains/Returns to pre admission functional level  Description: INTERVENTIONS:  - Perform BMAT or MOVE assessment daily    - Set and communicate daily mobility goal to care team and patient/family/caregiver  - Collaborate with rehabilitation services on mobility goals if consulted  - Perform Range of Motion 2 times a day  - Reposition patient every 2 hours    - Dangle patient 2 times a day  - Stand patient 2 times a day  - Ambulate patient 2 times a day  - Out of bed to chair 2 times a day   - Out of bed for meals 2 times a day  - Out of bed for toileting  - Record patient progress and toleration of activity level   Outcome: Progressing     Problem: DISCHARGE PLANNING  Goal: Discharge to home or other facility with appropriate resources  Description: INTERVENTIONS:  - Identify barriers to discharge w/patient and caregiver  - Arrange for needed discharge resources and transportation as appropriate  - Identify discharge learning needs (meds, wound care, etc )  - Arrange for interpretive services to assist at discharge as needed  - Refer to Case Management Department for coordinating discharge planning if the patient needs post-hospital services based on physician/advanced practitioner order or complex needs related to functional status, cognitive ability, or social support system  Outcome: Progressing     Problem: Knowledge Deficit  Goal: Patient/family/caregiver demonstrates understanding of disease process, treatment plan, medications, and discharge instructions  Description: Complete learning assessment and assess knowledge base    Interventions:  - Provide teaching at level of understanding  - Provide teaching via preferred learning methods  Outcome: Progressing     Problem: Prexisting or High Potential for Compromised Skin Integrity  Goal: Skin integrity is maintained or improved  Description: INTERVENTIONS:  - Identify patients at risk for skin breakdown  - Assess and monitor skin integrity  - Assess and monitor nutrition and hydration status  - Monitor labs   - Assess for incontinence   - Turn and reposition patient  - Assist with mobility/ambulation  - Relieve pressure over bony prominences  - Avoid friction and shearing  - Provide appropriate hygiene as needed including keeping skin clean and dry  - Evaluate need for skin moisturizer/barrier cream  - Collaborate with interdisciplinary team   - Patient/family teaching  - Consider wound care consult   Outcome: Progressing

## 2021-09-21 NOTE — CASE MANAGEMENT
Case Management Assessment & Discharge Planning Note    Patient name Radha LACKEY /S -01 MRN [id number]  : 1950 Date 2021       Current Admission Date: 2021  Current Admission Diagnosis:  Severe sepsis ([facility] [address] )  Previous Admission - Discharge Date:21   LOS (days): 2  Geometric Mean LOS (GMLOS) (days): 4 80  Days to GMLOS:2 8 Previous Discharge Diagnosis:  There are no discharge diagnoses documented for the most recent discharge  OBJECTIVE:    Risk of Unplanned Readmission Score: 21   Bundle(if applicable):    Current admission status: Inpatient       Preferred Pharmacy:   [facility] [place] - [address]  [address] [place] [address]  Phone: [phone] Fax: [phone]    Primary Care Provider: Alf Vicente MD normally sees annually + PRN    Primary Insurance: [address] REP  Secondary Insurance:     ASSESSMENT:  [address] [facility] [address], [address] [facility]   Primary Phone: [phone] [facility])  Home Phone: [phone]               Advance Directives  Does patient have a [address]?: Yes  Does patient have Advance Directives?: Yes  Advance Directives: Living will, Power of  for health care (copy requested for the chart and patient will bring a copy to her next doctor's appt)              Readmission Root Cause  30 Day Readmission: No    Patient Information  Admitted from[de-identified] Home  Mental Status: Alert  During Assessment patient was accompanied by: Not accompanied during assessment  Assessment information provided by[de-identified] Patient  Primary Caregiver: Self  Support Systems: Spouse/significant other, Family members  [place] of Residence: [address] do you live in?: [place] entry access options   Select all that apply : Stairs, Garage  Number of steps to enter home : 3  Do the steps have railings?: Yes  Type of Current Residence: [employer]  Living Arrangements: Lives w/ Spouse/significant other, Other (Comment) (+ her brother)    Activities of Daily Living Prior to Admission  Functional Status: Independent  Completes ADLs independently?: Yes  Ambulates independently?: Yes  Does patient use assisted devices?: No  Does patient currently own DME?: Yes  What DME does the patient currently own?: Veronica Deng, Other (Comment) (knee scooter)  Does patient have a history of Outpatient Therapy (PT/OT)?: No  Does the patient have a history of Short-Term Rehab?: No  Does patient have a history of HHC?: No  Does patient currently have [address]?: No         Patient Information Continued  Income Source: Employed (works 3 days/week, 4 hrs/day as a   also receives SSI and pension)  Does patient have prescription coverage?: Yes  Does patient receive dialysis treatments?: No  Does patient have a history of substance abuse?: No  Does patient have a history of Mental Health Diagnosis?: Yes (bipolar disorder)  Is patient receiving treatment for mental health?: Yes (meets with her Psychiatrist Dr Michael Lewis every 3 moths  medical management)  Has patient received inpatient treatment related to mental health in the last 2 years?: No         Means of Transportation  Means of Transport to Appts[de-identified] Drives Self  In the past 12 months, has lack of transportation kept you from medical appointments or from getting medications?: No  In the past 12 months, has lack of transportation kept you from meetings, work, or from getting things needed for daily living?: No  Was application for public transport provided?: No (N/A)    DISCHARGE DETAILS:    Discharge planning discussed with[de-identified] patient  Freedom of Choice: Yes  Comments - Freedom of Choice: patient confirms she was seen by PT/OT and knows that she doesn't need any skilled therapy at discharge, which she agrees with           [address]         Is the patient interested in [address] at discharge?: No    DME Referral Provided  Referral made for DME?: No         We would like to be able to fill any required prescriptions on discharge at our [address] [place] [address] and have them delivered to you at discharge in your room  Would you like to participate in this program? : No - Declined    Discharge Destination Plan[de-identified] Home     Type of Transport: Family (spouse or brother)         CM Dept will continue to follow

## 2021-09-21 NOTE — ASSESSMENT & PLAN NOTE
· Patient with chronic baseline anemia of disease  Significant drop in hemoglobin this am to 6 6  · Patient denies any bleeding at this time, awaiting stool for occult blood  Receiving transfusion of 1 unit of blood now, recheck later today  · Noted multiple abnormalities on anemia panel including B12 deficiency and low levels of iron and folate    Would replete all of the above

## 2021-09-21 NOTE — ASSESSMENT & PLAN NOTE
· Patient presents with fever, chills, cough, shortness of breath  · chest x-ray read as COVID pneumonia however patient is fully vaccinated and tested negative for COVID twice and positive for strep  · Continue supportive care with cough medication  · Noted strep antigen is positive--continue ceftriaxone day 3    · Following acute illness will need update in Pneumovax

## 2021-09-21 NOTE — ASSESSMENT & PLAN NOTE
Lab Results   Component Value Date    EGFR 53 09/21/2021    EGFR 44 09/20/2021    EGFR 27 09/19/2021    CREATININE 1 07 09/21/2021    CREATININE 1 26 09/20/2021    CREATININE 1 87 (H) 09/19/2021   · Patient was CKD 3 prior to hospitalization with creatinine 1-1 2, presented with ANNIE likely in the setting of sepsis with hypotension with creatinine 1 87  Now improved back to baseline to 1 07  · Avoid hypotension and nephrotoxic agents    Can discontinue further IV fluids now

## 2021-09-21 NOTE — ASSESSMENT & PLAN NOTE
· secondary to pneumonia, monitor O2 sats  Also further decompensated by significant anemia  Avoid volume overload    Noted that patient will appeared quite dyspneic during conversation today

## 2021-09-21 NOTE — PROGRESS NOTES
[facility]  Progress Note - Matthew Cambric 1950, 73 y o  female MRN: [id number]  Unit/Bed#: S -01 Encounter: [id number]  Primary Care Provider: Cadence Marshall MD   Date and time admitted to hospital: 9/19/2021 10:54 AM    * Severe sepsis [facility])  Assessment & Plan  · met criteria on admission with high fever pre-hospital, tachypnea, hypotension, leukocytosis  Also with acute kidney injury and hypoxia  Remains very ill but has had improvement in vital signs and decreased white count  · Can now discontinue IV fluids  · Trend procal which was noted to be elevated at 2 55 and ede further to 4 04  · Source felt to be streptococcal pneumonia  · Also found to have 2/2 positive blood cultures (though they may have been drawn from the same site according to the patient)--likely streptococcal bacteremia in the setting of pneumonia  · Continue ceftriaxone at a higher dose of 2 g daily  · Appreciate Infectious Disease eval  · 2D echocardiogram stable without any vegetation    Pneumonia due to Streptococcus [facility])  Assessment & Plan  · Patient presents with fever, chills, cough, shortness of breath  · chest x-ray read as COVID pneumonia however patient is fully vaccinated and tested negative for COVID twice and positive for strep  · Continue supportive care with cough medication  · Noted strep antigen is positive--continue ceftriaxone day 3    · Following acute illness will need update in Pneumovax      Anemia  Assessment & Plan  · Patient with chronic baseline anemia of disease  Significant drop in hemoglobin this am to 6 6  · Patient denies any bleeding at this time, awaiting stool for occult blood  Receiving transfusion of 1 unit of blood now, recheck later today  · Noted multiple abnormalities on anemia panel including B12 deficiency and low levels of iron and folate    Would replete all of the above    Petechial rash  Assessment & Plan  · Appreciate formal dermatology consultation today--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  This is due to her Sjogren's and does not require treatment  Additional studies to be ordered for confirmation      Acute respiratory failure with hypoxia ([facility] )  Assessment & Plan  · secondary to pneumonia, monitor O2 sats  Also further decompensated by significant anemia  Avoid volume overload  Noted that patient will appeared quite dyspneic during conversation today      Acute kidney injury superimposed on chronic kidney disease [facility])  Assessment & Plan  Lab Results   Component Value Date    EGFR 53 09/21/2021    EGFR 44 09/20/2021    EGFR 27 09/19/2021    CREATININE 1 07 09/21/2021    CREATININE 1 26 09/20/2021    CREATININE 1 87 (H) 09/19/2021   · Patient was CKD 3 prior to hospitalization with creatinine 1-1 2, presented with ANNIE likely in the setting of sepsis with hypotension with creatinine 1 87  Now improved back to baseline to 1 07  · Avoid hypotension and nephrotoxic agents  Can discontinue further IV fluids now      Systemic lupus erythematosus ([facility] )  Assessment & Plan  · Follows with rheumatology-- on hydroxychloroquine    Hyponatremia  Assessment & Plan  · Present on admission due to pneumonia and dehydration--resolved    Sjogren's syndrome ([facility] )  Assessment & Plan  · Follows with rheumatology, cont pilocarpine    VTE Pharmacologic Prophylaxis:   Pharmacologic: Enoxaparin (Lovenox)  Mechanical VTE Prophylaxis in Place: Yes    Patient Centered Rounds: I have performed bedside rounds with nursing staff today  Discussions with Specialists or Other Care Team Provider: ID, case mgmt, derm    Education and Discussions with Family / Patient: called patient's  with update    Time Spent for Care: 30 minutes  More than 50% of total time spent on counseling and coordination of care as described above      Current Length of Stay: 2 day(s)    Current Patient Status: Inpatient   Certification Statement: The patient will continue to require additional inpatient hospital stay due to severe sepsis, pna, resp failure, anemia    Discharge Plan: not stable for dc  Encourage OOB when more stable     Code Status: Level 1 - Full Code    Subjective:   Patient admits to being quite short of breath and still having cough  No bleeding noted  Very fatigued  Objective:     Vitals:   Temp (24hrs), Av 1 °F (36 7 °C), Min:97 4 °F (36 3 °C), Max:98 4 °F (36 9 °C)    Temp:  [97 4 °F (36 3 °C)-98 4 °F (36 9 °C)] 98 2 °F (36 8 °C)  HR:  [74-82] 82  Resp:  [18-20] 18  BP: (129-162)/(60-75) 162/74  SpO2:  [93 %-97 %] 96 %  Body mass index is 30 62 kg/m²  Input and Output Summary (last 24 hours): Intake/Output Summary (Last 24 hours) at 2021 1136  Last data filed at 2021 1800  Gross per 24 hour   Intake 360 ml   Output --   Net 360 ml       Physical Exam:     Physical Exam  Vitals reviewed  Constitutional:       General: She is not in acute distress  Appearance: She is ill-appearing and toxic-appearing  She is not diaphoretic  HENT:      Nose: No congestion or rhinorrhea  Eyes:      General:         Right eye: No discharge  Left eye: No discharge  Conjunctiva/sclera: Conjunctivae normal    Cardiovascular:      Rate and Rhythm: Normal rate and regular rhythm  Heart sounds: No murmur heard  Pulmonary:      Breath sounds: No stridor  Rhonchi present  No wheezing  Comments: Appears dyspneic  Bibasilar crackles/rhonchi  Abdominal:      General: Bowel sounds are normal  There is no distension  Palpations: Abdomen is soft  Tenderness: There is no abdominal tenderness  There is no guarding  Musculoskeletal:      Right lower leg: No edema  Left lower leg: No edema  Skin:     Coloration: Skin is pale  Skin is not jaundiced  Findings: Rash present  No bruising or lesion  Neurological:      General: No focal deficit present  Mental Status: She is alert  Mental status is at baseline  Comments: Ongoing petechial left >right rash   Psychiatric:         Mood and Affect: Mood normal          Thought Content: Thought content normal        Additional Data:     Labs:    Results from last 7 days   Lab Units 09/21/21  0451   WBC Thousand/uL 18 82*   HEMOGLOBIN g/dL 6 6*   HEMATOCRIT % 20 7*   PLATELETS Thousands/uL 217   BANDS PCT % 14*   LYMPHO PCT % 24   MONO PCT % 0*   EOS PCT % 1     Results from last 7 days   Lab Units 09/21/21  0451 09/19/21  1132   SODIUM mmol/L 139 131*   POTASSIUM mmol/L 3 5 4 4   CHLORIDE mmol/L 110* 102   CO2 mmol/L 19* 20*   BUN mg/dL 19 30*   CREATININE mg/dL 1 07 1 87*   ANION GAP mmol/L 10 9   CALCIUM mg/dL 7 4* 7 6*   ALBUMIN g/dL  --  2 9*   TOTAL BILIRUBIN mg/dL  --  0 88   ALK PHOS U/L  --  97   ALT U/L  --  16   AST U/L  --  29   GLUCOSE RANDOM mg/dL 71 95     Results from last 7 days   Lab Units 09/19/21  1132   INR  1 26*             Results from last 7 days   Lab Units 09/21/21  0451 09/20/21  0615 09/19/21  1132   LACTIC ACID mmol/L  --   --  1 9   PROCALCITONIN ng/ml 4 04* 5 34* 2 55*       * I Have Reviewed All Lab Data Listed Above  * Additional Pertinent Lab Tests Reviewed: [facility] Admission Reviewed    Imaging:    Imaging Reports Reviewed Today Include:   Imaging Personally Reviewed by Myself Includes:      Recent Cultures (last 7 days):     Results from last 7 days   Lab Units 09/21/21  0511 09/21/21  0506 09/20/21  1404 09/19/21  1929 09/19/21  1132   BLOOD CULTURE  Received in Microbiology Lab  Culture in Progress  Received in Microbiology Lab  Culture in Progress    --   --   --    GRAM STAIN RESULT   --   --   --   --  Gram positive cocci in pairs and chains*  Gram positive cocci in pairs and chains*   LEGIONELLA URINARY ANTIGEN   --   --   --  Negative  --    C DIFF TOXIN B BY PCR   --   --  Negative  --   --        Last 24 Hours Medication List:   Current Facility-Administered Medications   Medication Dose Route Frequency Provider Last Rate    acetaminophen  650 mg Oral Q6H PRN Ray Padilla, DO      ascorbic acid  250 mg Oral BID Janny Jean PA-C      benzonatate  100 mg Oral TID PRN Janny Jean PA-C      buPROPion  100 mg Oral TID Ray Padilla, DO      cefTRIAXone  2,000 mg Intravenous Q24H Maycol Pizarro MD 2,000 mg (09/20/21 8107)    cyanocobalamin  1,000 mcg Intramuscular Daily Gilda Jasmine PA-C      enoxaparin  30 mg Subcutaneous Daily Ray Padilla, DO      ferrous sulfate  325 mg Oral BID With Meals Gilda Jasmine PA-C      folic acid  1 mg Oral Daily Gilda Jasmine PA-C      guaiFENesin  600 mg Oral Q12H [address] Ray Padilla, DO      hydroxychloroquine  200 mg Oral BID With Meals Ray Padilla, DO      ipratropium-albuterol  3 mL Nebulization Q4H PRN Ray Padilla, DO      levothyroxine  50 mcg Oral Early Morning Ray Padilla, DO      mupirocin   Topical Daily Yesi Ceballos MD      ondansetron  4 mg Intravenous Q6H PRN Ray Padilla, DO      pilocarpine  5 mg Oral TID Ray Padilla, DO      QUEtiapine  350 mg Oral HS Ray Padilla, DO      sodium chloride (PF)  3 mL Intravenous Q1H PRN Tegan Collins, DO      temazepam  60 mg Oral HS PRN Ray Padilla, DO          Today, Patient Was Seen By: Janny Jean PA-C    ** Please Note: Dictation voice to text software may have been used in the creation of this document   **

## 2021-09-21 NOTE — PROGRESS NOTES
Progress Note - Infectious Disease   Romi Castañeda 73 y o  female MRN: [id number]  Unit/Bed#: S -01 Encounter: [id number]      Impression/Plan:  1  Severe Sepsis POA :  With tachycardia, leukocytosis along with hypotension and ANNIE  Sources #2 and #3  WBC and procalcitonin trending down   Fortunately patient is hemodynamically stable  Currently afebrile  Hypotension has resolved with IV fluid resuscitation  · Continue with high-dose IV ceftriaxone q24h  · Monitor fever/WBC  · Follow final initial and repeat blood cultures          2  Streptococcal pneumonia:  Chest x-ray noted for bibasilar infiltrates  COVID-19 is negative  Urine streptococcal antigen is positive  Urine Legionella is negative  Currently on 3 L supplemental oxygen with SpO2 over 96%  · Antibiotics as above  · Monitor respiratory status  · Obtain sputum cultures if possible          3  Gram-positive bacteremia as evident by 2 sets which are showing streptococcal pneumonia likely secondary to #2   · Antibiotics as above  · Follow final blood cultures and sensitivities then will adjust antibiotics accordingly  · Repeat blood cultures pending  · Monitor vital signs  If clinically deteriorate then will escalate antibiotics  · 2D echocardiogram with no vegetations      · If repeat Blood cultures shows clearance of bactreia and considering TTE with no abnormal valves then will plan for 2 weeks Rx          4  Rash :  Petechial on both lower extremities up to the knee more on the left  She does have history of the same rash in the past which she states that it was more significant after she got her COVID vaccine  Dermatology consulted  Patient denies tick or insect bites  She also denies any significant headaches and myalgias  No transaminitis  There is low suspicion for tick-borne disease at this time          5  Mild acute hypoxic respiratory failure:  Secondary to #2    She required 3 L of oxygen on admission to maintain SpO2 over 90%  She is currently on only 3 L  · Antibiotics as above  · Monitor respiratory status  · Titrate supplemental oxygen to maintain SpO2 over 89%  · Rest of management per primary team          6  Acute on chronic Anemia :  Baseline appears to be in the mid 9s-10  Hemoglobin is 6 6 today  · Workup being done per primary team    · Transfusion per primary team for HGB below 7        7  ANNIE : Likely prerenal secondary to poor oral intake and already improving with IV hydration  · Management per primary team with IV hydration         8  SLE / Sjogren's syndrome  · On Plaquenil and Pilocarpine   · Outpatient rheumatology follow up          Antibiotics:  Ceftriaxone 3    Subjective:  Patient continues to complain of SOB with ambulation and cough , about the same from yesterday  has no fever, chills, sweats; no nausea, vomiting; no pain  No new symptoms  Objective:  Vitals:  Temp:  [97 4 °F (36 3 °C)-98 4 °F (36 9 °C)] 98 2 °F (36 8 °C)  HR:  [74-82] 82  Resp:  [18-20] 18  BP: (129-162)/(60-75) 162/74  SpO2:  [93 %-97 %] 96 %  Temp (24hrs), Av 1 °F (36 7 °C), Min:97 4 °F (36 3 °C), Max:98 4 °F (36 9 °C)  Current: Temperature: 98 2 °F (36 8 °C)    Physical Exam:   General Appearance:  Alert, interactive, nontoxic, no acute distress  Throat: Oropharynx moist without lesions  Lungs:   Decreased breath sounds on the bases with rhonchi  respirations unlabored   Heart:  RRR; no murmur, rub or gallop   Abdomen:   Soft, non-tender, non-distended, positive bowel sounds  Extremities: No clubbing, cyanosis or edema   Skin: Petechial rash on lower extremities         Labs, Imaging, & Other studies:   All pertinent labs and imaging studies were personally reviewed  Results from last 7 days   Lab Units 21  1311 21  0451 21  0936 21  0615 21  2155 21  1132   WBC Thousand/uL  --  18 82*  --  25 48*  --  20 31*   HEMOGLOBIN g/dL 7 8* 6 6* 7 4* 6 9*  --  8 4* PLATELETS Thousands/uL  --  217  --  219 220 291     Results from last 7 days   Lab Units 09/21/21  0451 09/20/21  0615 09/19/21  1132   SODIUM mmol/L 139 136 131*   POTASSIUM mmol/L 3 5 4 0 4 4   CHLORIDE mmol/L 110* 110* 102   CO2 mmol/L 19* 19* 20*   BUN mg/dL 19 28* 30*   CREATININE mg/dL 1 07 1 26 1 87*   EGFR ml/min/1 73sq m 53 44 27   CALCIUM mg/dL 7 4* 7 1* 7 6*   AST U/L  --   --  29   ALT U/L  --   --  16   ALK PHOS U/L  --   --  97     Results from last 7 days   Lab Units 09/21/21  0511 09/21/21  0506 09/20/21  1404 09/19/21  1929 09/19/21  1132   BLOOD CULTURE  Received in Microbiology Lab  Culture in Progress  Received in Microbiology Lab  Culture in Progress    --   --  Streptococcus pneumoniae*  Streptococcus pneumoniae*   GRAM STAIN RESULT   --   --   --   --  Gram positive cocci in pairs and chains*  Gram positive cocci in pairs and chains*   LEGIONELLA URINARY ANTIGEN   --   --   --  Negative  --    C DIFF TOXIN B BY PCR   --   --  Negative  --   --      Results from last 7 days   Lab Units 09/21/21  0451 09/20/21  0615 09/19/21  1132   PROCALCITONIN ng/ml 4 04* 5 34* 2 55*     Results from last 7 days   Lab Units 09/19/21  1132   CRP mg/L 121 4*     Results from last 7 days   Lab Units 09/21/21  0451   FERRITIN ng/mL 46

## 2021-09-21 NOTE — ASSESSMENT & PLAN NOTE
· met criteria on admission with high fever pre-hospital, tachypnea, hypotension, leukocytosis  Also with acute kidney injury and hypoxia    Remains very ill but has had improvement in vital signs and decreased white count  · Can now discontinue IV fluids  · Trend procal which was noted to be elevated at 2 55 and ede further to 4 04  · Source felt to be streptococcal pneumonia  · Also found to have 2/2 positive blood cultures (though they may have been drawn from the same site according to the patient)--likely streptococcal bacteremia in the setting of pneumonia  · Continue ceftriaxone at a higher dose of 2 g daily  · Appreciate Infectious Disease eval  · 2D echocardiogram stable without any vegetation

## 2021-09-22 LAB
ABO GROUP BLD BPU: NORMAL
ANION GAP SERPL CALCULATED.3IONS-SCNC: 8 MMOL/L (ref 4–13)
BACTERIA BLD CULT: ABNORMAL
BACTERIA BLD CULT: ABNORMAL
BASOPHILS # BLD MANUAL: 0 THOUSAND/UL (ref 0–0.1)
BASOPHILS NFR MAR MANUAL: 0 % (ref 0–1)
BPU ID: NORMAL
BUN SERPL-MCNC: 12 MG/DL (ref 5–25)
CALCIUM SERPL-MCNC: 7.4 MG/DL (ref 8.3–10.1)
CHLORIDE SERPL-SCNC: 110 MMOL/L (ref 100–108)
CO2 SERPL-SCNC: 21 MMOL/L (ref 21–32)
CREAT SERPL-MCNC: 1.04 MG/DL (ref 0.6–1.3)
CROSSMATCH: NORMAL
EOSINOPHIL # BLD MANUAL: 0.27 THOUSAND/UL (ref 0–0.4)
EOSINOPHIL NFR BLD MANUAL: 2 % (ref 0–6)
ERYTHROCYTE [DISTWIDTH] IN BLOOD BY AUTOMATED COUNT: 16.8 % (ref 11.6–15.1)
GFR SERPL CREATININE-BSD FRML MDRD: 55 ML/MIN/1.73SQ M
GLUCOSE SERPL-MCNC: 71 MG/DL (ref 65–140)
GRAM STN SPEC: ABNORMAL
GRAM STN SPEC: ABNORMAL
HCT VFR BLD AUTO: 24.7 % (ref 34.8–46.1)
HGB BLD-MCNC: 7.6 G/DL (ref 11.5–15.4)
LYMPHOCYTES # BLD AUTO: 28 % (ref 14–44)
LYMPHOCYTES # BLD AUTO: 3.78 THOUSAND/UL (ref 0.6–4.47)
MCH RBC QN AUTO: 26 PG (ref 26.8–34.3)
MCHC RBC AUTO-ENTMCNC: 30.8 G/DL (ref 31.4–37.4)
MCV RBC AUTO: 85 FL (ref 82–98)
MONOCYTES # BLD AUTO: 0.68 THOUSAND/UL (ref 0–1.22)
MONOCYTES NFR BLD: 5 % (ref 4–12)
MYELOCYTES NFR BLD MANUAL: 2 % (ref 0–1)
NEUTROPHILS # BLD MANUAL: 8.51 THOUSAND/UL (ref 1.85–7.62)
NEUTS BAND NFR BLD MANUAL: 5 % (ref 0–8)
NEUTS SEG NFR BLD AUTO: 58 % (ref 43–75)
PLATELET # BLD AUTO: 244 THOUSANDS/UL (ref 149–390)
PLATELET BLD QL SMEAR: ADEQUATE
PMV BLD AUTO: 9.5 FL (ref 8.9–12.7)
POTASSIUM SERPL-SCNC: 3.5 MMOL/L (ref 3.5–5.3)
PROCALCITONIN SERPL-MCNC: 1.7 NG/ML
RBC # BLD AUTO: 2.92 MILLION/UL (ref 3.81–5.12)
RBC MORPH BLD: NORMAL
SODIUM SERPL-SCNC: 139 MMOL/L (ref 136–145)
UNIT DISPENSE STATUS: NORMAL
UNIT PRODUCT CODE: NORMAL
UNIT PRODUCT VOLUME: 350 ML
UNIT RH: NORMAL
WBC # BLD AUTO: 13.5 THOUSAND/UL (ref 4.31–10.16)

## 2021-09-22 PROCEDURE — 85027 COMPLETE CBC AUTOMATED: CPT | Performed by: PHYSICIAN ASSISTANT

## 2021-09-22 PROCEDURE — 84145 PROCALCITONIN (PCT): CPT | Performed by: PHYSICIAN ASSISTANT

## 2021-09-22 PROCEDURE — 85007 BL SMEAR W/DIFF WBC COUNT: CPT | Performed by: PHYSICIAN ASSISTANT

## 2021-09-22 PROCEDURE — 80048 BASIC METABOLIC PNL TOTAL CA: CPT | Performed by: PHYSICIAN ASSISTANT

## 2021-09-22 PROCEDURE — NC001 PR NO CHARGE: Performed by: INTERNAL MEDICINE

## 2021-09-22 PROCEDURE — 99232 SBSQ HOSP IP/OBS MODERATE 35: CPT | Performed by: PHYSICIAN ASSISTANT

## 2021-09-22 RX ADMIN — CEFTRIAXONE SODIUM 2000 MG: 10 INJECTION, POWDER, FOR SOLUTION INTRAVENOUS at 11:49

## 2021-09-22 RX ADMIN — HYDROXYCHLOROQUINE SULFATE 200 MG: 200 TABLET, FILM COATED ORAL at 08:25

## 2021-09-22 RX ADMIN — GUAIFENESIN 600 MG: 600 TABLET, EXTENDED RELEASE ORAL at 21:31

## 2021-09-22 RX ADMIN — FERROUS SULFATE TAB 325 MG (65 MG ELEMENTAL FE) 325 MG: 325 (65 FE) TAB at 16:04

## 2021-09-22 RX ADMIN — QUETIAPINE FUMARATE 350 MG: 100 TABLET ORAL at 21:30

## 2021-09-22 RX ADMIN — CYANOCOBALAMIN 1000 MCG: 1000 INJECTION, SOLUTION INTRAMUSCULAR; SUBCUTANEOUS at 08:24

## 2021-09-22 RX ADMIN — BUPROPION HYDROCHLORIDE 100 MG: 100 TABLET ORAL at 21:31

## 2021-09-22 RX ADMIN — MUPIROCIN: 20 OINTMENT TOPICAL at 10:57

## 2021-09-22 RX ADMIN — BUPROPION HYDROCHLORIDE 100 MG: 100 TABLET ORAL at 08:23

## 2021-09-22 RX ADMIN — PILOCARPINE HYDROCHLORIDE 5 MG: 5 TABLET, FILM COATED ORAL at 08:24

## 2021-09-22 RX ADMIN — HYDROXYCHLOROQUINE SULFATE 200 MG: 200 TABLET, FILM COATED ORAL at 16:05

## 2021-09-22 RX ADMIN — PILOCARPINE HYDROCHLORIDE 5 MG: 5 TABLET, FILM COATED ORAL at 16:04

## 2021-09-22 RX ADMIN — FERROUS SULFATE TAB 325 MG (65 MG ELEMENTAL FE) 325 MG: 325 (65 FE) TAB at 08:23

## 2021-09-22 RX ADMIN — FOLIC ACID 1 MG: 1 TABLET ORAL at 08:24

## 2021-09-22 RX ADMIN — BENZONATATE 100 MG: 100 CAPSULE ORAL at 18:57

## 2021-09-22 RX ADMIN — OXYCODONE HYDROCHLORIDE AND ACETAMINOPHEN 250 MG: 500 TABLET ORAL at 08:28

## 2021-09-22 RX ADMIN — GUAIFENESIN 600 MG: 600 TABLET, EXTENDED RELEASE ORAL at 08:23

## 2021-09-22 RX ADMIN — TEMAZEPAM 60 MG: 15 CAPSULE ORAL at 21:31

## 2021-09-22 RX ADMIN — OXYCODONE HYDROCHLORIDE AND ACETAMINOPHEN 250 MG: 500 TABLET ORAL at 18:57

## 2021-09-22 RX ADMIN — ENOXAPARIN SODIUM 30 MG: 30 INJECTION SUBCUTANEOUS at 08:24

## 2021-09-22 RX ADMIN — LEVOTHYROXINE SODIUM 50 MCG: 50 TABLET ORAL at 05:05

## 2021-09-22 RX ADMIN — BUPROPION HYDROCHLORIDE 100 MG: 100 TABLET ORAL at 16:04

## 2021-09-22 RX ADMIN — PILOCARPINE HYDROCHLORIDE 5 MG: 5 TABLET, FILM COATED ORAL at 21:31

## 2021-09-22 NOTE — PROGRESS NOTES
[facility]  Progress Note - Ofelia Carter 1950, 73 y o  female MRN: [id number]  Unit/Bed#: S -01 Encounter: [id number]  Primary Care Provider: Wes Gallardo MD   Date and time admitted to hospital: 9/19/2021 10:54 AM    * Severe sepsis [facility])  Assessment & Plan  · met criteria on admission with high fever pre-hospital, tachypnea, hypotension, leukocytosis  Also with acute kidney injury and hypoxia  Chest x-ray on admission consistent with pneumonia  Clinically improving  · Trend procal which was noted to be elevated to 4 04  · Source felt to be streptococcal pneumonia  · Also found to have 2/2 positive blood cultures (though they may have been drawn from the same site according to the patient)--consistent with streptococcal bacteremia in the setting of pneumonia  · Continue ceftriaxone at a higher dose of 2 g daily likely until the end of the week  Based on sensitivities may be able to prescribe high-dose oral amoxicillin  · Repeat blood cultures in process  · Appreciate Infectious Disease eval  · 2D echocardiogram stable without any vegetation    Pneumonia due to Streptococcus [facility])  Assessment & Plan  · As above  · chest x-ray read as COVID pneumonia however patient is fully vaccinated and tested negative for COVID twice and positive for strep  · Continue supportive care with cough medication  · Noted strep antigen  positive--continue ceftriaxone day 4    · Following acute illness will need update in Pneumovax      Anemia  Assessment & Plan  · Patient with chronic baseline anemia  Significant drop in hemoglobin to 6 6 on 9/21/21--received transfusion of 1 unit of blood  Currently hemoglobin 7 6 with symptomatic improvement noted  · Noted multiple abnormalities on anemia panel including B12 deficiency and low levels of iron and folate    Would replete all of the above; note that patient has history of gastric bypass and was not appropriately receiving supplements pre-hospital    Petechial rash  Assessment & Plan  · Appreciate formal dermatology consultation --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  This is due to her Sjogren's and does not require treatment  Additional studies to be ordered for confirmation      Acute respiratory failure with hypoxia ([facility] )  Assessment & Plan  · secondary to pneumonia, monitor O2 sats  Also further decompensated by significant anemia  Avoid volume overload  Noted that patient appeared much less dyspnea today      Acute kidney injury superimposed on chronic kidney disease [facility])  Assessment & Plan  Lab Results   Component Value Date    EGFR 55 09/22/2021    EGFR 53 09/21/2021    EGFR 44 09/20/2021    CREATININE 1 04 09/22/2021    CREATININE 1 07 09/21/2021    CREATININE 1 26 09/20/2021   · Patient was CKD 3 prior to hospitalization with creatinine 1-1 2, presented with ANNIE likely in the setting of sepsis with hypotension with creatinine 1 87  Now improved back to baseline to 1 04  · Avoid hypotension and nephrotoxic agents  Remains stable off IV fluids    Systemic lupus erythematosus ([facility] )  Assessment & Plan  · Follows with rheumatology-- on hydroxychloroquine    Hyponatremia  Assessment & Plan  · Present on admission due to pneumonia and dehydration--resolved    Sjogren's syndrome ([facility] )  Assessment & Plan  · Follows with rheumatology, cont pilocarpine    VTE Pharmacologic Prophylaxis:   Pharmacologic: Enoxaparin (Lovenox)  Mechanical VTE Prophylaxis in Place: Yes    Patient Centered Rounds: spoke with RN    Discussions with Specialists or Other Care Team Provider: rounded with infectious disease resident, spoke with case mgmt    Education and Discussions with Family / Patient: called patient's     Time Spent for Care: 30 minutes  More than 50% of total time spent on counseling and coordination of care as described above      Current Length of Stay: 3 day(s)    Current Patient Status: Inpatient   Certification Statement: The patient will continue to require additional inpatient hospital stay due to ongoing IV antbx pending repeat blood cultures monitor O2 levels    Discharge Plan: likely home Friday/saturday    Code Status: Level 1 - Full Code    Subjective:   Reports less labored breathing at rest and with exertion  Overall is reporting about 25% improvement today compared to yesterday and remains hopeful that she can go home on Friday  Denies any black or bloody stools  Not having any diarrhea except in fact when patient was trying to induce diarrhea with certain dietary choices in order to provide the stool sample that we requested  I notified her this was unnecessary  She admits to improvement in the rash in her legs but is concerned that it is lasting so much longer than usual     Objective:     Vitals:   Temp (24hrs), Av 2 °F (36 8 °C), Min:97 7 °F (36 5 °C), Max:98 5 °F (36 9 °C)    Temp:  [97 7 °F (36 5 °C)-98 5 °F (36 9 °C)] 97 7 °F (36 5 °C)  HR:  [72-87] 72  Resp:  [16-18] 18  BP: (136-167)/(65-84) 136/65  SpO2:  [94 %-98 %] 94 %  Body mass index is 30 62 kg/m²  Input and Output Summary (last 24 hours): Intake/Output Summary (Last 24 hours) at 2021 0941  Last data filed at 2021 1900  Gross per 24 hour   Intake 120 ml   Output --   Net 120 ml       Physical Exam:     Physical Exam  Vitals reviewed  Constitutional:       General: She is not in acute distress  Appearance: She is obese  She is not ill-appearing, toxic-appearing or diaphoretic  HENT:      Head: Normocephalic  Nose: No congestion or rhinorrhea  Eyes:      General: No scleral icterus  Right eye: No discharge  Left eye: No discharge  Conjunctiva/sclera: Conjunctivae normal    Cardiovascular:      Rate and Rhythm: Normal rate and regular rhythm  Heart sounds: No murmur heard  Pulmonary:      Effort: No respiratory distress  Breath sounds: No stridor  No wheezing  Comments: Still with mild rhonchi but improved at the bases  Appears less dyspneic during conversation  Noted to have some discomfort when taking a deep breath and still some ongoing cough  Nasal cannula oxygen in place  Abdominal:      General: Bowel sounds are normal  There is no distension  Palpations: Abdomen is soft  Tenderness: There is no abdominal tenderness  There is no guarding  Musculoskeletal:      Right lower leg: No edema  Left lower leg: No edema  Skin:     General: Skin is dry  Coloration: Skin is not jaundiced or pale  Findings: Rash present  No bruising or lesion  Comments: Still slightly pale but much improved  Petechial rash on left greater than right lower extremity is still evident but less intense   Neurological:      General: No focal deficit present  Mental Status: She is alert  Mental status is at baseline  Psychiatric:         Mood and Affect: Mood normal          Behavior: Behavior normal          Thought Content:  Thought content normal          Judgment: Judgment normal          Additional Data:     Labs:    Results from last 7 days   Lab Units 09/22/21  0502   WBC Thousand/uL 13 50*   HEMOGLOBIN g/dL 7 6*   HEMATOCRIT % 24 7*   PLATELETS Thousands/uL 244   BANDS PCT % 5   LYMPHO PCT % 28   MONO PCT % 5   EOS PCT % 2     Results from last 7 days   Lab Units 09/22/21  0502 09/19/21  1132   SODIUM mmol/L 139 131*   POTASSIUM mmol/L 3 5 4 4   CHLORIDE mmol/L 110* 102   CO2 mmol/L 21 20*   BUN mg/dL 12 30*   CREATININE mg/dL 1 04 1 87*   ANION GAP mmol/L 8 9   CALCIUM mg/dL 7 4* 7 6*   ALBUMIN g/dL  --  2 9*   TOTAL BILIRUBIN mg/dL  --  0 88   ALK PHOS U/L  --  97   ALT U/L  --  16   AST U/L  --  29   GLUCOSE RANDOM mg/dL 71 95     Results from last 7 days   Lab Units 09/19/21  1132   INR  1 26*             Results from last 7 days   Lab Units 09/21/21  0451 09/20/21  0615 09/19/21  1132   LACTIC ACID mmol/L  --   --  1 9   PROCALCITONIN ng/ml 4 04* 5 34* 2 55*       * I Have Reviewed All Lab Data Listed Above  * Additional Pertinent Lab Tests Reviewed: [facility] Admission Reviewed    Imaging:    Imaging Reports Reviewed Today Include:   Imaging Personally Reviewed by Myself Includes:      Recent Cultures (last 7 days):     Results from last 7 days   Lab Units 09/21/21  0511 09/21/21  0506 09/20/21  1404 09/19/21  1929 09/19/21  1132   BLOOD CULTURE  Received in Microbiology Lab  Culture in Progress  Received in Microbiology Lab  Culture in Progress    --   --  Streptococcus pneumoniae*  Streptococcus pneumoniae*   GRAM STAIN RESULT   --   --   --   --  Gram positive cocci in pairs and chains*  Gram positive cocci in pairs and chains*   LEGIONELLA URINARY ANTIGEN   --   --   --  Negative  --    C DIFF TOXIN B BY PCR   --   --  Negative  --   --        Last 24 Hours Medication List:   Current Facility-Administered Medications   Medication Dose Route Frequency Provider Last Rate    acetaminophen  650 mg Oral Q6H PRN Marly Padilla DO      ascorbic acid  250 mg Oral BID Gilda Jasmine PA-C      benzonatate  100 mg Oral TID PRN Ethel Merino PA-C      buPROPion  100 mg Oral TID Marly Padilla DO      cefTRIAXone  2,000 mg Intravenous Q24H Maycol Pizarro MD 2,000 mg (09/21/21 1235)    cyanocobalamin  1,000 mcg Intramuscular Daily Gilda Jasmine PA-C      enoxaparin  30 mg Subcutaneous Daily Marly Padilla DO      ferrous sulfate  325 mg Oral BID With Meals Gilda Jasmine PA-C      folic acid  1 mg Oral Daily Gilda Jasmine PA-C      guaiFENesin  600 mg Oral Q12H [address] Marly Padilla DO      hydroxychloroquine  200 mg Oral BID With Meals Marly Padilla DO      ipratropium-albuterol  3 mL Nebulization Q4H PRN Marly Padilla DO      levothyroxine  50 mcg Oral Early Morning Marly Padilla DO      mupirocin   Topical Daily Colan Evi, DO      ondansetron  4 mg Intravenous Q6H PRN Karma Helm Starsinic, DO      pilocarpine  5 mg Oral TID Karma Helm Starsinic, DO      QUEtiapine  350 mg Oral HS Karma Helm Starsinic, DO      sodium chloride (PF)  3 mL Intravenous Q1H PRN Minda Collins, DO      temazepam  60 mg Oral HS PRN Karma Helm Starsinic, DO          Today, Patient Was Seen By: Cori Gosselin, PA-C    ** Please Note: Dictation voice to text software may have been used in the creation of this document   **

## 2021-09-22 NOTE — PROGRESS NOTES
Progress Note - Infectious Disease   Brandon Steve 73 y o  female MRN: [id number]  Unit/Bed#: S -01 Encounter: [id number]      Impression/Plan:  1  Severe Sepsis POA :  With tachycardia, leukocytosis along with hypotension and ANNIE   Sources #2 and #3   WBC is trending down   Fortunately patient is hemodynamically stable   Currently afebrile   Hypotension has resolved with IV fluid resuscitation  · Continue with high-dose IV ceftriaxone q24h  · Monitor fever/WBC  · Follow final initial and repeat blood cultures          2  Streptococcal pneumonia:  Chest x-ray noted for bibasilar infiltrates   COVID-19 is negative   Urine streptococcal antigen is positive   Urine Legionella is negative   Currently on minimal supplemental oxygen  · Antibiotics as above    · Monitor respiratory status     · Obtain sputum cultures if possible          3  Gram-positive bacteremia as evident by 2 sets which are showing streptococcal pneumonia likely secondary to #2   · Antibiotics as above    · Follow final blood cultures sensitivities then will adjust antibiotics accordingly    · Repeat blood cultures pending  · 2D echocardiogram with no vegetations      · If repeat Blood cultures shows clearance of bactreia and considering TTE with no abnormal valves then will plan for 2 weeks Rx           4  Rash :  Likely vasculitis, Dermatology on board           5  Mild acute hypoxic respiratory failure:  Secondary to #2   She required 3 L of oxygen on admission to maintain SpO2 over 90%  She is requiring 1-3 L supplemental oxygen    · Antibiotics as above  · Monitor respiratory status  · Titrate supplemental oxygen to maintain SpO2 over 89%    · Rest of management per primary team          6  Acute on chronic Anemia :  s/p 1PRBC 9/21  · Workup being done per primary team    · Transfusion per primary team for HGB below 7        7  ANNIE : Likely prerenal secondary to poor oral intake and already improving with IV hydration    · Management per primary team with IV hydration         8  SLE / Sjogren's syndrome    · On Plaquenil and Pilocarpine   · Outpatient rheumatology follow up        Discussed with Vanessa Rey from primary team   ID service will continue to follow      Antibiotics:  Ceftriaxone 4    Subjective:  Patient reports significant improvement of her SOB and cough  She has no fever, chills, sweats; no nausea, vomiting, diarrhea  no pain  No new symptoms  Objective:  Vitals:  Temp:  [97 7 °F (36 5 °C)-98 5 °F (36 9 °C)] 97 7 °F (36 5 °C)  HR:  [72-87] 72  Resp:  [16-18] 18  BP: (130-167)/(60-84) 136/65  SpO2:  [93 %-98 %] 94 %  Temp (24hrs), Av 2 °F (36 8 °C), Min:97 7 °F (36 5 °C), Max:98 5 °F (36 9 °C)  Current: Temperature: 97 7 °F (36 5 °C)    Physical Exam:   General Appearance:  Alert, interactive, nontoxic, no acute distress  Throat: Oropharynx moist without lesions  Lungs:   Decreased breath sounds the bases with rhonchi - overall improved; respirations unlabored   Heart:  RRR; no murmur, rub or gallop   Abdomen:   Soft, non-tender, non-distended, positive bowel sounds  Extremities: No clubbing, cyanosis or edema   Skin: No new rashes or lesions  No draining wounds noted         Labs, Imaging, & Other studies:   All pertinent labs and imaging studies were personally reviewed  Results from last 7 days   Lab Units 21  0502 21  1311 21  0451 21  0615   WBC Thousand/uL 13 50*  --  18 82* 25 48*   HEMOGLOBIN g/dL 7 6* 7 8* 6 6* 6 9*   PLATELETS Thousands/uL 244  --  217 219     Results from last 7 days   Lab Units 21  0502 21  0451 21  0615 21  1132   SODIUM mmol/L 139 139 136 131*   POTASSIUM mmol/L 3 5 3 5 4 0 4 4   CHLORIDE mmol/L 110* 110* 110* 102   CO2 mmol/L 21 19* 19* 20*   BUN mg/dL 12 19 28* 30*   CREATININE mg/dL 1 04 1 07 1 26 1 87*   EGFR ml/min/1 73sq m 55 53 44 27   CALCIUM mg/dL 7 4* 7 4* 7 1* 7 6*   AST U/L  --   --   --  29   ALT U/L  --   --   --  16   ALK PHOS U/L  --   --   --  97     Results from last 7 days   Lab Units 09/21/21  0511 09/21/21  0506 09/20/21  1404 09/19/21  1929 09/19/21  1132   BLOOD CULTURE  Received in Microbiology Lab  Culture in Progress  Received in Microbiology Lab  Culture in Progress    --   --  Streptococcus pneumoniae*  Streptococcus pneumoniae*   GRAM STAIN RESULT   --   --   --   --  Gram positive cocci in pairs and chains*  Gram positive cocci in pairs and chains*   LEGIONELLA URINARY ANTIGEN   --   --   --  Negative  --    C DIFF TOXIN B BY PCR   --   --  Negative  --   --      Results from last 7 days   Lab Units 09/21/21  0451 09/20/21  0615 09/19/21  1132   PROCALCITONIN ng/ml 4 04* 5 34* 2 55*     Results from last 7 days   Lab Units 09/19/21  1132   CRP mg/L 121 4*     Results from last 7 days   Lab Units 09/21/21  0451   FERRITIN ng/mL 46

## 2021-09-22 NOTE — ASSESSMENT & PLAN NOTE
· met criteria on admission with high fever pre-hospital, tachypnea, hypotension, leukocytosis  Also with acute kidney injury and hypoxia  Chest x-ray on admission consistent with pneumonia  Clinically improving  · Trend procal which was noted to be elevated to 4 04  · Source felt to be streptococcal pneumonia  · Also found to have 2/2 positive blood cultures (though they may have been drawn from the same site according to the patient)--consistent with streptococcal bacteremia in the setting of pneumonia  · Continue ceftriaxone at a higher dose of 2 g daily likely until the end of the week    Based on sensitivities may be able to prescribe high-dose oral amoxicillin  · Repeat blood cultures in process  · Appreciate Infectious Disease eval  · 2D echocardiogram stable without any vegetation

## 2021-09-22 NOTE — ASSESSMENT & PLAN NOTE
· secondary to pneumonia, monitor O2 sats  Also further decompensated by significant anemia  Avoid volume overload    Noted that patient appeared much less dyspnea today

## 2021-09-22 NOTE — ASSESSMENT & PLAN NOTE
Lab Results   Component Value Date    EGFR 55 09/22/2021    EGFR 53 09/21/2021    EGFR 44 09/20/2021    CREATININE 1 04 09/22/2021    CREATININE 1 07 09/21/2021    CREATININE 1 26 09/20/2021   · Patient was CKD 3 prior to hospitalization with creatinine 1-1 2, presented with ANNIE likely in the setting of sepsis with hypotension with creatinine 1 87  Now improved back to baseline to 1 04  · Avoid hypotension and nephrotoxic agents    Remains stable off IV fluids

## 2021-09-22 NOTE — ASSESSMENT & PLAN NOTE
· As above  · chest x-ray read as COVID pneumonia however patient is fully vaccinated and tested negative for COVID twice and positive for strep  · Continue supportive care with cough medication  · Noted strep antigen  positive--continue ceftriaxone day 4    · Following acute illness will need update in Pneumovax

## 2021-09-22 NOTE — ASSESSMENT & PLAN NOTE
· Patient with chronic baseline anemia  Significant drop in hemoglobin to 6 6 on 9/21/21--received transfusion of 1 unit of blood  Currently hemoglobin 7 6 with symptomatic improvement noted  · Noted multiple abnormalities on anemia panel including B12 deficiency and low levels of iron and folate    Would replete all of the above; note that patient has history of gastric bypass and was not appropriately receiving supplements pre-hospital

## 2021-09-23 PROBLEM — E87.1 HYPONATREMIA: Status: RESOLVED | Noted: 2021-09-21 | Resolved: 2021-09-23

## 2021-09-23 LAB
ALBUMIN SERPL ELPH-MCNC: 2.84 G/DL (ref 3.5–5)
ALBUMIN SERPL ELPH-MCNC: 32.6 % (ref 52–65)
ALPHA1 GLOB SERPL ELPH-MCNC: 0.43 G/DL (ref 0.1–0.4)
ALPHA1 GLOB SERPL ELPH-MCNC: 4.9 % (ref 2.5–5)
ALPHA2 GLOB SERPL ELPH-MCNC: 0.76 G/DL (ref 0.4–1.2)
ALPHA2 GLOB SERPL ELPH-MCNC: 8.7 % (ref 7–13)
BASOPHILS # BLD AUTO: 0.11 THOUSANDS/ΜL (ref 0–0.1)
BASOPHILS NFR BLD AUTO: 1 % (ref 0–1)
BETA GLOB ABNORMAL SERPL ELPH-MCNC: 0.37 G/DL (ref 0.4–0.8)
BETA1 GLOB SERPL ELPH-MCNC: 4.3 % (ref 5–13)
BETA2 GLOB SERPL ELPH-MCNC: 4.5 % (ref 2–8)
BETA2+GAMMA GLOB SERPL ELPH-MCNC: 0.39 G/DL (ref 0.2–0.5)
EOSINOPHIL # BLD AUTO: 0.24 THOUSAND/ΜL (ref 0–0.61)
EOSINOPHIL NFR BLD AUTO: 2 % (ref 0–6)
ERYTHROCYTE [DISTWIDTH] IN BLOOD BY AUTOMATED COUNT: 16.9 % (ref 11.6–15.1)
GAMMA GLOB ABNORMAL SERPL ELPH-MCNC: 3.92 G/DL (ref 0.5–1.6)
GAMMA GLOB SERPL ELPH-MCNC: 45 % (ref 12–22)
HCT VFR BLD AUTO: 25.3 % (ref 34.8–46.1)
HGB BLD-MCNC: 8 G/DL (ref 11.5–15.4)
IGG/ALB SER: 0.48 {RATIO} (ref 1.1–1.8)
IMM GRANULOCYTES # BLD AUTO: 0.3 THOUSAND/UL (ref 0–0.2)
IMM GRANULOCYTES NFR BLD AUTO: 2 % (ref 0–2)
INTERPRETATION UR IFE-IMP: NORMAL
LYMPHOCYTES # BLD AUTO: 6.18 THOUSANDS/ΜL (ref 0.6–4.47)
LYMPHOCYTES NFR BLD AUTO: 49 % (ref 14–44)
MCH RBC QN AUTO: 26.5 PG (ref 26.8–34.3)
MCHC RBC AUTO-ENTMCNC: 31.6 G/DL (ref 31.4–37.4)
MCV RBC AUTO: 84 FL (ref 82–98)
MONOCYTES # BLD AUTO: 0.67 THOUSAND/ΜL (ref 0.17–1.22)
MONOCYTES NFR BLD AUTO: 5 % (ref 4–12)
NEUTROPHILS # BLD AUTO: 5.29 THOUSANDS/ΜL (ref 1.85–7.62)
NEUTS SEG NFR BLD AUTO: 41 % (ref 43–75)
NRBC BLD AUTO-RTO: 0 /100 WBCS
PLATELET # BLD AUTO: 248 THOUSANDS/UL (ref 149–390)
PMV BLD AUTO: 9.6 FL (ref 8.9–12.7)
PROT PATTERN SERPL ELPH-IMP: ABNORMAL
PROT SERPL-MCNC: 8.7 G/DL (ref 6.4–8.2)
RBC # BLD AUTO: 3.02 MILLION/UL (ref 3.81–5.12)
WBC # BLD AUTO: 12.79 THOUSAND/UL (ref 4.31–10.16)

## 2021-09-23 PROCEDURE — 99232 SBSQ HOSP IP/OBS MODERATE 35: CPT | Performed by: PHYSICIAN ASSISTANT

## 2021-09-23 PROCEDURE — 85027 COMPLETE CBC AUTOMATED: CPT | Performed by: PHYSICIAN ASSISTANT

## 2021-09-23 PROCEDURE — NC001 PR NO CHARGE: Performed by: DERMATOLOGY

## 2021-09-23 PROCEDURE — 99233 SBSQ HOSP IP/OBS HIGH 50: CPT | Performed by: INTERNAL MEDICINE

## 2021-09-23 RX ORDER — XYLITOL/YERBA SANTA
5 AEROSOL, SPRAY WITH PUMP (ML) MUCOUS MEMBRANE 4 TIMES DAILY PRN
Status: DISCONTINUED | OUTPATIENT
Start: 2021-09-23 | End: 2021-09-24 | Stop reason: HOSPADM

## 2021-09-23 RX ORDER — FLUTICASONE PROPIONATE 50 MCG
2 SPRAY, SUSPENSION (ML) NASAL DAILY
Status: DISCONTINUED | OUTPATIENT
Start: 2021-09-23 | End: 2021-09-24 | Stop reason: HOSPADM

## 2021-09-23 RX ADMIN — GUAIFENESIN 600 MG: 600 TABLET, EXTENDED RELEASE ORAL at 08:40

## 2021-09-23 RX ADMIN — HYDROXYCHLOROQUINE SULFATE 200 MG: 200 TABLET, FILM COATED ORAL at 16:27

## 2021-09-23 RX ADMIN — PILOCARPINE HYDROCHLORIDE 5 MG: 5 TABLET, FILM COATED ORAL at 21:49

## 2021-09-23 RX ADMIN — LEVOTHYROXINE SODIUM 50 MCG: 50 TABLET ORAL at 05:13

## 2021-09-23 RX ADMIN — FLUTICASONE PROPIONATE 2 SPRAY: 50 SPRAY, METERED NASAL at 12:52

## 2021-09-23 RX ADMIN — OXYCODONE HYDROCHLORIDE AND ACETAMINOPHEN 250 MG: 500 TABLET ORAL at 08:40

## 2021-09-23 RX ADMIN — CYANOCOBALAMIN 1000 MCG: 1000 INJECTION, SOLUTION INTRAMUSCULAR; SUBCUTANEOUS at 08:40

## 2021-09-23 RX ADMIN — PILOCARPINE HYDROCHLORIDE 5 MG: 5 TABLET, FILM COATED ORAL at 08:41

## 2021-09-23 RX ADMIN — CEFTRIAXONE SODIUM 2000 MG: 10 INJECTION, POWDER, FOR SOLUTION INTRAVENOUS at 12:52

## 2021-09-23 RX ADMIN — BUPROPION HYDROCHLORIDE 100 MG: 100 TABLET ORAL at 16:26

## 2021-09-23 RX ADMIN — MUPIROCIN: 20 OINTMENT TOPICAL at 08:41

## 2021-09-23 RX ADMIN — ENOXAPARIN SODIUM 30 MG: 30 INJECTION SUBCUTANEOUS at 08:40

## 2021-09-23 RX ADMIN — TEMAZEPAM 60 MG: 15 CAPSULE ORAL at 21:49

## 2021-09-23 RX ADMIN — QUETIAPINE FUMARATE 350 MG: 100 TABLET ORAL at 21:49

## 2021-09-23 RX ADMIN — FERROUS SULFATE TAB 325 MG (65 MG ELEMENTAL FE) 325 MG: 325 (65 FE) TAB at 16:27

## 2021-09-23 RX ADMIN — FOLIC ACID 1 MG: 1 TABLET ORAL at 08:40

## 2021-09-23 RX ADMIN — OXYCODONE HYDROCHLORIDE AND ACETAMINOPHEN 250 MG: 500 TABLET ORAL at 17:58

## 2021-09-23 RX ADMIN — HYDROXYCHLOROQUINE SULFATE 200 MG: 200 TABLET, FILM COATED ORAL at 08:41

## 2021-09-23 RX ADMIN — BUPROPION HYDROCHLORIDE 100 MG: 100 TABLET ORAL at 08:40

## 2021-09-23 RX ADMIN — FERROUS SULFATE TAB 325 MG (65 MG ELEMENTAL FE) 325 MG: 325 (65 FE) TAB at 08:40

## 2021-09-23 RX ADMIN — BUPROPION HYDROCHLORIDE 100 MG: 100 TABLET ORAL at 21:49

## 2021-09-23 RX ADMIN — PILOCARPINE HYDROCHLORIDE 5 MG: 5 TABLET, FILM COATED ORAL at 16:27

## 2021-09-23 RX ADMIN — GUAIFENESIN 600 MG: 600 TABLET, EXTENDED RELEASE ORAL at 21:49

## 2021-09-23 NOTE — ASSESSMENT & PLAN NOTE
· met criteria on admission with high fever pre-hospital, tachypnea, hypotension, leukocytosis  Also with acute kidney injury and hypoxia  Chest x-ray on admission consistent with pneumonia    Clinically improving  · Trended procal, dropped from 5 34 to 1 70  · Source felt to be streptococcal pneumonia  · Also found to have 2/2 positive blood cultures (though they may have been drawn from the same site according to the patient)--consistent with streptococcal bacteremia in the setting of pneumonia, sensitive to PCN  · Continue ceftriaxone at a higher dose of 2 g IV daily likely until tomorrow, then likely will be able to prescribe high-dose oral amoxicillin at discharge  · Repeat blood cultures negative at 48 hours  · Appreciate Infectious Disease eval  · 2D echocardiogram stable without any vegetation

## 2021-09-23 NOTE — ASSESSMENT & PLAN NOTE
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

## 2021-09-23 NOTE — PLAN OF CARE
Problem: Potential for Falls  Goal: Patient will remain free of falls  Description: INTERVENTIONS:  - Educate patient/family on patient safety including physical limitations  - Instruct patient to call for assistance with activity   - Consult OT/PT to assist with strengthening/mobility   - Keep Call bell within reach  - Keep bed low and locked with side rails adjusted as appropriate  - Keep care items and personal belongings within reach  - Initiate and maintain comfort rounds  - Make Fall Risk Sign visible to staff  - Offer Toileting every  Hours, in advance of need  - Initiate/Maintain alarm  - Obtain necessary fall risk management equipment:   - Apply yellow socks and bracelet for high fall risk patients  - Consider moving patient to room near nurses station  Outcome: Progressing     Problem: RESPIRATORY - ADULT  Goal: Achieves optimal ventilation and oxygenation  Description: INTERVENTIONS:  - Assess for changes in respiratory status  - Assess for changes in mentation and behavior  - Position to facilitate oxygenation and minimize respiratory effort  - Oxygen administered by appropriate delivery if ordered  - Initiate smoking cessation education as indicated  - Encourage broncho-pulmonary hygiene including cough, deep breathe, Incentive Spirometry  - Assess the need for suctioning and aspirate as needed  - Assess and instruct to report SOB or any respiratory difficulty  - Respiratory Therapy support as indicated  Outcome: Progressing     Problem: METABOLIC, FLUID AND ELECTROLYTES - ADULT  Goal: Electrolytes maintained within normal limits  Description: INTERVENTIONS:  - Monitor labs and assess patient for signs and symptoms of electrolyte imbalances  - Administer electrolyte replacement as ordered  - Monitor response to electrolyte replacements, including repeat lab results as appropriate  - Instruct patient on fluid and nutrition as appropriate  Outcome: Progressing  Goal: Fluid balance maintained  Description: INTERVENTIONS:  - Monitor labs   - Monitor I/O and WT  - Instruct patient on fluid and nutrition as appropriate  - Assess for signs & symptoms of volume excess or deficit  Outcome: Progressing     Problem: SKIN/TISSUE INTEGRITY - ADULT  Goal: Skin Integrity remains intact(Skin Breakdown Prevention)  Description: Assess:  -Perform Isaac assessment every   -Clean and moisturize skin every   -Inspect skin when repositioning, toileting, and assisting with ADLS  -Assess under medical devices such as  every   -Assess extremities for adequate circulation and sensation     Bed Management:  -Have minimal linens on bed & keep smooth, unwrinkled  -Change linens as needed when moist or perspiring  -Avoid sitting or lying in one position for more than  hours while in bed  -Keep HOB at degrees     Toileting:  -Offer bedside commode  -Assess for incontinence every   -Use incontinent care products after each incontinent episode such as     Activity:  -Mobilize patient  times a day  -Encourage activity and walks on unit  -Encourage or provide ROM exercises   -Turn and reposition patient every  Hours  -Use appropriate equipment to lift or move patient in bed  -Instruct/ Assist with weight shifting every  when out of bed in chair  -Consider limitation of chair time  hour intervals    Skin Care:  -Avoid use of baby powder, tape, friction and shearing, hot water or constrictive clothing  -Relieve pressure over bony prominences using   -Do not massage red bony areas    Next Steps:  -Teach patient strategies to minimize risks such as    -Consider consults to  interdisciplinary teams such as   Outcome: Progressing  Goal: Pressure injury heals and does not worsen  Description: Interventions:  - Implement low air loss mattress or specialty surface (Criteria met)  - Apply silicone foam dressing  - Instruct/assist with weight shifting every  minutes when in chair   - Limit chair time to  hour intervals  - Use special pressure reducing interventions such as  when in chair   - Apply fecal or urinary incontinence containment device   - Perform passive or active ROM every   - Turn and reposition patient & offload bony prominences every hours   - Utilize friction reducing device or surface for transfers   - Consider consults to  interdisciplinary teams such as   - Use incontinent care products after each incontinent episode such as   - Consider nutrition services referral as needed  Outcome: Progressing     Problem: INFECTION - ADULT  Goal: Absence or prevention of progression during hospitalization  Description: INTERVENTIONS:  - Assess and monitor for signs and symptoms of infection  - Monitor lab/diagnostic results  - Monitor all insertion sites, i e  indwelling lines, tubes, and drains  - Monitor endotracheal if appropriate and nasal secretions for changes in amount and color  - [place] appropriate cooling/warming therapies per order  - Administer medications as ordered  - Instruct and encourage patient and family to use good hand hygiene technique  - Identify and instruct in appropriate isolation precautions for identified infection/condition  Outcome: Progressing     Problem: SAFETY ADULT  Goal: Patient will remain free of falls  Description: INTERVENTIONS:  - Educate patient/family on patient safety including physical limitations  - Instruct patient to call for assistance with activity   - Consult OT/PT to assist with strengthening/mobility   - Keep Call bell within reach  - Keep bed low and locked with side rails adjusted as appropriate  - Keep care items and personal belongings within reach  - Initiate and maintain comfort rounds  - Make Fall Risk Sign visible to staff  - Offer Toileting every  Hours, in advance of need  - Initiate/Maintain alarm  - Obtain necessary fall risk management equipment:   - Apply yellow socks and bracelet for high fall risk patients  - Consider moving patient to room near nurses station  Outcome: Progressing  Goal: Maintain or return to baseline ADL function  Description: INTERVENTIONS:  -  Assess patient's ability to carry out ADLs; assess patient's baseline for ADL function and identify physical deficits which impact ability to perform ADLs (bathing, care of mouth/teeth, toileting, grooming, dressing, etc )  - Assess/evaluate cause of self-care deficits   - Assess range of motion  - Assess patient's mobility; develop plan if impaired  - Assess patient's need for assistive devices and provide as appropriate  - Encourage maximum independence but intervene and supervise when necessary  - Involve family in performance of ADLs  - Assess for home care needs following discharge   - Consider OT consult to assist with ADL evaluation and planning for discharge  - Provide patient education as appropriate  Outcome: Progressing  Goal: Maintains/Returns to pre admission functional level  Description: INTERVENTIONS:  - Perform BMAT or MOVE assessment daily    - Set and communicate daily mobility goal to care team and patient/family/caregiver  - Collaborate with rehabilitation services on mobility goals if consulted  - Perform Range of Motion  times a day  - Reposition patient every hours    - Dangle patient  times a day  - Stand patient  times a day  - Ambulate patient  times a day  - Out of bed to chair  times a day   - Out of bed for meals  times a day  - Out of bed for toileting  - Record patient progress and toleration of activity level   Outcome: Progressing     Problem: DISCHARGE PLANNING  Goal: Discharge to home or other facility with appropriate resources  Description: INTERVENTIONS:  - Identify barriers to discharge w/patient and caregiver  - Arrange for needed discharge resources and transportation as appropriate  - Identify discharge learning needs (meds, wound care, etc )  - Arrange for interpretive services to assist at discharge as needed  - Refer to Case Management Department for coordinating discharge planning if the patient needs post-hospital services based on physician/advanced practitioner order or complex needs related to functional status, cognitive ability, or social support system  Outcome: Progressing     Problem: Knowledge Deficit  Goal: Patient/family/caregiver demonstrates understanding of disease process, treatment plan, medications, and discharge instructions  Description: Complete learning assessment and assess knowledge base    Interventions:  - Provide teaching at level of understanding  - Provide teaching via preferred learning methods  Outcome: Progressing     Problem: Prexisting or High Potential for Compromised Skin Integrity  Goal: Skin integrity is maintained or improved  Description: INTERVENTIONS:  - Identify patients at risk for skin breakdown  - Assess and monitor skin integrity  - Assess and monitor nutrition and hydration status  - Monitor labs   - Assess for incontinence   - Turn and reposition patient  - Assist with mobility/ambulation  - Relieve pressure over bony prominences  - Avoid friction and shearing  - Provide appropriate hygiene as needed including keeping skin clean and dry  - Evaluate need for skin moisturizer/barrier cream  - Collaborate with interdisciplinary team   - Patient/family teaching  - Consider wound care consult   Outcome: Progressing

## 2021-09-23 NOTE — ASSESSMENT & PLAN NOTE
· secondary to pneumonia, monitor O2 sats  Also further decompensated by significant anemia    Check room air sats and d/c O2 if able (suspect this is causing dry sore throat and post nasal drip)

## 2021-09-23 NOTE — ASSESSMENT & PLAN NOTE
· As above  · chest x-ray read as COVID pneumonia however patient is fully vaccinated and tested negative for COVID twice and positive for strep  · Continue supportive care with cough medication  · Noted strep antigen positive--continue ceftriaxone day 5    · Following acute illness will need update in Pneumovax

## 2021-09-23 NOTE — PLAN OF CARE
Problem: Potential for Falls  Goal: Patient will remain free of falls  Description: INTERVENTIONS:  - Educate patient/family on patient safety including physical limitations  - Instruct patient to call for assistance with activity   - Consult OT/PT to assist with strengthening/mobility   - Keep Call bell within reach  - Keep bed low and locked with side rails adjusted as appropriate  - Keep care items and personal belongings within reach  - Initiate and maintain comfort rounds  - Make Fall Risk Sign visible to staff  - Offer Toileting every  Hours, in advance of need  - Initiate/Maintain alarm  - Obtain necessary fall risk management equipment:   - Apply yellow socks and bracelet for high fall risk patients  - Consider moving patient to room near nurses station  Outcome: Progressing     Problem: RESPIRATORY - ADULT  Goal: Achieves optimal ventilation and oxygenation  Description: INTERVENTIONS:  - Assess for changes in respiratory status  - Assess for changes in mentation and behavior  - Position to facilitate oxygenation and minimize respiratory effort  - Oxygen administered by appropriate delivery if ordered  - Initiate smoking cessation education as indicated  - Encourage broncho-pulmonary hygiene including cough, deep breathe, Incentive Spirometry  - Assess the need for suctioning and aspirate as needed  - Assess and instruct to report SOB or any respiratory difficulty  - Respiratory Therapy support as indicated  Outcome: Progressing     Problem: METABOLIC, FLUID AND ELECTROLYTES - ADULT  Goal: Electrolytes maintained within normal limits  Description: INTERVENTIONS:  - Monitor labs and assess patient for signs and symptoms of electrolyte imbalances  - Administer electrolyte replacement as ordered  - Monitor response to electrolyte replacements, including repeat lab results as appropriate  - Instruct patient on fluid and nutrition as appropriate  Outcome: Progressing  Goal: Fluid balance maintained  Description: INTERVENTIONS:  - Monitor labs   - Monitor I/O and WT  - Instruct patient on fluid and nutrition as appropriate  - Assess for signs & symptoms of volume excess or deficit  Outcome: Progressing     Problem: SKIN/TISSUE INTEGRITY - ADULT  Goal: Skin Integrity remains intact(Skin Breakdown Prevention)  Description: Assess:  -Perform Isaac assessment every   -Clean and moisturize skin every   -Inspect skin when repositioning, toileting, and assisting with ADLS  -Assess under medical devices such as  every   -Assess extremities for adequate circulation and sensation     Bed Management:  -Have minimal linens on bed & keep smooth, unwrinkled  -Change linens as needed when moist or perspiring  -Avoid sitting or lying in one position for more than  hours while in bed  -Keep HOB at degrees     Toileting:  -Offer bedside commode  -Assess for incontinence every   -Use incontinent care products after each incontinent episode such as     Activity:  -Mobilize patient  times a day  -Encourage activity and walks on unit  -Encourage or provide ROM exercises   -Turn and reposition patient every  Hours  -Use appropriate equipment to lift or move patient in bed  -Instruct/ Assist with weight shifting every  when out of bed in chair  -Consider limitation of chair time  hour intervals    Skin Care:  -Avoid use of baby powder, tape, friction and shearing, hot water or constrictive clothing  -Relieve pressure over bony prominences using   -Do not massage red bony areas    Next Steps:  -Teach patient strategies to minimize risks such as    -Consider consults to  interdisciplinary teams such as   Outcome: Progressing  Goal: Pressure injury heals and does not worsen  Description: Interventions:  - Implement low air loss mattress or specialty surface (Criteria met)  - Apply silicone foam dressing  - Instruct/assist with weight shifting every  minutes when in chair   - Limit chair time to  hour intervals  - Use special pressure reducing interventions such as  when in chair   - Apply fecal or urinary incontinence containment device   - Perform passive or active ROM every   - Turn and reposition patient & offload bony prominences every  hours   - Utilize friction reducing device or surface for transfers   - Consider consults to  interdisciplinary teams such as   - Use incontinent care products after each incontinent episode such as   - Consider nutrition services referral as needed  Outcome: Progressing     Problem: INFECTION - ADULT  Goal: Absence or prevention of progression during hospitalization  Description: INTERVENTIONS:  - Assess and monitor for signs and symptoms of infection  - Monitor lab/diagnostic results  - Monitor all insertion sites, i e  indwelling lines, tubes, and drains  - Monitor endotracheal if appropriate and nasal secretions for changes in amount and color  - [place] appropriate cooling/warming therapies per order  - Administer medications as ordered  - Instruct and encourage patient and family to use good hand hygiene technique  - Identify and instruct in appropriate isolation precautions for identified infection/condition  Outcome: Progressing     Problem: SAFETY ADULT  Goal: Patient will remain free of falls  Description: INTERVENTIONS:  - Educate patient/family on patient safety including physical limitations  - Instruct patient to call for assistance with activity   - Consult OT/PT to assist with strengthening/mobility   - Keep Call bell within reach  - Keep bed low and locked with side rails adjusted as appropriate  - Keep care items and personal belongings within reach  - Initiate and maintain comfort rounds  - Make Fall Risk Sign visible to staff  - Offer Toileting every  Hours, in advance of need  - Initiate/Maintain alarm  - Obtain necessary fall risk management equipment:   - Apply yellow socks and bracelet for high fall risk patients  - Consider moving patient to room near nurses station  Outcome: Progressing  Goal: Maintain or return to baseline ADL function  Description: INTERVENTIONS:  -  Assess patient's ability to carry out ADLs; assess patient's baseline for ADL function and identify physical deficits which impact ability to perform ADLs (bathing, care of mouth/teeth, toileting, grooming, dressing, etc )  - Assess/evaluate cause of self-care deficits   - Assess range of motion  - Assess patient's mobility; develop plan if impaired  - Assess patient's need for assistive devices and provide as appropriate  - Encourage maximum independence but intervene and supervise when necessary  - Involve family in performance of ADLs  - Assess for home care needs following discharge   - Consider OT consult to assist with ADL evaluation and planning for discharge  - Provide patient education as appropriate  Outcome: Progressing  Goal: Maintains/Returns to pre admission functional level  Description: INTERVENTIONS:  - Perform BMAT or MOVE assessment daily    - Set and communicate daily mobility goal to care team and patient/family/caregiver  - Collaborate with rehabilitation services on mobility goals if consulted  - Perform Range of Motion  times a day  - Reposition patient every  hours    - Dangle patient  times a day  - Stand patient  times a day  - Ambulate patient  times a day  - Out of bed to chair  times a day   - Out of bed for meals times a day  - Out of bed for toileting  - Record patient progress and toleration of activity level   Outcome: Progressing     Problem: DISCHARGE PLANNING  Goal: Discharge to home or other facility with appropriate resources  Description: INTERVENTIONS:  - Identify barriers to discharge w/patient and caregiver  - Arrange for needed discharge resources and transportation as appropriate  - Identify discharge learning needs (meds, wound care, etc )  - Arrange for interpretive services to assist at discharge as needed  - Refer to Case Management Department for coordinating discharge planning if the patient needs post-hospital services based on physician/advanced practitioner order or complex needs related to functional status, cognitive ability, or social support system  Outcome: Progressing     Problem: Knowledge Deficit  Goal: Patient/family/caregiver demonstrates understanding of disease process, treatment plan, medications, and discharge instructions  Description: Complete learning assessment and assess knowledge base    Interventions:  - Provide teaching at level of understanding  - Provide teaching via preferred learning methods  Outcome: Progressing     Problem: Prexisting or High Potential for Compromised Skin Integrity  Goal: Skin integrity is maintained or improved  Description: INTERVENTIONS:  - Identify patients at risk for skin breakdown  - Assess and monitor skin integrity  - Assess and monitor nutrition and hydration status  - Monitor labs   - Assess for incontinence   - Turn and reposition patient  - Assist with mobility/ambulation  - Relieve pressure over bony prominences  - Avoid friction and shearing  - Provide appropriate hygiene as needed including keeping skin clean and dry  - Evaluate need for skin moisturizer/barrier cream  - Collaborate with interdisciplinary team   - Patient/family teaching  - Consider wound care consult   Outcome: Progressing

## 2021-09-23 NOTE — PLAN OF CARE
Problem: Potential for Falls  Goal: Patient will remain free of falls  Description: INTERVENTIONS:  - Educate patient/family on patient safety including physical limitations  - Instruct patient to call for assistance with activity   - Consult OT/PT to assist with strengthening/mobility   - Keep Call bell within reach  - Keep bed low and locked with side rails adjusted as appropriate  - Keep care items and personal belongings within reach  - Initiate and maintain comfort rounds  - Make Fall Risk Sign visible to staff  - Offer Toileting every  Hours, in advance of need  - Initiate/Maintain alarm  - Obtain necessary fall risk management equipment:   - Apply yellow socks and bracelet for high fall risk patients  - Consider moving patient to room near nurses station  Outcome: Progressing     Problem: RESPIRATORY - ADULT  Goal: Achieves optimal ventilation and oxygenation  Description: INTERVENTIONS:  - Assess for changes in respiratory status  - Assess for changes in mentation and behavior  - Position to facilitate oxygenation and minimize respiratory effort  - Oxygen administered by appropriate delivery if ordered  - Initiate smoking cessation education as indicated  - Encourage broncho-pulmonary hygiene including cough, deep breathe, Incentive Spirometry  - Assess the need for suctioning and aspirate as needed  - Assess and instruct to report SOB or any respiratory difficulty  - Respiratory Therapy support as indicated  Outcome: Progressing     Problem: METABOLIC, FLUID AND ELECTROLYTES - ADULT  Goal: Electrolytes maintained within normal limits  Description: INTERVENTIONS:  - Monitor labs and assess patient for signs and symptoms of electrolyte imbalances  - Administer electrolyte replacement as ordered  - Monitor response to electrolyte replacements, including repeat lab results as appropriate  - Instruct patient on fluid and nutrition as appropriate  Outcome: Progressing  Goal: Fluid balance maintained  Description: INTERVENTIONS:  - Monitor labs   - Monitor I/O and WT  - Instruct patient on fluid and nutrition as appropriate  - Assess for signs & symptoms of volume excess or deficit  Outcome: Progressing     Problem: SKIN/TISSUE INTEGRITY - ADULT  Goal: Skin Integrity remains intact(Skin Breakdown Prevention)  Description: Assess:  -Perform Isaac assessment every   -Clean and moisturize skin every   -Inspect skin when repositioning, toileting, and assisting with ADLS  -Assess under medical devices such as  every   -Assess extremities for adequate circulation and sensation     Bed Management:  -Have minimal linens on bed & keep smooth, unwrinkled  -Change linens as needed when moist or perspiring  -Avoid sitting or lying in one position for more than  hours while in bed  -Keep HOB at degrees     Toileting:  -Offer bedside commode  -Assess for incontinence every   -Use incontinent care products after each incontinent episode such as     Activity:  -Mobilize patient  times a day  -Encourage activity and walks on unit  -Encourage or provide ROM exercises   -Turn and reposition patient every Hours  -Use appropriate equipment to lift or move patient in bed  -Instruct/ Assist with weight shifting every  when out of bed in chair  -Consider limitation of chair time  hour intervals    Skin Care:  -Avoid use of baby powder, tape, friction and shearing, hot water or constrictive clothing  -Relieve pressure over bony prominences using Do not massage red bony areas    Next Steps:  -Teach patient strategies to minimize risks such as    -Consider consults to  interdisciplinary teams such as   Outcome: Progressing  Goal: Pressure injury heals and does not worsen  Description: Interventions:  - Implement low air loss mattress or specialty surface (Criteria met)  - Apply silicone foam dressing  - Instruct/assist with weight shifting every  minutes when in chair   - Limit chair time to  hour intervals  - Use special pressure reducing interventions such as  when in chair   - Apply fecal or urinary incontinence containment device   - Perform passive or active ROM every   - Turn and reposition patient & offload bony prominences every  hours   - Utilize friction reducing device or surface for transfers   - Consider consults to  interdisciplinary teams such as   - Use incontinent care products after each incontinent episode such as   - Consider nutrition services referral as needed  Outcome: Progressing     Problem: INFECTION - ADULT  Goal: Absence or prevention of progression during hospitalization  Description: INTERVENTIONS:  - Assess and monitor for signs and symptoms of infection  - Monitor lab/diagnostic results  - Monitor all insertion sites, i e  indwelling lines, tubes, and drains  - Monitor endotracheal if appropriate and nasal secretions for changes in amount and color  - [place] appropriate cooling/warming therapies per order  - Administer medications as ordered  - Instruct and encourage patient and family to use good hand hygiene technique  - Identify and instruct in appropriate isolation precautions for identified infection/condition  Outcome: Progressing     Problem: SAFETY ADULT  Goal: Patient will remain free of falls  Description: INTERVENTIONS:  - Educate patient/family on patient safety including physical limitations  - Instruct patient to call for assistance with activity   - Consult OT/PT to assist with strengthening/mobility   - Keep Call bell within reach  - Keep bed low and locked with side rails adjusted as appropriate  - Keep care items and personal belongings within reach  - Initiate and maintain comfort rounds  - Make Fall Risk Sign visible to staff  - Offer Toileting every  Hours, in advance of need  - Initiate/Maintain alarm  - Obtain necessary fall risk management equipment:   - Apply yellow socks and bracelet for high fall risk patients  - Consider moving patient to room near nurses station  Outcome: Progressing  Goal: Maintain or return to baseline ADL function  Description: INTERVENTIONS:  -  Assess patient's ability to carry out ADLs; assess patient's baseline for ADL function and identify physical deficits which impact ability to perform ADLs (bathing, care of mouth/teeth, toileting, grooming, dressing, etc )  - Assess/evaluate cause of self-care deficits   - Assess range of motion  - Assess patient's mobility; develop plan if impaired  - Assess patient's need for assistive devices and provide as appropriate  - Encourage maximum independence but intervene and supervise when necessary  - Involve family in performance of ADLs  - Assess for home care needs following discharge   - Consider OT consult to assist with ADL evaluation and planning for discharge  - Provide patient education as appropriate  Outcome: Progressing  Goal: Maintains/Returns to pre admission functional level  Description: INTERVENTIONS:  - Perform BMAT or MOVE assessment daily    - Set and communicate daily mobility goal to care team and patient/family/caregiver  - Collaborate with rehabilitation services on mobility goals if consulted  - Perform Range of Motion  times a day  - Reposition patient every  hours    - Dangle patient  times a day  - Stand patient  times a day  - Ambulate patient  times a day  - Out of bed to chair  times a day   - Out of bed for meals  times a day  - Out of bed for toileting  - Record patient progress and toleration of activity level   Outcome: Progressing     Problem: DISCHARGE PLANNING  Goal: Discharge to home or other facility with appropriate resources  Description: INTERVENTIONS:  - Identify barriers to discharge w/patient and caregiver  - Arrange for needed discharge resources and transportation as appropriate  - Identify discharge learning needs (meds, wound care, etc )  - Arrange for interpretive services to assist at discharge as needed  - Refer to Case Management Department for coordinating discharge planning if the patient needs post-hospital services based on physician/advanced practitioner order or complex needs related to functional status, cognitive ability, or social support system  Outcome: Progressing     Problem: Knowledge Deficit  Goal: Patient/family/caregiver demonstrates understanding of disease process, treatment plan, medications, and discharge instructions  Description: Complete learning assessment and assess knowledge base    Interventions:  - Provide teaching at level of understanding  - Provide teaching via preferred learning methods  Outcome: Progressing     Problem: Prexisting or High Potential for Compromised Skin Integrity  Goal: Skin integrity is maintained or improved  Description: INTERVENTIONS:  - Identify patients at risk for skin breakdown  - Assess and monitor skin integrity  - Assess and monitor nutrition and hydration status  - Monitor labs   - Assess for incontinence   - Turn and reposition patient  - Assist with mobility/ambulation  - Relieve pressure over bony prominences  - Avoid friction and shearing  - Provide appropriate hygiene as needed including keeping skin clean and dry  - Evaluate need for skin moisturizer/barrier cream  - Collaborate with interdisciplinary team   - Patient/family teaching  - Consider wound care consult   Outcome: Progressing

## 2021-09-23 NOTE — PROGRESS NOTES
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

## 2021-09-23 NOTE — PROGRESS NOTES
[facility]  Progress Note - Allie Caldera 1950, 73 y o  female MRN: [id number]  Unit/Bed#: S -01 Encounter: [id number]  Primary Care Provider: Sania Street MD   Date and time admitted to hospital: 9/19/2021 10:54 AM    * Severe sepsis [facility])  Assessment & Plan  · met criteria on admission with high fever pre-hospital, tachypnea, hypotension, leukocytosis  Also with acute kidney injury and hypoxia  Chest x-ray on admission consistent with pneumonia  Clinically improving  · Trended procal, dropped from 5 34 to 1 70  · Source felt to be streptococcal pneumonia  · Also found to have 2/2 positive blood cultures (though they may have been drawn from the same site according to the patient)--consistent with streptococcal bacteremia in the setting of pneumonia, sensitive to PCN  · Continue ceftriaxone at a higher dose of 2 g IV daily likely until tomorrow, then likely will be able to prescribe high-dose oral amoxicillin at discharge  · Repeat blood cultures negative at 48 hours  · Appreciate Infectious Disease eval  · 2D echocardiogram stable without any vegetation    Pneumonia due to Streptococcus [facility])  Assessment & Plan  · As above  · chest x-ray read as COVID pneumonia however patient is fully vaccinated and tested negative for COVID twice and positive for strep  · Continue supportive care with cough medication  · Noted strep antigen positive--continue ceftriaxone day 5    · Following acute illness will need update in Pneumovax      Anemia  Assessment & Plan  · Patient with chronic baseline anemia  Significant drop in hemoglobin to 6 6 on 9/21/21--received transfusion of 1 unit of blood  Currently hemoglobin 8 0 with symptomatic improvement noted  · Noted multiple abnormalities on anemia panel including B12 deficiency and low levels of iron and folate    Would replete all of the above; note that patient has history of gastric bypass and was not appropriately receiving supplements pre-hospital  Will need 1/month b12 injection after dc    Petechial rash  Assessment & Plan  · Appreciate formal dermatology consultation --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  This is due to her Sjogren's and does not require treatment  Additional studies to be ordered for confirmation  · Much improved      Acute respiratory failure with hypoxia ([facility] )  Assessment & Plan  · secondary to pneumonia, monitor O2 sats  Also further decompensated by significant anemia  Check room air sats and d/c O2 if able (suspect this is causing dry sore throat and post nasal drip)      Acute kidney injury superimposed on chronic kidney disease [facility])  Assessment & Plan  Lab Results   Component Value Date    EGFR 55 09/22/2021    EGFR 53 09/21/2021    EGFR 44 09/20/2021    CREATININE 1 04 09/22/2021    CREATININE 1 07 09/21/2021    CREATININE 1 26 09/20/2021   · Patient was CKD 3 prior to hospitalization with creatinine 1-1 2, presented with ANNIE likely in the setting of sepsis with hypotension with creatinine 1 87  Now improved back to baseline to 1 04  · Avoid hypotension and nephrotoxic agents  Remains stable off IV fluids    Systemic lupus erythematosus ([facility] )  Assessment & Plan  · Follows with rheumatology-- on hydroxychloroquine    Hyponatremia-resolved as of 9/23/2021  Assessment & Plan  · Present on admission due to pneumonia and dehydration--resolved    Sjogren's syndrome ([facility] )  Assessment & Plan  · Follows with rheumatology, cont pilocarpine    VTE Pharmacologic Prophylaxis:   Pharmacologic: Enoxaparin (Lovenox)  Mechanical VTE Prophylaxis in Place: No    Patient Centered Rounds: Spoke with nursing    Discussions with Specialists or Other Care Team Provider:  Case management    Education and Discussions with Family / Patient:  Updated patient's  over the phone, all questions and concerns addressed    Time Spent for Care: 30 minutes    More than 50% of total time spent on counseling and coordination of care as described above  Current Length of Stay: 4 day(s)    Current Patient Status: Inpatient   Certification Statement: The patient will continue to require additional inpatient hospital stay due to Ongoing IV antibiotics pending negative repeat blood cultures at 72 hours and also awaiting room air O2 checks    Discharge Plan:  Hopefully discharge home tomorrow if stable off oxygen and able to be converted to oral antibiotics    Code Status: Level 1 - Full Code    Subjective:   Patient says she woke up this morning not feeling as good today as yesterday initially but now as the day has gone on she is doing better  She describes a sore throat and postnasal drip and some phlegm in the morning but this continues to improve as the day goes on  She notes that her rash is better and she is otherwise improving  Would like to shower  Objective:     Vitals:   Temp (24hrs), Av 3 °F (36 8 °C), Min:97 9 °F (36 6 °C), Max:98 7 °F (37 1 °C)    Temp:  [97 9 °F (36 6 °C)-98 7 °F (37 1 °C)] 98 3 °F (36 8 °C)  HR:  [75-87] 75  Resp:  [18] 18  BP: (142-159)/(73-82) 158/73  SpO2:  [94 %-96 %] 94 %  Body mass index is 30 62 kg/m²  Input and Output Summary (last 24 hours): Intake/Output Summary (Last 24 hours) at 2021 1033  Last data filed at 2021 0519  Gross per 24 hour   Intake 480 ml   Output 400 ml   Net 80 ml       Physical Exam:     Physical Exam  Vitals reviewed  Constitutional:       General: She is not in acute distress  Appearance: She is not ill-appearing, toxic-appearing or diaphoretic  Comments: Clinically improving   HENT:      Mouth/Throat:      Pharynx: Oropharynx is clear  No oropharyngeal exudate or posterior oropharyngeal erythema  Comments: Very mild pharyngeal erythema but no exudates  Eyes:      General: No scleral icterus  Right eye: No discharge  Left eye: No discharge        Conjunctiva/sclera: Conjunctivae normal    Cardiovascular: Rate and Rhythm: Normal rate and regular rhythm  Heart sounds: No murmur heard  Pulmonary:      Effort: No respiratory distress  Breath sounds: No stridor  No wheezing or rhonchi  Comments: 2 L nasal cannula oxygen in place  Breath sounds much improved overall  No tachypnea or dyspnea today  Abdominal:      General: Bowel sounds are normal  There is no distension  Palpations: Abdomen is soft  Tenderness: There is no abdominal tenderness  There is no guarding  Musculoskeletal:      Right lower leg: No edema  Left lower leg: No edema  Skin:     Coloration: Skin is not jaundiced or pale  Findings: Rash (Improvement noted in petechial rash which is now lightening significantly, still more notable in left greater than right lower extremity) present  No bruising, erythema or lesion  Neurological:      General: No focal deficit present  Mental Status: She is alert  Mental status is at baseline  Comments: No confusion   Psychiatric:         Mood and Affect: Mood normal          Thought Content:  Thought content normal          Additional Data:     Labs:    Results from last 7 days   Lab Units 09/23/21  0516 09/22/21  0502   WBC Thousand/uL 12 79* 13 50*   HEMOGLOBIN g/dL 8 0* 7 6*   HEMATOCRIT % 25 3* 24 7*   PLATELETS Thousands/uL 248 244   BANDS PCT %  --  5   NEUTROS PCT % 41*  --    LYMPHS PCT % 49*  --    LYMPHO PCT %  --  28   MONOS PCT % 5  --    MONO PCT %  --  5   EOS PCT % 2 2     Results from last 7 days   Lab Units 09/22/21  0502 09/19/21  1132   SODIUM mmol/L 139 131*   POTASSIUM mmol/L 3 5 4 4   CHLORIDE mmol/L 110* 102   CO2 mmol/L 21 20*   BUN mg/dL 12 30*   CREATININE mg/dL 1 04 1 87*   ANION GAP mmol/L 8 9   CALCIUM mg/dL 7 4* 7 6*   ALBUMIN g/dL  --  2 9*   TOTAL BILIRUBIN mg/dL  --  0 88   ALK PHOS U/L  --  97   ALT U/L  --  16   AST U/L  --  29   GLUCOSE RANDOM mg/dL 71 95     Results from last 7 days   Lab Units 09/19/21  1132   INR  1 26* Results from last 7 days   Lab Units 09/22/21  0502 09/21/21  0451 09/20/21  0615 09/19/21  1132   LACTIC ACID mmol/L  --   --   --  1 9   PROCALCITONIN ng/ml 1 70* 4 04* 5 34* 2 55*     * I Have Reviewed All Lab Data Listed Above  * Additional Pertinent Lab Tests Reviewed: [facility] Admission Reviewed    Imaging:    Imaging Reports Reviewed Today Include:   Imaging Personally Reviewed by Myself Includes:      Recent Cultures (last 7 days):     Results from last 7 days   Lab Units 09/21/21  0511 09/21/21  0506 09/20/21  1404 09/19/21  1929 09/19/21  1132   BLOOD CULTURE  No Growth at 48 hrs   No Growth at 48 hrs   --   --  Streptococcus pneumoniae*  Streptococcus pneumoniae*   GRAM STAIN RESULT   --   --   --   --  Gram positive cocci in pairs and chains*  Gram positive cocci in pairs and chains*   LEGIONELLA URINARY ANTIGEN   --   --   --  Negative  --    C DIFF TOXIN B BY PCR   --   --  Negative  --   --        Last 24 Hours Medication List:   Current Facility-Administered Medications   Medication Dose Route Frequency Provider Last Rate    acetaminophen  650 mg Oral Q6H PRN Elburn Duffel Starsinic, DO      ascorbic acid  250 mg Oral BID Gilda Jasmine PA-C      benzonatate  100 mg Oral TID PRN Renee Maravilla PA-C      buPROPion  100 mg Oral TID Elburn Duffel Gosiainic, DO      cefTRIAXone  2,000 mg Intravenous Q24H Maycol Pizarro MD 2,000 mg (09/22/21 1149)    [START ON 9/24/2021] enoxaparin  40 mg Subcutaneous Daily Gilda Jasmine PA-C      ferrous sulfate  325 mg Oral BID With Meals Gilda Jasmine PA-C      fluticasone  2 spray Each Nare Daily Gilda Jasmine PA-C      folic acid  1 mg Oral Daily Gilda Jasmine PA-C      guaiFENesin  600 mg Oral Q12H [facility] Elburn Duffel Starsinic, DO      hydroxychloroquine  200 mg Oral BID With Meals Elburn Duffeallegra Padilla, DO      ipratropium-albuterol  3 mL Nebulization Q4H PRN Elburn Duffel Valerie, DO      levothyroxine  50 mcg Oral Early Morning Marcos Padilla, DO      mupirocin   Topical Daily Mayi Case, DO      ondansetron  4 mg Intravenous Q6H PRN Marcos Padilla, DO      phenol  1 spray Mouth/Throat Q2H PRN Saira Givens PA-C      pilocarpine  5 mg Oral TID Marcos Padilla, DO      QUEtiapine  350 mg Oral HS Marcos Padilla, DO      saliva substitute  5 spray Mouth/Throat 4x Daily PRN Gilda Jasmine PA-C      sodium chloride (PF)  3 mL Intravenous Q1H PRN Carlo Collins, DO      temazepam  60 mg Oral HS PRN Marcos Padilla, DO          Today, Patient Was Seen By: Saira Givens PA-C    ** Please Note: Dictation voice to text software may have been used in the creation of this document   **

## 2021-09-23 NOTE — ASSESSMENT & PLAN NOTE
· Patient with chronic baseline anemia  Significant drop in hemoglobin to 6 6 on 9/21/21--received transfusion of 1 unit of blood  Currently hemoglobin 8 0 with symptomatic improvement noted  · Noted multiple abnormalities on anemia panel including B12 deficiency and low levels of iron and folate    Would replete all of the above; note that patient has history of gastric bypass and was not appropriately receiving supplements pre-hospital  Will need 1/month b12 injection after dc

## 2021-09-23 NOTE — PROGRESS NOTES
Progress Note - Infectious Disease   Stephanie Jeffrey 73 y o  female MRN: [id number]  Unit/Bed#: S -01 Encounter: [id number]      Impression/Plan:  1  Severe Sepsis POA :  With tachycardia, leukocytosis along with hypotension and ANNIE   Sources #2 and #3   WBC is trending down   Fortunately patient is hemodynamically stable   Currently afebrile   Hypotension has resolved with IV fluid resuscitation  · Continue with high-dose IV ceftriaxone q24h  Will likely transition to high dose Amoxicillin tomorrow if blood cultures continues to be negative over 48 hours  · Monitor fever/WBC  · Follow final repeat blood cultures          2  Streptococcal pneumonia:  Chest x-ray noted for bibasilar infiltrates   COVID-19 is negative   Urine streptococcal antigen is positive   Urine Legionella is negative   Currently on minimal supplemental oxygen  · Antibiotics as above    · Monitor respiratory status           3  Gram-positive bacteremia as evident by 2 sets which are showing streptococcal pneumoniae secondary to #2  2D echocardiogram with no vegetations  · Antibiotics as above    · Plan to treat for 14 days total considering rapid clearance of bacteremia and unremarkable 2d Echo         4  Rash :  Likely vasculitis, Dermatology on board          5  Diarrhea : resolved  Negative C  Diff      6  Acute on chronic Anemia :  s/p 1PRBC 9/21  · Workup being done per primary team    · Transfusion per primary team for HGB below 7  7  ANNIE : Likely prerenal secondary to poor oral intake and resolved with IV hydration  · Management per primary team with IV hydration         Discussed with Eva Hickey from primary team   ID service will continue to follow        Antibiotics:  Ceftriaxone 5    Subjective:  Patient reports cough and SOB howevre this has improved since admission  has no fever, chills, sweats; no nausea, vomiting, diarrhea;no pain  No new symptoms      Objective:  Vitals:  Temp:  [97 9 °F (36 6 °C)-98 7 °F (37 1 °C)] 98 7 °F (37 1 °C)  HR:  [83-87] 83  Resp:  [18] 18  BP: (142-159)/(78-82) 159/78  SpO2:  [95 %-96 %] 96 %  Temp (24hrs), Av 3 °F (36 8 °C), Min:97 9 °F (36 6 °C), Max:98 7 °F (37 1 °C)  Current: Temperature: 98 7 °F (37 1 °C)    Physical Exam:   General Appearance:  Alert, interactive, nontoxic, no acute distress  Throat: Oropharynx moist without lesions  Lungs:   Decreased breath sounds b/l on bases which has improved  No additional sounds ; respirations unlabored   Heart:  RRR; no murmur, rub or gallop   Abdomen:   Soft, non-tender, non-distended, positive bowel sounds  Extremities: No clubbing, cyanosis or edema   Skin: Lower extremity rash almost resolved  No draining wounds noted  Labs, Imaging, & Other studies:   All pertinent labs and imaging studies were personally reviewed  Results from last 7 days   Lab Units 21  0516 21  0502 21  1311 21  0451   WBC Thousand/uL 12 79* 13 50*  --  18 82*   HEMOGLOBIN g/dL 8 0* 7 6* 7 8* 6 6*   PLATELETS Thousands/uL 248 244  --  217     Results from last 7 days   Lab Units 21  0502 21  0451 21  0615 21  1132   SODIUM mmol/L 139 139 136 131*   POTASSIUM mmol/L 3 5 3 5 4 0 4 4   CHLORIDE mmol/L 110* 110* 110* 102   CO2 mmol/L 21 19* 19* 20*   BUN mg/dL 12 19 28* 30*   CREATININE mg/dL 1 04 1 07 1 26 1 87*   EGFR ml/min/1 73sq m 55 53 44 27   CALCIUM mg/dL 7 4* 7 4* 7 1* 7 6*   AST U/L  --   --   --  29   ALT U/L  --   --   --  16   ALK PHOS U/L  --   --   --  97     Results from last 7 days   Lab Units 21  0511 21  0506 21  1404 21  1929 21  1132   BLOOD CULTURE  No Growth at 24 hrs   No Growth at 24 hrs   --   --  Streptococcus pneumoniae*  Streptococcus pneumoniae*   GRAM STAIN RESULT   --   --   --   --  Gram positive cocci in pairs and chains*  Gram positive cocci in pairs and chains*   LEGIONELLA URINARY ANTIGEN   --   --   --  Negative  --    C DIFF TOXIN B BY PCR   -- --  Negative  --   --      Results from last 7 days   Lab Units 09/22/21  0502 09/21/21  0451 09/20/21  0615 09/19/21  1132   PROCALCITONIN ng/ml 1 70* 4 04* 5 34* 2 55*     Results from last 7 days   Lab Units 09/19/21  1132   CRP mg/L 121 4*     Results from last 7 days   Lab Units 09/21/21  0451   FERRITIN ng/mL 46

## 2021-09-24 VITALS
HEIGHT: 61 IN | BODY MASS INDEX: 30.59 KG/M2 | DIASTOLIC BLOOD PRESSURE: 85 MMHG | RESPIRATION RATE: 20 BRPM | HEART RATE: 77 BPM | SYSTOLIC BLOOD PRESSURE: 127 MMHG | WEIGHT: 162.04 LBS | OXYGEN SATURATION: 94 % | TEMPERATURE: 98.5 F

## 2021-09-24 PROBLEM — N18.9 ACUTE KIDNEY INJURY SUPERIMPOSED ON CHRONIC KIDNEY DISEASE (HCC): Status: RESOLVED | Noted: 2021-09-20 | Resolved: 2021-09-24

## 2021-09-24 PROBLEM — A41.9 SEVERE SEPSIS (HCC): Status: RESOLVED | Noted: 2021-09-19 | Resolved: 2021-09-24

## 2021-09-24 PROBLEM — R65.20 SEVERE SEPSIS (HCC): Status: RESOLVED | Noted: 2021-09-19 | Resolved: 2021-09-24

## 2021-09-24 PROBLEM — N17.9 ACUTE KIDNEY INJURY SUPERIMPOSED ON CHRONIC KIDNEY DISEASE (HCC): Status: RESOLVED | Noted: 2021-09-20 | Resolved: 2021-09-24

## 2021-09-24 PROBLEM — J96.01 ACUTE RESPIRATORY FAILURE WITH HYPOXIA (HCC): Status: RESOLVED | Noted: 2021-09-19 | Resolved: 2021-09-24

## 2021-09-24 LAB
ANION GAP SERPL CALCULATED.3IONS-SCNC: 11 MMOL/L (ref 4–13)
BASOPHILS # BLD MANUAL: 0 THOUSAND/UL (ref 0–0.1)
BASOPHILS NFR MAR MANUAL: 0 % (ref 0–1)
BUN SERPL-MCNC: 9 MG/DL (ref 5–25)
CALCIUM SERPL-MCNC: 7.9 MG/DL (ref 8.3–10.1)
CHLORIDE SERPL-SCNC: 106 MMOL/L (ref 100–108)
CO2 SERPL-SCNC: 21 MMOL/L (ref 21–32)
CREAT SERPL-MCNC: 1.04 MG/DL (ref 0.6–1.3)
EOSINOPHIL # BLD MANUAL: 0.23 THOUSAND/UL (ref 0–0.4)
EOSINOPHIL NFR BLD MANUAL: 2 % (ref 0–6)
ERYTHROCYTE [DISTWIDTH] IN BLOOD BY AUTOMATED COUNT: 17.2 % (ref 11.6–15.1)
GFR SERPL CREATININE-BSD FRML MDRD: 55 ML/MIN/1.73SQ M
GLUCOSE SERPL-MCNC: 74 MG/DL (ref 65–140)
HCT VFR BLD AUTO: 26 % (ref 34.8–46.1)
HGB BLD-MCNC: 8.3 G/DL (ref 11.5–15.4)
LYMPHOCYTES # BLD AUTO: 4.55 THOUSAND/UL (ref 0.6–4.47)
LYMPHOCYTES # BLD AUTO: 40 % (ref 14–44)
MCH RBC QN AUTO: 26.8 PG (ref 26.8–34.3)
MCHC RBC AUTO-ENTMCNC: 31.9 G/DL (ref 31.4–37.4)
MCV RBC AUTO: 84 FL (ref 82–98)
MONOCYTES # BLD AUTO: 0.57 THOUSAND/UL (ref 0–1.22)
MONOCYTES NFR BLD: 5 % (ref 4–12)
NEUTROPHILS # BLD MANUAL: 6.03 THOUSAND/UL (ref 1.85–7.62)
NEUTS BAND NFR BLD MANUAL: 4 % (ref 0–8)
NEUTS SEG NFR BLD AUTO: 49 % (ref 43–75)
PLATELET # BLD AUTO: 252 THOUSANDS/UL (ref 149–390)
PLATELET BLD QL SMEAR: ADEQUATE
PMV BLD AUTO: 9.3 FL (ref 8.9–12.7)
POTASSIUM SERPL-SCNC: 3.4 MMOL/L (ref 3.5–5.3)
RBC # BLD AUTO: 3.1 MILLION/UL (ref 3.81–5.12)
RBC MORPH BLD: NORMAL
SODIUM SERPL-SCNC: 138 MMOL/L (ref 136–145)
WBC # BLD AUTO: 11.37 THOUSAND/UL (ref 4.31–10.16)

## 2021-09-24 PROCEDURE — 85007 BL SMEAR W/DIFF WBC COUNT: CPT | Performed by: PHYSICIAN ASSISTANT

## 2021-09-24 PROCEDURE — 85027 COMPLETE CBC AUTOMATED: CPT | Performed by: PHYSICIAN ASSISTANT

## 2021-09-24 PROCEDURE — 80048 BASIC METABOLIC PNL TOTAL CA: CPT | Performed by: PHYSICIAN ASSISTANT

## 2021-09-24 PROCEDURE — 99239 HOSP IP/OBS DSCHRG MGMT >30: CPT | Performed by: PHYSICIAN ASSISTANT

## 2021-09-24 PROCEDURE — 99232 SBSQ HOSP IP/OBS MODERATE 35: CPT | Performed by: INTERNAL MEDICINE

## 2021-09-24 RX ORDER — GUAIFENESIN 600 MG
600 TABLET, EXTENDED RELEASE 12 HR ORAL EVERY 12 HOURS SCHEDULED
Qty: 30 TABLET | Refills: 0 | Status: SHIPPED | OUTPATIENT
Start: 2021-09-24

## 2021-09-24 RX ORDER — LOPERAMIDE HYDROCHLORIDE 2 MG/1
2 TABLET ORAL 4 TIMES DAILY PRN
Qty: 30 TABLET | Refills: 0
Start: 2021-09-24 | End: 2022-07-18 | Stop reason: ALTCHOICE

## 2021-09-24 RX ORDER — AMOXICILLIN 500 MG/1
1000 CAPSULE ORAL EVERY 8 HOURS SCHEDULED
Qty: 48 CAPSULE | Refills: 0 | Status: SHIPPED | OUTPATIENT
Start: 2021-09-24 | End: 2021-09-24 | Stop reason: SDUPTHER

## 2021-09-24 RX ORDER — FLUTICASONE PROPIONATE 50 MCG
2 SPRAY, SUSPENSION (ML) NASAL DAILY
Refills: 0
Start: 2021-09-25

## 2021-09-24 RX ORDER — ASCORBIC ACID 250 MG
250 TABLET ORAL 2 TIMES DAILY
Qty: 60 TABLET | Refills: 0 | Status: SHIPPED | OUTPATIENT
Start: 2021-09-24 | End: 2022-04-19

## 2021-09-24 RX ORDER — FERROUS SULFATE 325(65) MG
325 TABLET ORAL 2 TIMES DAILY WITH MEALS
Qty: 60 TABLET | Refills: 0 | Status: SHIPPED | OUTPATIENT
Start: 2021-09-24

## 2021-09-24 RX ORDER — FOLIC ACID 1 MG/1
1 TABLET ORAL DAILY
Qty: 30 TABLET | Refills: 0 | Status: SHIPPED | OUTPATIENT
Start: 2021-09-25 | End: 2022-04-19

## 2021-09-24 RX ORDER — BENZONATATE 100 MG/1
100 CAPSULE ORAL 3 TIMES DAILY PRN
Qty: 21 CAPSULE | Refills: 0 | Status: SHIPPED | OUTPATIENT
Start: 2021-09-24 | End: 2022-07-18 | Stop reason: ALTCHOICE

## 2021-09-24 RX ORDER — ACETAMINOPHEN 325 MG/1
650 TABLET ORAL EVERY 6 HOURS PRN
Refills: 0
Start: 2021-09-24

## 2021-09-24 RX ORDER — XYLITOL/YERBA SANTA
5 AEROSOL, SPRAY WITH PUMP (ML) MUCOUS MEMBRANE 4 TIMES DAILY PRN
Refills: 0
Start: 2021-09-24 | End: 2022-04-19

## 2021-09-24 RX ORDER — AMOXICILLIN 500 MG/1
1000 CAPSULE ORAL EVERY 8 HOURS SCHEDULED
Qty: 48 CAPSULE | Refills: 0 | Status: SHIPPED | OUTPATIENT
Start: 2021-09-25 | End: 2021-10-03

## 2021-09-24 RX ADMIN — LEVOTHYROXINE SODIUM 50 MCG: 50 TABLET ORAL at 05:22

## 2021-09-24 RX ADMIN — PILOCARPINE HYDROCHLORIDE 5 MG: 5 TABLET, FILM COATED ORAL at 08:18

## 2021-09-24 RX ADMIN — GUAIFENESIN 600 MG: 600 TABLET, EXTENDED RELEASE ORAL at 08:17

## 2021-09-24 RX ADMIN — ENOXAPARIN SODIUM 40 MG: 40 INJECTION SUBCUTANEOUS at 08:17

## 2021-09-24 RX ADMIN — MUPIROCIN: 20 OINTMENT TOPICAL at 08:18

## 2021-09-24 RX ADMIN — FLUTICASONE PROPIONATE 2 SPRAY: 50 SPRAY, METERED NASAL at 08:17

## 2021-09-24 RX ADMIN — FERROUS SULFATE TAB 325 MG (65 MG ELEMENTAL FE) 325 MG: 325 (65 FE) TAB at 08:17

## 2021-09-24 RX ADMIN — OXYCODONE HYDROCHLORIDE AND ACETAMINOPHEN 250 MG: 500 TABLET ORAL at 08:17

## 2021-09-24 RX ADMIN — HYDROXYCHLOROQUINE SULFATE 200 MG: 200 TABLET, FILM COATED ORAL at 08:17

## 2021-09-24 RX ADMIN — CEFTRIAXONE SODIUM 2000 MG: 10 INJECTION, POWDER, FOR SOLUTION INTRAVENOUS at 10:01

## 2021-09-24 RX ADMIN — BUPROPION HYDROCHLORIDE 100 MG: 100 TABLET ORAL at 08:17

## 2021-09-24 RX ADMIN — FOLIC ACID 1 MG: 1 TABLET ORAL at 08:17

## 2021-09-24 NOTE — ASSESSMENT & PLAN NOTE
· met criteria on admission with high fever pre-hospital, tachypnea, hypotension, leukocytosis  Also with acute kidney injury and hypoxia  Chest x-ray on admission consistent with pneumonia    Clinically improving  · Trended procal, dropped from 5 34 to 1 70  · Source felt to be streptococcal pneumonia  · Also found to have 2/2 positive blood cultures (though they may have been drawn from the same site according to the patient)--consistent with streptococcal bacteremia in the setting of pneumonia, sensitive to PCN  · Received IV ceftriaxone, day #6, 2 g IV daily through today then convert to high-dose 1 g TID oral amoxicillin starting tomorrow for 8 more days to complete a 14 day course  · Repeat blood cultures negative at 72 hours  · Appreciate Infectious Disease eval  · 2D echocardiogram stable without any vegetation

## 2021-09-24 NOTE — DISCHARGE SUMMARY
[facility]  Discharge- Radha Hu 1950, 73 y o  female MRN: [id number]  Unit/Bed#: S -01 Encounter: [id number]  Primary Care Provider: Mckenzie Rousseau MD   Date and time admitted to hospital: 9/19/2021 10:54 AM    * Severe sepsis (HCC)-resolved as of 9/24/2021  Assessment & Plan  · met criteria on admission with high fever pre-hospital, tachypnea, hypotension, leukocytosis  Also with acute kidney injury and hypoxia  Chest x-ray on admission consistent with pneumonia  Clinically improving  · Trended procal, dropped from 5 34 to 1 70  · Source felt to be streptococcal pneumonia  · Also found to have 2/2 positive blood cultures (though they may have been drawn from the same site according to the patient)--consistent with streptococcal bacteremia in the setting of pneumonia, sensitive to PCN  · Received IV ceftriaxone, day #6, 2 g IV daily through today then convert to high-dose 1 g TID oral amoxicillin starting tomorrow for 8 more days to complete a 14 day course  · Repeat blood cultures negative at 72 hours  · Appreciate Infectious Disease eval  · 2D echocardiogram stable without any vegetation    Pneumonia due to Streptococcus [facility])  Assessment & Plan  · As above  · chest x-ray read as COVID pneumonia however patient is fully vaccinated and tested negative for COVID twice and positive for strep  · Repeat CXR 4-6 weeks  · Continue supportive care with cough medication  · Noted strep antigen positive--ceftriaxone day 6, convert to PO and complete 14 day course  · Following acute illness will need update in Pneumovax      Anemia  Assessment & Plan  · Patient with chronic baseline anemia  Significant drop in hemoglobin to 6 6 on 9/21/21--received transfusion of 1 unit of blood  Currently hemoglobin 8 3 with symptomatic improvement noted  · Noted multiple abnormalities on anemia panel including B12 deficiency and low levels of iron and folate    Initiated repletion-- note that patient has history of gastric bypass and was not appropriately receiving supplements pre-hospital  Will need 1/month b12 injection after dc    Petechial rash  Assessment & Plan  · Appreciate formal dermatology consultation --was initially suspected to be leukocytoclastic vasculitis  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   Not usually an aggressive vasculitis    · Can follow up as outpatient with both rhematology and dermatology  · Much improved      Acute respiratory failure with hypoxia (HCC)-resolved as of 9/24/2021  Assessment & Plan  · secondary to pneumonia and anemia  · Now much improved and stable off of oxygen with room air sat 95%      Systemic lupus erythematosus ([facility] [address] )  Assessment & Plan  · Follows with rheumatology-- on hydroxychloroquine    Acute kidney injury superimposed on chronic kidney disease (HCC)-resolved as of 9/24/2021  Assessment & Plan  Lab Results   Component Value Date    EGFR 55 09/24/2021    EGFR 55 09/22/2021    EGFR 53 09/21/2021    CREATININE 1 04 09/24/2021    CREATININE 1 04 09/22/2021    CREATININE 1 07 09/21/2021   · Patient was CKD 3 prior to hospitalization with creatinine 1-1 2, presented with ANNIE likely in the setting of sepsis with hypotension with creatinine 1 87  Now improved back to baseline to 1 04  · Avoid hypotension and nephrotoxic agents    Remains stable off IV fluids    Hyponatremia-resolved as of 9/23/2021  Assessment & Plan  · Present on admission due to pneumonia and dehydration--resolved    Sjogren's syndrome ([facility] [address] )  Assessment & Plan  · Follows with rheumatology, cont pilocarpine    Bipolar I disorder, single manic episode ([facility] [address] )  Assessment & Plan  · Seroquel, Restoril, Wellbutrin    Discharging Physician / Practitioner: Carlos Wilkinson PA-C  PCP: Sherif Alves MD  Admission Date:   Admission Orders (From admission, onward)     Ordered        09/19/21 1529  Inpatient Admission  Once                   Discharge Date: 09/24/21    Medical Problems     Resolved Problems  Date Reviewed: 9/24/2021        Resolved    * (Principal) Severe sepsis ([facility] ) 9/24/2021     Resolved by  Ethel Merino PA-C    Acute respiratory failure with hypoxia ([facility] ) 9/24/2021     Resolved by  Ethel Merino PA-C    Acute kidney injury superimposed on chronic kidney disease ([facility] ) 9/24/2021     Resolved by  Ethel Merino PA-C    Hyponatremia 9/23/2021     Resolved by  Ethel Merino PA-C              Consultations During Hospital Stay:  · Infectious disease  · Dermatology    Procedures Performed:   · Blood transfusion    Significant Findings / Test Results:   · Chest x-ray  · Echocardiogram    Incidental Findings:   · None     Test Results Pending at Discharge (will require follow up): · Immuno fix     Outpatient Tests Requested:  · CBC  · Repeat chest x-ray    Complications:  None    Reason for Admission:  Fever and shortness of breath    Hospital Course:     King Celestino is a 73 y o  female patient who originally presented to the hospital on 9/19/2021 due to fever, chills, cough, and shortness of breath  Chest x-ray on admission was described as COVID pneumonia" however patient is vaccinated and tested negative for COVID twice during this admission  She did in fact however test positive for streptococcal antigen and was therefore felt to have strep pneumonia  Patient also had 2/2 blood cultures positive for strep as well  She was seen in consultation by Infectious Disease  She met criteria for severe sepsis with acute kidney injury and hypoxia on admission but clinically stabilized over the course of her hospitalization with high-dose IV ceftriaxone which will be converted to oral amoxicillin high-dose at discharge to complete a 14 day course      During the admission she was also noted to have a petechial rash which was evaluated by dermatology and initially felt to be leukocytoclastic vasculitis but upon further evaluation and review of studies, dermatology felt it was 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 due to her Sjogren's disease, which tends to apparently be benign overall  During the course of her hospitalization it was already resolving significantly  Shankar Arian was also found to be significantly anemic, acute on chronic likely due to underlying significant deficiencies of iron, folate, and B12 in addition to acute illness  She received 1 unit of blood transfusion and was started on appropriate repletion with recommendation to follow-up certainly in light of her history of gastric bypass we would advise B12 injections once a month and she did receive 3 days worth of IM B12 during her stay  No bleeding was noted and her hemoglobin continue to improve  Patient has successfully been weaned off of oxygen and feeling substantially better and was deemed to be stable for discharge but was recommended to have follow-up pneumonia vaccine and repeat chest x-ray to ensure resolution in 4-6 weeks    Please see above list of diagnoses and related plan for additional information  Condition at Discharge: stable     Discharge Day Visit / Exam:     Subjective:  Patient reports she is feeling much better overall  Continues to have a little phlegm/cough and postnasal drip but admits to chronic sinus issues but otherwise significantly better and has been walking in the room a lot and not requiring oxygen  Definitely feels less short of breath and overall ready to go home  Vitals: Blood Pressure: 127/85 (09/24/21 0704)  Pulse: 77 (09/24/21 0704)  Temperature: 98 5 °F (36 9 °C) (09/24/21 0704)  Temp Source: Oral (09/24/21 0704)  Respirations: 20 (09/24/21 0704)  Height: 5' 1" (154 9 cm) (09/20/21 0040)  Weight - Scale: 73 5 kg (162 lb 0 6 oz) (09/20/21 0040)  SpO2: 94 % (09/24/21 0704)  Exam:   Physical Exam  Vitals reviewed  Constitutional:       General: She is not in acute distress       Appearance: She is not ill-appearing, toxic-appearing or diaphoretic  Comments: Clinically much improved  Ambulates in the room without need for oxygen   HENT:      Nose: Congestion present  Eyes:      General: No scleral icterus  Right eye: No discharge  Left eye: No discharge  Conjunctiva/sclera: Conjunctivae normal    Cardiovascular:      Rate and Rhythm: Normal rate and regular rhythm  Heart sounds: No murmur heard  Pulmonary:      Effort: No respiratory distress  Breath sounds: No stridor  No wheezing or rhonchi  Comments: No dyspnea or tachypnea  Improved breath sounds  Decreased left lung base  Abdominal:      General: Bowel sounds are normal  There is no distension  Palpations: Abdomen is soft  Tenderness: There is no abdominal tenderness  There is no guarding  Musculoskeletal:         General: No swelling, tenderness, deformity or signs of injury  Right lower leg: No edema  Left lower leg: No edema  Skin:     General: Skin is warm and dry  Coloration: Skin is pale (Improved)  Skin is not jaundiced  Findings: No bruising, erythema, lesion or rash  Comments: Substantial improvement in petechial rash which is nearly resolved   Neurological:      General: No focal deficit present  Mental Status: She is alert  Mental status is at baseline  Comments: Ambulates independently without assistance   Psychiatric:         Mood and Affect: Mood normal          Thought Content: Thought content normal          Judgment: Judgment normal          Discussion with Family:  Updated patient's  over the phone    Discharge instructions/Information to patient and family:   See after visit summary for information provided to patient and family  Provisions for Follow-Up Care:  See after visit summary for information related to follow-up care and any pertinent home health orders        Disposition: home    Planned Readmission: none     Discharge Statement:  I spent 40 minutes discharging the patient  This time was spent on the day of discharge  I had direct contact with the patient on the day of discharge  Greater than 50% of the total time was spent examining patient, answering all patient questions, arranging and discussing plan of care with patient as well as directly providing post-discharge instructions  Additional time then spent on discharge activities  Bedside nursing rounds performed  Case was discussed with Infectious Disease and case management    Discharge Medications:  See after visit summary for reconciled discharge medications provided to patient and family        ** Please Note: This note has been constructed using a voice recognition system **

## 2021-09-24 NOTE — ASSESSMENT & PLAN NOTE
Lab Results   Component Value Date    EGFR 55 09/24/2021    EGFR 55 09/22/2021    EGFR 53 09/21/2021    CREATININE 1 04 09/24/2021    CREATININE 1 04 09/22/2021    CREATININE 1 07 09/21/2021   · Patient was CKD 3 prior to hospitalization with creatinine 1-1 2, presented with ANNIE likely in the setting of sepsis with hypotension with creatinine 1 87  Now improved back to baseline to 1 04  · Avoid hypotension and nephrotoxic agents    Remains stable off IV fluids

## 2021-09-24 NOTE — PROGRESS NOTES
Progress Note - Infectious Disease   Mervat Stephen 73 y o  female MRN: [id number]  Unit/Bed#: S -01 Encounter: [id number]      Impression/Plan:  1  Severe Sepsis POA :  With tachycardia, leukocytosis along with hypotension and ANNIE   Sources #2 and #3  Improved ,  WBC is trending down   Fortunately patient is hemodynamically stable   Currently afebrile   Hypotension has resolved with IV fluid resuscitation  · Continue with high-dose IV ceftriaxone q24h today then transition to high dose Amoxicillin tomorrow ( 1000 mg q8h ) to finish 14 days course of antibiotics through 10/2/2021   · Monitor fever/WBC        2  Streptococcal pneumonia:  Chest x-ray noted for bibasilar infiltrates   COVID-19 is negative   Urine streptococcal antigen is positive   Urine Legionella is negative   Not requiring supplemental oxygen  · Antibiotics as above    · Monitor respiratory status           3  Gram-positive bacteremia as evident by 2 sets which are showing streptococcal pneumoniae secondary to #2  2D echocardiogram with no vegetations  Repeat blood cultures are negative over 72 hours  · Antibiotics as above    · Plan to treat for 14 days total considering rapid clearance of bacteremia and unremarkable 2d Echo          4  Rash :  Improved , Likely vasculitis, Dermatology on board           5  Diarrhea : resolved  Negative C  Diff       7  ANNIE : Likely prerenal secondary to poor oral intake and resolved with IV hydration  Significantly Improved , Management per primary team      Discussed with Camille Carmen from primary team   Christina Sheppard for discharge from ID standpoint after getting her IV ceftriaxone dose this morning           Antibiotics:  Ceftriaxone 6    Subjective:  Patient has no fever, chills, sweats; no nausea, vomiting, diarrhea; no cough, shortness of breath; no pain  No new symptoms      Objective:  Vitals:  Temp:  [98 4 °F (36 9 °C)-99 °F (37 2 °C)] 98 5 °F (36 9 °C)  HR:  [77-86] 77  Resp:  [18-20] 20  BP: (122-163)/(64-87) 127/85  SpO2:  [90 %-95 %] 94 %  Temp (24hrs), Av 6 °F (37 °C), Min:98 4 °F (36 9 °C), Max:99 °F (37 2 °C)  Current: Temperature: 98 5 °F (36 9 °C)    Physical Exam:   General Appearance:  Alert, interactive, nontoxic, no acute distress  Throat: Oropharynx moist without lesions  Lungs:   Significantly improved breath sounds bilaterally; no wheezes, rhonchi or rales; respirations unlabored   Heart:  RRR; no murmur, rub or gallop   Abdomen:   Soft, non-tender, non-distended, positive bowel sounds  Extremities: No clubbing, cyanosis or edema   Skin: LE Rash significantly improved  Labs, Imaging, & Other studies:   All pertinent labs and imaging studies were personally reviewed  Results from last 7 days   Lab Units 21  0549 21  0516 21  0502   WBC Thousand/uL 11 37* 12 79* 13 50*   HEMOGLOBIN g/dL 8 3* 8 0* 7 6*   PLATELETS Thousands/uL 252 248 244     Results from last 7 days   Lab Units 21  0502 21  0451 21  0615 21  1132   SODIUM mmol/L 139 139 136 131*   POTASSIUM mmol/L 3 5 3 5 4 0 4 4   CHLORIDE mmol/L 110* 110* 110* 102   CO2 mmol/L 21 19* 19* 20*   BUN mg/dL 12 19 28* 30*   CREATININE mg/dL 1 04 1 07 1 26 1 87*   EGFR ml/min/1 73sq m 55 53 44 27   CALCIUM mg/dL 7 4* 7 4* 7 1* 7 6*   AST U/L  --   --   --  29   ALT U/L  --   --   --  16   ALK PHOS U/L  --   --   --  97     Results from last 7 days   Lab Units 21  0511 21  0506 21  1404 21  1929 21  1132   BLOOD CULTURE  No Growth at 48 hrs   No Growth at 48 hrs   --   --  Streptococcus pneumoniae*  Streptococcus pneumoniae*   GRAM STAIN RESULT   --   --   --   --  Gram positive cocci in pairs and chains*  Gram positive cocci in pairs and chains*   LEGIONELLA URINARY ANTIGEN   --   --   --  Negative  --    C DIFF TOXIN B BY PCR   --   --  Negative  --   --      Results from last 7 days   Lab Units 21  0502 21  0451 21  0615 21  1805 PROCALCITONIN ng/ml 1 70* 4 04* 5 34* 2 55*     Results from last 7 days   Lab Units 09/19/21  1132   CRP mg/L 121 4*     Results from last 7 days   Lab Units 09/21/21  0451   FERRITIN ng/mL 46

## 2021-09-24 NOTE — ASSESSMENT & PLAN NOTE
· As above  · chest x-ray read as COVID pneumonia however patient is fully vaccinated and tested negative for COVID twice and positive for strep  · Repeat CXR 4-6 weeks  · Continue supportive care with cough medication  · Noted strep antigen positive--ceftriaxone day 6, convert to PO and complete 14 day course  · Following acute illness will need update in Pneumovax

## 2021-09-24 NOTE — ASSESSMENT & PLAN NOTE
· Patient with chronic baseline anemia  Significant drop in hemoglobin to 6 6 on 9/21/21--received transfusion of 1 unit of blood  Currently hemoglobin 8 3 with symptomatic improvement noted  · Noted multiple abnormalities on anemia panel including B12 deficiency and low levels of iron and folate    Initiated repletion-- note that patient has history of gastric bypass and was not appropriately receiving supplements pre-hospital  Will need 1/month b12 injection after dc

## 2021-09-24 NOTE — PLAN OF CARE
Problem: Potential for Falls  Goal: Patient will remain free of falls  Description: INTERVENTIONS:  - Educate patient/family on patient safety including physical limitations  - Instruct patient to call for assistance with activity   - Consult OT/PT to assist with strengthening/mobility   - Keep Call bell within reach  - Keep bed low and locked with side rails adjusted as appropriate  - Keep care items and personal belongings within reach  - Initiate and maintain comfort rounds  - Make Fall Risk Sign visible to staff  - Offer Toileting every  Hours, in advance of need  - Initiate/Maintain alarm  - Obtain necessary fall risk management equipment:   - Apply yellow socks and bracelet for high fall risk patients  - Consider moving patient to room near nurses station  Outcome: Progressing     Problem: RESPIRATORY - ADULT  Goal: Achieves optimal ventilation and oxygenation  Description: INTERVENTIONS:  - Assess for changes in respiratory status  - Assess for changes in mentation and behavior  - Position to facilitate oxygenation and minimize respiratory effort  - Oxygen administered by appropriate delivery if ordered  - Initiate smoking cessation education as indicated  - Encourage broncho-pulmonary hygiene including cough, deep breathe, Incentive Spirometry  - Assess the need for suctioning and aspirate as needed  - Assess and instruct to report SOB or any respiratory difficulty  - Respiratory Therapy support as indicated  Outcome: Progressing     Problem: METABOLIC, FLUID AND ELECTROLYTES - ADULT  Goal: Electrolytes maintained within normal limits  Description: INTERVENTIONS:  - Monitor labs and assess patient for signs and symptoms of electrolyte imbalances  - Administer electrolyte replacement as ordered  - Monitor response to electrolyte replacements, including repeat lab results as appropriate  - Instruct patient on fluid and nutrition as appropriate  Outcome: Progressing  Goal: Fluid balance maintained  Description: INTERVENTIONS:  - Monitor labs   - Monitor I/O and WT  - Instruct patient on fluid and nutrition as appropriate  - Assess for signs & symptoms of volume excess or deficit  Outcome: Progressing     Problem: SKIN/TISSUE INTEGRITY - ADULT  Goal: Skin Integrity remains intact(Skin Breakdown Prevention)  Description: Assess:  -Perform Isaac assessment every   -Clean and moisturize skin every   -Inspect skin when repositioning, toileting, and assisting with ADLS  -Assess under medical devices such as  every   -Assess extremities for adequate circulation and sensation     Bed Management:  -Have minimal linens on bed & keep smooth, unwrinkled  -Change linens as needed when moist or perspiring  -Avoid sitting or lying in one position for more than  hours while in bed  -Keep HOB at degrees     Toileting:  -Offer bedside commode  -Assess for incontinence every   -Use incontinent care products after each incontinent episode such as     Activity:  -Mobilize patient  times a day  -Encourage activity and walks on unit  -Encourage or provide ROM exercises   -Turn and reposition patient every  Hours  -Use appropriate equipment to lift or move patient in bed  -Instruct/ Assist with weight shifting every  when out of bed in chair  -Consider limitation of chair time  hour intervals    Skin Care:  -Avoid use of baby powder, tape, friction and shearing, hot water or constrictive clothing  -Relieve pressure over bony prominences using   -Do not massage red bony areas    Next Steps:  -Teach patient strategies to minimize risks such as    -Consider consults to  interdisciplinary teams such as   Outcome: Progressing  Goal: Pressure injury heals and does not worsen  Description: Interventions:  - Implement low air loss mattress or specialty surface (Criteria met)  - Apply silicone foam dressing  - Instruct/assist with weight shifting every minutes when in chair   - Limit chair time to  hour intervals  - Use special pressure reducing interventions such as  when in chair   - Apply fecal or urinary incontinence containment device   - Perform passive or active ROM every   - Turn and reposition patient & offload bony prominences every  hours   - Utilize friction reducing device or surface for transfers   - Consider consults to  interdisciplinary teams such as   - Use incontinent care products after each incontinent episode such as   - Consider nutrition services referral as needed  Outcome: Progressing     Problem: INFECTION - ADULT  Goal: Absence or prevention of progression during hospitalization  Description: INTERVENTIONS:  - Assess and monitor for signs and symptoms of infection  - Monitor lab/diagnostic results  - Monitor all insertion sites, i e  indwelling lines, tubes, and drains  - Monitor endotracheal if appropriate and nasal secretions for changes in amount and color  - [place] appropriate cooling/warming therapies per order  - Administer medications as ordered  - Instruct and encourage patient and family to use good hand hygiene technique  - Identify and instruct in appropriate isolation precautions for identified infection/condition  Outcome: Progressing     Problem: SAFETY ADULT  Goal: Patient will remain free of falls  Description: INTERVENTIONS:  - Educate patient/family on patient safety including physical limitations  - Instruct patient to call for assistance with activity   - Consult OT/PT to assist with strengthening/mobility   - Keep Call bell within reach  - Keep bed low and locked with side rails adjusted as appropriate  - Keep care items and personal belongings within reach  - Initiate and maintain comfort rounds  - Make Fall Risk Sign visible to staff  - Offer Toileting every  Hours, in advance of need  - Initiate/Maintain alarm  - Obtain necessary fall risk management equipment:   - Apply yellow socks and bracelet for high fall risk patients  - Consider moving patient to room near nurses station  Outcome: Progressing  Goal: Maintain or return to baseline ADL function  Description: INTERVENTIONS:  -  Assess patient's ability to carry out ADLs; assess patient's baseline for ADL function and identify physical deficits which impact ability to perform ADLs (bathing, care of mouth/teeth, toileting, grooming, dressing, etc )  - Assess/evaluate cause of self-care deficits   - Assess range of motion  - Assess patient's mobility; develop plan if impaired  - Assess patient's need for assistive devices and provide as appropriate  - Encourage maximum independence but intervene and supervise when necessary  - Involve family in performance of ADLs  - Assess for home care needs following discharge   - Consider OT consult to assist with ADL evaluation and planning for discharge  - Provide patient education as appropriate  Outcome: Progressing  Goal: Maintains/Returns to pre admission functional level  Description: INTERVENTIONS:  - Perform BMAT or MOVE assessment daily    - Set and communicate daily mobility goal to care team and patient/family/caregiver  - Collaborate with rehabilitation services on mobility goals if consulted  - Perform Range of Motion times a day  - Reposition patient every  hours    - Dangle patient  times a day  - Stand patient  times a day  - Ambulate patient  times a day  - Out of bed to chair  times a day   - Out of bed for meals  times a day  - Out of bed for toileting  - Record patient progress and toleration of activity level   Outcome: Progressing     Problem: DISCHARGE PLANNING  Goal: Discharge to home or other facility with appropriate resources  Description: INTERVENTIONS:  - Identify barriers to discharge w/patient and caregiver  - Arrange for needed discharge resources and transportation as appropriate  - Identify discharge learning needs (meds, wound care, etc )  - Arrange for interpretive services to assist at discharge as needed  - Refer to Case Management Department for coordinating discharge planning if the patient needs post-hospital services based on physician/advanced practitioner order or complex needs related to functional status, cognitive ability, or social support system  Outcome: Progressing     Problem: Knowledge Deficit  Goal: Patient/family/caregiver demonstrates understanding of disease process, treatment plan, medications, and discharge instructions  Description: Complete learning assessment and assess knowledge base    Interventions:  - Provide teaching at level of understanding  - Provide teaching via preferred learning methods  Outcome: Progressing     Problem: Prexisting or High Potential for Compromised Skin Integrity  Goal: Skin integrity is maintained or improved  Description: INTERVENTIONS:  - Identify patients at risk for skin breakdown  - Assess and monitor skin integrity  - Assess and monitor nutrition and hydration status  - Monitor labs   - Assess for incontinence   - Turn and reposition patient  - Assist with mobility/ambulation  - Relieve pressure over bony prominences  - Avoid friction and shearing  - Provide appropriate hygiene as needed including keeping skin clean and dry  - Evaluate need for skin moisturizer/barrier cream  - Collaborate with interdisciplinary team   - Patient/family teaching  - Consider wound care consult   Outcome: Progressing

## 2021-09-24 NOTE — DISCHARGE INSTR - AVS FIRST PAGE
Dear Brandon Wilson,     It was our pleasure to care for you here at [facility]  It is our hope that we were always able to exceed the expected standards for your care during your stay  You were hospitalized due to streptococcal pneumonia with bacteremia and sepsis (strep bacteria in blood stream)  You were cared for on the 3rd floor by Carson Jamil PA-C under the service of Ray Abreu,  with the [facility] who covers for your primary care physician (PCP), Amairani Pham MD, while you were hospitalized  If you have any questions or concerns related to this hospitalization, you may contact us at [phone]  For follow up as well as any medication refills, we recommend that you follow up with your primary care physician  A registered nurse will reach out to you by phone within a few days after your discharge to answer any additional questions that you may have after going home  However, at this time we provide for you here, the most important instructions / recommendations at discharge:     · Notable Medication Adjustments -   · Starting tomorrow take amoxicillin 1 g every 8 hours for the next 8 days  If it causes you diarrhea you can take Imodium as needed  · You were found to be anemic and very low on multiple vitamin levels and iron- Take folic acid daily, iron and vitamin-C twice a day and talk to your family doctor about how to arrange once a month B12 injections  · Testing Required after Discharge -   · Repeat chest x-ray in 4-6 weeks to make sure pneumonia resolves    This can be arranged by her family doctor  · Repeat blood counts can be checked by her family doctor in about 1 week to make sure your anemia is improving  · Important follow up information -   · Follow-up with your rheumatologist  · Follow-up with your family doctor  · Other Instructions -   · Make arrangements to get the pneumococcal conjugate vaccine once you are fully recovered from your pneumonia  · Please review this entire after visit summary as additional general instructions including medication list, appointments, activity, diet, any pertinent wound care, and other additional recommendations from your care team that may be provided for you        Sincerely,     Geetha Romero PA-C

## 2021-09-24 NOTE — ASSESSMENT & PLAN NOTE
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

## 2021-09-24 NOTE — PLAN OF CARE
Problem: Potential for Falls  Goal: Patient will remain free of falls  Description: INTERVENTIONS:  - Educate patient/family on patient safety including physical limitations  - Instruct patient to call for assistance with activity   - Consult OT/PT to assist with strengthening/mobility   - Keep Call bell within reach  - Keep bed low and locked with side rails adjusted as appropriate  - Keep care items and personal belongings within reach  - Initiate and maintain comfort rounds  - Make Fall Risk Sign visible to staff  - Offer Toileting every  Hours, in advance of need  - Initiate/Maintain alarm  - Obtain necessary fall risk management equipment:   - Apply yellow socks and bracelet for high fall risk patients  - Consider moving patient to room near nurses station  9/24/2021 1104 by Brand Ahumada, RN  Outcome: Completed  9/24/2021 0716 by Brand Ahumada, RN  Outcome: Progressing     Problem: RESPIRATORY - ADULT  Goal: Achieves optimal ventilation and oxygenation  Description: INTERVENTIONS:  - Assess for changes in respiratory status  - Assess for changes in mentation and behavior  - Position to facilitate oxygenation and minimize respiratory effort  - Oxygen administered by appropriate delivery if ordered  - Initiate smoking cessation education as indicated  - Encourage broncho-pulmonary hygiene including cough, deep breathe, Incentive Spirometry  - Assess the need for suctioning and aspirate as needed  - Assess and instruct to report SOB or any respiratory difficulty  - Respiratory Therapy support as indicated  9/24/2021 1104 by Brand Ahumada, RN  Outcome: Completed  9/24/2021 0716 by Brand Ahumada, RN  Outcome: Progressing     Problem: METABOLIC, FLUID AND ELECTROLYTES - ADULT  Goal: Electrolytes maintained within normal limits  Description: INTERVENTIONS:  - Monitor labs and assess patient for signs and symptoms of electrolyte imbalances  - Administer electrolyte replacement as ordered  - Monitor response to electrolyte replacements, including repeat lab results as appropriate  - Instruct patient on fluid and nutrition as appropriate  9/24/2021 1104 by Ariana Boucher RN  Outcome: Completed  9/24/2021 0716 by Ariana Boucher RN  Outcome: Progressing  Goal: Fluid balance maintained  Description: INTERVENTIONS:  - Monitor labs   - Monitor I/O and WT  - Instruct patient on fluid and nutrition as appropriate  - Assess for signs & symptoms of volume excess or deficit  9/24/2021 1104 by Ariana Boucher RN  Outcome: Completed  9/24/2021 0716 by Ariana Boucher RN  Outcome: Progressing     Problem: SKIN/TISSUE INTEGRITY - ADULT  Goal: Skin Integrity remains intact(Skin Breakdown Prevention)  Description: Assess:  -Perform Isaac assessment every   -Clean and moisturize skin every   -Inspect skin when repositioning, toileting, and assisting with ADLS  -Assess under medical devices such as  every   -Assess extremities for adequate circulation and sensation     Bed Management:  -Have minimal linens on bed & keep smooth, unwrinkled  -Change linens as needed when moist or perspiring  -Avoid sitting or lying in one position for more than  hours while in bed  -Keep HOB at degrees     Toileting:  -Offer bedside commode  -Assess for incontinence every   -Use incontinent care products after each incontinent episode such as     Activity:  -Mobilize patient  times a day  -Encourage activity and walks on unit  -Encourage or provide ROM exercises   -Turn and reposition patient every  Hours  -Use appropriate equipment to lift or move patient in bed  -Instruct/ Assist with weight shifting every  when out of bed in chair  -Consider limitation of chair time  hour intervals    Skin Care:  -Avoid use of baby powder, tape, friction and shearing, hot water or constrictive clothing  -Relieve pressure over bony prominences using   -Do not massage red bony areas    Next Steps:  -Teach patient strategies to minimize risks such as    -Consider consults to interdisciplinary teams such as   9/24/2021 1104 by Lam Gomez RN  Outcome: Completed  9/24/2021 0716 by Lam Gomez RN  Outcome: Progressing  Goal: Pressure injury heals and does not worsen  Description: Interventions:  - Implement low air loss mattress or specialty surface (Criteria met)  - Apply silicone foam dressing  - Instruct/assist with weight shifting every minutes when in chair   - Limit chair time to  hour intervals  - Use special pressure reducing interventions such as *when in chair   - Apply fecal or urinary incontinence containment device   - Perform passive or active ROM every   - Turn and reposition patient & offload bony prominences every  hours   - Utilize friction reducing device or surface for transfers   - Consider consults to  interdisciplinary teams such as   - Use incontinent care products after each incontinent episode such as   - Consider nutrition services referral as needed  9/24/2021 1104 by Lam Gomez RN  Outcome: Completed  9/24/2021 0716 by Lam Gomez RN  Outcome: Progressing     Problem: INFECTION - ADULT  Goal: Absence or prevention of progression during hospitalization  Description: INTERVENTIONS:  - Assess and monitor for signs and symptoms of infection  - Monitor lab/diagnostic results  - Monitor all insertion sites, i e  indwelling lines, tubes, and drains  - Monitor endotracheal if appropriate and nasal secretions for changes in amount and color  - [place] appropriate cooling/warming therapies per order  - Administer medications as ordered  - Instruct and encourage patient and family to use good hand hygiene technique  - Identify and instruct in appropriate isolation precautions for identified infection/condition  9/24/2021 1104 by Lam Gomez RN  Outcome: Completed  9/24/2021 0716 by Lam Gomez RN  Outcome: Progressing     Problem: SAFETY ADULT  Goal: Patient will remain free of falls  Description: INTERVENTIONS:  - Educate patient/family on patient safety including physical limitations  - Instruct patient to call for assistance with activity   - Consult OT/PT to assist with strengthening/mobility   - Keep Call bell within reach  - Keep bed low and locked with side rails adjusted as appropriate  - Keep care items and personal belongings within reach  - Initiate and maintain comfort rounds  - Make Fall Risk Sign visible to staff  - Offer Toileting every  Hours, in advance of need  - Initiate/Maintain alarm  - Obtain necessary fall risk management equipment:   - Apply yellow socks and bracelet for high fall risk patients  - Consider moving patient to room near nurses station  9/24/2021 1104 by Sourav Reyes RN  Outcome: Completed  9/24/2021 0716 by Sourav Reyes RN  Outcome: Progressing  Goal: Maintain or return to baseline ADL function  Description: INTERVENTIONS:  -  Assess patient's ability to carry out ADLs; assess patient's baseline for ADL function and identify physical deficits which impact ability to perform ADLs (bathing, care of mouth/teeth, toileting, grooming, dressing, etc )  - Assess/evaluate cause of self-care deficits   - Assess range of motion  - Assess patient's mobility; develop plan if impaired  - Assess patient's need for assistive devices and provide as appropriate  - Encourage maximum independence but intervene and supervise when necessary  - Involve family in performance of ADLs  - Assess for home care needs following discharge   - Consider OT consult to assist with ADL evaluation and planning for discharge  - Provide patient education as appropriate  9/24/2021 1104 by Sourav Reyes RN  Outcome: Completed  9/24/2021 0716 by Sourav Reyes RN  Outcome: Progressing  Goal: Maintains/Returns to pre admission functional level  Description: INTERVENTIONS:  - Perform BMAT or MOVE assessment daily    - Set and communicate daily mobility goal to care team and patient/family/caregiver     - Collaborate with rehabilitation services on mobility goals if consulted  - Perform Range of Motion  times a day  - Reposition patient every hours  - Dangle patient  times a day  - Stand patient  times a day  - Ambulate patient  times a day  - Out of bed to chair times a day   - Out of bed for meals  times a day  - Out of bed for toileting  - Record patient progress and toleration of activity level   9/24/2021 1104 by Rosa Handley RN  Outcome: Completed  9/24/2021 0716 by Rosa Handley RN  Outcome: Progressing     Problem: DISCHARGE PLANNING  Goal: Discharge to home or other facility with appropriate resources  Description: INTERVENTIONS:  - Identify barriers to discharge w/patient and caregiver  - Arrange for needed discharge resources and transportation as appropriate  - Identify discharge learning needs (meds, wound care, etc )  - Arrange for interpretive services to assist at discharge as needed  - Refer to Case Management Department for coordinating discharge planning if the patient needs post-hospital services based on physician/advanced practitioner order or complex needs related to functional status, cognitive ability, or social support system  9/24/2021 1104 by Rosa Handley RN  Outcome: Completed  9/24/2021 0716 by Rosa Handley RN  Outcome: Progressing     Problem: Knowledge Deficit  Goal: Patient/family/caregiver demonstrates understanding of disease process, treatment plan, medications, and discharge instructions  Description: Complete learning assessment and assess knowledge base    Interventions:  - Provide teaching at level of understanding  - Provide teaching via preferred learning methods  9/24/2021 1104 by Rosa Handley RN  Outcome: Completed  9/24/2021 0716 by Rosa Handley RN  Outcome: Progressing     Problem: Prexisting or High Potential for Compromised Skin Integrity  Goal: Skin integrity is maintained or improved  Description: INTERVENTIONS:  - Identify patients at risk for skin breakdown  - Assess and monitor skin integrity  - Assess and monitor nutrition and hydration status  - Monitor labs   - Assess for incontinence   - Turn and reposition patient  - Assist with mobility/ambulation  - Relieve pressure over bony prominences  - Avoid friction and shearing  - Provide appropriate hygiene as needed including keeping skin clean and dry  - Evaluate need for skin moisturizer/barrier cream  - Collaborate with interdisciplinary team   - Patient/family teaching  - Consider wound care consult   9/24/2021 1104 by Brianna Vee RN  Outcome: Completed  9/24/2021 0716 by Brianna Vee RN  Outcome: Progressing

## 2021-09-24 NOTE — ASSESSMENT & PLAN NOTE
· secondary to pneumonia and anemia     · Now much improved and stable off of oxygen with room air sat 95%

## 2021-09-26 LAB
BACTERIA BLD CULT: NORMAL
BACTERIA BLD CULT: NORMAL

## 2021-09-27 ENCOUNTER — TRANSITIONAL CARE MANAGEMENT (OUTPATIENT)
Dept: FAMILY MEDICINE CLINIC | Facility: CLINIC | Age: 71
End: 2021-09-27

## 2021-09-27 NOTE — UTILIZATION REVIEW
Notification of Discharge   This is a Notification of Discharge from our facility [address]  Please be advised that this patient has been discharge from our facility  Below you will find the admission and discharge date and time including the patients disposition  UTILIZATION REVIEW CONTACT:  Jet Salomon  Utilization   Network Utilization Review Department  Phone: [phone] x carefully listen to the prompts  All voicemails are confidential   Email: [email or web address]     PHYSICIAN ADVISORY SERVICES:  FOR [id number] REVIEW - MEDICAL NECESSITY DENIAL  Phone: [phone]  Fax: [phone]  Email: [email or web address]     PRESENTATION DATE: 9/19/2021 10:54 AM  OBERVATION ADMISSION DATE:   INPATIENT ADMISSION DATE: 9/19/21  3:29 PM   DISCHARGE DATE: 9/24/2021 11:46 AM  DISPOSITION: Home/Self Care Home/Self Care      IMPORTANT INFORMATION:  Send all requests for admission clinical reviews, approved or denied determinations and any other requests to dedicated fax number below belonging to the campus where the patient is receiving treatment   List of dedicated fax numbers:  [address] [place] [address] DENIALS (Administrative/Medical Necessity) [phone]   [address]  (Maternity/NICU/Pediatrics) [phone]   Cody Sims [phone]   Vinod Mercedes [phone]   Keagan Saha [phone]   [address] [phone]   [facility]  [phone]   [address] [place] [address], [address]  [address] [facility] [address] [facility] [phone]

## 2021-09-29 LAB — MISCELLANEOUS LAB TEST RESULT: NORMAL

## 2021-10-04 ENCOUNTER — OFFICE VISIT (OUTPATIENT)
Dept: FAMILY MEDICINE CLINIC | Facility: CLINIC | Age: 71
End: 2021-10-04
Payer: COMMERCIAL

## 2021-10-04 VITALS
HEIGHT: 61 IN | BODY MASS INDEX: 28.32 KG/M2 | WEIGHT: 150 LBS | DIASTOLIC BLOOD PRESSURE: 78 MMHG | HEART RATE: 84 BPM | OXYGEN SATURATION: 96 % | RESPIRATION RATE: 16 BRPM | SYSTOLIC BLOOD PRESSURE: 118 MMHG

## 2021-10-04 DIAGNOSIS — Z86.19: ICD-10-CM

## 2021-10-04 DIAGNOSIS — K91.2 POSTGASTRECTOMY MALABSORPTION: Primary | ICD-10-CM

## 2021-10-04 DIAGNOSIS — Z90.3 POSTGASTRECTOMY MALABSORPTION: Primary | ICD-10-CM

## 2021-10-04 PROCEDURE — 99495 TRANSJ CARE MGMT MOD F2F 14D: CPT | Performed by: FAMILY MEDICINE

## 2021-10-04 PROCEDURE — 1111F DSCHRG MED/CURRENT MED MERGE: CPT | Performed by: FAMILY MEDICINE

## 2021-10-04 PROCEDURE — 3008F BODY MASS INDEX DOCD: CPT | Performed by: INTERNAL MEDICINE

## 2021-10-04 RX ORDER — QUETIAPINE FUMARATE 25 MG/1
TABLET, FILM COATED ORAL
COMMUNITY
Start: 2021-09-09 | End: 2022-07-18 | Stop reason: ALTCHOICE

## 2021-10-04 RX ORDER — QUETIAPINE FUMARATE 50 MG/1
TABLET, FILM COATED ORAL
COMMUNITY
Start: 2021-09-09

## 2021-10-05 ENCOUNTER — OFFICE VISIT (OUTPATIENT)
Dept: DERMATOLOGY | Facility: CLINIC | Age: 71
End: 2021-10-05

## 2021-10-05 DIAGNOSIS — Z48.02 ENCOUNTER FOR REMOVAL OF SUTURES: Primary | ICD-10-CM

## 2021-10-05 PROCEDURE — RECHECK: Performed by: DERMATOLOGY

## 2021-10-07 ENCOUNTER — APPOINTMENT (OUTPATIENT)
Dept: LAB | Facility: CLINIC | Age: 71
End: 2021-10-07
Payer: COMMERCIAL

## 2021-10-07 DIAGNOSIS — K91.2 POSTGASTRECTOMY MALABSORPTION: ICD-10-CM

## 2021-10-07 DIAGNOSIS — Z90.3 POSTGASTRECTOMY MALABSORPTION: ICD-10-CM

## 2021-10-07 LAB
25(OH)D3 SERPL-MCNC: 24.4 NG/ML (ref 30–100)
ALBUMIN SERPL BCP-MCNC: 3 G/DL (ref 3.5–5)
ALP SERPL-CCNC: 92 U/L (ref 46–116)
ALT SERPL W P-5'-P-CCNC: 15 U/L (ref 12–78)
ANION GAP SERPL CALCULATED.3IONS-SCNC: 9 MMOL/L (ref 4–13)
AST SERPL W P-5'-P-CCNC: 31 U/L (ref 5–45)
BASOPHILS # BLD MANUAL: 0.09 THOUSAND/UL (ref 0–0.1)
BASOPHILS NFR MAR MANUAL: 1 % (ref 0–1)
BILIRUB SERPL-MCNC: 0.35 MG/DL (ref 0.2–1)
BUN SERPL-MCNC: 16 MG/DL (ref 5–25)
CALCIUM ALBUM COR SERPL-MCNC: 8.8 MG/DL (ref 8.3–10.1)
CALCIUM SERPL-MCNC: 8 MG/DL (ref 8.3–10.1)
CHLORIDE SERPL-SCNC: 102 MMOL/L (ref 100–108)
CO2 SERPL-SCNC: 22 MMOL/L (ref 21–32)
CREAT SERPL-MCNC: 1.19 MG/DL (ref 0.6–1.3)
EOSINOPHIL # BLD MANUAL: 0.28 THOUSAND/UL (ref 0–0.4)
EOSINOPHIL NFR BLD MANUAL: 3 % (ref 0–6)
ERYTHROCYTE [DISTWIDTH] IN BLOOD BY AUTOMATED COUNT: 19.3 % (ref 11.6–15.1)
FERRITIN SERPL-MCNC: 22 NG/ML (ref 8–388)
FOLATE SERPL-MCNC: 19.6 NG/ML (ref 3.1–17.5)
GFR SERPL CREATININE-BSD FRML MDRD: 47 ML/MIN/1.73SQ M
GLUCOSE SERPL-MCNC: 86 MG/DL (ref 65–140)
HCT VFR BLD AUTO: 32 % (ref 34.8–46.1)
HGB BLD-MCNC: 9.9 G/DL (ref 11.5–15.4)
IRON SERPL-MCNC: 44 UG/DL (ref 50–170)
LYMPHOCYTES # BLD AUTO: 5.87 THOUSAND/UL (ref 0.6–4.47)
LYMPHOCYTES # BLD AUTO: 63 % (ref 14–44)
MCH RBC QN AUTO: 26.7 PG (ref 26.8–34.3)
MCHC RBC AUTO-ENTMCNC: 30.9 G/DL (ref 31.4–37.4)
MCV RBC AUTO: 86 FL (ref 82–98)
MONOCYTES # BLD AUTO: 0.28 THOUSAND/UL (ref 0–1.22)
MONOCYTES NFR BLD: 3 % (ref 4–12)
NEUTROPHILS # BLD MANUAL: 2.8 THOUSAND/UL (ref 1.85–7.62)
NEUTS SEG NFR BLD AUTO: 30 % (ref 43–75)
PLATELET # BLD AUTO: 324 THOUSANDS/UL (ref 149–390)
PLATELET BLD QL SMEAR: ADEQUATE
PMV BLD AUTO: 10 FL (ref 8.9–12.7)
POTASSIUM SERPL-SCNC: 4 MMOL/L (ref 3.5–5.3)
PROT SERPL-MCNC: 10.4 G/DL (ref 6.4–8.2)
PTH-INTACT SERPL-MCNC: 32.5 PG/ML (ref 18.4–80.1)
RBC # BLD AUTO: 3.71 MILLION/UL (ref 3.81–5.12)
RBC MORPH BLD: NORMAL
SODIUM SERPL-SCNC: 133 MMOL/L (ref 136–145)
TSH SERPL DL<=0.05 MIU/L-ACNC: 3.31 UIU/ML (ref 0.36–3.74)
VIT B12 SERPL-MCNC: 487 PG/ML (ref 100–900)
WBC # BLD AUTO: 9.32 THOUSAND/UL (ref 4.31–10.16)

## 2021-10-07 PROCEDURE — 84443 ASSAY THYROID STIM HORMONE: CPT

## 2021-10-07 PROCEDURE — 85027 COMPLETE CBC AUTOMATED: CPT

## 2021-10-07 PROCEDURE — 82607 VITAMIN B-12: CPT

## 2021-10-07 PROCEDURE — 85007 BL SMEAR W/DIFF WBC COUNT: CPT

## 2021-10-07 PROCEDURE — 84425 ASSAY OF VITAMIN B-1: CPT

## 2021-10-07 PROCEDURE — 82306 VITAMIN D 25 HYDROXY: CPT

## 2021-10-07 PROCEDURE — 84590 ASSAY OF VITAMIN A: CPT

## 2021-10-07 PROCEDURE — 82728 ASSAY OF FERRITIN: CPT

## 2021-10-07 PROCEDURE — 83540 ASSAY OF IRON: CPT

## 2021-10-07 PROCEDURE — 80053 COMPREHEN METABOLIC PANEL: CPT

## 2021-10-07 PROCEDURE — 36415 COLL VENOUS BLD VENIPUNCTURE: CPT

## 2021-10-07 PROCEDURE — 82525 ASSAY OF COPPER: CPT

## 2021-10-07 PROCEDURE — 84630 ASSAY OF ZINC: CPT

## 2021-10-07 PROCEDURE — 82746 ASSAY OF FOLIC ACID SERUM: CPT

## 2021-10-07 PROCEDURE — 83970 ASSAY OF PARATHORMONE: CPT

## 2021-10-11 ENCOUNTER — TELEPHONE (OUTPATIENT)
Dept: FAMILY MEDICINE CLINIC | Facility: CLINIC | Age: 71
End: 2021-10-11

## 2021-10-11 LAB — VIT B1 BLD-SCNC: 110 NMOL/L (ref 66.5–200)

## 2021-10-12 ENCOUNTER — IMMUNIZATIONS (OUTPATIENT)
Dept: FAMILY MEDICINE CLINIC | Facility: HOSPITAL | Age: 71
End: 2021-10-12

## 2021-10-12 DIAGNOSIS — Z23 ENCOUNTER FOR IMMUNIZATION: Primary | ICD-10-CM

## 2021-10-12 PROCEDURE — 0001A SARS-COV-2 / COVID-19 MRNA VACCINE (PFIZER-BIONTECH) 30 MCG: CPT

## 2021-10-12 PROCEDURE — 91300 SARS-COV-2 / COVID-19 MRNA VACCINE (PFIZER-BIONTECH) 30 MCG: CPT

## 2021-10-13 LAB — VIT A SERPL-MCNC: 33 UG/DL (ref 22–69.5)

## 2021-10-16 LAB
COPPER SERPL-MCNC: 128 UG/DL (ref 80–158)
ZINC SERPL-MCNC: 72 UG/DL (ref 44–115)

## 2021-10-19 ENCOUNTER — TELEPHONE (OUTPATIENT)
Dept: BARIATRICS | Facility: CLINIC | Age: 71
End: 2021-10-19

## 2021-11-04 ENCOUNTER — CLINICAL SUPPORT (OUTPATIENT)
Dept: FAMILY MEDICINE CLINIC | Facility: CLINIC | Age: 71
End: 2021-11-04
Payer: COMMERCIAL

## 2021-11-04 DIAGNOSIS — Z23 NEED FOR VACCINATION: Primary | ICD-10-CM

## 2021-11-04 PROCEDURE — 90732 PPSV23 VACC 2 YRS+ SUBQ/IM: CPT

## 2021-11-04 PROCEDURE — G0009 ADMIN PNEUMOCOCCAL VACCINE: HCPCS

## 2021-11-18 ENCOUNTER — IMMUNIZATIONS (OUTPATIENT)
Dept: FAMILY MEDICINE CLINIC | Facility: CLINIC | Age: 71
End: 2021-11-18
Payer: COMMERCIAL

## 2021-11-18 DIAGNOSIS — Z23 ENCOUNTER FOR IMMUNIZATION: Primary | ICD-10-CM

## 2021-11-18 PROCEDURE — 90662 IIV NO PRSV INCREASED AG IM: CPT

## 2021-11-18 PROCEDURE — G0008 ADMIN INFLUENZA VIRUS VAC: HCPCS

## 2022-01-10 ENCOUNTER — OFFICE VISIT (OUTPATIENT)
Dept: BARIATRICS | Facility: CLINIC | Age: 72
End: 2022-01-10
Payer: COMMERCIAL

## 2022-01-10 VITALS
SYSTOLIC BLOOD PRESSURE: 122 MMHG | HEART RATE: 75 BPM | HEIGHT: 61 IN | WEIGHT: 146.5 LBS | DIASTOLIC BLOOD PRESSURE: 80 MMHG | BODY MASS INDEX: 27.66 KG/M2 | TEMPERATURE: 97.2 F

## 2022-01-10 DIAGNOSIS — E03.9 HYPOTHYROIDISM, UNSPECIFIED TYPE: ICD-10-CM

## 2022-01-10 DIAGNOSIS — K91.2 POSTSURGICAL MALABSORPTION: ICD-10-CM

## 2022-01-10 DIAGNOSIS — E55.9 VITAMIN D INSUFFICIENCY: ICD-10-CM

## 2022-01-10 DIAGNOSIS — Z48.815 ENCOUNTER FOR SURGICAL AFTERCARE FOLLOWING SURGERY OF DIGESTIVE SYSTEM: Primary | ICD-10-CM

## 2022-01-10 DIAGNOSIS — F30.9 BIPOLAR I DISORDER, SINGLE MANIC EPISODE (HCC): ICD-10-CM

## 2022-01-10 DIAGNOSIS — E66.3 OVERWEIGHT: ICD-10-CM

## 2022-01-10 DIAGNOSIS — M32.9 LUPUS (HCC): ICD-10-CM

## 2022-01-10 DIAGNOSIS — D50.9 IRON DEFICIENCY ANEMIA: ICD-10-CM

## 2022-01-10 DIAGNOSIS — Z98.84 BARIATRIC SURGERY STATUS: ICD-10-CM

## 2022-01-10 PROCEDURE — 1036F TOBACCO NON-USER: CPT | Performed by: PHYSICIAN ASSISTANT

## 2022-01-10 PROCEDURE — 99214 OFFICE O/P EST MOD 30 MIN: CPT | Performed by: PHYSICIAN ASSISTANT

## 2022-01-10 PROCEDURE — 3008F BODY MASS INDEX DOCD: CPT | Performed by: PHYSICIAN ASSISTANT

## 2022-01-10 PROCEDURE — 1160F RVW MEDS BY RX/DR IN RCRD: CPT | Performed by: PHYSICIAN ASSISTANT

## 2022-01-10 NOTE — PATIENT INSTRUCTIONS
· Follow-up in 1 year and with dietician  We kindly ask that your arrive 15 minutes before your scheduled appointment time with your provider to allow our staff to room you, get your vital signs and update your chart  · Get lab work done  Please call the office if you need a script  It is recommended to check with your insurance BEFORE getting labs done to make sure they are covered by your policy  · Call our office if you have any problems with abdominal pain especially associated with fever, chills, nausea, vomiting or any other concerns  · All  Post-bariatric surgery patients should be aware that very small quantities of any alcohol can cause impairment and it is very possible not to feel the effect  The effect can be in the system for several hours  It is also a stomach irritant  · It is advised to AVOID alcohol, Nonsteroidal antiinflammatory drugs (NSAIDS) and nicotine of all forms   Any of these can cause stomach irritation/pain  · Discussed the effects of alcohol on a bariatric patient and the increased impairment risk  · Keep up the good work!

## 2022-01-10 NOTE — PROGRESS NOTES
Assessment/Plan:     Patient ID: Joss Brothers is a 73 y o  female  Bariatric Surgery Status    -s/p Misael-En-Y Gastric Bypass with Dr Manuel Kc on 5/1/2012  Overall doing Well with her weight loss  She is interested in touching base with RD, having more food sensitives and symptoms of dumping with high sugar foods and would like to revisit diet with RD  Lupus - followed by rheumatology     Iron def anemia- followed by heme, continue iron     · Continued/Maintain healthy weight loss with good nutrition intakes  · Adequate hydration with at least 64oz  fluid intake  · Follow diet as discussed  · Follow vitamin and mineral recommendations as reviewed with you  · Exercise as tolerated  · Colonoscopy referral made: utd  · Mammogram: utd    · Follow-up in 1 year and with dietician  We kindly ask that your arrive 15 minutes before your scheduled appointment time with your provider to allow our staff to room you, get your vital signs and update your chart  · Get lab work done  Please call the office if you need a script  It is recommended to check with your insurance BEFORE getting labs done to make sure they are covered by your policy  · Call our office if you have any problems with abdominal pain especially associated with fever, chills, nausea, vomiting or any other concerns  · All  Post-bariatric surgery patients should be aware that very small quantities of any alcohol can cause impairment and it is very possible not to feel the effect  The effect can be in the system for several hours  It is also a stomach irritant  · It is advised to AVOID alcohol, Nonsteroidal antiinflammatory drugs (NSAIDS) and nicotine of all forms   Any of these can cause stomach irritation/pain  · Discussed the effects of alcohol on a bariatric patient and the increased impairment risk  · Keep up the good work!      Postsurgical Malabsorption   -At risk for malabsorption of vitamins/minerals secondary to malabsorption and restriction of intake from bariatric surgery  -Currently taking adequate postop bariatric surgery vitamin supplementation but advised increasing vit D3 daily to 2000 IU - will repeat in a few months   -Last set of bariatric labs completed on 10/2021 per pcp   - iron labs to continue being monitored by hematology   -Patient received education about the importance of adhering to a lifelong supplementation regimen to avoid vitamin/mineral deficiencies      Diagnoses and all orders for this visit:    Encounter for surgical aftercare following surgery of digestive system  -     Vitamin D 25 hydroxy; Future    Bariatric surgery status  -     Vitamin D 25 hydroxy; Future    Postsurgical malabsorption  -     Vitamin D 25 hydroxy; Future    Bipolar I disorder, single manic episode (HCC)    Hypothyroidism, unspecified type    Overweight  -     Vitamin D 25 hydroxy; Future    Vitamin D insufficiency  -     Vitamin D 25 hydroxy; Future    Lupus (HCC)    Iron deficiency anemia         Subjective:      Patient ID: Maggi Mccarthy is a 73 y o  female  -s/p Misael-En-Y Gastric Bypass with Dr Juan F Gold on 5/1/2012  Overall doing Well with her weight loss  Initial: in the 200s  Current: 146  EWL: (Weight loss is ahead of schedule at this post surgical period )  Eliud: in the 130s  Current BMI is Body mass index is 27 68 kg/m²  · Tolerating a regular diet-yes  · Eating at least 60 grams of protein per day-yes  · Following 30/60 minute rule with liquids-yes  · Drinking at least 64 ounces of fluid per day-yes  · Drinking carbonated beverages-no  · Sufficient exercise-no  · Using NSAIDs regularly-no  · Using nicotine-no  · Using alcohol-no  · Supplements: centrum womens (takes 2 ) with iron + calcium + 1000 units D3    · EWL is 87%, which places the patient ahead of schedule for expected post surgical weight loss at this time       The following portions of the patient's history were reviewed and updated as appropriate: allergies, current medications, past family history, past medical history, past social history, past surgical history and problem list     Review of Systems   Constitutional: Positive for fever (low grade fevers in the evenings for the last few months - will discuss with her pcp as well as rheumatology )  Negative for chills  Respiratory: Negative  Cardiovascular: Negative  Gastrointestinal: Negative  Skin: Positive for rash (c/w lupus )  Neurological: Negative  Psychiatric/Behavioral:        Struggles with SAD - follows with psychiatrist          Objective:    /80 (BP Location: Left arm, Patient Position: Sitting, Cuff Size: Standard)   Pulse 75   Temp (!) 97 2 °F (36 2 °C) (Tympanic)   Ht 5' 1" (1 549 m)   Wt 66 5 kg (146 lb 8 oz)   LMP  (LMP Unknown)   BMI 27 68 kg/m²      Physical Exam  Vitals and nursing note reviewed  Constitutional:       Appearance: Normal appearance  She is obese  HENT:      Head: Normocephalic and atraumatic  Eyes:      Extraocular Movements: Extraocular movements intact  Pupils: Pupils are equal, round, and reactive to light  Cardiovascular:      Rate and Rhythm: Normal rate and regular rhythm  Pulmonary:      Effort: Pulmonary effort is normal       Breath sounds: Normal breath sounds  Abdominal:      General: Bowel sounds are normal       Tenderness: There is no abdominal tenderness  Musculoskeletal:         General: Normal range of motion  Cervical back: Normal range of motion  Skin:     General: Skin is warm and dry  Neurological:      General: No focal deficit present  Mental Status: She is alert and oriented to person, place, and time     Psychiatric:         Mood and Affect: Mood normal

## 2022-02-15 ENCOUNTER — TELEPHONE (OUTPATIENT)
Dept: HEMATOLOGY ONCOLOGY | Facility: CLINIC | Age: 72
End: 2022-02-15

## 2022-04-06 ENCOUNTER — TELEPHONE (OUTPATIENT)
Dept: HEMATOLOGY ONCOLOGY | Facility: CLINIC | Age: 72
End: 2022-04-06

## 2022-04-06 ENCOUNTER — TELEPHONE (OUTPATIENT)
Dept: OBGYN CLINIC | Facility: CLINIC | Age: 72
End: 2022-04-06

## 2022-04-06 DIAGNOSIS — M32.9 SYSTEMIC LUPUS ERYTHEMATOSUS, UNSPECIFIED SLE TYPE, UNSPECIFIED ORGAN INVOLVEMENT STATUS (HCC): Primary | ICD-10-CM

## 2022-04-06 NOTE — TELEPHONE ENCOUNTER
CALL RETURN FORM   Reason for patient call? Patient had been hospitalized, medical questions regarding appmt in June  Any new requirements for blood work? Patient's primary oncologist? Dr Cong Villarreal    Name of person the patient was calling for? Dr Cong Villarreal   Any additional information to add, if applicable? Please call [phone]   Informed patient that the message will be forwarded to the team and someone will get back to them as soon as possible    Did you relay this information to the patient?   Yes

## 2022-04-06 NOTE — TELEPHONE ENCOUNTER
Dr Flor Diop has an appointment on 4/20/21 and wants to know if she should have labs done prior to; there is nothing in her chart    Please call her [phone] Thank you

## 2022-04-06 NOTE — TELEPHONE ENCOUNTER
Spoke with patient who was calling in to let us know that she was hospitalized back in September with septic shock  She did state she has had a blood transfusion when she was admitted to the hospital then  She wanted to make sure that there were labs ordered for before her appt in June  I informed the patient that I will make sure all lab orders are in the system  Pt verbalized understanding

## 2022-04-11 ENCOUNTER — APPOINTMENT (OUTPATIENT)
Dept: LAB | Facility: CLINIC | Age: 72
End: 2022-04-11
Payer: COMMERCIAL

## 2022-04-11 DIAGNOSIS — M32.9 SYSTEMIC LUPUS ERYTHEMATOSUS, UNSPECIFIED SLE TYPE, UNSPECIFIED ORGAN INVOLVEMENT STATUS (HCC): ICD-10-CM

## 2022-04-11 DIAGNOSIS — E66.3 OVERWEIGHT: ICD-10-CM

## 2022-04-11 DIAGNOSIS — Z48.815 ENCOUNTER FOR SURGICAL AFTERCARE FOLLOWING SURGERY OF DIGESTIVE SYSTEM: ICD-10-CM

## 2022-04-11 DIAGNOSIS — R59.1 LYMPHADENOPATHY: ICD-10-CM

## 2022-04-11 DIAGNOSIS — E55.9 VITAMIN D INSUFFICIENCY: ICD-10-CM

## 2022-04-11 DIAGNOSIS — K91.2 POSTSURGICAL MALABSORPTION: ICD-10-CM

## 2022-04-11 DIAGNOSIS — Z98.84 BARIATRIC SURGERY STATUS: ICD-10-CM

## 2022-04-11 LAB
25(OH)D3 SERPL-MCNC: 26.8 NG/ML (ref 30–100)
ALBUMIN SERPL BCP-MCNC: 2.9 G/DL (ref 3.5–5)
ALP SERPL-CCNC: 115 U/L (ref 46–116)
ALT SERPL W P-5'-P-CCNC: 15 U/L (ref 12–78)
ANION GAP SERPL CALCULATED.3IONS-SCNC: 6 MMOL/L (ref 4–13)
AST SERPL W P-5'-P-CCNC: 31 U/L (ref 5–45)
BACTERIA UR QL AUTO: ABNORMAL /HPF
BASOPHILS # BLD MANUAL: 0 THOUSAND/UL (ref 0–0.1)
BASOPHILS NFR MAR MANUAL: 0 % (ref 0–1)
BILIRUB SERPL-MCNC: 0.3 MG/DL (ref 0.2–1)
BILIRUB UR QL STRIP: NEGATIVE
BUN SERPL-MCNC: 21 MG/DL (ref 5–25)
C3 SERPL-MCNC: 99.8 MG/DL (ref 90–180)
C4 SERPL-MCNC: 33 MG/DL (ref 10–40)
CALCIUM ALBUM COR SERPL-MCNC: 9 MG/DL (ref 8.3–10.1)
CALCIUM SERPL-MCNC: 8.1 MG/DL (ref 8.3–10.1)
CHLORIDE SERPL-SCNC: 101 MMOL/L (ref 100–108)
CLARITY UR: CLEAR
CO2 SERPL-SCNC: 23 MMOL/L (ref 21–32)
COLOR UR: ABNORMAL
CREAT SERPL-MCNC: 1.22 MG/DL (ref 0.6–1.3)
CREAT UR-MCNC: 50 MG/DL
CRP SERPL QL: 3.9 MG/L
EOSINOPHIL # BLD MANUAL: 0.38 THOUSAND/UL (ref 0–0.4)
EOSINOPHIL NFR BLD MANUAL: 4 % (ref 0–6)
ERYTHROCYTE [DISTWIDTH] IN BLOOD BY AUTOMATED COUNT: 15.9 % (ref 11.6–15.1)
ERYTHROCYTE [SEDIMENTATION RATE] IN BLOOD: 105 MM/HOUR (ref 0–29)
GFR SERPL CREATININE-BSD FRML MDRD: 45 ML/MIN/1.73SQ M
GLUCOSE P FAST SERPL-MCNC: 91 MG/DL (ref 65–99)
GLUCOSE UR STRIP-MCNC: NEGATIVE MG/DL
HCT VFR BLD AUTO: 33.5 % (ref 34.8–46.1)
HGB BLD-MCNC: 10.4 G/DL (ref 11.5–15.4)
HGB UR QL STRIP.AUTO: NEGATIVE
KETONES UR STRIP-MCNC: NEGATIVE MG/DL
LEUKOCYTE ESTERASE UR QL STRIP: ABNORMAL
LYMPHOCYTES # BLD AUTO: 6.23 THOUSAND/UL (ref 0.6–4.47)
LYMPHOCYTES # BLD AUTO: 66 % (ref 14–44)
MCH RBC QN AUTO: 26.9 PG (ref 26.8–34.3)
MCHC RBC AUTO-ENTMCNC: 31 G/DL (ref 31.4–37.4)
MCV RBC AUTO: 87 FL (ref 82–98)
MONOCYTES # BLD AUTO: 0.85 THOUSAND/UL (ref 0–1.22)
MONOCYTES NFR BLD: 9 % (ref 4–12)
NEUTROPHILS # BLD MANUAL: 1.98 THOUSAND/UL (ref 1.85–7.62)
NEUTS BAND NFR BLD MANUAL: 1 % (ref 0–8)
NEUTS SEG NFR BLD AUTO: 20 % (ref 43–75)
NITRITE UR QL STRIP: POSITIVE
NON-SQ EPI CELLS URNS QL MICRO: ABNORMAL /HPF
PH UR STRIP.AUTO: 6 [PH]
PLATELET # BLD AUTO: 210 THOUSANDS/UL (ref 149–390)
PLATELET BLD QL SMEAR: ADEQUATE
PMV BLD AUTO: 11.6 FL (ref 8.9–12.7)
POTASSIUM SERPL-SCNC: 4.2 MMOL/L (ref 3.5–5.3)
PROT SERPL-MCNC: 11.1 G/DL (ref 6.4–8.2)
PROT UR STRIP-MCNC: NEGATIVE MG/DL
PROT UR-MCNC: 11 MG/DL
PROT/CREAT UR: 0.22 MG/G{CREAT} (ref 0–0.1)
RBC # BLD AUTO: 3.87 MILLION/UL (ref 3.81–5.12)
RBC #/AREA URNS AUTO: ABNORMAL /HPF
RBC MORPH BLD: NORMAL
SODIUM SERPL-SCNC: 130 MMOL/L (ref 136–145)
SP GR UR STRIP.AUTO: 1.01 (ref 1–1.03)
UROBILINOGEN UR QL STRIP.AUTO: 0.2 E.U./DL
WBC # BLD AUTO: 9.44 THOUSAND/UL (ref 4.31–10.16)
WBC #/AREA URNS AUTO: ABNORMAL /HPF

## 2022-04-11 PROCEDURE — 86160 COMPLEMENT ANTIGEN: CPT

## 2022-04-11 PROCEDURE — 85027 COMPLETE CBC AUTOMATED: CPT

## 2022-04-11 PROCEDURE — 81001 URINALYSIS AUTO W/SCOPE: CPT | Performed by: INTERNAL MEDICINE

## 2022-04-11 PROCEDURE — 85652 RBC SED RATE AUTOMATED: CPT

## 2022-04-11 PROCEDURE — 82306 VITAMIN D 25 HYDROXY: CPT

## 2022-04-11 PROCEDURE — 86140 C-REACTIVE PROTEIN: CPT

## 2022-04-11 PROCEDURE — 84156 ASSAY OF PROTEIN URINE: CPT | Performed by: INTERNAL MEDICINE

## 2022-04-11 PROCEDURE — 85007 BL SMEAR W/DIFF WBC COUNT: CPT

## 2022-04-11 PROCEDURE — 36415 COLL VENOUS BLD VENIPUNCTURE: CPT

## 2022-04-11 PROCEDURE — 86225 DNA ANTIBODY NATIVE: CPT

## 2022-04-11 PROCEDURE — 80053 COMPREHEN METABOLIC PANEL: CPT

## 2022-04-11 PROCEDURE — 82570 ASSAY OF URINE CREATININE: CPT | Performed by: INTERNAL MEDICINE

## 2022-04-12 ENCOUNTER — TELEPHONE (OUTPATIENT)
Dept: FAMILY MEDICINE CLINIC | Facility: CLINIC | Age: 72
End: 2022-04-12

## 2022-04-12 ENCOUNTER — TELEPHONE (OUTPATIENT)
Dept: RHEUMATOLOGY | Facility: CLINIC | Age: 72
End: 2022-04-12

## 2022-04-12 DIAGNOSIS — N39.0 URINARY TRACT INFECTION WITHOUT HEMATURIA, SITE UNSPECIFIED: Primary | ICD-10-CM

## 2022-04-12 LAB — DSDNA AB SER-ACNC: 2 IU/ML (ref 0–9)

## 2022-04-12 RX ORDER — CEPHALEXIN 500 MG/1
500 CAPSULE ORAL EVERY 12 HOURS SCHEDULED
Qty: 14 CAPSULE | Refills: 0 | Status: SHIPPED | OUTPATIENT
Start: 2022-04-12 | End: 2022-04-19

## 2022-04-12 NOTE — TELEPHONE ENCOUNTER
----- Message from Brenna Brown MD sent at 4/11/2022  4:19 PM EDT -----  Please let her know that urine test looks suggestive of an infection and she should reach out to her primary care physician to see if she needs antibiotics, thanks

## 2022-04-12 NOTE — TELEPHONE ENCOUNTER
Patient called said she saw Rheumatology today and that they did a urine on her and told her there was some infection going on and that she should contact her PCP to see if she should be on an antibiotic    Please advise

## 2022-04-18 RX ORDER — LORAZEPAM 1 MG/1
TABLET ORAL
COMMUNITY
Start: 2022-02-10 | End: 2022-07-18 | Stop reason: ALTCHOICE

## 2022-04-20 ENCOUNTER — TELEPHONE (OUTPATIENT)
Dept: RHEUMATOLOGY | Facility: CLINIC | Age: 72
End: 2022-04-20

## 2022-04-20 ENCOUNTER — OFFICE VISIT (OUTPATIENT)
Dept: RHEUMATOLOGY | Facility: CLINIC | Age: 72
End: 2022-04-20
Payer: COMMERCIAL

## 2022-04-20 VITALS — SYSTOLIC BLOOD PRESSURE: 122 MMHG | HEART RATE: 77 BPM | DIASTOLIC BLOOD PRESSURE: 80 MMHG

## 2022-04-20 DIAGNOSIS — D47.9 ATYPICAL LYMPHOPROLIFERATIVE DISORDER (HCC): ICD-10-CM

## 2022-04-20 DIAGNOSIS — M32.9 SYSTEMIC LUPUS ERYTHEMATOSUS, UNSPECIFIED SLE TYPE, UNSPECIFIED ORGAN INVOLVEMENT STATUS (HCC): Primary | ICD-10-CM

## 2022-04-20 DIAGNOSIS — M85.80 OSTEOPENIA, UNSPECIFIED LOCATION: ICD-10-CM

## 2022-04-20 DIAGNOSIS — N18.32 STAGE 3B CHRONIC KIDNEY DISEASE (HCC): ICD-10-CM

## 2022-04-20 DIAGNOSIS — Z13.820 SCREENING FOR OSTEOPOROSIS: ICD-10-CM

## 2022-04-20 DIAGNOSIS — M17.0 BILATERAL PRIMARY OSTEOARTHRITIS OF KNEE: ICD-10-CM

## 2022-04-20 DIAGNOSIS — M35.00 SJOGREN'S SYNDROME, WITH UNSPECIFIED ORGAN INVOLVEMENT (HCC): ICD-10-CM

## 2022-04-20 DIAGNOSIS — Z79.899 LONG-TERM USE OF PLAQUENIL: ICD-10-CM

## 2022-04-20 DIAGNOSIS — J84.2 LYMPHOCYTIC INTERSTITIAL PNEUMONIA (HCC): ICD-10-CM

## 2022-04-20 PROCEDURE — 99215 OFFICE O/P EST HI 40 MIN: CPT | Performed by: INTERNAL MEDICINE

## 2022-04-20 RX ORDER — CEVIMELINE HYDROCHLORIDE 30 MG/1
30 CAPSULE ORAL 3 TIMES DAILY
Qty: 90 CAPSULE | Refills: 2 | Status: SHIPPED | OUTPATIENT
Start: 2022-04-20

## 2022-04-20 NOTE — TELEPHONE ENCOUNTER
Please start prior authorization for Benlysta infusions for systemic lupus erythematosus at 10 milligram/kg every 4 weeks  She has previously been on this as well as hydroxychloroquine, methotrexate, azathioprine and steroids  Thanks

## 2022-04-20 NOTE — PROGRESS NOTES
Assessment and Plan:   Ms Rojas Son a 53-year-old female with history significant for systemic lupus erythematosus and Sjogren's syndrome diagnosed in her early 45s by Rheumatology in [place], who presents for a follow-up  Vivian Davis currently on hydroxychloroquine 200 mg twice daily      # SLE and Sjogren's syndrome  # Sjogren's related ILD  - Caryle Boss presents today for a follow-up of systemic lupus erythematosus and Sjogren's syndrome which has primarily presented with chronic fatigue, sicca complaints, the recurrent lower extremity skin rash and likely the Sjogren's syndrome related interstitial lung disease   She has been maintained on hydroxychloroquine 200 mg twice daily and I advised her to continue regular follow ups with Ophthalmology for visual field testing  As she is experiencing significant sicca complaints without good response noted with pilocarpine we will discontinue this and I will start her on Evoxac 30 mg 3 times daily  She will continue regular follow-up with ophthalmology and her dentist   As she has been noticing more fatigue as well as the joint pains and previously had good response to Benlysta infusions I advised her that we can consider restarting this at 10 milligram/kg every 4 weeks and a prior authorization will be initiated      - It is reassuring to note over the years she has not presented with progressively worsening complaints and overall appears to be stable at this time, with the most pertinent issues that need to be addressed including the interstitial lung disease (likely lymphocytic interstitial pneumonia as a result of Sjogren's syndrome) and extensive intrathoracic and intra-abdominal lymphadenopathy, for which she did have an axillary lymph node biopsy repeated in February 2021 with the histopathological diagnosis suggestive of an atypical lymphoproliferative disorder  Jeremías Salcido will follow-up with Hematology periodically to continue monitoring this but at this time there does not appear to be concerns for an underlying malignant process   It is reassuring to note that she has been stable from a respiratory standpoint and only complains of dyspnea on exertion which is not debilitating to her  The plan from pulmonology is to continue monitoring for now without the indication to start her on immunosuppressives      - In regards to the lower extremity skin rash which may be hypergammaglobulinemic purpura related to the Sjogren's syndrome she was advised by Dermatology that this can be a benign skin rash and does not need to be treated  We will monitor for now as the rash usually resolves spontaneously  # Osteopenia  - She is due to update a DEXA scan          Plan:  Diagnoses and all orders for this visit:    Systemic lupus erythematosus, unspecified SLE type, unspecified organ involvement status ([facility] [address] )  -     CBC and differential; Future  -     Comprehensive metabolic panel; Future  -     C-reactive protein; Future  -     Sedimentation rate, automated; Future  -     C4 complement; Future  -     C3 complement; Future  -     Anti-DNA antibody, double-stranded; Future  -     Urinalysis with microscopic  -     Protein / creatinine ratio, urine    Sjogren's syndrome, with unspecified organ involvement (HCC)  -     cevimeline (EVOXAC) 30 MG capsule; Take 1 capsule (30 mg total) by mouth 3 (three) times a day    Long-term use of Plaquenil    Lymphocytic interstitial pneumonia (HCC)    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HCC)    Bilateral primary osteoarthritis of knee    Osteopenia, unspecified location  -     DXA bone density spine hip and pelvis; Future    Screening for osteoporosis  -     DXA bone density spine hip and pelvis; Future    Stage 3b chronic kidney disease ([facility] [address] )      Activities as tolerated  Exercise: try to maintain a low impact exercise regimen as much as possible  Continue other medications as prescribed by PCP and other specialists  RTC in 5 months  HPI    INITIAL VISIT NOTE (10/2020):  Ms Armaan Tyalor a 57-year-old female with history significant for systemic lupus erythematosus and Sjogren's syndrome diagnosed in her early 45s by Rheumatology in [place], [facility] [address] [place] [address] [facility]  Kamilah Sharpe is currently not on DMARDs  Kamilah Sharpe is referred today by Dr Vasyl Brewer for a rheumatology consult      Patient reports in her early 45s she started to develop various joint pains which eventually led to the diagnosis of systemic lupus erythematosus and Sjogren's syndrome   She reports surrounding the time of her diagnosis is when the joint pains were most prevalent, but appear to have spontaneously resolved   She did experience joint pains again a few years later but states that this also resolved and recently she has not had any joint related issues   At the time of her diagnosis it appears like she was treated with steroids as well as hydroxychloroquine   She was on the hydroxychloroquine for less than a year at which time it was discontinued due to nausea and other side effects which she cannot recall  Kamilah Sharpe has never been restarted on the hydroxychloroquine following that      The timeline of her rheumatology visits is unclear as we do not have her complete prior records   Based on chart review as well as patient's history, she has been on multiple DMARDs including methotrexate, azathioprine and Benlysta   She states that the methotrexate and azathioprine were prescribed in the past 10-15 years  Bib Coffey is unclear what these medications for specifically prescribed for and if they helped with any of her symptoms   Most recently she was on Benlysta approximately 3 years ago when she was being seen by Dr Jocelyne Newton states that this helped significantly with how she was feeling overall and her general well-being, as well as significantly helped with the fatigue   She was on this for about 7-8 months and unfortunately it was discontinued when she turned 74 as her insurance no longer covered it   I do not see any of her lupus related labs in our system, but on review of her prior rheumatologist's note from 2015 she appeared to have a positive MALATHI at 1:1280, with positive SSA, SSB and RNP antibodies    A rheumatoid factor was also elevated at 105 with an ESR of 84   Her antiphospholipid antibodies were negative        She states over the years it has primarily been the excessive fatigue that has bothered her and only recently has she been dealing with chronic respiratory symptoms as well as intrathoracic and abdominal lymphadenopathy that has been monitored by Oncology  Jean-Paul García terms of her respiratory symptoms she reports shortness of breath with exertion, usually with walking about a block, but at times she may experience shortness of breath with day-to-day activities  Ro Wolf also has a chronic productive cough   On review of her CT chest and abdomen as far back as 2017, she has had abnormal findings concerning for interstitial lung disease as well as the extensive lymphadenopathy   On her most recent CT chest from September 2020 this showed asymmetric bilateral areas of ground-glass opacities and lung cysts likely representing a spectrum of lymphocytic interstitial pneumonia as well as worsening adenopathy in the mediastinum and bilateral hilar regions   She did undergo an IR guided biopsy of an axillary lymph node on 10/08/2020 which did not show any evidence of a lymphoproliferative disorder   The biopsy was not diagnostic   On her most recent follow-up with Hematology/Oncology, as there are no clear features to suggest an underlying malignancy the plan will be to continue monitoring   She has not seen pulmonology yet and is actually establishing for her initial appointment tomorrow  Ro Wolf did undergo a bronchoscopy in December 2017 with cytology negative for malignancy and showing an abundant mixed but predominantly chronic inflammatory infiltrate   Flow cytometry as well as cultures at that time were unrevealing      As mentioned, she is primarily dealing with the abnormal CT chest and abdomen findings at this time   Symptom wise she overall appears to be stable   She does report chronic dry eyes, dry nose and dry mouth which she manages with topical lubricants   Due to the dry nose as well as recent face masking she has been noticing nose bleeds and feels like there may be a "hole" in her nasal septum   She has not yet established with ENT   On occasion she may notice a "butterfly rash" and states that she is photosensitive but strictly avoids sun exposure and utilizes daily sunscreen   She has also been experiencing blister-like skin lesions scattered on her extremities, which heal spontaneously   She has not had any mouth ulcerations in years  Will Simmering denies fevers, chills, night sweats, unintentional weight loss, focal alopecia, inflammatory eye disease, psoriasis, swollen glands, pleuritic chest pain, hemoptysis, abdominal pain, vomiting, diarrhea, blood in stools (is up-to-date with a screening colonoscopy), blood clots, miscarriages, Raynaud's, joint pain/swelling/stiffness, renal disease, neurological disease/seizures or family history of autoimmune disease         2/10/2021:  Patient presents for a follow-up of systemic lupus erythematosus and Sjogren's syndrome   She is currently on hydroxychloroquine 200 mg twice daily that was started at the last office visit in October 2020   We reviewed her testing done following the last visit which showed a positive MALATHI 1:1280 speckled pattern with an SSA and SSB antibody greater than 8   An RNP antibody was 6 8   A rheumatoid factor was elevated at 80   An SPEP showed a possible monoclonal gammopathy but this was not clearly determined on serum immunofixation   An immunoglobulin assay showed hypergammaglobulinemia   A peripheral flow cytometry showed 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    A vitamin-D level was low at 13  5   An ESR and CRP were elevated at 130 and 5 4, respectively   The remainder of the MALATHI specificity, C3, C4, antiphospholipid antibody testing, hepatitis panel, HIV, urinalysis, urine protein creatinine ratio, angiotensin-converting enzyme, anti neutrophilic cytoplasmic antibody, CK and anti CCP antibody were unremarkable   She had a CT chest done yesterday with the results still pending   She is going to be establishing with thoracic surgery soon to consider the mediastinal lymphadenopathy biopsy      She reports overall she has been stable in terms of her symptoms   She mostly describes the dry eyes worse on the right side and unfortunately the Restasis has not been approved by her insurance  Junette Homans also reports dry nose and dry mouth   She will have nose bleeds due to the dryness   She reports chronic fatigue without any improvement seen with starting the hydroxychloroquine   She also reports within the past year she has had approximately 4 episodes of a pinpoint, red and itchy skin rash arise on her bilateral lower extremities   At times she may use topical steroids but has never been prescribed oral steroids for this   The rash will usually resolve spontaneously within 3-4 days  Junette Homans has not been seen by Dermatology for this  Junette Homans was recently seen in the emergency room for an occurrence of the skin rash after receiving the COVID vaccine without any specific treatment and states that the rash eventually resolved spontaneously  Ivan Cheung is no rash today   No other complaints that she describes today         6/8/2021:  Patient presents for a follow-up of systemic lupus erythematosus and Sjogren's syndrome   She temporarily suspended the hydroxychloroquine as she thought that this was causing joint pains in her knees and ankles which did subside with holding the hydroxychloroquine   I did review her labs done following the last office visit which showed an unremarkable CBC, CMP, C3 and C4   The ESR and CRP were elevated at 81 and 4 2, respectively   Since the last office visit she also underwent a left axillary lymph node biopsy which showed an atypical lymphoproliferative disorder  Oliverio Givens is followed up with Hematology/Oncology without any concerns for an underlying malignant process and the plan is to continue monitoring for now      She continues to experience an intermittent skin rash affecting her bilateral lower extremities which she thought may be related to an allergic reaction from dog fur   She states that this will happen irrespective of pet exposure   The rash is usually itchy and at times she may use topical hydrocortisone to help with her symptoms but has not been on oral steroids   She mentions that the rash will appear and remit spontaneously   She continues to report the dry eyes and dry mouth which she is able to manage with the pilocarpine   Other than this no complaints today         9/8/2021:  Patient presents for a follow-up of systemic lupus erythematosus and Sjogren's syndrome   She is currently on hydroxychloroquine 200 mg twice daily that she restarted in July  I reviewed her recently done labs which showed an elevated ESR and CRP of 76 and 6 1, respectively  A urine protein creatinine ratio was stable at 0 24  A urinalysis showed concerns for a urinary tract infection and she is following up with her primary care physician for this  A CBC, CMP, C3, C4 and double-stranded DNA antibody were unremarkable      She reports overall she has been stable from the last office visit and not reporting any new complaints  She does continue to feel fatigued and this is her main symptom  No flare-ups of joint pains, swelling or stiffness  She reports that the rash on her legs has intermittently appeared and she is scheduled to see Dermatology      She does follow with Ophthalmology routinely as she is on the hydroxychloroquine    She is managing the dry eyes and dry mouth with pilocarpine 5 mg 3 times daily and states that this helps  4/20/2022:  Patient presents for a follow-up of systemic lupus erythematosus and Sjogren's syndrome   She is currently on hydroxychloroquine 200 mg twice daily  I reviewed her recently done labs which showed an elevated ESR and CRP of 105 and 3 9, respectively  A urine protein creatinine ratio was stable at 0 22  A urinalysis showed concerns for a urinary tract infection and she is following up with her primary care physician for this  A CBC, CMP, C3, C4 and double-stranded DNA antibody were unremarkable  She did also see Dermatology due to the recurrent skin rash on her lower extremities and had a skin biopsy done on 09/21 which showed:  Perivascular mixed inflammatory infiltrate with numerous neutrophils, scattered eosinophils, and focal erythrocyte extravasation (see note)      Note: The histopathologic findings are non-specific  Definite vasculitis is not seen (early or resolving vasculitis cannot be fully excluded; correlation with immuofluorescence studies is advised)  In the appropriate clinical context, the histopathologic findings may be compatible with hypergammaglobulinemic purpura of [place], though the histopathologic differential diagnosis includes a hypersensitivity reaction (e g , to a drug)  Clinical pathological correlation is essential  Pathogenic microorganisms are not seen on PAS stain  Multiple levels examined  Direct immunofluorescence showed weak epidermal particulate staining seen with IgG as can be seen in subacute lupus erythematosus, Sjogren's or mixed connective tissue disease but the overall granular immune deposits at the basement membrane zone are insufficient for a diagnosis of lupus  The overall vascular staining is weak and indeterminate for vasculitis  Nonspecific vascular standing can occur at anatomically dependent areas      She was advised by Dermatology that this could be a benign skin rash related to the Sjogren's syndrome and as it occurs and remits spontaneously no treatment would be required  She was also hospitalized with sepsis and pneumonia in September 2021  She reports over the past few weeks she has noticed more prominent joint pains  She takes Tylenol once a day which does help her but the effect is not long lasting  She does follow with Ophthalmology routinely as she is on the hydroxychloroquine  She reports the pilocarpine 5 mg 3 times daily did help with the dryness initially but is no longer effective  She is using Systane eye drops and Biotene products but is still experiencing significant dryness issues  No fevers, unintentional weight loss, ongoing skin rashes, mouth/nose ulcers, swollen glands or pleuritic chest pain  The following portions of the patient's history were reviewed and updated as appropriate: allergies, current medications, past family history, past medical history, past social history, past surgical history and problem list       Review of Systems  Constitutional: Negative for weight change, fevers, chills, night sweats  Positive for fatigue  ENT/Mouth: Negative for hearing changes, ear pain, nasal congestion, sinus pain, hoarseness, sore throat, rhinorrhea, swallowing difficulty  Eyes: Negative for pain, redness, discharge, vision changes  Cardiovascular: Negative for chest pain, palpitations  Respiratory: Negative for sputum, wheezing  Positive for shortness of breath and cough  Gastrointestinal: Negative for nausea, vomiting, diarrhea, constipation, pain, heartburn  Genitourinary: Negative for dysuria, urinary frequency, hematuria  Musculoskeletal: As per HPI  Skin: Negative for skin rash, color changes  Neuro: Negative for weakness, numbness, tingling, loss of consciousness  Psych: Negative for anxiety, depression  Heme/Lymph: Negative for easy bruising, bleeding, lymphadenopathy          Past Medical History:   Diagnosis Date    Acute kidney injury superimposed on chronic kidney disease ([facility] [address] ) 9/20/2021    Bipolar 1 disorder ([facility] [address] )     Deaf     Pt lost all hearing in right ear after having [place] measles    Hard of hearing     Pt wears a hearing in left ear   Hypothyroidism     Psychiatric disorder     Sjogren's syndrome ([facility] [address] )     [facility]     Wears glasses     Wears partial dentures        Past Surgical History:   Procedure Laterality Date    BREAST BIOPSY Right     COLONOSCOPY      FRACTURE SURGERY Right     elbow    GASTRIC BYPASS      HYSTERECTOMY Bilateral 1975    IR BIOPSY LYMPH NODE  10/8/2020    OH BRONCHOSCOPY,DIAGNOSTIC N/A 12/8/2017    Procedure: BRONCHOSCOPY;  Surgeon: Checo Yuen MD;  [facility]: [facility]; Service: Pulmonary    OH NEEDLE BIOPSY, LYMPH NODE(S) Left 2/25/2021    Procedure: EXCISION BIOPSY LYMPH NODE: left axillary lymph node biopsy;  Surgeon: Raine Villegas MD;  Location: [facility];  Service: Thoracic    TONSILLECTOMY         Social History     Socioeconomic History    Marital status: /Civil Union     Spouse name: Not on file    Number of children: Not on file    Years of education: Not on file    Highest education level: Not on file   Occupational History    Not on file   Tobacco Use    Smoking status: Never Smoker    Smokeless tobacco: Never Used   Vaping Use    Vaping Use: Never used   Substance and Sexual Activity    Alcohol use: No    Drug use: No    Sexual activity: Yes     Partners: Male     Birth control/protection: None     Comment:    Other Topics Concern    Not on file   Social History Narrative    Not on file     Social Determinants of Health     Financial Resource Strain: Not on file   Food Insecurity: Not on file   Transportation Needs: No Transportation Needs    Lack of Transportation (Medical): No    Lack of Transportation (Non-Medical):  No   Physical Activity: Not on file   Stress: Not on file   Social Connections: Not on file Intimate Partner Violence: Not on file   Housing Stability: Not on file       Family History   Problem Relation Age of Onset    Heart disease Mother     Diabetes Mother     Kidney cancer Mother     Heart disease Father     Stroke Father     Diabetes Father     Cancer Father     Leukemia Half-Sister 48       Allergies   Allergen Reactions    Ciprofloxacin Hives and Swelling     Pt reports that lips and mouth and face swelled   Cymbalta [Duloxetine Hcl] Other (See Comments)     Hallucinations, fatigue, faints    Percocet [Oxycodone-Acetaminophen] Other (See Comments)     Dry mouth - pt states it kept her up all night  States had to continually drink fluids       Current Outpatient Medications:     acetaminophen (TYLENOL) 325 mg tablet, Take 2 tablets (650 mg total) by mouth every 6 (six) hours as needed for mild pain, Disp: , Rfl: 0    albuterol (Ventolin HFA) 90 mcg/act inhaler, Inhale 1 puff every 4 (four) hours as needed for wheezing, Disp: 3 Inhaler, Rfl: 3    benzonatate (TESSALON PERLES) 100 mg capsule, Take 1 capsule (100 mg total) by mouth 3 (three) times a day as needed for cough (Patient not taking: Reported on 1/10/2022 ), Disp: 21 capsule, Rfl: 0    buPROPion (WELLBUTRIN) 100 mg tablet, Take 100 mg by mouth 3 (three) times a day , Disp: , Rfl:     calcium citrate-vitamin D (CITRACAL+D) 315-200 MG-UNIT per tablet, Take 1 tablet by mouth daily  , Disp: , Rfl:     cevimeline (EVOXAC) 30 MG capsule, Take 1 capsule (30 mg total) by mouth 3 (three) times a day, Disp: 90 capsule, Rfl: 2    ferrous sulfate 325 (65 Fe) mg tablet, Take 1 tablet (325 mg total) by mouth 2 (two) times a day with meals, Disp: 60 tablet, Rfl: 0    fluticasone (FLONASE) 50 mcg/act nasal spray, 2 sprays into each nostril daily, Disp: , Rfl: 0    guaiFENesin (MUCINEX) 600 mg 12 hr tablet, Take 1 tablet (600 mg total) by mouth every 12 (twelve) hours, Disp: 30 tablet, Rfl: 0    hydroxychloroquine (PLAQUENIL) 200 mg tablet, Take 1 tablet (200 mg total) by mouth 2 (two) times a day with meals, Disp: 180 tablet, Rfl: 3    levothyroxine 50 mcg tablet, Take 1 tablet (50 mcg total) by mouth daily (Patient not taking: Reported on 1/10/2022 ), Disp: 30 tablet, Rfl: 1    loperamide (IMODIUM A-D) 2 MG tablet, Take 1 tablet (2 mg total) by mouth 4 (four) times a day as needed for diarrhea, Disp: 30 tablet, Rfl: 0    LORazepam (ATIVAN) 1 mg tablet, , Disp: , Rfl:     QUEtiapine (SEROquel) 25 mg tablet, , Disp: , Rfl:     QUEtiapine (SEROquel) 300 mg tablet, Take 300 mg by mouth daily at bedtime  , Disp: , Rfl:     QUEtiapine (SEROquel) 50 mg tablet, , Disp: , Rfl:     temazepam (RESTORIL) 30 mg capsule, Take 2 capsules by mouth daily at bedtime as needed , Disp: , Rfl:       Objective:    Vitals:    04/20/22 1541   BP: 122/80   Pulse: 77       Physical Exam  General: Well appearing, well nourished, in no distress  Oriented x 3, normal mood and affect  Ambulating without difficulty  Skin: Good turgor, no rash today, unusual bruising or prominent lesions  Hair: Normal texture and distribution  Nails: Normal color, no deformities  HEENT:  Head: Normocephalic, atraumatic  Eyes: Conjunctiva clear, sclera non-icteric, EOM intact  Extremities: No amputations or deformities, cyanosis, edema  Neurologic: Alert and oriented  No focal neurological deficits appreciated  Psychiatric: Normal mood and affect  KIRK Martins    Rheumatology

## 2022-04-21 ENCOUNTER — TELEPHONE (OUTPATIENT)
Dept: RHEUMATOLOGY | Facility: CLINIC | Age: 72
End: 2022-04-21

## 2022-04-21 DIAGNOSIS — M32.9 SYSTEMIC LUPUS ERYTHEMATOSUS, UNSPECIFIED SLE TYPE, UNSPECIFIED ORGAN INVOLVEMENT STATUS (HCC): ICD-10-CM

## 2022-04-21 RX ORDER — HYDROXYCHLOROQUINE SULFATE 200 MG/1
200 TABLET, FILM COATED ORAL 2 TIMES DAILY WITH MEALS
Qty: 180 TABLET | Refills: 3 | Status: SHIPPED | OUTPATIENT
Start: 2022-04-21 | End: 2023-04-16

## 2022-04-21 NOTE — TELEPHONE ENCOUNTER
Patient is calling to let Dr Svetlana Paredes know that her insurance approved Benlysta, but her financial obligation would be almost $700  She is going to work with someone at the hospital for assistance        EVOXAC 30 mg needs pre authorization     She also needs a refill on hydroxychloroquine (PLAQUENIL) 200 mg tablet [[id number]]     [facility] is [facility] [address] [facility]    Patient's call back number is [phone]

## 2022-04-21 NOTE — TELEPHONE ENCOUNTER
Anything we need to do for the Benlysta? Please do prior auth for Evoxac 30 mg TID for Sjogren's syndrome, failed pilocarpine  Will refill HCQ

## 2022-04-22 ENCOUNTER — OFFICE VISIT (OUTPATIENT)
Dept: PULMONOLOGY | Facility: CLINIC | Age: 72
End: 2022-04-22
Payer: COMMERCIAL

## 2022-04-22 VITALS
RESPIRATION RATE: 18 BRPM | OXYGEN SATURATION: 96 % | HEIGHT: 61 IN | DIASTOLIC BLOOD PRESSURE: 71 MMHG | HEART RATE: 74 BPM | TEMPERATURE: 96.2 F | BODY MASS INDEX: 27.9 KG/M2 | SYSTOLIC BLOOD PRESSURE: 120 MMHG | WEIGHT: 147.8 LBS

## 2022-04-22 DIAGNOSIS — R59.1 LYMPHADENOPATHY: ICD-10-CM

## 2022-04-22 DIAGNOSIS — R05.3 CHRONIC COUGH: Primary | ICD-10-CM

## 2022-04-22 DIAGNOSIS — R06.02 BREATH, SHORTNESS: ICD-10-CM

## 2022-04-22 PROBLEM — J15.3 PNEUMONIA DUE TO GROUP B STREPTOCOCCUS (HCC): Status: RESOLVED | Noted: 2021-09-19 | Resolved: 2022-04-22

## 2022-04-22 PROCEDURE — 1036F TOBACCO NON-USER: CPT | Performed by: INTERNAL MEDICINE

## 2022-04-22 PROCEDURE — 1160F RVW MEDS BY RX/DR IN RCRD: CPT | Performed by: INTERNAL MEDICINE

## 2022-04-22 PROCEDURE — 99214 OFFICE O/P EST MOD 30 MIN: CPT | Performed by: INTERNAL MEDICINE

## 2022-04-22 PROCEDURE — 3008F BODY MASS INDEX DOCD: CPT | Performed by: INTERNAL MEDICINE

## 2022-04-22 RX ORDER — BENZONATATE 200 MG/1
200 CAPSULE ORAL 3 TIMES DAILY PRN
Qty: 20 CAPSULE | Refills: 1 | Status: SHIPPED | OUTPATIENT
Start: 2022-04-22

## 2022-04-22 NOTE — PROGRESS NOTES
Progress Note - Pulmonary   Lorna Samayoa 73 y o  female MRN: [id number]   Encounter: [id number]      Assessment/Plan:  Patient is a 14-year-old female with past medical history significant for CLL who presents for routine follow-up  The patient had a significant pneumonia in September of 2021 for which he was hospitalized for several days  She notes that it took her several months to recover  Over the last few months, the patient has noted a worsening cough  The cough occurs primarily with exertion in talking with concern for scar type tissue  I have given the patient an incentive spirometer to help stretch out and expand her lungs as part of her respiratory therapy  Recently, may use Tessalon Perles on as-needed basis  Will check repeat CT scan to assess adenopathy as well as updating her previous adenopathy  1  Chronic cough  -     benzonatate (TESSALON) 200 MG capsule; Take 1 capsule (200 mg total) by mouth 3 (three) times a day as needed for cough  -     CT chest without contrast; Future; Expected date: 04/22/2022    2  Lymphadenopathy  -     CT chest without contrast; Future; Expected date: 04/22/2022    Patient may follow up in 6 months or sooner as necessary  Orders:  Orders Placed This Encounter   Procedures    CT chest without contrast     Standing Status:   Future     Standing Expiration Date:   4/22/2026     Scheduling Instructions: There is no prep for this study  Please bring your insurance cards, a form of photo ID and a list of your medications with you  Arrive 15 minutes prior to your appointment time to register  On the day of your test, please bring any prior CT or MRI studies of this area with you that were not performed at a [facility]  To schedule this appointment, please contact Central Scheduling at [phone]  Order Specific Question:   What is the patient's sedation requirement?      Answer:   No Sedation     Order Specific Question:   Release to patient through [employer]     Answer:   Immediate     Order Specific Question:   Reason for Exam (FREE TEXT)     Answer:   chronic cough, mediastinal adenopathy     Subjective: The patient was admitted in 9/2021 w/ pneumonia/sepsis  The patient has had a cough for about the past 3 month  She feels like she did not significantly improve until December  She denies previous similar incidents  She has not tried anything for the cough  She coughs everyday about 4-5x/at a clip  She feels like she may nearly throw up  She does typically wake from sleep  The cough is worse through the middle of the day  The cough becomes worse with more strenuous activity  She is taking allergy medication and prilosec every day  Inhaler Regimen:  Albuterol - occasional 2x/week    Remainder of review of systems negative except as described in HPI  The following portions of the patient's history were reviewed and updated as appropriate: allergies, current medications, past family history, past medical history, past social history, past surgical history and problem list      Objective:   Vitals: Blood pressure 120/71, pulse 74, temperature (!) 96 2 °F (35 7 °C), temperature source Tympanic, resp  rate 18, height 5' 1" (1 549 m), weight 67 kg (147 lb 12 8 oz), SpO2 96 %, not currently breastfeeding , RA, Body mass index is 27 93 kg/m²  Physical Exam  Gen: Pleasant, awake, alert, oriented x 3, no acute distress  HEENT: Mucous membranes moist, no oral lesions, no thrush  NECK: No accessory muscle use, JVP not elevated  Cardiac: RRR, single S1, single S2, no murmurs, no rubs, no gallops  Lungs: CTA b/l  Abdomen: normoactive bowel sounds, soft nontender, nondistended, no rebound or rigidity, no guarding  Extremities: no cyanosis, no clubbing, no LE edema  MSK:  Strength equal in all extremities  Derm:  No rashes/lesions noted  Neuro:  Appropriate mood/affect    Labs:  I have personally reviewed pertinent lab results  Lab Results   Component Value Date    WBC 9 44 04/11/2022    HGB 10 4 (L) 04/11/2022     04/11/2022     Lab Results   Component Value Date    CREATININE 1 22 04/11/2022     Imaging and other studies: I have personally reviewed pertinent reports  and I have personally reviewed pertinent films in PACS  CT Chest 2/9/21  My interpretation:  Stable nodules/adenopathy    Radiology findings:  LUNGS:  Again noted are groundglass areas in the both upper lobes with interspersed small lung cysts  ,  Seen in the left upper lobe, right upper lobe, right lower lobe  A left upper lobe lung nodule, measuring about 4 mm, stable  PLEURA:  No pleural effusion seen  No pneumothorax  HEART/GREAT VESSELS:  Ascending aorta measures 3 4 cm, stable  MEDIASTINUM AND KACEY:  Mediastinal lymphadenopathy seen  Lower right paratracheal lymph node measuring 1 cm  Carinal lymph nodes a 1 1 cm, subcarinal lymph nodes, measuring about 1 cm, stable  Small anterior mediastinal lymph nodes, stable  CHEST WALL AND LOWER NECK:   Small lymph nodes are seen in the lower neck, measuring 1 cm, stable  Lymph node seen in the lower left neck measuring about 1 cm, stable  Bilateral axillary lymphadenopathy noted  A left axillary lymph node seen in image 7 series 2 and in image 61 series 601, measuring 3 5 x 2 1 cm  The previous study this was measuring 2 5 x 2 cm  An additional lymph node is seen in the left axilla in image 14 series 2 which measures 2 7 x 1 6 cm the previous study this was measuring 1 9 x 2 2 cm  Right axilla; there are multiple liver right axillary lymph nodes a dominant right axillary lymph node now measures about 4 4X 2 7 x 3 7 cm  On the previous study this was measuring 4 5 x 2 3 x 3 cm    Pulmonary Function Testing: I have personally reviewed pertinent reports  and I have personally reviewed pertinent films in PACS  10/11/2017:  Normal spirometry  Spirometry:  FEV1/FVC Ratio is 81%    FEV1 is 64% predicted  FVC is 60% predicted  No change with bronchodilators  Lung volumes: Total lung capacity is 77% predicted  Residual volume is 94% predicted  Flow volume loop:  Normal    Jairo Torres

## 2022-04-26 ENCOUNTER — TELEPHONE (OUTPATIENT)
Dept: FAMILY MEDICINE CLINIC | Facility: CLINIC | Age: 72
End: 2022-04-26

## 2022-04-26 NOTE — TELEPHONE ENCOUNTER
Patient called and stated she had a cough, chills yesterday and a fever of 103 4  She stated she would take 2 Tylenol and call us back  She called back and stated her temp is now at 102 (no chills)  I called patient back and stated she needs to go to the ER to be evaluated and maybe get any bloodwork done especially since her temp isn't really going down  I left a message for her

## 2022-04-27 ENCOUNTER — TELEPHONE (OUTPATIENT)
Dept: FAMILY MEDICINE CLINIC | Facility: CLINIC | Age: 72
End: 2022-04-27

## 2022-04-27 NOTE — TELEPHONE ENCOUNTER
Patient called to give the doctor an update to yesterday's call about her having a high fever and not feeling well  She states today she is not 100% but feels so much better  She states her temp was normal when she went to bed last night and this morning still normal, she's not having any more chills and no coughing  She states she took a covid test and it is normal   She just wanted to call to update the doctor

## 2022-05-22 ENCOUNTER — HOSPITAL ENCOUNTER (OUTPATIENT)
Dept: CT IMAGING | Facility: HOSPITAL | Age: 72
Discharge: HOME | End: 2022-05-22
Attending: INTERNAL MEDICINE
Payer: COMMERCIAL

## 2022-05-22 DIAGNOSIS — R05.3 CHRONIC COUGH: ICD-10-CM

## 2022-05-22 DIAGNOSIS — R59.1 LYMPHADENOPATHY: ICD-10-CM

## 2022-05-22 PROCEDURE — 71250 CT THORAX DX C-: CPT

## 2022-05-22 PROCEDURE — G1004 CDSM NDSC: HCPCS

## 2022-06-13 ENCOUNTER — OFFICE VISIT (OUTPATIENT)
Dept: HEMATOLOGY ONCOLOGY | Facility: CLINIC | Age: 72
End: 2022-06-13
Payer: COMMERCIAL

## 2022-06-13 VITALS
HEIGHT: 61 IN | WEIGHT: 147.6 LBS | DIASTOLIC BLOOD PRESSURE: 80 MMHG | OXYGEN SATURATION: 95 % | TEMPERATURE: 97.4 F | HEART RATE: 87 BPM | BODY MASS INDEX: 27.87 KG/M2 | RESPIRATION RATE: 17 BRPM | SYSTOLIC BLOOD PRESSURE: 140 MMHG

## 2022-06-13 DIAGNOSIS — R59.1 LYMPHADENOPATHY: Primary | ICD-10-CM

## 2022-06-13 PROCEDURE — 99213 OFFICE O/P EST LOW 20 MIN: CPT | Performed by: INTERNAL MEDICINE

## 2022-06-13 PROCEDURE — 1160F RVW MEDS BY RX/DR IN RCRD: CPT | Performed by: INTERNAL MEDICINE

## 2022-06-13 PROCEDURE — 1036F TOBACCO NON-USER: CPT | Performed by: INTERNAL MEDICINE

## 2022-06-13 PROCEDURE — 3008F BODY MASS INDEX DOCD: CPT | Performed by: INTERNAL MEDICINE

## 2022-06-13 NOTE — PROGRESS NOTES
[facility] [place]  [address] [address]  [phone]  [address], [id number]  06/13/22    Discussion:   In summary, this is a 53-year-old female with history of lymphadenopathy  February 2021 mediastinoscopy showed lymph node with CLL immunophenotype  Polyclonality 80 was noted, however  Diagnosis is atypical lymphoproliferative disorder  Clinically she is been doing well over the past year  No new problems are noted  She is considering returning to work  Most recent CBC 2 months ago showed normal white count and platelets  Hemoglobin 10 5, slowly but steadily increasing over a year  Recent CT of the chest showed mild mediastinal lymphadenopathy, unchanged over the past year  Some pulmonary cysts are noted without change  Bilateral axillary nodes are likewise stable  Altogether, she seems stable and observation seems most appropriate  I discussed the above with the patient  The patient and her  voiced understanding and agreement   ______________________________________________________________________    Chief Complaint   Patient presents with    Follow-up       HPI:  Oncology History    No history exists  Interval History:  Clinically stable  ECOG-  1 - Symptomatic but completely ambulatory    Review of Systems   Constitutional: Positive for fatigue  Negative for appetite change, diaphoresis and fever  HENT: Negative for sinus pain  Eyes: Negative for discharge  Respiratory: Negative for cough and shortness of breath  Cardiovascular: Negative for chest pain  Gastrointestinal: Negative for abdominal pain, constipation and diarrhea  Endocrine: Negative for cold intolerance  Genitourinary: Negative for difficulty urinating and hematuria  Musculoskeletal: Negative for joint swelling  Skin: Negative for rash     Allergic/Immunologic: Negative for environmental allergies  Neurological: Negative for dizziness and headaches  Hematological: Negative for adenopathy  Psychiatric/Behavioral: Negative for agitation  Past Medical History:   Diagnosis Date    Acute kidney injury superimposed on chronic kidney disease ([facility] ) 9/20/2021    Bipolar 1 disorder ([facility] )     Deaf     Pt lost all hearing in right ear after having [place] measles    Hard of hearing     Pt wears a hearing in left ear      Hypothyroidism     Pneumonia due to Streptococcus [facility]) 9/19/2021    Psychiatric disorder     Sjogren's syndrome ([facility] )     Systemic lupus erythematosus ([facility] )     Wears glasses     Wears partial dentures      Patient Active Problem List   Diagnosis    Bipolar I disorder, single manic episode ([facility] )    Hypothyroidism    Systemic lupus erythematosus (HCC)    Acid reflux disease    Anemia    Breath, shortness    Depression    Dyspnea on exertion    Elevated sedimentation rate    Generalized osteoarthritis    Heart burn    Obesity    Periorbital edema    Polyarthralgia    Postgastrectomy malabsorption    Sjogren's syndrome ([facility] )    Vaginal atrophy    Vitamin D deficiency    Closed avulsion fracture of lateral malleolus of left fibula    Closed fracture of base of fifth metatarsal bone of left foot    Lymphadenopathy    Iron deficiency anemia following bariatric surgery    On belimumab therapy    S/P gastric bypass    Overweight    Encounter for surgical aftercare following surgery of digestive system    Epigastric abdominal pain    Bariatric surgery status    LIP (lipoid interstitial pneumonia) ([facility] )    Annual physical exam    Petechial rash       Current Outpatient Medications:     acetaminophen (TYLENOL) 325 mg tablet, Take 2 tablets (650 mg total) by mouth every 6 (six) hours as needed for mild pain, Disp: , Rfl: 0    albuterol (Ventolin HFA) 90 mcg/act inhaler, Inhale 1 puff every 4 (four) hours as needed for wheezing, Disp: 3 Inhaler, Rfl: 3    benzonatate (TESSALON) 200 MG capsule, Take 1 capsule (200 mg total) by mouth 3 (three) times a day as needed for cough, Disp: 20 capsule, Rfl: 1    buPROPion (WELLBUTRIN) 100 mg tablet, Take 100 mg by mouth 3 (three) times a day , Disp: , Rfl:     calcium citrate-vitamin D (CITRACAL+D) 315-200 MG-UNIT per tablet, Take 1 tablet by mouth daily  , Disp: , Rfl:     cevimeline (EVOXAC) 30 MG capsule, Take 1 capsule (30 mg total) by mouth 3 (three) times a day, Disp: 90 capsule, Rfl: 2    ferrous sulfate 325 (65 Fe) mg tablet, Take 1 tablet (325 mg total) by mouth 2 (two) times a day with meals, Disp: 60 tablet, Rfl: 0    fluticasone (FLONASE) 50 mcg/act nasal spray, 2 sprays into each nostril daily, Disp: , Rfl: 0    guaiFENesin (MUCINEX) 600 mg 12 hr tablet, Take 1 tablet (600 mg total) by mouth every 12 (twelve) hours, Disp: 30 tablet, Rfl: 0    hydroxychloroquine (PLAQUENIL) 200 mg tablet, Take 1 tablet (200 mg total) by mouth 2 (two) times a day with meals, Disp: 180 tablet, Rfl: 3    loperamide (IMODIUM A-D) 2 MG tablet, Take 1 tablet (2 mg total) by mouth 4 (four) times a day as needed for diarrhea, Disp: 30 tablet, Rfl: 0    LORazepam (ATIVAN) 1 mg tablet, , Disp: , Rfl:     QUEtiapine (SEROquel) 300 mg tablet, Take 300 mg by mouth daily at bedtime  , Disp: , Rfl:     QUEtiapine (SEROquel) 50 mg tablet, , Disp: , Rfl:     temazepam (RESTORIL) 30 mg capsule, Take 2 capsules by mouth daily at bedtime as needed , Disp: , Rfl:     benzonatate (TESSALON PERLES) 100 mg capsule, Take 1 capsule (100 mg total) by mouth 3 (three) times a day as needed for cough (Patient not taking: Reported on 1/10/2022 ), Disp: 21 capsule, Rfl: 0    levothyroxine 50 mcg tablet, Take 1 tablet (50 mcg total) by mouth daily (Patient not taking: No sig reported), Disp: 30 tablet, Rfl: 1    QUEtiapine (SEROquel) 25 mg tablet, , Disp: , Rfl:   Allergies   Allergen Reactions    Ciprofloxacin Hives and Swelling     Pt reports that lips and mouth and face swelled   Cymbalta [Duloxetine Hcl] Other (See Comments)     Hallucinations, fatigue, faints    Percocet [Oxycodone-Acetaminophen] Other (See Comments)     Dry mouth - pt states it kept her up all night  States had to continually drink fluids     Past Surgical History:   Procedure Laterality Date    BREAST BIOPSY Right     COLONOSCOPY      FRACTURE SURGERY Right     elbow    GASTRIC BYPASS      HYSTERECTOMY Bilateral 1975    IR BIOPSY LYMPH NODE  10/8/2020    [place] BRONCHOSCOPY,DIAGNOSTIC N/A 12/8/2017    Procedure: BRONCHOSCOPY;  Surgeon: Maria M Cook MD;  [facility]: [facility]; Service: Pulmonary    [place] NEEDLE BIOPSY, LYMPH NODE(S) Left 2/25/2021    Procedure: EXCISION BIOPSY LYMPH NODE: left axillary lymph node biopsy;  Surgeon: Prabhu Rush MD;  Location: [facility];  Service: Thoracic    TONSILLECTOMY       Social History     Objective:  Vitals:    06/13/22 1058   BP: 140/80   BP Location: Left arm   Patient Position: Sitting   Cuff Size: Adult   Pulse: 87   Resp: 17   Temp: (!) 97 4 °F (36 3 °C)   SpO2: 95%   Weight: 67 kg (147 lb 9 6 oz)   Height: 5' 1" (1 549 m)     Physical Exam  Constitutional:       Appearance: She is well-developed  HENT:      Head: Normocephalic and atraumatic  Eyes:      Pupils: Pupils are equal, round, and reactive to light  Cardiovascular:      Rate and Rhythm: Normal rate  Heart sounds: No murmur heard  Pulmonary:      Effort: No respiratory distress  Breath sounds: No wheezing or rales  Abdominal:      General: There is no distension  Palpations: Abdomen is soft  Tenderness: There is no abdominal tenderness  There is no rebound  Musculoskeletal:         General: No tenderness  Cervical back: Neck supple  Lymphadenopathy:      Cervical: No cervical adenopathy  Skin:     General: Skin is warm  Findings: No rash     Neurological:      Mental Status: She is alert and oriented to person, place, and time  Deep Tendon Reflexes: Reflexes normal    Psychiatric:         Thought Content: Thought content normal            Labs: I personally reviewed the labs and imaging pertinent to this patient care

## 2022-06-15 ENCOUNTER — TELEPHONE (OUTPATIENT)
Dept: PULMONOLOGY | Facility: CLINIC | Age: 72
End: 2022-06-15

## 2022-06-15 DIAGNOSIS — J69.1: Primary | ICD-10-CM

## 2022-06-15 RX ORDER — PREDNISONE 10 MG/1
10 TABLET ORAL DAILY
Qty: 60 TABLET | Refills: 1 | Status: SHIPPED | OUTPATIENT
Start: 2022-06-15

## 2022-06-15 NOTE — TELEPHONE ENCOUNTER
A call the patient discussed results of CT scan  There is no acute changes  The findings are chronic  The patient reports he is significantly fatigued dyspneic with a cough  She has no swelling of her legs  The patient feels quite poorly and would be interested in starting prednisone  She notes she previously had a reaction about 15 years ago which had a rash and sore throat  She denied throat swelling  The patient is comfortable trying prednisone again  I have reached out to rheumatology to discuss further

## 2022-07-18 ENCOUNTER — OFFICE VISIT (OUTPATIENT)
Dept: FAMILY MEDICINE CLINIC | Facility: CLINIC | Age: 72
End: 2022-07-18
Payer: COMMERCIAL

## 2022-07-18 VITALS
HEIGHT: 61 IN | BODY MASS INDEX: 28.32 KG/M2 | DIASTOLIC BLOOD PRESSURE: 78 MMHG | OXYGEN SATURATION: 95 % | WEIGHT: 150 LBS | RESPIRATION RATE: 16 BRPM | SYSTOLIC BLOOD PRESSURE: 116 MMHG | HEART RATE: 82 BPM

## 2022-07-18 DIAGNOSIS — Z00.00 WELL ADULT EXAM: ICD-10-CM

## 2022-07-18 DIAGNOSIS — M32.9 SYSTEMIC LUPUS ERYTHEMATOSUS, UNSPECIFIED SLE TYPE, UNSPECIFIED ORGAN INVOLVEMENT STATUS (HCC): ICD-10-CM

## 2022-07-18 DIAGNOSIS — J69.1: ICD-10-CM

## 2022-07-18 DIAGNOSIS — M35.00 SJOGREN'S SYNDROME, WITH UNSPECIFIED ORGAN INVOLVEMENT (HCC): ICD-10-CM

## 2022-07-18 DIAGNOSIS — F31.9 BIPOLAR 1 DISORDER (HCC): ICD-10-CM

## 2022-07-18 DIAGNOSIS — D47.9 ATYPICAL LYMPHOPROLIFERATIVE DISORDER (HCC): Primary | ICD-10-CM

## 2022-07-18 DIAGNOSIS — E61.1 IRON DEFICIENCY: ICD-10-CM

## 2022-07-18 DIAGNOSIS — E55.9 VITAMIN D DEFICIENCY: ICD-10-CM

## 2022-07-18 PROCEDURE — G0439 PPPS, SUBSEQ VISIT: HCPCS | Performed by: FAMILY MEDICINE

## 2022-07-18 PROCEDURE — 1125F AMNT PAIN NOTED PAIN PRSNT: CPT | Performed by: FAMILY MEDICINE

## 2022-07-18 PROCEDURE — 3288F FALL RISK ASSESSMENT DOCD: CPT | Performed by: FAMILY MEDICINE

## 2022-07-18 PROCEDURE — 1160F RVW MEDS BY RX/DR IN RCRD: CPT | Performed by: FAMILY MEDICINE

## 2022-07-18 PROCEDURE — 99214 OFFICE O/P EST MOD 30 MIN: CPT | Performed by: FAMILY MEDICINE

## 2022-07-18 PROCEDURE — 1170F FXNL STATUS ASSESSED: CPT | Performed by: FAMILY MEDICINE

## 2022-07-18 RX ORDER — CHLORHEXIDINE GLUCONATE 0.12 MG/ML
RINSE ORAL
COMMUNITY
Start: 2022-05-10

## 2022-07-18 NOTE — PATIENT INSTRUCTIONS
Medicare Preventive Visit Patient Instructions  Thank you for completing your Welcome to Medicare Visit or Medicare Annual Wellness Visit today  Your next wellness visit will be due in one year (7/19/2023)  The screening/preventive services that you may require over the next 5-10 years are detailed below  Some tests may not apply to you based off risk factors and/or age  Screening tests ordered at today's visit but not completed yet may show as past due  Also, please note that scanned in results may not display below  Preventive Screenings:  Service Recommendations Previous Testing/Comments   Colorectal Cancer Screening  * Colonoscopy    * Fecal Occult Blood Test (FOBT)/Fecal Immunochemical Test (FIT)  * Fecal DNA/Cologuard Test  * Flexible Sigmoidoscopy Age: 54-65 years old   Colonoscopy: every 10 years (may be performed more frequently if at higher risk)  OR  FOBT/FIT: every 1 year  OR  Cologuard: every 3 years  OR  Sigmoidoscopy: every 5 years  Screening may be recommended earlier than age 48 if at higher risk for colorectal cancer  Also, an individualized decision between you and your healthcare provider will decide whether screening between the ages of 74-80 would be appropriate  Colonoscopy: 07/16/2020  FOBT/FIT: Not on file  Cologuard: Not on file  Sigmoidoscopy: Not on file    Screening Current     Breast Cancer Screening Age: 36 years old  Frequency: every 1-2 years  Not required if history of left and right mastectomy Mammogram: 08/12/2020    Screening Current   Cervical Cancer Screening Between the ages of 21-29, pap smear recommended once every 3 years  Between the ages of 33-67, can perform pap smear with HPV co-testing every 5 years     Recommendations may differ for women with a history of total hysterectomy, cervical cancer, or abnormal pap smears in past  Pap Smear: Not on file    Screening Not Indicated   Hepatitis C Screening Once for adults born between 1945 and 1965  More frequently in patients at high risk for Hepatitis C Hep C Antibody: Not on file    Screening Current   Diabetes Screening 1-2 times per year if you're at risk for diabetes or have pre-diabetes Fasting glucose: 91 mg/dL   A1C: No results in last 5 years    Screening Current   Cholesterol Screening Once every 5 years if you don't have a lipid disorder  May order more often based on risk factors  Lipid panel: Not on file          Other Preventive Screenings Covered by Medicare:  1  Abdominal Aortic Aneurysm (AAA) Screening: covered once if your at risk  You're considered to be at risk if you have a family history of AAA  2  Lung Cancer Screening: covers low dose CT scan once per year if you meet all of the following conditions: (1) Age 50-69; (2) No signs or symptoms of lung cancer; (3) Current smoker or have quit smoking within the last 15 years; (4) You have a tobacco smoking history of at least 30 pack years (packs per day multiplied by number of years you smoked); (5) You get a written order from a healthcare provider  3  Glaucoma Screening: covered annually if you're considered high risk: (1) You have diabetes OR (2) Family history of glaucoma OR (3)  aged 48 and older OR (3)  American aged 72 and older  3  Osteoporosis Screening: covered every 2 years if you meet one of the following conditions: (1) You're estrogen deficient and at risk for osteoporosis based off medical history and other findings; (2) Have a vertebral abnormality; (3) On glucocorticoid therapy for more than 3 months; (4) Have primary hyperparathyroidism; (5) On osteoporosis medications and need to assess response to drug therapy  · Last bone density test (DXA Scan): 03/18/2020   5  HIV Screening: covered annually if you're between the age of 15-65  Also covered annually if you are younger than 13 and older than 72 with risk factors for HIV infection   For pregnant patients, it is covered up to 3 times per pregnancy  Immunizations:  Immunization Recommendations   Influenza Vaccine Annual influenza vaccination during flu season is recommended for all persons aged >= 6 months who do not have contraindications   Pneumococcal Vaccine (Prevnar and Pneumovax)  * Prevnar = PCV13  * Pneumovax = PPSV23   Adults 25-60 years old: 1-3 doses may be recommended based on certain risk factors  Adults 72 years old: Prevnar (PCV13) vaccine recommended followed by Pneumovax (PPSV23) vaccine  If already received PPSV23 since turning 65, then PCV13 recommended at least one year after PPSV23 dose  Hepatitis B Vaccine 3 dose series if at intermediate or high risk (ex: diabetes, end stage renal disease, liver disease)   Tetanus (Td) Vaccine - COST NOT COVERED BY MEDICARE PART B Following completion of primary series, a booster dose should be given every 10 years to maintain immunity against tetanus  Td may also be given as tetanus wound prophylaxis  Tdap Vaccine - COST NOT COVERED BY MEDICARE PART B Recommended at least once for all adults  For pregnant patients, recommended with each pregnancy  Shingles Vaccine (Shingrix) - COST NOT COVERED BY MEDICARE PART B  2 shot series recommended in those aged 48 and above     Health Maintenance Due:      Topic Date Due    Breast Cancer Screening: Mammogram  08/12/2021    Colorectal Cancer Screening  07/16/2025    Hepatitis C Screening  Completed     Immunizations Due:      Topic Date Due    Influenza Vaccine (1) 09/01/2022     Advance Directives   What are advance directives? Advance directives are legal documents that state your wishes and plans for medical care  These plans are made ahead of time in case you lose your ability to make decisions for yourself  Advance directives can apply to any medical decision, such as the treatments you want, and if you want to donate organs  What are the types of advance directives?   There are many types of advance directives, and each state has rules about how to use them  You may choose a combination of any of the following:  · Living will: This is a written record of the treatment you want  You can also choose which treatments you do not want, which to limit, and which to stop at a certain time  This includes surgery, medicine, IV fluid, and tube feedings  · Durable power of  for healthcare [facility]): This is a written record that states who you want to make healthcare choices for you when you are unable to make them for yourself  This person, called a proxy, is usually a family member or a friend  You may choose more than 1 proxy  · Do not resuscitate (DNR) order:  A DNR order is used in case your heart stops beating or you stop breathing  It is a request not to have certain forms of treatment, such as CPR  A DNR order may be included in other types of advance directives  · Medical directive: This covers the care that you want if you are in a coma, near death, or unable to make decisions for yourself  You can list the treatments you want for each condition  Treatment may include pain medicine, surgery, blood transfusions, dialysis, IV or tube feedings, and a ventilator (breathing machine)  · Values history: This document has questions about your views, beliefs, and how you feel and think about life  This information can help others choose the care that you would choose  Why are advance directives important? An advance directive helps you control your care  Although spoken wishes may be used, it is better to have your wishes written down  Spoken wishes can be misunderstood, or not followed  Treatments may be given even if you do not want them  An advance directive may make it easier for your family to make difficult choices about your care     Weight Management   Why it is important to manage your weight:  Being overweight increases your risk of health conditions such as heart disease, high blood pressure, type 2 diabetes, and certain types of cancer  It can also increase your risk for osteoarthritis, sleep apnea, and other respiratory problems  Aim for a slow, steady weight loss  Even a small amount of weight loss can lower your risk of health problems  How to lose weight safely:  A safe and healthy way to lose weight is to eat fewer calories and get regular exercise  You can lose up about 1 pound a week by decreasing the number of calories you eat by 500 calories each day  Healthy meal plan for weight management:  A healthy meal plan includes a variety of foods, contains fewer calories, and helps you stay healthy  A healthy meal plan includes the following:  · Eat whole-grain foods more often  A healthy meal plan should contain fiber  Fiber is the part of grains, fruits, and vegetables that is not broken down by your body  Whole-grain foods are healthy and provide extra fiber in your diet  Some examples of whole-grain foods are whole-wheat breads and pastas, oatmeal, brown rice, and bulgur  · Eat a variety of vegetables every day  Include dark, leafy greens such as spinach, kale, kavon greens, and mustard greens  Eat yellow and orange vegetables such as carrots, sweet potatoes, and winter squash  · Eat a variety of fruits every day  Choose fresh or canned fruit (canned in its own juice or light syrup) instead of juice  Fruit juice has very little or no fiber  · Eat low-fat dairy foods  Drink fat-free (skim) milk or 1% milk  Eat fat-free yogurt and low-fat cottage cheese  Try low-fat cheeses such as mozzarella and other reduced-fat cheeses  · Choose meat and other protein foods that are low in fat  Choose beans or other legumes such as split peas or lentils  Choose fish, skinless poultry (chicken or turkey), or lean cuts of red meat (beef or pork)  Before you cook meat or poultry, cut off any visible fat  · Use less fat and oil  Try baking foods instead of frying them   Add less fat, such as margarine, sour cream, regular salad dressing and mayonnaise to foods  Eat fewer high-fat foods  Some examples of high-fat foods include french fries, doughnuts, ice cream, and cakes  · Eat fewer sweets  Limit foods and drinks that are high in sugar  This includes candy, cookies, regular soda, and sweetened drinks  Exercise:  Exercise at least 30 minutes per day on most days of the week  Some examples of exercise include walking, biking, dancing, and swimming  You can also fit in more physical activity by taking the stairs instead of the elevator or parking farther away from stores  Ask your healthcare provider about the best exercise plan for you  © Copyright [address] 2018 Information is for End User's use only and may not be sold, redistributed or otherwise used for commercial purposes   All illustrations and images included in CareNotes® are the copyrighted property of A D A M , Inc  or [address] [place] [address]

## 2022-07-18 NOTE — PROGRESS NOTES
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[facility] )    -  Primary    Well adult exam        Relevant Orders    Lipid Panel with Direct LDL reflex    Bipolar 1 disorder ([facility] )        BMI 28 0-28 9,adult        Iron deficiency        Relevant Orders    Iron Panel (Includes Ferritin, Iron Sat%, Iron, and TIBC)          Things are stable   We updated her dx from CLL to atypical lymphroliferative disorder   May increase the predisone since its not affecting her as much as she hoped     Mild arthritis in the hands   Takes tyelnool 500mg if needs   Working 12-20hrs a week        Preventive health issues were discussed with patient, and age appropriate screening tests were ordered as noted in patient's After Visit Summary  Personalized health advice and appropriate referrals for health education or preventive services given if needed, as noted in patient's After Visit Summary  History of Present Illness:     Patient presents for a Medicare Wellness Visit    Here to go over chronic issues and labs / imaging studies if applicable  Patient Care Team:  Elliott Cruz MD as PCP - General (Family Medicine)  Elliott Cruz MD as PCP - [address] [place] [address],[address] ([place])  Katheren Kirsten, MD Alcide Edis, MD Antonette New, MD Krystal Ada, PA-C Isadora Haymaker, MD Guadelupe Gaucher, MD     Review of Systems:     Review of Systems   Constitutional: Positive for fatigue  Negative for fever and unexpected weight change  HENT: Negative for nosebleeds and trouble swallowing  Eyes: Negative for visual disturbance  Respiratory: Negative for chest tightness and shortness of breath  Cardiovascular: Negative for chest pain, palpitations and leg swelling  Gastrointestinal: Negative for abdominal pain, constipation, diarrhea and nausea  Endocrine: Negative for cold intolerance  Genitourinary: Negative for dysuria and urgency  Musculoskeletal: Negative for joint swelling and myalgias  Skin: Negative for rash  Neurological: Negative for tremors, seizures and syncope  Hematological: Does not bruise/bleed easily  Psychiatric/Behavioral: Negative for hallucinations and suicidal ideas  Problem List:     Patient Active Problem List   Diagnosis    Bipolar I disorder, single manic episode (HCC)    Hypothyroidism    Systemic lupus erythematosus (HCC)    Acid reflux disease    Anemia    Breath, shortness    Depression    Dyspnea on exertion    Elevated sedimentation rate    Generalized osteoarthritis    Heart burn    Obesity    Periorbital edema    Polyarthralgia    Postgastrectomy malabsorption    Sjogren's syndrome ([facility] )    Vaginal atrophy    Vitamin D deficiency    Closed avulsion fracture of lateral malleolus of left fibula    Closed fracture of base of fifth metatarsal bone of left foot    Lymphadenopathy    Iron deficiency anemia following bariatric surgery    On belimumab therapy    S/P gastric bypass    Overweight    Encounter for surgical aftercare following surgery of digestive system    Epigastric abdominal pain    Bariatric surgery status    LIP (lipoid interstitial pneumonia) ([facility] )    Annual physical exam    Petechial rash      Past Medical and Surgical History:     Past Medical History:   Diagnosis Date    Acute kidney injury superimposed on chronic kidney disease ([address] ) 9/20/2021    Bipolar 1 disorder ([address] )     Deaf     Pt lost all hearing in right ear after having [place] measles    Hard of hearing     Pt wears a hearing in left ear      Hypothyroidism     Pneumonia due to Streptococcus [facility]) 9/19/2021    Psychiatric disorder     [facility]jogren's syndrome ([facility] )     Systemic lupus erythematosus ([facility] )     Wears glasses     Wears partial dentures      Past Surgical History:   Procedure Laterality Date    BREAST BIOPSY Right     COLONOSCOPY      FRACTURE SURGERY Right     elbow    GASTRIC BYPASS      HYSTERECTOMY Bilateral 1975    IR BIOPSY LYMPH NODE  10/8/2020    ND BRONCHOSCOPY,DIAGNOSTIC N/A 12/8/2017    Procedure: BRONCHOSCOPY;  Surgeon: Karla Saucedo MD;  [facility]; Service: Pulmonary    ND NEEDLE BIOPSY, LYMPH NODE(S) Left 2/25/2021    Procedure: EXCISION BIOPSY LYMPH NODE: left axillary lymph node biopsy;  Surgeon: Toribio Bowser MD;  [facility];  Service: Thoracic    TONSILLECTOMY        Family History:     Family History   Problem Relation Age of Onset    Heart disease Mother     Diabetes Mother     Kidney cancer Mother     Heart disease Father     Stroke Father     Diabetes Father     Cancer Father     Leukemia Half-Sister 48      Social History:     Social History     Socioeconomic History    Marital status: /Civil Union     Spouse name: None    Number of children: None    Years of education: None    Highest education level: None   Occupational History    None   Tobacco Use    Smoking status: Never Smoker    Smokeless tobacco: Never Used   Vaping Use    Vaping Use: Never used   Substance and Sexual Activity    Alcohol use: No    Drug use: No    Sexual activity: Yes     Partners: Male     Birth control/protection: None     Comment:    Other Topics Concern    None   Social History Narrative    None     Social Determinants of Health     Financial Resource Strain: Not on file   Food Insecurity: Not on file   Transportation Needs: No Transportation Needs    Lack of Transportation (Medical): No    Lack of Transportation (Non-Medical):  No   Physical Activity: Not on file   Stress: Not on file   Social Connections: Not on file   Intimate Partner Violence: Not on file Housing Stability: Not on file      Medications and Allergies:     Current Outpatient Medications   Medication Sig Dispense Refill    acetaminophen (TYLENOL) 325 mg tablet Take 2 tablets (650 mg total) by mouth every 6 (six) hours as needed for mild pain  0    albuterol (Ventolin HFA) 90 mcg/act inhaler Inhale 1 puff every 4 (four) hours as needed for wheezing 3 Inhaler 3    benzonatate (TESSALON) 200 MG capsule Take 1 capsule (200 mg total) by mouth 3 (three) times a day as needed for cough 20 capsule 1    buPROPion (WELLBUTRIN) 100 mg tablet Take 100 mg by mouth 3 (three) times a day   calcium citrate-vitamin D (CITRACAL+D) 315-200 MG-UNIT per tablet Take 1 tablet by mouth daily   cevimeline (EVOXAC) 30 MG capsule Take 1 capsule (30 mg total) by mouth 3 (three) times a day 90 capsule 2    chlorhexidine (PERIDEX) 0 12 % solution       ferrous sulfate 325 (65 Fe) mg tablet Take 1 tablet (325 mg total) by mouth 2 (two) times a day with meals 60 tablet 0    fluticasone (FLONASE) 50 mcg/act nasal spray 2 sprays into each nostril daily  0    guaiFENesin (MUCINEX) 600 mg 12 hr tablet Take 1 tablet (600 mg total) by mouth every 12 (twelve) hours 30 tablet 0    hydroxychloroquine (PLAQUENIL) 200 mg tablet Take 1 tablet (200 mg total) by mouth 2 (two) times a day with meals 180 tablet 3    predniSONE 10 mg tablet Take 1 tablet (10 mg total) by mouth daily 60 tablet 1    QUEtiapine (SEROquel) 300 mg tablet Take 300 mg by mouth daily at bedtime   QUEtiapine (SEROquel) 50 mg tablet       temazepam (RESTORIL) 30 mg capsule Take 2 capsules by mouth daily at bedtime as needed       levothyroxine 50 mcg tablet Take 1 tablet (50 mcg total) by mouth daily (Patient not taking: No sig reported) 30 tablet 1     No current facility-administered medications for this visit  Allergies   Allergen Reactions    Ciprofloxacin Hives and Swelling     Pt reports that lips and mouth and face swelled      Cymbalta [Duloxetine Hcl] Other (See Comments)     Hallucinations, fatigue, faints    Percocet [Oxycodone-Acetaminophen] Other (See Comments)     Dry mouth - pt states it kept her up all night  States had to continually drink fluids      Immunizations:     Immunization History   Administered Date(s) Administered    COVID-19 PFIZER VACCINE 0 3 ML IM 01/09/2021, 01/28/2021, 10/12/2021    COVID-19 Pfizer vac ([place]-sucrose, gray cap) 12 yr+ IM 07/15/2022    INFLUENZA 09/26/2013, 10/12/2017, 09/27/2018, 10/10/2019    Influenza Split High Dose Preservative Free IM 10/10/2019    Influenza, high dose seasonal 0 7 mL 10/05/2020, 11/18/2021    Pneumococcal Polysaccharide PPV23 11/04/2021    Zoster 09/26/2013      Health Maintenance:         Topic Date Due    Breast Cancer Screening: Mammogram  08/12/2021    Colorectal Cancer Screening  07/16/2025    Hepatitis C Screening  Completed         Topic Date Due    Influenza Vaccine (1) 09/01/2022      Medicare Screening Tests and Risk Assessments:     Jovi Izquierdo is here for her Subsequent Wellness visit  Last Medicare Wellness visit information reviewed, patient interviewed and updates made to the record today  Health Risk Assessment:   Patient rates overall health as good  Patient feels that their physical health rating is slightly better  Patient is very satisfied with their life  Eyesight was rated as slightly worse  Hearing was rated as slightly worse  Patient feels that their emotional and mental health rating is same  Patients states they are never, rarely angry  Patient states they are often unusually tired/fatigued  Pain experienced in the last 7 days has been some  Patient's pain rating has been 4/10  Patient states that she has experienced no weight loss or gain in last 6 months  Fall Risk Screening:    In the past year, patient has experienced: no history of falling in past year      Urinary Incontinence Screening:   Patient has not leaked urine accidently in the last six months  Home Safety:  Patient does not have trouble with stairs inside or outside of their home  Patient has working smoke alarms and has working carbon monoxide detector  Home safety hazards include: none  Nutrition:   Current diet is No Added Salt  Medications:   Patient is currently taking over-the-counter supplements  OTC medications include: multivitamin  Patient is able to manage medications  Activities of Daily Living (ADLs)/Instrumental Activities of Daily Living (IADLs):   Walk and transfer into and out of bed and chair?: Yes  Dress and groom yourself?: Yes    Bathe or shower yourself?: Yes    Feed yourself? Yes  Do your laundry/housekeeping?: Yes  Manage your money, pay your bills and track your expenses?: Yes  Make your own meals?: Yes    Do your own shopping?: Yes    Previous Hospitalizations:   Any hospitalizations or ED visits within the last 12 months?: Yes    How many hospitalizations have you had in the last year?: 1-2    Advance Care Planning:   Living will: Yes    Durable POA for healthcare:  Yes    Advanced directive: Yes      Cognitive Screening:   Provider or family/friend/caregiver concerned regarding cognition?: No    PREVENTIVE SCREENINGS      Cardiovascular Screening:      Due for: Lipid Panel      Diabetes Screening:     General: Screening Current      Colorectal Cancer Screening:     General: Screening Current      Breast Cancer Screening:     General: Screening Current      Cervical Cancer Screening:    General: Screening Not Indicated      Abdominal Aortic Aneurysm (AAA) Screening:        General: Screening Not Indicated      Lung Cancer Screening:     General: Screening Not Indicated      Hepatitis C Screening:    General: Screening Current    Hep C Screening Accepted: No     Screening, Brief Intervention, and Referral to Treatment (SBIRT)    Screening  Typical number of drinks in a day: 0  Typical number of drinks in a week: 0  Interpretation: Low risk drinking behavior  AUDIT-C Screenin) How often did you have a drink containing alcohol in the past year? never  2) How many drinks did you have on a typical day when you were drinking in the past year? 0  3) How often did you have 6 or more drinks on one occasion in the past year? never    AUDIT-C Score: 0  Interpretation: Score 0-2 (female): Negative screen for alcohol misuse    Single Item Drug Screening:  How often have you used an illegal drug (including marijuana) or a prescription medication for non-medical reasons in the past year? never    Single Item Drug Screen Score: 0  Interpretation: Negative screen for possible drug use disorder    Brief Intervention  Alcohol & drug use screenings were reviewed  No concerns regarding substance use disorder identified       No exam data present     Physical Exam:     /78 (BP Location: Right arm, Patient Position: Sitting, Cuff Size: Standard)   Pulse 82   Resp 16   Ht 5' 1" (1 549 m)   Wt 68 kg (150 lb)   LMP  (LMP Unknown)   SpO2 95%   BMI 28 34 kg/m²     Physical Exam     Shruti Mccullough MD

## 2022-08-12 ENCOUNTER — TELEPHONE (OUTPATIENT)
Dept: PULMONOLOGY | Facility: CLINIC | Age: 72
End: 2022-08-12

## 2022-08-18 ENCOUNTER — HOSPITAL ENCOUNTER (OUTPATIENT)
Dept: MAMMOGRAPHY | Facility: HOSPITAL | Age: 72
Discharge: HOME | End: 2022-08-18
Attending: FAMILY MEDICINE
Payer: COMMERCIAL

## 2022-08-18 VITALS — BODY MASS INDEX: 28.32 KG/M2 | WEIGHT: 150 LBS | HEIGHT: 61 IN

## 2022-08-18 DIAGNOSIS — Z12.31 SCREENING MAMMOGRAM, ENCOUNTER FOR: ICD-10-CM

## 2022-08-18 PROCEDURE — 77067 SCR MAMMO BI INCL CAD: CPT

## 2022-08-18 PROCEDURE — 77063 BREAST TOMOSYNTHESIS BI: CPT

## 2022-10-10 ENCOUNTER — TELEPHONE (OUTPATIENT)
Dept: RHEUMATOLOGY | Facility: CLINIC | Age: 72
End: 2022-10-10

## 2022-10-10 ENCOUNTER — APPOINTMENT (OUTPATIENT)
Dept: LAB | Facility: CLINIC | Age: 72
End: 2022-10-10
Payer: COMMERCIAL

## 2022-10-10 ENCOUNTER — TELEPHONE (OUTPATIENT)
Dept: FAMILY MEDICINE CLINIC | Facility: CLINIC | Age: 72
End: 2022-10-10

## 2022-10-10 DIAGNOSIS — Z00.00 WELL ADULT EXAM: ICD-10-CM

## 2022-10-10 DIAGNOSIS — E61.1 IRON DEFICIENCY: ICD-10-CM

## 2022-10-10 DIAGNOSIS — M32.9 SYSTEMIC LUPUS ERYTHEMATOSUS, UNSPECIFIED SLE TYPE, UNSPECIFIED ORGAN INVOLVEMENT STATUS (HCC): ICD-10-CM

## 2022-10-10 DIAGNOSIS — E55.9 VITAMIN D DEFICIENCY: ICD-10-CM

## 2022-10-10 LAB
25(OH)D3 SERPL-MCNC: 24.6 NG/ML (ref 30–100)
ALBUMIN SERPL BCP-MCNC: 3.7 G/DL (ref 3.5–5)
ALP SERPL-CCNC: 104 U/L (ref 34–104)
ALT SERPL W P-5'-P-CCNC: 10 U/L (ref 7–52)
ANION GAP SERPL CALCULATED.3IONS-SCNC: 5 MMOL/L (ref 4–13)
AST SERPL W P-5'-P-CCNC: 24 U/L (ref 13–39)
BACTERIA UR QL AUTO: ABNORMAL /HPF
BASOPHILS # BLD MANUAL: 0 THOUSAND/UL (ref 0–0.1)
BASOPHILS NFR MAR MANUAL: 0 % (ref 0–1)
BILIRUB SERPL-MCNC: 0.6 MG/DL (ref 0.2–1)
BILIRUB UR QL STRIP: NEGATIVE
BUN SERPL-MCNC: 18 MG/DL (ref 5–25)
C3 SERPL-MCNC: 109 MG/DL (ref 90–180)
C4 SERPL-MCNC: 24 MG/DL (ref 10–40)
CALCIUM SERPL-MCNC: 8.9 MG/DL (ref 8.4–10.2)
CHLORIDE SERPL-SCNC: 101 MMOL/L (ref 96–108)
CHOLEST SERPL-MCNC: 183 MG/DL
CLARITY UR: CLEAR
CO2 SERPL-SCNC: 25 MMOL/L (ref 21–32)
COLOR UR: ABNORMAL
CREAT SERPL-MCNC: 0.97 MG/DL (ref 0.6–1.3)
CREAT UR-MCNC: 47 MG/DL
CRP SERPL QL: 4.3 MG/L
EOSINOPHIL # BLD MANUAL: 0.21 THOUSAND/UL (ref 0–0.4)
EOSINOPHIL NFR BLD MANUAL: 2 % (ref 0–6)
ERYTHROCYTE [DISTWIDTH] IN BLOOD BY AUTOMATED COUNT: 17.3 % (ref 11.6–15.1)
ERYTHROCYTE [SEDIMENTATION RATE] IN BLOOD: 89 MM/HOUR (ref 0–29)
FERRITIN SERPL-MCNC: 9 NG/ML (ref 8–388)
GFR SERPL CREATININE-BSD FRML MDRD: 59 ML/MIN/1.73SQ M
GLUCOSE P FAST SERPL-MCNC: 85 MG/DL (ref 65–99)
GLUCOSE UR STRIP-MCNC: NEGATIVE MG/DL
HCT VFR BLD AUTO: 31.2 % (ref 34.8–46.1)
HDLC SERPL-MCNC: 45 MG/DL
HGB BLD-MCNC: 9.6 G/DL (ref 11.5–15.4)
HGB UR QL STRIP.AUTO: NEGATIVE
IRON SATN MFR SERPL: 16 % (ref 15–50)
IRON SERPL-MCNC: 42 UG/DL (ref 50–170)
KETONES UR STRIP-MCNC: NEGATIVE MG/DL
LDLC SERPL CALC-MCNC: 121 MG/DL (ref 0–100)
LEUKOCYTE ESTERASE UR QL STRIP: ABNORMAL
LYMPHOCYTES # BLD AUTO: 5.98 THOUSAND/UL (ref 0.6–4.47)
LYMPHOCYTES # BLD AUTO: 58 % (ref 14–44)
MCH RBC QN AUTO: 24.6 PG (ref 26.8–34.3)
MCHC RBC AUTO-ENTMCNC: 30.8 G/DL (ref 31.4–37.4)
MCV RBC AUTO: 80 FL (ref 82–98)
MONOCYTES # BLD AUTO: 0.31 THOUSAND/UL (ref 0–1.22)
MONOCYTES NFR BLD: 3 % (ref 4–12)
MYELOCYTES NFR BLD MANUAL: 1 % (ref 0–1)
NEUTROPHILS # BLD MANUAL: 3.71 THOUSAND/UL (ref 1.85–7.62)
NEUTS SEG NFR BLD AUTO: 36 % (ref 43–75)
NITRITE UR QL STRIP: POSITIVE
NON-SQ EPI CELLS URNS QL MICRO: ABNORMAL /HPF
PH UR STRIP.AUTO: 6 [PH]
PLATELET # BLD AUTO: 288 THOUSANDS/UL (ref 149–390)
PLATELET BLD QL SMEAR: ADEQUATE
PMV BLD AUTO: 9 FL (ref 8.9–12.7)
POTASSIUM SERPL-SCNC: 4 MMOL/L (ref 3.5–5.3)
PROT SERPL-MCNC: 9.4 G/DL (ref 6.4–8.4)
PROT UR STRIP-MCNC: NEGATIVE MG/DL
PROT UR-MCNC: 7 MG/DL
PROT/CREAT UR: 0.15 MG/G{CREAT} (ref 0–0.1)
RBC # BLD AUTO: 3.91 MILLION/UL (ref 3.81–5.12)
RBC #/AREA URNS AUTO: ABNORMAL /HPF
RBC MORPH BLD: NORMAL
SODIUM SERPL-SCNC: 131 MMOL/L (ref 135–147)
SP GR UR STRIP.AUTO: 1.01 (ref 1–1.03)
TIBC SERPL-MCNC: 269 UG/DL (ref 250–450)
TRIGL SERPL-MCNC: 84 MG/DL
UROBILINOGEN UR STRIP-ACNC: <2 MG/DL
WBC # BLD AUTO: 10.31 THOUSAND/UL (ref 4.31–10.16)
WBC #/AREA URNS AUTO: ABNORMAL /HPF

## 2022-10-10 PROCEDURE — 85007 BL SMEAR W/DIFF WBC COUNT: CPT

## 2022-10-10 PROCEDURE — 82306 VITAMIN D 25 HYDROXY: CPT

## 2022-10-10 PROCEDURE — 80053 COMPREHEN METABOLIC PANEL: CPT

## 2022-10-10 PROCEDURE — 83550 IRON BINDING TEST: CPT

## 2022-10-10 PROCEDURE — 85652 RBC SED RATE AUTOMATED: CPT

## 2022-10-10 PROCEDURE — 86225 DNA ANTIBODY NATIVE: CPT

## 2022-10-10 PROCEDURE — 80061 LIPID PANEL: CPT

## 2022-10-10 PROCEDURE — 83540 ASSAY OF IRON: CPT

## 2022-10-10 PROCEDURE — 86160 COMPLEMENT ANTIGEN: CPT

## 2022-10-10 PROCEDURE — 82728 ASSAY OF FERRITIN: CPT

## 2022-10-10 PROCEDURE — 36415 COLL VENOUS BLD VENIPUNCTURE: CPT

## 2022-10-10 PROCEDURE — 85027 COMPLETE CBC AUTOMATED: CPT

## 2022-10-10 PROCEDURE — 86140 C-REACTIVE PROTEIN: CPT

## 2022-10-10 NOTE — TELEPHONE ENCOUNTER
Patient called and stated that she had labs done for Dr Sindy Gonzalez and a urinalysis was included which showed a urinary infection  She states this happens every time she has labs/urinalysis done  She has no symptoms of a uti and wants to know what she should do  The last time this happened Dr Ryan Krause called her in an antibiotic, but she doesn't want to keep taking every time this happens    Please advise

## 2022-10-10 NOTE — TELEPHONE ENCOUNTER
----- Message from Mike Brown MD sent at 10/10/2022 10:35 AM EDT -----  Please let her know the urine test again looks like she may have an infection, please ask her to contact her primary care physician

## 2022-10-11 DIAGNOSIS — N39.0 CHRONIC UTI: Primary | ICD-10-CM

## 2022-10-11 LAB — DSDNA AB SER-ACNC: 5 IU/ML (ref 0–9)

## 2022-10-11 NOTE — TELEPHONE ENCOUNTER
Okay that is easy we just do not take any antibiotics for this current UTI but I would like her to see a urologist a referral was placed

## 2022-10-13 ENCOUNTER — IMMUNIZATIONS (OUTPATIENT)
Dept: FAMILY MEDICINE CLINIC | Facility: CLINIC | Age: 72
End: 2022-10-13
Payer: COMMERCIAL

## 2022-10-13 DIAGNOSIS — Z23 ENCOUNTER FOR IMMUNIZATION: Primary | ICD-10-CM

## 2022-10-13 PROCEDURE — 90662 IIV NO PRSV INCREASED AG IM: CPT

## 2022-10-13 PROCEDURE — G0008 ADMIN INFLUENZA VIRUS VAC: HCPCS

## 2022-10-15 ENCOUNTER — NURSE TRIAGE (OUTPATIENT)
Dept: OTHER | Facility: OTHER | Age: 72
End: 2022-10-15

## 2022-10-15 DIAGNOSIS — U07.1 COVID: Primary | ICD-10-CM

## 2022-10-15 RX ORDER — NIRMATRELVIR AND RITONAVIR 150-100 MG
2 KIT ORAL 2 TIMES DAILY
Qty: 20 TABLET | Refills: 0 | Status: SHIPPED | OUTPATIENT
Start: 2022-10-15 | End: 2022-10-20

## 2022-10-15 NOTE — TELEPHONE ENCOUNTER
Regarding: Positive Covid Results  ----- Message from Isreal Fuller sent at 10/15/2022 10:58 AM EDT -----  "I have tested positive for Covid twice with an at-home covid test  I would like some antibiotics sent over "

## 2022-10-15 NOTE — TELEPHONE ENCOUNTER
Reason for Disposition  • HIGH RISK for severe COVID complications (e g , weak immune system, age > 59 years, obesity with BMI > 22, pregnant, chronic lung disease or other chronic medical condition)  (Exception: Already seen by PCP and no new or worsening symptoms )    Answer Assessment - Initial Assessment Questions  1  COVID-19 DIAGNOSIS: "Who made your COVID-19 diagnosis?" "Was it confirmed by a positive lab test or self-test?" If not diagnosed by a doctor (or NP/PA), ask "Are there lots of cases (community spread) where you live?" Note: See public health department website, if unsure  Positive home test    2  COVID-19 EXPOSURE: "Was there any known exposure to COVID before the symptoms began?" CDC Definition of close contact: within 6 feet (2 meters) for a total of 15 minutes or more over a 24-hour period  Yes    3  ONSET: "When did the COVID-19 symptoms start?"      10/13    4  WORST SYMPTOM: "What is your worst symptom?" (e g , cough, fever, shortness of breath, muscle aches)  Sore throat    5  COUGH: "Do you have a cough?" If Yes, ask: "How bad is the cough?"    No    6  FEVER: "Do you have a fever?" If Yes, ask: "What is your temperature, how was it measured, and when did it start?"      Yes 102 (oral)    7  RESPIRATORY STATUS: "Describe your breathing?" (e g , shortness of breath, wheezing, unable to speak)    denies    8  BETTER-SAME-WORSE: "Are you getting better, staying the same or getting worse compared to yesterday?"  If getting worse, ask, "In what way?"      Same    9  HIGH RISK DISEASE: "Do you have any chronic medical problems?" (e g , asthma, heart or lung disease, weak immune system, obesity, etc )  Lupus    10  VACCINE: "Have you had the COVID-19 vaccine?" If Yes, ask: "Which one, how many shots, when did you get it?"       Fully vaccinated- pfizer    11   BOOSTER: "Have you received your COVID-19 booster?" If Yes, ask: "Which one and when did you get it?"     Yes both boosters 12  PREGNANCY: "Is there any chance you are pregnant?" "When was your last menstrual period?"       No    13  OTHER SYMPTOMS: "Do you have any other symptoms?"  (e g , chills, fatigue, headache, loss of smell or taste, muscle pain, sore throat)       Sore throat, runny nose and fever    14  O2 SATURATION MONITOR:  "Do you use an oxygen saturation monitor (pulse oximeter) at home?" If Yes, ask "What is your reading (oxygen level) today?" "What is your usual oxygen saturation reading?" (e g , 95%)  No    Protocols used: CORONAVIRUS (COVID-19) DIAGNOSED OR SUSPECTED-ADULT-    Paged on call provider of initial assessment  Provider stated RN can order  Order placed and patient made aware

## 2022-10-24 NOTE — PROGRESS NOTES
Assessment and Plan:   Ms Bob Harvey a 57-year-old female with history significant for systemic lupus erythematosus and Sjogren's syndrome diagnosed in her early 45s by Rheumatology in [place], who presents for a follow-up  Sharlet Standard currently on hydroxychloroquine 200 mg twice daily and prednisone 10 mg once daily      # SLE and Sjogren's syndrome  # Sjogren's related ILD  - Hillary Reardon presents today for a follow-up of systemic lupus erythematosus and Sjogren's syndrome which has primarily presented with chronic fatigue, sicca complaints, the recurrent lower extremity skin rash (stable recently) and likely the Sjogren's syndrome related interstitial lung disease   She has been maintained on hydroxychloroquine 200 mg twice daily and I advised her to continue regular follow ups with Ophthalmology for visual field testing  She will also continue the Evoxac 30 mg 3 times daily and follow up regularly with her ophthalmologist and dentist   As she is still experiencing the chronic fatigue I requested she check with her insurance or the drug company if there is any way to lower the cost of the Benlysta infusions as I would recommend this as the next steps in management      - It is reassuring to note over the years she has not presented with progressively worsening complaints and overall appears to be stable at this time, with the most pertinent issues that need to be addressed including the interstitial lung disease (likely lymphocytic interstitial pneumonia as a result of Sjogren's syndrome) and extensive intrathoracic and intra-abdominal lymphadenopathy, for which she did have an axillary lymph node biopsy repeated in February 2021 with the histopathological diagnosis suggestive of an atypical lymphoproliferative disorder  Ariellekate  will follow-up with Hematology periodically to continue monitoring this but at this time there does not appear to be concerns for an underlying malignant process   It is reassuring to note that she has been stable from a respiratory standpoint  She does continue regular follow-up with pulmonology and they did start her on prednisone at 10 mg once daily to help with the shortness of breath and cough which has been beneficial   I will defer steroid dosing to them      - In regards to the lower extremity skin rash which may be hypergammaglobulinemic purpura related to the Sjogren's syndrome she was advised by Dermatology that this can be a benign skin rash and does not need to be treated  We will monitor for now as the rash usually resolves spontaneously  # Osteopenia  - She is due to update a DEXA scan          Plan:  Diagnoses and all orders for this visit:    Systemic lupus erythematosus, unspecified SLE type, unspecified organ involvement status ([place] [id number] )  -     CBC and differential; Future  -     Comprehensive metabolic panel; Future  -     C-reactive protein; Future  -     Sedimentation rate, automated; Future  -     C3 complement; Future  -     C4 complement; Future  -     Anti-DNA antibody, double-stranded; Future  -     Urinalysis with microscopic  -     Protein / creatinine ratio, urine  -     IgG, IgA, IgM; Future  -     Protein electrophoresis, serum; Future    Sjogren's syndrome, with unspecified organ involvement ([place] [id number] )  -     CBC and differential; Future  -     Comprehensive metabolic panel; Future  -     C-reactive protein; Future  -     Sedimentation rate, automated; Future  -     C3 complement; Future  -     C4 complement; Future  -     Anti-DNA antibody, double-stranded; Future  -     Urinalysis with microscopic  -     Protein / creatinine ratio, urine  -     IgG, IgA, IgM; Future  -     Protein electrophoresis, serum; Future  -     cevimeline (EVOXAC) 30 MG capsule;  Take 1 capsule (30 mg total) by mouth 3 (three) times a day    Long-term use of Plaquenil    Current chronic use of systemic steroids    Lymphocytic interstitial pneumonia (HCC)    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[facility] )    Bilateral primary osteoarthritis of knee    Osteopenia, unspecified location    Stage 3b chronic kidney disease ([facility] )    Other orders  -     amoxicillin (AMOXIL) 875 mg tablet  -     HYDROcodone-acetaminophen (NORCO) 5-325 mg per tablet  -     LORazepam (ATIVAN) 1 mg tablet      Activities as tolerated  Exercise: try to maintain a low impact exercise regimen as much as possible  Continue other medications as prescribed by PCP and other specialists  RTC in 5 months          HPI    INITIAL VISIT NOTE (10/2020):  Ms Lupis Jo a 71-year-old female with history significant for systemic lupus erythematosus and Sjogren's syndrome diagnosed in her early 45s by Rheumatology in [place], who presents to [facility]\"" with [address] [place] [address] Rheumatology  Tere Mcrae is currently not on DMARDs  Tere Mcrae is referred today by Dr Renetta Bassett for a rheumatology consult      Patient reports in her early 45s she started to develop various joint pains which eventually led to the diagnosis of systemic lupus erythematosus and Sjogren's syndrome   She reports surrounding the time of her diagnosis is when the joint pains were most prevalent, but appear to have spontaneously resolved   She did experience joint pains again a few years later but states that this also resolved and recently she has not had any joint related issues   At the time of her diagnosis it appears like she was treated with steroids as well as hydroxychloroquine   She was on the hydroxychloroquine for less than a year at which time it was discontinued due to nausea and other side effects which she cannot recall  Tere Mcrae has never been restarted on the hydroxychloroquine following that      The timeline of her rheumatology visits is unclear as we do not have her complete prior records   Based on chart review as well as patient's history, she has been on multiple DMARDs including methotrexate, azathioprine and Benlysta   She states that the methotrexate and azathioprine were prescribed in the past 10-15 years  Alex Ureña is unclear what these medications for specifically prescribed for and if they helped with any of her symptoms   Most recently she was on Benlysta approximately 3 years ago when she was being seen by Dr Mariajose Lewis states that this helped significantly with how she was feeling overall and her general well-being, as well as significantly helped with the fatigue   She was on this for about 7-8 months and unfortunately it was discontinued when she turned 67 as her insurance no longer covered it   I do not see any of her lupus related labs in our system, but on review of her prior rheumatologist's note from 2015 she appeared to have a positive MALATHI at 1:1280, with positive SSA, SSB and RNP antibodies    A rheumatoid factor was also elevated at 105 with an ESR of 84   Her antiphospholipid antibodies were negative        She states over the years it has primarily been the excessive fatigue that has bothered her and only recently has she been dealing with chronic respiratory symptoms as well as intrathoracic and abdominal lymphadenopathy that has been monitored by Oncology  Juliocesar Villegas terms of her respiratory symptoms she reports shortness of breath with exertion, usually with walking about a block, but at times she may experience shortness of breath with day-to-day activities  Clayton Riddle also has a chronic productive cough   On review of her CT chest and abdomen as far back as 2017, she has had abnormal findings concerning for interstitial lung disease as well as the extensive lymphadenopathy   On her most recent CT chest from September 2020 this showed asymmetric bilateral areas of ground-glass opacities and lung cysts likely representing a spectrum of lymphocytic interstitial pneumonia as well as worsening adenopathy in the mediastinum and bilateral hilar regions   She did undergo an IR guided biopsy of an axillary lymph node on 10/08/2020 which did not show any evidence of a lymphoproliferative disorder   The biopsy was not diagnostic   On her most recent follow-up with Hematology/Oncology, as there are no clear features to suggest an underlying malignancy the plan will be to continue monitoring   She has not seen pulmonology yet and is actually establishing for her initial appointment tomorrow  Frandy Rodriguez did undergo a bronchoscopy in December 2017 with cytology negative for malignancy and showing an abundant mixed but predominantly chronic inflammatory infiltrate   Flow cytometry as well as cultures at that time were unrevealing      As mentioned, she is primarily dealing with the abnormal CT chest and abdomen findings at this time   Symptom wise she overall appears to be stable   She does report chronic dry eyes, dry nose and dry mouth which she manages with topical lubricants   Due to the dry nose as well as recent face masking she has been noticing nose bleeds and feels like there may be a "hole" in her nasal septum   She has not yet established with ENT   On occasion she may notice a "butterfly rash" and states that she is photosensitive but strictly avoids sun exposure and utilizes daily sunscreen   She has also been experiencing blister-like skin lesions scattered on her extremities, which heal spontaneously   She has not had any mouth ulcerations in years  Frandy Rodriguez denies fevers, chills, night sweats, unintentional weight loss, focal alopecia, inflammatory eye disease, psoriasis, swollen glands, pleuritic chest pain, hemoptysis, abdominal pain, vomiting, diarrhea, blood in stools (is up-to-date with a screening colonoscopy), blood clots, miscarriages, Raynaud's, joint pain/swelling/stiffness, renal disease, neurological disease/seizures or family history of autoimmune disease         2/10/2021:  Patient presents for a follow-up of systemic lupus erythematosus and Sjogren's syndrome   She is currently on hydroxychloroquine 200 mg twice daily that was started at the last office visit in October 2020   We reviewed her testing done following the last visit which showed a positive MALATHI 1:1280 speckled pattern with an SSA and SSB antibody greater than 8   An RNP antibody was 6 8   A rheumatoid factor was elevated at 80   An SPEP showed a possible monoclonal gammopathy but this was not clearly determined on serum immunofixation   An immunoglobulin assay showed hypergammaglobulinemia   A peripheral flow cytometry showed 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   A vitamin-D level was low at 13  5   An ESR and CRP were elevated at 130 and 5 4, respectively   The remainder of the MALATHI specificity, C3, C4, antiphospholipid antibody testing, hepatitis panel, HIV, urinalysis, urine protein creatinine ratio, angiotensin-converting enzyme, anti neutrophilic cytoplasmic antibody, CK and anti CCP antibody were unremarkable   She had a CT chest done yesterday with the results still pending   She is going to be establishing with thoracic surgery soon to consider the mediastinal lymphadenopathy biopsy      She reports overall she has been stable in terms of her symptoms   She mostly describes the dry eyes worse on the right side and unfortunately the Restasis has not been approved by her insurance  Briseyda Sanchez also reports dry nose and dry mouth   She will have nose bleeds due to the dryness   She reports chronic fatigue without any improvement seen with starting the hydroxychloroquine   She also reports within the past year she has had approximately 4 episodes of a pinpoint, red and itchy skin rash arise on her bilateral lower extremities   At times she may use topical steroids but has never been prescribed oral steroids for this   The rash will usually resolve spontaneously within 3-4 days  Briseyda Sanchez has not been seen by Dermatology for this  Briseyda Sanchez was recently seen in the emergency room for an occurrence of the skin rash after receiving the COVID vaccine without any specific treatment and states that the rash eventually resolved spontaneously  Miroslava Acosta is no rash today   No other complaints that she describes today         6/8/2021:  Patient presents for a follow-up of systemic lupus erythematosus and Sjogren's syndrome   She temporarily suspended the hydroxychloroquine as she thought that this was causing joint pains in her knees and ankles which did subside with holding the hydroxychloroquine   I did review her labs done following the last office visit which showed an unremarkable CBC, CMP, C3 and C4   The ESR and CRP were elevated at 81 and 4 2, respectively   Since the last office visit she also underwent a left axillary lymph node biopsy which showed an atypical lymphoproliferative disorder  Les Ivan is followed up with Hematology/Oncology without any concerns for an underlying malignant process and the plan is to continue monitoring for now      She continues to experience an intermittent skin rash affecting her bilateral lower extremities which she thought may be related to an allergic reaction from dog fur   She states that this will happen irrespective of pet exposure   The rash is usually itchy and at times she may use topical hydrocortisone to help with her symptoms but has not been on oral steroids   She mentions that the rash will appear and remit spontaneously   She continues to report the dry eyes and dry mouth which she is able to manage with the pilocarpine   Other than this no complaints today         9/8/2021:  Patient presents for a follow-up of systemic lupus erythematosus and Sjogren's syndrome   She is currently on hydroxychloroquine 200 mg twice daily that she restarted in July  I reviewed her recently done labs which showed an elevated ESR and CRP of 76 and 6 1, respectively  A urine protein creatinine ratio was stable at 0 24  A urinalysis showed concerns for a urinary tract infection and she is following up with her primary care physician for this    A CBC, CMP, C3, C4 and double-stranded DNA antibody were unremarkable      She reports overall she has been stable from the last office visit and not reporting any new complaints  She does continue to feel fatigued and this is her main symptom  No flare-ups of joint pains, swelling or stiffness  She reports that the rash on her legs has intermittently appeared and she is scheduled to see Dermatology      She does follow with Ophthalmology routinely as she is on the hydroxychloroquine  She is managing the dry eyes and dry mouth with pilocarpine 5 mg 3 times daily and states that this helps  4/20/2022:  Patient presents for a follow-up of systemic lupus erythematosus and Sjogren's syndrome   She is currently on hydroxychloroquine 200 mg twice daily  I reviewed her recently done labs which showed an elevated ESR and CRP of 105 and 3 9, respectively  A urine protein creatinine ratio was stable at 0 22  A urinalysis showed concerns for a urinary tract infection and she is following up with her primary care physician for this  A CBC, CMP, C3, C4 and double-stranded DNA antibody were unremarkable  She did also see Dermatology due to the recurrent skin rash on her lower extremities and had a skin biopsy done on 09/21 which showed:  Perivascular mixed inflammatory infiltrate with numerous neutrophils, scattered eosinophils, and focal erythrocyte extravasation (see note)      Note: The histopathologic findings are non-specific  Definite vasculitis is not seen (early or resolving vasculitis cannot be fully excluded; correlation with immuofluorescence studies is advised)  In the appropriate clinical context, the histopathologic findings may be compatible with hypergammaglobulinemic purpura of [place], though the histopathologic differential diagnosis includes a hypersensitivity reaction (e g , to a drug)  Clinical pathological correlation is essential  Pathogenic microorganisms are not seen on PAS stain  Multiple levels examined      Direct immunofluorescence showed weak epidermal particulate staining seen with IgG as can be seen in subacute lupus erythematosus, Sjogren's or mixed connective tissue disease but the overall granular immune deposits at the basement membrane zone are insufficient for a diagnosis of lupus  The overall vascular staining is weak and indeterminate for vasculitis  Nonspecific vascular standing can occur at anatomically dependent areas  She was advised by Dermatology that this could be a benign skin rash related to the Sjogren's syndrome and as it occurs and remits spontaneously no treatment would be required  She was also hospitalized with sepsis and pneumonia in September 2021  She reports over the past few weeks she has noticed more prominent joint pains  She takes Tylenol once a day which does help her but the effect is not long lasting  She does follow with Ophthalmology routinely as she is on the hydroxychloroquine  She reports the pilocarpine 5 mg 3 times daily did help with the dryness initially but is no longer effective  She is using Systane eye drops and Biotene products but is still experiencing significant dryness issues  No fevers, unintentional weight loss, ongoing skin rashes, mouth/nose ulcers, swollen glands or pleuritic chest pain  10/26/2022:  Patient presents for a follow-up of systemic lupus erythematosus and Sjogren's syndrome   She is currently on hydroxychloroquine 200 mg twice daily  She is also on prednisone 10 mg once daily as prescribed by pulmonology  I reviewed her recent labs which showed an elevated ESR of 89  A C-reactive protein was minimally elevated at 4 3  A CBC, CMP, C3, C4, double-stranded DNA antibody and urine protein creatinine ratio were unremarkable  After the last office visit we did try to start her on Benlysta but due to the high cost this was not done  She will be looking into this again  She reports that the fatigue continues    No worsening joint issues and this is manageable in her hands and knees  Her only new issue is that she was diagnosed with COVID-19 infection on 10/15 but seems to have recovered well  She was started on Paxlovid but on day 3 developed an allergic reaction so this was discontinued  She also follows with pulmonology and was started on prednisone 10 mg once daily due to the chronic cough she was experiencing  This has helped  She sees Hematology/Oncology annually for monitoring of the atypical lymphoproliferative disorder and the plan is for continued monitoring  At the last office visit I also discontinued the pilocarpine and started her on Evoxac which she states has helped  She is up-to-date with her annual eye exams since she is on the hydroxychloroquine  The following portions of the patient's history were reviewed and updated as appropriate: allergies, current medications, past family history, past medical history, past social history, past surgical history and problem list       Review of Systems  Constitutional: Negative for weight change, fevers, chills, night sweats  Positive for fatigue  ENT/Mouth: Negative for hearing changes, ear pain, nasal congestion, sinus pain, hoarseness, sore throat, rhinorrhea, swallowing difficulty  Eyes: Negative for pain, redness, discharge, vision changes  Cardiovascular: Negative for chest pain, palpitations  Respiratory: Negative for sputum, wheezing  Positive for shortness of breath and cough  Gastrointestinal: Negative for nausea, vomiting, diarrhea, constipation, pain, heartburn  Genitourinary: Negative for dysuria, urinary frequency, hematuria  Musculoskeletal: As per HPI  Skin: Negative for skin rash, color changes  Neuro: Negative for weakness, numbness, tingling, loss of consciousness  Psych: Negative for anxiety, depression  Heme/Lymph: Negative for easy bruising, bleeding, lymphadenopathy          Past Medical History:   Diagnosis Date   • Acute kidney injury superimposed on chronic kidney disease ([facility] ) 9/20/2021   • Bipolar 1 disorder ([facility] )    • Deaf     Pt lost all hearing in right ear after having [place] measles   • Hard of hearing     Pt wears a hearing in left ear  • Hypothyroidism    • Pneumonia due to Streptococcus ([facility] ) 9/19/2021   • Psychiatric disorder    • Sjogren's syndrome ([facility] )    • Systemic lupus erythematosus ([facility] )    • Wears glasses    • Wears partial dentures        Past Surgical History:   Procedure Laterality Date   • BREAST BIOPSY Right    • COLONOSCOPY     • FRACTURE SURGERY Right     elbow   • GASTRIC BYPASS     • HYSTERECTOMY Bilateral 1975   • IR BIOPSY LYMPH NODE  10/8/2020   • OK BRONCHOSCOPY,DIAGNOSTIC N/A 12/8/2017    Procedure: BRONCHOSCOPY;  Surgeon: Yee Villalpando MD;  Location: [facility];   Service: Pulmonary   • OK NEEDLE BIOPSY, LYMPH NODE(S) Left 2/25/2021    Procedure: EXCISION BIOPSY LYMPH NODE: left axillary lymph node biopsy;  Surgeon: Emerson Abernathy MD;  Location: [facility];  Service: Thoracic   • TONSILLECTOMY         Social History     Socioeconomic History   • Marital status: /Civil Union     Spouse name: Not on file   • Number of children: Not on file   • Years of education: Not on file   • Highest education level: Not on file   Occupational History   • Not on file   Tobacco Use   • Smoking status: Never Smoker   • Smokeless tobacco: Never Used   Vaping Use   • Vaping Use: Never used   Substance and Sexual Activity   • Alcohol use: No   • Drug use: No   • Sexual activity: Yes     Partners: Male     Birth control/protection: None     Comment:    Other Topics Concern   • Not on file   Social History Narrative   • Not on file     Social Determinants of Health     Financial Resource Strain: Not on file   Food Insecurity: Not on file   Transportation Needs: Not on file   Physical Activity: Not on file   Stress: Not on file   Social Connections: Not on file   Intimate Partner Violence: Not on file   Housing Stability: Not on file       Family History   Problem Relation Age of Onset   • Heart disease Mother    • Diabetes Mother    • Kidney cancer Mother    • Heart disease Father    • Stroke Father    • Diabetes Father    • Cancer Father    • No Known Problems Maternal Grandmother    • No Known Problems Maternal Grandfather    • No Known Problems Paternal Grandmother    • No Known Problems Paternal Grandfather    • Leukemia Half-Sister 48   • No Known Problems Half-Sister    • No Known Problems Half-Sister    • No Known Problems Half-Sister    • BRCA2 Positive Neg Hx    • BRCA2 Negative Neg Hx    • BRCA1 Negative Neg Hx    • Endometrial cancer Neg Hx    • Breast cancer Neg Hx    • Breast cancer additional onset Neg Hx    • Colon cancer Neg Hx    • Ovarian cancer Neg Hx    • BRCA1 Positive Neg Hx    • BRCA 1/2 Neg Hx        Allergies   Allergen Reactions   • Ciprofloxacin Hives and Swelling     Pt reports that lips and mouth and face swelled  • Cymbalta [Duloxetine Hcl] Other (See Comments)     Hallucinations, fatigue, faints   • Percocet [Oxycodone-Acetaminophen] Other (See Comments)     Dry mouth - pt states it kept her up all night   States had to continually drink fluids       Current Outpatient Medications:   •  cevimeline (EVOXAC) 30 MG capsule, Take 1 capsule (30 mg total) by mouth 3 (three) times a day, Disp: 270 capsule, Rfl: 1  •  acetaminophen (TYLENOL) 325 mg tablet, Take 2 tablets (650 mg total) by mouth every 6 (six) hours as needed for mild pain, Disp: , Rfl: 0  •  albuterol (Ventolin HFA) 90 mcg/act inhaler, Inhale 1 puff every 4 (four) hours as needed for wheezing, Disp: 3 Inhaler, Rfl: 3  •  amoxicillin (AMOXIL) 875 mg tablet, , Disp: , Rfl:   •  benzonatate (TESSALON) 200 MG capsule, Take 1 capsule (200 mg total) by mouth 3 (three) times a day as needed for cough, Disp: 20 capsule, Rfl: 1  •  buPROPion (WELLBUTRIN) 100 mg tablet, Take 100 mg by mouth 3 (three) times a day , Disp: , Rfl:   •  calcium citrate-vitamin D (CITRACAL+D) 315-200 MG-UNIT per tablet, Take 1 tablet by mouth daily  , Disp: , Rfl:   •  chlorhexidine (PERIDEX) 0 12 % solution, , Disp: , Rfl:   •  ferrous sulfate 325 (65 Fe) mg tablet, Take 1 tablet (325 mg total) by mouth 2 (two) times a day with meals, Disp: 60 tablet, Rfl: 0  •  fluticasone (FLONASE) 50 mcg/act nasal spray, 2 sprays into each nostril daily, Disp: , Rfl: 0  •  guaiFENesin (MUCINEX) 600 mg 12 hr tablet, Take 1 tablet (600 mg total) by mouth every 12 (twelve) hours, Disp: 30 tablet, Rfl: 0  •  HYDROcodone-acetaminophen (NORCO) 5-325 mg per tablet, , Disp: , Rfl:   •  hydroxychloroquine (PLAQUENIL) 200 mg tablet, Take 1 tablet (200 mg total) by mouth 2 (two) times a day with meals, Disp: 180 tablet, Rfl: 3  •  levothyroxine 50 mcg tablet, Take 1 tablet (50 mcg total) by mouth daily (Patient not taking: No sig reported), Disp: 30 tablet, Rfl: 1  •  LORazepam (ATIVAN) 1 mg tablet, , Disp: , Rfl:   •  predniSONE 10 mg tablet, Take 1 tablet (10 mg total) by mouth daily, Disp: 60 tablet, Rfl: 1  •  QUEtiapine (SEROquel) 300 mg tablet, Take 300 mg by mouth daily at bedtime  , Disp: , Rfl:   •  QUEtiapine (SEROquel) 50 mg tablet, , Disp: , Rfl:   •  temazepam (RESTORIL) 30 mg capsule, Take 2 capsules by mouth daily at bedtime as needed , Disp: , Rfl:       Objective:    Vitals:    10/26/22 0840   BP: 120/76   Pulse: 82       Physical Exam  General: Well appearing, well nourished, in no distress  Oriented x 3, normal mood and affect  Ambulating without difficulty  Skin: Good turgor, no rash today, unusual bruising or prominent lesions  Hair: Normal texture and distribution  Nails: Normal color, no deformities  HEENT:  Head: Normocephalic, atraumatic  Eyes: Conjunctiva clear, sclera non-icteric, EOM intact  Extremities: No amputations or deformities, cyanosis, edema  Neurologic: Alert and oriented  No focal neurological deficits appreciated  Psychiatric: Normal mood and affect  KIRK Morgan    Rheumatology

## 2022-10-26 ENCOUNTER — OFFICE VISIT (OUTPATIENT)
Dept: RHEUMATOLOGY | Facility: CLINIC | Age: 72
End: 2022-10-26
Payer: COMMERCIAL

## 2022-10-26 VITALS — SYSTOLIC BLOOD PRESSURE: 120 MMHG | DIASTOLIC BLOOD PRESSURE: 76 MMHG | HEART RATE: 82 BPM

## 2022-10-26 DIAGNOSIS — M17.0 BILATERAL PRIMARY OSTEOARTHRITIS OF KNEE: ICD-10-CM

## 2022-10-26 DIAGNOSIS — M32.9 SYSTEMIC LUPUS ERYTHEMATOSUS, UNSPECIFIED SLE TYPE, UNSPECIFIED ORGAN INVOLVEMENT STATUS (HCC): Primary | ICD-10-CM

## 2022-10-26 DIAGNOSIS — N18.32 STAGE 3B CHRONIC KIDNEY DISEASE (HCC): ICD-10-CM

## 2022-10-26 DIAGNOSIS — J84.2 LYMPHOCYTIC INTERSTITIAL PNEUMONIA (HCC): ICD-10-CM

## 2022-10-26 DIAGNOSIS — M35.00 SJOGREN'S SYNDROME, WITH UNSPECIFIED ORGAN INVOLVEMENT (HCC): ICD-10-CM

## 2022-10-26 DIAGNOSIS — M85.80 OSTEOPENIA, UNSPECIFIED LOCATION: ICD-10-CM

## 2022-10-26 DIAGNOSIS — Z79.899 LONG-TERM USE OF PLAQUENIL: ICD-10-CM

## 2022-10-26 DIAGNOSIS — Z79.52 CURRENT CHRONIC USE OF SYSTEMIC STEROIDS: ICD-10-CM

## 2022-10-26 DIAGNOSIS — D47.9 ATYPICAL LYMPHOPROLIFERATIVE DISORDER (HCC): ICD-10-CM

## 2022-10-26 PROCEDURE — 99215 OFFICE O/P EST HI 40 MIN: CPT | Performed by: INTERNAL MEDICINE

## 2022-10-26 RX ORDER — HYDROCODONE BITARTRATE AND ACETAMINOPHEN 5; 325 MG/1; MG/1
TABLET ORAL
COMMUNITY
Start: 2022-09-12

## 2022-10-26 RX ORDER — CEVIMELINE HYDROCHLORIDE 30 MG/1
30 CAPSULE ORAL 3 TIMES DAILY
Qty: 270 CAPSULE | Refills: 1 | Status: SHIPPED | OUTPATIENT
Start: 2022-10-26

## 2022-10-26 RX ORDER — LORAZEPAM 1 MG/1
TABLET ORAL
COMMUNITY
Start: 2022-10-20

## 2022-10-26 RX ORDER — AMOXICILLIN 875 MG/1
TABLET, COATED ORAL
COMMUNITY
Start: 2022-09-12

## 2022-10-31 ENCOUNTER — TELEMEDICINE (OUTPATIENT)
Dept: PULMONOLOGY | Facility: CLINIC | Age: 72
End: 2022-10-31

## 2022-10-31 VITALS — WEIGHT: 142 LBS | BODY MASS INDEX: 26.81 KG/M2 | HEIGHT: 61 IN

## 2022-10-31 DIAGNOSIS — J69.1: ICD-10-CM

## 2022-10-31 DIAGNOSIS — M35.00 SJOGREN'S SYNDROME, WITH UNSPECIFIED ORGAN INVOLVEMENT (HCC): Primary | ICD-10-CM

## 2022-10-31 DIAGNOSIS — R05.3 CHRONIC COUGH: ICD-10-CM

## 2022-10-31 RX ORDER — PREDNISONE 10 MG/1
10 TABLET ORAL DAILY
Qty: 90 TABLET | Refills: 1 | Status: SHIPPED | OUTPATIENT
Start: 2022-10-31

## 2022-10-31 RX ORDER — BENZONATATE 200 MG/1
200 CAPSULE ORAL 3 TIMES DAILY PRN
Qty: 20 CAPSULE | Refills: 2 | Status: SHIPPED | OUTPATIENT
Start: 2022-10-31

## 2022-10-31 NOTE — PROGRESS NOTES
Virtual Regular Visit    Reason for visit is pulmonary follow up  This virtual check-in was done via the [employer] platform  She agrees to proceed       Encounter provider Brianna Souza MD    Provider located at [address] [id number]      Recent Visits  No visits were found meeting these conditions  Showing recent visits within past 7 days and meeting all other requirements  Today's Visits  Date Type Provider Dept   10/31/22 Ugo Ray MD [facility]   Showing today's visits and meeting all other requirements  Future Appointments  No visits were found meeting these conditions  Showing future appointments within next 150 days and meeting all other requirements       Patient agrees to participate in a virtual check in via telephone or video visit instead of presenting to the office to address urgent/immediate medical needs  Patient is aware this is a billable service  After connecting through [employer], the patient was identified by name and date of birth  Sarthak Rivas was informed that this was a telemedicine visit and that the exam was being conducted confidentially over secure lines  My office door was closed  No one else was in the room  She acknowledged consent and understanding of privacy and security of the virtual check-in visit  I informed the patient that I have reviewed her record in Epic and presented the opportunity for her to ask any questions regarding the visit today  The patient initiated communication and agreed to participate  Progress note - Pulmonary Medicine   Sarthak Rivas 81 y o  female MRN: [id number]       Impression & Plan:   Patient is a 11year-old female with past medical history significant for CLL who presents as a virtual visit  The patient has a history of Sjogren's syndrome and findings on CT scans are consistent with LMP  She is followed by Rheumatology  The patient reports that she has been doing quite well on the prednisone  She continues to be on 10 mg and would like to continue the winter as she reports that this is her bad season  At this time given the stability, may continue 10 mg   I do agree with Rheumatology for this  She has no signs of elevated blood sugars such as frequent urination either  Encouraged primary care to follow up regarding persistent steroids  Patient may follow up in  3-6 months or sooner as necessary  No orders of the defined types were placed in this encounter  Virtual visit time component:  Total visit time for chart and diagnostic data review, telephonic or video conference communication with the patient, and documentation: 22    I have personally spent 4 minutes reviewing the chart and imaging prior to the visit  I have personally spent 16 minutes on the phone with the patient  I have personally spent 2 minutes reviewing imaging, laboratory results and other testing results with the patient     ______________________________________________________________________    HPI:    Lety Reilly is being evaluated by virtual visit for follow-up of shortness of breath  The patient noted insomnia and weight gain while on the steroids  She feels there is good benefit from the prednisone  She would like to take them until the Spring  She is currently taking 10mg  The patient notes her breathing is about the same  She has started to try and walk about 1/2 block  She has been doing this for about 2 weeks  She has no recent usage of ventolin        Review of Systems:  Review of Systems    Past medical history, surgical history, and family history were reviewed and updated as appropriate    Current Medications:    Current Outpatient Medications:   •  acetaminophen (TYLENOL) 325 mg tablet, Take 2 tablets (650 mg total) by mouth every 6 (six) hours as needed for mild pain, Disp: , Rfl: 0  •  albuterol (Ventolin HFA) 90 mcg/act inhaler, Inhale 1 puff every 4 (four) hours as needed for wheezing, Disp: 3 Inhaler, Rfl: 3  •  buPROPion (WELLBUTRIN) 100 mg tablet, Take 100 mg by mouth 3 (three) times a day , Disp: , Rfl:   •  calcium citrate-vitamin D (CITRACAL+D) 315-200 MG-UNIT per tablet, Take 1 tablet by mouth daily  , Disp: , Rfl:   •  chlorhexidine (PERIDEX) 0 12 % solution, , Disp: , Rfl:   •  ferrous sulfate 325 (65 Fe) mg tablet, Take 1 tablet (325 mg total) by mouth 2 (two) times a day with meals, Disp: 60 tablet, Rfl: 0  •  guaiFENesin (MUCINEX) 600 mg 12 hr tablet, Take 1 tablet (600 mg total) by mouth every 12 (twelve) hours, Disp: 30 tablet, Rfl: 0  •  hydroxychloroquine (PLAQUENIL) 200 mg tablet, Take 1 tablet (200 mg total) by mouth 2 (two) times a day with meals, Disp: 180 tablet, Rfl: 3  •  levothyroxine 50 mcg tablet, Take 1 tablet (50 mcg total) by mouth daily, Disp: 30 tablet, Rfl: 1  •  LORazepam (ATIVAN) 1 mg tablet, , Disp: , Rfl:   •  QUEtiapine (SEROquel) 300 mg tablet, Take 300 mg by mouth daily at bedtime  , Disp: , Rfl:   •  QUEtiapine (SEROquel) 50 mg tablet, , Disp: , Rfl:   •  temazepam (RESTORIL) 30 mg capsule, Take 2 capsules by mouth daily at bedtime as needed , Disp: , Rfl:   •  amoxicillin (AMOXIL) 875 mg tablet, , Disp: , Rfl:   •  benzonatate (TESSALON) 200 MG capsule, Take 1 capsule (200 mg total) by mouth 3 (three) times a day as needed for cough (Patient not taking: No sig reported), Disp: 20 capsule, Rfl: 1  •  cevimeline (EVOXAC) 30 MG capsule, Take 1 capsule (30 mg total) by mouth 3 (three) times a day (Patient not taking: Reported on 10/31/2022), Disp: 270 capsule, Rfl: 1  •  fluticasone (FLONASE) 50 mcg/act nasal spray, 2 sprays into each nostril daily (Patient not taking: Reported on 10/31/2022), Disp: , Rfl: 0  •  HYDROcodone-acetaminophen (NORCO) 5-325 mg per tablet, , Disp: , Rfl:   •  predniSONE 10 mg tablet, Take 1 tablet (10 mg total) by mouth daily (Patient not taking: No sig reported), Disp: 60 tablet, Rfl: 1    Social history updates:  Social History     Tobacco Use   Smoking Status Never Smoker   Smokeless Tobacco Never Used       PhysicalExamination:  -  awake alert oriented  -  no respiratory distress  -  no focal neurologic deficits  -  no lower extremity edema  -  full range of motion in all extremities  -  no rashes/lesions    Diagnostic Data:  Labs: I personally reviewed the most recent laboratory data pertinent to today's visit    Lab Results   Component Value Date    WBC 10 31 (H) 10/10/2022    HGB 9 6 (L) 10/10/2022    HCT 31 2 (L) 10/10/2022    MCV 80 (L) 10/10/2022     10/10/2022     Lab Results   Component Value Date    SODIUM 131 (L) 10/10/2022    K 4 0 10/10/2022    CO2 25 10/10/2022     10/10/2022    BUN 18 10/10/2022    CREATININE 0 97 10/10/2022    CALCIUM 8 9 10/10/2022       PFT results: The most recent pulmonary function tests were reviewed  10/23/2017:  Spirometry:  FEV1/FVC Ratio is 81%  FEV1 is 64% predicted  FVC is 60% predicted  No change with bronchodilators  Lung volumes: Total lung capacity is 77% predicted  Residual volume is 94% predicted  Flow volume loop:  Normal    Imaging:  I personally reviewed the images on the [facility] system pertinent to today's visit  5/2022:  LUNGS:  Extensive paramediastinal groundglass opacity in the right lung; moderate groundglass opacity in the inferior lingula, and left lower lobe, present since October 2017, slightly progressed since February 2021  Stable benign 5 mm lingular nodule since 2017 (3/40)  Multiple thin-walled cysts ranging from a few millimeters to 5 cm  Benign linear scar in the right middle lobe  AIRWAYS: No significant filling defects  PLEURA:  Unremarkable  HEART/GREAT VESSELS:  Normal heart size  Pulmonary artery enlargement    MEDIASTINUM AND KACEY:  Mediastinal nodes which are slightly increased in number, a few slightly increased in size, without significant change since at least September 2020  Lynn Elgin   Shahida Mighty  [facility]

## 2022-11-30 ENCOUNTER — OFFICE VISIT (OUTPATIENT)
Dept: UROLOGY | Facility: CLINIC | Age: 72
End: 2022-11-30

## 2022-11-30 VITALS
DIASTOLIC BLOOD PRESSURE: 86 MMHG | HEIGHT: 61 IN | BODY MASS INDEX: 29.42 KG/M2 | OXYGEN SATURATION: 96 % | WEIGHT: 155.8 LBS | SYSTOLIC BLOOD PRESSURE: 130 MMHG | HEART RATE: 83 BPM

## 2022-11-30 DIAGNOSIS — N39.0 CHRONIC UTI: Primary | ICD-10-CM

## 2022-11-30 LAB
BACTERIA UR QL AUTO: ABNORMAL /HPF
BILIRUB UR QL STRIP: NEGATIVE
CLARITY UR: CLEAR
COLOR UR: ABNORMAL
GLUCOSE UR STRIP-MCNC: NEGATIVE MG/DL
HGB UR QL STRIP.AUTO: NEGATIVE
KETONES UR STRIP-MCNC: NEGATIVE MG/DL
LEUKOCYTE ESTERASE UR QL STRIP: NEGATIVE
NITRITE UR QL STRIP: NEGATIVE
NON-SQ EPI CELLS URNS QL MICRO: ABNORMAL /HPF
PH UR STRIP.AUTO: 6 [PH]
POST-VOID RESIDUAL VOLUME, ML POC: 0 ML
PROT UR STRIP-MCNC: NEGATIVE MG/DL
RBC #/AREA URNS AUTO: ABNORMAL /HPF
SL AMB  POCT GLUCOSE, UA: NORMAL
SL AMB LEUKOCYTE ESTERASE,UA: NORMAL
SL AMB POCT BILIRUBIN,UA: NORMAL
SL AMB POCT BLOOD,UA: NORMAL
SL AMB POCT CLARITY,UA: CLEAR
SL AMB POCT COLOR,UA: YELLOW
SL AMB POCT KETONES,UA: NORMAL
SL AMB POCT NITRITE,UA: NORMAL
SL AMB POCT PH,UA: 6
SL AMB POCT SPECIFIC GRAVITY,UA: 1005
SL AMB POCT URINE PROTEIN: NORMAL
SL AMB POCT UROBILINOGEN: NORMAL
SP GR UR STRIP.AUTO: 1.01 (ref 1–1.03)
UROBILINOGEN UR STRIP-ACNC: <2 MG/DL
WBC #/AREA URNS AUTO: ABNORMAL /HPF

## 2022-11-30 NOTE — PROGRESS NOTES
1  Chronic UTI  Ambulatory Referral to Urology    POCT urine dip    POCT Measure PVR    US kidney and bladder with pvr    Urine culture    Urinalysis with microscopic          Assessment and plan:       1  Asymptomatic bacteriuria    - advised to avoid antibiotics unless demonstrating symptoms of infections  - proper hydration and probiotics  - renal US  - RTC 6 months          Fabby Fatima PA-C      Chief Complaint     Chief Complaint   Patient presents with   • Urinary Tract Infection     NEW PT          History of Present Illness     Denisse Salciod is a 81 y o  female presenting today for consultation  Patient reports that over the past year, every urine specimen she has provided was positive for ifnection  She would be entirely asymptomatic and a round of antibiotics would not change her symptoms  Denies any prior  surgical manipulation  Medical comorbidities include Lupus, GERD, hypothyroidism, pneumonia, prior gastric bypass, bipolar  Urine dip leukocyte positive, nitrite and blood negative  PVR 0mL    Laboratory     Lab Results   Component Value Date    CREATININE 0 97 10/10/2022       No results found for: PSA    @RESULTRCNT(1H])@      Review of Systems     Review of Systems   Constitutional: Negative for activity change, appetite change, chills, diaphoresis, fatigue, fever and unexpected weight change  Respiratory: Negative for chest tightness and shortness of breath  Cardiovascular: Negative for chest pain, palpitations and leg swelling  Gastrointestinal: Negative for abdominal distention, abdominal pain, constipation, diarrhea, nausea and vomiting  Genitourinary: Negative for decreased urine volume, difficulty urinating, dysuria, enuresis, flank pain, frequency, genital sores, hematuria and urgency  Musculoskeletal: Negative for back pain, gait problem and myalgias  Skin: Negative for color change, pallor, rash and wound     Psychiatric/Behavioral: Negative for behavioral problems  The patient is not nervous/anxious  Allergies     Allergies   Allergen Reactions   • Ciprofloxacin Hives and Swelling     Pt reports that lips and mouth and face swelled  • Cymbalta [Duloxetine Hcl] Other (See Comments)     Hallucinations, fatigue, faints   • Percocet [Oxycodone-Acetaminophen] Other (See Comments)     Dry mouth - pt states it kept her up all night  States had to continually drink fluids       Physical Exam     Physical Exam  Constitutional:       General: She is not in acute distress  Appearance: Normal appearance  She is normal weight  She is not ill-appearing, toxic-appearing or diaphoretic  HENT:      Head: Normocephalic and atraumatic  Eyes:      General:         Right eye: No discharge  Left eye: No discharge  Conjunctiva/sclera: Conjunctivae normal    Pulmonary:      Effort: Pulmonary effort is normal  No respiratory distress  Musculoskeletal:         General: No swelling or tenderness  Normal range of motion  Skin:     General: Skin is warm and dry  Coloration: Skin is not jaundiced or pale  Neurological:      General: No focal deficit present  Mental Status: She is alert and oriented to person, place, and time  Psychiatric:         Mood and Affect: Mood normal          Behavior: Behavior normal          Thought Content:  Thought content normal          Vital Signs     Vitals:    11/30/22 1333   BP: 130/86   BP Location: Left arm   Patient Position: Sitting   Cuff Size: Standard   Pulse: 83   SpO2: 96%   Weight: 70 7 kg (155 lb 12 8 oz)   Height: 5' 1" (1 549 m)         Current Medications       Current Outpatient Medications:   •  acetaminophen (TYLENOL) 325 mg tablet, Take 2 tablets (650 mg total) by mouth every 6 (six) hours as needed for mild pain, Disp: , Rfl: 0  •  albuterol (Ventolin HFA) 90 mcg/act inhaler, Inhale 1 puff every 4 (four) hours as needed for wheezing, Disp: 3 Inhaler, Rfl: 3  •  benzonatate (TESSALON) 200 MG capsule, Take 1 capsule (200 mg total) by mouth 3 (three) times a day as needed for cough, Disp: 20 capsule, Rfl: 2  •  buPROPion (WELLBUTRIN) 100 mg tablet, Take 100 mg by mouth 3 (three) times a day , Disp: , Rfl:   •  calcium citrate-vitamin D (CITRACAL+D) 315-200 MG-UNIT per tablet, Take 1 tablet by mouth daily  , Disp: , Rfl:   •  cevimeline (EVOXAC) 30 MG capsule, Take 1 capsule (30 mg total) by mouth 3 (three) times a day, Disp: 270 capsule, Rfl: 1  •  chlorhexidine (PERIDEX) 0 12 % solution, , Disp: , Rfl:   •  ferrous sulfate 325 (65 Fe) mg tablet, Take 1 tablet (325 mg total) by mouth 2 (two) times a day with meals, Disp: 60 tablet, Rfl: 0  •  fluticasone (FLONASE) 50 mcg/act nasal spray, 2 sprays into each nostril daily, Disp: , Rfl: 0  •  guaiFENesin (MUCINEX) 600 mg 12 hr tablet, Take 1 tablet (600 mg total) by mouth every 12 (twelve) hours, Disp: 30 tablet, Rfl: 0  •  HYDROcodone-acetaminophen (NORCO) 5-325 mg per tablet, , Disp: , Rfl:   •  hydroxychloroquine (PLAQUENIL) 200 mg tablet, Take 1 tablet (200 mg total) by mouth 2 (two) times a day with meals, Disp: 180 tablet, Rfl: 3  •  levothyroxine 50 mcg tablet, Take 1 tablet (50 mcg total) by mouth daily, Disp: 30 tablet, Rfl: 1  •  LORazepam (ATIVAN) 1 mg tablet, , Disp: , Rfl:   •  predniSONE 10 mg tablet, Take 1 tablet (10 mg total) by mouth daily, Disp: 90 tablet, Rfl: 1  •  QUEtiapine (SEROquel) 300 mg tablet, Take 300 mg by mouth daily at bedtime  , Disp: , Rfl:   •  QUEtiapine (SEROquel) 50 mg tablet, , Disp: , Rfl:   •  temazepam (RESTORIL) 30 mg capsule, Take 2 capsules by mouth daily at bedtime as needed , Disp: , Rfl:   •  amoxicillin (AMOXIL) 875 mg tablet, , Disp: , Rfl:       Active Problems     Patient Active Problem List   Diagnosis   • Bipolar I disorder, single manic episode ([facility] )   • Hypothyroidism   • Systemic lupus erythematosus ([facility] )   • Acid reflux disease   • Anemia   • Breath, shortness   • Depression   • Dyspnea on exertion   • Elevated sedimentation rate   • Generalized osteoarthritis   • Heart burn   • Obesity   • Periorbital edema   • Polyarthralgia   • Postgastrectomy malabsorption   • Sjogren's syndrome (HCC)   • Vaginal atrophy   • Vitamin D deficiency   • Closed avulsion fracture of lateral malleolus of left fibula   • Closed fracture of base of fifth metatarsal bone of left foot   • Lymphadenopathy   • Iron deficiency anemia following bariatric surgery   • On belimumab therapy   • S/P gastric bypass   • Overweight   • Encounter for surgical aftercare following surgery of digestive system   • Epigastric abdominal pain   • Bariatric surgery status   • LIP (lipoid interstitial pneumonia) ([facility] )   • Annual physical exam   • Petechial rash         Past Medical History     Past Medical History:   Diagnosis Date   • Acute kidney injury superimposed on chronic kidney disease ([facility] ) 09/20/2021   • Allergic rhinitis    • Anemia    • Bipolar 1 disorder (HCC)    • Deaf     Pt lost all hearing in right ear after having [place] measles   • GERD (gastroesophageal reflux disease) 08/01/2020   • Hard of hearing     Pt wears a hearing in left ear  • HL (hearing loss)    • Hypothyroidism    • Lung nodule    • Nasal congestion    • Pneumonia 02/10/2017   • Pneumonia due to Streptococcus ([facility] ) 09/19/2021   • Psychiatric disorder    • Sjogren's syndrome (HCC)    • Systemic lupus erythematosus (HCC)    • Wears glasses    • Wears partial dentures          Surgical History     Past Surgical History:   Procedure Laterality Date   • BREAST BIOPSY Right    • COLONOSCOPY     • EYE SURGERY     • FRACTURE SURGERY Right     elbow   • GASTRIC BYPASS     • HYSTERECTOMY Bilateral 1975   • IR BIOPSY LYMPH NODE  10/08/2020   • LUNG BIOPSY     • LYMPH NODE BIOPSY  09/18/2020   • IN BRONCHOSCOPY,DIAGNOSTIC N/A 12/08/2017    Procedure: BRONCHOSCOPY;  Surgeon: Checo Yuen MD;  Location: [facility];   Service: Pulmonary   • IN NEEDLE BIOPSY, LYMPH NODE(S) Left 02/25/2021    Procedure: EXCISION BIOPSY LYMPH NODE: left axillary lymph node biopsy;  Surgeon: Jarred Harrison MD;  Location: [facility];  Service: Thoracic   • TONSILLECTOMY           Family History     Family History   Problem Relation Age of Onset   • Heart disease Mother    • Diabetes Mother    • Kidney cancer Mother    • Cancer Mother         Kidney   • Hypertension Mother    • Heart disease Father    • Stroke Father    • Diabetes Father    • Cancer Father         Rectal Cancer   • Hypertension Father    • No Known Problems Maternal Grandmother    • No Known Problems Maternal Grandfather    • No Known Problems Paternal Grandmother    • No Known Problems Paternal Grandfather    • Leukemia Half-Sister 48   • No Known Problems Half-Sister    • No Known Problems Half-Sister    • No Known Problems Half-Sister    • BRCA2 Positive Neg Hx    • BRCA2 Negative Neg Hx    • BRCA1 Negative Neg Hx    • Endometrial cancer Neg Hx    • Breast cancer Neg Hx    • Breast cancer additional onset Neg Hx    • Colon cancer Neg Hx    • Ovarian cancer Neg Hx    • BRCA1 Positive Neg Hx    • BRCA 1/2 Neg Hx          Social History     Social History       Radiology

## 2022-12-02 LAB — BACTERIA UR CULT: ABNORMAL

## 2022-12-05 ENCOUNTER — TELEPHONE (OUTPATIENT)
Dept: UROLOGY | Facility: CLINIC | Age: 72
End: 2022-12-05

## 2022-12-05 NOTE — TELEPHONE ENCOUNTER
Called patient back and relayed Chelsea's message  Patient confirms she is asymptomatic at this time  She was concerned about the pneumoniae that was seen in urine  Informed her that is the name of the bacteria that was seen in urine  Patient verbalized understanding

## 2022-12-05 NOTE — TELEPHONE ENCOUNTER
Called patient l/m to call us back regarding message below  Is patient symptomatic?     ----- Message from Pamela Gill PA-C sent at 12/5/2022  7:41 AM EST -----  Patient was asymptomatic at our consult last week and likely colonized  No antibiotics recommended at this time unless patient begins to demonstrate symptoms

## 2022-12-16 ENCOUNTER — HOSPITAL ENCOUNTER (OUTPATIENT)
Dept: CT IMAGING | Facility: HOSPITAL | Age: 72
Discharge: HOME | End: 2022-12-16
Attending: INTERNAL MEDICINE

## 2022-12-16 ENCOUNTER — HOSPITAL ENCOUNTER (OUTPATIENT)
Dept: ULTRASOUND IMAGING | Facility: HOSPITAL | Age: 72
Discharge: HOME | End: 2022-12-16

## 2022-12-16 DIAGNOSIS — R59.1 LYMPHADENOPATHY: ICD-10-CM

## 2022-12-16 DIAGNOSIS — N39.0 CHRONIC UTI: ICD-10-CM

## 2022-12-21 ENCOUNTER — TELEPHONE (OUTPATIENT)
Dept: UROLOGY | Facility: AMBULATORY SURGERY CENTER | Age: 72
End: 2022-12-21

## 2023-01-19 ENCOUNTER — OFFICE VISIT (OUTPATIENT)
Dept: FAMILY MEDICINE CLINIC | Facility: CLINIC | Age: 73
End: 2023-01-19

## 2023-01-19 VITALS
WEIGHT: 154 LBS | BODY MASS INDEX: 29.07 KG/M2 | SYSTOLIC BLOOD PRESSURE: 116 MMHG | DIASTOLIC BLOOD PRESSURE: 82 MMHG | OXYGEN SATURATION: 97 % | HEART RATE: 79 BPM | HEIGHT: 61 IN | RESPIRATION RATE: 16 BRPM

## 2023-01-19 DIAGNOSIS — Z90.3 POSTGASTRECTOMY MALABSORPTION: ICD-10-CM

## 2023-01-19 DIAGNOSIS — D47.9 ATYPICAL LYMPHOPROLIFERATIVE DISORDER (HCC): ICD-10-CM

## 2023-01-19 DIAGNOSIS — J84.2 LYMPHOCYTIC INTERSTITIAL PNEUMONIA (HCC): ICD-10-CM

## 2023-01-19 DIAGNOSIS — J69.1: ICD-10-CM

## 2023-01-19 DIAGNOSIS — N18.32 STAGE 3B CHRONIC KIDNEY DISEASE (HCC): ICD-10-CM

## 2023-01-19 DIAGNOSIS — F31.9 BIPOLAR 1 DISORDER (HCC): ICD-10-CM

## 2023-01-19 DIAGNOSIS — K91.2 POSTGASTRECTOMY MALABSORPTION: ICD-10-CM

## 2023-01-19 DIAGNOSIS — E55.9 VITAMIN D DEFICIENCY: ICD-10-CM

## 2023-01-19 DIAGNOSIS — E03.9 HYPOTHYROIDISM, UNSPECIFIED TYPE: ICD-10-CM

## 2023-01-19 DIAGNOSIS — M32.9 SYSTEMIC LUPUS ERYTHEMATOSUS, UNSPECIFIED SLE TYPE, UNSPECIFIED ORGAN INVOLVEMENT STATUS (HCC): ICD-10-CM

## 2023-01-19 DIAGNOSIS — E61.1 IRON DEFICIENCY: Primary | ICD-10-CM

## 2023-01-19 DIAGNOSIS — M35.00 SJOGREN'S SYNDROME, WITH UNSPECIFIED ORGAN INVOLVEMENT (HCC): ICD-10-CM

## 2023-01-19 PROBLEM — S82.62XA CLOSED AVULSION FRACTURE OF LATERAL MALLEOLUS OF LEFT FIBULA: Status: RESOLVED | Noted: 2018-05-30 | Resolved: 2023-01-19

## 2023-01-19 PROBLEM — R10.13 EPIGASTRIC ABDOMINAL PAIN: Status: RESOLVED | Noted: 2020-08-06 | Resolved: 2023-01-19

## 2023-01-19 PROBLEM — Z00.00 ANNUAL PHYSICAL EXAM: Status: RESOLVED | Noted: 2020-11-04 | Resolved: 2023-01-19

## 2023-01-19 PROBLEM — R06.02 BREATH, SHORTNESS: Status: RESOLVED | Noted: 2017-11-22 | Resolved: 2023-01-19

## 2023-01-19 PROBLEM — R06.09 DYSPNEA ON EXERTION: Status: RESOLVED | Noted: 2017-07-18 | Resolved: 2023-01-19

## 2023-01-19 NOTE — PROGRESS NOTES
Assessment/Plan:  Ill order the shelia labs   Cont with pred and hcq   Follow with rheum and pulm    Depending on the iron level we will see what heme would say but I would infuse her     1  Iron deficiency  -     Vitamin B1 (Thiamine), Serum/Plasma, LC/MS/MS; Future  -     Vitamin A; Future  -     Vitamin D 25 hydroxy; Future  -     Folate; Future  -     Vitamin B12; Future  -     Iron; Future  -     Ferritin; Future  -     CBC and differential; Future  -     Comprehensive metabolic panel; Future  -     Copper Level; Future  -     PTH, intact; Future  -     Zinc; Future  -     TSH, 3rd generation with Free T4 reflex; Future    2  Vitamin D deficiency  -     Vitamin B1 (Thiamine), Serum/Plasma, LC/MS/MS; Future  -     Vitamin A; Future  -     Vitamin D 25 hydroxy; Future  -     Folate; Future  -     Vitamin B12; Future  -     Iron; Future  -     Ferritin; Future  -     CBC and differential; Future  -     Comprehensive metabolic panel; Future  -     Copper Level; Future  -     PTH, intact; Future  -     Zinc; Future  -     TSH, 3rd generation with Free T4 reflex; Future    3  Postgastrectomy malabsorption  -     Vitamin B1 (Thiamine), Serum/Plasma, LC/MS/MS; Future  -     Vitamin A; Future  -     Vitamin D 25 hydroxy; Future  -     Folate; Future  -     Vitamin B12; Future  -     Iron; Future  -     Ferritin; Future  -     CBC and differential; Future  -     Comprehensive metabolic panel; Future  -     Copper Level; Future  -     PTH, intact; Future  -     Zinc; Future  -     TSH, 3rd generation with Free T4 reflex; Future    4  Hypothyroidism, unspecified type  -     Vitamin B1 (Thiamine), Serum/Plasma, LC/MS/MS; Future  -     Vitamin A; Future  -     Vitamin D 25 hydroxy; Future  -     Folate; Future  -     Vitamin B12; Future  -     Iron; Future  -     Ferritin; Future  -     CBC and differential; Future  -     Comprehensive metabolic panel; Future  -     Copper Level; Future  -     PTH, intact;  Future  -     Zinc; Future  -     TSH, 3rd generation with Free T4 reflex; Future    5  Systemic lupus erythematosus, unspecified SLE type, unspecified organ involvement status ([facility] )  -     Vitamin B1 (Thiamine), Serum/Plasma, LC/MS/MS; Future  -     Vitamin A; Future  -     Vitamin D 25 hydroxy; Future  -     Folate; Future  -     Vitamin B12; Future  -     Iron; Future  -     Ferritin; Future  -     CBC and differential; Future  -     Comprehensive metabolic panel; Future  -     Copper Level; Future  -     PTH, intact; Future  -     Zinc; Future  -     TSH, 3rd generation with Free T4 reflex; Future    6  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HCC)  -     Vitamin B1 (Thiamine), Serum/Plasma, LC/MS/MS; Future  -     Vitamin A; Future  -     Vitamin D 25 hydroxy; Future  -     Folate; Future  -     Vitamin B12; Future  -     Iron; Future  -     Ferritin; Future  -     CBC and differential; Future  -     Comprehensive metabolic panel; Future  -     Copper Level; Future  -     PTH, intact; Future  -     Zinc; Future  -     TSH, 3rd generation with Free T4 reflex; Future    7  LIP (lipoid interstitial pneumonia) (HCC)  -     Vitamin B1 (Thiamine), Serum/Plasma, LC/MS/MS; Future  -     Vitamin A; Future  -     Vitamin D 25 hydroxy; Future  -     Folate; Future  -     Vitamin B12; Future  -     Iron; Future  -     Ferritin; Future  -     CBC and differential; Future  -     Comprehensive metabolic panel; Future  -     Copper Level; Future  -     PTH, intact; Future  -     Zinc; Future  -     TSH, 3rd generation with Free T4 reflex; Future    8  Bipolar 1 disorder (HCC)  -     Vitamin B1 (Thiamine), Serum/Plasma, LC/MS/MS; Future  -     Vitamin A; Future  -     Vitamin D 25 hydroxy; Future  -     Folate; Future  -     Vitamin B12; Future  -     Iron; Future  -     Ferritin; Future  -     CBC and differential; Future  -     Comprehensive metabolic panel; Future  -     Copper Level; Future  -     PTH, intact;  Future  -     Zinc; Future  - TSH, 3rd generation with Free T4 reflex; Future    9  Sjogren's syndrome, with unspecified organ involvement ([facility] )  -     Vitamin B1 (Thiamine), Serum/Plasma, LC/MS/MS; Future  -     Vitamin A; Future  -     Vitamin D 25 hydroxy; Future  -     Folate; Future  -     Vitamin B12; Future  -     Iron; Future  -     Ferritin; Future  -     CBC and differential; Future  -     Comprehensive metabolic panel; Future  -     Copper Level; Future  -     PTH, intact; Future  -     Zinc; Future  -     TSH, 3rd generation with Free T4 reflex; Future    10  Lymphocytic interstitial pneumonia ([facility] )    11  Stage 3b chronic kidney disease ([facility] )    12  BMI 29 0-29 9,adult       Subjective:      Patient ID: Eva Miller is a 81 y o  female  HPI   Here to go over chronic issues and labs / imaging studies if applicable  Low Na on atypical   Back on plaquenil   Vit d low 24     Iron 42  Ferritin 9     Is due for shelia labs     The following portions of the patient's history were reviewed and updated as appropriate: allergies, current medications, past family history, past medical history, past social history, past surgical history and problem list     Review of Systems   Constitutional: Positive for fatigue  Negative for fever and unexpected weight change  HENT: Negative for nosebleeds and trouble swallowing  Eyes: Negative for visual disturbance  Respiratory: Positive for shortness of breath  Negative for chest tightness  Cardiovascular: Negative for chest pain, palpitations and leg swelling  Gastrointestinal: Negative for abdominal pain, constipation, diarrhea and nausea  Endocrine: Negative for cold intolerance  Genitourinary: Negative for dysuria and urgency  Musculoskeletal: Positive for arthralgias  Negative for joint swelling and myalgias  Skin: Negative for rash  Neurological: Negative for tremors, seizures and syncope  Hematological: Does not bruise/bleed easily     Psychiatric/Behavioral: Negative for hallucinations and suicidal ideas  Objective:      /82 (BP Location: Left arm, Patient Position: Sitting, Cuff Size: Standard)   Pulse 79   Resp 16   Ht 5' 1" (1 549 m)   Wt 69 9 kg (154 lb)   LMP  (LMP Unknown)   SpO2 97%   BMI 29 10 kg/m²     No visits with results within 2 Week(s) from this visit  Latest known visit with results is:   Office Visit on 11/30/2022   Component Date Value   • LEUKOCYTE ESTERASE,UA 11/30/2022 +    • Taffy Bashir 11/30/2022 -    • SL AMB POCT UROBILINOGEN 11/30/2022 -    • POCT URINE PROTEIN 11/30/2022 -    •  PH,UA 11/30/2022 6 0    • BLOOD,UA 11/30/2022 -    • SPECIFIC GRAVITY,UA 11/30/2022 1,005    • Eliceo Santosek 11/30/2022 -    • BILIRUBIN,UA 11/30/2022 +    • GLUCOSE, UA 11/30/2022 -    •  COLOR,UA 11/30/2022 yellow    • CLARITY,UA 11/30/2022 clear    • POST-VOID RESIDUAL VOLUM* 11/30/2022 0    • Urine Culture 11/30/2022 >100,000 cfu/ml Klebsiella pneumoniae (A)    • Color, UA 11/30/2022 Light Yellow    • Clarity, UA 11/30/2022 Clear    • Specific [place], UA 11/30/2022 1 011    • pH, UA 11/30/2022 6 0    • Leukocytes, UA 11/30/2022 Negative    • Nitrite, UA 11/30/2022 Negative    • Protein, UA 11/30/2022 Negative    • Glucose, UA 11/30/2022 Negative    • Ketones, UA 11/30/2022 Negative    • Urobilinogen, UA 11/30/2022 <2 0    • Bilirubin, UA 11/30/2022 Negative    • Occult Blood, UA 11/30/2022 Negative    • RBC, UA 11/30/2022 1-2    • WBC, UA 11/30/2022 2-4 (A)    • Epithelial Cells 11/30/2022 Occasional    • Bacteria, UA 11/30/2022 Occasional           Physical Exam  Vitals and nursing note reviewed  Constitutional:       Appearance: She is well-developed  HENT:      Head: Normocephalic and atraumatic  Cardiovascular:      Rate and Rhythm: Normal rate and regular rhythm  Heart sounds: Normal heart sounds  No murmur heard  Pulmonary:      Effort: Pulmonary effort is normal       Breath sounds: Normal breath sounds  No wheezing or rales  Abdominal:      General: Bowel sounds are normal  There is no distension  Palpations: Abdomen is soft  Tenderness: There is no abdominal tenderness  Musculoskeletal:         General: No tenderness  Normal range of motion  Cervical back: Normal range of motion and neck supple  Lymphadenopathy:      Cervical: No cervical adenopathy  Skin:     General: Skin is warm and dry  Capillary Refill: Capillary refill takes less than 2 seconds  Findings: No rash  Neurological:      Mental Status: She is alert and oriented to person, place, and time  Cranial Nerves: No cranial nerve deficit  Sensory: No sensory deficit  Motor: No abnormal muscle tone  Psychiatric:         Behavior: Behavior normal          Thought Content: Thought content normal          Judgment: Judgment normal            BMI Counseling: Body mass index is 29 1 kg/m²  The BMI is above normal  Nutrition recommendations include decreasing portion sizes, encouraging healthy choices of fruits and vegetables, decreasing fast food intake, consuming healthier snacks and limiting drinks that contain sugar  Exercise recommendations include exercising 3-5 times per week  No pharmacotherapy was ordered  Rationale for BMI follow-up plan is due to patient being overweight or obese  Falls Plan of Care: balance, strength, and gait training instructions were provided  Medications that increase falls were reviewed         MD [place] [id number]

## 2023-02-22 ENCOUNTER — OFFICE VISIT (OUTPATIENT)
Dept: PULMONOLOGY | Facility: CLINIC | Age: 73
End: 2023-02-22

## 2023-02-22 VITALS
HEART RATE: 83 BPM | TEMPERATURE: 98.8 F | SYSTOLIC BLOOD PRESSURE: 122 MMHG | DIASTOLIC BLOOD PRESSURE: 82 MMHG | BODY MASS INDEX: 28.89 KG/M2 | RESPIRATION RATE: 18 BRPM | OXYGEN SATURATION: 98 % | HEIGHT: 61 IN | WEIGHT: 153 LBS

## 2023-02-22 DIAGNOSIS — R05.3 CHRONIC COUGH: ICD-10-CM

## 2023-02-22 DIAGNOSIS — J84.2 LYMPHOCYTIC INTERSTITIAL PNEUMONIA (HCC): ICD-10-CM

## 2023-02-22 DIAGNOSIS — M35.00 SJOGREN'S SYNDROME, WITH UNSPECIFIED ORGAN INVOLVEMENT (HCC): Primary | ICD-10-CM

## 2023-02-22 NOTE — PROGRESS NOTES
Progress Note - Pulmonary   Betzaida Cohoes 81 y o  female MRN: [id number]   Encounter: [id number]      Assessment/Plan:  Patient is a 68-year-old female past medical history significant for Sjogren's syndrome who presents for routine follow-up  Overall, the patient is doing relatively well  From a respiratory standpoint, she does report short of breath but this is likely related to deconditioning  During wintertime she only does limited exercise was counseled on need to increase this  The patient's CT scan is stable  No further therapy is needed from this standpoint  We did discuss that her eyes were significant bothering and the Systane she is using is not sufficient  Reviewed the [facility] covers Restasis  She may contact her ophthalmologist or rheumatologist for this  Given her difficulty with the prednisone, will start a very slow taper  We will drop by 1 mg/month  Chronic cough  -  Maintains on 10mg prednisone  -  Will drop by 1mg/month    Sjogrens's syndrome w/ associated LIP  -  Dry eyes/mouth  -  Recommend [facility] for restasis   -  Had great benefit previously    CLL  -  Stable  -  Follows with heme    1  Sjogren's syndrome, with unspecified organ involvement ([address] )  -     predniSONE 1 mg tablet; Take 1 tablet (1 mg total) by mouth daily    2  Lymphocytic interstitial pneumonia ([address] )    3  Chronic cough      Patient may follow up in 4 months or sooner as necessary  Orders:  No orders of the defined types were placed in this encounter  Subjective:   Patient notes that she is doing relatively well since last visit  She is stable with her Sjogren's on prednisone of 10 mg  She is concerned about her inability to afford cyclosporine for which she provides great benefit  Related to the prednisone, she does note did weight gain as well as insomnia  The patient does report she will get short of breath with walking    Currently she is doing limited exercise more associated with seasonal affective changes  She is looking forward to the spring  She is not currently using any inhalers or nebulizers  The patient reports significant dry eyes and dry mouth but has no other Sjogren symptoms  She is using Biotene for dry mouth  Inhaler Regimen:  None    Answers for HPI/ROS submitted by the patient on 2/21/2023  Do you have a cough?: Yes  Do you have difficulty breathing?: Yes  Do you have shortness of breath?: Yes  Do you have a wet cough?: Yes  Chronicity: chronic  How often do your symptoms occur?: constantly  Since you first noticed this problem, how has it changed?: waxing and waning  Have you had a change in appetite?: No  Do you have chest pain?: No  Do you have shortness of breath that occurs with effort or exertion?: Yes  Do you have ear congestion?: Yes  Do you have ear pain?: Yes  Do you have a fever?: No  Do you have headaches?: No  Do you have heartburn?: No  Do you have fatigue?: Yes  Do you have muscle pain?: Yes  Do you have nasal congestion?: No  Do you have shortness of breath when lying flat?: No  Do you have shortness of breath when you wake up?: No  Do you have post-nasal drip?: Yes  Do you have a runny nose?: Yes  Do you have sneezing?: No  Do you have a sore throat?: Yes  Do you have sweats?: Yes  Have you experienced weight loss?: No  Which of the following makes your symptoms worse?: change in weather, climbing stairs, minimal activity, strenuous activity  Which of the following makes your symptoms better?: rest    Remainder of review of systems negative except as described in HPI  The following portions of the patient's history were reviewed and updated as appropriate: allergies, current medications, past family history, past medical history, past social history, past surgical history and problem list      Objective:   Vitals: Blood pressure 122/82, pulse 83, temperature 98 8 °F (37 1 °C), temperature source Tympanic, resp   rate 18, height 5' 1" (1 549 m), weight 69 4 kg (153 lb), SpO2 98 %, not currently breastfeeding , RA, Body mass index is 28 91 kg/m²  Physical Exam  Gen: pleasant, awake, alert, oriented x 3, no acute distress  HEENT: Mucous membranes moist, no oral lesions, no thrush  NECK: No accessory muscle use, JVP not elevated  Cardiac: RRR, single S1, single S2, no murmurs, no rubs, no gallops  Lungs: CTA b/l  Abdomen: normoactive bowel sounds, soft nontender, nondistended, no rebound or rigidity, no guarding  Extremities: no cyanosis, no clubbing, no Le edema  MSK:  Strength equal in all extremities  Derm:  No rashes/lesions noted  Neuro:  Appropriate mood/affect    Labs: I have personally reviewed pertinent lab results  Lab Results   Component Value Date    WBC 10 31 (H) 10/10/2022    HGB 9 6 (L) 10/10/2022     10/10/2022     Lab Results   Component Value Date    CREATININE 0 97 10/10/2022        Imaging and other studies: I have personally reviewed pertinent reports  and I have personally reviewed pertinent films in PACS  12/16/2022  My interpretation:  + cysts/ground glass    Radiology findings:  LUNGS:  No new pulmonary nodules  Redemonstrated are scattered small pulmonary nodules, stable since 2018, benign  Also redemonstrated is the basilar predominant areas of paramediastinal groundglass opacification with associated multiple cysts without solid components  No evidence of more confluent consolidation  Airways are normal   PLEURA:  Unremarkable  HEART/GREAT VESSELS: Heart is unremarkable for patient's age  No thoracic aortic aneurysm  There is enlargement of the central pulmonary arterial tree suggesting some element of pulmonary artery hypertension  MEDIASTINUM AND KACEY:  Redemonstrated is mediastinal lymph node enlargement  Reference 2R lymph node measures 10 mm in short axis (series 2, image 14), stable  Other lymph nodes are similar in size  CHEST WALL AND LOWER NECK:  Redemonstrated is bilateral axillary lymph node enlargement  Reference right axillary lymph node now measures 12 mm (series 2, image 15), previously 16 mm in short axis  Additional reference left axillary lymph node now   measures 10 mm (series 2, image 14), previously 15 mm  Pulmonary Function Testing: I have personally reviewed pertinent reports  and I have personally reviewed pertinent films in PACS  10/23/2017:  Restriction  Spirometry:  FEV1/FVC Ratio is 81%  FEV1 is 64% predicted  FVC is 60% predicted  No change with bronchodilators  Lung volumes: Total lung capacity is 77% predicted  Residual volume is 94% predicted    Flow volume loop:  Normal    Chepe S   Tucker Soulier, [place]

## 2023-02-23 PROBLEM — J69.1: Status: RESOLVED | Noted: 2020-10-29 | Resolved: 2023-02-23

## 2023-02-23 RX ORDER — PREDNISONE 1 MG/1
1 TABLET ORAL DAILY
Qty: 150 TABLET | Refills: 0 | Status: SHIPPED | OUTPATIENT
Start: 2023-02-23

## 2023-03-15 ENCOUNTER — HOSPITAL ENCOUNTER (OUTPATIENT)
Dept: RADIOLOGY | Age: 73
Discharge: HOME | End: 2023-03-15

## 2023-03-15 VITALS — WEIGHT: 154 LBS | BODY MASS INDEX: 29.07 KG/M2 | HEIGHT: 61 IN

## 2023-03-15 DIAGNOSIS — Z13.820 SCREENING FOR OSTEOPOROSIS: ICD-10-CM

## 2023-03-15 DIAGNOSIS — M85.80 OSTEOPENIA, UNSPECIFIED LOCATION: ICD-10-CM

## 2023-03-20 PROBLEM — J84.2: Status: RESOLVED | Noted: 2023-01-19 | Resolved: 2023-03-20

## 2023-03-29 ENCOUNTER — TELEPHONE (OUTPATIENT)
Dept: HEMATOLOGY ONCOLOGY | Facility: CLINIC | Age: 73
End: 2023-03-29

## 2023-05-04 ENCOUNTER — TELEPHONE (OUTPATIENT)
Dept: HEMATOLOGY ONCOLOGY | Facility: CLINIC | Age: 73
End: 2023-05-04

## 2023-05-04 NOTE — TELEPHONE ENCOUNTER
Appointment Change  Cancel, Reschedule, Change to Virtual      Who are you speaking with? Patient   If it is not the patient, are they listed on an active communication consent form? N/A   Which provider is the appointment scheduled with? Dr Billie Dotson   When is the appointment scheduled? Please list date and time 6/14/23   At which location is the appointment scheduled to take place? [place]   Was the appointment rescheduled or changed from an in person visit to a virtual visit? If so, please list the details of the change  6/15/23   What is the reason for the appointment change? Provider is at a different location  Was [facility] scheduled for this visit? No   Does [facility] need to be scheduled for the new visit (if applicable) No   Does the patient need an infusion appointment rescheduled? No   Does the patient have an infusion appointment scheduled? If so, when? No   Is the patient undergoing chemotherapy? No   Was the no-show policy reviewed for appointments being changed with less then 24 hours of notice?  Yes

## 2023-05-25 ENCOUNTER — APPOINTMENT (OUTPATIENT)
Dept: LAB | Facility: CLINIC | Age: 73
End: 2023-05-25

## 2023-05-25 ENCOUNTER — PATIENT MESSAGE (OUTPATIENT)
Dept: UROLOGY | Facility: CLINIC | Age: 73
End: 2023-05-25

## 2023-05-25 DIAGNOSIS — M32.9 SYSTEMIC LUPUS ERYTHEMATOSUS, UNSPECIFIED SLE TYPE, UNSPECIFIED ORGAN INVOLVEMENT STATUS (HCC): ICD-10-CM

## 2023-05-25 DIAGNOSIS — M35.00 SJOGREN'S SYNDROME, WITH UNSPECIFIED ORGAN INVOLVEMENT (HCC): ICD-10-CM

## 2023-05-25 LAB
ALBUMIN SERPL BCP-MCNC: 3.4 G/DL (ref 3.5–5)
ALP SERPL-CCNC: 110 U/L (ref 34–104)
ALT SERPL W P-5'-P-CCNC: 9 U/L (ref 7–52)
ANION GAP SERPL CALCULATED.3IONS-SCNC: 6 MMOL/L (ref 4–13)
AST SERPL W P-5'-P-CCNC: 24 U/L (ref 13–39)
BACTERIA UR QL AUTO: ABNORMAL /HPF
BASOPHILS # BLD MANUAL: 0 THOUSAND/UL (ref 0–0.1)
BASOPHILS NFR MAR MANUAL: 0 % (ref 0–1)
BILIRUB SERPL-MCNC: 0.42 MG/DL (ref 0.2–1)
BILIRUB UR QL STRIP: NEGATIVE
BUN SERPL-MCNC: 19 MG/DL (ref 5–25)
C3 SERPL-MCNC: 104 MG/DL (ref 90–180)
C4 SERPL-MCNC: 25 MG/DL (ref 10–40)
CALCIUM ALBUM COR SERPL-MCNC: 9.1 MG/DL (ref 8.3–10.1)
CALCIUM SERPL-MCNC: 8.6 MG/DL (ref 8.4–10.2)
CHLORIDE SERPL-SCNC: 103 MMOL/L (ref 96–108)
CLARITY UR: ABNORMAL
CO2 SERPL-SCNC: 22 MMOL/L (ref 21–32)
COLOR UR: ABNORMAL
CREAT SERPL-MCNC: 0.94 MG/DL (ref 0.6–1.3)
CREAT UR-MCNC: 29.6 MG/DL
CRP SERPL QL: 2.6 MG/L
EOSINOPHIL # BLD MANUAL: 0.25 THOUSAND/UL (ref 0–0.4)
EOSINOPHIL NFR BLD MANUAL: 2 % (ref 0–6)
ERYTHROCYTE [DISTWIDTH] IN BLOOD BY AUTOMATED COUNT: 17 % (ref 11.6–15.1)
ERYTHROCYTE [SEDIMENTATION RATE] IN BLOOD: 72 MM/HOUR (ref 0–29)
GFR SERPL CREATININE-BSD FRML MDRD: 61 ML/MIN/1.73SQ M
GLUCOSE P FAST SERPL-MCNC: 98 MG/DL (ref 65–99)
GLUCOSE UR STRIP-MCNC: NEGATIVE MG/DL
HCT VFR BLD AUTO: 32.3 % (ref 34.8–46.1)
HGB BLD-MCNC: 10.1 G/DL (ref 11.5–15.4)
HGB UR QL STRIP.AUTO: NEGATIVE
HYALINE CASTS #/AREA URNS LPF: ABNORMAL /LPF
IGA SERPL-MCNC: 380 MG/DL (ref 70–400)
IGG SERPL-MCNC: 3640 MG/DL (ref 700–1600)
IGM SERPL-MCNC: 358 MG/DL (ref 40–230)
KETONES UR STRIP-MCNC: NEGATIVE MG/DL
LEUKOCYTE ESTERASE UR QL STRIP: ABNORMAL
LYMPHOCYTES # BLD AUTO: 73 % (ref 14–44)
LYMPHOCYTES # BLD AUTO: 9.26 THOUSAND/UL (ref 0.6–4.47)
MCH RBC QN AUTO: 27.7 PG (ref 26.8–34.3)
MCHC RBC AUTO-ENTMCNC: 31.3 G/DL (ref 31.4–37.4)
MCV RBC AUTO: 89 FL (ref 82–98)
MONOCYTES # BLD AUTO: 0.51 THOUSAND/UL (ref 0–1.22)
MONOCYTES NFR BLD: 4 % (ref 4–12)
NEUTROPHILS # BLD MANUAL: 2.66 THOUSAND/UL (ref 1.85–7.62)
NEUTS SEG NFR BLD AUTO: 21 % (ref 43–75)
NITRITE UR QL STRIP: NEGATIVE
NON-SQ EPI CELLS URNS QL MICRO: ABNORMAL /HPF
PH UR STRIP.AUTO: 5.5 [PH]
PLATELET # BLD AUTO: 263 THOUSANDS/UL (ref 149–390)
PLATELET BLD QL SMEAR: ADEQUATE
PMV BLD AUTO: 9.8 FL (ref 8.9–12.7)
POTASSIUM SERPL-SCNC: 4.2 MMOL/L (ref 3.5–5.3)
PROT SERPL-MCNC: 9.2 G/DL (ref 6.4–8.4)
PROT UR STRIP-MCNC: NEGATIVE MG/DL
PROT UR-MCNC: 9 MG/DL
PROT/CREAT UR: 0.3 MG/G{CREAT} (ref 0–0.1)
RBC # BLD AUTO: 3.64 MILLION/UL (ref 3.81–5.12)
RBC #/AREA URNS AUTO: ABNORMAL /HPF
RBC MORPH BLD: NORMAL
SODIUM SERPL-SCNC: 131 MMOL/L (ref 135–147)
SP GR UR STRIP.AUTO: 1.01 (ref 1–1.03)
UROBILINOGEN UR STRIP-ACNC: <2 MG/DL
WBC # BLD AUTO: 12.69 THOUSAND/UL (ref 4.31–10.16)
WBC #/AREA URNS AUTO: ABNORMAL /HPF

## 2023-05-26 ENCOUNTER — APPOINTMENT (OUTPATIENT)
Dept: LAB | Facility: CLINIC | Age: 73
End: 2023-05-26

## 2023-05-26 DIAGNOSIS — N39.0 CHRONIC UTI: Primary | ICD-10-CM

## 2023-05-26 DIAGNOSIS — N39.0 CHRONIC UTI: ICD-10-CM

## 2023-05-26 LAB
ALBUMIN SERPL ELPH-MCNC: 3.59 G/DL (ref 3.5–5)
ALBUMIN SERPL ELPH-MCNC: 38.2 % (ref 52–65)
ALPHA1 GLOB SERPL ELPH-MCNC: 0.29 G/DL (ref 0.1–0.4)
ALPHA1 GLOB SERPL ELPH-MCNC: 3.1 % (ref 2.5–5)
ALPHA2 GLOB SERPL ELPH-MCNC: 0.79 G/DL (ref 0.4–1.2)
ALPHA2 GLOB SERPL ELPH-MCNC: 8.4 % (ref 7–13)
BETA GLOB ABNORMAL SERPL ELPH-MCNC: 0.39 G/DL (ref 0.4–0.8)
BETA1 GLOB SERPL ELPH-MCNC: 4.2 % (ref 5–13)
BETA2 GLOB SERPL ELPH-MCNC: 4.4 % (ref 2–8)
BETA2+GAMMA GLOB SERPL ELPH-MCNC: 0.41 G/DL (ref 0.2–0.5)
DSDNA AB SER-ACNC: 2 IU/ML (ref 0–9)
GAMMA GLOB ABNORMAL SERPL ELPH-MCNC: 3.92 G/DL (ref 0.5–1.6)
GAMMA GLOB SERPL ELPH-MCNC: 41.7 % (ref 12–22)
IGG/ALB SER: 0.62 {RATIO} (ref 1.1–1.8)
INTERPRETATION UR IFE-IMP: NORMAL
PROT PATTERN SERPL ELPH-IMP: ABNORMAL
PROT SERPL-MCNC: 9.4 G/DL (ref 6.4–8.2)

## 2023-05-28 LAB — BACTERIA UR CULT: ABNORMAL

## 2023-05-30 DIAGNOSIS — N39.0 URINARY TRACT INFECTION WITHOUT HEMATURIA, SITE UNSPECIFIED: Primary | ICD-10-CM

## 2023-05-30 RX ORDER — SULFAMETHOXAZOLE AND TRIMETHOPRIM 800; 160 MG/1; MG/1
1 TABLET ORAL EVERY 12 HOURS SCHEDULED
Qty: 14 TABLET | Refills: 0 | Status: SHIPPED | OUTPATIENT
Start: 2023-05-30 | End: 2023-06-06

## 2023-05-30 NOTE — PROGRESS NOTES
Assessment and Plan:   Ms Viramontes Abdirahman a 44-year-old female with history significant for systemic lupus erythematosus and Sjogren's syndrome diagnosed in her early 45s by Rheumatology in [facility] who presents for a follow-up  Jessika Keith currently on hydroxychloroquine 200 mg twice daily      # SLE and Sjogren's syndrome  # Sjogren's related ILD  - Ramiro Douglass presents today for a follow-up of systemic lupus erythematosus and Sjogren's syndrome which has primarily presented with chronic fatigue, sicca complaints, the recurrent lower extremity skin rash (stable recently) and likely the Sjogren's syndrome related interstitial lung disease   She has been maintained on hydroxychloroquine 200 mg twice daily and I advised her to continue regular follow ups with Ophthalmology for visual field testing    She will also continue the Evoxac 30 mg 3 times daily [will represcribe as she is unsure why this was discontinued], topical Restasis and follow up regularly with her ophthalmologist and dentist       - It is reassuring to note over the years she has not presented with progressively worsening complaints and overall appears to be stable at this time, with the most pertinent issues that need to be addressed including the interstitial lung disease (likely lymphocytic interstitial pneumonia as a result of Sjogren's syndrome) and extensive intrathoracic and intra-abdominal lymphadenopathy, for which she did have an axillary lymph node biopsy repeated in February 2021 with the histopathological diagnosis suggestive of an atypical lymphoproliferative disorder  Sadafpina Toneyr will follow-up with Hematology periodically to continue monitoring this but at this time there does not appear to be concerns for an underlying malignant process   It is reassuring to note that she has been stable from a respiratory standpoint      - In regards to the lower extremity skin rash which may be hypergammaglobulinemic purpura related to the Sjogren's syndrome she was advised by Dermatology that this can be a benign skin rash and does not need to be treated  We will monitor for now as the rash usually resolves spontaneously  # Osteoporosis  - Given the progression to osteoporosis we discussed starting treatment and as an initial option given her history of GERD and postgastrectomy malabsorption I will plan to start her on IV zoledronic acid infusions on an annual basis at the [facility]  She should continue with daily calcium and vitamin D supplements and attempt weightbearing exercises        Plan:  Diagnoses and all orders for this visit:    Systemic lupus erythematosus, unspecified SLE type, unspecified organ involvement status ([facility] )  -     CBC and differential; Future  -     Comprehensive metabolic panel; Future  -     C-reactive protein; Future  -     Sedimentation rate, automated; Future  -     C4 complement; Future  -     C3 complement; Future  -     Urinalysis with microscopic  -     Protein / creatinine ratio, urine  -     Protein electrophoresis, serum; Future  -     Cryoglobulin; Future  -     IgG, IgA, IgM; Future    Sjogren's syndrome, with unspecified organ involvement ([facility] )  -     CBC and differential; Future  -     Comprehensive metabolic panel; Future  -     C-reactive protein; Future  -     Sedimentation rate, automated; Future  -     C4 complement; Future  -     C3 complement; Future  -     Urinalysis with microscopic  -     Protein / creatinine ratio, urine  -     Protein electrophoresis, serum; Future  -     Cryoglobulin; Future  -     IgG, IgA, IgM; Future  -     cevimeline (EVOXAC) 30 MG capsule;  Take 1 capsule (30 mg total) by mouth 3 (three) times a day    Long-term use of Plaquenil    Lymphocytic interstitial pneumonia (HCC)    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HCC)    Bilateral primary osteoarthritis of knee    Age-related osteoporosis without current pathological fracture    Long term use of bisphosphonates    Stage 3b chronic kidney disease [facility])  -     Ambulatory Referral to Nephrology; Future    Other orders  -     cycloSPORINE (RESTASIS) 0 05 % ophthalmic emulsion; instill 1 drop into both eyes twice a day      Activities as tolerated  Exercise: try to maintain a low impact exercise regimen as much as possible  Continue other medications as prescribed by PCP and other specialists  RTC in 9 months          HPI    INITIAL VISIT NOTE (10/2020):  Ms Deana Sorto a 74-year-old female with history significant for systemic lupus erythematosus and Sjogren's syndrome diagnosed in her early 45s by Rheumatology in [place], [facility]  Woody Ortega is currently not on DMARDs  Woody Ortega is referred today by Dr Alirio Argueta for a rheumatology consult      Patient reports in her early 45s she started to develop various joint pains which eventually led to the diagnosis of systemic lupus erythematosus and Sjogren's syndrome   She reports surrounding the time of her diagnosis is when the joint pains were most prevalent, but appear to have spontaneously resolved   She did experience joint pains again a few years later but states that this also resolved and recently she has not had any joint related issues   At the time of her diagnosis it appears like she was treated with steroids as well as hydroxychloroquine   She was on the hydroxychloroquine for less than a year at which time it was discontinued due to nausea and other side effects which she cannot recall  Woody Ortega has never been restarted on the hydroxychloroquine following that      The timeline of her rheumatology visits is unclear as we do not have her complete prior records   Based on chart review as well as patient's history, she has been on multiple DMARDs including methotrexate, azathioprine and Benlysta   She states that the methotrexate and azathioprine were prescribed in the past 10-15 years  Nora Clark is unclear what these medications for specifically prescribed for and if they helped with any of her symptoms   Most recently she was on Benlysta approximately 3 years ago when she was being seen by Dr Magaly Robles states that this helped significantly with how she was feeling overall and her general well-being, as well as significantly helped with the fatigue   She was on this for about 7-8 months and unfortunately it was discontinued when she turned 67 as her insurance no longer covered it   I do not see any of her lupus related labs in our system, but on review of her prior rheumatologist's note from 2015 she appeared to have a positive MALATHI at 1:1280, with positive SSA, SSB and RNP antibodies    A rheumatoid factor was also elevated at 105 with an ESR of 84   Her antiphospholipid antibodies were negative        She states over the years it has primarily been the excessive fatigue that has bothered her and only recently has she been dealing with chronic respiratory symptoms as well as intrathoracic and abdominal lymphadenopathy that has been monitored by Oncology  Analisa Dam terms of her respiratory symptoms she reports shortness of breath with exertion, usually with walking about a block, but at times she may experience shortness of breath with day-to-day activities  Tori Hope also has a chronic productive cough   On review of her CT chest and abdomen as far back as 2017, she has had abnormal findings concerning for interstitial lung disease as well as the extensive lymphadenopathy   On her most recent CT chest from September 2020 this showed asymmetric bilateral areas of ground-glass opacities and lung cysts likely representing a spectrum of lymphocytic interstitial pneumonia as well as worsening adenopathy in the mediastinum and bilateral hilar regions   She did undergo an IR guided biopsy of an axillary lymph node on 10/08/2020 which did not show any evidence of a lymphoproliferative disorder   The biopsy was not diagnostic   On her most recent follow-up with Hematology/Oncology, as "there are no clear features to suggest an underlying malignancy the plan will be to continue monitoring   She has not seen pulmonology yet and is actually establishing for her initial appointment tomorrow  Nadege Clemente did undergo a bronchoscopy in December 2017 with cytology negative for malignancy and showing an abundant mixed but predominantly chronic inflammatory infiltrate   Flow cytometry as well as cultures at that time were unrevealing      As mentioned, she is primarily dealing with the abnormal CT chest and abdomen findings at this time   Symptom wise she overall appears to be stable   She does report chronic dry eyes, dry nose and dry mouth which she manages with topical lubricants   Due to the dry nose as well as recent face masking she has been noticing nose bleeds and feels like there may be a \"hole\" in her nasal septum   She has not yet established with ENT   On occasion she may notice a \"butterfly rash\" and states that she is photosensitive but strictly avoids sun exposure and utilizes daily sunscreen   She has also been experiencing blister-like skin lesions scattered on her extremities, which heal spontaneously   She has not had any mouth ulcerations in years  Nadege Clemente denies fevers, chills, night sweats, unintentional weight loss, focal alopecia, inflammatory eye disease, psoriasis, swollen glands, pleuritic chest pain, hemoptysis, abdominal pain, vomiting, diarrhea, blood in stools (is up-to-date with a screening colonoscopy), blood clots, miscarriages, Raynaud's, joint pain/swelling/stiffness, renal disease, neurological disease/seizures or family history of autoimmune disease         2/10/2021:  Patient presents for a follow-up of systemic lupus erythematosus and Sjogren's syndrome   She is currently on hydroxychloroquine 200 mg twice daily that was started at the last office visit in October 2020   We reviewed her testing done following the last visit which showed a positive MALATHI 1:1280 speckled pattern " with an SSA and SSB antibody greater than 8   An RNP antibody was 6 8   A rheumatoid factor was elevated at 80   An SPEP showed a possible monoclonal gammopathy but this was not clearly determined on serum immunofixation   An immunoglobulin assay showed hypergammaglobulinemia   A peripheral flow cytometry showed 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   A vitamin-D level was low at 13  5   An ESR and CRP were elevated at 130 and 5 4, respectively   The remainder of the MALATHI specificity, C3, C4, antiphospholipid antibody testing, hepatitis panel, HIV, urinalysis, urine protein creatinine ratio, angiotensin-converting enzyme, anti neutrophilic cytoplasmic antibody, CK and anti CCP antibody were unremarkable   She had a CT chest done yesterday with the results still pending   She is going to be establishing with thoracic surgery soon to consider the mediastinal lymphadenopathy biopsy      She reports overall she has been stable in terms of her symptoms   She mostly describes the dry eyes worse on the right side and unfortunately the Restasis has not been approved by her insurance  Darvin Pittman also reports dry nose and dry mouth   She will have nose bleeds due to the dryness   She reports chronic fatigue without any improvement seen with starting the hydroxychloroquine   She also reports within the past year she has had approximately 4 episodes of a pinpoint, red and itchy skin rash arise on her bilateral lower extremities   At times she may use topical steroids but has never been prescribed oral steroids for this   The rash will usually resolve spontaneously within 3-4 days  Darvin Pittman has not been seen by Dermatology for this  Darvin Toya was recently seen in the emergency room for an occurrence of the skin rash after receiving the COVID vaccine without any specific treatment and states that the rash eventually resolved spontaneously  Bunny Lobe is no rash today   No other complaints that she describes today         6/8/2021:  Patient presents for a follow-up of systemic lupus erythematosus and Sjogren's syndrome   She temporarily suspended the hydroxychloroquine as she thought that this was causing joint pains in her knees and ankles which did subside with holding the hydroxychloroquine   I did review her labs done following the last office visit which showed an unremarkable CBC, CMP, C3 and C4   The ESR and CRP were elevated at 81 and 4 2, respectively   Since the last office visit she also underwent a left axillary lymph node biopsy which showed an atypical lymphoproliferative disorder  Osiris Shearer is followed up with Hematology/Oncology without any concerns for an underlying malignant process and the plan is to continue monitoring for now      She continues to experience an intermittent skin rash affecting her bilateral lower extremities which she thought may be related to an allergic reaction from dog fur   She states that this will happen irrespective of pet exposure   The rash is usually itchy and at times she may use topical hydrocortisone to help with her symptoms but has not been on oral steroids   She mentions that the rash will appear and remit spontaneously   She continues to report the dry eyes and dry mouth which she is able to manage with the pilocarpine   Other than this no complaints today         9/8/2021:  Patient presents for a follow-up of systemic lupus erythematosus and Sjogren's syndrome   She is currently on hydroxychloroquine 200 mg twice daily that she restarted in July  I reviewed her recently done labs which showed an elevated ESR and CRP of 76 and 6 1, respectively  A urine protein creatinine ratio was stable at 0 24  A urinalysis showed concerns for a urinary tract infection and she is following up with her primary care physician for this  A CBC, CMP, C3, C4 and double-stranded DNA antibody were unremarkable      She reports overall she has been stable from the last office visit and not reporting any new complaints  She does continue to feel fatigued and this is her main symptom  No flare-ups of joint pains, swelling or stiffness  She reports that the rash on her legs has intermittently appeared and she is scheduled to see Dermatology      She does follow with Ophthalmology routinely as she is on the hydroxychloroquine  She is managing the dry eyes and dry mouth with pilocarpine 5 mg 3 times daily and states that this helps  4/20/2022:  Patient presents for a follow-up of systemic lupus erythematosus and Sjogren's syndrome   She is currently on hydroxychloroquine 200 mg twice daily  I reviewed her recently done labs which showed an elevated ESR and CRP of 105 and 3 9, respectively  A urine protein creatinine ratio was stable at 0 22  A urinalysis showed concerns for a urinary tract infection and she is following up with her primary care physician for this  A CBC, CMP, C3, C4 and double-stranded DNA antibody were unremarkable  She did also see Dermatology due to the recurrent skin rash on her lower extremities and had a skin biopsy done on 09/21 which showed:  Perivascular mixed inflammatory infiltrate with numerous neutrophils, scattered eosinophils, and focal erythrocyte extravasation (see note)      Note: The histopathologic findings are non-specific  Definite vasculitis is not seen (early or resolving vasculitis cannot be fully excluded; correlation with immuofluorescence studies is advised)  In the appropriate clinical context, the histopathologic findings may be compatible with hypergammaglobulinemic purpura of [place]strom, though the histopathologic differential diagnosis includes a hypersensitivity reaction (e g , to a drug)  Clinical pathological correlation is essential  Pathogenic microorganisms are not seen on PAS stain  Multiple levels examined      Direct immunofluorescence showed weak epidermal particulate staining seen with IgG as can be seen in subacute lupus erythematosus, Sjogren's or mixed connective tissue disease but the overall granular immune deposits at the basement membrane zone are insufficient for a diagnosis of lupus  The overall vascular staining is weak and indeterminate for vasculitis  Nonspecific vascular standing can occur at anatomically dependent areas  She was advised by Dermatology that this could be a benign skin rash related to the Sjogren's syndrome and as it occurs and remits spontaneously no treatment would be required  She was also hospitalized with sepsis and pneumonia in September 2021  She reports over the past few weeks she has noticed more prominent joint pains  She takes Tylenol once a day which does help her but the effect is not long lasting  She does follow with Ophthalmology routinely as she is on the hydroxychloroquine  She reports the pilocarpine 5 mg 3 times daily did help with the dryness initially but is no longer effective  She is using Systane eye drops and Biotene products but is still experiencing significant dryness issues  No fevers, unintentional weight loss, ongoing skin rashes, mouth/nose ulcers, swollen glands or pleuritic chest pain  10/26/2022:  Patient presents for a follow-up of systemic lupus erythematosus and Sjogren's syndrome   She is currently on hydroxychloroquine 200 mg twice daily  She is also on prednisone 10 mg once daily as prescribed by pulmonology  I reviewed her recent labs which showed an elevated ESR of 89  A C-reactive protein was minimally elevated at 4 3  A CBC, CMP, C3, C4, double-stranded DNA antibody and urine protein creatinine ratio were unremarkable  After the last office visit we did try to start her on Benlysta but due to the high cost this was not done  She will be looking into this again  She reports that the fatigue continues  No worsening joint issues and this is manageable in her hands and knees    Her only new issue is that she was diagnosed with COVID-19 infection on 10/15 but seems to have recovered well  She was started on Paxlovid but on day 3 developed an allergic reaction so this was discontinued  She also follows with pulmonology and was started on prednisone 10 mg once daily due to the chronic cough she was experiencing  This has helped  She sees Hematology/Oncology annually for monitoring of the atypical lymphoproliferative disorder and the plan is for continued monitoring  At the last office visit I also discontinued the pilocarpine and started her on Evoxac which she states has helped  She is up-to-date with her annual eye exams since she is on the hydroxychloroquine  5/31/2023:  Patient presents for a follow-up of systemic lupus erythematosus and Sjogren's syndrome   She is currently on hydroxychloroquine 200 mg twice daily  I reviewed her recent labs which showed an elevated ESR of 72  An SPEP showed the possibility of a monoclonal protein so it was sent to [facility] labs for further evaluation  An immunoglobulin assay showed hypergammaglobulinemia  A CBC, CMP, C3, C4, double-stranded DNA antibody and urine protein creatinine ratio were unremarkable  She also underwent a DEXA scan in March 2023 which showed osteoporosis  Compared to March 2020 there was a statistically significant decrease in the bone mineral density of the left total hip  The 10-year risk of hip fracture was 6 4% with a 10-year risk of major osteoporotic fracture of 28%  She mentions since the last visit she has experienced additional joint pains in her hands, wrists and knees  Her symptoms are intermittent and she is able to manage them more or less with Tylenol as needed  She continues to report chronic dry eyes and dry mouth  She was started on Restasis by her ophthalmologist   She had been taking the Evoxac 30 mg 3 times daily up until the last visit but she is unsure why recently she has not been taking this      She is up-to-date with her annual eye exams since she is on the hydroxychloroquine  The following portions of the patient's history were reviewed and updated as appropriate: allergies, current medications, past family history, past medical history, past social history, past surgical history and problem list       Review of Systems  Constitutional: Negative for weight change, fevers, chills, night sweats  Positive for fatigue  ENT/Mouth: Negative for hearing changes, ear pain, nasal congestion, sinus pain, hoarseness, sore throat, rhinorrhea, swallowing difficulty  Eyes: Negative for pain, redness, discharge, vision changes  Cardiovascular: Negative for chest pain, palpitations  Respiratory: Negative for sputum, wheezing  Positive for shortness of breath and cough  Gastrointestinal: Negative for nausea, vomiting, diarrhea, constipation, pain, heartburn  Genitourinary: Negative for dysuria, urinary frequency, hematuria  Musculoskeletal: As per HPI  Skin: Negative for skin rash, color changes  Neuro: Negative for weakness, numbness, tingling, loss of consciousness  Psych: Negative for anxiety, depression  Heme/Lymph: Negative for easy bruising, bleeding, lymphadenopathy  Past Medical History:   Diagnosis Date   • Acute kidney injury superimposed on chronic kidney disease ([facility] ) 09/20/2021   • Allergic rhinitis    • Anemia    • Bipolar 1 disorder ([facility] )    • Deaf     Pt lost all hearing in right ear after having [place] measles   • GERD (gastroesophageal reflux disease) 08/01/2020   • Hard of hearing     Pt wears a hearing in left ear     • HL (hearing loss)    • Hypothyroidism    • Lung nodule    • Nasal congestion    • Pneumonia 02/10/2017   • [facility] 09/19[facility]20[facility]   • Psychiatric disorder    • Sjogren's syndrome (HCC)    • Systemic lupus erythematosus ([facility] )    • Wears glasses    • Wears partial dentures        Past Surgical History:   Procedure Laterality Date   • BREAST BIOPSY Right    • COLONOSCOPY     • EYE SURGERY     • FRACTURE SURGERY Right     elbow   • GASTRIC BYPASS     • HYSTERECTOMY Bilateral 1975   • IR BIOPSY LYMPH NODE  10/08/2020   • LUNG BIOPSY     • LYMPH NODE BIOPSY  09/18/2020   • [place] [address] INCL FLUOR GDNCE DX W/CELL WASHG SPX N/A 12/08/2017    Procedure: BRONCHOSCOPY;  Surgeon: Paxton Pierson MD;  Location: [facility]; Service: Pulmonary   • LA BX/EXC LYMPH NODE NEEDLE SUPERFICIAL Left 02/25/2021    Procedure: EXCISION BIOPSY LYMPH NODE: left axillary lymph node biopsy;  Surgeon: Cassandra Murray MD;  Location: [facility];  Service: Thoracic   • TONSILLECTOMY         Social History     Socioeconomic History   • Marital status: /Civil Union     Spouse name: Not on file   • Number of children: Not on file   • Years of education: Not on file   • Highest education level: Not on file   Occupational History   • Not on file   Tobacco Use   • Smoking status: Never   • Smokeless tobacco: Never   Vaping Use   • Vaping Use: Never used   Substance and Sexual Activity   • Alcohol use: No   • Drug use: No   • Sexual activity: Not Currently     Partners: Male     Birth control/protection: Female Sterilization     Comment:    Other Topics Concern   • Not on file   Social History Narrative   • Not on file     Social Determinants of Health     Financial Resource Strain: Not on file   Food Insecurity: Not on file   Transportation Needs: No Transportation Needs (9/21/2021)    PRAPARE - Transportation    • Lack of Transportation (Medical): No    • Lack of Transportation (Non-Medical):  No   Physical Activity: Not on file   Stress: Not on file   Social Connections: Not on file   Intimate Partner Violence: Not on file   Housing Stability: Not on file       Family History   Problem Relation Age of Onset   • Heart disease Mother    • Diabetes Mother    • Kidney cancer Mother    • Cancer Mother         Kidney   • Hypertension Mother    • Heart disease Father    • Stroke Father    • Diabetes Father    • Cancer Father Rectal Cancer   • Hypertension Father    • No Known Problems Maternal Grandmother    • No Known Problems Maternal Grandfather    • No Known Problems Paternal Grandmother    • No Known Problems Paternal Grandfather    • Leukemia Half-Sister 50   • No Known Problems Half-Sister    • No Known Problems Half-Sister    • No Known Problems Half-Sister    • BRCA2 Positive Neg Hx    • BRCA2 Negative Neg Hx    • BRCA1 Negative Neg Hx    • Endometrial cancer Neg Hx    • Breast cancer Neg Hx    • Breast cancer additional onset Neg Hx    • Colon cancer Neg Hx    • Ovarian cancer Neg Hx    • BRCA1 Positive Neg Hx    • BRCA 1/2 Neg Hx        Allergies   Allergen Reactions   • Ciprofloxacin Hives and Swelling     Pt reports that lips and mouth and face swelled  • Cymbalta [Duloxetine Hcl] Other (See Comments)     Hallucinations, fatigue, faints   • Percocet [Oxycodone-Acetaminophen] Other (See Comments)     Dry mouth - pt states it kept her up all night  States had to continually drink fluids   • Nirmatrelvir-Ritonavir Diarrhea, Dizziness, Nausea Only, Other (See Comments) and Vomiting       Current Outpatient Medications:   •  cevimeline (EVOXAC) 30 MG capsule, Take 1 capsule (30 mg total) by mouth 3 (three) times a day, Disp: 270 capsule, Rfl: 1  •  acetaminophen (TYLENOL) 325 mg tablet, Take 2 tablets (650 mg total) by mouth every 6 (six) hours as needed for mild pain, Disp: , Rfl: 0  •  buPROPion (WELLBUTRIN) 100 mg tablet, Take 100 mg by mouth 3 (three) times a day , Disp: , Rfl:   •  calcium citrate-vitamin D (CITRACAL+D) 315-200 MG-UNIT per tablet, Take 1 tablet by mouth daily  , Disp: , Rfl:   •  chlorhexidine (PERIDEX) 0 12 % solution, , Disp: , Rfl:   •  cycloSPORINE (RESTASIS) 0 05 % ophthalmic emulsion, instill 1 drop into both eyes twice a day, Disp: , Rfl:   •  ferrous sulfate 325 (65 Fe) mg tablet, Take 1 tablet (325 mg total) by mouth 2 (two) times a day with meals, Disp: 60 tablet, Rfl: 0  •  hydroxychloroquine (PLAQUENIL) 200 mg tablet, Take 1 tablet (200 mg total) by mouth 2 (two) times a day with meals, Disp: 180 tablet, Rfl: 3  •  levothyroxine 50 mcg tablet, Take 1 tablet (50 mcg total) by mouth daily, Disp: 30 tablet, Rfl: 1  •  LORazepam (ATIVAN) 1 mg tablet, , Disp: , Rfl:   •  QUEtiapine (SEROquel) 300 mg tablet, Take 300 mg by mouth daily at bedtime  , Disp: , Rfl:   •  sulfamethoxazole-trimethoprim (BACTRIM DS) 800-160 mg per tablet, Take 1 tablet by mouth every 12 (twelve) hours for 7 days, Disp: 14 tablet, Rfl: 0  •  temazepam (RESTORIL) 30 mg capsule, Take 2 capsules by mouth daily at bedtime as needed , Disp: , Rfl:       Objective:    Vitals:    05/31/23 1127   BP: 118/80   Pulse: 80       Physical Exam  General: Well appearing, well nourished, in no distress  Oriented x 3, normal mood and affect  Ambulating without difficulty  Skin: Good turgor, no rash today, unusual bruising or prominent lesions  Hair: Normal texture and distribution  Nails: Normal color, no deformities  HEENT:  Head: Normocephalic, atraumatic  Eyes: Conjunctiva clear, sclera non-icteric, EOM intact  Extremities: No amputations or deformities, cyanosis, edema  Neurologic: Alert and oriented  No focal neurological deficits appreciated  Psychiatric: Normal mood and affect  KIRK Red    Rheumatology

## 2023-05-31 ENCOUNTER — TELEPHONE (OUTPATIENT)
Dept: RHEUMATOLOGY | Facility: CLINIC | Age: 73
End: 2023-05-31

## 2023-05-31 ENCOUNTER — OFFICE VISIT (OUTPATIENT)
Dept: RHEUMATOLOGY | Facility: CLINIC | Age: 73
End: 2023-05-31

## 2023-05-31 VITALS — DIASTOLIC BLOOD PRESSURE: 80 MMHG | SYSTOLIC BLOOD PRESSURE: 118 MMHG | HEART RATE: 80 BPM

## 2023-05-31 DIAGNOSIS — M17.0 BILATERAL PRIMARY OSTEOARTHRITIS OF KNEE: ICD-10-CM

## 2023-05-31 DIAGNOSIS — Z79.899 LONG-TERM USE OF PLAQUENIL: ICD-10-CM

## 2023-05-31 DIAGNOSIS — M35.00 SJOGREN'S SYNDROME, WITH UNSPECIFIED ORGAN INVOLVEMENT (HCC): ICD-10-CM

## 2023-05-31 DIAGNOSIS — M81.0 AGE-RELATED OSTEOPOROSIS WITHOUT CURRENT PATHOLOGICAL FRACTURE: ICD-10-CM

## 2023-05-31 DIAGNOSIS — Z79.83 LONG TERM USE OF BISPHOSPHONATES: ICD-10-CM

## 2023-05-31 DIAGNOSIS — N18.32 STAGE 3B CHRONIC KIDNEY DISEASE (HCC): ICD-10-CM

## 2023-05-31 DIAGNOSIS — M32.9 SYSTEMIC LUPUS ERYTHEMATOSUS, UNSPECIFIED SLE TYPE, UNSPECIFIED ORGAN INVOLVEMENT STATUS (HCC): Primary | ICD-10-CM

## 2023-05-31 DIAGNOSIS — D47.9 ATYPICAL LYMPHOPROLIFERATIVE DISORDER (HCC): ICD-10-CM

## 2023-05-31 DIAGNOSIS — J84.2 LYMPHOCYTIC INTERSTITIAL PNEUMONIA (HCC): ICD-10-CM

## 2023-05-31 RX ORDER — CEVIMELINE HYDROCHLORIDE 30 MG/1
30 CAPSULE ORAL 3 TIMES DAILY
Qty: 270 CAPSULE | Refills: 1 | Status: SHIPPED | OUTPATIENT
Start: 2023-05-31 | End: 2023-06-01

## 2023-05-31 RX ORDER — CYCLOSPORINE 0.5 MG/ML
EMULSION OPHTHALMIC
COMMUNITY
Start: 2023-04-21

## 2023-05-31 NOTE — TELEPHONE ENCOUNTER
Please start prior authorization for annual Reclast infusions for osteoporosis  She would like this at the [facility]  Cannot do oral medications as she has GERD and postgastrectomy malabsorption

## 2023-06-01 ENCOUNTER — TELEPHONE (OUTPATIENT)
Dept: RHEUMATOLOGY | Facility: CLINIC | Age: 73
End: 2023-06-01

## 2023-06-01 ENCOUNTER — PATIENT MESSAGE (OUTPATIENT)
Dept: RHEUMATOLOGY | Facility: CLINIC | Age: 73
End: 2023-06-01

## 2023-06-01 ENCOUNTER — TELEPHONE (OUTPATIENT)
Dept: NEPHROLOGY | Facility: CLINIC | Age: 73
End: 2023-06-01

## 2023-06-01 DIAGNOSIS — M81.0 AGE-RELATED OSTEOPOROSIS WITHOUT CURRENT PATHOLOGICAL FRACTURE: Primary | ICD-10-CM

## 2023-06-01 DIAGNOSIS — M35.00 SJOGREN'S SYNDROME, WITH UNSPECIFIED ORGAN INVOLVEMENT (HCC): Primary | ICD-10-CM

## 2023-06-01 RX ORDER — PILOCARPINE HYDROCHLORIDE 5 MG/1
5 TABLET, FILM COATED ORAL 3 TIMES DAILY
Qty: 90 TABLET | Refills: 5 | Status: SHIPPED | OUTPATIENT
Start: 2023-06-01

## 2023-06-01 RX ORDER — SODIUM CHLORIDE 9 MG/ML
20 INJECTION, SOLUTION INTRAVENOUS ONCE
OUTPATIENT
Start: 2023-06-12

## 2023-06-01 RX ORDER — ZOLEDRONIC ACID 5 MG/100ML
5 INJECTION, SOLUTION INTRAVENOUS ONCE
OUTPATIENT
Start: 2023-06-12

## 2023-06-01 NOTE — TELEPHONE ENCOUNTER
New Patient Intake Form   Patient Details   Bill Santos     1950     [id number]     Insurance Information   Name of [place] [id number]   Does the patient need an insurance referral? no   If patient has Pitney Kev, please ask if they will be using their [facility]  Appointment Information   Who is calling to schedule? If not patient, what is callers name? Emily Manning   Referring Provider Dr Jessy Goel   Reason for Appt (Diagnosis) CKD3   Does Patient have labs/urine done at [place] [address]? If not, where do they go? List the date of last lab / urine  *Please try to get labs 2 years back if not at [address] Yes May 2023   Has patient been hospitalized recently? If yes, list name and location of hospital they were in no   Has patient been seen by a Nephrologist before? If yes, list name, location and phone number no   Has the patient had renal imaging done? If so, list the most recent date and type of imaging yes 12/2022 in Epic Ultrasound   Does patient have a history of Kidney Stones? no   Appointment Details   Is there a referral on file?  yes    Appointment Date 6/1   Location  [facility]   Miscellaneous

## 2023-06-05 ENCOUNTER — OFFICE VISIT (OUTPATIENT)
Dept: UROLOGY | Facility: CLINIC | Age: 73
End: 2023-06-05
Payer: COMMERCIAL

## 2023-06-05 VITALS
HEART RATE: 80 BPM | BODY MASS INDEX: 29 KG/M2 | WEIGHT: 153.6 LBS | OXYGEN SATURATION: 99 % | HEIGHT: 61 IN | SYSTOLIC BLOOD PRESSURE: 122 MMHG | DIASTOLIC BLOOD PRESSURE: 82 MMHG

## 2023-06-05 DIAGNOSIS — N39.0 RECURRENT UTI: Primary | ICD-10-CM

## 2023-06-05 PROCEDURE — 99203 OFFICE O/P NEW LOW 30 MIN: CPT | Performed by: UROLOGY

## 2023-06-05 NOTE — ASSESSMENT & PLAN NOTE
The patient has had several asymptomatic urinary tract infections  We discussed this at length  I do not think she should have asymptomatic infections treated could lead to antibiotic resistance and she already has a significant allergy to fluoroquinolones  I do recommend she increase her fluid intake which started as a pretty good job with but could perhaps improve further  We also discussed the role for a vaginal estrogen cream to help reduce the risk for infections occurring but she wants to hold off for now  We discussed follow-up and she would like to see us back in 6 months to reassess how she is doing which is reasonable

## 2023-06-05 NOTE — Clinical Note
We discussed asymptomatic UTIs and told her I do not recommend abx unless she has sx  Also discussed vaginal estrogen cream but she wants to hold off for now

## 2023-06-05 NOTE — PROGRESS NOTES
Assessment/Plan:    Recurrent UTI  The patient has had several asymptomatic urinary tract infections  We discussed this at length  I do not think she should have asymptomatic infections treated could lead to antibiotic resistance and she already has a significant allergy to fluoroquinolones  I do recommend she increase her fluid intake which started as a pretty good job with but could perhaps improve further  We also discussed the role for a vaginal estrogen cream to help reduce the risk for infections occurring but she wants to hold off for now  We discussed follow-up and she would like to see us back in 6 months to reassess how she is doing which is reasonable  Subjective:      Patient ID: Alban Hadley is a 72 y o  female  HPI    Alban Hadley is a 72 y o  female with recurrent but essentially asymptomatic UTIs       Patient reports that over the past year he has had several urine cultures performed which show evidence of infection although she does not have symptoms associated with these clearing no fevers or chills or pain  Unclear why she was having urine cultures performed  She has had imaging in the form of a renal bladder ultrasound in December 2020 was unremarkable  Bladder scan in November 2022 was 0 cc  She is currently finishing a course of Bactrim for a Klebsiella urinary tract infection  May 23: Klebsiella  Nov 22: Klebsiella  Feb 21: E Coli    Denies any prior  surgical manipulation      Medical comorbidities include Lupus, GERD, hypothyroidism, pneumonia, prior gastric bypass, bipolar       She has a significant allergy to fluoroquinolones      Past Surgical History:   Procedure Laterality Date   • BREAST BIOPSY Right    • COLONOSCOPY     • EYE SURGERY     • FRACTURE SURGERY Right     elbow   • GASTRIC BYPASS     • HYSTERECTOMY Bilateral 1975   • IR BIOPSY LYMPH NODE  10/08/2020   • LUNG BIOPSY     • LYMPH NODE BIOPSY  09/18/2020   • [address] INCL FLUOR "GDNCE DX W/CELL WASHG SPX N/A 12/08/2017    Procedure: BRONCHOSCOPY;  Surgeon: William Lara MD;  [facility]; Service: Pulmonary   • [place] BX/EXC LYMPH NODE NEEDLE SUPERFICIAL Left 02/25/2021    Procedure: EXCISION BIOPSY LYMPH NODE: left axillary lymph node biopsy;  Surgeon: Iva Spencer MD;  [facility]: [facility];  Service: Thoracic   • TONSILLECTOMY          Past Medical History:   Diagnosis Date   • Acute kidney injury superimposed on chronic kidney disease ([facility] ) 09/20/2021   • Age-related osteoporosis without current pathological fracture 6/1/2023   • Allergic rhinitis    • Anemia    • Bipolar 1 disorder ([facility] )    • Deaf     Pt lost all hearing in right ear after having [place] measles   • GERD (gastroesophageal reflux disease) 08/01/2020   • Hard of hearing     Pt wears a hearing in left ear  • HL (hearing loss)    • Hypothyroidism    • Lung nodule    • Nasal congestion    • Pneumonia 02/10/2017   • Pneumonia due to Streptococcus [facility]) 09/19/2021   • Psychiatric disorder    • Sjogren's syndrome (HCC)    • Systemic lupus erythematosus ([facility] )    • Wears glasses    • Wears partial dentures              Review of Systems   Constitutional: Negative for chills and fever  HENT: Negative for ear pain and sore throat  Eyes: Negative for pain and visual disturbance  Respiratory: Negative for cough and shortness of breath  Cardiovascular: Negative for chest pain and palpitations  Gastrointestinal: Negative for abdominal pain and vomiting  Genitourinary: Negative for dysuria and hematuria  Musculoskeletal: Negative for arthralgias and back pain  Skin: Negative for color change and rash  Neurological: Negative for seizures and syncope  All other systems reviewed and are negative          Objective:      /82 (BP Location: Right arm, Patient Position: Sitting, Cuff Size: Adult)   Pulse 80   Ht 5' 0 5\" (1 537 m)   Wt 69 7 kg (153 lb 9 6 oz)   LMP  (LMP Unknown)   SpO2 99%   BMI " "29 50 kg/m²     No results found for: \"PSA\"       Physical Exam  Vitals reviewed  Constitutional:       General: She is not in acute distress  Appearance: Normal appearance  She is not ill-appearing, toxic-appearing or diaphoretic  HENT:      Head: Normocephalic and atraumatic  Eyes:      Extraocular Movements: Extraocular movements intact  Pupils: Pupils are equal, round, and reactive to light  Pulmonary:      Effort: Pulmonary effort is normal    Abdominal:      General: Abdomen is flat  There is no distension  Palpations: Abdomen is soft  There is no mass  Tenderness: There is no abdominal tenderness  There is no guarding or rebound  Hernia: No hernia is present  Skin:     General: Skin is warm  Neurological:      General: No focal deficit present  Mental Status: She is alert and oriented to person, place, and time  Mental status is at baseline  Psychiatric:         Mood and Affect: Mood normal          Behavior: Behavior normal          Thought Content: Thought content normal              Narrative & Impression   RENAL ULTRASOUND WITH PVR     INDICATION:   N39 0: Urinary tract infection, site not specified      COMPARISON: None     TECHNIQUE:   Ultrasound of the retroperitoneum was performed with a curvilinear transducer utilizing volumetric sweeps and still imaging techniques       FINDINGS:     KIDNEYS:     Right kidney:  10 8 x 4 4 x 4 1 cm  Volume 102 8 mL  Left kidney:  9 3 x 3 9 x 4 5 cm  Volume 85 4 mL     Right kidney  Unremarkable echogenicity and contour  No mass is identified  2 5 x 2 0 x 2 3 cm simple cyst in the upper pole  1 4 x 1 2 x 1 4 cm simple cyst in the lower pole  No hydronephrosis  No shadowing calculi  No perinephric fluid collections      Left kidney  Unremarkable echogenicity and contour  No mass is identified  0 9 x 0 7 x 1 1 cm simple cyst in the upper pole  No hydronephrosis  No shadowing calculi    No perinephric fluid " collections      URETERS:  Nonvisualized      BLADDER:   Normally distended  No focal thickening or mass lesions  Bilateral ureteral jets not detected  Prevoid: 56 5   No significant post void volume  Measured post void volume in mL: 3 2  IMPRESSION:     No hydronephrosis      No significant post void residual          Orders  No orders of the defined types were placed in this encounter  24.4

## 2023-06-06 LAB — MISCELLANEOUS LAB TEST RESULT: NORMAL

## 2023-06-09 ENCOUNTER — TELEPHONE (OUTPATIENT)
Dept: HEMATOLOGY ONCOLOGY | Facility: CLINIC | Age: 73
End: 2023-06-09

## 2023-06-09 ENCOUNTER — APPOINTMENT (OUTPATIENT)
Dept: LAB | Facility: CLINIC | Age: 73
End: 2023-06-09
Payer: COMMERCIAL

## 2023-06-09 DIAGNOSIS — R59.1 LYMPHADENOPATHY: ICD-10-CM

## 2023-06-09 LAB
ALBUMIN SERPL BCP-MCNC: 3.5 G/DL (ref 3.5–5)
ALP SERPL-CCNC: 122 U/L (ref 46–116)
ALT SERPL W P-5'-P-CCNC: 15 U/L (ref 12–78)
ANION GAP SERPL CALCULATED.3IONS-SCNC: 5 MMOL/L (ref 4–13)
AST SERPL W P-5'-P-CCNC: 29 U/L (ref 5–45)
BASOPHILS # BLD MANUAL: 0.25 THOUSAND/UL (ref 0–0.1)
BASOPHILS NFR MAR MANUAL: 1 % (ref 0–1)
BILIRUB SERPL-MCNC: 0.32 MG/DL (ref 0.2–1)
BUN SERPL-MCNC: 32 MG/DL (ref 5–25)
BURR CELLS BLD QL SMEAR: PRESENT
CALCIUM SERPL-MCNC: 8.3 MG/DL (ref 8.3–10.1)
CHLORIDE SERPL-SCNC: 111 MMOL/L (ref 96–108)
CO2 SERPL-SCNC: 18 MMOL/L (ref 21–32)
CREAT SERPL-MCNC: 1.65 MG/DL (ref 0.6–1.3)
DIFFERENTIAL COMMENT: ABNORMAL
EOSINOPHIL # BLD MANUAL: 1 THOUSAND/UL (ref 0–0.4)
EOSINOPHIL NFR BLD MANUAL: 4 % (ref 0–6)
ERYTHROCYTE [DISTWIDTH] IN BLOOD BY AUTOMATED COUNT: 16.7 % (ref 11.6–15.1)
GFR SERPL CREATININE-BSD FRML MDRD: 31 ML/MIN/1.73SQ M
GLUCOSE SERPL-MCNC: 82 MG/DL (ref 65–140)
HCT VFR BLD AUTO: 32.7 % (ref 34.8–46.1)
HGB BLD-MCNC: 10.6 G/DL (ref 11.5–15.4)
LYMPHOCYTES # BLD AUTO: 17.19 THOUSAND/UL (ref 0.6–4.47)
LYMPHOCYTES # BLD AUTO: 69 % (ref 14–44)
MCH RBC QN AUTO: 28.6 PG (ref 26.8–34.3)
MCHC RBC AUTO-ENTMCNC: 32.4 G/DL (ref 31.4–37.4)
MCV RBC AUTO: 88 FL (ref 82–98)
MONOCYTES # BLD AUTO: 1 THOUSAND/UL (ref 0–1.22)
MONOCYTES NFR BLD: 4 % (ref 4–12)
NEUTROPHILS # BLD MANUAL: 4.98 THOUSAND/UL (ref 1.85–7.62)
NEUTS BAND NFR BLD MANUAL: 2 % (ref 0–8)
NEUTS SEG NFR BLD AUTO: 18 % (ref 43–75)
OVALOCYTES BLD QL SMEAR: PRESENT
PLATELET # BLD AUTO: 280 THOUSANDS/UL (ref 149–390)
PLATELET BLD QL SMEAR: ADEQUATE
PMV BLD AUTO: 10.3 FL (ref 8.9–12.7)
POIKILOCYTOSIS BLD QL SMEAR: PRESENT
POTASSIUM SERPL-SCNC: 4.5 MMOL/L (ref 3.5–5.3)
PROT SERPL-MCNC: 10.4 G/DL (ref 6.4–8.4)
RBC # BLD AUTO: 3.7 MILLION/UL (ref 3.81–5.12)
SMUDGE CELLS BLD QL SMEAR: PRESENT
SODIUM SERPL-SCNC: 134 MMOL/L (ref 135–147)
VARIANT LYMPHS # BLD AUTO: 2 %
WBC # BLD AUTO: 24.92 THOUSAND/UL (ref 4.31–10.16)

## 2023-06-09 PROCEDURE — 85027 COMPLETE CBC AUTOMATED: CPT

## 2023-06-09 PROCEDURE — 85007 BL SMEAR W/DIFF WBC COUNT: CPT

## 2023-06-09 PROCEDURE — 80053 COMPREHEN METABOLIC PANEL: CPT

## 2023-06-09 PROCEDURE — 36415 COLL VENOUS BLD VENIPUNCTURE: CPT

## 2023-06-09 NOTE — TELEPHONE ENCOUNTER
ELISABET to inform the patient that she does need labs completed prior to her follow up on 6/15/23  Informed patient that all orders are in they system so she can go to any [facility] lab  Informed patient if she has any questions or concerns please call [phone]

## 2023-06-09 NOTE — TELEPHONE ENCOUNTER
Appointment Confirmation   Who are you speaking with? Patient   If it is not the patient, are they listed on an active communication consent form? N/A   Which provider is the appointment scheduled with? Dr Fransisco Figueroa   When is the appointment scheduled? Please list date and time        06/15/2023 @2:20PM    At which location is the appointment scheduled to take place? [place]   Did caller verbalize understanding of appointment details?  Yes

## 2023-06-09 NOTE — TELEPHONE ENCOUNTER
Lab Inquiry   Who are you speaking with? Patient     If it is not the patient, are they listed on an active communication consent form? N/A   Name of ordering provider Dr Sveta Rubalcava   What is being requested? Requesting clarification - Are labs needed for upcoming appointment on 6/15/23   Lab draw location [facility]   What is the best call back number?  [phone]

## 2023-06-09 NOTE — TELEPHONE ENCOUNTER
Can you please call this pt to let them know they do need labs prior to their next appt and the labs are in the computer    Clint Chase

## 2023-06-15 ENCOUNTER — OFFICE VISIT (OUTPATIENT)
Dept: HEMATOLOGY ONCOLOGY | Facility: CLINIC | Age: 73
End: 2023-06-15
Payer: COMMERCIAL

## 2023-06-15 VITALS
OXYGEN SATURATION: 98 % | SYSTOLIC BLOOD PRESSURE: 122 MMHG | HEART RATE: 81 BPM | WEIGHT: 153 LBS | DIASTOLIC BLOOD PRESSURE: 88 MMHG | BODY MASS INDEX: 28.89 KG/M2 | TEMPERATURE: 98.2 F | HEIGHT: 61 IN

## 2023-06-15 DIAGNOSIS — R59.1 LYMPHADENOPATHY: Primary | ICD-10-CM

## 2023-06-15 PROCEDURE — 99215 OFFICE O/P EST HI 40 MIN: CPT | Performed by: INTERNAL MEDICINE

## 2023-06-15 NOTE — LETTER
Marilia 15, 2023     Ira[address]acio Contreras[address]  [address] [id number]    Patient: Simone Coronado   YOB: 1950   Date of Visit: 6/15/2023       Dear Dr Dahiana Baldwin: Thank you for referring Simone Coronado to me for evaluation  Below are my notes for this consultation  If you have questions, please do not hesitate to call me  I look forward to following your patient along with you  Sincerely,        Cheyanne Land DO        CC: MD Cheyanne Ramos DO  6/15/2023  2:36 PM  Sign when Signing Visit  [address]  [facility] [address] [address] [phone]    [address] [place] [address] [phone]  06/15/23    Discussion:   In summary, this is a 68-year-old female history of 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  Mediastinoscopy in February 2021 showed lymph node with B cells lacking CD5 expression and expressing probably clonality by surface light chain expression  She has been under observation  Over the past few months she has developed increasing fatigue as well as alternating constipation and diarrhea  WBCs 25 K, mature lymphoid predominant  Hemoglobin 10 6, normal platelets  CMP shows creatinine 1 6, baseline 1 0-1 2  SPEP was inconclusive  Immunofixation at reference lab proved negative, however  IgG 3600, IgM 358, IgA 380  CT chest ab pelvis with oral contrast is requested  Peripheral blood flow cytometry is requested  GI consultation requested  I discussed the above with the patient  The patient  voiced understanding and agreement   ______________________________________________________________________    Chief Complaint   Patient presents with   • Follow-up       HPI:  Oncology History    No history exists  Interval History: Clinically stable  Fatigue  Alternating constipation and diarrhea    ECOG-  1 - Symptomatic but completely ambulatory    Review of Systems   Constitutional: Negative for appetite change, diaphoresis, fatigue and fever  HENT: Negative for sinus pain  Eyes: Negative for discharge  Respiratory: Negative for cough and shortness of breath  Cardiovascular: Negative for chest pain  Gastrointestinal: Positive for constipation and diarrhea  Negative for abdominal pain  Endocrine: Negative for cold intolerance  Genitourinary: Negative for difficulty urinating and hematuria  Musculoskeletal: Negative for joint swelling  Skin: Negative for rash  Allergic/Immunologic: Negative for environmental allergies  Neurological: Negative for dizziness and headaches  Hematological: Negative for adenopathy  Psychiatric/Behavioral: Negative for agitation  Past Medical History:   Diagnosis Date   • Acute kidney injury superimposed on chronic kidney disease ([facility] ) 09/20/2021   • Age-related osteoporosis without current pathological fracture 6/1/2023   • Allergic rhinitis    • Anemia    • Bipolar 1 disorder ([facility] )    • Deaf     Pt lost all hearing in right ear after having [place] measles   • GERD (gastroesophageal reflux disease) 08/01/2020   • Hard of hearing     Pt wears a hearing in left ear     • HL (hearing loss)    • Hypothyroidism    • Lung nodule    • Nasal congestion    • Pneumonia 02/10/2017   • Pneumonia due to Streptococcus [facility]) 09/19/2021   • Psychiatric disorder    • Sjogren's syndrome (HCC)    • Systemic lupus erythematosus (HCC)    • Wears glasses    • Wears partial dentures      Patient Active Problem List   Diagnosis   • Bipolar I disorder, single manic episode ([facility] )   • Hypothyroidism   • Systemic lupus erythematosus ([facility] )   • Acid reflux disease   • Anemia   • Depression   • Generalized osteoarthritis   • Heart burn   • Obesity   • Periorbital edema   • Polyarthralgia   • Postgastrectomy malabsorption   • Sjogren's syndrome ([facility] )   • Vaginal atrophy   • Vitamin D deficiency   • Closed fracture of base of fifth metatarsal bone of left foot   • Lymphadenopathy   • Iron deficiency anemia following bariatric surgery   • On belimumab therapy   • S/P gastric bypass   • Overweight   • Encounter for surgical aftercare following surgery of digestive system   • Bariatric surgery status   • Petechial rash   • Stage 3b chronic kidney disease (HCC)   • Age-related osteoporosis without current pathological fracture   • Recurrent UTI       Current Outpatient Medications:   •  acetaminophen (TYLENOL) 325 mg tablet, Take 2 tablets (650 mg total) by mouth every 6 (six) hours as needed for mild pain, Disp: , Rfl: 0  •  buPROPion (WELLBUTRIN) 100 mg tablet, Take 100 mg by mouth 3 (three) times a day , Disp: , Rfl:   •  calcium citrate-vitamin D (CITRACAL+D) 315-200 MG-UNIT per tablet, Take 1 tablet by mouth daily  , Disp: , Rfl:   •  cycloSPORINE (RESTASIS) 0 05 % ophthalmic emulsion, instill 1 drop into both eyes twice a day, Disp: , Rfl:   •  ferrous sulfate 325 (65 Fe) mg tablet, Take 1 tablet (325 mg total) by mouth 2 (two) times a day with meals, Disp: 60 tablet, Rfl: 0  •  LORazepam (ATIVAN) 1 mg tablet, , Disp: , Rfl:   •  pilocarpine (SALAGEN) 5 mg tablet, Take 1 tablet (5 mg total) by mouth 3 (three) times a day, Disp: 90 tablet, Rfl: 5  •  QUEtiapine (SEROquel) 300 mg tablet, Take 300 mg by mouth daily at bedtime  , Disp: , Rfl:   •  temazepam (RESTORIL) 30 mg capsule, Take 2 capsules by mouth daily at bedtime as needed , Disp: , Rfl:   •  chlorhexidine (PERIDEX) 0 12 % solution, , Disp: , Rfl:   •  hydroxychloroquine (PLAQUENIL) 200 mg tablet, Take 1 tablet (200 mg total) by mouth 2 (two) times a day with meals, Disp: 180 tablet, Rfl: 3  •  levothyroxine 50 mcg tablet, Take 1 tablet (50 mcg total) by mouth daily (Patient not taking: Reported on 6/5/2023), Disp: 30 tablet, Rfl: 1  Allergies   Allergen Reactions   • Ciprofloxacin Hives and Swelling     Pt reports that lips and mouth and face swelled  "  • Cymbalta [Duloxetine Hcl] Other (See Comments)     Hallucinations, fatigue, faints   • Percocet [Oxycodone-Acetaminophen] Other (See Comments)     Dry mouth - pt states it kept her up all night  States had to continually drink fluids   • Nirmatrelvir-Ritonavir Diarrhea, Dizziness, Nausea Only, Other (See Comments) and Vomiting     Past Surgical History:   Procedure Laterality Date   • BREAST BIOPSY Right    • COLONOSCOPY     • EYE SURGERY     • FRACTURE SURGERY Right     elbow   • GASTRIC BYPASS     • HYSTERECTOMY Bilateral 1975   • IR BIOPSY LYMPH NODE  10/08/2020   • LUNG BIOPSY     • LYMPH NODE BIOPSY  09/18/2020   • [place] [address] INCL FLUOR GDNCE DX W/CELL WASHG SPX N/A 12/08/2017    Procedure: BRONCHOSCOPY;  Surgeon: Velia Gonzalez MD;  [facility]; Service: Pulmonary   • [place] BX/EXC LYMPH NODE NEEDLE SUPERFICIAL Left 02/25/2021    Procedure: EXCISION BIOPSY LYMPH NODE: left axillary lymph node biopsy;  Surgeon: Cornel Skelton MD;  [facility];  Service: Thoracic   • TONSILLECTOMY       Social History     Objective:  Vitals:    06/15/23 1356   BP: 122/88   BP Location: Left arm   Patient Position: Sitting   Cuff Size: Standard   Pulse: 81   Temp: 98 2 °F (36 8 °C)   TempSrc: Temporal   SpO2: 98%   Weight: 69 4 kg (153 lb)   Height: 5' 1\" (1 549 m)     Physical Exam  Constitutional:       Appearance: She is well-developed  HENT:      Head: Normocephalic and atraumatic  Eyes:      Pupils: Pupils are equal, round, and reactive to light  Cardiovascular:      Rate and Rhythm: Normal rate  Heart sounds: No murmur heard  Pulmonary:      Effort: No respiratory distress  Breath sounds: No wheezing or rales  Abdominal:      General: There is no distension  Palpations: Abdomen is soft  Tenderness: There is no abdominal tenderness  There is no rebound  Musculoskeletal:         General: No tenderness  Cervical back: Neck supple     Lymphadenopathy:      Cervical: No " cervical adenopathy  Skin:     General: Skin is warm  Findings: No rash  Neurological:      Mental Status: She is alert and oriented to person, place, and time  Deep Tendon Reflexes: Reflexes normal    Psychiatric:         Thought Content: Thought content normal            Labs: I personally reviewed the labs and imaging pertinent to this patient care

## 2023-06-15 NOTE — PROGRESS NOTES
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  Mediastinoscopy in February 2021 showed lymph node with B cells lacking CD5 expression and expressing probably clonality by surface light chain expression  She has been under observation  Over the past few months she has developed increasing fatigue as well as alternating constipation and diarrhea  WBCs 25 K, mature lymphoid predominant  Hemoglobin 10 6, normal platelets  CMP shows creatinine 1 6, baseline 1 0-1 2  SPEP was inconclusive  Immunofixation at reference lab proved negative, however  IgG 3600, IgM 358, IgA 380  CT chest ab pelvis with oral contrast is requested  Peripheral blood flow cytometry is requested  GI consultation requested  I discussed the above with the patient  The patient  voiced understanding and agreement   ______________________________________________________________________    Chief Complaint   Patient presents with   • Follow-up       HPI:  Oncology History    No history exists  Interval History: Clinically stable  Fatigue  Alternating constipation and diarrhea  ECOG-  1 - Symptomatic but completely ambulatory    Review of Systems   Constitutional: Negative for appetite change, diaphoresis, fatigue and fever  HENT: Negative for sinus pain  Eyes: Negative for discharge  Respiratory: Negative for cough and shortness of breath  Cardiovascular: Negative for chest pain  Gastrointestinal: Positive for constipation and diarrhea  Negative for abdominal pain  Endocrine: Negative for cold intolerance  Genitourinary: Negative for difficulty urinating and hematuria  Musculoskeletal: Negative for joint swelling  Skin: Negative for rash  Allergic/Immunologic: Negative for environmental allergies  Neurological: Negative for dizziness and headaches  Hematological: Negative for adenopathy  Psychiatric/Behavioral: Negative for agitation  Past Medical History:   Diagnosis Date   • Acute kidney injury superimposed on chronic kidney disease ([facility] ) 09/20/2021   • Age-related osteoporosis without current pathological fracture 6/1/2023   • Allergic rhinitis    • Anemia    • Bipolar 1 disorder ([facility] )    • Deaf     Pt lost all hearing in right ear after having [place] measles   • GERD (gastroesophageal reflux disease) 08/01/2020   • Hard of hearing     Pt wears a hearing in left ear     • HL (hearing loss)    • Hypothyroidism    • Lung nodule    • Nasal congestion    • Pneumonia 02/10/2017   • Pneumonia due to Streptococcus [facility]) 09/19/2021   • Psychiatric disorder    • Sjogren's syndrome (HCC)    • Systemic lupus erythematosus (HCC)    • Wears glasses    • Wears partial dentures      Patient Active Problem List   Diagnosis   • Bipolar I disorder, single manic episode ([facility] )   • Hypothyroidism   • Systemic lupus erythematosus ([facility] )   • Acid reflux disease   • Anemia   • Depression   • Generalized osteoarthritis   • Heart burn   • Obesity   • Periorbital edema   • Polyarthralgia   • Postgastrectomy malabsorption   • Sjogren's syndrome ([facility] )   • Vaginal atrophy   • Vitamin D deficiency   • Closed fracture of base of fifth metatarsal bone of left foot   • Lymphadenopathy   • Iron deficiency anemia following bariatric surgery   • On belimumab therapy   • S/P gastric bypass   • Overweight   • Encounter for surgical aftercare following surgery of digestive system   • Bariatric surgery status   • Petechial rash   • Stage 3b chronic kidney disease ([facility] )   • Age-related osteoporosis without current pathological fracture   • Recurrent UTI       Current Outpatient Medications:   •  acetaminophen (TYLENOL) 325 mg tablet, Take 2 tablets (650 mg total) by mouth every 6 (six) hours as needed for mild pain, Disp: , Rfl: 0  •  buPROPion (WELLBUTRIN) 100 mg tablet, Take 100 mg by mouth 3 (three) times a day , Disp: , Rfl:   •  calcium citrate-vitamin D (CITRACAL+D) 315-200 MG-UNIT per tablet, Take 1 tablet by mouth daily  , Disp: , Rfl:   •  cycloSPORINE (RESTASIS) 0 05 % ophthalmic emulsion, instill 1 drop into both eyes twice a day, Disp: , Rfl:   •  ferrous sulfate 325 (65 Fe) mg tablet, Take 1 tablet (325 mg total) by mouth 2 (two) times a day with meals, Disp: 60 tablet, Rfl: 0  •  LORazepam (ATIVAN) 1 mg tablet, , Disp: , Rfl:   •  pilocarpine (SALAGEN) 5 mg tablet, Take 1 tablet (5 mg total) by mouth 3 (three) times a day, Disp: 90 tablet, Rfl: 5  •  QUEtiapine (SEROquel) 300 mg tablet, Take 300 mg by mouth daily at bedtime  , Disp: , Rfl:   •  temazepam (RESTORIL) 30 mg capsule, Take 2 capsules by mouth daily at bedtime as needed , Disp: , Rfl:   •  chlorhexidine (PERIDEX) 0 12 % solution, , Disp: , Rfl:   •  hydroxychloroquine (PLAQUENIL) 200 mg tablet, Take 1 tablet (200 mg total) by mouth 2 (two) times a day with meals, Disp: 180 tablet, Rfl: 3  •  levothyroxine 50 mcg tablet, Take 1 tablet (50 mcg total) by mouth daily (Patient not taking: Reported on 6/5/2023), Disp: 30 tablet, Rfl: 1  Allergies   Allergen Reactions   • Ciprofloxacin Hives and Swelling     Pt reports that lips and mouth and face swelled  • Cymbalta [Duloxetine Hcl] Other (See Comments)     Hallucinations, fatigue, faints   • Percocet [Oxycodone-Acetaminophen] Other (See Comments)     Dry mouth - pt states it kept her up all night   States had to continually drink fluids   • Nirmatrelvir-Ritonavir Diarrhea, Dizziness, Nausea Only, Other (See Comments) and Vomiting     Past Surgical History:   Procedure Laterality Date   • BREAST BIOPSY Right    • COLONOSCOPY     • EYE SURGERY     • FRACTURE SURGERY Right     elbow   • GASTRIC BYPASS     • HYSTERECTOMY Bilateral 1975   • IR BIOPSY LYMPH NODE "10/08/2020   • LUNG BIOPSY     • LYMPH NODE BIOPSY  09/18/2020   • [facility] [address] INCL FLUOR GDNCE DX W/CELL WASHG SPX N/A 12/08/2017    Procedure: BRONCHOSCOPY;  Surgeon: Anneliese Juan MD;  Location: [facility]; Service: Pulmonary   • [facility] BX/EXC LYMPH NODE NEEDLE SUPERFICIAL Left 02/25/2021    Procedure: EXCISION BIOPSY LYMPH NODE: left axillary lymph node biopsy;  Surgeon: Kerwin Rodriguez MD;  Location: [facility];  Service: Thoracic   • TONSILLECTOMY       Social History     Objective:  Vitals:    06/15/23 1356   BP: 122/88   BP Location: Left arm   Patient Position: Sitting   Cuff Size: Standard   Pulse: 81   Temp: 98 2 °F (36 8 °C)   TempSrc: Temporal   SpO2: 98%   Weight: 69 4 kg (153 lb)   Height: 5' 1\" (1 549 m)     Physical Exam  Constitutional:       Appearance: She is well-developed  HENT:      Head: Normocephalic and atraumatic  Eyes:      Pupils: Pupils are equal, round, and reactive to light  Cardiovascular:      Rate and Rhythm: Normal rate  Heart sounds: No murmur heard  Pulmonary:      Effort: No respiratory distress  Breath sounds: No wheezing or rales  Abdominal:      General: There is no distension  Palpations: Abdomen is soft  Tenderness: There is no abdominal tenderness  There is no rebound  Musculoskeletal:         General: No tenderness  Cervical back: Neck supple  Lymphadenopathy:      Cervical: No cervical adenopathy  Skin:     General: Skin is warm  Findings: No rash  Neurological:      Mental Status: She is alert and oriented to person, place, and time  Deep Tendon Reflexes: Reflexes normal    Psychiatric:         Thought Content: Thought content normal            Labs: I personally reviewed the labs and imaging pertinent to this patient care    "

## 2023-06-16 ENCOUNTER — OFFICE VISIT (OUTPATIENT)
Dept: GASTROENTEROLOGY | Facility: AMBULARY SURGERY CENTER | Age: 73
End: 2023-06-16
Payer: COMMERCIAL

## 2023-06-16 VITALS
HEIGHT: 61 IN | DIASTOLIC BLOOD PRESSURE: 70 MMHG | SYSTOLIC BLOOD PRESSURE: 110 MMHG | WEIGHT: 155 LBS | HEART RATE: 68 BPM | OXYGEN SATURATION: 97 % | BODY MASS INDEX: 29.27 KG/M2

## 2023-06-16 DIAGNOSIS — R59.1 LYMPHADENOPATHY: ICD-10-CM

## 2023-06-16 DIAGNOSIS — Z98.84 S/P GASTRIC BYPASS: ICD-10-CM

## 2023-06-16 DIAGNOSIS — R19.4 CHANGE IN BOWEL HABITS: Primary | ICD-10-CM

## 2023-06-16 PROCEDURE — 99214 OFFICE O/P EST MOD 30 MIN: CPT | Performed by: INTERNAL MEDICINE

## 2023-06-16 NOTE — PROGRESS NOTES
[facility]  Progress Note - Emre Hicks 72 y o  female MRN: [id number]    Unit/Bed#:  Encounter: [id number]    Assessment/Plan:    1  Change in bowel habits with constipation and diarrhea-given family history of GI malignancy, would recommend colonoscopy at this time  Other possibilities include small intestinal bacterial overgrowth in setting of prior gastric bypass surgery, history of Sjogren's versus less likely undiagnosed celiac disease  -Given new onset of fatigue, change in bowel habits, would recommend endoscopic evaluation with both EGD and colonoscopy at this time  Will benefit from biopsies for microscopic colitis if there is no evidence of colon polyps/lesions   -We will check celiac serologies as well  -If above work-up is negative, consider testing for SIBO  -Would recommend starting on a fiber supplement to bulk up the stool, recommend psyllium husk 1 tablespoon on daily basis   -Can also trial a daily probiotic  2   History of gastric bypass-denies any upper abdominal pain, acid reflux, no reports of melena or hematochezia  Subjective:     19-year-old female with history of GERD, gastric bypass, thyroid disorder, osteoarthritis, SLE, Sjogren's, lymphadenopathy secondary to atypical lymphoproliferative disorder, being followed closely by hematology presents for follow-up  She was previously seen by Dr Lajean Ahumada for colon cancer screening  Patient has been referred to us for evaluation of alternating constipation and diarrhea and increasing fatigue over the past few months  Patient reports that she has not started any new medications but does report changing her diet, reports that she has been snacking in the afternoon more than she was previously  She is not sure if this is resulted into the change in bowel habits  Patient reports that she has days of loose stools followed by feeling of incomplete evacuation  Patient reports that her stools feel pasty    She has not noted any blood in the stool and does not report any melena  She also feels increased fatigue  She does not endorse any unintentional weight loss  CT of chest abdomen and pelvis with contrast has been ordered  She has also been recommended a GI evaluation  Blood work most recently is notable for hemoglobin of 10 6, WBC of 24, MCV normal, platelets 619  Creatinine appears to be worsened from baseline at 1 6 from 0 94, and work also notable for elevated total protein at 10 4 and elevation of alkaline phosphatase at 122  Flow cytometry has been ordered  Colonoscopy in July 2020 was notable only for diverticulosis and internal hemorrhoids  She was recommended repeat at 5-year interval due to family history  Objective:     Vitals: not currently breastfeeding  ,There is no height or weight on file to calculate BMI  [unfilled]    Physical Exam:    GEN: wn/wd, NAD  HEENT: MMM,  anciteric  CV: RRR, no m/r/g  CHEST: CTA b/l, no w/r/r  ABD: +BS, soft, NT/ND, no hepatosplenomegaly  EXT: no c/c/e  SKIN: no rashes  NEURO: aaox3      Invasive Devices     None                         Lab, Imaging and other studies:     No visits with results within 1 Day(s) from this visit     Latest known visit with results is:   Appointment on 06/09/2023   Component Date Value   • WBC 06/09/2023 24 92 (H)    • RBC 06/09/2023 3 70 (L)    • Hemoglobin 06/09/2023 10 6 (L)    • Hematocrit 06/09/2023 32 7 (L)    • MCV 06/09/2023 88    • MCH 06/09/2023 28 6    • MCHC 06/09/2023 32 4    • RDW 06/09/2023 16 7 (H)    • MPV 06/09/2023 10 3    • Platelets 13/31/4881 280    • Sodium 06/09/2023 134 (L)    • Potassium 06/09/2023 4 5    • Chloride 06/09/2023 111 (H)    • CO2 06/09/2023 18 (L)    • ANION GAP 06/09/2023 5    • BUN 06/09/2023 32 (H)    • Creatinine 06/09/2023 1 65 (H)    • Glucose 06/09/2023 82    • Calcium 06/09/2023 8 3    • AST 06/09/2023 29    • ALT 06/09/2023 15    • Alkaline Phosphatase 06/09/2023 122 (H)    • Total Protein 06/09/2023 10 4 (H)    • Albumin 06/09/2023 3 5    • Total Bilirubin 06/09/2023 0 32    • eGFR 06/09/2023 31    • Segmented % 06/09/2023 18 (L)    • Bands % 06/09/2023 2    • Lymphocytes % 06/09/2023 69 (H)    • Monocytes % 06/09/2023 4    • Eosinophils, % 06/09/2023 4    • Basophils % 06/09/2023 1    • Atypical Lymphocytes % 06/09/2023 2 (H)    • Absolute Neutrophils 06/09/2023 4 98    • Lymphocytes Absolute 06/09/2023 17 19 (H)    • Monocytes Absolute 06/09/2023 1 00    • Eosinophils Absolute 06/09/2023 1 00 (H)    • Basophils Absolute 06/09/2023 0 25 (H)    • Smudge Cells 06/09/2023 Present    • Nora Cells 06/09/2023 Present    • Ovalocytes 06/09/2023 Present    • Poikilocytes 06/09/2023 Present    • Platelet Estimate 34/12/0770 Adequate    • Differential Comment 06/09/2023 see note          I have personally reviewed pertinent films in PACS    No current facility-administered medications for this visit               Answers for HPI/ROS submitted by the patient on 6/15/2023  Chronicity: new  Onset: 1 to 4 weeks ago  Onset quality: undetermined  Frequency: intermittently  Episode duration: 1 Hours  Progression since onset: waxing and waning  Pain location: suprapubic region  Pain - numeric: 3/10  Pain quality: dull  Radiates to: suprapubic region  anorexia: No  arthralgias: Yes  belching: Yes  constipation: Yes  diarrhea: Yes  dysuria: No  fever: No  flatus: No  frequency: No  headaches: No  hematochezia: No  hematuria: No  melena: No  myalgias: Yes  nausea: Yes  weight loss: No  vomiting: No  Aggravated by: bowel movement  Relieved by: bowel movements

## 2023-06-16 NOTE — H&P (VIEW-ONLY)
[facility]  Progress Note - Geraldo Mcdowell 72 y o  female MRN: [id number]    Unit/Bed#:  Encounter: [id number]    Assessment/Plan:    1  Change in bowel habits with constipation and diarrhea-given family history of GI malignancy, would recommend colonoscopy at this time  Other possibilities include small intestinal bacterial overgrowth in setting of prior gastric bypass surgery, history of Sjogren's versus less likely undiagnosed celiac disease  -Given new onset of fatigue, change in bowel habits, would recommend endoscopic evaluation with both EGD and colonoscopy at this time  Will benefit from biopsies for microscopic colitis if there is no evidence of colon polyps/lesions   -We will check celiac serologies as well  -If above work-up is negative, consider testing for SIBO  -Would recommend starting on a fiber supplement to bulk up the stool, recommend psyllium husk 1 tablespoon on daily basis   -Can also trial a daily probiotic  2   History of gastric bypass-denies any upper abdominal pain, acid reflux, no reports of melena or hematochezia  Subjective:     43-year-old female with history of GERD, gastric bypass, thyroid disorder, osteoarthritis, SLE, Sjogren's, lymphadenopathy secondary to atypical lymphoproliferative disorder, being followed closely by hematology presents for follow-up  She was previously seen by Dr Airam Floyd for colon cancer screening  Patient has been referred to us for evaluation of alternating constipation and diarrhea and increasing fatigue over the past few months  Patient reports that she has not started any new medications but does report changing her diet, reports that she has been snacking in the afternoon more than she was previously  She is not sure if this is resulted into the change in bowel habits  Patient reports that she has days of loose stools followed by feeling of incomplete evacuation  Patient reports that her stools feel pasty    She has not noted any blood in the stool and does not report any melena  She also feels increased fatigue  She does not endorse any unintentional weight loss  CT of chest abdomen and pelvis with contrast has been ordered  She has also been recommended a GI evaluation  Blood work most recently is notable for hemoglobin of 10 6, WBC of 24, MCV normal, platelets 610  Creatinine appears to be worsened from baseline at 1 6 from 0 94, and work also notable for elevated total protein at 10 4 and elevation of alkaline phosphatase at 122  Flow cytometry has been ordered  Colonoscopy in July 2020 was notable only for diverticulosis and internal hemorrhoids  She was recommended repeat at 5-year interval due to family history  Objective:     Vitals: not currently breastfeeding  ,There is no height or weight on file to calculate BMI  [unfilled]    Physical Exam:    GEN: wn/wd, NAD  HEENT: MMM,  anciteric  CV: RRR, no m/r/g  CHEST: CTA b/l, no w/r/r  ABD: +BS, soft, NT/ND, no hepatosplenomegaly  EXT: no c/c/e  SKIN: no rashes  NEURO: aaox3      Invasive Devices     None                         Lab, Imaging and other studies:     No visits with results within 1 Day(s) from this visit     Latest known visit with results is:   Appointment on 06/09/2023   Component Date Value   • WBC 06/09/2023 24 92 (H)    • RBC 06/09/2023 3 70 (L)    • Hemoglobin 06/09/2023 10 6 (L)    • Hematocrit 06/09/2023 32 7 (L)    • MCV 06/09/2023 88    • MCH 06/09/2023 28 6    • MCHC 06/09/2023 32 4    • RDW 06/09/2023 16 7 (H)    • MPV 06/09/2023 10 3    • Platelets [birth year]1970 280    • Sodium 06/09/2023 134 (L)    • Potassium 06/09/2023 4 5    • Chloride 06/09/2023 111 (H)    • CO2 06/09/2023 18 (L)    • ANION GAP 06/09/2023 5    • BUN 06/09/2023 32 (H)    • Creatinine 06/09/2023 1 65 (H)    • Glucose 06/09/2023 82    • Calcium 06/09/2023 8 3    • AST 06/09/2023 29    • ALT 06/09/2023 15    • Alkaline Phosphatase 06/09/2023 122 (H)    • Total Protein 06/09/2023 10 4 (H)    • Albumin 06/09/2023 3 5    • Total Bilirubin 06/09/2023 0 32    • eGFR 06/09/2023 31    • Segmented % 06/09/2023 18 (L)    • Bands % 06/09/2023 2    • Lymphocytes % 06/09/2023 69 (H)    • Monocytes % 06/09/2023 4    • Eosinophils, % 06/09/2023 4    • Basophils % 06/09/2023 1    • Atypical Lymphocytes % 06/09/2023 2 (H)    • Absolute Neutrophils 06/09/2023 4 98    • Lymphocytes Absolute 06/09/2023 17 19 (H)    • Monocytes Absolute 06/09/2023 1 00    • Eosinophils Absolute 06/09/2023 1 00 (H)    • Basophils Absolute 06/09/2023 0 25 (H)    • Smudge Cells 06/09/2023 Present    • Hamilton Cells 06/09/2023 Present    • Ovalocytes 06/09/2023 Present    • Poikilocytes 06/09/2023 Present    • Platelet Estimate 97/94/1464 Adequate    • Differential Comment 06/09/2023 see note          I have personally reviewed pertinent films in PACS    No current facility-administered medications for this visit               Answers for HPI/ROS submitted by the patient on 6/15/2023  Chronicity: new  Onset: 1 to 4 weeks ago  Onset quality: undetermined  Frequency: intermittently  Episode duration: 1 Hours  Progression since onset: waxing and waning  Pain location: suprapubic region  Pain - numeric: 3/10  Pain quality: dull  Radiates to: suprapubic region  anorexia: No  arthralgias: Yes  belching: Yes  constipation: Yes  diarrhea: Yes  dysuria: No  fever: No  flatus: No  frequency: No  headaches: No  hematochezia: No  hematuria: No  melena: No  myalgias: Yes  nausea: Yes  weight loss: No  vomiting: No  Aggravated by: bowel movement  Relieved by: bowel movements

## 2023-06-18 RX ORDER — SODIUM CHLORIDE, SODIUM LACTATE, POTASSIUM CHLORIDE, CALCIUM CHLORIDE 600; 310; 30; 20 MG/100ML; MG/100ML; MG/100ML; MG/100ML
75 INJECTION, SOLUTION INTRAVENOUS CONTINUOUS
Status: CANCELLED | OUTPATIENT
Start: 2023-06-18

## 2023-06-19 ENCOUNTER — ANESTHESIA EVENT (OUTPATIENT)
Dept: GASTROENTEROLOGY | Facility: AMBULARY SURGERY CENTER | Age: 73
End: 2023-06-19

## 2023-06-19 ENCOUNTER — HOSPITAL ENCOUNTER (OUTPATIENT)
Dept: GASTROENTEROLOGY | Facility: AMBULARY SURGERY CENTER | Age: 73
Setting detail: OUTPATIENT SURGERY
Discharge: HOME | End: 2023-06-19
Attending: INTERNAL MEDICINE
Payer: COMMERCIAL

## 2023-06-19 ENCOUNTER — ANESTHESIA (OUTPATIENT)
Dept: GASTROENTEROLOGY | Facility: AMBULARY SURGERY CENTER | Age: 73
End: 2023-06-19

## 2023-06-19 VITALS
OXYGEN SATURATION: 98 % | RESPIRATION RATE: 18 BRPM | DIASTOLIC BLOOD PRESSURE: 58 MMHG | SYSTOLIC BLOOD PRESSURE: 111 MMHG | TEMPERATURE: 98 F | HEART RATE: 62 BPM

## 2023-06-19 DIAGNOSIS — R19.4 CHANGE IN BOWEL HABITS: ICD-10-CM

## 2023-06-19 PROCEDURE — 88305 TISSUE EXAM BY PATHOLOGIST: CPT | Performed by: PATHOLOGY

## 2023-06-19 PROCEDURE — 43239 EGD BIOPSY SINGLE/MULTIPLE: CPT | Performed by: INTERNAL MEDICINE

## 2023-06-19 PROCEDURE — 45380 COLONOSCOPY AND BIOPSY: CPT | Performed by: INTERNAL MEDICINE

## 2023-06-19 RX ORDER — SODIUM CHLORIDE, SODIUM LACTATE, POTASSIUM CHLORIDE, CALCIUM CHLORIDE 600; 310; 30; 20 MG/100ML; MG/100ML; MG/100ML; MG/100ML
INJECTION, SOLUTION INTRAVENOUS CONTINUOUS PRN
Status: DISCONTINUED | OUTPATIENT
Start: 2023-06-19 | End: 2023-06-19

## 2023-06-19 RX ORDER — LIDOCAINE HYDROCHLORIDE 10 MG/ML
INJECTION, SOLUTION EPIDURAL; INFILTRATION; INTRACAUDAL; PERINEURAL AS NEEDED
Status: DISCONTINUED | OUTPATIENT
Start: 2023-06-19 | End: 2023-06-19

## 2023-06-19 RX ORDER — PROPOFOL 10 MG/ML
INJECTION, EMULSION INTRAVENOUS CONTINUOUS PRN
Status: DISCONTINUED | OUTPATIENT
Start: 2023-06-19 | End: 2023-06-19

## 2023-06-19 RX ORDER — PROPOFOL 10 MG/ML
INJECTION, EMULSION INTRAVENOUS AS NEEDED
Status: DISCONTINUED | OUTPATIENT
Start: 2023-06-19 | End: 2023-06-19

## 2023-06-19 RX ADMIN — SODIUM CHLORIDE, SODIUM LACTATE, POTASSIUM CHLORIDE, AND CALCIUM CHLORIDE: .6; .31; .03; .02 INJECTION, SOLUTION INTRAVENOUS at 12:21

## 2023-06-19 RX ADMIN — PROPOFOL 100 MCG/KG/MIN: 10 INJECTION, EMULSION INTRAVENOUS at 12:31

## 2023-06-19 RX ADMIN — PROPOFOL 120 MG: 10 INJECTION, EMULSION INTRAVENOUS at 12:31

## 2023-06-19 RX ADMIN — LIDOCAINE HYDROCHLORIDE 50 MG: 10 INJECTION, SOLUTION EPIDURAL; INFILTRATION; INTRACAUDAL; PERINEURAL at 12:31

## 2023-06-19 NOTE — INTERVAL H&P NOTE
H&P reviewed  After examining the patient I find no changes in the patients condition since the H&P had been written      Vitals:    06/19/23 1125   BP: 126/66   Pulse: 64   Resp: 18   Temp: 98 °F (36 7 °C)   SpO2: 97%

## 2023-06-19 NOTE — ANESTHESIA POSTPROCEDURE EVALUATION
Post-Op Assessment Note    CV Status:  Stable  Pain Score: 0    Pain management: adequate     Mental Status:  Alert and awake   Hydration Status:  Euvolemic   PONV Controlled:  Controlled   Airway Patency:  Patent      Post Op Vitals Reviewed: Yes      Staff: CRNA, Anesthesiologist         No notable events documented      BP   126/66   Temp   98   Pulse  70   Resp 15   SpO2   100

## 2023-06-19 NOTE — ANESTHESIA PREPROCEDURE EVALUATION
Procedure:  EGD  COLONOSCOPY    Relevant Problems   ENDO   (+) Hypothyroidism      GI/HEPATIC   (+) Acid reflux disease   (+) H/O bariatric surgery - bypass      /RENAL   (+) Stage 3b chronic kidney disease (HCC)      HEMATOLOGY   (+) Anemia   (+) Iron deficiency anemia following bariatric surgery      MUSCULOSKELETAL  hardware in right elbow   (+) Generalized osteoarthritis      NEURO/PSYCH   (+) Depression      Other   (+) Bipolar I disorder, single manic episode (HCC)        Physical Exam    Airway    Mallampati score: II  TM Distance: >3 FB  Neck ROM: full     Dental       Cardiovascular  Rhythm: regular, Rate: normal,     Pulmonary  Breath sounds clear to auscultation,     Other Findings        Anesthesia Plan  ASA Score- 3     Anesthesia Type- IV sedation with anesthesia with ASA Monitors  Additional Monitors:   Airway Plan:           Plan Factors-    Chart reviewed  Patient is not a current smoker  Induction- intravenous  Postoperative Plan-     Informed Consent- Anesthetic plan and risks discussed with patient  I personally reviewed this patient with the CRNA  Discussed and agreed on the Anesthesia Plan with the CRNA  Linn Singh

## 2023-06-21 ENCOUNTER — APPOINTMENT (OUTPATIENT)
Dept: LAB | Facility: CLINIC | Age: 73
End: 2023-06-21
Payer: COMMERCIAL

## 2023-06-21 DIAGNOSIS — R59.1 LYMPHADENOPATHY: ICD-10-CM

## 2023-06-21 DIAGNOSIS — R19.4 CHANGE IN BOWEL HABITS: ICD-10-CM

## 2023-06-21 LAB — URATE SERPL-MCNC: 6.4 MG/DL (ref 2–7.5)

## 2023-06-21 PROCEDURE — 36415 COLL VENOUS BLD VENIPUNCTURE: CPT

## 2023-06-21 PROCEDURE — 86364 TISS TRNSGLTMNASE EA IG CLAS: CPT

## 2023-06-21 PROCEDURE — 86231 EMA EACH IG CLASS: CPT

## 2023-06-21 PROCEDURE — 82784 ASSAY IGA/IGD/IGG/IGM EACH: CPT

## 2023-06-21 PROCEDURE — 84550 ASSAY OF BLOOD/URIC ACID: CPT

## 2023-06-21 PROCEDURE — 88184 FLOWCYTOMETRY/ TC 1 MARKER: CPT

## 2023-06-21 PROCEDURE — 86258 DGP ANTIBODY EACH IG CLASS: CPT

## 2023-06-21 PROCEDURE — 88185 FLOWCYTOMETRY/TC ADD-ON: CPT

## 2023-06-22 LAB
ENDOMYSIUM IGA SER QL: NEGATIVE
GLIADIN PEPTIDE IGA SER-ACNC: 7 UNITS (ref 0–19)
GLIADIN PEPTIDE IGG SER-ACNC: 3 UNITS (ref 0–19)
IGA SERPL-MCNC: 433 MG/DL (ref 64–422)
TTG IGA SER-ACNC: <2 U/ML (ref 0–3)
TTG IGG SER-ACNC: 4 U/ML (ref 0–5)

## 2023-06-22 PROCEDURE — 88305 TISSUE EXAM BY PATHOLOGIST: CPT | Performed by: PATHOLOGY

## 2023-06-26 LAB — SCAN RESULT: NORMAL

## 2023-06-29 ENCOUNTER — HOSPITAL ENCOUNTER (OUTPATIENT)
Dept: CT IMAGING | Facility: HOSPITAL | Age: 73
Discharge: HOME | End: 2023-06-29
Attending: INTERNAL MEDICINE
Payer: COMMERCIAL

## 2023-06-29 DIAGNOSIS — R59.1 LYMPHADENOPATHY: ICD-10-CM

## 2023-06-29 PROCEDURE — 74176 CT ABD & PELVIS W/O CONTRAST: CPT

## 2023-06-29 PROCEDURE — G1004 CDSM NDSC: HCPCS

## 2023-06-29 PROCEDURE — 71250 CT THORAX DX C-: CPT

## 2023-07-05 ENCOUNTER — TELEPHONE (OUTPATIENT)
Dept: RHEUMATOLOGY | Facility: CLINIC | Age: 73
End: 2023-07-05

## 2023-07-05 DIAGNOSIS — M81.0 AGE-RELATED OSTEOPOROSIS WITHOUT CURRENT PATHOLOGICAL FRACTURE: Primary | ICD-10-CM

## 2023-07-06 ENCOUNTER — TELEPHONE (OUTPATIENT)
Dept: HEMATOLOGY ONCOLOGY | Facility: CLINIC | Age: 73
End: 2023-07-06

## 2023-07-06 ENCOUNTER — APPOINTMENT (OUTPATIENT)
Dept: LAB | Facility: CLINIC | Age: 73
End: 2023-07-06
Payer: COMMERCIAL

## 2023-07-06 DIAGNOSIS — M81.0 AGE-RELATED OSTEOPOROSIS WITHOUT CURRENT PATHOLOGICAL FRACTURE: ICD-10-CM

## 2023-07-06 LAB
ALBUMIN SERPL BCP-MCNC: 3.1 G/DL (ref 3.5–5)
ALP SERPL-CCNC: 120 U/L (ref 46–116)
ALT SERPL W P-5'-P-CCNC: 19 U/L (ref 12–78)
ANION GAP SERPL CALCULATED.3IONS-SCNC: 3 MMOL/L
AST SERPL W P-5'-P-CCNC: 35 U/L (ref 5–45)
BILIRUB SERPL-MCNC: 0.42 MG/DL (ref 0.2–1)
BUN SERPL-MCNC: 19 MG/DL (ref 5–25)
CALCIUM ALBUM COR SERPL-MCNC: 9.3 MG/DL (ref 8.3–10.1)
CALCIUM SERPL-MCNC: 8.6 MG/DL (ref 8.3–10.1)
CHLORIDE SERPL-SCNC: 107 MMOL/L (ref 96–108)
CO2 SERPL-SCNC: 23 MMOL/L (ref 21–32)
CREAT SERPL-MCNC: 1 MG/DL (ref 0.6–1.3)
GFR SERPL CREATININE-BSD FRML MDRD: 56 ML/MIN/1.73SQ M
GLUCOSE P FAST SERPL-MCNC: 97 MG/DL (ref 65–99)
POTASSIUM SERPL-SCNC: 4.5 MMOL/L (ref 3.5–5.3)
PROT SERPL-MCNC: 10 G/DL (ref 6.4–8.4)
SODIUM SERPL-SCNC: 133 MMOL/L (ref 135–147)

## 2023-07-06 PROCEDURE — 80053 COMPREHEN METABOLIC PANEL: CPT

## 2023-07-06 PROCEDURE — 36415 COLL VENOUS BLD VENIPUNCTURE: CPT

## 2023-07-06 NOTE — TELEPHONE ENCOUNTER
Received voicemail:    "Yes, it's Piper calling from Radiology. I have significant findings on a CT we did on Sou[facility] [address]. Date of birth is [birth year]. If you could give me a call back to confirm [phone]. Thank you."    Returned phone call to radiology that we are able to see results.

## 2023-07-07 ENCOUNTER — HOSPITAL ENCOUNTER (OUTPATIENT)
Dept: INFUSION CENTER | Facility: CLINIC | Age: 73
End: 2023-07-07
Payer: COMMERCIAL

## 2023-07-07 VITALS
RESPIRATION RATE: 18 BRPM | BODY MASS INDEX: 29.07 KG/M2 | HEART RATE: 81 BPM | HEIGHT: 61 IN | SYSTOLIC BLOOD PRESSURE: 143 MMHG | OXYGEN SATURATION: 95 % | WEIGHT: 154 LBS | DIASTOLIC BLOOD PRESSURE: 80 MMHG | TEMPERATURE: 98.1 F

## 2023-07-07 DIAGNOSIS — M81.0 AGE-RELATED OSTEOPOROSIS WITHOUT CURRENT PATHOLOGICAL FRACTURE: Primary | ICD-10-CM

## 2023-07-07 LAB
MISCELLANEOUS LAB TEST RESULT: NORMAL
SCAN RESULT: NORMAL

## 2023-07-07 PROCEDURE — 96374 THER/PROPH/DIAG INJ IV PUSH: CPT

## 2023-07-07 RX ORDER — ZOLEDRONIC ACID 5 MG/100ML
5 INJECTION, SOLUTION INTRAVENOUS ONCE
Status: COMPLETED | OUTPATIENT
Start: 2023-07-07 | End: 2023-07-07

## 2023-07-07 RX ORDER — SODIUM CHLORIDE 9 MG/ML
20 INJECTION, SOLUTION INTRAVENOUS ONCE
Status: COMPLETED | OUTPATIENT
Start: 2023-07-07 | End: 2023-07-07

## 2023-07-07 RX ORDER — SODIUM CHLORIDE 9 MG/ML
20 INJECTION, SOLUTION INTRAVENOUS ONCE
OUTPATIENT
Start: 2024-07-05

## 2023-07-07 RX ORDER — ZOLEDRONIC ACID 5 MG/100ML
5 INJECTION, SOLUTION INTRAVENOUS ONCE
OUTPATIENT
Start: 2024-07-05

## 2023-07-07 RX ADMIN — SODIUM CHLORIDE 20 ML/HR: 0.9 INJECTION, SOLUTION INTRAVENOUS at 15:16

## 2023-07-07 RX ADMIN — ZOLEDRONIC ACID 5 MG: 0.05 INJECTION, SOLUTION INTRAVENOUS at 15:22

## 2023-07-07 NOTE — PROGRESS NOTES
Patient is here for reclast. She offers no complaints at this time. Labs from 7/6/23 reviewed, ca 8.6 and CrCl 40.2 which meets parameters.

## 2023-07-07 NOTE — PROGRESS NOTES
Patient tolerated her treatment without any adverse reactions.  Next appointment confirmed and avs given

## 2023-07-09 LAB — MISCELLANEOUS LAB TEST RESULT: NORMAL

## 2023-07-13 ENCOUNTER — OFFICE VISIT (OUTPATIENT)
Dept: HEMATOLOGY ONCOLOGY | Facility: CLINIC | Age: 73
End: 2023-07-13
Payer: COMMERCIAL

## 2023-07-13 ENCOUNTER — TELEPHONE (OUTPATIENT)
Dept: HEMATOLOGY ONCOLOGY | Facility: CLINIC | Age: 73
End: 2023-07-13

## 2023-07-13 VITALS
RESPIRATION RATE: 17 BRPM | WEIGHT: 152 LBS | OXYGEN SATURATION: 96 % | DIASTOLIC BLOOD PRESSURE: 70 MMHG | TEMPERATURE: 97.4 F | BODY MASS INDEX: 28.7 KG/M2 | HEIGHT: 61 IN | SYSTOLIC BLOOD PRESSURE: 102 MMHG | HEART RATE: 86 BPM

## 2023-07-13 DIAGNOSIS — R59.1 LYMPHADENOPATHY: ICD-10-CM

## 2023-07-13 DIAGNOSIS — M32.9 SYSTEMIC LUPUS ERYTHEMATOSUS, UNSPECIFIED SLE TYPE, UNSPECIFIED ORGAN INVOLVEMENT STATUS (HCC): ICD-10-CM

## 2023-07-13 DIAGNOSIS — D72.820 MONOCLONAL B-CELL LYMPHOCYTOSIS: Primary | ICD-10-CM

## 2023-07-13 DIAGNOSIS — D50.8 IRON DEFICIENCY ANEMIA FOLLOWING BARIATRIC SURGERY: ICD-10-CM

## 2023-07-13 DIAGNOSIS — R59.1 LYMPHADENOPATHY: Primary | ICD-10-CM

## 2023-07-13 DIAGNOSIS — K95.89 IRON DEFICIENCY ANEMIA FOLLOWING BARIATRIC SURGERY: ICD-10-CM

## 2023-07-13 DIAGNOSIS — M35.00 SJOGREN'S SYNDROME, WITH UNSPECIFIED ORGAN INVOLVEMENT (HCC): ICD-10-CM

## 2023-07-13 PROCEDURE — 99215 OFFICE O/P EST HI 40 MIN: CPT | Performed by: INTERNAL MEDICINE

## 2023-07-13 NOTE — PROGRESS NOTES
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 Mediastinoscopy February 2021 showed lymph nodes with B cells lacking CD5 expression and expressing clonality by surface light chain expression restriction. Anemia secondary to iron deficiency, status post bariatric surgery. Recent WBC 24.9, hemoglobin 10.6, platelet count 474 18 neutrophils, 2 bands, 69 lymphocytes, 4 monos, 4 eos, 2 atypical lymphocytes. Peripheral blood flow cytometry showed high count MBL with CLL phenotype, absolute clonal cells 3.5. NGS showed notch 1 mutation, variant of strong clinical significance. TMB low. MSI negative. Recent CT chest ab pelvis shows slight increase in the size of right axillary nodes, previously 3.5 cm, currently 3.9 cm. These are easily palpable, nontender. I think the bulk of her lymphocytosis is secondary to autoimmune processes with a small percentage representing MBL. The phenotype of her peripheral lymphocytosis is not consistent with previous lymph node biopsy, however. Given increase in size of axillary lymph nodes, excisional biopsy is requested. I discussed the above with the patient. The patient and her  voiced understanding and agreement.  ______________________________________________________________________    Chief Complaint   Patient presents with   • Follow-up       HPI:  Oncology History    No history exists. Interval History: Clinically stable. ECOG-  1 - Symptomatic but completely ambulatory    Review of Systems   Constitutional: Positive for fatigue. Negative for appetite change, diaphoresis and fever. HENT: Negative for sinus pain. Eyes: Negative for discharge.    Respiratory: Negative for cough and shortness of breath. Cardiovascular: Negative for chest pain. Gastrointestinal: Negative for abdominal pain, constipation and diarrhea. Endocrine: Negative for cold intolerance. Genitourinary: Negative for difficulty urinating and hematuria. Musculoskeletal: Negative for joint swelling. Skin: Negative for rash. Allergic/Immunologic: Negative for environmental allergies. Neurological: Negative for dizziness and headaches. Hematological: Negative for adenopathy. Psychiatric/Behavioral: Negative for agitation. Past Medical History:   Diagnosis Date   • Acute kidney injury superimposed on chronic kidney disease ([address]) 09/20/2021   • Age-related osteoporosis without current pathological fracture 06/01/2023   • Allergic rhinitis    • Anemia    • Bipolar 1 disorder ([address])    • Constipation    • Deaf     Pt lost all hearing in right ear after having [place] measles   • Diarrhea    • Fatigue    • GERD (gastroesophageal reflux disease) 08/01/2020   • Hard of hearing     Pt wears a hearing in left ear.    • HL (hearing loss)    • Hypothyroidism    • Lung nodule    • Nasal congestion    • Pneumonia 02/10/2017   • Pneumonia due to Streptococcus [facility]) 09/19/2021   • Psychiatric disorder    • Sjogren's syndrome ([address])    • Systemic lupus erythematosus ([address])    • Wears glasses    • Wears partial dentures      Patient Active Problem List   Diagnosis   • Bipolar I disorder, single manic episode ([address])   • Hypothyroidism   • Change in bowel habits   • Systemic lupus erythematosus ([address])   • Acid reflux disease   • Anemia   • Depression   • Generalized osteoarthritis   • Heart burn   • Obesity   • Periorbital edema   • Polyarthralgia   • Postgastrectomy malabsorption   • Sjogren's syndrome ([address])   • Vaginal atrophy   • Vitamin D deficiency   • Closed fracture of base of fifth metatarsal bone of left foot   • Lymphadenopathy   • Iron deficiency anemia following bariatric surgery   • On belimumab therapy   • S/P gastric bypass   • Overweight   • Encounter for surgical aftercare following surgery of digestive system   • H/O bariatric surgery - bypass   • Petechial rash   • Stage 3b chronic kidney disease (HCC)   • Age-related osteoporosis without current pathological fracture   • Recurrent UTI       Current Outpatient Medications:   •  acetaminophen (TYLENOL) 325 mg tablet, Take 2 tablets (650 mg total) by mouth every 6 (six) hours as needed for mild pain, Disp: , Rfl: 0  •  buPROPion (WELLBUTRIN) 100 mg tablet, Take 100 mg by mouth 3 (three) times a day., Disp: , Rfl:   •  calcium citrate-vitamin D (CITRACAL+D) 315-200 MG-UNIT per tablet, Take 1 tablet by mouth daily. , Disp: , Rfl:   •  chlorhexidine (PERIDEX) 0.12 % solution, once a week, Disp: , Rfl:   •  cycloSPORINE (RESTASIS) 0.05 % ophthalmic emulsion, instill 1 drop into both eyes twice a day, Disp: , Rfl:   •  ferrous sulfate 325 (65 Fe) mg tablet, Take 1 tablet (325 mg total) by mouth 2 (two) times a day with meals, Disp: 60 tablet, Rfl: 0  •  hydroxychloroquine (PLAQUENIL) 200 mg tablet, Take 1 tablet (200 mg total) by mouth 2 (two) times a day with meals, Disp: 180 tablet, Rfl: 3  •  LORazepam (ATIVAN) 1 mg tablet, if needed, Disp: , Rfl:   •  pilocarpine (SALAGEN) 5 mg tablet, Take 1 tablet (5 mg total) by mouth 3 (three) times a day, Disp: 90 tablet, Rfl: 5  •  Polyethylene Glycol 3350 (MIRALAX PO), Take by mouth 1 (one) time 238 gm with dulcolax, Disp: , Rfl:   •  QUEtiapine (SEROquel) 25 mg tablet, if needed, Disp: , Rfl:   •  QUEtiapine (SEROquel) 300 mg tablet, Take 300 mg by mouth daily at bedtime. , Disp: , Rfl:   •  temazepam (RESTORIL) 30 mg capsule, Take 2 capsules by mouth daily at bedtime as needed , Disp: , Rfl:   Allergies   Allergen Reactions   • Ciprofloxacin Hives and Swelling     Pt reports that lips and mouth and face swelled.    • Cymbalta [Duloxetine Hcl] Other (See Comments)     Hallucinations, fatigue, faints   • Nirmatrelvir-Ritonavir Diarrhea, Nausea Only, Other (See Comments), Dizziness and Lightheadedness     "passed out"-nausea   • Percocet [Oxycodone-Acetaminophen] Other (See Comments)     Dry mouth - pt states it kept her up all night. States had to continually drink fluids     Past Surgical History:   Procedure Laterality Date   • BREAST BIOPSY Right    • COLONOSCOPY     • EYE SURGERY     • FRACTURE SURGERY Right     elbow   • GASTRIC BYPASS     • HYSTERECTOMY Bilateral 1975   • IR BIOPSY LYMPH NODE  10/08/2020   • LUNG BIOPSY     • LYMPH NODE BIOPSY  09/18/2020   •  [address] INCL FLUOR GDNCE DX W/CELL WASHG SPX N/A 12/08/2017    Procedure: BRONCHOSCOPY;  Surgeon: Eliot Thorpe MD;  [facility]; Service: Pulmonary   • MS BX/EXC LYMPH NODE NEEDLE SUPERFICIAL Left 02/25/2021    Procedure: EXCISION BIOPSY LYMPH NODE: left axillary lymph node biopsy;  Surgeon: Daphnie Capellan MD;  Location: [facility];  Service: Thoracic   • TONSILLECTOMY       Social History     Objective:  Vitals:    07/13/23 1417   BP: 102/70   BP Location: Left arm   Patient Position: Sitting   Cuff Size: Adult   Pulse: 86   Resp: 17   Temp: (!) 97.4 °F (36.3 °C)   SpO2: 96%   Weight: 68.9 kg (152 lb)   Height: 5' 0.5" (1.537 m)     Physical Exam  Constitutional:       Appearance: She is well-developed. HENT:      Head: Normocephalic and atraumatic. Eyes:      Pupils: Pupils are equal, round, and reactive to light. Cardiovascular:      Rate and Rhythm: Normal rate. Heart sounds: No murmur heard. Pulmonary:      Effort: No respiratory distress. Breath sounds: No wheezing or rales. Abdominal:      General: There is no distension. Palpations: Abdomen is soft. Tenderness: There is no abdominal tenderness. There is no rebound. Musculoskeletal:         General: No tenderness. Cervical back: Neck supple. Lymphadenopathy:      Cervical: No cervical adenopathy. Upper Body:      Right upper body: Axillary adenopathy present. Left upper body: Axillary adenopathy present. Skin:     General: Skin is warm. Findings: No rash. Neurological:      Mental Status: She is alert and oriented to person, place, and time. Deep Tendon Reflexes: Reflexes normal.   Psychiatric:         Thought Content: Thought content normal.           Labs: I personally reviewed the labs and imaging pertinent to this patient care.

## 2023-07-13 NOTE — TELEPHONE ENCOUNTER
I called Latasha Castillo in response to a referral that was received for patient to establish care with Thoracic Surgery. Outreach was made to complete patient's intake questionnaire . Patient's intake questionnaire was reviewed and complete. Patient's intake has been sent to the team for clinical review.

## 2023-07-14 ENCOUNTER — APPOINTMENT (OUTPATIENT)
Dept: LAB | Facility: CLINIC | Age: 73
End: 2023-07-14
Payer: COMMERCIAL

## 2023-07-14 DIAGNOSIS — E55.9 VITAMIN D DEFICIENCY: ICD-10-CM

## 2023-07-14 DIAGNOSIS — E03.9 HYPOTHYROIDISM, UNSPECIFIED TYPE: ICD-10-CM

## 2023-07-14 DIAGNOSIS — E55.9 AVITAMINOSIS D: ICD-10-CM

## 2023-07-14 DIAGNOSIS — M32.9 SYSTEMIC LUPUS ERYTHEMATOSUS, UNSPECIFIED SLE TYPE, UNSPECIFIED ORGAN INVOLVEMENT STATUS (HCC): ICD-10-CM

## 2023-07-14 DIAGNOSIS — J69.1: ICD-10-CM

## 2023-07-14 DIAGNOSIS — Z90.3 POSTGASTRECTOMY MALABSORPTION: ICD-10-CM

## 2023-07-14 DIAGNOSIS — K91.2 POSTGASTRECTOMY MALABSORPTION: ICD-10-CM

## 2023-07-14 DIAGNOSIS — K91.2 HYPOGLYCEMIA FOLLOWING GASTROINTESTINAL SURGERY: ICD-10-CM

## 2023-07-14 DIAGNOSIS — M35.00 SJOGREN'S SYNDROME, WITH UNSPECIFIED ORGAN INVOLVEMENT (HCC): ICD-10-CM

## 2023-07-14 DIAGNOSIS — E61.1 IRON DEFICIENCY: ICD-10-CM

## 2023-07-14 DIAGNOSIS — D47.9 ATYPICAL LYMPHOPROLIFERATIVE DISORDER (HCC): ICD-10-CM

## 2023-07-14 DIAGNOSIS — F31.9 BIPOLAR 1 DISORDER (HCC): ICD-10-CM

## 2023-07-14 LAB
25(OH)D3 SERPL-MCNC: 10.1 NG/ML (ref 30–100)
ALBUMIN SERPL BCP-MCNC: 3.1 G/DL (ref 3.5–5)
ALP SERPL-CCNC: 126 U/L (ref 46–116)
ALT SERPL W P-5'-P-CCNC: 20 U/L (ref 12–78)
ANION GAP SERPL CALCULATED.3IONS-SCNC: 2 MMOL/L
ANISOCYTOSIS BLD QL SMEAR: PRESENT
AST SERPL W P-5'-P-CCNC: 28 U/L (ref 5–45)
BASOPHILS # BLD MANUAL: 0 THOUSAND/UL (ref 0–0.1)
BASOPHILS NFR MAR MANUAL: 0 % (ref 0–1)
BILIRUB SERPL-MCNC: 0.34 MG/DL (ref 0.2–1)
BUN SERPL-MCNC: 15 MG/DL (ref 5–25)
CALCIUM ALBUM COR SERPL-MCNC: 8.3 MG/DL (ref 8.3–10.1)
CALCIUM SERPL-MCNC: 7.6 MG/DL (ref 8.3–10.1)
CHLORIDE SERPL-SCNC: 108 MMOL/L (ref 96–108)
CO2 SERPL-SCNC: 23 MMOL/L (ref 21–32)
CREAT SERPL-MCNC: 0.93 MG/DL (ref 0.6–1.3)
EOSINOPHIL # BLD MANUAL: 0.18 THOUSAND/UL (ref 0–0.4)
EOSINOPHIL NFR BLD MANUAL: 1 % (ref 0–6)
ERYTHROCYTE [DISTWIDTH] IN BLOOD BY AUTOMATED COUNT: 16.6 % (ref 11.6–15.1)
FERRITIN SERPL-MCNC: 9 NG/ML (ref 11–307)
FOLATE SERPL-MCNC: 14.7 NG/ML
GFR SERPL CREATININE-BSD FRML MDRD: 62 ML/MIN/1.73SQ M
GLUCOSE P FAST SERPL-MCNC: 93 MG/DL (ref 65–99)
HCT VFR BLD AUTO: 32.4 % (ref 34.8–46.1)
HGB BLD-MCNC: 9.9 G/DL (ref 11.5–15.4)
IRON SERPL-MCNC: 52 UG/DL (ref 50–170)
LYMPHOCYTES # BLD AUTO: 11.28 THOUSAND/UL (ref 0.6–4.47)
LYMPHOCYTES # BLD AUTO: 63 % (ref 14–44)
MCH RBC QN AUTO: 28.3 PG (ref 26.8–34.3)
MCHC RBC AUTO-ENTMCNC: 30.6 G/DL (ref 31.4–37.4)
MCV RBC AUTO: 93 FL (ref 82–98)
METAMYELOCYTES NFR BLD MANUAL: 1 % (ref 0–1)
MONOCYTES # BLD AUTO: 1.07 THOUSAND/UL (ref 0–1.22)
MONOCYTES NFR BLD: 6 % (ref 4–12)
NEUTROPHILS # BLD MANUAL: 4.65 THOUSAND/UL (ref 1.85–7.62)
NEUTS SEG NFR BLD AUTO: 26 % (ref 43–75)
PLATELET # BLD AUTO: 279 THOUSANDS/UL (ref 149–390)
PLATELET BLD QL SMEAR: ADEQUATE
PMV BLD AUTO: 10.6 FL (ref 8.9–12.7)
POIKILOCYTOSIS BLD QL SMEAR: PRESENT
POTASSIUM SERPL-SCNC: 4.1 MMOL/L (ref 3.5–5.3)
PROT SERPL-MCNC: 10.1 G/DL (ref 6.4–8.4)
PTH-INTACT SERPL-MCNC: 171.4 PG/ML (ref 12–88)
RBC # BLD AUTO: 3.5 MILLION/UL (ref 3.81–5.12)
RBC MORPH BLD: PRESENT
SODIUM SERPL-SCNC: 133 MMOL/L (ref 135–147)
TSH SERPL DL<=0.05 MIU/L-ACNC: 3.3 UIU/ML (ref 0.45–4.5)
VARIANT LYMPHS # BLD AUTO: 3 %
VIT B12 SERPL-MCNC: 136 PG/ML (ref 180–914)
WBC # BLD AUTO: 17.9 THOUSAND/UL (ref 4.31–10.16)

## 2023-07-14 PROCEDURE — 82607 VITAMIN B-12: CPT

## 2023-07-14 PROCEDURE — 82306 VITAMIN D 25 HYDROXY: CPT

## 2023-07-14 PROCEDURE — 85007 BL SMEAR W/DIFF WBC COUNT: CPT

## 2023-07-14 PROCEDURE — 82746 ASSAY OF FOLIC ACID SERUM: CPT

## 2023-07-14 PROCEDURE — 84443 ASSAY THYROID STIM HORMONE: CPT

## 2023-07-14 PROCEDURE — 83540 ASSAY OF IRON: CPT

## 2023-07-14 PROCEDURE — 36415 COLL VENOUS BLD VENIPUNCTURE: CPT

## 2023-07-14 PROCEDURE — 82525 ASSAY OF COPPER: CPT

## 2023-07-14 PROCEDURE — 85027 COMPLETE CBC AUTOMATED: CPT

## 2023-07-14 PROCEDURE — 82728 ASSAY OF FERRITIN: CPT

## 2023-07-14 PROCEDURE — 84425 ASSAY OF VITAMIN B-1: CPT

## 2023-07-14 PROCEDURE — 84590 ASSAY OF VITAMIN A: CPT

## 2023-07-14 PROCEDURE — 80053 COMPREHEN METABOLIC PANEL: CPT

## 2023-07-14 PROCEDURE — 83970 ASSAY OF PARATHORMONE: CPT

## 2023-07-14 PROCEDURE — 84630 ASSAY OF ZINC: CPT

## 2023-07-18 LAB
COPPER SERPL-MCNC: 125 UG/DL (ref 80–158)
ZINC SERPL-MCNC: 76 UG/DL (ref 44–115)

## 2023-07-19 LAB
VIT A SERPL-MCNC: 24.8 UG/DL (ref 22–69.5)
VIT B1 BLD-SCNC: 111.5 NMOL/L (ref 66.5–200)

## 2023-07-20 DIAGNOSIS — E55.9 VITAMIN D DEFICIENCY: Primary | ICD-10-CM

## 2023-07-20 DIAGNOSIS — K95.89 IRON DEFICIENCY ANEMIA FOLLOWING BARIATRIC SURGERY: ICD-10-CM

## 2023-07-20 DIAGNOSIS — D50.8 IRON DEFICIENCY ANEMIA FOLLOWING BARIATRIC SURGERY: ICD-10-CM

## 2023-07-20 RX ORDER — SODIUM CHLORIDE 9 MG/ML
20 INJECTION, SOLUTION INTRAVENOUS ONCE
OUTPATIENT
Start: 2023-07-27

## 2023-07-20 RX ORDER — IRON HEME POLYPEPTIDE/FOLIC AC 12-1MG
5000 TABLET ORAL 2 TIMES DAILY
Qty: 180 CAPSULE | Refills: 0 | Status: SHIPPED | OUTPATIENT
Start: 2023-07-20 | End: 2023-10-18

## 2023-07-20 RX ORDER — HYDROCODONE BITARTRATE AND ACETAMINOPHEN 5; 325 MG/1; MG/1
TABLET ORAL
COMMUNITY
Start: 2023-07-18

## 2023-07-20 RX ORDER — AMOXICILLIN 500 MG/1
CAPSULE ORAL
COMMUNITY
Start: 2023-07-18

## 2023-07-21 ENCOUNTER — OFFICE VISIT (OUTPATIENT)
Dept: FAMILY MEDICINE CLINIC | Facility: CLINIC | Age: 73
End: 2023-07-21
Payer: COMMERCIAL

## 2023-07-21 ENCOUNTER — TELEPHONE (OUTPATIENT)
Dept: FAMILY MEDICINE CLINIC | Facility: CLINIC | Age: 73
End: 2023-07-21

## 2023-07-21 VITALS
DIASTOLIC BLOOD PRESSURE: 72 MMHG | BODY MASS INDEX: 28.7 KG/M2 | WEIGHT: 152 LBS | OXYGEN SATURATION: 98 % | HEIGHT: 61 IN | HEART RATE: 64 BPM | SYSTOLIC BLOOD PRESSURE: 102 MMHG | RESPIRATION RATE: 16 BRPM

## 2023-07-21 DIAGNOSIS — N39.0 CHRONIC UTI: ICD-10-CM

## 2023-07-21 DIAGNOSIS — K95.89 IRON DEFICIENCY ANEMIA FOLLOWING BARIATRIC SURGERY: ICD-10-CM

## 2023-07-21 DIAGNOSIS — D72.820 MONOCLONAL B-CELL LYMPHOCYTOSIS: Primary | ICD-10-CM

## 2023-07-21 DIAGNOSIS — M35.00 SJOGREN'S SYNDROME, WITH UNSPECIFIED ORGAN INVOLVEMENT (HCC): ICD-10-CM

## 2023-07-21 DIAGNOSIS — M32.9 SYSTEMIC LUPUS ERYTHEMATOSUS, UNSPECIFIED SLE TYPE, UNSPECIFIED ORGAN INVOLVEMENT STATUS (HCC): ICD-10-CM

## 2023-07-21 DIAGNOSIS — D50.8 IRON DEFICIENCY ANEMIA FOLLOWING BARIATRIC SURGERY: ICD-10-CM

## 2023-07-21 DIAGNOSIS — M81.0 AGE-RELATED OSTEOPOROSIS WITHOUT CURRENT PATHOLOGICAL FRACTURE: ICD-10-CM

## 2023-07-21 DIAGNOSIS — E55.9 VITAMIN D DEFICIENCY: ICD-10-CM

## 2023-07-21 PROCEDURE — G0439 PPPS, SUBSEQ VISIT: HCPCS | Performed by: FAMILY MEDICINE

## 2023-07-21 PROCEDURE — 96372 THER/PROPH/DIAG INJ SC/IM: CPT | Performed by: FAMILY MEDICINE

## 2023-07-21 PROCEDURE — 99214 OFFICE O/P EST MOD 30 MIN: CPT | Performed by: FAMILY MEDICINE

## 2023-07-21 RX ORDER — CYANOCOBALAMIN 1000 UG/ML
1000 INJECTION, SOLUTION INTRAMUSCULAR; SUBCUTANEOUS
Status: SHIPPED | OUTPATIENT
Start: 2023-07-21

## 2023-07-21 RX ADMIN — CYANOCOBALAMIN 1000 MCG: 1000 INJECTION, SOLUTION INTRAMUSCULAR; SUBCUTANEOUS at 10:52

## 2023-07-21 NOTE — PATIENT INSTRUCTIONS
Medicare Preventive Visit Patient Instructions  Thank you for completing your Welcome to Medicare Visit or Medicare Annual Wellness Visit today. Your next wellness visit will be due in one year (7/21/2024). The screening/preventive services that you may require over the next 5-10 years are detailed below. Some tests may not apply to you based off risk factors and/or age. Screening tests ordered at today's visit but not completed yet may show as past due. Also, please note that scanned in results may not display below. Preventive Screenings:  Service Recommendations Previous Testing/Comments   Colorectal Cancer Screening  * Colonoscopy    * Fecal Occult Blood Test (FOBT)/Fecal Immunochemical Test (FIT)  * Fecal DNA/Cologuard Test  * Flexible Sigmoidoscopy Age: 43-73 years old   Colonoscopy: every 10 years (may be performed more frequently if at higher risk)  OR  FOBT/FIT: every 1 year  OR  Cologuard: every 3 years  OR  Sigmoidoscopy: every 5 years  Screening may be recommended earlier than age 39 if at higher risk for colorectal cancer. Also, an individualized decision between you and your healthcare provider will decide whether screening between the ages of 77-80 would be appropriate. Colonoscopy: 06/19/2023  FOBT/FIT: Not on file  Cologuard: Not on file  Sigmoidoscopy: Not on file    Screening Current     Breast Cancer Screening Age: 36 years old  Frequency: every 1-2 years  Not required if history of left and right mastectomy Mammogram: 08/18/2022    Screening Current   Cervical Cancer Screening Between the ages of 21-29, pap smear recommended once every 3 years. Between the ages of 32-69, can perform pap smear with HPV co-testing every 5 years.    Recommendations may differ for women with a history of total hysterectomy, cervical cancer, or abnormal pap smears in past. Pap Smear: Not on file    Screening Not Indicated   Hepatitis C Screening Once for adults born between 1945 and 1965  More frequently in patients at high risk for Hepatitis C Hep C Antibody: Not on file    Screening Current   Diabetes Screening 1-2 times per year if you're at risk for diabetes or have pre-diabetes Fasting glucose: 93 mg/dL (7/14/2023)  A1C: No results in last 5 years (No results in last 5 years)  Screening Current   Cholesterol Screening Once every 5 years if you don't have a lipid disorder. May order more often based on risk factors. Lipid panel: 10/10/2022    Screening Current     Other Preventive Screenings Covered by Medicare:  1. Abdominal Aortic Aneurysm (AAA) Screening: covered once if your at risk. You're considered to be at risk if you have a family history of AAA. 2. Lung Cancer Screening: covers low dose CT scan once per year if you meet all of the following conditions: (1) Age 48-67; (2) No signs or symptoms of lung cancer; (3) Current smoker or have quit smoking within the last 15 years; (4) You have a tobacco smoking history of at least 20 pack years (packs per day multiplied by number of years you smoked); (5) You get a written order from a healthcare provider. 3. Glaucoma Screening: covered annually if you're considered high risk: (1) You have diabetes OR (2) Family history of glaucoma OR (3)  aged 48 and older OR (3)  American aged 72 and older  3. Osteoporosis Screening: covered every 2 years if you meet one of the following conditions: (1) You're estrogen deficient and at risk for osteoporosis based off medical history and other findings; (2) Have a vertebral abnormality; (3) On glucocorticoid therapy for more than 3 months; (4) Have primary hyperparathyroidism; (5) On osteoporosis medications and need to assess response to drug therapy. · Last bone density test (DXA Scan): 03/15/2023.  5. HIV Screening: covered annually if you're between the age of 15-65. Also covered annually if you are younger than 13 and older than 72 with risk factors for HIV infection.  For pregnant patients, it is covered up to 3 times per pregnancy. Immunizations:  Immunization Recommendations   Influenza Vaccine Annual influenza vaccination during flu season is recommended for all persons aged >= 6 months who do not have contraindications   Pneumococcal Vaccine   * Pneumococcal conjugate vaccine = PCV13 (Prevnar 13), PCV15 (Vaxneuvance), PCV20 (Prevnar 20)  * Pneumococcal polysaccharide vaccine = PPSV23 (Pneumovax) Adults 20-63 years old: 1-3 doses may be recommended based on certain risk factors  Adults 72 years old: 1-2 doses may be recommended based off what pneumonia vaccine you previously received   Hepatitis B Vaccine 3 dose series if at intermediate or high risk (ex: diabetes, end stage renal disease, liver disease)   Tetanus (Td) Vaccine - COST NOT COVERED BY MEDICARE PART B Following completion of primary series, a booster dose should be given every 10 years to maintain immunity against tetanus. Td may also be given as tetanus wound prophylaxis. Tdap Vaccine - COST NOT COVERED BY MEDICARE PART B Recommended at least once for all adults. For pregnant patients, recommended with each pregnancy. Shingles Vaccine (Shingrix) - COST NOT COVERED BY MEDICARE PART B  2 shot series recommended in those aged 48 and above     Health Maintenance Due:      Topic Date Due   • Breast Cancer Screening: Mammogram  08/18/2023   • Colorectal Cancer Screening  06/17/2028   • Hepatitis C Screening  Completed     Immunizations Due:      Topic Date Due   • Pneumococcal Vaccine: 65+ Years (2 - PCV) 11/04/2022   • Influenza Vaccine (1) 09/01/2023     Advance Directives   What are advance directives? Advance directives are legal documents that state your wishes and plans for medical care. These plans are made ahead of time in case you lose your ability to make decisions for yourself. Advance directives can apply to any medical decision, such as the treatments you want, and if you want to donate organs.    What are the types of advance directives? There are many types of advance directives, and each state has rules about how to use them. You may choose a combination of any of the following:  · Living will: This is a written record of the treatment you want. You can also choose which treatments you do not want, which to limit, and which to stop at a certain time. This includes surgery, medicine, IV fluid, and tube feedings. · Durable power of  for healthcare [facility]): This is a written record that states who you want to make healthcare choices for you when you are unable to make them for yourself. This person, called a proxy, is usually a family member or a friend. You may choose more than 1 proxy. · Do not resuscitate (DNR) order:  A DNR order is used in case your heart stops beating or you stop breathing. It is a request not to have certain forms of treatment, such as CPR. A DNR order may be included in other types of advance directives. · Medical directive: This covers the care that you want if you are in a coma, near death, or unable to make decisions for yourself. You can list the treatments you want for each condition. Treatment may include pain medicine, surgery, blood transfusions, dialysis, IV or tube feedings, and a ventilator (breathing machine). · Values history: This document has questions about your views, beliefs, and how you feel and think about life. This information can help others choose the care that you would choose. Why are advance directives important? An advance directive helps you control your care. Although spoken wishes may be used, it is better to have your wishes written down. Spoken wishes can be misunderstood, or not followed. Treatments may be given even if you do not want them. An advance directive may make it easier for your family to make difficult choices about your care.    Weight Management   Why it is important to manage your weight:  Being overweight increases your risk of health conditions such as heart disease, high blood pressure, type 2 diabetes, and certain types of cancer. It can also increase your risk for osteoarthritis, sleep apnea, and other respiratory problems. Aim for a slow, steady weight loss. Even a small amount of weight loss can lower your risk of health problems. How to lose weight safely:  A safe and healthy way to lose weight is to eat fewer calories and get regular exercise. You can lose up about 1 pound a week by decreasing the number of calories you eat by 500 calories each day. Healthy meal plan for weight management:  A healthy meal plan includes a variety of foods, contains fewer calories, and helps you stay healthy. A healthy meal plan includes the following:  · Eat whole-grain foods more often. A healthy meal plan should contain fiber. Fiber is the part of grains, fruits, and vegetables that is not broken down by your body. Whole-grain foods are healthy and provide extra fiber in your diet. Some examples of whole-grain foods are whole-wheat breads and pastas, oatmeal, brown rice, and bulgur. · Eat a variety of vegetables every day. Include dark, leafy greens such as spinach, kale, kavon greens, and mustard greens. Eat yellow and orange vegetables such as carrots, sweet potatoes, and winter squash. · Eat a variety of fruits every day. Choose fresh or canned fruit (canned in its own juice or light syrup) instead of juice. Fruit juice has very little or no fiber. · Eat low-fat dairy foods. Drink fat-free (skim) milk or 1% milk. Eat fat-free yogurt and low-fat cottage cheese. Try low-fat cheeses such as mozzarella and other reduced-fat cheeses. · Choose meat and other protein foods that are low in fat. Choose beans or other legumes such as split peas or lentils. Choose fish, skinless poultry (chicken or turkey), or lean cuts of red meat (beef or pork). Before you cook meat or poultry, cut off any visible fat. · Use less fat and oil.   Try baking foods instead of frying them. Add less fat, such as margarine, sour cream, regular salad dressing and mayonnaise to foods. Eat fewer high-fat foods. Some examples of high-fat foods include french fries, doughnuts, ice cream, and cakes. · Eat fewer sweets. Limit foods and drinks that are high in sugar. This includes candy, cookies, regular soda, and sweetened drinks. Exercise:  Exercise at least 30 minutes per day on most days of the week. Some examples of exercise include walking, biking, dancing, and swimming. You can also fit in more physical activity by taking the stairs instead of the elevator or parking farther away from stores. Ask your healthcare provider about the best exercise plan for you. © Copyright [address] 2018 Information is for End User's use only and may not be sold, redistributed or otherwise used for commercial purposes.  All illustrations and images included in CareNotes® are the copyrighted property of [employer] or [address] [place] [address]

## 2023-07-21 NOTE — PROGRESS NOTES
Assessment and Plan:     Problem List Items Addressed This Visit        Musculoskeletal and Integument    Age-related osteoporosis without current pathological fracture       Other    Systemic lupus erythematosus (HCC)    Sjogren's syndrome (HCC)    Vitamin D deficiency    Relevant Medications    cyanocobalamin injection 1,000 mcg (Start on 7/21/2023 11:00 AM)    Iron deficiency anemia following bariatric surgery    Relevant Medications    cyanocobalamin injection 1,000 mcg (Start on 7/21/2023 11:00 AM)    Monoclonal B-cell lymphocytosis - Primary    Relevant Medications    cyanocobalamin injection 1,000 mcg (Start on 7/21/2023 11:00 AM)   Other Visit Diagnoses     Chronic UTI        Relevant Medications    amoxicillin (AMOXIL) 500 mg capsule    Other Relevant Orders    Urine culture    BMI 29.0-29.9,adult               Preventive health issues were discussed with patient, and age appropriate screening tests were ordered as noted in patient's After Visit Summary. Personalized health advice and appropriate referrals for health education or preventive services given if needed, as noted in patient's After Visit Summary.      History of Present Illness:     Patient presents for a Medicare Wellness Visit    HPI   Getting dental work done   Needs all bottoms done   26k   Cant have dentures due to sjogrens     dexa with op -3.3 right forearm  seeing rheum on reclast     Betty Enriquez recently did see Dr. Zachary Christianson and and he believes that most of the lymphocytosis is secondary to an autoimmune process with a small percentage representing MBL although there did seem to be an inconsistency with the phenotype of her peripheral lymphocytosis compared to that of the left lymph node biopsy due to that he would like to see a axillary lymph node excisional biopsy also since it has grown in size she does also still have iron deficiency anemia secondary to bariatric surgery in which case we will send her for iron infusions along with B12 shots      Standing order for urine culture         Patient Care Team:  Eduardo Gonzalze MD as PCP - General (Family Medicine)  Eduardo Gonzalez MD as PCP - [address] [place] [address] (RTE)  Pamla Enter, MD Valentino Briar, MD Carles Furbish, MD Glendell Fitz, PA-C Arlean Edwards, MD Buford Corwin, MD     Review of Systems:     Review of Systems   Constitutional: Positive for fatigue. Negative for fever and unexpected weight change. HENT: Negative for nosebleeds and trouble swallowing. Eyes: Negative for visual disturbance. Respiratory: Negative for chest tightness and shortness of breath. Cardiovascular: Negative for chest pain, palpitations and leg swelling. Gastrointestinal: Negative for abdominal pain, constipation, diarrhea and nausea. Endocrine: Negative for cold intolerance. Genitourinary: Negative for dysuria and urgency. Musculoskeletal: Positive for arthralgias and myalgias. Negative for joint swelling. Skin: Negative for rash. Neurological: Negative for tremors, seizures and syncope. Hematological: Does not bruise/bleed easily. Psychiatric/Behavioral: Negative for hallucinations and suicidal ideas.         Problem List:     Patient Active Problem List   Diagnosis   • Bipolar I disorder, single manic episode ([address])   • Hypothyroidism   • Change in bowel habits   • Systemic lupus erythematosus (HCC)   • Acid reflux disease   • Anemia   • Depression   • Generalized osteoarthritis   • Heart burn   • Obesity   • Periorbital edema   • Polyarthralgia   • Postgastrectomy malabsorption   • Sjogren's syndrome ([address])   • Vaginal atrophy   • Vitamin D deficiency   • Closed fracture of base of fifth metatarsal bone of left foot   • Lymphadenopathy   • Iron deficiency anemia following bariatric surgery   • On belimumab therapy   • S/P gastric bypass   • Overweight   • Encounter for surgical aftercare following surgery of digestive system   • H/O bariatric surgery - bypass   • Petechial rash   • Stage 3b chronic kidney disease (HCC)   • Age-related osteoporosis without current pathological fracture   • Recurrent UTI   • Monoclonal B-cell lymphocytosis      Past Medical and Surgical History:     Past Medical History:   Diagnosis Date   • Acute kidney injury superimposed on chronic kidney disease ([address]) 09/20/2021   • Age-related osteoporosis without current pathological fracture 06/01/2023   • Allergic rhinitis    • Anemia    • Bipolar 1 disorder ([address])    • Constipation    • Deaf     Pt lost all hearing in right ear after having [place] measles   • Diarrhea    • Fatigue    • GERD (gastroesophageal reflux disease) 08/01/2020   • Hard of hearing     Pt wears a hearing in left ear. • HL (hearing loss)    • Hypothyroidism    • Lung nodule    • Nasal congestion    • Pneumonia 02/10/2017   • Pneumonia due to Streptococcus ([address]) 09/19/2021   • Psychiatric disorder    • Sjogren's syndrome (HCC)    • Systemic lupus erythematosus (HCC)    • Wears glasses    • Wears partial dentures      Past Surgical History:   Procedure Laterality Date   • BREAST BIOPSY Right    • COLONOSCOPY     • EYE SURGERY     • FRACTURE SURGERY Right     elbow   • GASTRIC BYPASS     • HYSTERECTOMY Bilateral 1975   • IR BIOPSY LYMPH NODE  10/08/2020   • LUNG BIOPSY     • LYMPH NODE BIOPSY  09/18/2020   •  [address] INCL FLUOR GDNCE DX W/CELL WASHG SPX N/A 12/08/2017    Procedure: BRONCHOSCOPY;  Surgeon: Lorena Ribera MD;  Location: [facility];   Service: Pulmonary   • IL BX/EXC LYMPH NODE NEEDLE SUPERFICIAL Left 02/25/2021    Procedure: EXCISION BIOPSY LYMPH NODE: left axillary lymph node biopsy;  Surgeon: Jarvis Prajapati MD;  Location: [facility];  Service: Thoracic   • TONSILLECTOMY        Family History:     Family History   Problem Relation Age of Onset   • Heart disease Mother    • Diabetes Mother    • Kidney cancer Mother    • Cancer Mother         Kidney   • Hypertension Mother    • Heart disease Father    • Stroke Father    • Diabetes Father    • Cancer Father         Rectal Cancer   • Hypertension Father    • No Known Problems Maternal Grandmother    • No Known Problems Maternal Grandfather    • No Known Problems Paternal Grandmother    • No Known Problems Paternal Grandfather    • Leukemia Half-Sister 48   • No Known Problems Half-Sister    • No Known Problems Half-Sister    • No Known Problems Half-Sister    • BRCA2 Positive Neg Hx    • BRCA2 Negative Neg Hx    • BRCA1 Negative Neg Hx    • Endometrial cancer Neg Hx    • Breast cancer Neg Hx    • Breast cancer additional onset Neg Hx    • Colon cancer Neg Hx    • Ovarian cancer Neg Hx    • BRCA1 Positive Neg Hx    • BRCA 1/2 Neg Hx       Social History:     Social History     Socioeconomic History   • Marital status: /Civil Union     Spouse name: None   • Number of children: None   • Years of education: None   • Highest education level: None   Occupational History   • None   Tobacco Use   • Smoking status: Never   • Smokeless tobacco: Never   Vaping Use   • Vaping Use: Never used   Substance and Sexual Activity   • Alcohol use: No   • Drug use: No   • Sexual activity: Not Currently     Partners: Male     Birth control/protection: Female Sterilization     Comment:    Other Topics Concern   • None   Social History Narrative   • None     Social Determinants of Health     Financial Resource Strain: Low Risk  (7/14/2023)    Overall Financial Resource Strain (CARDIA)    • Difficulty of Paying Living Expenses: Not very hard   Food Insecurity: Not on file   Transportation Needs: No Transportation Needs (7/14/2023)    PRAPARE - Transportation    • Lack of Transportation (Medical): No    • Lack of Transportation (Non-Medical):  No   Physical Activity: Not on file   Stress: Not on file   Social Connections: Not on file   Intimate Partner Violence: Not on file   Housing Stability: Not on file      Medications and Allergies:     Current Outpatient Medications   Medication Sig Dispense Refill   • acetaminophen (TYLENOL) 325 mg tablet Take 2 tablets (650 mg total) by mouth every 6 (six) hours as needed for mild pain  0   • amoxicillin (AMOXIL) 500 mg capsule      • buPROPion (WELLBUTRIN) 100 mg tablet Take 100 mg by mouth 3 (three) times a day. • calcium citrate-vitamin D (CITRACAL+D) 315-200 MG-UNIT per tablet Take 1 tablet by mouth daily. • chlorhexidine (PERIDEX) 0.12 % solution once a week     • Cholecalciferol (Dialyvite Vitamin D 5000) 125 MCG (5000 UT) capsule Take 1 capsule (5,000 Units total) by mouth 2 (two) times a day 180 capsule 0   • cycloSPORINE (RESTASIS) 0.05 % ophthalmic emulsion instill 1 drop into both eyes twice a day     • ferrous sulfate 325 (65 Fe) mg tablet Take 1 tablet (325 mg total) by mouth 2 (two) times a day with meals 60 tablet 0   • HYDROcodone-acetaminophen (NORCO) 5-325 mg per tablet      • LORazepam (ATIVAN) 1 mg tablet if needed     • pilocarpine (SALAGEN) 5 mg tablet Take 1 tablet (5 mg total) by mouth 3 (three) times a day 90 tablet 5   • Polyethylene Glycol 3350 (MIRALAX PO) Take by mouth 1 (one) time 238 gm with dulcolax     • QUEtiapine (SEROquel) 25 mg tablet if needed     • QUEtiapine (SEROquel) 300 mg tablet Take 300 mg by mouth daily at bedtime. • temazepam (RESTORIL) 30 mg capsule Take 2 capsules by mouth daily at bedtime as needed      • hydroxychloroquine (PLAQUENIL) 200 mg tablet Take 1 tablet (200 mg total) by mouth 2 (two) times a day with meals 180 tablet 3     Current Facility-Administered Medications   Medication Dose Route Frequency Provider Last Rate Last Admin   • cyanocobalamin injection 1,000 mcg  1,000 mcg Intramuscular Q30 Days Everette Dykes MD   1,000 mcg at 07/21/23 1052     Allergies   Allergen Reactions   • Ciprofloxacin Hives and Swelling     Pt reports that lips and mouth and face swelled.    • Cymbalta [Duloxetine Hcl] Other (See Comments)     Hallucinations, fatigue, faints   • Nirmatrelvir-Ritonavir Diarrhea, Nausea Only, Other (See Comments), Dizziness and Lightheadedness     "passed out"-nausea   • Percocet [Oxycodone-Acetaminophen] Other (See Comments)     Dry mouth - pt states it kept her up all night. States had to continually drink fluids      Immunizations:     Immunization History   Administered Date(s) Administered   • COVID-19 PFIZER VACCINE 0.3 ML IM 01/09/2021, 01/28/2021, 10/12/2021   • COVID-19 Pfizer Vac BIVALENT Gentry-sucrose 12 Yr+ IM (BOOSTER ONLY) 03/03/2023   • COVID-19 Pfizer vac (Gentry-sucrose, gray cap) 12 yr+ IM 07/15/2022   • INFLUENZA 09/26/2013, 10/12/2017, 09/27/2018, 10/10/2019, 10/13/2022   • Influenza Split High Dose Preservative Free IM 10/10/2019   • Influenza, high dose seasonal 0.7 mL 10/05/2020, 11/18/2021, 10/13/2022   • Pneumococcal Polysaccharide PPV23 11/04/2021   • Zoster 09/26/2013      Health Maintenance:         Topic Date Due   • Breast Cancer Screening: Mammogram  08/18/2023   • Colorectal Cancer Screening  06/17/2028   • Hepatitis C Screening  Completed         Topic Date Due   • Pneumococcal Vaccine: 65+ Years (2 - PCV) 11/04/2022   • Influenza Vaccine (1) 09/01/2023      Medicare Screening Tests and Risk Assessments:     Matthew Donato is here for her Subsequent Wellness visit. Last Medicare Wellness visit information reviewed, patient interviewed and updates made to the record today. Health Risk Assessment:   Patient rates overall health as good. Patient feels that their physical health rating is same. Patient is satisfied with their life. Eyesight was rated as same. Hearing was rated as slightly worse. Patient feels that their emotional and mental health rating is same. Patients states they are never, rarely angry. Patient states they are often unusually tired/fatigued. Pain experienced in the last 7 days has been some. Patient's pain rating has been 3/10. Patient states that she has experienced no weight loss or gain in last 6 months. Depression Screening:   PHQ-9 Score: 0      Fall Risk Screening: In the past year, patient has experienced: no history of falling in past year      Urinary Incontinence Screening:   Patient has not leaked urine accidently in the last six months. Home Safety:  Patient does not have trouble with stairs inside or outside of their home. Patient has working smoke alarms and has working carbon monoxide detector. Home safety hazards include: none. Nutrition:   Current diet is Regular. Medications:   Patient is currently taking over-the-counter supplements. OTC medications include: Multivitamin. Patient is able to manage medications. Activities of Daily Living (ADLs)/Instrumental Activities of Daily Living (IADLs):   Walk and transfer into and out of bed and chair?: Yes  Dress and groom yourself?: Yes    Bathe or shower yourself?: Yes    Feed yourself? Yes  Do your laundry/housekeeping?: Yes  Manage your money, pay your bills and track your expenses?: Yes  Make your own meals?: Yes    Do your own shopping?: Yes    Previous Hospitalizations:   Any hospitalizations or ED visits within the last 12 months?: No      Advance Care Planning:   Living will: Yes    Durable POA for healthcare:  Yes    Advanced directive: Yes      Cognitive Screening:   Provider or family/friend/caregiver concerned regarding cognition?: No    PREVENTIVE SCREENINGS      Cardiovascular Screening:    General: Screening Current      Diabetes Screening:     General: Screening Current      Colorectal Cancer Screening:     General: Screening Current      Breast Cancer Screening:     General: Screening Current      Cervical Cancer Screening:    General: Screening Not Indicated      Osteoporosis Screening:    General: Screening Not Indicated and History Osteoporosis      Abdominal Aortic Aneurysm (AAA) Screening:        General: Screening Not Indicated      Lung Cancer Screening:     General: Screening Not Indicated      Hepatitis C Screening:    General: Screening Current    Screening, Brief Intervention, and Referral to Treatment (SBIRT)    Screening  Typical number of drinks in a day: 0  Typical number of drinks in a week: 0  Interpretation: Low risk drinking behavior. AUDIT-C Screenin) How often did you have a drink containing alcohol in the past year? never  2) How many drinks did you have on a typical day when you were drinking in the past year? 0  3) How often did you have 6 or more drinks on one occasion in the past year? never    AUDIT-C Score: 0  Interpretation: Score 0-2 (female): Negative screen for alcohol misuse    Single Item Drug Screening:  How often have you used an illegal drug (including marijuana) or a prescription medication for non-medical reasons in the past year? never    Single Item Drug Screen Score: 0  Interpretation: Negative screen for possible drug use disorder    Brief Intervention  Alcohol & drug use screenings were reviewed. No concerns regarding substance use disorder identified. No results found. Physical Exam:     /72 (BP Location: Left arm, Patient Position: Sitting, Cuff Size: Standard)   Pulse 64   Resp 16   Ht 5' 0.5" (1.537 m)   Wt 68.9 kg (152 lb)   LMP  (LMP Unknown)   SpO2 98%   BMI 29.20 kg/m²     Physical Exam  Vitals and nursing note reviewed. Constitutional:       Appearance: She is well-developed. HENT:      Head: Normocephalic and atraumatic. Right Ear: External ear normal.      Left Ear: External ear normal.      Nose: Nose normal.   Eyes:      Conjunctiva/sclera: Conjunctivae normal.      Pupils: Pupils are equal, round, and reactive to light. Cardiovascular:      Rate and Rhythm: Normal rate and regular rhythm. Heart sounds: Normal heart sounds. No murmur heard. Pulmonary:      Effort: Pulmonary effort is normal.      Breath sounds: Normal breath sounds. No wheezing.    Abdominal:      General: Bowel sounds are normal.      Palpations: Abdomen is soft.   Musculoskeletal:         General: No tenderness. Normal range of motion. Cervical back: Normal range of motion and neck supple. Lymphadenopathy:      Cervical: No cervical adenopathy. Skin:     General: Skin is warm and dry. Capillary Refill: Capillary refill takes less than 2 seconds. Neurological:      Mental Status: She is alert and oriented to person, place, and time. Psychiatric:         Behavior: Behavior normal.         Thought Content:  Thought content normal.         Judgment: Judgment normal.          Estefany Lira MD

## 2023-07-21 NOTE — TELEPHONE ENCOUNTER
I scheduled pt's infusion for 8/4 @ 1:30 pm I tried to call t her mailbox is full.  The infusion is at [address] [place] [address]

## 2023-07-24 ENCOUNTER — TELEPHONE (OUTPATIENT)
Dept: FAMILY MEDICINE CLINIC | Facility: CLINIC | Age: 73
End: 2023-07-24

## 2023-07-24 NOTE — TELEPHONE ENCOUNTER
----- Message from Anh Cerrato MD sent at 7/20/2023  5:46 AM EDT -----  Ok so Amari Cheng and I spoke and we will order iron infusions for you.  You will get it once a week for 4 weeks then call me   We then space it out for every 2 weeks for a couple visits then retest     Also you need a b12 shot once a month - come to my office for that please   And you need to take vit D supplementation - ill order it for you

## 2023-07-31 DIAGNOSIS — Z12.31 ENCOUNTER FOR SCREENING MAMMOGRAM FOR BREAST CANCER: Primary | ICD-10-CM

## 2023-07-31 DIAGNOSIS — M81.0 AGE-RELATED OSTEOPOROSIS WITHOUT CURRENT PATHOLOGICAL FRACTURE: Primary | ICD-10-CM

## 2023-07-31 RX ORDER — SODIUM CHLORIDE 9 MG/ML
20 INJECTION, SOLUTION INTRAVENOUS ONCE
Status: CANCELLED | OUTPATIENT
Start: 2023-08-04

## 2023-07-31 RX ORDER — ZOLEDRONIC ACID 5 MG/100ML
5 INJECTION, SOLUTION INTRAVENOUS ONCE
Status: CANCELLED | OUTPATIENT
Start: 2023-08-04

## 2023-08-04 ENCOUNTER — APPOINTMENT (OUTPATIENT)
Dept: LAB | Facility: CLINIC | Age: 73
End: 2023-08-04
Payer: COMMERCIAL

## 2023-08-04 ENCOUNTER — HOSPITAL ENCOUNTER (OUTPATIENT)
Dept: INFUSION CENTER | Facility: CLINIC | Age: 73
End: 2023-08-04
Payer: COMMERCIAL

## 2023-08-04 VITALS
SYSTOLIC BLOOD PRESSURE: 118 MMHG | HEART RATE: 66 BPM | RESPIRATION RATE: 18 BRPM | WEIGHT: 153.5 LBS | HEIGHT: 61 IN | DIASTOLIC BLOOD PRESSURE: 80 MMHG | BODY MASS INDEX: 28.98 KG/M2 | TEMPERATURE: 96.3 F | OXYGEN SATURATION: 98 %

## 2023-08-04 DIAGNOSIS — K95.89 IRON DEFICIENCY ANEMIA FOLLOWING BARIATRIC SURGERY: Primary | ICD-10-CM

## 2023-08-04 DIAGNOSIS — D50.8 IRON DEFICIENCY ANEMIA FOLLOWING BARIATRIC SURGERY: Primary | ICD-10-CM

## 2023-08-04 DIAGNOSIS — M81.0 AGE-RELATED OSTEOPOROSIS WITHOUT CURRENT PATHOLOGICAL FRACTURE: ICD-10-CM

## 2023-08-04 LAB
ALBUMIN SERPL BCP-MCNC: 3.6 G/DL (ref 3.5–5)
ALP SERPL-CCNC: 109 U/L (ref 34–104)
ALT SERPL W P-5'-P-CCNC: 17 U/L (ref 7–52)
ANION GAP SERPL CALCULATED.3IONS-SCNC: 5 MMOL/L
AST SERPL W P-5'-P-CCNC: 35 U/L (ref 13–39)
BILIRUB SERPL-MCNC: 0.39 MG/DL (ref 0.2–1)
BUN SERPL-MCNC: 19 MG/DL (ref 5–25)
CALCIUM SERPL-MCNC: 8.7 MG/DL (ref 8.4–10.2)
CHLORIDE SERPL-SCNC: 104 MMOL/L (ref 96–108)
CO2 SERPL-SCNC: 24 MMOL/L (ref 21–32)
CREAT SERPL-MCNC: 0.85 MG/DL (ref 0.6–1.3)
GFR SERPL CREATININE-BSD FRML MDRD: 69 ML/MIN/1.73SQ M
GLUCOSE P FAST SERPL-MCNC: 88 MG/DL (ref 65–99)
POTASSIUM SERPL-SCNC: 5.1 MMOL/L (ref 3.5–5.3)
PROT SERPL-MCNC: 9.4 G/DL (ref 6.4–8.4)
SODIUM SERPL-SCNC: 133 MMOL/L (ref 135–147)

## 2023-08-04 PROCEDURE — 80053 COMPREHEN METABOLIC PANEL: CPT

## 2023-08-04 PROCEDURE — 36415 COLL VENOUS BLD VENIPUNCTURE: CPT

## 2023-08-04 RX ORDER — SODIUM CHLORIDE 9 MG/ML
20 INJECTION, SOLUTION INTRAVENOUS ONCE
Status: CANCELLED | OUTPATIENT
Start: 2023-08-11

## 2023-08-04 RX ORDER — SODIUM CHLORIDE 9 MG/ML
20 INJECTION, SOLUTION INTRAVENOUS ONCE
OUTPATIENT
Start: 2024-07-08

## 2023-08-04 RX ORDER — ZOLEDRONIC ACID 5 MG/100ML
5 INJECTION, SOLUTION INTRAVENOUS ONCE
OUTPATIENT
Start: 2024-07-08

## 2023-08-04 RX ORDER — ZOLEDRONIC ACID 5 MG/100ML
5 INJECTION, SOLUTION INTRAVENOUS ONCE
Status: CANCELLED | OUTPATIENT
Start: 2023-08-04

## 2023-08-04 RX ORDER — SODIUM CHLORIDE 9 MG/ML
20 INJECTION, SOLUTION INTRAVENOUS ONCE
Status: COMPLETED | OUTPATIENT
Start: 2023-08-04 | End: 2023-08-04

## 2023-08-04 RX ADMIN — SODIUM CHLORIDE 200 MG: 9 INJECTION, SOLUTION INTRAVENOUS at 13:53

## 2023-08-04 RX ADMIN — SODIUM CHLORIDE 20 ML/HR: 0.9 INJECTION, SOLUTION INTRAVENOUS at 13:50

## 2023-08-04 NOTE — PROGRESS NOTES
Patient tolerated venofer infusion today without complications. Patient actually not due for reclast until July 2024. Patient had first reclast infusion July 7,2023. Date on plan was accidentally changed to today and never rectified. Patient is aware she has appointment scheduled for reclast next July 2024 already. Patient will schedule next 3 venofer appointments today before leaving. AVS declined.

## 2023-08-04 NOTE — PROGRESS NOTES
Patient arrived for venofer and first dose of reclast. Offers no complaints. Patient had dental implants placed 10 days ago. TigerText sent to Dr. Royal Lazaro for clarification.  Per Dr. Nila Frankel to be delayed for at least 2 months and patient will need clearance from her dentist before proceeding with reclast.

## 2023-08-04 NOTE — PLAN OF CARE
Problem: Potential for Falls  Goal: Patient will remain free of falls  Description: INTERVENTIONS:  - Educate patient/family on patient safety including physical limitations  - Instruct patient to call for assistance with activity   - Consult OT/PT to assist with strengthening/mobility   - Keep Call bell within reach  - Keep bed low and locked with side rails adjusted as appropriate  - Keep care items and personal belongings within reach  - Initiate and maintain comfort rounds  - Make Fall Risk Sign visible to staff  - Apply yellow socks and bracelet for high fall risk patients  - Consider moving patient to room near nurses station  Outcome: Progressing     Problem: SAFETY ADULT  Goal: Patient will remain free of falls  Description: INTERVENTIONS:  - Educate patient/family on patient safety including physical limitations  - Instruct patient to call for assistance with activity   - Consult OT/PT to assist with strengthening/mobility   - Keep Call bell within reach  - Keep bed low and locked with side rails adjusted as appropriate  - Keep care items and personal belongings within reach  - Initiate and maintain comfort rounds  - Make Fall Risk Sign visible to staff  - Apply yellow socks and bracelet for high fall risk patients  - Consider moving patient to room near nurses station  Outcome: Progressing     Problem: Knowledge Deficit  Goal: Patient/family/caregiver demonstrates understanding of disease process, treatment plan, medications, and discharge instructions  Description: Complete learning assessment and assess knowledge base.   Interventions:  - Provide teaching at level of understanding  - Provide teaching via preferred learning methods  Outcome: Progressing

## 2023-08-09 ENCOUNTER — OFFICE VISIT (OUTPATIENT)
Dept: CARDIAC SURGERY | Facility: CLINIC | Age: 73
End: 2023-08-09
Payer: COMMERCIAL

## 2023-08-09 VITALS
OXYGEN SATURATION: 97 % | DIASTOLIC BLOOD PRESSURE: 82 MMHG | BODY MASS INDEX: 28.55 KG/M2 | WEIGHT: 151.24 LBS | HEIGHT: 61 IN | TEMPERATURE: 98.2 F | HEART RATE: 78 BPM | SYSTOLIC BLOOD PRESSURE: 129 MMHG | RESPIRATION RATE: 15 BRPM

## 2023-08-09 DIAGNOSIS — R91.1 PULMONARY NODULE: Primary | ICD-10-CM

## 2023-08-09 DIAGNOSIS — R59.1 LYMPHADENOPATHY: ICD-10-CM

## 2023-08-09 PROCEDURE — 99215 OFFICE O/P EST HI 40 MIN: CPT | Performed by: THORACIC SURGERY (CARDIOTHORACIC VASCULAR SURGERY)

## 2023-08-09 RX ORDER — CEFAZOLIN SODIUM 1 G/50ML
1000 SOLUTION INTRAVENOUS ONCE
OUTPATIENT
Start: 2023-08-09 | End: 2023-08-09

## 2023-08-09 RX ORDER — PHENOL 1.4 %
AEROSOL, SPRAY (ML) MUCOUS MEMBRANE
COMMUNITY
Start: 2023-07-24

## 2023-08-09 RX ORDER — METHOCARBAMOL 750 MG/1
TABLET ORAL
COMMUNITY
Start: 2023-07-31

## 2023-08-09 NOTE — PROGRESS NOTES
Thoracic Consult  Assessment/Plan:    Lymphadenopathy  Ms Adriana Stallworth follows with Dr Anny Liao for atypical lymphoproliferative disorder and has had previous left axillary lymph node excision in February 2021. Her most recent CT scan shows increasing diffuse adenopathy including lymphadenopathy in the mediastinum, supraclavicular, bilateral axillary, pelvic and inguinal regions. Dr. Anny Liao has referred her for tissue biopsy. She has a prominent palpable left axillary nodule that we will plan for excisional biopsy of. Dr Guillermina Jimenez explained the procedure, risks, benefits and alternatives of an excisional biopsy of left axillary lymph node today in the office and consent was signed. She will need some updated lab work. We will plan for this on August 28th. All questions were answered and she is in agreement with this plan. Pulmonary nodule  Ms Turcios's CT scan shows an irregular density in the lingula measuring 1 cm. At this time we will address her lymphadenopathy first. We would recommend following this nodule with a repeat CT scan in 6 months. Diagnoses and all orders for this visit:    Pulmonary nodule    Lymphadenopathy  -     Ambulatory Referral to Thoracic Oncology  -     Protime-INR; Future  -     APTT; Future  -     Type and screen; Future  -     Case request operating room: EXCISION BIOPSY LYMPH NODE LEFT AXILLARY; Standing  -     Case request operating room: EXCISION BIOPSY LYMPH NODE LEFT AXILLARY    Other orders  -     calcium carbonate (Calcium 600) 600 MG tablet  -     Cholecalciferol (D3-50) 1.25 MG (18365 UT) capsule  -     Diet NPO; Sips with meds; Standing  -     Void on call to OR; Standing  -     Insert peripheral IV;  Standing  -     Place sequential compression device; Standing  -     Shower/scrub; Standing  -     ceFAZolin (ANCEF) IVPB (premix in dextrose) 1,000 mg 50 mL          Thoracic History   Diagnosis: Lymphadenopathy, pulmonary nodule     Procedures/Surgeries:    Pathology: Adjuvant Therapy:       Subjective:    Patient ID: Jake Rocha is a 72 y.o. female. ECOG 1    HPI   [facility] is a 72year old female with PMH of 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 with previous left axillary lymph node excision in February 2021 which showed lymph nodes with B cells lacking CD5 expression and expressing clonality by surface light chain expression restriction. GERD, CKD III, Bipolar I, SLE, Sjogren's currently on Plaquenil, polyarthralgia, who was referred to our office by Dr Gian Rosen for evaluation of increasing adenopathy. CT chest on 6/29/2023 was personally reviewed by myself in PACS and revealed bilateral ground glass densities with predominant perihilar and central distribution and scattered lung cysts. There is a nodule density in the left lingular region measuring 1 cm increased from prior. There are additional nodular densities seen abutting the right dome of the diaphragm measuring about 6mm. There is a left upper lobe lung nodule which measures 2.5mm. There is a lower right paratracheal lymph node measuring 13 x 9mm that is stable. There is adenopathy in the lwoer neck including a supraclavicular lymph node on the right measuring 1.8cm. There is also adenopathy in the bilateral axilla, the pelvis, groin. On discussion she has had worsening fatigue and was recently started on B12 and iron infusions. She otherwise feels she is in her normal state of health. She denies chest pain, SOB, cough, recent illness, fevers/chills, night sweats, pruritis.      The following portions of the patient's history were reviewed and updated as appropriate: allergies, current medications, past family history, past medical history, past social history, past surgical history and problem list.    Past Medical History:   Diagnosis Date   • Acute kidney injury superimposed on chronic kidney disease ([address]) 09/20/2021   • Age-related osteoporosis without current pathological fracture 06/01/2023   • Allergic rhinitis    • Anemia    • Bipolar 1 disorder ([address])    • Constipation    • Deaf     Pt lost all hearing in right ear after having [place] measles   • Diarrhea    • Fatigue    • GERD (gastroesophageal reflux disease) 08/01/2020   • Hard of hearing     Pt wears a hearing in left ear. • HL (hearing loss)    • Hypothyroidism    • Lung nodule    • Nasal congestion    • Pneumonia 02/10/2017   • Pneumonia due to Streptococcus ([address]) 09/19/2021   • Psychiatric disorder    • Sjogren's syndrome (HCC)    • Systemic lupus erythematosus (HCC)    • Wears glasses    • Wears partial dentures       Past Surgical History:   Procedure Laterality Date   • BREAST BIOPSY Right    • COLONOSCOPY     • EYE SURGERY     • FRACTURE SURGERY Right     elbow   • GASTRIC BYPASS     • HYSTERECTOMY Bilateral 1975   • IR BIOPSY LYMPH NODE  10/08/2020   • LUNG BIOPSY     • LYMPH NODE BIOPSY  09/18/2020   •  [address] INCL FLUOR GDNCE DX W/CELL WASHG SPX N/A 12/08/2017    Procedure: BRONCHOSCOPY;  Surgeon: Mathew Israel MD;  Location: [facility];   Service: Pulmonary   • IL BX/EXC LYMPH NODE NEEDLE SUPERFICIAL Left 02/25/2021    Procedure: EXCISION BIOPSY LYMPH NODE: left axillary lymph node biopsy;  Surgeon: Vivi Diaz MD;  Location: [facility];  Service: Thoracic   • TONSILLECTOMY        Family History   Problem Relation Age of Onset   • Heart disease Mother    • Diabetes Mother    • Kidney cancer Mother    • Cancer Mother         Kidney   • Hypertension Mother    • Heart disease Father    • Stroke Father    • Diabetes Father    • Cancer Father         Rectal Cancer   • Hypertension Father    • No Known Problems Maternal Grandmother    • No Known Problems Maternal Grandfather    • No Known Problems Paternal Grandmother    • No Known Problems Paternal Grandfather    • Leukemia Half-Sister 48   • No Known Problems Half-Sister    • No Known Problems Half-Sister    • No Known Problems Half-Sister    • BRCA2 Positive Neg Hx    • BRCA2 Negative Neg Hx    • BRCA1 Negative Neg Hx    • Endometrial cancer Neg Hx    • Breast cancer Neg Hx    • Breast cancer additional onset Neg Hx    • Colon cancer Neg Hx    • Ovarian cancer Neg Hx    • BRCA1 Positive Neg Hx    • BRCA 1/2 Neg Hx       Social History     Socioeconomic History   • Marital status: /Civil Union     Spouse name: Not on file   • Number of children: Not on file   • Years of education: Not on file   • Highest education level: Not on file   Occupational History   • Not on file   Tobacco Use   • Smoking status: Never   • Smokeless tobacco: Never   Vaping Use   • Vaping Use: Never used   Substance and Sexual Activity   • Alcohol use: No   • Drug use: No   • Sexual activity: Not Currently     Partners: Male     Birth control/protection: Female Sterilization     Comment:    Other Topics Concern   • Not on file   Social History Narrative   • Not on file     Social Determinants of Health     Financial Resource Strain: Low Risk  (7/14/2023)    Overall Financial Resource Strain (CARDIA)    • Difficulty of Paying Living Expenses: Not very hard   Food Insecurity: Not on file   Transportation Needs: No Transportation Needs (7/14/2023)    PRAPARE - Transportation    • Lack of Transportation (Medical): No    • Lack of Transportation (Non-Medical): No   Physical Activity: Not on file   Stress: Not on file   Social Connections: Not on file   Intimate Partner Violence: Not on file   Housing Stability: Not on file      Review of Systems   Constitutional: Positive for fatigue. Negative for chills, diaphoresis and unexpected weight change. HENT: Negative. Eyes: Negative. Respiratory: Negative for cough, shortness of breath and wheezing. Cardiovascular: Negative for chest pain and leg swelling. Gastrointestinal: Negative for abdominal pain, diarrhea and nausea. Endocrine: Negative. Genitourinary: Negative. Musculoskeletal: Negative.     Skin: Negative. Allergic/Immunologic: Negative. Neurological: Negative. Hematological: Negative for adenopathy. Does not bruise/bleed easily. Psychiatric/Behavioral: Negative. All other systems reviewed and are negative. Objective:   Physical Exam  Vitals reviewed. Constitutional:       General: She is not in acute distress. Appearance: Normal appearance. She is not ill-appearing. HENT:      Head: Normocephalic. Nose: Nose normal.      Mouth/Throat:      Mouth: Mucous membranes are moist.   Eyes:      Pupils: Pupils are equal, round, and reactive to light. Cardiovascular:      Rate and Rhythm: Normal rate and regular rhythm. Heart sounds: Normal heart sounds. Pulmonary:      Effort: Pulmonary effort is normal.      Breath sounds: Normal breath sounds. No wheezing, rhonchi or rales. Chest:      Comments: Almost 4cm left axillary lymph node palpated about 7 o clock in axilla  Abdominal:      Palpations: Abdomen is soft. Musculoskeletal:         General: No swelling. Normal range of motion. Lymphadenopathy:      Cervical: No cervical adenopathy. Upper Body:      Right upper body: Axillary adenopathy present. Left upper body: Axillary adenopathy present. Skin:     General: Skin is warm. Neurological:      General: No focal deficit present. Mental Status: She is alert and oriented to person, place, and time. Psychiatric:         Mood and Affect: Mood normal.     /82 (BP Location: Right arm, Patient Position: Sitting, Cuff Size: Standard)   Pulse 78   Temp 98.2 °F (36.8 °C) (Temporal)   Resp 15   Ht 5' 0.5" (1.537 m)   Wt 68.6 kg (151 lb 3.8 oz)   LMP  (LMP Unknown)   SpO2 97%   BMI 29.05 kg/m²     No Chest XR results available for this patient. CT chest wo contrast    Result Date: 12/22/2022  Narrative CT CHEST WITHOUT IV CONTRAST INDICATION:   R59.1: Generalized enlarged lymph nodes. Known history of CLL and Sjogren's syndrome.  COMPARISON: CT, dated 5/22/2022. TECHNIQUE: CT examination of the chest was performed without intravenous contrast. Axial, sagittal, and coronal 2D reformatted images were created from the source data and submitted for interpretation. Radiation dose length product (DLP) for this visit:  156 mGy-cm . This examination, like all CT scans performed in the [facility], was performed utilizing techniques to minimize radiation dose exposure, including the use of iterative reconstruction and automated exposure control. FINDINGS: LUNGS:  No new pulmonary nodules. Redemonstrated are scattered small pulmonary nodules, stable since 2018, benign. Also redemonstrated is the basilar predominant areas of paramediastinal groundglass opacification with associated multiple cysts without solid components. No evidence of more confluent consolidation. Airways are normal. PLEURA:  Unremarkable. HEART/GREAT VESSELS: Heart is unremarkable for patient's age. No thoracic aortic aneurysm. There is enlargement of the central pulmonary arterial tree suggesting some element of pulmonary artery hypertension. MEDIASTINUM AND KACEY:  Redemonstrated is mediastinal lymph node enlargement. Reference 2R lymph node measures 10 mm in short axis (series 2, image 14), stable. Other lymph nodes are similar in size. CHEST WALL AND LOWER NECK:  Redemonstrated is bilateral axillary lymph node enlargement. Reference right axillary lymph node now measures 12 mm (series 2, image 15), previously 16 mm in short axis. Additional reference left axillary lymph node now measures 10 mm (series 2, image 14), previously 15 mm. VISUALIZED STRUCTURES IN THE UPPER ABDOMEN:  Postsurgical changes of gastric bypass. OSSEOUS STRUCTURES:  T7 hemangioma. Impression 1. Decrease in size of bilateral axillary lymph nodes, consistent with response to treatment. 2.  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  3.  Mediastinal lymph nodes are similar in size, either attributable to known CLL or LIP. Workstation performed: [id number]     CT chest abdomen pelvis wo contrast    Result Date: 7/6/2023  Narrative CT CHEST, ABDOMEN AND PELVIS WITHOUT IV CONTRAST INDICATION:   R59.1: Generalized enlarged lymph nodes. COMPARISON: Previous study from December 16, 2022, previous study from December 4, 2018 TECHNIQUE: CT examination of the chest, abdomen and pelvis was performed without intravenous contrast. Multiplanar 2D reformatted images were created from the source data. This examination, like all CT scans performed in the [address] [place] [address], was performed utilizing techniques to minimize radiation dose exposure, including the use of iterative reconstruction and automated exposure control. Radiation dose length product (DLP) for this visit:  536 mGy-cm Enteric contrast was administered. FINDINGS: CHEST LUNGS: Again noted are areas of groundglass density in the both lungs with predominant perihilar and central distribution. Again noted are scattered lung cysts There is a nodular density left lingular region which measures about 1 cm, larger from the previous study. This is seen in image 107 series 4 Additional nodular density seen abutting the right dome of diaphragm, measuring about 6 mm Scattered lung granuloma are seen there are additional not measurable lung nodules as seen on the previous study There is a left upper lobe lung nodule which measures 2.5 mm PLEURA: No pleural effusion seen No pneumothorax seen HEART/GREAT VESSELS: Heart is unremarkable for patient's age. No thoracic aortic aneurysm.  MEDIASTINUM AND KACEY: Lower right paratracheal lymph node are seen which measure about 1.3 cm x 0.9 cm, stable Small lower left paratracheal lymph nodes are seen, unchanged Subcarinal lymph nodes are seen measuring about 1.1 cm x 2 cm, stable CHEST WALL AND LOWER NECK: Bilateral axillary lymph nodes are noted There is a lymph node in the right axilla, image 58 series 601, measuring 2.9 x 1.6 x 2.8 cm. Previously measuring 1.9 x 1.2 x 1.6 cm, larger Additional lymph node in the right axilla measures 3.9 x 2.1 x 3.4 cm, previously measuring 3.5 x 1.7 x 2.5 cm Enlarged left axillary lymph nodes are seen. Level 2 marcin left axillary lymph node measures 2.4 x 2.2 x 1.7 cm, previously measuring 1.6 x 1.9 x 1.4 cm Lymph nodes are also seen in the bilateral lower neck with largest right supraclavicular lymph node measuring 1.8 x 1.2 cm ABDOMEN LIVER/BILIARY TREE: No hepatomegaly seen GALLBLADDER:  No calcified gallstones. No pericholecystic inflammatory change. SPLEEN: The spleen measures 11.6 cm, stable in size PANCREAS:  Unremarkable. ADRENAL GLANDS:  Unremarkable. 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 Mild retrograde telescoping of the bowel loops at the JJ anastomosis without bowel obstruction APPENDIX:  No findings to suggest appendicitis. ABDOMINOPELVIC CAVITY: There are bilateral pelvic sidewall lymph nodes. The largest left obturator lymph node, measures 2.7 x 1.2 cm x 2.4 cm, previously measuring about 3.2 x 1.4 x 2.9 cm. Right obturator lymph node measures 1.5 x 1 x 1.4 cm. Previously measuring 1.7 x 0.8 x 1.6 cm Left common iliac lymph nodes are seen the largest left common iliac lymph node measures about 1 cm x 1.5 cm, previously measuring 1.89 x 1.8 cm Right common iliac lymph node measuring about 1 cm x 0.9 cm, previously measuring 1 x 0.8 cm Small para-aortic and aortocaval lymph nodes are seen The largest aortocaval lymph node measures 1.2 x 1.1 x 1.8 cm. Previously this lymph node is measured at 0.8 x 0.9 x 1.3 cm Enlarged right cardiophrenic angle region lymph nodes are seen, measuring 3.5 x 2.2 x 1.3 cm  previously measuring 2.6 x 1.9 cm x 1 cm. These are not visible on the previous study of 2018, mildly larger from the previous study in December 16, 2022 VESSELS:  Unremarkable for patient's age. PELVIS REPRODUCTIVE ORGANS:  Unremarkable for patient's age. URINARY BLADDER:  Unremarkable. ABDOMINAL WALL/INGUINAL REGIONS: Bilateral inguinal region lymph nodes are seen, mildly larger and no largest right inguinal region lymph node, measures 2.3 x 1.1 x 0.9 cm, previously measuring 1.5 x 1.1 x 0.6 cm. Mildly enlarged right external iliac lymph nodes are seen and mildly enlarged left external iliac lymph nodes are seen OSSEOUS STRUCTURES: No new lytic destructive lesion seen Vertebral hemangioma in the T7 vertebral     Impression There is mild enlargement of the axillary lymph nodes and mild enlargement of the aortocaval, and inguinal region lymph nodes as compared to the previous study from December 4, 2018 and December 16, 2022 The largest lymph node is seen in the right cardiophrenic angle region measuring 3.5 x 2.2 x 1.3 cm, this lymph node is not measurable in 2018 and measured about 2.6 x 1.9 x 1 cm in CT of December 16, 2022, PET scan be helpful in evaluating this abnormality further There is no new organomegaly, stable splenic size with a longitudinal craniocaudal length of 11.64 cm No new lytic lesion in the visualized bony skeleton Groundglass density in the both lungs with associated lung cyst Irregular nodular density left lingular region measuring about 1.3 cm, consider PET scan The study was marked in EPIC for significant notification. Workstation performed: [id number]      No NM PET CT results available for this patient. No Barium Swallow results available for this patient.

## 2023-08-09 NOTE — H&P (VIEW-ONLY)
Thoracic Consult  Assessment/Plan:    Lymphadenopathy  Ms Alexi Lofton follows with Dr Leslie Vee for atypical lymphoproliferative disorder and has had previous left axillary lymph node excision in February 2021. Her most recent CT scan shows increasing diffuse adenopathy including lymphadenopathy in the mediastinum, supraclavicular, bilateral axillary, pelvic and inguinal regions. Dr. Leslie Vee has referred her for tissue biopsy. She has a prominent palpable left axillary nodule that we will plan for excisional biopsy of. Dr Sofiya Melendrez explained the procedure, risks, benefits and alternatives of an excisional biopsy of left axillary lymph node today in the office and consent was signed. She will need some updated lab work. We will plan for this on August 28th. All questions were answered and she is in agreement with this plan. Pulmonary nodule  Ms Turcios's CT scan shows an irregular density in the lingula measuring 1 cm. At this time we will address her lymphadenopathy first. We would recommend following this nodule with a repeat CT scan in 6 months. Diagnoses and all orders for this visit:    Pulmonary nodule    Lymphadenopathy  -     Ambulatory Referral to Thoracic Oncology  -     Protime-INR; Future  -     APTT; Future  -     Type and screen; Future  -     Case request operating room: EXCISION BIOPSY LYMPH NODE LEFT AXILLARY; Standing  -     Case request operating room: EXCISION BIOPSY LYMPH NODE LEFT AXILLARY    Other orders  -     calcium carbonate (Calcium 600) 600 MG tablet  -     Cholecalciferol (D3-50) 1.25 MG (98645 UT) capsule  -     Diet NPO; Sips with meds; Standing  -     Void on call to OR; Standing  -     Insert peripheral IV;  Standing  -     Place sequential compression device; Standing  -     Shower/scrub; Standing  -     ceFAZolin (ANCEF) IVPB (premix in dextrose) 1,000 mg 50 mL          Thoracic History   Diagnosis: Lymphadenopathy, pulmonary nodule     Procedures/Surgeries:    Pathology: Adjuvant Therapy:       Subjective:    Patient ID: Sandrine De Los Santos is a 72 y.o. female. ECOG 1    HPI   Ms Aliyah Berger is a 72year old female with PMH of 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 with previous left axillary lymph node excision in February 2021 which showed lymph nodes with B cells lacking CD5 expression and expressing clonality by surface light chain expression restriction. GERD, CKD III, Bipolar I, SLE, Sjogren's currently on Plaquenil, polyarthralgia, who was referred to our office by Dr Guido Villanueva for evaluation of increasing adenopathy. CT chest on 6/29/2023 was personally reviewed by myself in PACS and revealed bilateral ground glass densities with predominant perihilar and central distribution and scattered lung cysts. There is a nodule density in the left lingular region measuring 1 cm increased from prior. There are additional nodular densities seen abutting the right dome of the diaphragm measuring about 6mm. There is a left upper lobe lung nodule which measures 2.5mm. There is a lower right paratracheal lymph node measuring 13 x 9mm that is stable. There is adenopathy in the lwoer neck including a supraclavicular lymph node on the right measuring 1.8cm. There is also adenopathy in the bilateral axilla, the pelvis, groin. On discussion she has had worsening fatigue and was recently started on B12 and iron infusions. She otherwise feels she is in her normal state of health. She denies chest pain, SOB, cough, recent illness, fevers/chills, night sweats, pruritis.      The following portions of the patient's history were reviewed and updated as appropriate: allergies, current medications, past family history, past medical history, past social history, past surgical history and problem list.    Past Medical History:   Diagnosis Date   • Acute kidney injury superimposed on chronic kidney disease ([address]) 09/20/2021   • Age-related osteoporosis without current pathological fracture 06/01/2023   • Allergic rhinitis    • Anemia    • Bipolar 1 disorder ([address])    • Constipation    • Deaf     Pt lost all hearing in right ear after having [place] measles   • Diarrhea    • Fatigue    • GERD (gastroesophageal reflux disease) 08/01/2020   • Hard of hearing     Pt wears a hearing in left ear. • HL (hearing loss)    • Hypothyroidism    • Lung nodule    • Nasal congestion    • Pneumonia 02/10/2017   • Pneumonia due to Streptococcus ([address]) 09/19/2021   • Psychiatric disorder    • Sjogren's syndrome (HCC)    • Systemic lupus erythematosus (HCC)    • Wears glasses    • Wears partial dentures       Past Surgical History:   Procedure Laterality Date   • BREAST BIOPSY Right    • COLONOSCOPY     • EYE SURGERY     • FRACTURE SURGERY Right     elbow   • GASTRIC BYPASS     • HYSTERECTOMY Bilateral 1975   • IR BIOPSY LYMPH NODE  10/08/2020   • LUNG BIOPSY     • LYMPH NODE BIOPSY  09/18/2020   •  [address] INCL FLUOR GDNCE DX W/CELL WASHG SPX N/A 12/08/2017    Procedure: BRONCHOSCOPY;  Surgeon: Ray May MD;  Location: [facility];   Service: Pulmonary   • ND BX/EXC LYMPH NODE NEEDLE SUPERFICIAL Left 02/25/2021    Procedure: EXCISION BIOPSY LYMPH NODE: left axillary lymph node biopsy;  Surgeon: Andrez Horn MD;  Location: [facility];  Service: Thoracic   • TONSILLECTOMY        Family History   Problem Relation Age of Onset   • Heart disease Mother    • Diabetes Mother    • Kidney cancer Mother    • Cancer Mother         Kidney   • Hypertension Mother    • Heart disease Father    • Stroke Father    • Diabetes Father    • Cancer Father         Rectal Cancer   • Hypertension Father    • No Known Problems Maternal Grandmother    • No Known Problems Maternal Grandfather    • No Known Problems Paternal Grandmother    • No Known Problems Paternal Grandfather    • Leukemia Half-Sister 48   • No Known Problems Half-Sister    • No Known Problems Half-Sister    • No Known Problems Half-Sister    • BRCA2 Positive Neg Hx    • BRCA2 Negative Neg Hx    • BRCA1 Negative Neg Hx    • Endometrial cancer Neg Hx    • Breast cancer Neg Hx    • Breast cancer additional onset Neg Hx    • Colon cancer Neg Hx    • Ovarian cancer Neg Hx    • BRCA1 Positive Neg Hx    • BRCA 1/2 Neg Hx       Social History     Socioeconomic History   • Marital status: /Civil Union     Spouse name: Not on file   • Number of children: Not on file   • Years of education: Not on file   • Highest education level: Not on file   Occupational History   • Not on file   Tobacco Use   • Smoking status: Never   • Smokeless tobacco: Never   Vaping Use   • Vaping Use: Never used   Substance and Sexual Activity   • Alcohol use: No   • Drug use: No   • Sexual activity: Not Currently     Partners: Male     Birth control/protection: Female Sterilization     Comment:    Other Topics Concern   • Not on file   Social History Narrative   • Not on file     Social Determinants of Health     Financial Resource Strain: Low Risk  (7/14/2023)    Overall Financial Resource Strain (CARDIA)    • Difficulty of Paying Living Expenses: Not very hard   Food Insecurity: Not on file   Transportation Needs: No Transportation Needs (7/14/2023)    PRAPARE - Transportation    • Lack of Transportation (Medical): No    • Lack of Transportation (Non-Medical): No   Physical Activity: Not on file   Stress: Not on file   Social Connections: Not on file   Intimate Partner Violence: Not on file   Housing Stability: Not on file      Review of Systems   Constitutional: Positive for fatigue. Negative for chills, diaphoresis and unexpected weight change. HENT: Negative. Eyes: Negative. Respiratory: Negative for cough, shortness of breath and wheezing. Cardiovascular: Negative for chest pain and leg swelling. Gastrointestinal: Negative for abdominal pain, diarrhea and nausea. Endocrine: Negative. Genitourinary: Negative. Musculoskeletal: Negative.     Skin: Negative. Allergic/Immunologic: Negative. Neurological: Negative. Hematological: Negative for adenopathy. Does not bruise/bleed easily. Psychiatric/Behavioral: Negative. All other systems reviewed and are negative. Objective:   Physical Exam  Vitals reviewed. Constitutional:       General: She is not in acute distress. Appearance: Normal appearance. She is not ill-appearing. HENT:      Head: Normocephalic. Nose: Nose normal.      Mouth/Throat:      Mouth: Mucous membranes are moist.   Eyes:      Pupils: Pupils are equal, round, and reactive to light. Cardiovascular:      Rate and Rhythm: Normal rate and regular rhythm. Heart sounds: Normal heart sounds. Pulmonary:      Effort: Pulmonary effort is normal.      Breath sounds: Normal breath sounds. No wheezing, rhonchi or rales. Chest:      Comments: Almost 4cm left axillary lymph node palpated about 7 o clock in axilla  Abdominal:      Palpations: Abdomen is soft. Musculoskeletal:         General: No swelling. Normal range of motion. Lymphadenopathy:      Cervical: No cervical adenopathy. Upper Body:      Right upper body: Axillary adenopathy present. Left upper body: Axillary adenopathy present. Skin:     General: Skin is warm. Neurological:      General: No focal deficit present. Mental Status: She is alert and oriented to person, place, and time. Psychiatric:         Mood and Affect: Mood normal.     /82 (BP Location: Right arm, Patient Position: Sitting, Cuff Size: Standard)   Pulse 78   Temp 98.2 °F (36.8 °C) (Temporal)   Resp 15   Ht 5' 0.5" (1.537 m)   Wt 68.6 kg (151 lb 3.8 oz)   LMP  (LMP Unknown)   SpO2 97%   BMI 29.05 kg/m²     No Chest XR results available for this patient. CT chest wo contrast    Result Date: 12/22/2022  Narrative CT CHEST WITHOUT IV CONTRAST INDICATION:   R59.1: Generalized enlarged lymph nodes. Known history of CLL and Sjogren's syndrome.  COMPARISON: CT, dated 5/22/2022. TECHNIQUE: CT examination of the chest was performed without intravenous contrast. Axial, sagittal, and coronal 2D reformatted images were created from the source data and submitted for interpretation. Radiation dose length product (DLP) for this visit:  156 mGy-cm . This examination, like all CT scans performed in the [facility], was performed utilizing techniques to minimize radiation dose exposure, including the use of iterative reconstruction and automated exposure control. FINDINGS: LUNGS:  No new pulmonary nodules. Redemonstrated are scattered small pulmonary nodules, stable since 2018, benign. Also redemonstrated is the basilar predominant areas of paramediastinal groundglass opacification with associated multiple cysts without solid components. No evidence of more confluent consolidation. Airways are normal. PLEURA:  Unremarkable. HEART/GREAT VESSELS: Heart is unremarkable for patient's age. No thoracic aortic aneurysm. There is enlargement of the central pulmonary arterial tree suggesting some element of pulmonary artery hypertension. MEDIASTINUM AND KACEY:  Redemonstrated is mediastinal lymph node enlargement. Reference 2R lymph node measures 10 mm in short axis (series 2, image 14), stable. Other lymph nodes are similar in size. CHEST WALL AND LOWER NECK:  Redemonstrated is bilateral axillary lymph node enlargement. Reference right axillary lymph node now measures 12 mm (series 2, image 15), previously 16 mm in short axis. Additional reference left axillary lymph node now measures 10 mm (series 2, image 14), previously 15 mm. VISUALIZED STRUCTURES IN THE UPPER ABDOMEN:  Postsurgical changes of gastric bypass. OSSEOUS STRUCTURES:  T7 hemangioma. Impression 1. Decrease in size of bilateral axillary lymph nodes, consistent with response to treatment. 2.  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  3.  Mediastinal lymph nodes are similar in size, either attributable to known CLL or LIP. Workstation performed: [id number]     CT chest abdomen pelvis wo contrast    Result Date: 7/6/2023  Narrative CT CHEST, ABDOMEN AND PELVIS WITHOUT IV CONTRAST INDICATION:   R59.1: Generalized enlarged lymph nodes. COMPARISON: Previous study from December 16, 2022, previous study from December 4, 2018 TECHNIQUE: CT examination of the chest, abdomen and pelvis was performed without intravenous contrast. Multiplanar 2D reformatted images were created from the source data. This examination, like all CT scans performed in the [facility], was performed utilizing techniques to minimize radiation dose exposure, including the use of iterative reconstruction and automated exposure control. Radiation dose length product (DLP) for this visit:  536 mGy-cm Enteric contrast was administered. FINDINGS: CHEST LUNGS: Again noted are areas of groundglass density in the both lungs with predominant perihilar and central distribution. Again noted are scattered lung cysts There is a nodular density left lingular region which measures about 1 cm, larger from the previous study. This is seen in image 107 series 4 Additional nodular density seen abutting the right dome of diaphragm, measuring about 6 mm Scattered lung granuloma are seen there are additional not measurable lung nodules as seen on the previous study There is a left upper lobe lung nodule which measures 2.5 mm PLEURA: No pleural effusion seen No pneumothorax seen HEART/GREAT VESSELS: Heart is unremarkable for patient's age. No thoracic aortic aneurysm.  MEDIASTINUM AND KACEY: Lower right paratracheal lymph node are seen which measure about 1.3 cm x 0.9 cm, stable Small lower left paratracheal lymph nodes are seen, unchanged Subcarinal lymph nodes are seen measuring about 1.1 cm x 2 cm, stable CHEST WALL AND LOWER NECK: Bilateral axillary lymph nodes are noted There is a lymph node in the right axilla, image 58 series 601, measuring 2.9 x 1.6 x 2.8 cm. Previously measuring 1.9 x 1.2 x 1.6 cm, larger Additional lymph node in the right axilla measures 3.9 x 2.1 x 3.4 cm, previously measuring 3.5 x 1.7 x 2.5 cm Enlarged left axillary lymph nodes are seen. Level 2 marcin left axillary lymph node measures 2.4 x 2.2 x 1.7 cm, previously measuring 1.6 x 1.9 x 1.4 cm Lymph nodes are also seen in the bilateral lower neck with largest right supraclavicular lymph node measuring 1.8 x 1.2 cm ABDOMEN LIVER/BILIARY TREE: No hepatomegaly seen GALLBLADDER:  No calcified gallstones. No pericholecystic inflammatory change. SPLEEN: The spleen measures 11.6 cm, stable in size PANCREAS:  Unremarkable. ADRENAL GLANDS:  Unremarkable. 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 Mild retrograde telescoping of the bowel loops at the JJ anastomosis without bowel obstruction APPENDIX:  No findings to suggest appendicitis. ABDOMINOPELVIC CAVITY: There are bilateral pelvic sidewall lymph nodes. The largest left obturator lymph node, measures 2.7 x 1.2 cm x 2.4 cm, previously measuring about 3.2 x 1.4 x 2.9 cm. Right obturator lymph node measures 1.5 x 1 x 1.4 cm. Previously measuring 1.7 x 0.8 x 1.6 cm Left common iliac lymph nodes are seen the largest left common iliac lymph node measures about 1 cm x 1.5 cm, previously measuring 1.89 x 1.8 cm Right common iliac lymph node measuring about 1 cm x 0.9 cm, previously measuring 1 x 0.8 cm Small para-aortic and aortocaval lymph nodes are seen The largest aortocaval lymph node measures 1.2 x 1.1 x 1.8 cm. Previously this lymph node is measured at 0.8 x 0.9 x 1.3 cm Enlarged right cardiophrenic angle region lymph nodes are seen, measuring 3.5 x 2.2 x 1.3 cm  previously measuring 2.6 x 1.9 cm x 1 cm. These are not visible on the previous study of 2018, mildly larger from the previous study in December 16, 2022 VESSELS:  Unremarkable for patient's age. PELVIS REPRODUCTIVE ORGANS:  Unremarkable for patient's age. URINARY BLADDER:  Unremarkable. ABDOMINAL WALL/INGUINAL REGIONS: Bilateral inguinal region lymph nodes are seen, mildly larger and no largest right inguinal region lymph node, measures 2.3 x 1.1 x 0.9 cm, previously measuring 1.5 x 1.1 x 0.6 cm. Mildly enlarged right external iliac lymph nodes are seen and mildly enlarged left external iliac lymph nodes are seen OSSEOUS STRUCTURES: No new lytic destructive lesion seen Vertebral hemangioma in the T7 vertebral     Impression There is mild enlargement of the axillary lymph nodes and mild enlargement of the aortocaval, and inguinal region lymph nodes as compared to the previous study from December 4, 2018 and December 16, 2022 The largest lymph node is seen in the right cardiophrenic angle region measuring 3.5 x 2.2 x 1.3 cm, this lymph node is not measurable in 2018 and measured about 2.6 x 1.9 x 1 cm in CT of December 16, 2022, PET scan be helpful in evaluating this abnormality further There is no new organomegaly, stable splenic size with a longitudinal craniocaudal length of 11.64 cm No new lytic lesion in the visualized bony skeleton Groundglass density in the both lungs with associated lung cyst Irregular nodular density left lingular region measuring about 1.3 cm, consider PET scan The study was marked in EPIC for significant notification. Workstation performed: [id number]      No NM PET CT results available for this patient. No Barium Swallow results available for this patient.

## 2023-08-09 NOTE — ASSESSMENT & PLAN NOTE
Ms Becky Liu follows with Dr Jazmyne Clay for atypical lymphoproliferative disorder and has had previous left axillary lymph node excision in February 2021. Her most recent CT scan shows increasing diffuse adenopathy including lymphadenopathy in the mediastinum, supraclavicular, bilateral axillary, pelvic and inguinal regions. Dr. Jazmyne Clay has referred her for tissue biopsy. She has a prominent palpable left axillary nodule that we will plan for excisional biopsy of. Dr Rose Borja explained the procedure, risks, benefits and alternatives of an excisional biopsy of left axillary lymph node today in the office and consent was signed. She will need some updated lab work. We will plan for this on August 28th. All questions were answered and she is in agreement with this plan.

## 2023-08-09 NOTE — ASSESSMENT & PLAN NOTE
Ms Turcios's CT scan shows an irregular density in the lingula measuring 1 cm. At this time we will address her lymphadenopathy first. We would recommend following this nodule with a repeat CT scan in 6 months.

## 2023-08-09 NOTE — LETTER
August 9, 2023     Radha Rodriges, DO  [address]    Patient: Madison Paul   YOB: 1950   Date of Visit: 8/9/2023       Dear Dr. Lemus [facility]: Thank you for referring Madison Paul to me for evaluation. Below are my notes for this consultation. If you have questions, please do not hesitate to call me. I look forward to following your patient along with you. Sincerely,        Linda Hall MD        CC: No Recipients    Lisa Michael  8/9/2023  2:42 PM  Sign when Signing Visit  Thoracic Consult  Assessment/Plan:    Lymphadenopathy  Ms Marry Wiley follows with Dr Allan Kasper for atypical lymphoproliferative disorder and has had previous left axillary lymph node excision in February 2021. Her most recent CT scan shows increasing diffuse adenopathy including lymphadenopathy in the mediastinum, supraclavicular, bilateral axillary, pelvic and inguinal regions. Dr. Allan Kasper has referred her for tissue biopsy. She has a prominent palpable left axillary nodule that we will plan for excisional biopsy of. Dr Tyler Mcghee explained the procedure, risks, benefits and alternatives of an excisional biopsy of left axillary lymph node today in the office and consent was signed. She will need some updated lab work. We will plan for this on August 28th. All questions were answered and she is in agreement with this plan. Pulmonary nodule  Ms Turcios's CT scan shows an irregular density in the lingula measuring 1 cm. At this time we will address her lymphadenopathy first. We would recommend following this nodule with a repeat CT scan in 6 months. Diagnoses and all orders for this visit:    Pulmonary nodule    Lymphadenopathy  -     Ambulatory Referral to Thoracic Oncology  -     Protime-INR; Future  -     APTT; Future  -     Type and screen;  Future  -     Case request operating room: EXCISION BIOPSY LYMPH NODE LEFT AXILLARY; Standing  -     Case request operating room: EXCISION BIOPSY LYMPH NODE LEFT AXILLARY    Other orders  -     calcium carbonate (Calcium 600) 600 MG tablet  -     Cholecalciferol (D3-50) 1.25 MG (61288 UT) capsule  -     Diet NPO; Sips with meds; Standing  -     Void on call to OR; Standing  -     Insert peripheral IV; Standing  -     Place sequential compression device; Standing  -     Shower/scrub; Standing  -     ceFAZolin (ANCEF) IVPB (premix in dextrose) 1,000 mg 50 mL         Thoracic History   Diagnosis: Lymphadenopathy, pulmonary nodule     Procedures/Surgeries:    Pathology:    Adjuvant Therapy:      Subjective:   Patient ID: Marina Saldana is a 72 y.o. female. ECOG 1    HPI   Ms Keshav Miles is a 72year old female with PMH of 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 with previous left axillary lymph node excision in February 2021 which showed lymph nodes with B cells lacking CD5 expression and expressing clonality by surface light chain expression restriction. GERD, CKD III, Bipolar I, SLE, Sjogren's currently on Plaquenil, polyarthralgia, who was referred to our office by Dr Suyapa Green for evaluation of increasing adenopathy. CT chest on 6/29/2023 was personally reviewed by myself in PACS and revealed bilateral ground glass densities with predominant perihilar and central distribution and scattered lung cysts. There is a nodule density in the left lingular region measuring 1 cm increased from prior. There are additional nodular densities seen abutting the right dome of the diaphragm measuring about 6mm. There is a left upper lobe lung nodule which measures 2.5mm. There is a lower right paratracheal lymph node measuring 13 x 9mm that is stable. There is adenopathy in the lwoer neck including a supraclavicular lymph node on the right measuring 1.8cm. There is also adenopathy in the bilateral axilla, the pelvis, groin. On discussion she has had worsening fatigue and was recently started on B12 and iron infusions.  She otherwise feels she is in her normal state of health. She denies chest pain, SOB, cough, recent illness, fevers/chills, night sweats, pruritis. The following portions of the patient's history were reviewed and updated as appropriate: allergies, current medications, past family history, past medical history, past social history, past surgical history and problem list.    Past Medical History:   Diagnosis Date   • Acute kidney injury superimposed on chronic kidney disease ([address]) 09/20/2021   • Age-related osteoporosis without current pathological fracture 06/01/2023   • Allergic rhinitis    • Anemia    • Bipolar 1 disorder ([address])    • Constipation    • Deaf     Pt lost all hearing in right ear after having [place] measles   • Diarrhea    • Fatigue    • GERD (gastroesophageal reflux disease) 08/01/2020   • Hard of hearing     Pt wears a hearing in left ear. • HL (hearing loss)    • Hypothyroidism    • Lung nodule    • Nasal congestion    • Pneumonia 02/10/2017   • Pneumonia due to Streptococcus ([address] [facility]) 09/19/2021   • Psychiatric disorder    • Sjogren's syndrome (HCC)    • Systemic lupus erythematosus (HCC)    • Wears glasses    • Wears partial dentures       Past Surgical History:   Procedure Laterality Date   • BREAST BIOPSY Right    • COLONOSCOPY     • EYE SURGERY     • FRACTURE SURGERY Right     elbow   • GASTRIC BYPASS     • HYSTERECTOMY Bilateral 1975   • IR BIOPSY LYMPH NODE  10/08/2020   • LUNG BIOPSY     • LYMPH NODE BIOPSY  09/18/2020   •  [address] INCL FLUOR GDNCE DX W/CELL WASHG SPX N/A 12/08/2017    Procedure: BRONCHOSCOPY;  Surgeon: Russell Padilla MD;  Location: [facility];   Service: Pulmonary   • [place] BX/EXC LYMPH NODE NEEDLE SUPERFICIAL Left 02/25/2021    Procedure: EXCISION BIOPSY LYMPH NODE: left axillary lymph node biopsy;  Surgeon: Radha Park MD;  Location: [facility];  Service: Thoracic   • TONSILLECTOMY        Family History   Problem Relation Age of Onset   • Heart disease Mother    • Diabetes Mother • Kidney cancer Mother    • Cancer Mother         Kidney   • Hypertension Mother    • Heart disease Father    • Stroke Father    • Diabetes Father    • Cancer Father         Rectal Cancer   • Hypertension Father    • No Known Problems Maternal Grandmother    • No Known Problems Maternal Grandfather    • No Known Problems Paternal Grandmother    • No Known Problems Paternal Grandfather    • Leukemia Half-Sister 48   • No Known Problems Half-Sister    • No Known Problems Half-Sister    • No Known Problems Half-Sister    • BRCA2 Positive Neg Hx    • BRCA2 Negative Neg Hx    • BRCA1 Negative Neg Hx    • Endometrial cancer Neg Hx    • Breast cancer Neg Hx    • Breast cancer additional onset Neg Hx    • Colon cancer Neg Hx    • Ovarian cancer Neg Hx    • BRCA1 Positive Neg Hx    • BRCA 1/2 Neg Hx       Social History     Socioeconomic History   • Marital status: /Civil Union     Spouse name: Not on file   • Number of children: Not on file   • Years of education: Not on file   • Highest education level: Not on file   Occupational History   • Not on file   Tobacco Use   • Smoking status: Never   • Smokeless tobacco: Never   Vaping Use   • Vaping Use: Never used   Substance and Sexual Activity   • Alcohol use: No   • Drug use: No   • Sexual activity: Not Currently     Partners: Male     Birth control/protection: Female Sterilization     Comment:    Other Topics Concern   • Not on file   Social History Narrative   • Not on file     Social Determinants of Health     Financial Resource Strain: Low Risk  (7/14/2023)    Overall Financial Resource Strain (CARDIA)    • Difficulty of Paying Living Expenses: Not very hard   Food Insecurity: Not on file   Transportation Needs: No Transportation Needs (7/14/2023)    PRAPARE - Transportation    • Lack of Transportation (Medical): No    • Lack of Transportation (Non-Medical):  No   Physical Activity: Not on file   Stress: Not on file   Social Connections: Not on file Intimate Partner Violence: Not on file   Housing Stability: Not on file      Review of Systems   Constitutional: Positive for fatigue. Negative for chills, diaphoresis and unexpected weight change. HENT: Negative. Eyes: Negative. Respiratory: Negative for cough, shortness of breath and wheezing. Cardiovascular: Negative for chest pain and leg swelling. Gastrointestinal: Negative for abdominal pain, diarrhea and nausea. Endocrine: Negative. Genitourinary: Negative. Musculoskeletal: Negative. Skin: Negative. Allergic/Immunologic: Negative. Neurological: Negative. Hematological: Negative for adenopathy. Does not bruise/bleed easily. Psychiatric/Behavioral: Negative. All other systems reviewed and are negative. Objective:  Physical Exam  Vitals reviewed. Constitutional:       General: She is not in acute distress. Appearance: Normal appearance. She is not ill-appearing. HENT:      Head: Normocephalic. Nose: Nose normal.      Mouth/Throat:      Mouth: Mucous membranes are moist.   Eyes:      Pupils: Pupils are equal, round, and reactive to light. Cardiovascular:      Rate and Rhythm: Normal rate and regular rhythm. Heart sounds: Normal heart sounds. Pulmonary:      Effort: Pulmonary effort is normal.      Breath sounds: Normal breath sounds. No wheezing, rhonchi or rales. Chest:      Comments: Almost 4cm left axillary lymph node palpated about 7 o clock in axilla  Abdominal:      Palpations: Abdomen is soft. Musculoskeletal:         General: No swelling. Normal range of motion. Lymphadenopathy:      Cervical: No cervical adenopathy. Upper Body:      Right upper body: Axillary adenopathy present. Left upper body: Axillary adenopathy present. Skin:     General: Skin is warm. Neurological:      General: No focal deficit present. Mental Status: She is alert and oriented to person, place, and time.    Psychiatric:         Mood and Affect: Mood normal.     /82 (BP Location: Right arm, Patient Position: Sitting, Cuff Size: Standard)   Pulse 78   Temp 98.2 °F (36.8 °C) (Temporal)   Resp 15   Ht 5' 0.5" (1.537 m)   Wt 68.6 kg (151 lb 3.8 oz)   LMP  (LMP Unknown)   SpO2 97%   BMI 29.05 kg/m²    No Chest XR results available for this patient. CT chest wo contrast    Result Date: 12/22/2022  Narrative CT CHEST WITHOUT IV CONTRAST INDICATION:   R59.1: Generalized enlarged lymph nodes. Known history of CLL and Sjogren's syndrome. COMPARISON:  CT, dated 5/22/2022. TECHNIQUE: CT examination of the chest was performed without intravenous contrast. Axial, sagittal, and coronal 2D reformatted images were created from the source data and submitted for interpretation. Radiation dose length product (DLP) for this visit:  156 mGy-cm . This examination, like all CT scans performed in the [facility], was performed utilizing techniques to minimize radiation dose exposure, including the use of iterative reconstruction and automated exposure control. FINDINGS: LUNGS:  No new pulmonary nodules. Redemonstrated are scattered small pulmonary nodules, stable since 2018, benign. Also redemonstrated is the basilar predominant areas of paramediastinal groundglass opacification with associated multiple cysts without solid components. No evidence of more confluent consolidation. Airways are normal. PLEURA:  Unremarkable. HEART/GREAT VESSELS: Heart is unremarkable for patient's age. No thoracic aortic aneurysm. There is enlargement of the central pulmonary arterial tree suggesting some element of pulmonary artery hypertension. MEDIASTINUM AND KACEY:  Redemonstrated is mediastinal lymph node enlargement. Reference 2R lymph node measures 10 mm in short axis (series 2, image 14), stable. Other lymph nodes are similar in size. CHEST WALL AND LOWER NECK:  Redemonstrated is bilateral axillary lymph node enlargement.  Reference right axillary lymph node now measures 12 mm (series 2, image 15), previously 16 mm in short axis. Additional reference left axillary lymph node now measures 10 mm (series 2, image 14), previously 15 mm. VISUALIZED STRUCTURES IN THE UPPER ABDOMEN:  Postsurgical changes of gastric bypass. OSSEOUS STRUCTURES:  T7 hemangioma. Impression 1. Decrease in size of bilateral axillary lymph nodes, consistent with response to treatment. 2.  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 3.  Mediastinal lymph nodes are similar in size, either attributable to known CLL or LIP. Workstation performed: [id number]     CT chest abdomen pelvis wo contrast    Result Date: 7/6/2023  Narrative CT CHEST, ABDOMEN AND PELVIS WITHOUT IV CONTRAST INDICATION:   R59.1: Generalized enlarged lymph nodes. COMPARISON: Previous study from December 16, 2022, previous study from December 4, 2018 TECHNIQUE: CT examination of the chest, abdomen and pelvis was performed without intravenous contrast. Multiplanar 2D reformatted images were created from the source data. This examination, like all CT scans performed in the [facility], was performed utilizing techniques to minimize radiation dose exposure, including the use of iterative reconstruction and automated exposure control. Radiation dose length product (DLP) for this visit:  536 mGy-cm Enteric contrast was administered. FINDINGS: CHEST LUNGS: Again noted are areas of groundglass density in the both lungs with predominant perihilar and central distribution. Again noted are scattered lung cysts There is a nodular density left lingular region which measures about 1 cm, larger from the previous study.  This is seen in image 107 series 4 Additional nodular density seen abutting the right dome of diaphragm, measuring about 6 mm Scattered lung granuloma are seen there are additional not measurable lung nodules as seen on the previous study There is a left upper lobe lung nodule which measures 2.5 mm PLEURA: No pleural effusion seen No pneumothorax seen HEART/GREAT VESSELS: Heart is unremarkable for patient's age. No thoracic aortic aneurysm. MEDIASTINUM AND KACEY: Lower right paratracheal lymph node are seen which measure about 1.3 cm x 0.9 cm, stable Small lower left paratracheal lymph nodes are seen, unchanged Subcarinal lymph nodes are seen measuring about 1.1 cm x 2 cm, stable CHEST WALL AND LOWER NECK: Bilateral axillary lymph nodes are noted There is a lymph node in the right axilla, image 58 series 601, measuring 2.9 x 1.6 x 2.8 cm. Previously measuring 1.9 x 1.2 x 1.6 cm, larger Additional lymph node in the right axilla measures 3.9 x 2.1 x 3.4 cm, previously measuring 3.5 x 1.7 x 2.5 cm Enlarged left axillary lymph nodes are seen. Level 2 marcin left axillary lymph node measures 2.4 x 2.2 x 1.7 cm, previously measuring 1.6 x 1.9 x 1.4 cm Lymph nodes are also seen in the bilateral lower neck with largest right supraclavicular lymph node measuring 1.8 x 1.2 cm ABDOMEN LIVER/BILIARY TREE: No hepatomegaly seen GALLBLADDER:  No calcified gallstones. No pericholecystic inflammatory change. SPLEEN: The spleen measures 11.6 cm, stable in size PANCREAS:  Unremarkable. ADRENAL GLANDS:  Unremarkable. 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 Mild retrograde telescoping of the bowel loops at the JJ anastomosis without bowel obstruction APPENDIX:  No findings to suggest appendicitis. ABDOMINOPELVIC CAVITY: There are bilateral pelvic sidewall lymph nodes.  The largest left obturator lymph node, measures 2.7 x 1.2 cm x 2.4 cm, previously measuring about 3.2 x 1.4 x 2.9 cm. Right obturator lymph node measures 1.5 x 1 x 1.4 cm. Previously measuring 1.7 x 0.8 x 1.6 cm Left common iliac lymph nodes are seen the largest left common iliac lymph node measures about 1 cm x 1.5 cm, previously measuring 1.89 x 1.8 cm Right common iliac lymph node measuring about 1 cm x 0.9 cm, previously measuring 1 x 0.8 cm Small para-aortic and aortocaval lymph nodes are seen The largest aortocaval lymph node measures 1.2 x 1.1 x 1.8 cm. Previously this lymph node is measured at 0.8 x 0.9 x 1.3 cm Enlarged right cardiophrenic angle region lymph nodes are seen, measuring 3.5 x 2.2 x 1.3 cm  previously measuring 2.6 x 1.9 cm x 1 cm. These are not visible on the previous study of 2018, mildly larger from the previous study in December 16, 2022 VESSELS:  Unremarkable for patient's age. PELVIS REPRODUCTIVE ORGANS:  Unremarkable for patient's age. URINARY BLADDER:  Unremarkable. ABDOMINAL WALL/INGUINAL REGIONS: Bilateral inguinal region lymph nodes are seen, mildly larger and no largest right inguinal region lymph node, measures 2.3 x 1.1 x 0.9 cm, previously measuring 1.5 x 1.1 x 0.6 cm.  Mildly enlarged right external iliac lymph nodes are seen and mildly enlarged left external iliac lymph nodes are seen OSSEOUS STRUCTURES: No new lytic destructive lesion seen Vertebral hemangioma in the T7 vertebral     Impression There is mild enlargement of the axillary lymph nodes and mild enlargement of the aortocaval, and inguinal region lymph nodes as compared to the previous study from December 4, 2018 and December 16, 2022 The largest lymph node is seen in the right cardiophrenic angle region measuring 3.5 x 2.2 x 1.3 cm, this lymph node is not measurable in 2018 and measured about 2.6 x 1.9 x 1 cm in CT of December 16, 2022, PET scan be helpful in evaluating this abnormality further There is no new organomegaly, stable splenic size with a longitudinal craniocaudal length of 11.64 cm No new lytic lesion in the visualized bony skeleton Groundglass density in the both lungs with associated lung cyst Irregular nodular density left lingular region measuring about 1.3 cm, consider PET scan The study was marked in EPIC for significant notification. Workstation performed: [id number]      No NM PET CT results available for this patient. No Barium Swallow results available for this patient.

## 2023-08-09 NOTE — LETTER
August 9, 2023     Lauren Dorantes DO  [address] [place] [address]    Patient: Rivka Johns   YOB: 1950   Date of Visit: 8/9/2023       Dear Dr. Amie Schilder: Thank you for referring Rivka Johns to me for evaluation. Below are my notes for this consultation. If you have questions, please do not hesitate to call me. I look forward to following your patient along with you. Sincerely,        Evangelista Arizmendi MD        CC: No Recipients    Lisa Frankel  8/9/2023  2:41 PM  Incomplete  Thoracic Consult  Assessment/Plan:    Lymphadenopathy  Ms Brooke Farmer follows with Dr Amie Schilder for atypical lymphoproliferative disorder and has had previous left axillary lymph node excision in February 2021. Her most recent CT scan shows increasing diffuse adenopathy including lymphadenopathy in the mediastinum, supraclavicular, bilateral axillary, pelvic and inguinal regions. Dr. Amie Schilder has referred her for tissue biopsy. She has a prominent palpable left axillary nodule that we will plan for excisional biopsy of. Dr Shelley Owens explained the procedure, risks, benefits and alternatives of an excisional biopsy of left axillary lymph node today in the office and consent was signed. She will need some updated lab work. We will plan for this on August 28th. All questions were answered and she is in agreement with this plan. Pulmonary nodule  Ms Turcios's CT scan shows an irregular density in the lingula measuring 1 cm. At this time we will address her lymphadenopathy first. We would recommend following this nodule with a repeat CT scan in 6 months. Diagnoses and all orders for this visit:    Pulmonary nodule    Lymphadenopathy  -     Ambulatory Referral to Thoracic Oncology  -     Protime-INR; Future  -     APTT; Future  -     Type and screen;  Future  -     Case request operating room: EXCISION BIOPSY LYMPH NODE LEFT AXILLARY; Standing  -     Case request operating room: EXCISION BIOPSY LYMPH NODE LEFT AXILLARY    Other orders  -     calcium carbonate (Calcium 600) 600 MG tablet  -     Cholecalciferol (D3-50) 1.25 MG (82765 UT) capsule  -     Diet NPO; Sips with meds; Standing  -     Void on call to OR; Standing  -     Insert peripheral IV; Standing  -     Place sequential compression device; Standing  -     Shower/scrub; Standing  -     ceFAZolin (ANCEF) IVPB (premix in dextrose) 1,000 mg 50 mL         Thoracic History   Diagnosis: Lymphadenopathy, pulmonary nodule     Procedures/Surgeries:    Pathology:    Adjuvant Therapy:      Subjective:   Patient ID: Jake Rocha is a 72 y.o. female. ECOG 1    HPI   Ms Arpita Quinonez is a 72year old female with PMH of 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 with previous left axillary lymph node excision in February 2021 which showed lymph nodes with B cells lacking CD5 expression and expressing clonality by surface light chain expression restriction. GERD, CKD III, Bipolar I, SLE, Sjogren's currently on Plaquenil, polyarthralgia, who was referred to our office by Dr Gian Rosen for evaluation of increasing adenopathy. CT chest on 6/29/2023 was personally reviewed by myself in PACS and revealed bilateral ground glass densities with predominant perihilar and central distribution and scattered lung cysts. There is a nodule density in the left lingular region measuring 1 cm increased from prior. There are additional nodular densities seen abutting the right dome of the diaphragm measuring about 6mm. There is a left upper lobe lung nodule which measures 2.5mm. There is a lower right paratracheal lymph node measuring 13 x 9mm that is stable. There is adenopathy in the lwoer neck including a supraclavicular lymph node on the right measuring 1.8cm. There is also adenopathy in the bilateral axilla, the pelvis, groin. On discussion she has had worsening fatigue and was recently started on B12 and iron infusions.  She otherwise feels she is in her normal state of health. She denies chest pain, SOB, cough, recent illness, fevers/chills, night sweats, pruritis. The following portions of the patient's history were reviewed and updated as appropriate: allergies, current medications, past family history, past medical history, past social history, past surgical history and problem list.    Past Medical History:   Diagnosis Date   • Acute kidney injury superimposed on chronic kidney disease ([address]) 09/20/2021   • Age-related osteoporosis without current pathological fracture 06/01/2023   • Allergic rhinitis    • Anemia    • Bipolar 1 disorder ([address])    • Constipation    • Deaf     Pt lost all hearing in right ear after having [place] measles   • Diarrhea    • Fatigue    • GERD (gastroesophageal reflux disease) 08/01/2020   • Hard of hearing     Pt wears a hearing in left ear. • HL (hearing loss)    • Hypothyroidism    • Lung nodule    • Nasal congestion    • Pneumonia 02/10/2017   • Pneumonia due to Streptococcus ([address]) 09/19/2021   • Psychiatric disorder    • Sjogren's syndrome (HCC)    • Systemic lupus erythematosus (HCC)    • Wears glasses    • Wears partial dentures       Past Surgical History:   Procedure Laterality Date   • BREAST BIOPSY Right    • COLONOSCOPY     • EYE SURGERY     • FRACTURE SURGERY Right     elbow   • GASTRIC BYPASS     • HYSTERECTOMY Bilateral 1975   • IR BIOPSY LYMPH NODE  10/08/2020   • LUNG BIOPSY     • LYMPH NODE BIOPSY  09/18/2020   •  [address] INCL FLUOR GDNCE DX W/CELL WASHG SPX N/A 12/08/2017    Procedure: BRONCHOSCOPY;  Surgeon: Cam Rodas MD;  Location: [facility];   Service: Pulmonary   • [place] BX/EXC LYMPH NODE NEEDLE SUPERFICIAL Left 02/25/2021    Procedure: EXCISION BIOPSY LYMPH NODE: left axillary lymph node biopsy;  Surgeon: Harry Sanchez MD;  Location: [facility];  Service: Thoracic   • TONSILLECTOMY        Family History   Problem Relation Age of Onset   • Heart disease Mother    • Diabetes Mother    • Kidney cancer Mother    • Cancer Mother         Kidney   • Hypertension Mother    • Heart disease Father    • Stroke Father    • Diabetes Father    • Cancer Father         Rectal Cancer   • Hypertension Father    • No Known Problems Maternal Grandmother    • No Known Problems Maternal Grandfather    • No Known Problems Paternal Grandmother    • No Known Problems Paternal Grandfather    • Leukemia Half-Sister 48   • No Known Problems Half-Sister    • No Known Problems Half-Sister    • No Known Problems Half-Sister    • BRCA2 Positive Neg Hx    • BRCA2 Negative Neg Hx    • BRCA1 Negative Neg Hx    • Endometrial cancer Neg Hx    • Breast cancer Neg Hx    • Breast cancer additional onset Neg Hx    • Colon cancer Neg Hx    • Ovarian cancer Neg Hx    • BRCA1 Positive Neg Hx    • BRCA 1/2 Neg Hx       Social History     Socioeconomic History   • Marital status: /Civil Union     Spouse name: Not on file   • Number of children: Not on file   • Years of education: Not on file   • Highest education level: Not on file   Occupational History   • Not on file   Tobacco Use   • Smoking status: Never   • Smokeless tobacco: Never   Vaping Use   • Vaping Use: Never used   Substance and Sexual Activity   • Alcohol use: No   • Drug use: No   • Sexual activity: Not Currently     Partners: Male     Birth control/protection: Female Sterilization     Comment:    Other Topics Concern   • Not on file   Social History Narrative   • Not on file     Social Determinants of Health     Financial Resource Strain: Low Risk  (7/14/2023)    Overall Financial Resource Strain (CARDIA)    • Difficulty of Paying Living Expenses: Not very hard   Food Insecurity: Not on file   Transportation Needs: No Transportation Needs (7/14/2023)    PRAPARE - Transportation    • Lack of Transportation (Medical): No    • Lack of Transportation (Non-Medical):  No   Physical Activity: Not on file   Stress: Not on file   Social Connections: Not on file   Intimate Partner Violence: Not on file   Housing Stability: Not on file      Review of Systems   Constitutional: Positive for fatigue. Negative for chills, diaphoresis and unexpected weight change. HENT: Negative. Eyes: Negative. Respiratory: Negative for cough, shortness of breath and wheezing. Cardiovascular: Negative for chest pain and leg swelling. Gastrointestinal: Negative for abdominal pain, diarrhea and nausea. Endocrine: Negative. Genitourinary: Negative. Musculoskeletal: Negative. Skin: Negative. Allergic/Immunologic: Negative. Neurological: Negative. Hematological: Negative for adenopathy. Does not bruise/bleed easily. Psychiatric/Behavioral: Negative. All other systems reviewed and are negative. Objective:  Physical Exam  Vitals reviewed. Constitutional:       General: She is not in acute distress. Appearance: Normal appearance. She is not ill-appearing. HENT:      Head: Normocephalic. Nose: Nose normal.      Mouth/Throat:      Mouth: Mucous membranes are moist.   Eyes:      Pupils: Pupils are equal, round, and reactive to light. Cardiovascular:      Rate and Rhythm: Normal rate and regular rhythm. Heart sounds: Normal heart sounds. Pulmonary:      Effort: Pulmonary effort is normal.      Breath sounds: Normal breath sounds. No wheezing, rhonchi or rales. Chest:      Comments: Almost 4cm left axillary lymph node palpated about 7 o clock in axilla  Abdominal:      Palpations: Abdomen is soft. Musculoskeletal:         General: No swelling. Normal range of motion. Lymphadenopathy:      Cervical: No cervical adenopathy. Upper Body:      Right upper body: Axillary adenopathy present. Left upper body: Axillary adenopathy present. Skin:     General: Skin is warm. Neurological:      General: No focal deficit present. Mental Status: She is alert and oriented to person, place, and time.    Psychiatric:         Mood and Affect: Mood normal.     /82 (BP Location: Right arm, Patient Position: Sitting, Cuff Size: Standard)   Pulse 78   Temp 98.2 °F (36.8 °C) (Temporal)   Resp 15   Ht 5' 0.5" (1.537 m)   Wt 68.6 kg (151 lb 3.8 oz)   LMP  (LMP Unknown)   SpO2 97%   BMI 29.05 kg/m²    No Chest XR results available for this patient. CT chest wo contrast    Result Date: 12/22/2022  Narrative CT CHEST WITHOUT IV CONTRAST INDICATION:   R59.1: Generalized enlarged lymph nodes. Known history of CLL and Sjogren's syndrome. COMPARISON:  CT, dated 5/22/2022. TECHNIQUE: CT examination of the chest was performed without intravenous contrast. Axial, sagittal, and coronal 2D reformatted images were created from the source data and submitted for interpretation. Radiation dose length product (DLP) for this visit:  156 mGy-cm . This examination, like all CT scans performed in the [facility], was performed utilizing techniques to minimize radiation dose exposure, including the use of iterative reconstruction and automated exposure control. FINDINGS: LUNGS:  No new pulmonary nodules. Redemonstrated are scattered small pulmonary nodules, stable since 2018, benign. Also redemonstrated is the basilar predominant areas of paramediastinal groundglass opacification with associated multiple cysts without solid components. No evidence of more confluent consolidation. Airways are normal. PLEURA:  Unremarkable. HEART/GREAT VESSELS: Heart is unremarkable for patient's age. No thoracic aortic aneurysm. There is enlargement of the central pulmonary arterial tree suggesting some element of pulmonary artery hypertension. MEDIASTINUM AND KACEY:  Redemonstrated is mediastinal lymph node enlargement. Reference 2R lymph node measures 10 mm in short axis (series 2, image 14), stable. Other lymph nodes are similar in size. CHEST WALL AND LOWER NECK:  Redemonstrated is bilateral axillary lymph node enlargement.  Reference right axillary lymph node now measures 12 mm (series 2, image 15), previously 16 mm in short axis. Additional reference left axillary lymph node now measures 10 mm (series 2, image 14), previously 15 mm. VISUALIZED STRUCTURES IN THE UPPER ABDOMEN:  Postsurgical changes of gastric bypass. OSSEOUS STRUCTURES:  T7 hemangioma. Impression 1. Decrease in size of bilateral axillary lymph nodes, consistent with response to treatment. 2.  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 3.  Mediastinal lymph nodes are similar in size, either attributable to known CLL or LIP. Workstation performed: [id number]     CT chest abdomen pelvis wo contrast    Result Date: 7/6/2023  Narrative CT CHEST, ABDOMEN AND PELVIS WITHOUT IV CONTRAST INDICATION:   R59.1: Generalized enlarged lymph nodes. COMPARISON: Previous study from December 16, 2022, previous study from December 4, 2018 TECHNIQUE: CT examination of the chest, abdomen and pelvis was performed without intravenous contrast. Multiplanar 2D reformatted images were created from the source data. This examination, like all CT scans performed in the [facility], was performed utilizing techniques to minimize radiation dose exposure, including the use of iterative reconstruction and automated exposure control. Radiation dose length product (DLP) for this visit:  536 mGy-cm Enteric contrast was administered. FINDINGS: CHEST LUNGS: Again noted are areas of groundglass density in the both lungs with predominant perihilar and central distribution. Again noted are scattered lung cysts There is a nodular density left lingular region which measures about 1 cm, larger from the previous study.  This is seen in image 107 series 4 Additional nodular density seen abutting the right dome of diaphragm, measuring about 6 mm Scattered lung granuloma are seen there are additional not measurable lung nodules as seen on the previous study There is a left upper lobe lung nodule which measures 2.5 mm PLEURA: No pleural effusion seen No pneumothorax seen HEART/GREAT VESSELS: Heart is unremarkable for patient's age. No thoracic aortic aneurysm. MEDIASTINUM AND KACEY: Lower right paratracheal lymph node are seen which measure about 1.3 cm x 0.9 cm, stable Small lower left paratracheal lymph nodes are seen, unchanged Subcarinal lymph nodes are seen measuring about 1.1 cm x 2 cm, stable CHEST WALL AND LOWER NECK: Bilateral axillary lymph nodes are noted There is a lymph node in the right axilla, image 58 series 601, measuring 2.9 x 1.6 x 2.8 cm. Previously measuring 1.9 x 1.2 x 1.6 cm, larger Additional lymph node in the right axilla measures 3.9 x 2.1 x 3.4 cm, previously measuring 3.5 x 1.7 x 2.5 cm Enlarged left axillary lymph nodes are seen. Level 2 marcin left axillary lymph node measures 2.4 x 2.2 x 1.7 cm, previously measuring 1.6 x 1.9 x 1.4 cm Lymph nodes are also seen in the bilateral lower neck with largest right supraclavicular lymph node measuring 1.8 x 1.2 cm ABDOMEN LIVER/BILIARY TREE: No hepatomegaly seen GALLBLADDER:  No calcified gallstones. No pericholecystic inflammatory change. SPLEEN: The spleen measures 11.6 cm, stable in size PANCREAS:  Unremarkable. ADRENAL GLANDS:  Unremarkable. 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 Mild retrograde telescoping of the bowel loops at the JJ anastomosis without bowel obstruction APPENDIX:  No findings to suggest appendicitis. ABDOMINOPELVIC CAVITY: There are bilateral pelvic sidewall lymph nodes.  The largest left obturator lymph node, measures 2.7 x 1.2 cm x 2.4 cm, previously measuring about 3.2 x 1.4 x 2.9 cm. Right obturator lymph node measures 1.5 x 1 x 1.4 cm. Previously measuring 1.7 x 0.8 x 1.6 cm Left common iliac lymph nodes are seen the largest left common iliac lymph node measures about 1 cm x 1.5 cm, previously measuring 1.89 x 1.8 cm Right common iliac lymph node measuring about 1 cm x 0.9 cm, previously measuring 1 x 0.8 cm Small para-aortic and aortocaval lymph nodes are seen The largest aortocaval lymph node measures 1.2 x 1.1 x 1.8 cm. Previously this lymph node is measured at 0.8 x 0.9 x 1.3 cm Enlarged right cardiophrenic angle region lymph nodes are seen, measuring 3.5 x 2.2 x 1.3 cm  previously measuring 2.6 x 1.9 cm x 1 cm. These are not visible on the previous study of 2018, mildly larger from the previous study in December 16, 2022 VESSELS:  Unremarkable for patient's age. PELVIS REPRODUCTIVE ORGANS:  Unremarkable for patient's age. URINARY BLADDER:  Unremarkable. ABDOMINAL WALL/INGUINAL REGIONS: Bilateral inguinal region lymph nodes are seen, mildly larger and no largest right inguinal region lymph node, measures 2.3 x 1.1 x 0.9 cm, previously measuring 1.5 x 1.1 x 0.6 cm.  Mildly enlarged right external iliac lymph nodes are seen and mildly enlarged left external iliac lymph nodes are seen OSSEOUS STRUCTURES: No new lytic destructive lesion seen Vertebral hemangioma in the T7 vertebral     Impression There is mild enlargement of the axillary lymph nodes and mild enlargement of the aortocaval, and inguinal region lymph nodes as compared to the previous study from December 4, 2018 and December 16, 2022 The largest lymph node is seen in the right cardiophrenic angle region measuring 3.5 x 2.2 x 1.3 cm, this lymph node is not measurable in 2018 and measured about 2.6 x 1.9 x 1 cm in CT of December 16, 2022, PET scan be helpful in evaluating this abnormality further There is no new organomegaly, stable splenic size with a longitudinal craniocaudal length of 11.64 cm No new lytic lesion in the visualized bony skeleton Groundglass density in the both lungs with associated lung cyst Irregular nodular density left lingular region measuring about 1.3 cm, consider PET scan The study was marked in EPIC for significant notification. Workstation performed: [id number]      No NM PET CT results available for this patient. No Barium Swallow results available for this patient.

## 2023-08-11 ENCOUNTER — HOSPITAL ENCOUNTER (OUTPATIENT)
Dept: INFUSION CENTER | Facility: HOSPITAL | Age: 73
End: 2023-08-11
Payer: COMMERCIAL

## 2023-08-11 VITALS
DIASTOLIC BLOOD PRESSURE: 78 MMHG | TEMPERATURE: 98.1 F | OXYGEN SATURATION: 95 % | SYSTOLIC BLOOD PRESSURE: 113 MMHG | HEART RATE: 83 BPM | RESPIRATION RATE: 18 BRPM

## 2023-08-11 DIAGNOSIS — D50.8 IRON DEFICIENCY ANEMIA FOLLOWING BARIATRIC SURGERY: Primary | ICD-10-CM

## 2023-08-11 DIAGNOSIS — K95.89 IRON DEFICIENCY ANEMIA FOLLOWING BARIATRIC SURGERY: Primary | ICD-10-CM

## 2023-08-11 PROCEDURE — 96365 THER/PROPH/DIAG IV INF INIT: CPT

## 2023-08-11 RX ORDER — SODIUM CHLORIDE 9 MG/ML
20 INJECTION, SOLUTION INTRAVENOUS ONCE
Status: CANCELLED | OUTPATIENT
Start: 2023-08-18

## 2023-08-11 RX ORDER — SODIUM CHLORIDE 9 MG/ML
20 INJECTION, SOLUTION INTRAVENOUS ONCE
Status: COMPLETED | OUTPATIENT
Start: 2023-08-11 | End: 2023-08-11

## 2023-08-11 RX ADMIN — SODIUM CHLORIDE 20 ML/HR: 0.9 INJECTION, SOLUTION INTRAVENOUS at 11:13

## 2023-08-11 RX ADMIN — IRON SUCROSE 200 MG: 20 INJECTION, SOLUTION INTRAVENOUS at 11:15

## 2023-08-15 ENCOUNTER — APPOINTMENT (OUTPATIENT)
Dept: LAB | Facility: CLINIC | Age: 73
End: 2023-08-15
Payer: COMMERCIAL

## 2023-08-15 ENCOUNTER — LAB REQUISITION (OUTPATIENT)
Dept: LAB | Facility: HOSPITAL | Age: 73
End: 2023-08-15
Payer: COMMERCIAL

## 2023-08-15 ENCOUNTER — ANESTHESIA EVENT (OUTPATIENT)
Dept: PERIOP | Facility: HOSPITAL | Age: 73
End: 2023-08-15
Payer: COMMERCIAL

## 2023-08-15 ENCOUNTER — TELEPHONE (OUTPATIENT)
Dept: NEPHROLOGY | Facility: CLINIC | Age: 73
End: 2023-08-15

## 2023-08-15 DIAGNOSIS — R59.1 LYMPHADENOPATHY: ICD-10-CM

## 2023-08-15 DIAGNOSIS — R59.1 GENERALIZED ENLARGED LYMPH NODES: ICD-10-CM

## 2023-08-15 LAB
APTT PPP: 28 SECONDS (ref 23–37)
INR PPP: 1.02 (ref 0.84–1.19)
PROTHROMBIN TIME: 14 SECONDS (ref 11.6–14.5)

## 2023-08-15 PROCEDURE — 86900 BLOOD TYPING SEROLOGIC ABO: CPT | Performed by: PHYSICIAN ASSISTANT

## 2023-08-15 PROCEDURE — 36415 COLL VENOUS BLD VENIPUNCTURE: CPT

## 2023-08-15 PROCEDURE — 85730 THROMBOPLASTIN TIME PARTIAL: CPT

## 2023-08-15 PROCEDURE — 85610 PROTHROMBIN TIME: CPT

## 2023-08-15 PROCEDURE — 86850 RBC ANTIBODY SCREEN: CPT | Performed by: PHYSICIAN ASSISTANT

## 2023-08-15 PROCEDURE — 86901 BLOOD TYPING SEROLOGIC RH(D): CPT | Performed by: PHYSICIAN ASSISTANT

## 2023-08-16 LAB
ABO GROUP BLD: NORMAL
BLD GP AB SCN SERPL QL: NEGATIVE
RH BLD: POSITIVE
SPECIMEN EXPIRATION DATE: NORMAL

## 2023-08-18 ENCOUNTER — HOSPITAL ENCOUNTER (OUTPATIENT)
Dept: INFUSION CENTER | Facility: CLINIC | Age: 73
End: 2023-08-18
Payer: COMMERCIAL

## 2023-08-18 VITALS
RESPIRATION RATE: 18 BRPM | TEMPERATURE: 96.6 F | DIASTOLIC BLOOD PRESSURE: 56 MMHG | SYSTOLIC BLOOD PRESSURE: 121 MMHG | HEART RATE: 68 BPM

## 2023-08-18 DIAGNOSIS — D50.8 IRON DEFICIENCY ANEMIA FOLLOWING BARIATRIC SURGERY: Primary | ICD-10-CM

## 2023-08-18 DIAGNOSIS — K95.89 IRON DEFICIENCY ANEMIA FOLLOWING BARIATRIC SURGERY: Primary | ICD-10-CM

## 2023-08-18 PROCEDURE — 96365 THER/PROPH/DIAG IV INF INIT: CPT

## 2023-08-18 RX ORDER — SODIUM CHLORIDE 9 MG/ML
20 INJECTION, SOLUTION INTRAVENOUS ONCE
Status: CANCELLED | OUTPATIENT
Start: 2023-08-25

## 2023-08-18 RX ORDER — SODIUM CHLORIDE 9 MG/ML
20 INJECTION, SOLUTION INTRAVENOUS ONCE
Status: COMPLETED | OUTPATIENT
Start: 2023-08-18 | End: 2023-08-18

## 2023-08-18 RX ADMIN — IRON SUCROSE 200 MG: 20 INJECTION, SOLUTION INTRAVENOUS at 13:51

## 2023-08-18 RX ADMIN — SODIUM CHLORIDE 20 ML/HR: 9 INJECTION, SOLUTION INTRAVENOUS at 13:50

## 2023-08-18 NOTE — PROGRESS NOTES
Pt presents for venofer infusion offering no complaints. Pt tolerated treatment without incident. PIV removed. AVS declined, next appointment reviewed.

## 2023-08-22 ENCOUNTER — CLINICAL SUPPORT (OUTPATIENT)
Dept: FAMILY MEDICINE CLINIC | Facility: CLINIC | Age: 73
End: 2023-08-22
Payer: COMMERCIAL

## 2023-08-22 DIAGNOSIS — D64.9 ANEMIA, UNSPECIFIED TYPE: Primary | ICD-10-CM

## 2023-08-22 PROCEDURE — 96372 THER/PROPH/DIAG INJ SC/IM: CPT

## 2023-08-22 RX ADMIN — CYANOCOBALAMIN 1000 MCG: 1000 INJECTION, SOLUTION INTRAMUSCULAR; SUBCUTANEOUS at 12:51

## 2023-08-22 NOTE — PRE-PROCEDURE INSTRUCTIONS
Pre-Surgery Instructions:   Medication Instructions   • acetaminophen (TYLENOL) 325 mg tablet Uses PRN- OK to take day of surgery   • buPROPion (WELLBUTRIN) 100 mg tablet Take day of surgery. • calcium carbonate (Calcium 600) 600 MG tablet Hold day of surgery. • Cholecalciferol (D3-50) 1.25 MG (84073 UT) capsule Hold day of surgery. • cycloSPORINE (RESTASIS) 0.05 % ophthalmic emulsion Take day of surgery. • ferrous sulfate 325 (65 Fe) mg tablet Hold day of surgery. • hydroxychloroquine (PLAQUENIL) 200 mg tablet Hold day of surgery. • LORazepam (ATIVAN) 1 mg tablet Uses PRN- OK to take day of surgery   • pilocarpine (SALAGEN) 5 mg tablet Hold day of surgery. • QUEtiapine (SEROquel) 25 mg tablet Take night before surgery   • QUEtiapine (SEROquel) 300 mg tablet Take night before surgery   • temazepam (RESTORIL) 30 mg capsule Take night before surgery   Medication instructions for day surgery reviewed. Please use only a sip of water to take your instructed medications. Avoid all over the counter vitamins, supplements and NSAIDS for one week prior to surgery per anesthesia guidelines. Tylenol is ok to take as needed. You will receive a call one business day prior to surgery with an arrival time and hospital directions. If your surgery is scheduled on a Monday, the hospital will be calling you on the Friday prior to your surgery. If you have not heard from anyone by 8pm, please call the hospital supervisor through the hospital  at [phone]. Betty Shay [phone]). Do not eat or drink anything after midnight the night before your surgery, including candy, mints, lifesavers, or chewing gum. Do not drink alcohol 24hrs before your surgery. Try not to smoke at least 24hrs before your surgery. Follow the pre surgery showering instructions as listed in the [facility] Surgical Experience Booklet” or otherwise provided by your surgeon's office.  Do not shave the surgical area 24 hours before surgery. Do not apply any lotions, creams, including makeup, cologne, deodorant, or perfumes after showering on the day of your surgery. No contact lenses, eye make-up, or artificial eyelashes. Remove nail polish, including gel polish, and any artificial, gel, or acrylic nails if possible. Remove all jewelry including rings and body piercing jewelry. Wear causal clothing that is easy to take on and off. Consider your type of surgery. Keep any valuables, jewelry, piercings at home. Please bring any specially ordered equipment (sling, braces) if indicated. Arrange for a responsible person to drive you to and from the hospital on the day of your surgery. Visitor Guidelines discussed. Call the surgeon's office with any new illnesses, exposures, or additional questions prior to surgery. Please reference your [facility] Surgical Experience Booklet” for additional information to prepare for your upcoming surgery.

## 2023-08-24 DIAGNOSIS — K95.89 IRON DEFICIENCY ANEMIA FOLLOWING BARIATRIC SURGERY: Primary | ICD-10-CM

## 2023-08-24 DIAGNOSIS — D50.8 IRON DEFICIENCY ANEMIA FOLLOWING BARIATRIC SURGERY: Primary | ICD-10-CM

## 2023-08-24 RX ORDER — SODIUM CHLORIDE 9 MG/ML
20 INJECTION, SOLUTION INTRAVENOUS ONCE
Status: CANCELLED | OUTPATIENT
Start: 2023-08-25

## 2023-08-25 ENCOUNTER — HOSPITAL ENCOUNTER (OUTPATIENT)
Dept: INFUSION CENTER | Facility: CLINIC | Age: 73
End: 2023-08-25
Payer: COMMERCIAL

## 2023-08-25 VITALS
DIASTOLIC BLOOD PRESSURE: 85 MMHG | SYSTOLIC BLOOD PRESSURE: 132 MMHG | TEMPERATURE: 96.7 F | HEART RATE: 78 BPM | RESPIRATION RATE: 18 BRPM

## 2023-08-25 DIAGNOSIS — K95.89 IRON DEFICIENCY ANEMIA FOLLOWING BARIATRIC SURGERY: Primary | ICD-10-CM

## 2023-08-25 DIAGNOSIS — D50.8 IRON DEFICIENCY ANEMIA FOLLOWING BARIATRIC SURGERY: Primary | ICD-10-CM

## 2023-08-25 PROCEDURE — 96365 THER/PROPH/DIAG IV INF INIT: CPT

## 2023-08-25 RX ORDER — SODIUM CHLORIDE 9 MG/ML
20 INJECTION, SOLUTION INTRAVENOUS ONCE
Status: CANCELLED | OUTPATIENT
Start: 2023-08-25

## 2023-08-25 RX ORDER — SODIUM CHLORIDE 9 MG/ML
20 INJECTION, SOLUTION INTRAVENOUS ONCE
Status: COMPLETED | OUTPATIENT
Start: 2023-08-25 | End: 2023-08-25

## 2023-08-25 RX ADMIN — SODIUM CHLORIDE 20 ML/HR: 9 INJECTION, SOLUTION INTRAVENOUS at 14:36

## 2023-08-25 RX ADMIN — SODIUM CHLORIDE 200 MG: 9 INJECTION, SOLUTION INTRAVENOUS at 14:36

## 2023-08-25 NOTE — PROGRESS NOTES
Pt presents for venofer infusion offering no complaints. Pt tolerated treatment without incident. PIV removed. AVS declined, pt stated MD is ordering Iron infusions every 2 weeks but did not sign orders yet. Pt stated she would call infusion center once orders are placed to make next appointment.

## 2023-08-28 ENCOUNTER — ANESTHESIA (OUTPATIENT)
Dept: PERIOP | Facility: HOSPITAL | Age: 73
End: 2023-08-28
Payer: COMMERCIAL

## 2023-08-28 ENCOUNTER — HOSPITAL ENCOUNTER (OUTPATIENT)
Facility: HOSPITAL | Age: 73
Setting detail: OUTPATIENT SURGERY
Discharge: HOME | End: 2023-08-28
Attending: THORACIC SURGERY (CARDIOTHORACIC VASCULAR SURGERY) | Admitting: THORACIC SURGERY (CARDIOTHORACIC VASCULAR SURGERY)
Payer: COMMERCIAL

## 2023-08-28 VITALS
TEMPERATURE: 96.6 F | DIASTOLIC BLOOD PRESSURE: 75 MMHG | OXYGEN SATURATION: 94 % | RESPIRATION RATE: 19 BRPM | WEIGHT: 150 LBS | HEART RATE: 64 BPM | HEIGHT: 60 IN | BODY MASS INDEX: 29.45 KG/M2 | SYSTOLIC BLOOD PRESSURE: 118 MMHG

## 2023-08-28 DIAGNOSIS — R59.1 LYMPHADENOPATHY: ICD-10-CM

## 2023-08-28 DIAGNOSIS — D50.8 IRON DEFICIENCY ANEMIA FOLLOWING BARIATRIC SURGERY: Primary | ICD-10-CM

## 2023-08-28 DIAGNOSIS — K95.89 IRON DEFICIENCY ANEMIA FOLLOWING BARIATRIC SURGERY: Primary | ICD-10-CM

## 2023-08-28 LAB
ATRIAL RATE: 61 BPM
P AXIS: 6 DEGREES
PR INTERVAL: 154 MS
QRS AXIS: -31 DEGREES
QRSD INTERVAL: 94 MS
QT INTERVAL: 436 MS
QTC INTERVAL: 438 MS
T WAVE AXIS: -4 DEGREES
VENTRICULAR RATE: 61 BPM

## 2023-08-28 PROCEDURE — 88342 IMHCHEM/IMCYTCHM 1ST ANTB: CPT | Performed by: STUDENT IN AN ORGANIZED HEALTH CARE EDUCATION/TRAINING PROGRAM

## 2023-08-28 PROCEDURE — 88365 INSITU HYBRIDIZATION (FISH): CPT | Performed by: STUDENT IN AN ORGANIZED HEALTH CARE EDUCATION/TRAINING PROGRAM

## 2023-08-28 PROCEDURE — 88377 M/PHMTRC ALYS ISHQUANT/SEMIQ: CPT | Performed by: STUDENT IN AN ORGANIZED HEALTH CARE EDUCATION/TRAINING PROGRAM

## 2023-08-28 PROCEDURE — 81450 HL NEO GSAP 5-50DNA/DNA&RNA: CPT | Performed by: STUDENT IN AN ORGANIZED HEALTH CARE EDUCATION/TRAINING PROGRAM

## 2023-08-28 PROCEDURE — 88364 INSITU HYBRIDIZATION (FISH): CPT | Performed by: STUDENT IN AN ORGANIZED HEALTH CARE EDUCATION/TRAINING PROGRAM

## 2023-08-28 PROCEDURE — 88341 IMHCHEM/IMCYTCHM EA ADD ANTB: CPT | Performed by: STUDENT IN AN ORGANIZED HEALTH CARE EDUCATION/TRAINING PROGRAM

## 2023-08-28 PROCEDURE — 88305 TISSUE EXAM BY PATHOLOGIST: CPT | Performed by: STUDENT IN AN ORGANIZED HEALTH CARE EDUCATION/TRAINING PROGRAM

## 2023-08-28 PROCEDURE — 88307 TISSUE EXAM BY PATHOLOGIST: CPT | Performed by: STUDENT IN AN ORGANIZED HEALTH CARE EDUCATION/TRAINING PROGRAM

## 2023-08-28 PROCEDURE — 93010 ELECTROCARDIOGRAM REPORT: CPT | Performed by: INTERNAL MEDICINE

## 2023-08-28 PROCEDURE — 88185 FLOWCYTOMETRY/TC ADD-ON: CPT

## 2023-08-28 PROCEDURE — 88360 TUMOR IMMUNOHISTOCHEM/MANUAL: CPT | Performed by: STUDENT IN AN ORGANIZED HEALTH CARE EDUCATION/TRAINING PROGRAM

## 2023-08-28 PROCEDURE — 38525 BIOPSY/REMOVAL LYMPH NODES: CPT | Performed by: THORACIC SURGERY (CARDIOTHORACIC VASCULAR SURGERY)

## 2023-08-28 PROCEDURE — 93005 ELECTROCARDIOGRAM TRACING: CPT

## 2023-08-28 PROCEDURE — 88184 FLOWCYTOMETRY/ TC 1 MARKER: CPT | Performed by: THORACIC SURGERY (CARDIOTHORACIC VASCULAR SURGERY)

## 2023-08-28 RX ORDER — ONDANSETRON 2 MG/ML
INJECTION INTRAMUSCULAR; INTRAVENOUS AS NEEDED
Status: DISCONTINUED | OUTPATIENT
Start: 2023-08-28 | End: 2023-08-28

## 2023-08-28 RX ORDER — ACETAMINOPHEN 325 MG/1
650 TABLET ORAL EVERY 6 HOURS PRN
Status: DISCONTINUED | OUTPATIENT
Start: 2023-08-28 | End: 2023-08-28 | Stop reason: HOSPADM

## 2023-08-28 RX ORDER — HYDROMORPHONE HCL/PF 1 MG/ML
0.5 SYRINGE (ML) INJECTION
Status: DISCONTINUED | OUTPATIENT
Start: 2023-08-28 | End: 2023-08-28 | Stop reason: HOSPADM

## 2023-08-28 RX ORDER — KETOROLAC TROMETHAMINE 30 MG/ML
INJECTION, SOLUTION INTRAMUSCULAR; INTRAVENOUS AS NEEDED
Status: DISCONTINUED | OUTPATIENT
Start: 2023-08-28 | End: 2023-08-28

## 2023-08-28 RX ORDER — ONDANSETRON 2 MG/ML
4 INJECTION INTRAMUSCULAR; INTRAVENOUS ONCE AS NEEDED
Status: DISCONTINUED | OUTPATIENT
Start: 2023-08-28 | End: 2023-08-28 | Stop reason: HOSPADM

## 2023-08-28 RX ORDER — CEFAZOLIN SODIUM 1 G/3ML
INJECTION, POWDER, FOR SOLUTION INTRAMUSCULAR; INTRAVENOUS AS NEEDED
Status: DISCONTINUED | OUTPATIENT
Start: 2023-08-28 | End: 2023-08-28

## 2023-08-28 RX ORDER — LIDOCAINE HYDROCHLORIDE AND EPINEPHRINE 10; 10 MG/ML; UG/ML
INJECTION, SOLUTION INFILTRATION; PERINEURAL AS NEEDED
Status: DISCONTINUED | OUTPATIENT
Start: 2023-08-28 | End: 2023-08-28 | Stop reason: HOSPADM

## 2023-08-28 RX ORDER — DEXAMETHASONE SODIUM PHOSPHATE 10 MG/ML
INJECTION, SOLUTION INTRAMUSCULAR; INTRAVENOUS AS NEEDED
Status: DISCONTINUED | OUTPATIENT
Start: 2023-08-28 | End: 2023-08-28

## 2023-08-28 RX ORDER — FENTANYL CITRATE/PF 50 MCG/ML
50 SYRINGE (ML) INJECTION
Status: DISCONTINUED | OUTPATIENT
Start: 2023-08-28 | End: 2023-08-28 | Stop reason: HOSPADM

## 2023-08-28 RX ORDER — EPHEDRINE SULFATE 50 MG/ML
INJECTION INTRAVENOUS AS NEEDED
Status: DISCONTINUED | OUTPATIENT
Start: 2023-08-28 | End: 2023-08-28

## 2023-08-28 RX ORDER — FENTANYL CITRATE 50 UG/ML
INJECTION, SOLUTION INTRAMUSCULAR; INTRAVENOUS AS NEEDED
Status: DISCONTINUED | OUTPATIENT
Start: 2023-08-28 | End: 2023-08-28

## 2023-08-28 RX ORDER — PROMETHAZINE HYDROCHLORIDE 25 MG/ML
25 INJECTION, SOLUTION INTRAMUSCULAR; INTRAVENOUS ONCE AS NEEDED
Status: COMPLETED | OUTPATIENT
Start: 2023-08-28 | End: 2023-08-28

## 2023-08-28 RX ORDER — SODIUM CHLORIDE, SODIUM LACTATE, POTASSIUM CHLORIDE, CALCIUM CHLORIDE 600; 310; 30; 20 MG/100ML; MG/100ML; MG/100ML; MG/100ML
INJECTION, SOLUTION INTRAVENOUS CONTINUOUS PRN
Status: DISCONTINUED | OUTPATIENT
Start: 2023-08-28 | End: 2023-08-28

## 2023-08-28 RX ORDER — PROPOFOL 10 MG/ML
INJECTION, EMULSION INTRAVENOUS AS NEEDED
Status: DISCONTINUED | OUTPATIENT
Start: 2023-08-28 | End: 2023-08-28

## 2023-08-28 RX ORDER — LIDOCAINE HYDROCHLORIDE 10 MG/ML
INJECTION, SOLUTION EPIDURAL; INFILTRATION; INTRACAUDAL; PERINEURAL AS NEEDED
Status: DISCONTINUED | OUTPATIENT
Start: 2023-08-28 | End: 2023-08-28

## 2023-08-28 RX ORDER — CEFAZOLIN SODIUM 1 G/50ML
1000 SOLUTION INTRAVENOUS ONCE
Status: DISCONTINUED | OUTPATIENT
Start: 2023-08-28 | End: 2023-08-28 | Stop reason: HOSPADM

## 2023-08-28 RX ADMIN — LIDOCAINE HYDROCHLORIDE 50 MG: 10 INJECTION, SOLUTION EPIDURAL; INFILTRATION; INTRACAUDAL; PERINEURAL at 07:36

## 2023-08-28 RX ADMIN — SODIUM CHLORIDE, SODIUM LACTATE, POTASSIUM CHLORIDE, AND CALCIUM CHLORIDE: .6; .31; .03; .02 INJECTION, SOLUTION INTRAVENOUS at 07:27

## 2023-08-28 RX ADMIN — FENTANYL CITRATE 25 MCG: 50 INJECTION INTRAMUSCULAR; INTRAVENOUS at 08:04

## 2023-08-28 RX ADMIN — DEXAMETHASONE SODIUM PHOSPHATE 10 MG: 10 INJECTION, SOLUTION INTRAMUSCULAR; INTRAVENOUS at 07:36

## 2023-08-28 RX ADMIN — FENTANYL CITRATE 25 MCG: 50 INJECTION INTRAMUSCULAR; INTRAVENOUS at 07:54

## 2023-08-28 RX ADMIN — FENTANYL CITRATE 50 MCG: 50 INJECTION INTRAMUSCULAR; INTRAVENOUS at 07:27

## 2023-08-28 RX ADMIN — CEFAZOLIN 2000 MG: 1 INJECTION, POWDER, FOR SOLUTION INTRAMUSCULAR; INTRAVENOUS at 07:44

## 2023-08-28 RX ADMIN — EPHEDRINE SULFATE 10 MG: 50 INJECTION INTRAVENOUS at 08:00

## 2023-08-28 RX ADMIN — PROMETHAZINE HYDROCHLORIDE 25 MG: 25 INJECTION INTRAMUSCULAR; INTRAVENOUS at 08:43

## 2023-08-28 RX ADMIN — ONDANSETRON 4 MG: 2 INJECTION INTRAMUSCULAR; INTRAVENOUS at 07:36

## 2023-08-28 RX ADMIN — KETOROLAC TROMETHAMINE 15 MG: 30 INJECTION, SOLUTION INTRAMUSCULAR; INTRAVENOUS at 08:27

## 2023-08-28 RX ADMIN — HYDROMORPHONE HYDROCHLORIDE 0.5 MG: 1 INJECTION, SOLUTION INTRAMUSCULAR; INTRAVENOUS; SUBCUTANEOUS at 08:38

## 2023-08-28 RX ADMIN — PROPOFOL 120 MG: 10 INJECTION, EMULSION INTRAVENOUS at 07:36

## 2023-08-28 NOTE — OP NOTE
OPERATIVE REPORT  PATIENT NAME: Fabiola Rodriguez    :  1950  MRN: [id number]  Pt Location: [facility] OR ROOM 08    SURGERY DATE: 2023    Surgeon(s) and Role:     * Maritza Latif MD - Primary     * Morenita Baker MD - Assisting    Preop Diagnosis:  Lymphadenopathy [R59.1]    Post-Op Diagnosis Codes:     * Lymphadenopathy [R59.1]    Procedure(s):  1. Excisional biopsy of left axillary lymph node x2    Specimen(s):  ID Type Source Tests Collected by Time Destination   1 : left axillary lymph node  Tissue Lymph Node - Lymphoma Prtocol TISSUE EXAM, LEUKEMIA/LYMPHOMA FLOW CYTOMETRY Maritza Latif MD 2023 [id number]    2 : left axillary lymph node  Tissue Lymph Node TISSUE Marilu Gallardo MD 2023 [id number]    3 : left axillary lymph node #2 Tissue Lymph Node TISSUE EXAM Maritza Latif MD 2023 [id number]        Estimated Blood Loss:   Minimal    Drains:  * No LDAs found *    Anesthesia Type:   General    Operative Indications:  Lymphadenopathy [R611]  70-year-old female with past medical history of an atypical lymphoproliferative disorder, Sjogren's syndrome on Plaquenil and polyarthralgia rheumatica who we are seeing for diffuse enlarging lymphadenopathy    Operative Findings:  Multiple enlarged left axillary lymph nodes. Performed excisional biopsy of 2 lymph nodes and sent portions for lymphoma protocol as well as routine pathology    Complications:   None    Procedure and Technique:  After obtaining informed consent the patient was brought back to the operating room and placed supine on the OR table. Monitored anesthesia was administered and an LMA was placed without issue. We then put a shoulder roll behind the patient's neck to help with elevation of the left axilla. We performed a complete timeout verifying patient, date of birth, procedure, antibiotics, beta-blockers, specimen handling, and all other indicated steps. She was prepped and draped in standard sterile fashion.   We identified her previous left axillary incision. We could clearly palpate lymph nodes in the left axilla through here. Using 10 mL of 1% lidocaine with epi we anesthetize the skin and made a 3 cm incision. We dissected down through the soft tissue with cautery and identified 2 abnormal lymph nodes. These were both approximately 2 to 3 cm in size. These were fully excised. We made certain to tie off any vasculature as well as lymphatics. Once these were removed they were cut in half. Half a specimen was sent for lymphoma protocol and the other half was sent for routine permanent pathology. Hemostasis was verified with packing and cautery. We then washed out began to close. Deep layers were closed with interrupted 3-0 Vicryl's to obliterate any dead space. Superficial skin was closed with 4-0 Monocryl and glue. All surgical instrument needle and sponge counts were correct at the conclusion of the procedure. The patient was extubated and transferred to the PACU in stable condition. I was present for the entire procedure.     Patient Disposition:  PACU     This procedure was not performed to treat breast cancer through sentinel node biopsy         SIGNATURE: Enrique Becerra MD  DATE: August 28, 2023  TIME: 8:36 AM

## 2023-08-28 NOTE — ANESTHESIA PREPROCEDURE EVALUATION
Procedure:  EXCISION BIOPSY LYMPH NODE LEFT AXILLARY (Left: Neck)    Relevant Problems   ENDO   (+) Hypothyroidism      GI/HEPATIC   (+) Acid reflux disease   (+) H/O bariatric surgery - bypass      /RENAL   (+) Stage 3b chronic kidney disease (HCC)      HEMATOLOGY   (+) Anemia   (+) Iron deficiency anemia following bariatric surgery      MUSCULOSKELETAL   (+) Generalized osteoarthritis      NEURO/PSYCH   (+) Depression      09/20/2021:  LEFT VENTRICLE:  Systolic function was normal by visual assessment. Ejection fraction was estimated to be 55 %. There were no regional wall motion abnormalities.     RIGHT VENTRICLE:  Systolic pressure was mildly increased. Estimated peak pressure was 40 mmHg.     LEFT ATRIUM:  The atrium was mildly to moderately dilated.     RIGHT ATRIUM:  The atrium was mildly dilated.     MITRAL VALVE:  There was trace regurgitation.     AORTIC VALVE:  The valve was trileaflet. Leaflets exhibited normal thickness, moderate calcification, and mildly reduced cuspal separation. Transaortic velocity was increased due to valvular stenosis. There was mild stenosis. There was mild regurgitation.     TRICUSPID VALVE:  There was mild regurgitation.       Physical Exam    Airway    Mallampati score: II  TM Distance: >3 FB  Neck ROM: full     Dental   Comment: Lower dental implants, No notable dental hx     Cardiovascular      Pulmonary      Other Findings        Anesthesia Plan  ASA Score- 3     Anesthesia Type- general with ASA Monitors. Additional Monitors:   Airway Plan: LMA. Plan Factors-Exercise tolerance (METS): >4 METS. Chart reviewed. Existing labs reviewed. Patient summary reviewed. Induction- intravenous. Postoperative Plan- Plan for postoperative opioid use. Informed Consent- Anesthetic plan and risks discussed with patient. I personally reviewed this patient with the CRNA. Discussed and agreed on the Anesthesia Plan with the CRNA. Trini Smith

## 2023-08-28 NOTE — ANESTHESIA POSTPROCEDURE EVALUATION
Post-Op Assessment Note    CV Status:  Stable  Pain Score: 0    Pain management: adequate     Mental Status:  Alert and awake   Hydration Status:  Euvolemic   PONV Controlled:  Controlled   Airway Patency:  Patent      Post Op Vitals Reviewed: Yes      Staff: Anesthesiologist, CRNA         No notable events documented.     /61 (08/28/23 0831)    Temp (!) 96.6 °F (35.9 °C) (08/28/23 0831)    Pulse 68 (08/28/23 0831)   Resp 16 (08/28/23 0831)    SpO2   98

## 2023-08-28 NOTE — DISCHARGE INSTR - AVS FIRST PAGE
Thoracic Surgery Discharge Instructions    Wound Care -  Gently wash your incisions daily with soap and water, do not soak in a tub. Do not apply any lotions, creams, or ointments to incisions. Your incision is covered in skin glue. Do not peel or remove, allow glue to slough off on its own. No other dressings needed. Restrictions - No lifting over 20 lbs or strenuous exercise. We would like you to remain active, walking, stairs, going outside etc.      Follow-up - Please call Dr. Shawna Ramirez office at [phone] to schedule your follow-up appointment in two weeks. Call the office with any questions/concerns prior to that. Pain - Okay to use over the counter Tylenol as needed for pain.

## 2023-08-29 ENCOUNTER — TELEPHONE (OUTPATIENT)
Dept: HEMATOLOGY ONCOLOGY | Facility: CLINIC | Age: 73
End: 2023-08-29

## 2023-08-29 NOTE — TELEPHONE ENCOUNTER
Patient Call    Who are you speaking with? Patient    If it is not the patient, are they listed on an active communication consent form? N/A   What is the reason for this call? Patient calling in stating that she was told to call us to schedule her first post op. Does this require a call back? Yes   If a call back is required, please list best call back number [phone]   If a call back is required, advise that a message will be forwarded to their care team and someone will return their call as soon as possible. Did you relay this information to the patient?  Yes

## 2023-08-30 ENCOUNTER — OFFICE VISIT (OUTPATIENT)
Dept: URGENT CARE | Facility: CLINIC | Age: 73
End: 2023-08-30
Payer: COMMERCIAL

## 2023-08-30 VITALS
OXYGEN SATURATION: 98 % | DIASTOLIC BLOOD PRESSURE: 77 MMHG | HEART RATE: 72 BPM | TEMPERATURE: 98.3 F | BODY MASS INDEX: 29.29 KG/M2 | RESPIRATION RATE: 18 BRPM | SYSTOLIC BLOOD PRESSURE: 139 MMHG | WEIGHT: 150 LBS

## 2023-08-30 DIAGNOSIS — S05.11XA ECCHYMOSIS OF RIGHT EYE, INITIAL ENCOUNTER: Primary | ICD-10-CM

## 2023-08-30 PROCEDURE — 99213 OFFICE O/P EST LOW 20 MIN: CPT | Performed by: NURSE PRACTITIONER

## 2023-08-30 NOTE — PATIENT INSTRUCTIONS
--Suspected bruising from recent use of mask on face. Monitor for now.   Expect gradual fading and resolution over the next 2-3 weeks  --Follow-up with PCP or eye doctor if no resolution/worsening/recurrent

## 2023-08-30 NOTE — PROGRESS NOTES
[facility]        NAME: Samul Holstein is a 72 y.o. female  : 1950    MRN: [id number]  DATE: 2023  TIME: 3:16 PM    Assessment and Plan   Ecchymosis of right eye, initial encounter [S05.11XA]  1. Ecchymosis of right eye, initial encounter              Patient Instructions     --Suspected bruising from recent use of mask on face. No red flags. Monitor for now. Expect gradual fading and resolution over the next 2-3 weeks  --Follow-up with PCP or eye doctor if no resolution/worsening/recurrent    Chief Complaint     Chief Complaint   Patient presents with   • Eye Problem     Patient had lymph nodes removed on Monday, patient noticed bruise to the right eye Monday afternoon         History of Present Illness       Here with complaints of painless, small area of bruised skin just lateral to right eye. First noticed two days ago when she woke up from surgical procedure (left axillary lymph node biopsy). She recalls mask being placed on her face just prior, and suspects that this may be the cause. No worsening since. No vision changes. Notes easy bruising at baseline. History of SLE, Sjogren's. Review of Systems   Review of Systems   Constitutional: Negative for fever. Eyes: Negative for pain, redness and visual disturbance.          Current Medications       Current Outpatient Medications:   •  acetaminophen (TYLENOL) 325 mg tablet, Take 2 tablets (650 mg total) by mouth every 6 (six) hours as needed for mild pain, Disp: , Rfl: 0  •  buPROPion (WELLBUTRIN) 100 mg tablet, Take 100 mg by mouth 3 (three) times a day., Disp: , Rfl:   •  calcium carbonate (Calcium 600) 600 MG tablet, , Disp: , Rfl:   •  Cholecalciferol (D3-50) 1.25 MG (76770 UT) capsule, , Disp: , Rfl:   •  cycloSPORINE (RESTASIS) 0.05 % ophthalmic emulsion, instill 1 drop into both eyes twice a day, Disp: , Rfl:   •  ferrous sulfate 325 (65 Fe) mg tablet, Take 1 tablet (325 mg total) by mouth 2 (two) times a day with meals, Disp: 60 tablet, Rfl: 0  •  LORazepam (ATIVAN) 1 mg tablet, if needed, Disp: , Rfl:   •  QUEtiapine (SEROquel) 25 mg tablet, if needed, Disp: , Rfl:   •  QUEtiapine (SEROquel) 300 mg tablet, Take 300 mg by mouth daily at bedtime. , Disp: , Rfl:   •  hydroxychloroquine (PLAQUENIL) 200 mg tablet, Take 1 tablet (200 mg total) by mouth 2 (two) times a day with meals, Disp: 180 tablet, Rfl: 3  •  pilocarpine (SALAGEN) 5 mg tablet, Take 1 tablet (5 mg total) by mouth 3 (three) times a day, Disp: 90 tablet, Rfl: 5  •  temazepam (RESTORIL) 30 mg capsule, Take 2 capsules by mouth daily at bedtime as needed , Disp: , Rfl:     Current Facility-Administered Medications:   •  cyanocobalamin injection 1,000 mcg, 1,000 mcg, Intramuscular, Q30 Days, Melva Scott MD, 1,000 mcg at 08/22/23 1251    Current Allergies     Allergies as of 08/30/2023 - Reviewed 08/30/2023   Allergen Reaction Noted   • Ciprofloxacin Hives and Swelling 08/23/2016   • Cymbalta [duloxetine hcl] Other (See Comments) 08/23/2016   • Nirmatrelvir-ritonavir Diarrhea, Nausea Only, Other (See Comments), Dizziness, and Lightheadedness 10/15/2022   • Percocet [oxycodone-acetaminophen] Other (See Comments) 08/22/2020            The following portions of the patient's history were reviewed and updated as appropriate: allergies, current medications, past family history, past medical history, past social history, past surgical history and problem list.     Past Medical History:   Diagnosis Date   • Acute kidney injury superimposed on chronic kidney disease ([address]) 09/20/2021   • Age-related osteoporosis without current pathological fracture 06/01/2023   • Allergic rhinitis    • Anemia    • Anxiety    • Bipolar 1 disorder ([address])    • Constipation    • Deaf     Pt lost all hearing in right ear after having [place] measles   • Depression    • Diarrhea    • Fatigue    • GERD (gastroesophageal reflux disease) 08/01/2020   • Hard of hearing     Pt wears a hearing in left ear. • HL (hearing loss)    • Hypothyroidism    • Lung nodule    • Nasal congestion    • Osteoporosis    • Pneumonia 02/10/2017   • Pneumonia due to Streptococcus ([address]) 09/19/2021   • Psychiatric disorder    • Sjogren's syndrome (HCC)    • Systemic lupus erythematosus (HCC)    • Wears glasses    • Wears partial dentures        Past Surgical History:   Procedure Laterality Date   • BREAST BIOPSY Right    • COLONOSCOPY     • EYE SURGERY     • FRACTURE SURGERY Right     elbow   • GASTRIC BYPASS     • HYSTERECTOMY Bilateral 1975   • IR BIOPSY LYMPH NODE  10/08/2020   • LUNG BIOPSY     • LYMPH NODE BIOPSY  09/18/2020   • LYMPH NODE BIOPSY Left 8/28/2023    Procedure: EXCISION BIOPSY LYMPH NODE LEFT AXILLARY;  Surgeon: Deya Knox MD;  Location: [facility];  Service: Thoracic   •  [address] INCL FLUOR GDNCE DX W/CELL WASHG [address] [facility] N/A 12/08/2017    Procedure: BRONCHOSCOPY;  Surgeon: Day Peck MD;  Location: [facility];   Service: Pulmonary   • NC BX/EXC LYMPH NODE NEEDLE SUPERFICIAL Left 02/25/2021    Procedure: EXCISION BIOPSY LYMPH NODE: left axillary lymph node biopsy;  Surgeon: Collin Ritchie MD;  Location: [facility];  Service: Thoracic   • TONSILLECTOMY         Family History   Problem Relation Age of Onset   • Heart disease Mother    • Diabetes Mother    • Kidney cancer Mother    • Cancer Mother         Kidney   • Hypertension Mother    • Heart disease Father    • Stroke Father    • Diabetes Father    • Cancer Father         Rectal Cancer   • Hypertension Father    • No Known Problems Maternal Grandmother    • No Known Problems Maternal Grandfather    • No Known Problems Paternal Grandmother    • No Known Problems Paternal Grandfather    • Leukemia Half-Sister 48   • No Known Problems Half-Sister    • No Known Problems Half-Sister    • No Known Problems Half-Sister    • BRCA2 Positive Neg Hx    • BRCA2 Negative Neg Hx    • BRCA1 Negative Neg Hx    • Endometrial cancer Neg Hx    • Breast cancer Neg Hx    • Breast cancer additional onset Neg Hx    • Colon cancer Neg Hx    • Ovarian cancer Neg Hx    • BRCA1 Positive Neg Hx    • BRCA 1/2 Neg Hx          Medications have been verified. Objective   /77   Pulse 72   Temp 98.3 °F (36.8 °C)   Resp 18   Wt 68 kg (150 lb)   LMP  (LMP Unknown)   SpO2 98%   BMI 29.29 kg/m²   No LMP recorded (lmp unknown). Patient is postmenopausal.       Physical Exam     Physical Exam  Eyes:      General:         Right eye: No discharge. Left eye: No discharge. Extraocular Movements: Extraocular movements intact. Conjunctiva/sclera: Conjunctivae normal.      Comments: Small (1 cm) area of skin immediately adjacent to right lateral canthus with nontender, non-palpable, ecchymotic patch. Both lids with thin skin, prominent capillaries consistent with age, Sjogren's. Eyes, remainder of face otherwise with normal appearance. No subconjunctival hemorrhage noted. No signs of trauma. Multiple bruises on arms (baseline, per patient). Pulmonary:      Effort: Pulmonary effort is normal.   Neurological:      Mental Status: She is alert.    Psychiatric:         Mood and Affect: Mood normal.

## 2023-08-31 RX ORDER — SODIUM CHLORIDE 9 MG/ML
20 INJECTION, SOLUTION INTRAVENOUS ONCE
Status: CANCELLED | OUTPATIENT
Start: 2023-09-18

## 2023-09-01 LAB — SCAN RESULT: NORMAL

## 2023-09-06 ENCOUNTER — TELEPHONE (OUTPATIENT)
Dept: FAMILY MEDICINE CLINIC | Facility: CLINIC | Age: 73
End: 2023-09-06

## 2023-09-06 ENCOUNTER — TELEPHONE (OUTPATIENT)
Dept: INFUSION CENTER | Facility: CLINIC | Age: 73
End: 2023-09-06

## 2023-09-06 NOTE — TELEPHONE ENCOUNTER
I reached out to patient to get her schedule for venofer treatments. Patient confirmed all appointment dates and times.

## 2023-09-06 NOTE — TELEPHONE ENCOUNTER
Guy Rivas saw her ekg results she had done on 8/28 in my chart. States the say abnormal ekg.  (did state they were having trouble with ekg machine)  Would like to know if Dr. Estella Lynn would want her to have another ekg. Please let her know.

## 2023-09-07 DIAGNOSIS — D64.9 ANEMIA, UNSPECIFIED TYPE: Primary | ICD-10-CM

## 2023-09-07 DIAGNOSIS — Z82.49 FAMILY HISTORY OF EARLY CAD: ICD-10-CM

## 2023-09-07 DIAGNOSIS — R94.31 ABNORMAL EKG: ICD-10-CM

## 2023-09-07 DIAGNOSIS — E03.9 HYPOTHYROIDISM, UNSPECIFIED TYPE: ICD-10-CM

## 2023-09-13 ENCOUNTER — OFFICE VISIT (OUTPATIENT)
Dept: CARDIAC SURGERY | Facility: CLINIC | Age: 73
End: 2023-09-13

## 2023-09-13 ENCOUNTER — TELEPHONE (OUTPATIENT)
Dept: HEMATOLOGY ONCOLOGY | Facility: CLINIC | Age: 73
End: 2023-09-13

## 2023-09-13 VITALS
HEART RATE: 82 BPM | TEMPERATURE: 97.7 F | BODY MASS INDEX: 29 KG/M2 | OXYGEN SATURATION: 98 % | WEIGHT: 147.71 LBS | RESPIRATION RATE: 14 BRPM | HEIGHT: 60 IN | SYSTOLIC BLOOD PRESSURE: 115 MMHG | DIASTOLIC BLOOD PRESSURE: 70 MMHG

## 2023-09-13 DIAGNOSIS — R59.1 LYMPHADENOPATHY: Primary | ICD-10-CM

## 2023-09-13 PROCEDURE — 99024 POSTOP FOLLOW-UP VISIT: CPT | Performed by: THORACIC SURGERY (CARDIOTHORACIC VASCULAR SURGERY)

## 2023-09-13 NOTE — PROGRESS NOTES
Assessment/Plan:    Lymphadenopathy  We had a discussion with Danika Hughesn regarding her pathology results from her left axillary node biopsy. This was consistent with B cell lymphoma. She already has an appt with Dr. Prasanth Horn in the beginning of October. She will only need to return to our office on as needed basis. She is in agreement with the plan. Diagnoses and all orders for this visit:    Lymphadenopathy          Thoracic History       Patient ID: Ronnie Rich is a 72 y.o. female. ECOG 1     HPI   Ms. Erik Saez is a 72year old female who underwent an excisional biopsy of two left axillary lymph nodes on 8/28/23. The pathology is consistent with Bcell lymphoma (B-SLL/CLL). On discussion, she is feeling okay. She is not having pain and not taking any pain medication. She denies fever, chills. She has an appt with Dr. Prasanth Horn on 10/9/23. The following portions of the patient's history were reviewed and updated as appropriate: allergies, current medications, past family history, past medical history, past social history, past surgical history and problem list.    Review of Systems      Objective:   Physical Exam  Vitals reviewed. Constitutional:       General: She is not in acute distress. Appearance: Normal appearance. HENT:      Head: Normocephalic and atraumatic. Nose: Nose normal.   Eyes:      Extraocular Movements: Extraocular movements intact. Comments: + glasses   Cardiovascular:      Rate and Rhythm: Normal rate and regular rhythm. Heart sounds: Normal heart sounds. No murmur heard. Pulmonary:      Effort: Pulmonary effort is normal. No respiratory distress. Breath sounds: Normal breath sounds. Abdominal:      Palpations: Abdomen is soft. Musculoskeletal:      Cervical back: Normal range of motion and neck supple. Skin:     General: Skin is warm and dry. Comments: Left axillary incision healing well. No evidence of infection.     Neurological:      Mental Status: She is alert and oriented to person, place, and time. Cranial Nerves: No cranial nerve deficit. Motor: No weakness. Psychiatric:         Mood and Affect: Mood normal.         Behavior: Behavior normal.         Thought Content: Thought content normal.     /70 (BP Location: Left arm, Patient Position: Sitting, Cuff Size: Standard)   Pulse 82   Temp 97.7 °F (36.5 °C) (Temporal)   Resp 14   Ht 5' (1.524 m)   Wt 67 kg (147 lb 11.3 oz)   LMP  (LMP Unknown)   SpO2 98%   BMI 28.85 kg/m²       CT chest abdomen pelvis wo contrast    Result Date: 7/6/2023  Narrative CT CHEST, ABDOMEN AND PELVIS WITHOUT IV CONTRAST INDICATION:   R59.1: Generalized enlarged lymph nodes. COMPARISON: Previous study from December 16, 2022, previous study from December 4, 2018 TECHNIQUE: CT examination of the chest, abdomen and pelvis was performed without intravenous contrast. Multiplanar 2D reformatted images were created from the source data. This examination, like all CT scans performed in the [facility], was performed utilizing techniques to minimize radiation dose exposure, including the use of iterative reconstruction and automated exposure control. Radiation dose length product (DLP) for this visit:  536 mGy-cm Enteric contrast was administered. FINDINGS: CHEST LUNGS: Again noted are areas of groundglass density in the both lungs with predominant perihilar and central distribution. Again noted are scattered lung cysts There is a nodular density left lingular region which measures about 1 cm, larger from the previous study.  This is seen in image 107 series 4 Additional nodular density seen abutting the right dome of diaphragm, measuring about 6 mm Scattered lung granuloma are seen there are additional not measurable lung nodules as seen on the previous study There is a left upper lobe lung nodule which measures 2.5 mm PLEURA: No pleural effusion seen No pneumothorax seen HEART/GREAT VESSELS: Heart is unremarkable for patient's age. No thoracic aortic aneurysm. MEDIASTINUM AND KACEY: Lower right paratracheal lymph node are seen which measure about 1.3 cm x 0.9 cm, stable Small lower left paratracheal lymph nodes are seen, unchanged Subcarinal lymph nodes are seen measuring about 1.1 cm x 2 cm, stable CHEST WALL AND LOWER NECK: Bilateral axillary lymph nodes are noted There is a lymph node in the right axilla, image 58 series 601, measuring 2.9 x 1.6 x 2.8 cm. Previously measuring 1.9 x 1.2 x 1.6 cm, larger Additional lymph node in the right axilla measures 3.9 x 2.1 x 3.4 cm, previously measuring 3.5 x 1.7 x 2.5 cm Enlarged left axillary lymph nodes are seen. Level 2 marcin left axillary lymph node measures 2.4 x 2.2 x 1.7 cm, previously measuring 1.6 x 1.9 x 1.4 cm Lymph nodes are also seen in the bilateral lower neck with largest right supraclavicular lymph node measuring 1.8 x 1.2 cm ABDOMEN LIVER/BILIARY TREE: No hepatomegaly seen GALLBLADDER:  No calcified gallstones. No pericholecystic inflammatory change. SPLEEN: The spleen measures 11.6 cm, stable in size PANCREAS:  Unremarkable. ADRENAL GLANDS:  Unremarkable. 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 Mild retrograde telescoping of the bowel loops at the JJ anastomosis without bowel obstruction APPENDIX:  No findings to suggest appendicitis. ABDOMINOPELVIC CAVITY: There are bilateral pelvic sidewall lymph nodes. The largest left obturator lymph node, measures 2.7 x 1.2 cm x 2.4 cm, previously measuring about 3.2 x 1.4 x 2.9 cm.  Right obturator lymph node measures 1.5 x 1 x 1.4 cm. Previously measuring 1.7 x 0.8 x 1.6 cm Left common iliac lymph nodes are seen the largest left common iliac lymph node measures about 1 cm x 1.5 cm, previously measuring 1.89 x 1.8 cm Right common iliac lymph node measuring about 1 cm x 0.9 cm, previously measuring 1 x 0.8 cm Small para-aortic and aortocaval lymph nodes are seen The largest aortocaval lymph node measures 1.2 x 1.1 x 1.8 cm. Previously this lymph node is measured at 0.8 x 0.9 x 1.3 cm Enlarged right cardiophrenic angle region lymph nodes are seen, measuring 3.5 x 2.2 x 1.3 cm  previously measuring 2.6 x 1.9 cm x 1 cm. These are not visible on the previous study of 2018, mildly larger from the previous study in December 16, 2022 VESSELS:  Unremarkable for patient's age. PELVIS REPRODUCTIVE ORGANS:  Unremarkable for patient's age. URINARY BLADDER:  Unremarkable. ABDOMINAL WALL/INGUINAL REGIONS: Bilateral inguinal region lymph nodes are seen, mildly larger and no largest right inguinal region lymph node, measures 2.3 x 1.1 x 0.9 cm, previously measuring 1.5 x 1.1 x 0.6 cm.  Mildly enlarged right external iliac lymph nodes are seen and mildly enlarged left external iliac lymph nodes are seen OSSEOUS STRUCTURES: No new lytic destructive lesion seen Vertebral hemangioma in the T7 vertebral     Impression There is mild enlargement of the axillary lymph nodes and mild enlargement of the aortocaval, and inguinal region lymph nodes as compared to the previous study from December 4, 2018 and December 16, 2022 The largest lymph node is seen in the right cardiophrenic angle region measuring 3.5 x 2.2 x 1.3 cm, this lymph node is not measurable in 2018 and measured about 2.6 x 1.9 x 1 cm in CT of December 16, 2022, PET scan be helpful in evaluating this abnormality further There is no new organomegaly, stable splenic size with a longitudinal craniocaudal length of 11.64 cm No new lytic lesion in the visualized bony skeleton Groundglass density in the both lungs with associated lung cyst Irregular nodular density left lingular region measuring about 1.3 cm, consider PET scan The study was marked in EPIC for significant notification.  Workstation performed: [id number]

## 2023-09-13 NOTE — ASSESSMENT & PLAN NOTE
We had a discussion with Tano Mckeon regarding her pathology results from her left axillary node biopsy. This was consistent with B cell lymphoma. She already has an appt with Dr. Gertrudis Juárez in the beginning of October. She will only need to return to our office on as needed basis. She is in agreement with the plan.

## 2023-09-18 ENCOUNTER — OFFICE VISIT (OUTPATIENT)
Dept: HEMATOLOGY ONCOLOGY | Facility: CLINIC | Age: 73
End: 2023-09-18
Payer: COMMERCIAL

## 2023-09-18 ENCOUNTER — DOCUMENTATION (OUTPATIENT)
Dept: HEMATOLOGY ONCOLOGY | Facility: CLINIC | Age: 73
End: 2023-09-18

## 2023-09-18 ENCOUNTER — HOSPITAL ENCOUNTER (OUTPATIENT)
Dept: INFUSION CENTER | Facility: CLINIC | Age: 73
Discharge: HOME | End: 2023-09-18
Payer: COMMERCIAL

## 2023-09-18 VITALS
DIASTOLIC BLOOD PRESSURE: 82 MMHG | OXYGEN SATURATION: 97 % | HEART RATE: 76 BPM | TEMPERATURE: 97.6 F | SYSTOLIC BLOOD PRESSURE: 121 MMHG | RESPIRATION RATE: 18 BRPM

## 2023-09-18 VITALS
TEMPERATURE: 97.5 F | SYSTOLIC BLOOD PRESSURE: 110 MMHG | HEART RATE: 84 BPM | OXYGEN SATURATION: 96 % | WEIGHT: 146.8 LBS | DIASTOLIC BLOOD PRESSURE: 80 MMHG | HEIGHT: 60 IN | BODY MASS INDEX: 28.82 KG/M2 | RESPIRATION RATE: 17 BRPM

## 2023-09-18 DIAGNOSIS — K95.89 IRON DEFICIENCY ANEMIA FOLLOWING BARIATRIC SURGERY: Primary | ICD-10-CM

## 2023-09-18 DIAGNOSIS — D50.8 IRON DEFICIENCY ANEMIA FOLLOWING BARIATRIC SURGERY: Primary | ICD-10-CM

## 2023-09-18 DIAGNOSIS — K95.89 IRON DEFICIENCY ANEMIA FOLLOWING BARIATRIC SURGERY: ICD-10-CM

## 2023-09-18 DIAGNOSIS — Z98.84 H/O BARIATRIC SURGERY: ICD-10-CM

## 2023-09-18 DIAGNOSIS — D50.8 IRON DEFICIENCY ANEMIA FOLLOWING BARIATRIC SURGERY: ICD-10-CM

## 2023-09-18 DIAGNOSIS — C83.00 SMALL LYMPHOCYTIC LYMPHOMA (HCC): Primary | ICD-10-CM

## 2023-09-18 PROCEDURE — 99215 OFFICE O/P EST HI 40 MIN: CPT | Performed by: INTERNAL MEDICINE

## 2023-09-18 PROCEDURE — 96365 THER/PROPH/DIAG IV INF INIT: CPT

## 2023-09-18 RX ORDER — SODIUM CHLORIDE 9 MG/ML
20 INJECTION, SOLUTION INTRAVENOUS ONCE
OUTPATIENT
Start: 2023-10-02

## 2023-09-18 RX ORDER — ZANUBRUTINIB 80 MG/1
CAPSULE, GELATIN COATED ORAL
Qty: 120 CAPSULE | Refills: 11 | Status: SHIPPED | OUTPATIENT
Start: 2023-09-18

## 2023-09-18 RX ORDER — SODIUM CHLORIDE 9 MG/ML
20 INJECTION, SOLUTION INTRAVENOUS ONCE
Status: COMPLETED | OUTPATIENT
Start: 2023-09-18 | End: 2023-09-18

## 2023-09-18 RX ADMIN — SODIUM CHLORIDE 20 ML/HR: 0.9 INJECTION, SOLUTION INTRAVENOUS at 14:19

## 2023-09-18 RX ADMIN — SODIUM CHLORIDE 200 MG: 9 INJECTION, SOLUTION INTRAVENOUS at 14:20

## 2023-09-18 NOTE — PROGRESS NOTES
Rcvd new oral chemo start-Letty // Danielle Cart is required     Patient Adriana Londono [birth year]    Patient has been enrolled with [facility]  ID# [id number]  CARD# [id number]  BIN# [id number]  PCN# [id number]  GRP# [id number]  AMT$ [id number]

## 2023-09-18 NOTE — PROGRESS NOTES
[facility] [address] [address]  [address] Basurto, [phone]  09/18/23    Discussion:   In summary, this is a 82-year-old female with a history of lupus, lymphadenopathy, lymphocytosis, MBL. Flow cytometry on recent axillary node biopsy showed findings consistent with CLL/SLL. Full pathology pending at this time. She has symptoms of progressive CLL/SLL. Anemia is partly related to iron deficiency, status post bariatric surgery but remains despite iron replacement. Iron studies are requested to verify adequacy of replacement. She has night sweats intermittently. She has had unintentional weight loss over the past 6 months, 10 pounds. CT shows approximately 20-25% progression over the past 6 months, December 2022-June 2023. We reviewed treatment options including Brukinsa versus venetoclax/obinutuzumab. We reviewed means of administration, potential toxicities of both of these programs. Efficacy is fairly similar. After consideration we elected to move forward with Brukinsa 320 mg p.o. daily. CBC, CMP every 2 weeks for 2 months. Monthly thereafter. Repeat CAT scan 3 months after starting treatment. Follow-up in 6 weeks. Our chemotherapy nurse reviewed and provided written information information regarding Brukinsa. We reviewed that the goal of treatment is disease control, palliative benefit, survival benefit but that cure is not likely. I discussed the above with the patient. The patient and her  voiced understanding and agreement.  ______________________________________________________________________    Chief Complaint   Patient presents with   • Follow-up       HPI:  Oncology History    No history exists. Interval History: Clinically stable.   ECOG-  1 - Symptomatic but completely ambulatory    Review of Systems   Constitutional: Positive for fatigue and unexpected weight change. Negative for appetite change, diaphoresis and fever. HENT: Negative for sinus pain. Eyes: Negative for discharge. Respiratory: Negative for cough and shortness of breath. Cardiovascular: Negative for chest pain. Gastrointestinal: Negative for abdominal pain, constipation and diarrhea. Endocrine: Negative for cold intolerance. Genitourinary: Negative for difficulty urinating and hematuria. Musculoskeletal: Negative for joint swelling. Skin: Negative for rash. Allergic/Immunologic: Negative for environmental allergies. Neurological: Negative for dizziness and headaches. Hematological: Negative for adenopathy. Psychiatric/Behavioral: Negative for agitation. Past Medical History:   Diagnosis Date   • Acute kidney injury superimposed on chronic kidney disease ([address]) 09/20/2021   • Age-related osteoporosis without current pathological fracture 06/01/2023   • Allergic rhinitis    • Anemia    • Anxiety    • Bipolar 1 disorder ([address])    • Constipation    • Deaf     Pt lost all hearing in right ear after having [place] measles   • Depression    • Diarrhea    • Fatigue    • GERD (gastroesophageal reflux disease) 08/01/2020   • Hard of hearing     Pt wears a hearing in left ear.    • HL (hearing loss)    • Hypothyroidism    • Lung nodule    • Nasal congestion    • Osteoporosis    • Pneumonia 02/10/2017   • Pneumonia due to Streptococcus [facility]) 09/19/2021   • Psychiatric disorder    • Sjogren's syndrome ([address])    • Systemic lupus erythematosus ([address])    • Wears glasses    • Wears partial dentures      Patient Active Problem List   Diagnosis   • Bipolar I disorder, single manic episode ([address])   • Hypothyroidism   • Change in bowel habits   • Systemic lupus erythematosus ([address])   • Acid reflux disease   • Anemia   • Depression   • Generalized osteoarthritis   • Heart burn   • Obesity   • Periorbital edema   • Polyarthralgia   • Postgastrectomy malabsorption   • Sjogren's syndrome ([address])   • Vaginal atrophy   • Vitamin D deficiency   • Closed fracture of base of fifth metatarsal bone of left foot   • Lymphadenopathy   • Iron deficiency anemia following bariatric surgery   • On belimumab therapy   • S/P gastric bypass   • Overweight   • Encounter for surgical aftercare following surgery of digestive system   • H/O bariatric surgery - bypass   • Petechial rash   • Stage 3b chronic kidney disease ([address])   • Age-related osteoporosis without current pathological fracture   • Recurrent UTI   • Monoclonal B-cell lymphocytosis   • Pulmonary nodule       Current Outpatient Medications:   •  acetaminophen (TYLENOL) 325 mg tablet, Take 2 tablets (650 mg total) by mouth every 6 (six) hours as needed for mild pain, Disp: , Rfl: 0  •  buPROPion (WELLBUTRIN) 100 mg tablet, Take 100 mg by mouth 3 (three) times a day., Disp: , Rfl:   •  calcium carbonate (Calcium 600) 600 MG tablet, , Disp: , Rfl:   •  Cholecalciferol (D3-50) 1.25 MG (67669 UT) capsule, , Disp: , Rfl:   •  cycloSPORINE (RESTASIS) 0.05 % ophthalmic emulsion, instill 1 drop into both eyes twice a day, Disp: , Rfl:   •  ferrous sulfate 325 (65 Fe) mg tablet, Take 1 tablet (325 mg total) by mouth 2 (two) times a day with meals, Disp: 60 tablet, Rfl: 0  •  hydroxychloroquine (PLAQUENIL) 200 mg tablet, Take 1 tablet (200 mg total) by mouth 2 (two) times a day with meals, Disp: 180 tablet, Rfl: 3  •  pilocarpine (SALAGEN) 5 mg tablet, Take 1 tablet (5 mg total) by mouth 3 (three) times a day, Disp: 90 tablet, Rfl: 5  •  QUEtiapine (SEROquel) 300 mg tablet, Take 300 mg by mouth daily at bedtime. , Disp: , Rfl:   •  temazepam (RESTORIL) 30 mg capsule, Take 2 capsules by mouth daily at bedtime as needed , Disp: , Rfl:   •  LORazepam (ATIVAN) 1 mg tablet, if needed, Disp: , Rfl:   •  QUEtiapine (SEROquel) 25 mg tablet, if needed, Disp: , Rfl:     Current Facility-Administered Medications:   •  cyanocobalamin injection 1,000 mcg, 1,000 mcg, Intramuscular, Q30 Days, Karla Atkins Antonia Lynne MD, 1,000 mcg at 08/22/23 1251  Allergies   Allergen Reactions   • Ciprofloxacin Hives and Swelling     Pt reports that lips and mouth and face swelled. • Cymbalta [Duloxetine Hcl] Other (See Comments)     Hallucinations, fatigue, faints   • Nirmatrelvir-Ritonavir Diarrhea, Nausea Only, Other (See Comments), Dizziness and Lightheadedness     "passed out"-nausea   • Percocet [Oxycodone-Acetaminophen] Other (See Comments)     Dry mouth - pt states it kept her up all night. States had to continually drink fluids     Past Surgical History:   Procedure Laterality Date   • BREAST BIOPSY Right    • COLONOSCOPY     • EYE SURGERY     • FRACTURE SURGERY Right     elbow   • GASTRIC BYPASS     • HYSTERECTOMY Bilateral 1975   • IR BIOPSY LYMPH NODE  10/08/2020   • LUNG BIOPSY     • LYMPH NODE BIOPSY  09/18/2020   • LYMPH NODE BIOPSY Left 8/28/2023    Procedure: EXCISION BIOPSY LYMPH NODE LEFT AXILLARY;  Surgeon: Kristi Tabares MD;  Location: [facility];  Service: Thoracic   •  [facility] INCL FLUOR GDNCE DX W/CELL WASHG 44 [facility] N/A 12/08/2017    Procedure: BRONCHOSCOPY;  Surgeon: Janeth Aquino MD;  Location: [facility]; Service: Pulmonary   • [place] BX/EXC LYMPH NODE NEEDLE SUPERFICIAL Left 02/25/2021    Procedure: EXCISION BIOPSY LYMPH NODE: left axillary lymph node biopsy;  Surgeon: Urban Hitchcock MD;  Location: [facility];  Service: Thoracic   • TONSILLECTOMY       Social History     Objective:  Vitals:    09/18/23 0913   BP: 110/80   BP Location: Left arm   Patient Position: Sitting   Cuff Size: Adult   Pulse: 84   Resp: 17   Temp: 97.5 °F (36.4 °C)   SpO2: 96%   Weight: 66.6 kg (146 lb 12.8 oz)   Height: 5' (1.524 m)     Physical Exam  Constitutional:       Appearance: She is well-developed. HENT:      Head: Normocephalic and atraumatic. Eyes:      Pupils: Pupils are equal, round, and reactive to light. Cardiovascular:      Rate and Rhythm: Normal rate. Heart sounds: No murmur heard.   Pulmonary: Effort: No respiratory distress. Breath sounds: No wheezing or rales. Abdominal:      General: There is no distension. Palpations: Abdomen is soft. Tenderness: There is no abdominal tenderness. There is no rebound. Musculoskeletal:         General: No tenderness. Cervical back: Neck supple. Lymphadenopathy:      Cervical: No cervical adenopathy. Skin:     General: Skin is warm. Findings: No rash. Neurological:      Mental Status: She is alert and oriented to person, place, and time. Deep Tendon Reflexes: Reflexes normal.   Psychiatric:         Thought Content: Thought content normal.           Labs: I personally reviewed the labs and imaging pertinent to this patient care.

## 2023-09-18 NOTE — LETTER
September 18, 2023     Sujit West MD  [address] [facility]  [address]  [address] [place] [address]    Patient: Stu Filney   YOB: 1950   Date of Visit: 9/18/2023       Dear Dr. Alma Rosa Kulkarni: Thank you for referring Stu Finley to me for evaluation. Below are my notes for this consultation. If you have questions, please do not hesitate to call me. I look forward to following your patient along with you. Sincerely,        Génesis Guzman, DO        CC: Starla Mayhew, MD Angelina Hammans, MD Inetta Rudd,   9/18/2023 10:28 AM  Sign when Signing Visit   [facility]  [address] [facility]  [address] [facility] [address] [phone]    [address] [place] [address], [phone]  09/18/23    Discussion:   In summary, this is a 72-year-old female with a history of lupus, lymphadenopathy, lymphocytosis, MBL. Flow cytometry on recent axillary node biopsy showed findings consistent with CLL/SLL. Full pathology pending at this time. She has symptoms of progressive CLL/SLL. Anemia is partly related to iron deficiency, status post bariatric surgery but remains despite iron replacement. Iron studies are requested to verify adequacy of replacement. She has night sweats intermittently. She has had unintentional weight loss over the past 6 months, 10 pounds. CT shows approximately 20-25% progression over the past 6 months, December 2022-June 2023. We reviewed treatment options including Brukinsa versus venetoclax/obinutuzumab. We reviewed means of administration, potential toxicities of both of these programs. Efficacy is fairly similar. After consideration we elected to move forward with Brukinsa 320 mg p.o. daily. CBC, CMP every 2 weeks for 2 months. Monthly thereafter. Repeat CAT scan 3 months after starting treatment. Follow-up in 6 weeks.   Our chemotherapy nurse reviewed and provided written information information regarding Brukinsa. We reviewed that the goal of treatment is disease control, palliative benefit, survival benefit but that cure is not likely. I discussed the above with the patient. The patient and her  voiced understanding and agreement.  ______________________________________________________________________    Chief Complaint   Patient presents with   • Follow-up       HPI:  Oncology History    No history exists. Interval History: Clinically stable. ECOG-  1 - Symptomatic but completely ambulatory    Review of Systems   Constitutional: Positive for fatigue and unexpected weight change. Negative for appetite change, diaphoresis and fever. HENT: Negative for sinus pain. Eyes: Negative for discharge. Respiratory: Negative for cough and shortness of breath. Cardiovascular: Negative for chest pain. Gastrointestinal: Negative for abdominal pain, constipation and diarrhea. Endocrine: Negative for cold intolerance. Genitourinary: Negative for difficulty urinating and hematuria. Musculoskeletal: Negative for joint swelling. Skin: Negative for rash. Allergic/Immunologic: Negative for environmental allergies. Neurological: Negative for dizziness and headaches. Hematological: Negative for adenopathy. Psychiatric/Behavioral: Negative for agitation. Past Medical History:   Diagnosis Date   • Acute kidney injury superimposed on chronic kidney disease ([address]) 09/20/2021   • Age-related osteoporosis without current pathological fracture 06/01/2023   • Allergic rhinitis    • Anemia    • Anxiety    • Bipolar 1 disorder ([address])    • Constipation    • Deaf     Pt lost all hearing in right ear after having [place] measles   • Depression    • Diarrhea    • Fatigue    • GERD (gastroesophageal reflux disease) 08/01/2020   • Hard of hearing     Pt wears a hearing in left ear.    • HL (hearing loss)    • Hypothyroidism    • Lung nodule    • Nasal congestion    • Osteoporosis    • Pneumonia 02/10/2017 • Pneumonia due to Streptococcus [facility]) 09/19/2021   • Psychiatric disorder    • Sjogren's syndrome ([address])    • Systemic lupus erythematosus ([address])    • Wears glasses    • Wears partial dentures      Patient Active Problem List   Diagnosis   • Bipolar I disorder, single manic episode ([address])   • Hypothyroidism   • Change in bowel habits   • Systemic lupus erythematosus ([address])   • Acid reflux disease   • Anemia   • Depression   • Generalized osteoarthritis   • Heart burn   • Obesity   • Periorbital edema   • Polyarthralgia   • Postgastrectomy malabsorption   • Sjogren's syndrome ([address])   • Vaginal atrophy   • Vitamin D deficiency   • Closed fracture of base of fifth metatarsal bone of left foot   • Lymphadenopathy   • Iron deficiency anemia following bariatric surgery   • On belimumab therapy   • S/P gastric bypass   • Overweight   • Encounter for surgical aftercare following surgery of digestive system   • H/O bariatric surgery - bypass   • Petechial rash   • Stage 3b chronic kidney disease ([facility])   • Age-related osteoporosis without current pathological fracture   • Recurrent UTI   • Monoclonal B-cell lymphocytosis   • Pulmonary nodule       Current Outpatient Medications:   •  acetaminophen (TYLENOL) 325 mg tablet, Take 2 tablets (650 mg total) by mouth every 6 (six) hours as needed for mild pain, Disp: , Rfl: 0  •  buPROPion (WELLBUTRIN) 100 mg tablet, Take 100 mg by mouth 3 (three) times a day., Disp: , Rfl:   •  calcium carbonate (Calcium 600) 600 MG tablet, , Disp: , Rfl:   •  Cholecalciferol (D3-50) 1.25 MG (36237 UT) capsule, , Disp: , Rfl:   •  cycloSPORINE (RESTASIS) 0.05 % ophthalmic emulsion, instill 1 drop into both eyes twice a day, Disp: , Rfl:   •  ferrous sulfate 325 (65 Fe) mg tablet, Take 1 tablet (325 mg total) by mouth 2 (two) times a day with meals, Disp: 60 tablet, Rfl: 0  •  hydroxychloroquine (PLAQUENIL) 200 mg tablet, Take 1 tablet (200 mg total) by mouth 2 (two) times a day with meals, Disp: 180 tablet, Rfl: 3  •  pilocarpine (SALAGEN) 5 mg tablet, Take 1 tablet (5 mg total) by mouth 3 (three) times a day, Disp: 90 tablet, Rfl: 5  •  QUEtiapine (SEROquel) 300 mg tablet, Take 300 mg by mouth daily at bedtime. , Disp: , Rfl:   •  temazepam (RESTORIL) 30 mg capsule, Take 2 capsules by mouth daily at bedtime as needed , Disp: , Rfl:   •  LORazepam (ATIVAN) 1 mg tablet, if needed, Disp: , Rfl:   •  QUEtiapine (SEROquel) 25 mg tablet, if needed, Disp: , Rfl:     Current Facility-Administered Medications:   •  cyanocobalamin injection 1,000 mcg, 1,000 mcg, Intramuscular, Q30 Days, Carli Phan MD, 1,000 mcg at 08/22/23 1251  Allergies   Allergen Reactions   • Ciprofloxacin Hives and Swelling     Pt reports that lips and mouth and face swelled. • Cymbalta [Duloxetine Hcl] Other (See Comments)     Hallucinations, fatigue, faints   • Nirmatrelvir-Ritonavir Diarrhea, Nausea Only, Other (See Comments), Dizziness and Lightheadedness     "passed out"-nausea   • Percocet [Oxycodone-Acetaminophen] Other (See Comments)     Dry mouth - pt states it kept her up all night. States had to continually drink fluids     Past Surgical History:   Procedure Laterality Date   • BREAST BIOPSY Right    • COLONOSCOPY     • EYE SURGERY     • FRACTURE SURGERY Right     elbow   • GASTRIC BYPASS     • HYSTERECTOMY Bilateral 1975   • IR BIOPSY LYMPH NODE  10/08/2020   • LUNG BIOPSY     • LYMPH NODE BIOPSY  09/18/2020   • LYMPH NODE BIOPSY Left 8/28/2023    Procedure: EXCISION BIOPSY LYMPH NODE LEFT AXILLARY;  Surgeon: Britni Alva MD;  Location: [facility];  Service: Thoracic   •  [address] INCL FLUOR GDNCE DX W/CELL WASHG 44 [facility] N/A 12/08/2017    Procedure: BRONCHOSCOPY;  Surgeon: Janneth Woodson MD;  Location: [facility];   Service: Pulmonary   • [place] BX/EXC LYMPH NODE NEEDLE SUPERFICIAL Left 02/25/2021    Procedure: EXCISION BIOPSY LYMPH NODE: left axillary lymph node biopsy;  Surgeon: Jose Lopez MD;  Location: [facility]; Service: Thoracic   • TONSILLECTOMY       Social History     Objective:  Vitals:    09/18/23 0913   BP: 110/80   BP Location: Left arm   Patient Position: Sitting   Cuff Size: Adult   Pulse: 84   Resp: 17   Temp: 97.5 °F (36.4 °C)   SpO2: 96%   Weight: 66.6 kg (146 lb 12.8 oz)   Height: 5' (1.524 m)     Physical Exam  Constitutional:       Appearance: She is well-developed. HENT:      Head: Normocephalic and atraumatic. Eyes:      Pupils: Pupils are equal, round, and reactive to light. Cardiovascular:      Rate and Rhythm: Normal rate. Heart sounds: No murmur heard. Pulmonary:      Effort: No respiratory distress. Breath sounds: No wheezing or rales. Abdominal:      General: There is no distension. Palpations: Abdomen is soft. Tenderness: There is no abdominal tenderness. There is no rebound. Musculoskeletal:         General: No tenderness. Cervical back: Neck supple. Lymphadenopathy:      Cervical: No cervical adenopathy. Skin:     General: Skin is warm. Findings: No rash. Neurological:      Mental Status: She is alert and oriented to person, place, and time. Deep Tendon Reflexes: Reflexes normal.   Psychiatric:         Thought Content: Thought content normal.           Labs: I personally reviewed the labs and imaging pertinent to this patient care.

## 2023-09-18 NOTE — PROGRESS NOTES
Patient tolerated treatment without incident. Peripheral IV removed. Next appointment confirmed. AVS declined.

## 2023-09-21 PROCEDURE — 88360 TUMOR IMMUNOHISTOCHEM/MANUAL: CPT | Performed by: STUDENT IN AN ORGANIZED HEALTH CARE EDUCATION/TRAINING PROGRAM

## 2023-09-21 PROCEDURE — 88341 IMHCHEM/IMCYTCHM EA ADD ANTB: CPT | Performed by: STUDENT IN AN ORGANIZED HEALTH CARE EDUCATION/TRAINING PROGRAM

## 2023-09-21 PROCEDURE — 88365 INSITU HYBRIDIZATION (FISH): CPT | Performed by: STUDENT IN AN ORGANIZED HEALTH CARE EDUCATION/TRAINING PROGRAM

## 2023-09-21 PROCEDURE — 88364 INSITU HYBRIDIZATION (FISH): CPT | Performed by: STUDENT IN AN ORGANIZED HEALTH CARE EDUCATION/TRAINING PROGRAM

## 2023-09-21 PROCEDURE — 88305 TISSUE EXAM BY PATHOLOGIST: CPT | Performed by: STUDENT IN AN ORGANIZED HEALTH CARE EDUCATION/TRAINING PROGRAM

## 2023-09-21 PROCEDURE — 88342 IMHCHEM/IMCYTCHM 1ST ANTB: CPT | Performed by: STUDENT IN AN ORGANIZED HEALTH CARE EDUCATION/TRAINING PROGRAM

## 2023-09-21 PROCEDURE — 88307 TISSUE EXAM BY PATHOLOGIST: CPT | Performed by: STUDENT IN AN ORGANIZED HEALTH CARE EDUCATION/TRAINING PROGRAM

## 2023-09-26 ENCOUNTER — CLINICAL SUPPORT (OUTPATIENT)
Dept: FAMILY MEDICINE CLINIC | Facility: CLINIC | Age: 73
End: 2023-09-26
Payer: COMMERCIAL

## 2023-09-26 DIAGNOSIS — Z23 NEED FOR INFLUENZA VACCINATION: Primary | ICD-10-CM

## 2023-09-26 PROCEDURE — 90662 IIV NO PRSV INCREASED AG IM: CPT

## 2023-09-26 PROCEDURE — G0008 ADMIN INFLUENZA VIRUS VAC: HCPCS

## 2023-09-26 PROCEDURE — 96372 THER/PROPH/DIAG INJ SC/IM: CPT

## 2023-09-26 RX ADMIN — CYANOCOBALAMIN 1000 MCG: 1000 INJECTION, SOLUTION INTRAMUSCULAR; SUBCUTANEOUS at 14:12

## 2023-09-27 ENCOUNTER — OFFICE VISIT (OUTPATIENT)
Dept: LAB | Facility: CLINIC | Age: 73
End: 2023-09-27
Payer: COMMERCIAL

## 2023-09-27 ENCOUNTER — OFFICE VISIT (OUTPATIENT)
Dept: PULMONOLOGY | Facility: CLINIC | Age: 73
End: 2023-09-27
Payer: COMMERCIAL

## 2023-09-27 VITALS
DIASTOLIC BLOOD PRESSURE: 82 MMHG | OXYGEN SATURATION: 98 % | WEIGHT: 145 LBS | SYSTOLIC BLOOD PRESSURE: 120 MMHG | BODY MASS INDEX: 28.47 KG/M2 | TEMPERATURE: 98.4 F | HEIGHT: 60 IN | HEART RATE: 75 BPM

## 2023-09-27 DIAGNOSIS — R94.31 ABNORMAL EKG: ICD-10-CM

## 2023-09-27 DIAGNOSIS — C83.00 SMALL LYMPHOCYTIC LYMPHOMA (HCC): ICD-10-CM

## 2023-09-27 DIAGNOSIS — R91.1 LUNG NODULE: Primary | ICD-10-CM

## 2023-09-27 DIAGNOSIS — Z82.49 FAMILY HISTORY OF EARLY CAD: ICD-10-CM

## 2023-09-27 DIAGNOSIS — M35.00 SJOGREN'S SYNDROME, WITH UNSPECIFIED ORGAN INVOLVEMENT (HCC): ICD-10-CM

## 2023-09-27 DIAGNOSIS — D64.9 ANEMIA, UNSPECIFIED TYPE: ICD-10-CM

## 2023-09-27 DIAGNOSIS — E03.9 HYPOTHYROIDISM, UNSPECIFIED TYPE: ICD-10-CM

## 2023-09-27 LAB
ATRIAL RATE: 68 BPM
P AXIS: 58 DEGREES
PR INTERVAL: 154 MS
QRS AXIS: 98 DEGREES
QRSD INTERVAL: 94 MS
QT INTERVAL: 384 MS
QTC INTERVAL: 408 MS
T WAVE AXIS: 69 DEGREES
VENTRICULAR RATE: 68 BPM

## 2023-09-27 PROCEDURE — 99214 OFFICE O/P EST MOD 30 MIN: CPT | Performed by: INTERNAL MEDICINE

## 2023-09-27 PROCEDURE — 93005 ELECTROCARDIOGRAM TRACING: CPT

## 2023-09-27 PROCEDURE — 93010 ELECTROCARDIOGRAM REPORT: CPT | Performed by: INTERNAL MEDICINE

## 2023-09-27 NOTE — PROGRESS NOTES
Progress Note - Pulmonary   Griffin Nyhan 72 y.o. female MRN: [id number]   Encounter: [id number]      Assessment/Plan:  Patient is 72-year-old female presenting for routine follow-up. Patient has LMP which is approximate stable. She does have a previous lung nodule for which repeat imaging is appropriate. The patient was recently started on chemotherapy. She notes night sweats and weight loss which is likely related to her recent diagnosis of advanced cancer. Sjogrens's syndrome w/ associated LIP  - feels symptoms have worsened  -  may continue pilocarpine and restasis     CLL  -  started on therapy  -  managed by Onc    Lung nodule  -  repeat CT Chest    1. Lung nodule  -     CT chest without contrast; Future; Expected date: 09/27/2023    2. Small lymphocytic lymphoma ([address] [facility])    3. Sjogren's syndrome, with unspecified organ involvement [facility])        Patient may follow up in 4-6 months or sooner as necessary. Orders:  Orders Placed This Encounter   Procedures    CT chest without contrast     Standing Status:   Future     Standing Expiration Date:   9/27/2027     Scheduling Instructions: There is no prep for this study. Please bring your insurance cards, a form of photo ID and a list of your medications with you. Arrive 15 minutes prior to your appointment time to register. On the day of your test, please bring any prior CT or MRI studies of this area with you that were not performed at a [facility]. To schedule this appointment, please contact Central Scheduling at [phone]. Order Specific Question:   What is the patient's sedation requirement? If Medication for Claustrophobia is selected, order medication at this point.      Answer:   No Sedation     Order Specific Question:   Release to patient through [employer]     Answer:   Immediate     Order Specific Question:   Reason for Exam (FREE TEXT)     Answer:   lung nodule     Order Specific Question:   When should the test be performed? Answer:   in 6 weeks       Subjective: The patient is currently on day 5 of Zanubrutinib. She notes nausea and fatigue but also a degree of insomnia. She will have periods of chills. She denies shortness of breath. She denies chest pain, chest heaviness or hemoptysis. She is not using inhalers. She denies night sweats but has lost about 12 lbs in about 7 weeks. This has been an unintentional weight loss. The notes that her Sjogrens is doing "terrible."  She is taking pilocarpine and restasis. She notes if she takes 2x/daily pilocarpine she feels like its too much. She is not taking any systemic medications. Inhaler Regimen:  None    Remainder of review of systems negative except as described in HPI. The following portions of the patient's history were reviewed and updated as appropriate: allergies, current medications, past family history, past medical history, past social history, past surgical history and problem list.     Objective:   Vitals: Blood pressure 120/82, pulse 75, temperature 98.4 °F (36.9 °C), height 5' (1.524 m), weight 65.8 kg (145 lb), SpO2 98 %, not currently breastfeeding.,  Body mass index is 28.32 kg/m². Physical Exam  Gen: Pleasant, awake, alert, oriented x 3, no acute distress  HEENT: Mucous membranes moist, no oral lesions, no thrush  NECK: No accessory muscle use, JVP not elevated  Cardiac: RRR, single S1, single S2, no murmurs, no rubs, no gallops  Lungs: CTa B/L  Abdomen: normoactive bowel sounds, soft nontender, nondistended, no rebound or rigidity, no guarding  Extremities: no cyanosis, no clubbing, no LE edema  MSK:  Strength equal in all extremities  Derm:  No rashes/lesions noted  Neuro:  Appropriate mood/affect    Labs: I have personally reviewed pertinent lab results.   Lab Results   Component Value Date    WBC 23.19 (H) 10/06/2023    HGB 12.6 10/06/2023     10/06/2023     Lab Results   Component Value Date CREATININE 0.74 10/12/2023        Imaging and other studies: I have personally reviewed pertinent reports. CTA Chest 6/29/2023    Radiology findings:  LUNGS: Again noted are areas of groundglass density in the both lungs with predominant perihilar and central distribution. Again noted are scattered lung cysts  There is a nodular density left lingular region which measures about 1 cm, larger from the previous study. This is seen in image 107 series 4  Additional nodular density seen abutting the right dome of diaphragm, measuring about 6 mm  Scattered lung granuloma are seen there are additional not measurable lung nodules as seen on the previous study  There is a left upper lobe lung nodule which measures 2.5 mm     PLEURA: No pleural effusion seen  No pneumothorax seen     HEART/GREAT VESSELS: Heart is unremarkable for patient's age. No thoracic aortic aneurysm. MEDIASTINUM AND KACEY: Lower right paratracheal lymph node are seen which measure about 1.3 cm x 0.9 cm, stable  Small lower left paratracheal lymph nodes are seen, unchanged  Subcarinal lymph nodes are seen measuring about 1.1 cm x 2 cm, stable     CHEST WALL AND LOWER NECK: Bilateral axillary lymph nodes are noted  There is a lymph node in the right axilla, image 58 series 601, measuring 2.9 x 1.6 x 2.8 cm. Previously measuring 1.9 x 1.2 x 1.6 cm, larger  Additional lymph node in the right axilla measures 3.9 x 2.1 x 3.4 cm, previously measuring 3.5 x 1.7 x 2.5 cm  Enlarged left axillary lymph nodes are seen. Level 2 marcin left axillary lymph node measures 2.4 x 2.2 x 1.7 cm, previously measuring 1.6 x 1.9 x 1.4 cm    Pulmonary Function Testing: I have personally reviewed pertinent reports. and I have personally reviewed pertinent films in PACS  10/23/2017:  Restriction  Spirometry:  FEV1/FVC Ratio is 81%. FEV1 is 64% predicted. FVC is 60% predicted. No change with bronchodilators  Lung volumes: Total lung capacity is 77% predicted.   Residual volume is 94% predicted. Flow volume loop:  Normal    Chepe SVan Kennedy Delonte, [address] [place] [address]

## 2023-09-29 ENCOUNTER — TELEPHONE (OUTPATIENT)
Dept: HEMATOLOGY ONCOLOGY | Facility: CLINIC | Age: 73
End: 2023-09-29

## 2023-09-29 NOTE — TELEPHONE ENCOUNTER
Patient reports that for the past couple of days, she has had head/sinus congestion/pain and a runny nose. As patient is currently on chemotherapy, patient was encouraged to call her PCP for further evaluation, as this potentially could be the cause of increased Bps today. In the meantime, patient will try using her brother's BP machine to track her Bps over the weekend. Patient will f/u on Monday.

## 2023-09-29 NOTE — TELEPHONE ENCOUNTER
9/29 Pt states her blood pressure up until today as been normal. Today's readings have been 150/90 with HR 76 and 143/101 with HR 80. Denies headache or dizziness. Is not on any blood pressure medications. Started Brukinsa 9/23.

## 2023-09-29 NOTE — TELEPHONE ENCOUNTER
9/29 Pt replied via [employer] message on 9/23 thread. Pt subsequently left the following voicemail:     Alfredo Teague, this is Rik Hauser. Date of birth 1950. I sent you a message about my symptoms and you said that you all seem to be normal for the medicine I'm taking, but I have noticed today. I didn't tell you this, but I've been taking my blood pressure and my pulse over since I started taking this medicine, and twice today the bottom number was abnormal. It was 101 and 96, so the blood blood pressure cuff, which is on my wrist is saying it's abnormal. Is there something I should be looking for in my in my health to find out whether or not this is just something that's just for the time being or something I should be really aware of? Again, my Rik Hauser, my number is [phone]. I'm wondering about my blood pressure. Thanks.

## 2023-09-29 NOTE — TELEPHONE ENCOUNTER
Left voicemail for pt requesting a call back to discuss how she's doing on Brukinsa. Reminded her Dr. Gertrudis Juárez would like blood work drawn every 2 weeks. Provided my direct line.

## 2023-09-30 ENCOUNTER — OFFICE VISIT (OUTPATIENT)
Dept: URGENT CARE | Facility: CLINIC | Age: 73
End: 2023-09-30
Payer: COMMERCIAL

## 2023-09-30 VITALS
RESPIRATION RATE: 18 BRPM | HEART RATE: 78 BPM | OXYGEN SATURATION: 97 % | TEMPERATURE: 97.9 F | DIASTOLIC BLOOD PRESSURE: 56 MMHG | SYSTOLIC BLOOD PRESSURE: 134 MMHG

## 2023-09-30 DIAGNOSIS — J06.9 ACUTE URI: Primary | ICD-10-CM

## 2023-09-30 PROCEDURE — 99213 OFFICE O/P EST LOW 20 MIN: CPT | Performed by: NURSE PRACTITIONER

## 2023-09-30 NOTE — PROGRESS NOTES
[facility]        NAME: Stephanie Acevedo is a 72 y.o. female  : 1950    MRN: [id number]  DATE: 2023  TIME: 11:05 AM    Assessment and Plan   Acute URI [J06.9]  1. Acute URI          Recommended Meenu pot or saline mist, Nasacort, and Claritin. May use Tylenol for pain. If symptoms are worsening or do not improve follow-up with your primary care provider. Patient Instructions       Follow up with PCP in 3-5 days. Proceed to  ER if symptoms worsen. Chief Complaint     Chief Complaint   Patient presents with   • Nasal Congestion     Nasal congestion, cough, post nasal drip x 2 days          History of Present Illness       Patient is a 47-year-old female presenting with 2 days of frontal headache, ear pressure, sinus congestion, cough, and postnasal drip. She has intermittent chills. Denies fever or sore throat. No over-the-counter medications attempted. Review of Systems   Review of Systems   Constitutional: Positive for chills. Negative for activity change and fever. HENT: Positive for congestion, ear pain, postnasal drip, rhinorrhea and sinus pressure. Negative for sore throat. Respiratory: Positive for cough. Neurological: Positive for headaches.          Current Medications       Current Outpatient Medications:   •  acetaminophen (TYLENOL) 325 mg tablet, Take 2 tablets (650 mg total) by mouth every 6 (six) hours as needed for mild pain, Disp: , Rfl: 0  •  buPROPion (WELLBUTRIN) 100 mg tablet, Take 100 mg by mouth 3 (three) times a day., Disp: , Rfl:   •  calcium carbonate (Calcium 600) 600 MG tablet, , Disp: , Rfl:   •  Cholecalciferol (D3-50) 1.25 MG (39003 UT) capsule, , Disp: , Rfl:   •  cycloSPORINE (RESTASIS) 0.05 % ophthalmic emulsion, instill 1 drop into both eyes twice a day, Disp: , Rfl:   •  ferrous sulfate 325 (65 Fe) mg tablet, Take 1 tablet (325 mg total) by mouth 2 (two) times a day with meals, Disp: 60 tablet, Rfl: 0  •  LORazepam (ATIVAN) 1 mg tablet, if needed, Disp: , Rfl:   •  Multiple Vitamins-Minerals (CENTRUM MINIS WOMEN 50+ PO), , Disp: , Rfl:   •  pilocarpine (SALAGEN) 5 mg tablet, Take 1 tablet (5 mg total) by mouth 3 (three) times a day, Disp: 90 tablet, Rfl: 5  •  QUEtiapine (SEROquel) 300 mg tablet, Take 300 mg by mouth daily at bedtime. , Disp: , Rfl:   •  temazepam (RESTORIL) 30 mg capsule, Take 2 capsules by mouth daily at bedtime as needed , Disp: , Rfl:   •  Zanubrutinib (Brukinsa) 80 MG CAPS, Take 4 capsules (320 mg total) by mouth daily, Disp: 120 capsule, Rfl: 11  •  hydroxychloroquine (PLAQUENIL) 200 mg tablet, Take 1 tablet (200 mg total) by mouth 2 (two) times a day with meals, Disp: 180 tablet, Rfl: 3  •  QUEtiapine (SEROquel) 25 mg tablet, if needed, Disp: , Rfl:     Current Facility-Administered Medications:   •  cyanocobalamin injection 1,000 mcg, 1,000 mcg, Intramuscular, Q30 Days, Khushbu Colby MD, 1,000 mcg at 09/26/23 1412    Current Allergies     Allergies as of 09/30/2023 - Reviewed 09/30/2023   Allergen Reaction Noted   • Ciprofloxacin Hives and Swelling 08/23/2016   • Cymbalta [duloxetine hcl] Other (See Comments) 08/23/2016   • Nirmatrelvir-ritonavir Diarrhea, Nausea Only, Other (See Comments), Dizziness, and Lightheadedness 10/15/2022   • Percocet [oxycodone-acetaminophen] Other (See Comments) 08/22/2020            The following portions of the patient's history were reviewed and updated as appropriate: allergies, current medications, past family history, past medical history, past social history, past surgical history and problem list.     Past Medical History:   Diagnosis Date   • Acute kidney injury superimposed on chronic kidney disease ([address]) 09/20/2021   • Age-related osteoporosis without current pathological fracture 06/01/2023   • Allergic rhinitis    • Anemia    • Anxiety    • Bipolar 1 disorder ([address])    • Constipation    • Deaf     Pt lost all hearing in right ear after having [place] measles   • Depression    • Diarrhea    • Fatigue    • GERD (gastroesophageal reflux disease) 08/01/2020   • Hard of hearing     Pt wears a hearing in left ear. • HL (hearing loss)    • Hypothyroidism    • Lung nodule    • Nasal congestion    • Osteoporosis    • Pneumonia 02/10/2017   • Pneumonia due to Streptococcus ([address]) 09/19/2021   • Psychiatric disorder    • Sjogren's syndrome (HCC)    • Small lymphocytic lymphoma ([address]) 9/18/2023   • Systemic lupus erythematosus ([address])    • Wears glasses    • Wears partial dentures        Past Surgical History:   Procedure Laterality Date   • BREAST BIOPSY Right    • COLONOSCOPY     • EYE SURGERY     • FRACTURE SURGERY Right     elbow   • GASTRIC BYPASS     • HYSTERECTOMY Bilateral 1975   • IR BIOPSY LYMPH NODE  10/08/2020   • LUNG BIOPSY     • LYMPH NODE BIOPSY  09/18/2020   • LYMPH NODE BIOPSY Left 8/28/2023    Procedure: EXCISION BIOPSY LYMPH NODE LEFT AXILLARY;  Surgeon: Sandi Varghese MD;  Location: [facility];  Service: Thoracic   •  [address] INCL FLUOR GDNCE DX W/CELL WASHG [facility] N/A 12/08/2017    Procedure: BRONCHOSCOPY;  Surgeon: David Cifuentes MD;  Location: [facility];   Service: Pulmonary   • NM BX/EXC LYMPH NODE NEEDLE SUPERFICIAL Left 02/25/2021    Procedure: EXCISION BIOPSY LYMPH NODE: left axillary lymph node biopsy;  Surgeon: Mehnaz Vasquez MD;  Location: [facility];  Service: Thoracic   • TONSILLECTOMY         Family History   Problem Relation Age of Onset   • Heart disease Mother    • Diabetes Mother    • Kidney cancer Mother    • Cancer Mother         Kidney   • Hypertension Mother    • Heart disease Father    • Stroke Father    • Diabetes Father    • Cancer Father         Rectal Cancer   • Hypertension Father    • No Known Problems Maternal Grandmother    • No Known Problems Maternal Grandfather    • No Known Problems Paternal Grandmother    • No Known Problems Paternal Grandfather    • Leukemia Half-Sister 48   • No Known Problems Half-Sister    • No Known Problems Half-Sister    • No Known Problems Half-Sister    • BRCA2 Positive Neg Hx    • BRCA2 Negative Neg Hx    • BRCA1 Negative Neg Hx    • Endometrial cancer Neg Hx    • Breast cancer Neg Hx    • Breast cancer additional onset Neg Hx    • Colon cancer Neg Hx    • Ovarian cancer Neg Hx    • BRCA1 Positive Neg Hx    • BRCA 1/2 Neg Hx          Medications have been verified. Objective   /56   Pulse 78   Temp 97.9 °F (36.6 °C)   Resp 18   LMP  (LMP Unknown)   SpO2 97%        Physical Exam     Physical Exam  Vitals reviewed. Constitutional:       General: She is awake. She is not in acute distress. Appearance: Normal appearance. She is normal weight. She is not ill-appearing or toxic-appearing. HENT:      Head: Normocephalic. Right Ear: Ear canal normal. Tympanic membrane is retracted. Left Ear: Ear canal normal. Tympanic membrane is retracted. Nose: Congestion present. Right Sinus: Frontal sinus tenderness present. Left Sinus: Frontal sinus tenderness present. Mouth/Throat:      Lips: Pink. Pharynx: Oropharynx is clear. Cardiovascular:      Rate and Rhythm: Normal rate and regular rhythm. Heart sounds: Normal heart sounds, S1 normal and S2 normal.   Pulmonary:      Effort: Pulmonary effort is normal.      Breath sounds: Normal breath sounds. No decreased breath sounds, wheezing, rhonchi or rales. Skin:     General: Skin is warm and moist.   Neurological:      General: No focal deficit present. Mental Status: She is alert, oriented to person, place, and time and easily aroused. Psychiatric:         Behavior: Behavior is cooperative.

## 2023-10-02 ENCOUNTER — HOSPITAL ENCOUNTER (OUTPATIENT)
Dept: INFUSION CENTER | Facility: CLINIC | Age: 73
Discharge: HOME | End: 2023-10-02
Payer: COMMERCIAL

## 2023-10-02 VITALS
TEMPERATURE: 97.6 F | HEART RATE: 76 BPM | SYSTOLIC BLOOD PRESSURE: 116 MMHG | OXYGEN SATURATION: 99 % | DIASTOLIC BLOOD PRESSURE: 68 MMHG | RESPIRATION RATE: 18 BRPM

## 2023-10-02 DIAGNOSIS — D50.8 IRON DEFICIENCY ANEMIA FOLLOWING BARIATRIC SURGERY: Primary | ICD-10-CM

## 2023-10-02 DIAGNOSIS — K95.89 IRON DEFICIENCY ANEMIA FOLLOWING BARIATRIC SURGERY: Primary | ICD-10-CM

## 2023-10-02 PROCEDURE — 96365 THER/PROPH/DIAG IV INF INIT: CPT

## 2023-10-02 RX ORDER — SODIUM CHLORIDE 9 MG/ML
20 INJECTION, SOLUTION INTRAVENOUS ONCE
OUTPATIENT
Start: 2023-10-16

## 2023-10-02 RX ORDER — SODIUM CHLORIDE 9 MG/ML
20 INJECTION, SOLUTION INTRAVENOUS ONCE
Status: COMPLETED | OUTPATIENT
Start: 2023-10-02 | End: 2023-10-02

## 2023-10-02 RX ADMIN — SODIUM CHLORIDE 200 MG: 9 INJECTION, SOLUTION INTRAVENOUS at 10:16

## 2023-10-02 RX ADMIN — SODIUM CHLORIDE 20 ML/HR: 0.9 INJECTION, SOLUTION INTRAVENOUS at 10:15

## 2023-10-02 NOTE — PROGRESS NOTES
Pt here for venofer infusion. Pt offered no complaints today, vitals stable. Pt resting comfortably in chair with call bell in place. 5

## 2023-10-03 ENCOUNTER — TELEPHONE (OUTPATIENT)
Dept: OTHER | Facility: HOSPITAL | Age: 73
End: 2023-10-03

## 2023-10-03 DIAGNOSIS — N39.0 CHRONIC UTI: Primary | ICD-10-CM

## 2023-10-03 NOTE — TELEPHONE ENCOUNTER
Patient called she feels like she may have the start of a UTI. Patient states she has to force herself to pee, its uncomfortable and feels like there are little prickers down there. Patient states she is on a medication that can cause UTI's, she was advised to call PCP office. Patient would like a return call on what provider recommends she do.

## 2023-10-04 ENCOUNTER — OFFICE VISIT (OUTPATIENT)
Dept: FAMILY MEDICINE CLINIC | Facility: CLINIC | Age: 73
End: 2023-10-04
Payer: COMMERCIAL

## 2023-10-04 VITALS
SYSTOLIC BLOOD PRESSURE: 118 MMHG | HEIGHT: 60 IN | HEART RATE: 71 BPM | WEIGHT: 142 LBS | OXYGEN SATURATION: 97 % | BODY MASS INDEX: 27.88 KG/M2 | RESPIRATION RATE: 16 BRPM | DIASTOLIC BLOOD PRESSURE: 82 MMHG

## 2023-10-04 DIAGNOSIS — R30.0 DYSURIA: Primary | ICD-10-CM

## 2023-10-04 DIAGNOSIS — C83.00 SMALL LYMPHOCYTIC LYMPHOMA (HCC): ICD-10-CM

## 2023-10-04 DIAGNOSIS — N39.0 URINARY TRACT INFECTION WITHOUT HEMATURIA, SITE UNSPECIFIED: ICD-10-CM

## 2023-10-04 DIAGNOSIS — K95.89 IRON DEFICIENCY ANEMIA FOLLOWING BARIATRIC SURGERY: ICD-10-CM

## 2023-10-04 DIAGNOSIS — D50.8 IRON DEFICIENCY ANEMIA FOLLOWING BARIATRIC SURGERY: ICD-10-CM

## 2023-10-04 LAB
SL AMB  POCT GLUCOSE, UA: NORMAL
SL AMB LEUKOCYTE ESTERASE,UA: 500
SL AMB POCT BILIRUBIN,UA: NORMAL
SL AMB POCT BLOOD,UA: NORMAL
SL AMB POCT CLARITY,UA: CLEAR
SL AMB POCT COLOR,UA: YELLOW
SL AMB POCT KETONES,UA: NORMAL
SL AMB POCT NITRITE,UA: NORMAL
SL AMB POCT PH,UA: 7
SL AMB POCT SPECIFIC GRAVITY,UA: 1
SL AMB POCT URINE PROTEIN: NORMAL
SL AMB POCT UROBILINOGEN: NORMAL

## 2023-10-04 PROCEDURE — 99214 OFFICE O/P EST MOD 30 MIN: CPT | Performed by: FAMILY MEDICINE

## 2023-10-04 PROCEDURE — 81003 URINALYSIS AUTO W/O SCOPE: CPT | Performed by: FAMILY MEDICINE

## 2023-10-04 RX ORDER — QUETIAPINE FUMARATE 50 MG/1
TABLET, FILM COATED ORAL
COMMUNITY
Start: 2023-09-29

## 2023-10-04 RX ORDER — CEPHALEXIN 500 MG/1
500 CAPSULE ORAL EVERY 12 HOURS SCHEDULED
Qty: 14 CAPSULE | Refills: 0 | Status: SHIPPED | OUTPATIENT
Start: 2023-10-04 | End: 2023-10-11

## 2023-10-04 NOTE — PROGRESS NOTES
Assessment/Plan:  1. Dysuria  -     POCT urine dip    2. Urinary tract infection without hematuria, site unspecified  -     cephalexin (KEFLEX) 500 mg capsule; Take 1 capsule (500 mg total) by mouth every 12 (twelve) hours for 7 days    3. Small lymphocytic lymphoma ([address])    4. Iron deficiency anemia following bariatric surgery      Recurrent Urinary tract infection  Prescribed Keflex twice a day for 7 days and repeat urine culture. She was advised to contact the clinic if the result of the culture is abnormal and what type of bacteria she had. I explained to the patient the common bacteria that causes urinary tract infection. I recommend cranberry pills as they are helpful. Advised to drink lemon with water as well. I recommended using disposable wipes because regular toilet paper causes vaginal dryness or irritation which shows urinary tract infection like symptoms. I explained my Chart to the patient who provides her access to her results. Subjective:      Patient ID: Brad Estes is a 72 y.o. female. Ms. Brad Estes is a 59-year-old female who presents for a Urinary tract Infection follow up. She consents to the use of [place]. Urinary tract infection  The onset of the symptoms started on 10/01/2023. She denies painful urination but there is discomfort after urinating that lasts only for 4 to 5 seconds. She was given Keflex. She mentioned that she cannot take ciprofloxacin. She has been experiencing side effects for 10 days. She had high blood pressure for a couple of days. The patient drinks lots of water. Iron deficiency  The patient had her 2nd iron infusion done and there are 2 more doses remaining. She takes iron, calcium supplements and multivitamins 2 times a day, 1 in the morning and 1 at night. lymphocytic lymphoma  She had a biopsy and 2 of her lymph nodes were removed. She had no symptoms prior to the biopsy.    Since then, she has been doing walking exercises and tries to do her daily activities as normally as possible. Her CAT scan done a few months ago came out her organs are normal. However, Dr. Hilary Wright told her that she had grown lymph nodes in her arm. Then it was confirmed by Dr. Ilya Avila, that she had lymphoma that needs to be removed. She has an upcoming appointment with Dr. Hilary Wright in a couple of weeks. She verbalized having a good medical team handling her. The patient has a family history of leukemia. The following portions of the patient's history were reviewed and updated as appropriate: allergies, current medications, past family history, past medical history, past social history, past surgical history and problem list.    Review of Systems   Constitutional: Negative for fever and unexpected weight change. HENT: Negative for nosebleeds and trouble swallowing. Eyes: Negative for visual disturbance. Respiratory: Negative for chest tightness and shortness of breath. Cardiovascular: Negative for chest pain, palpitations, and leg swelling. Gastrointestinal: Negative for abdominal pain, constipation, diarrhea, and nausea. Endocrine: Negative for cold intolerance. Genitourinary: Positive urinary discomfort. Negative for dysuria and urgency. Musculoskeletal: Negative for joint swelling and myalgias. Skin: Negative for rash. Neurological: Negative for tremors, seizures, and syncope. Hematological: Does not bruise/bleed easily. Psychiatric/Behavioral: Negative for hallucinations and suicidal ideas. Objective:     /82 (BP Location: Left arm, Patient Position: Sitting, Cuff Size: Standard)   Pulse 71   Resp 16   Ht 5' (1.524 m)   Wt 64.4 kg (142 lb)   LMP  (LMP Unknown)   SpO2 97%   BMI 27.73 kg/m²    Physical Exam  Vitals and nursing note reviewed. Constitutional:     Appearance: Well-developed. HENT:     Head: Normocephalic and atraumatic. Cardiovascular:     Rate and Rhythm: Normal rate and regular rhythm.      Heart sounds: Normal heart sounds. No murmur heard. Pulmonary:     Effort: Pulmonary effort is normal.     Breath sounds: Normal breath sounds. No wheezing or rales. Abdominal:     General: Bowel sounds are normal. There is no distension. Palpations: Abdomen is soft. Tenderness: There is no abdominal tenderness. Musculoskeletal:     General: No tenderness. Normal range of motion. Cervical back: Normal range of motion and neck supple. Lymphadenopathy:     Cervical: No cervical adenopathy. Skin:     General: Skin is warm and dry. Capillary Refill: Capillary refill takes less than 2 seconds. Findings: No rash. Neurological:     Mental Status: Alert and oriented to person, place, and time. Cranial Nerves: No cranial nerve deficit. Sensory: No sensory deficit. Motor: No abnormal muscle tone. Psychiatric:     Behavior: Behavior normal.     Thought Content: Thought content normal.     Judgment: Judgment normal.      Office Visit on 10/04/2023   Component Date Value   • LEUKOCYTE ESTERASE,UA 10/04/2023 500    • Waynetta Ratel 10/04/2023 neg    • SL AMB POCT UROBILINOGEN 10/04/2023 norm    • POCT URINE PROTEIN 10/04/2023 neg    •  PH,UA 10/04/2023 7    • BLOOD,UA 10/04/2023 tr    • SPECIFIC GRAVITY,UA 10/04/2023 1.005    • KETONES,UA 10/04/2023 neg    • BILIRUBIN,UA 10/04/2023 neg    • GLUCOSE, UA 10/04/2023 norm    •  COLOR,UA 10/04/2023 yellow    • CLARITY,UA 10/04/2023 clear    Office Visit on 09/27/2023   Component Date Value   • Ventricular Rate 09/27/2023 68    • Atrial Rate 09/27/2023 68    • OK Interval 09/27/2023 154    • QRSD Interval 09/27/2023 94    • QT Interval 09/27/2023 384    • QTC Interval 09/27/2023 408    • P Axis 09/27/2023 58    • QRS Axis 09/27/2023 98    • T Wave Axis 09/27/2023 69      I have personally reviewed results with the patient. Leukocytes is 500. Nitrate is negative.   Urine Culture is normal.  May blood culture reveals Klebsiella pneumoniae  Blood test for leukemia and lymphoma was done 5 months ago that came out negative. BMI Counseling: Body mass index is 27.73 kg/m². The BMI is above normal. Nutrition recommendations include decreasing portion sizes, encouraging healthy choices of fruits and vegetables, decreasing fast food intake, consuming healthier snacks and limiting drinks that contain sugar. Exercise recommendations include exercising 3-5 times per week. No pharmacotherapy was ordered. Rationale for BMI follow-up plan is due to patient being overweight or obese. Falls Plan of Care: balance, strength, and gait training instructions were provided. Medications that increase falls were reviewed. Filiberto Peralta MD   [facility]     Transcribed for Filiberto Peralta MD, by Curry Lozano on 10/04/23 at 10:07 PM. Powered by [employer].

## 2023-10-06 ENCOUNTER — HOSPITAL ENCOUNTER (INPATIENT)
Facility: HOSPITAL | Age: 73
LOS: 1 days | Discharge: HOME | DRG: 644 | End: 2023-10-08
Attending: EMERGENCY MEDICINE | Admitting: HOSPITALIST
Payer: COMMERCIAL

## 2023-10-06 ENCOUNTER — APPOINTMENT (EMERGENCY)
Dept: CT IMAGING | Facility: HOSPITAL | Age: 73
DRG: 644 | End: 2023-10-06
Payer: COMMERCIAL

## 2023-10-06 ENCOUNTER — APPOINTMENT (OUTPATIENT)
Dept: LAB | Facility: CLINIC | Age: 73
DRG: 644 | End: 2023-10-06
Payer: COMMERCIAL

## 2023-10-06 ENCOUNTER — APPOINTMENT (EMERGENCY)
Dept: RADIOLOGY | Facility: HOSPITAL | Age: 73
DRG: 644 | End: 2023-10-06
Payer: COMMERCIAL

## 2023-10-06 DIAGNOSIS — N39.0 CHRONIC UTI: ICD-10-CM

## 2023-10-06 DIAGNOSIS — K95.89 IRON DEFICIENCY ANEMIA FOLLOWING BARIATRIC SURGERY: ICD-10-CM

## 2023-10-06 DIAGNOSIS — C83.00 SMALL LYMPHOCYTIC LYMPHOMA (HCC): ICD-10-CM

## 2023-10-06 DIAGNOSIS — I16.0 HYPERTENSIVE URGENCY: ICD-10-CM

## 2023-10-06 DIAGNOSIS — E87.1 HYPONATREMIA: Primary | ICD-10-CM

## 2023-10-06 DIAGNOSIS — D50.8 IRON DEFICIENCY ANEMIA FOLLOWING BARIATRIC SURGERY: ICD-10-CM

## 2023-10-06 DIAGNOSIS — R00.2 PALPITATIONS: ICD-10-CM

## 2023-10-06 LAB
2HR DELTA HS TROPONIN: 2 NG/L
ALBUMIN SERPL BCP-MCNC: 4 G/DL (ref 3.5–5)
ALP SERPL-CCNC: 70 U/L (ref 34–104)
ALT SERPL W P-5'-P-CCNC: 13 U/L (ref 7–52)
ANION GAP SERPL CALCULATED.3IONS-SCNC: 6 MMOL/L
AST SERPL W P-5'-P-CCNC: 29 U/L (ref 13–39)
BASOPHILS # BLD AUTO: 0.16 THOUSANDS/ÂΜL (ref 0–0.1)
BASOPHILS NFR BLD AUTO: 1 % (ref 0–1)
BILIRUB SERPL-MCNC: 0.61 MG/DL (ref 0.2–1)
BUN SERPL-MCNC: 16 MG/DL (ref 5–25)
CALCIUM SERPL-MCNC: 9.1 MG/DL (ref 8.4–10.2)
CARDIAC TROPONIN I PNL SERPL HS: 3 NG/L
CARDIAC TROPONIN I PNL SERPL HS: 5 NG/L
CHLORIDE SERPL-SCNC: 94 MMOL/L (ref 96–108)
CO2 SERPL-SCNC: 23 MMOL/L (ref 21–32)
CREAT SERPL-MCNC: 0.89 MG/DL (ref 0.6–1.3)
CREAT UR-MCNC: 48.2 MG/DL
D DIMER PPP FEU-MCNC: 3.59 UG/ML FEU
EOSINOPHIL # BLD AUTO: 0.09 THOUSAND/ÂΜL (ref 0–0.61)
EOSINOPHIL NFR BLD AUTO: 0 % (ref 0–6)
ERYTHROCYTE [DISTWIDTH] IN BLOOD BY AUTOMATED COUNT: 15.9 % (ref 11.6–15.1)
FERRITIN SERPL-MCNC: 266 NG/ML (ref 11–307)
GFR SERPL CREATININE-BSD FRML MDRD: 65 ML/MIN/1.73SQ M
GLUCOSE SERPL-MCNC: 109 MG/DL (ref 65–140)
HCT VFR BLD AUTO: 38 % (ref 34.8–46.1)
HGB BLD-MCNC: 12.6 G/DL (ref 11.5–15.4)
IMM GRANULOCYTES # BLD AUTO: 0.05 THOUSAND/UL (ref 0–0.2)
IMM GRANULOCYTES NFR BLD AUTO: 0 % (ref 0–2)
IRON SATN MFR SERPL: 58 % (ref 15–50)
IRON SERPL-MCNC: 114 UG/DL (ref 50–212)
LYMPHOCYTES # BLD AUTO: 18.27 THOUSANDS/ÂΜL (ref 0.6–4.47)
LYMPHOCYTES NFR BLD AUTO: 79 % (ref 14–44)
MCH RBC QN AUTO: 30.8 PG (ref 26.8–34.3)
MCHC RBC AUTO-ENTMCNC: 33.2 G/DL (ref 31.4–37.4)
MCV RBC AUTO: 93 FL (ref 82–98)
MONOCYTES # BLD AUTO: 0.85 THOUSAND/ÂΜL (ref 0.17–1.22)
MONOCYTES NFR BLD AUTO: 4 % (ref 4–12)
NEUTROPHILS # BLD AUTO: 3.77 THOUSANDS/ÂΜL (ref 1.85–7.62)
NEUTS SEG NFR BLD AUTO: 16 % (ref 43–75)
NRBC BLD AUTO-RTO: 0 /100 WBCS
OSMOLALITY UR: 304 MMOL/KG
PLATELET # BLD AUTO: 303 THOUSANDS/UL (ref 149–390)
PMV BLD AUTO: 10.3 FL (ref 8.9–12.7)
POTASSIUM SERPL-SCNC: 4.4 MMOL/L (ref 3.5–5.3)
PROT SERPL-MCNC: 10.2 G/DL (ref 6.4–8.4)
PROT UR-MCNC: 12 MG/DL
PROT/CREAT UR: 0.25 MG/G{CREAT} (ref 0–0.1)
RBC # BLD AUTO: 4.09 MILLION/UL (ref 3.81–5.12)
SODIUM 24H UR-SCNC: 49 MOL/L
SODIUM SERPL-SCNC: 123 MMOL/L (ref 135–147)
TIBC SERPL-MCNC: 196 UG/DL (ref 250–450)
UIBC SERPL-MCNC: 82 UG/DL (ref 155–355)
WBC # BLD AUTO: 23.19 THOUSAND/UL (ref 4.31–10.16)

## 2023-10-06 PROCEDURE — 71275 CT ANGIOGRAPHY CHEST: CPT

## 2023-10-06 PROCEDURE — 84484 ASSAY OF TROPONIN QUANT: CPT | Performed by: EMERGENCY MEDICINE

## 2023-10-06 PROCEDURE — 96361 HYDRATE IV INFUSION ADD-ON: CPT

## 2023-10-06 PROCEDURE — 99285 EMERGENCY DEPT VISIT HI MDM: CPT

## 2023-10-06 PROCEDURE — 82570 ASSAY OF URINE CREATININE: CPT

## 2023-10-06 PROCEDURE — 83550 IRON BINDING TEST: CPT

## 2023-10-06 PROCEDURE — 85027 COMPLETE CBC AUTOMATED: CPT | Performed by: EMERGENCY MEDICINE

## 2023-10-06 PROCEDURE — 83735 ASSAY OF MAGNESIUM: CPT | Performed by: EMERGENCY MEDICINE

## 2023-10-06 PROCEDURE — 83540 ASSAY OF IRON: CPT

## 2023-10-06 PROCEDURE — 80053 COMPREHEN METABOLIC PANEL: CPT | Performed by: EMERGENCY MEDICINE

## 2023-10-06 PROCEDURE — 71045 X-RAY EXAM CHEST 1 VIEW: CPT

## 2023-10-06 PROCEDURE — 84156 ASSAY OF PROTEIN URINE: CPT

## 2023-10-06 PROCEDURE — 85379 FIBRIN DEGRADATION QUANT: CPT

## 2023-10-06 PROCEDURE — 83935 ASSAY OF URINE OSMOLALITY: CPT

## 2023-10-06 PROCEDURE — 82728 ASSAY OF FERRITIN: CPT

## 2023-10-06 PROCEDURE — 84300 ASSAY OF URINE SODIUM: CPT

## 2023-10-06 PROCEDURE — G1004 CDSM NDSC: HCPCS

## 2023-10-06 PROCEDURE — 99285 EMERGENCY DEPT VISIT HI MDM: CPT | Performed by: EMERGENCY MEDICINE

## 2023-10-06 PROCEDURE — 93005 ELECTROCARDIOGRAM TRACING: CPT

## 2023-10-06 PROCEDURE — 96374 THER/PROPH/DIAG INJ IV PUSH: CPT

## 2023-10-06 RX ORDER — TEMAZEPAM 15 MG/1
30 CAPSULE ORAL
Status: DISCONTINUED | OUTPATIENT
Start: 2023-10-06 | End: 2023-10-08 | Stop reason: HOSPADM

## 2023-10-06 RX ORDER — HYDRALAZINE HYDROCHLORIDE 20 MG/ML
5 INJECTION INTRAMUSCULAR; INTRAVENOUS ONCE
Status: DISCONTINUED | OUTPATIENT
Start: 2023-10-06 | End: 2023-10-06

## 2023-10-06 RX ORDER — HYDRALAZINE HYDROCHLORIDE 20 MG/ML
5 INJECTION INTRAMUSCULAR; INTRAVENOUS ONCE
Status: COMPLETED | OUTPATIENT
Start: 2023-10-06 | End: 2023-10-06

## 2023-10-06 RX ORDER — QUETIAPINE FUMARATE 100 MG/1
300 TABLET, FILM COATED ORAL
Status: DISCONTINUED | OUTPATIENT
Start: 2023-10-06 | End: 2023-10-08 | Stop reason: HOSPADM

## 2023-10-06 RX ADMIN — HYDRALAZINE HYDROCHLORIDE 5 MG: 20 INJECTION, SOLUTION INTRAMUSCULAR; INTRAVENOUS at 23:55

## 2023-10-06 RX ADMIN — QUETIAPINE FUMARATE 300 MG: 100 TABLET ORAL at 23:50

## 2023-10-06 RX ADMIN — TEMAZEPAM 30 MG: 15 CAPSULE ORAL at 23:50

## 2023-10-06 RX ADMIN — SODIUM CHLORIDE 1000 ML: 0.9 INJECTION, SOLUTION INTRAVENOUS at 22:46

## 2023-10-07 PROBLEM — I16.0 HYPERTENSIVE URGENCY: Status: ACTIVE | Noted: 2023-10-07

## 2023-10-07 PROBLEM — I10 HYPERTENSION: Status: ACTIVE | Noted: 2023-10-07

## 2023-10-07 LAB
4HR DELTA HS TROPONIN: 1 NG/L
ANION GAP SERPL CALCULATED.3IONS-SCNC: 6 MMOL/L
ANION GAP SERPL CALCULATED.3IONS-SCNC: 6 MMOL/L
BUN SERPL-MCNC: 13 MG/DL (ref 5–25)
BUN SERPL-MCNC: 15 MG/DL (ref 5–25)
CALCIUM SERPL-MCNC: 8.5 MG/DL (ref 8.4–10.2)
CALCIUM SERPL-MCNC: 8.7 MG/DL (ref 8.4–10.2)
CARDIAC TROPONIN I PNL SERPL HS: 4 NG/L
CHLORIDE SERPL-SCNC: 102 MMOL/L (ref 96–108)
CHLORIDE SERPL-SCNC: 102 MMOL/L (ref 96–108)
CO2 SERPL-SCNC: 19 MMOL/L (ref 21–32)
CO2 SERPL-SCNC: 20 MMOL/L (ref 21–32)
CREAT SERPL-MCNC: 0.77 MG/DL (ref 0.6–1.3)
CREAT SERPL-MCNC: 0.79 MG/DL (ref 0.6–1.3)
GFR SERPL CREATININE-BSD FRML MDRD: 75 ML/MIN/1.73SQ M
GFR SERPL CREATININE-BSD FRML MDRD: 77 ML/MIN/1.73SQ M
GLUCOSE SERPL-MCNC: 150 MG/DL (ref 65–140)
GLUCOSE SERPL-MCNC: 234 MG/DL (ref 65–140)
MAGNESIUM SERPL-MCNC: 2 MG/DL (ref 1.9–2.7)
OSMOLALITY UR: 312 MMOL/KG
POTASSIUM SERPL-SCNC: 3.8 MMOL/L (ref 3.5–5.3)
POTASSIUM SERPL-SCNC: 3.9 MMOL/L (ref 3.5–5.3)
SODIUM 24H UR-SCNC: 69 MOL/L
SODIUM SERPL-SCNC: 127 MMOL/L (ref 135–147)
SODIUM SERPL-SCNC: 128 MMOL/L (ref 135–147)
URATE SERPL-MCNC: 4.4 MG/DL (ref 2–7.5)

## 2023-10-07 PROCEDURE — 84300 ASSAY OF URINE SODIUM: CPT | Performed by: HOSPITALIST

## 2023-10-07 PROCEDURE — 96361 HYDRATE IV INFUSION ADD-ON: CPT

## 2023-10-07 PROCEDURE — 93005 ELECTROCARDIOGRAM TRACING: CPT

## 2023-10-07 PROCEDURE — 36415 COLL VENOUS BLD VENIPUNCTURE: CPT | Performed by: EMERGENCY MEDICINE

## 2023-10-07 PROCEDURE — 84550 ASSAY OF BLOOD/URIC ACID: CPT | Performed by: HOSPITALIST

## 2023-10-07 PROCEDURE — 84484 ASSAY OF TROPONIN QUANT: CPT | Performed by: EMERGENCY MEDICINE

## 2023-10-07 PROCEDURE — 80048 BASIC METABOLIC PNL TOTAL CA: CPT

## 2023-10-07 PROCEDURE — 99223 1ST HOSP IP/OBS HIGH 75: CPT | Performed by: HOSPITALIST

## 2023-10-07 PROCEDURE — 83935 ASSAY OF URINE OSMOLALITY: CPT | Performed by: HOSPITALIST

## 2023-10-07 PROCEDURE — 80048 BASIC METABOLIC PNL TOTAL CA: CPT | Performed by: HOSPITALIST

## 2023-10-07 RX ORDER — HYDRALAZINE HYDROCHLORIDE 20 MG/ML
5 INJECTION INTRAMUSCULAR; INTRAVENOUS EVERY 6 HOURS PRN
Status: DISCONTINUED | OUTPATIENT
Start: 2023-10-07 | End: 2023-10-08 | Stop reason: HOSPADM

## 2023-10-07 RX ORDER — TEMAZEPAM 15 MG/1
60 CAPSULE ORAL
Status: DISCONTINUED | OUTPATIENT
Start: 2023-10-07 | End: 2023-10-07

## 2023-10-07 RX ORDER — BUPROPION HYDROCHLORIDE 100 MG/1
100 TABLET ORAL 3 TIMES DAILY
Status: DISCONTINUED | OUTPATIENT
Start: 2023-10-07 | End: 2023-10-08 | Stop reason: HOSPADM

## 2023-10-07 RX ORDER — FERROUS SULFATE 325(65) MG
325 TABLET ORAL 2 TIMES DAILY WITH MEALS
Status: DISCONTINUED | OUTPATIENT
Start: 2023-10-07 | End: 2023-10-08 | Stop reason: HOSPADM

## 2023-10-07 RX ORDER — PILOCARPINE HYDROCHLORIDE 5 MG/1
5 TABLET, FILM COATED ORAL 3 TIMES DAILY
Status: DISCONTINUED | OUTPATIENT
Start: 2023-10-07 | End: 2023-10-08 | Stop reason: HOSPADM

## 2023-10-07 RX ORDER — CEPHALEXIN 500 MG/1
500 CAPSULE ORAL EVERY 12 HOURS SCHEDULED
Status: DISCONTINUED | OUTPATIENT
Start: 2023-10-07 | End: 2023-10-08 | Stop reason: HOSPADM

## 2023-10-07 RX ORDER — ACETAMINOPHEN 325 MG/1
650 TABLET ORAL EVERY 6 HOURS PRN
Status: DISCONTINUED | OUTPATIENT
Start: 2023-10-07 | End: 2023-10-08 | Stop reason: HOSPADM

## 2023-10-07 RX ORDER — CALCIUM CARBONATE 500 MG/1
500 TABLET, CHEWABLE ORAL DAILY
Status: DISCONTINUED | OUTPATIENT
Start: 2023-10-07 | End: 2023-10-08 | Stop reason: HOSPADM

## 2023-10-07 RX ORDER — PILOCARPINE HYDROCHLORIDE 5 MG/1
5 TABLET, FILM COATED ORAL DAILY
Status: DISCONTINUED | OUTPATIENT
Start: 2023-10-07 | End: 2023-10-07

## 2023-10-07 RX ORDER — QUETIAPINE FUMARATE 100 MG/1
300 TABLET, FILM COATED ORAL
Status: DISCONTINUED | OUTPATIENT
Start: 2023-10-07 | End: 2023-10-07 | Stop reason: SDUPTHER

## 2023-10-07 RX ORDER — SODIUM CHLORIDE 1 G/1
1 TABLET ORAL
Status: DISCONTINUED | OUTPATIENT
Start: 2023-10-07 | End: 2023-10-08 | Stop reason: HOSPADM

## 2023-10-07 RX ORDER — ENOXAPARIN SODIUM 100 MG/ML
40 INJECTION SUBCUTANEOUS DAILY
Status: DISCONTINUED | OUTPATIENT
Start: 2023-10-07 | End: 2023-10-08 | Stop reason: HOSPADM

## 2023-10-07 RX ADMIN — ACETAMINOPHEN 650 MG: 325 TABLET, FILM COATED ORAL at 13:34

## 2023-10-07 RX ADMIN — PILOCARPINE HYDROCHLORIDE 5 MG: 5 TABLET, FILM COATED ORAL at 17:47

## 2023-10-07 RX ADMIN — BUPROPION HYDROCHLORIDE 100 MG: 100 TABLET, FILM COATED ORAL at 21:11

## 2023-10-07 RX ADMIN — ZANUBRUTINIB 160 MG: 80 CAPSULE, GELATIN COATED ORAL at 12:18

## 2023-10-07 RX ADMIN — TEMAZEPAM 30 MG: 15 CAPSULE ORAL at 21:11

## 2023-10-07 RX ADMIN — PILOCARPINE HYDROCHLORIDE 5 MG: 5 TABLET, FILM COATED ORAL at 12:19

## 2023-10-07 RX ADMIN — CEPHALEXIN 500 MG: 500 CAPSULE ORAL at 21:11

## 2023-10-07 RX ADMIN — PILOCARPINE HYDROCHLORIDE 5 MG: 5 TABLET, FILM COATED ORAL at 08:30

## 2023-10-07 RX ADMIN — FERROUS SULFATE TAB 325 MG (65 MG ELEMENTAL FE) 325 MG: 325 (65 FE) TAB at 08:29

## 2023-10-07 RX ADMIN — SODIUM CHLORIDE 1 G: 1 TABLET ORAL at 17:47

## 2023-10-07 RX ADMIN — IOHEXOL 60 ML: 350 INJECTION, SOLUTION INTRAVENOUS at 00:17

## 2023-10-07 RX ADMIN — MULTIPLE VITAMINS W/ MINERALS TAB 1 TABLET: TAB ORAL at 08:29

## 2023-10-07 RX ADMIN — FERROUS SULFATE TAB 325 MG (65 MG ELEMENTAL FE) 325 MG: 325 (65 FE) TAB at 17:47

## 2023-10-07 RX ADMIN — ENOXAPARIN SODIUM 40 MG: 40 INJECTION SUBCUTANEOUS at 08:30

## 2023-10-07 RX ADMIN — PILOCARPINE HYDROCHLORIDE 5 MG: 5 TABLET, FILM COATED ORAL at 21:11

## 2023-10-07 RX ADMIN — CEPHALEXIN 500 MG: 500 CAPSULE ORAL at 08:29

## 2023-10-07 RX ADMIN — BUPROPION HYDROCHLORIDE 100 MG: 100 TABLET, FILM COATED ORAL at 13:34

## 2023-10-07 RX ADMIN — BUPROPION HYDROCHLORIDE 100 MG: 100 TABLET, FILM COATED ORAL at 08:30

## 2023-10-07 RX ADMIN — CALCIUM CARBONATE (ANTACID) CHEW TAB 500 MG 500 MG: 500 CHEW TAB at 08:30

## 2023-10-07 RX ADMIN — ZANUBRUTINIB 160 MG: 80 CAPSULE, GELATIN COATED ORAL at 17:47

## 2023-10-07 RX ADMIN — QUETIAPINE FUMARATE 300 MG: 100 TABLET ORAL at 21:10

## 2023-10-07 NOTE — ASSESSMENT & PLAN NOTE
Lab Results   Component Value Date    SODIUM 131 (L) 10/08/2023    SODIUM 128 (L) 10/07/2023    SODIUM 127 (L) 10/07/2023       Patient has a history of chronic hyponatremia in the 130s. She does state that she has been drinking more water recently now at 64 ounces a day for the last week or as for she was drinking maybe around 8 to 16 ounces a day. She also endorses decreased appetite over the last week and does not eat much sodium on a regular basis. Patient received a 1 L bolus of normal saline in the ED. Impression: Patient appears to be euvolemic and has increased urine sodium at 49. 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   Sodium 131 today almost back to baseline    Plan:  Stable for discharge  Can continue sodium chloride 1 g 3 times daily  Fluid restriction 1500  She will follow-up with PCP and get BMP check on Monday for adjustments of salt tabs Yes

## 2023-10-07 NOTE — ASSESSMENT & PLAN NOTE
Patient was feeling chest pain, fluttery feeling in her chest, as well as a pounding sensation in her head. She checked her blood pressure at this time and said that her diastolic was ranging between . She has a history of small lymphocytic lymphoma and is on Brukinsa chemotherapy which does have a side effect of hypertension. She just began this chemotherapy about 2 weeks ago. Prior to that she has no history of hypertension and is on no hypertensive meds. Patient's cardiac work-up came back negative with negative troponins from 3>5>4. Patient has elevated D-dimer but does have history of malignancy, CT PE chest did not show any PE but did show right middle lobe consolidation, however the patient does not have any clinical signs of pneumonia no sputum production no cough no fevers no and no systemic signs of infection. She does have elevated white count at 23.19 but this is due to her lymphoma. At admission the patient had elevated blood pressures in the 801C systolic. Patient received 5 mg of hydralazine in the ED. Blood pressures have decreased to the 662S systolic at this point.   Patient well controlled    Plan:  Blood pressure stable no indication for any antihypertensive at the moment  Encouraged to measure blood pressure daily  Follow-up with PCP

## 2023-10-07 NOTE — ED PROVIDER NOTES
History  Chief Complaint   Patient presents with   • Palpitations     Pt recently started taking chemo for lymphoma. Tonight pt BP has been fluctuating and is now having palpitations, chest pain, head pressure, and sweating. Alex Cardona is a 72year old female with diagnosis of lymphoma on chemotherapy, presenting for evaluation of elevated blood pressures, and associated palpitations and chest pain that are both resolved, continued head pressure. She describes a current pulsing sensation in her head. No shortness of breath or difficulty breathing. No lower extremity swelling or tenderness. No significant cardiopulmonary history, no history of DVT or PE. She does not have any history of hypertension and does not take any antihypertensives. Of note, patient was found to have significant hyponatremia here with sodium of 123, she says that she does have a history of low sodium but never this low. She did recently start her chemotherapy treatments. History provided by:  Patient   used: No    Palpitations  Associated symptoms: chest pain and diaphoresis    Associated symptoms: no back pain, no cough, no shortness of breath and no vomiting        Prior to Admission Medications   Prescriptions Last Dose Informant Patient Reported? Taking? Cholecalciferol (D3-50) 1.25 MG (36292 UT) capsule  Self Yes No   LORazepam (ATIVAN) 1 mg tablet  Self Yes No   Sig: if needed   Multiple Vitamins-Minerals (CENTRUM MINIS WOMEN 50+ PO)  Self Yes No   QUEtiapine (SEROquel) 300 mg tablet  Self Yes No   Sig: Take 300 mg by mouth daily at bedtime.    QUEtiapine (SEROquel) 50 mg tablet   Yes No   Zanubrutinib (Brukinsa) 80 MG CAPS  Self No No   Sig: Take 4 capsules (320 mg total) by mouth daily   acetaminophen (TYLENOL) 325 mg tablet  Self No No   Sig: Take 2 tablets (650 mg total) by mouth every 6 (six) hours as needed for mild pain   buPROPion (WELLBUTRIN) 100 mg tablet  Self Yes No   Sig: Take 100 mg by mouth 3 (three) times a day. calcium carbonate (Calcium 600) 600 MG tablet  Self Yes No   cephalexin (KEFLEX) 500 mg capsule   No No   Sig: Take 1 capsule (500 mg total) by mouth every 12 (twelve) hours for 7 days   cycloSPORINE (RESTASIS) 0.05 % ophthalmic emulsion  Self Yes No   Sig: instill 1 drop into both eyes twice a day   ferrous sulfate 325 (65 Fe) mg tablet  Self No No   Sig: Take 1 tablet (325 mg total) by mouth 2 (two) times a day with meals   hydroxychloroquine (PLAQUENIL) 200 mg tablet  Self No No   Sig: Take 1 tablet (200 mg total) by mouth 2 (two) times a day with meals   pilocarpine (SALAGEN) 5 mg tablet  Self No No   Sig: Take 1 tablet (5 mg total) by mouth 3 (three) times a day   temazepam (RESTORIL) 30 mg capsule  Self Yes No   Sig: Take 2 capsules by mouth daily at bedtime as needed       Facility-Administered Medications Last Administration Doses Remaining   cyanocobalamin injection 1,000 mcg 9/26/2023  2:12 PM           Past Medical History:   Diagnosis Date   • Acute kidney injury superimposed on chronic kidney disease  09/20/2021   • Age-related osteoporosis without current pathological fracture 06/01/2023   • Allergic rhinitis    • Anemia    • Anxiety    • Bipolar 1 disorder (HCC)    • Constipation    • Deaf     Pt lost all hearing in right ear after having [place] measles   • Depression    • Diarrhea    • Fatigue    • GERD (gastroesophageal reflux disease) 08/01/2020   • Hard of hearing     Pt wears a hearing in left ear.    • HL (hearing loss)    • Hypothyroidism    • Lung nodule    • Nasal congestion    • Osteoporosis    • Pneumonia 02/10/2017   • Pneumonia due to Streptococcus ([address]) 09/19/2021   • Psychiatric disorder    • Sjogren's syndrome ([address])    • Small lymphocytic lymphoma ([address]) 9/18/2023   • Systemic lupus erythematosus ([address])    • Wears glasses    • Wears partial dentures        Past Surgical History:   Procedure Laterality Date   • BREAST BIOPSY Right    • COLONOSCOPY     • EYE SURGERY     • FRACTURE SURGERY Right     elbow   • GASTRIC BYPASS     • HYSTERECTOMY Bilateral 1975   • IR BIOPSY LYMPH NODE  10/08/2020   • LUNG BIOPSY     • LYMPH NODE BIOPSY  09/18/2020   • LYMPH NODE BIOPSY Left 8/28/2023    Procedure: EXCISION BIOPSY LYMPH NODE LEFT AXILLARY;  Surgeon: Peter Smith MD;  Location: [facility];  Service: Thoracic   •  [address] INCL FLUOR GDNCE DX W/CELL WASHG [facility] N/A 12/08/2017    Procedure: BRONCHOSCOPY;  Surgeon: Umer Head MD;  Location: [facility]; Service: Pulmonary   • OK BX/EXC LYMPH NODE NEEDLE SUPERFICIAL Left 02/25/2021    Procedure: EXCISION BIOPSY LYMPH NODE: left axillary lymph node biopsy;  Surgeon: Darrius Chan MD;  Location: [facility];  Service: Thoracic   • TONSILLECTOMY         Family History   Problem Relation Age of Onset   • Heart disease Mother    • Diabetes Mother    • Kidney cancer Mother    • Cancer Mother         Kidney   • Hypertension Mother    • Heart disease Father    • Stroke Father    • Diabetes Father    • Cancer Father         Rectal Cancer   • Hypertension Father    • No Known Problems Maternal Grandmother    • No Known Problems Maternal Grandfather    • No Known Problems Paternal Grandmother    • No Known Problems Paternal Grandfather    • Leukemia Half-Sister 48   • No Known Problems Half-Sister    • No Known Problems Half-Sister    • No Known Problems Half-Sister    • BRCA2 Positive Neg Hx    • BRCA2 Negative Neg Hx    • BRCA1 Negative Neg Hx    • Endometrial cancer Neg Hx    • Breast cancer Neg Hx    • Breast cancer additional onset Neg Hx    • Colon cancer Neg Hx    • Ovarian cancer Neg Hx    • BRCA1 Positive Neg Hx    • BRCA 1/2 Neg Hx      I have reviewed and agree with the history as documented.     E-Cigarette/Vaping   • E-Cigarette Use Never User      E-Cigarette/Vaping Substances   • Nicotine No    • THC No    • CBD No    • Flavoring No    • Other No    • Unknown No      Social History     Tobacco Use   • Smoking status: Never   • Smokeless tobacco: Never   Vaping Use   • Vaping Use: Never used   Substance Use Topics   • Alcohol use: No   • Drug use: No        Review of Systems   Constitutional: Positive for diaphoresis. Negative for chills and fever. HENT: Negative for ear pain and sore throat. Eyes: Negative for pain and visual disturbance. Respiratory: Negative for cough and shortness of breath. Cardiovascular: Positive for chest pain and palpitations. Gastrointestinal: Negative for abdominal pain and vomiting. Genitourinary: Negative for dysuria and hematuria. Musculoskeletal: Negative for arthralgias and back pain. Skin: Negative for color change and rash. Neurological: Positive for headaches. Negative for seizures and syncope. All other systems reviewed and are negative. Physical Exam  ED Triage Vitals   Temperature Pulse Respirations Blood Pressure SpO2   10/06/23 2043 10/06/23 2043 10/06/23 2043 10/06/23 2043 10/06/23 2043   98.1 °F (36.7 °C) 74 18 (!) 174/83 98 %      Temp Source Heart Rate Source Patient Position - Orthostatic VS BP Location FiO2 (%)   10/06/23 2043 10/06/23 2043 10/06/23 2245 10/06/23 2043 --   Oral Monitor Lying Right arm       Pain Score       --                    Orthostatic Vital Signs  Vitals:    10/06/23 2043 10/06/23 2245 10/06/23 2330 10/06/23 2355   BP: (!) 174/83 (!) 187/102 (!) 173/81 170/82   Pulse: 74 64 64    Patient Position - Orthostatic VS:  Lying         Physical Exam  Vitals and nursing note reviewed. Constitutional:       General: She is not in acute distress. Appearance: Normal appearance. HENT:      Head: Normocephalic and atraumatic. Mouth/Throat:      Mouth: Mucous membranes are moist.      Pharynx: Oropharynx is clear. Eyes:      General: No scleral icterus. Extraocular Movements: Extraocular movements intact. Conjunctiva/sclera: Conjunctivae normal.      Pupils: Pupils are equal, round, and reactive to light. Cardiovascular:      Rate and Rhythm: Normal rate and regular rhythm. Heart sounds: Normal heart sounds. Pulmonary:      Effort: Pulmonary effort is normal. No respiratory distress. Breath sounds: Normal breath sounds. Abdominal:      General: Abdomen is flat. There is no distension. Tenderness: There is no abdominal tenderness. Musculoskeletal:         General: No tenderness or signs of injury. Cervical back: Neck supple. No rigidity. Skin:     General: Skin is warm. Coloration: Skin is not jaundiced. Findings: No erythema or rash. Neurological:      General: No focal deficit present. Mental Status: She is alert and oriented to person, place, and time. Mental status is at baseline. Cranial Nerves: No cranial nerve deficit. Sensory: No sensory deficit. Motor: No weakness. Coordination: Coordination normal.      Gait: Gait normal.   Psychiatric:         Mood and Affect: Mood normal.         Behavior: Behavior normal.         ED Medications  Medications   QUEtiapine (SEROquel) tablet 300 mg (300 mg Oral Given 10/6/23 2350)   temazepam (RESTORIL) capsule 30 mg (30 mg Oral Given 10/6/23 2350)   sodium chloride 0.9 % bolus 1,000 mL (1,000 mL Intravenous New Bag 10/6/23 2246)   hydrALAZINE (APRESOLINE) injection 5 mg (5 mg Intravenous Given 10/6/23 2355)   iohexol (OMNIPAQUE) 350 MG/ML injection (MULTI-DOSE) 85 mL (60 mL Intravenous Given 10/7/23 0017)       Diagnostic Studies  Results Reviewed     Procedure Component Value Units Date/Time    HS Troponin I 4hr [[id number]]  (Normal) Collected: 10/07/23 0049    Lab Status: Final result Specimen: Blood from Arm, Left Updated: 10/07/23 0156     hs TnI 4hr 4 ng/L      Delta 4hr hsTnI 1 ng/L     D-dimer, quantitative [[id number]]  (Abnormal) Collected: 10/06/23 2051    Lab Status: Final result Specimen: Blood from Arm, Left Updated: 10/06/23 2342     D-Dimer, Quant 3.59 ug/ml FEU     Narrative:       In the evaluation for possible pulmonary embolism, in the appropriate (Well's Score of 4 or less) patient, the age adjusted d-dimer cutoff for this patient can be calculated as:    Age x 0.01 (in ug/mL) for Age-adjusted D-dimer exclusion threshold for a patient over 50 years.     Osmolality, urine [[id number]]  (Normal) Collected: 10/06/23 2300    Lab Status: Final result Specimen: Urine, Clean Catch Updated: 10/06/23 2326     Osmolality, Ur 304 mmol/KG     Protein / creatinine ratio, urine [[id number]]  (Abnormal) Collected: 10/06/23 2300    Lab Status: Final result Specimen: Urine, Clean Catch Updated: 10/06/23 2319     Creatinine, Ur 48.2 mg/dL      Protein Urine Random 12 mg/dL      Prot/Creat Ratio, Ur 0.25    Sodium, urine, random [[id number]] Collected: 10/06/23 2300    Lab Status: Final result Specimen: Urine, Clean Catch Updated: 10/06/23 2311     Sodium, Ur 49    HS Troponin I 2hr [[id number]]  (Normal) Collected: 10/06/23 2234    Lab Status: Final result Specimen: Blood from Arm, Left Updated: 10/06/23 2309     hs TnI 2hr 5 ng/L      Delta 2hr hsTnI 2 ng/L     Magnesium [[id number]]     Lab Status: No result Specimen: Blood     HS Troponin 0hr (reflex protocol) [[id number]]  (Normal) Collected: 10/06/23 2051    Lab Status: Final result Specimen: Blood from Arm, Left Updated: 10/06/23 2133     hs TnI 0hr 3 ng/L     Comprehensive metabolic panel [[id number]]  (Abnormal) Collected: 10/06/23 2051    Lab Status: Final result Specimen: Blood from Arm, Left Updated: 10/06/23 2125     Sodium 123 mmol/L      Potassium 4.4 mmol/L      Chloride 94 mmol/L      CO2 23 mmol/L      ANION GAP 6 mmol/L      BUN 16 mg/dL      Creatinine 0.89 mg/dL      Glucose 109 mg/dL      Calcium 9.1 mg/dL      AST 29 U/L      ALT 13 U/L      Alkaline Phosphatase 70 U/L      Total Protein 10.2 g/dL      Albumin 4.0 g/dL      Total Bilirubin 0.61 mg/dL      eGFR 65 ml/min/1.73sq m     [facility]:      [facility] guidelines for Chronic Kidney Disease (CKD):   •  Stage 1 with normal or high GFR (GFR > 90 mL/min/1.73 square meters)  •  Stage 2 Mild CKD (GFR = 60-89 mL/min/1.73 square meters)  •  Stage 3A Moderate CKD (GFR = 45-59 mL/min/1.73 square meters)  •  Stage 3B Moderate CKD (GFR = 30-44 mL/min/1.73 square meters)  •  Stage 4 Severe CKD (GFR = 15-29 mL/min/1.73 square meters)  •  Stage 5 End Stage CKD (GFR <15 mL/min/1.73 square meters)  Note: GFR calculation is accurate only with a steady state creatinine    CBC and differential [[id number]]  (Abnormal) Collected: 10/06/23 2051    Lab Status: Final result Specimen: Blood from Arm, Left Updated: 10/06/23 2119     WBC 23.19 Thousand/uL      RBC 4.09 Million/uL      Hemoglobin 12.6 g/dL      Hematocrit 38.0 %      MCV 93 fL      MCH 30.8 pg      MCHC 33.2 g/dL      RDW 15.9 %      MPV 10.3 fL      Platelets 026 Thousands/uL      nRBC 0 /100 WBCs      Neutrophils Relative 16 %      Immat GRANS % 0 %      Lymphocytes Relative 79 %      Monocytes Relative 4 %      Eosinophils Relative 0 %      Basophils Relative 1 %      Neutrophils Absolute 3.77 Thousands/µL      Immature Grans Absolute 0.05 Thousand/uL      Lymphocytes Absolute 18.27 Thousands/µL      Monocytes Absolute 0.85 Thousand/µL      Eosinophils Absolute 0.09 Thousand/µL      Basophils Absolute 0.16 Thousands/µL     Narrative:      See Manual Diff Done Within 3 Days  This is an appended report. These results have been appended to a previously verified report. CTA ED chest PE study   Final Result by Lavinia Orozco DO (10/07 0153)      No acute pulmonary embolus. Slight increased size of right middle lobe consolidation. Correlate for pneumonia. Similar appearance of patchy groundglass consolidations and small cysts throughout the lungs, which could be related to LIP. Decreased size of axillary and mediastinal lymph nodes compared to most recent CT.                         Workstation performed: [id number]         XR chest 1 view portable   ED Interpretation by Barry Gauthier DO (10/06 2248)   No acute cardiopulmonary abnormalities visualized            Procedures  Procedures      ED Course  ED Course as of 10/07/23 0208   [facility] Oct 06, 2023   2345 D-Dimer, Quant(!): 3.59  We will proceed with CT PE study                     [facility] Rule for PE    Flowsheet Row Most Recent Value   PERC Rule for PE    Age >=50 1 Filed at: 10/06/2023 2252   HR >=100 0 Filed at: 10/06/2023 2252   O2 Sat on room air < 95% 0 Filed at: 10/06/2023 2252   History of PE or DVT 0 Filed at: 10/06/2023 2252   Recent trauma or surgery 0 Filed at: 10/06/2023 2252   Hemoptysis 0 Filed at: 10/06/2023 2252   Exogenous estrogen 0 Filed at: 10/06/2023 2252   Unilateral leg swelling 0 Filed at: 10/06/2023 2252   PERC Rule for PE Results 1 Filed at: 10/06/2023 2252                  Wells' Criteria for PE    Flowsheet Row Most Recent Value   Wells' Criteria for PE    Clinical signs and symptoms of DVT 0 Filed at: 10/06/2023 2252   PE is primary diagnosis or equally likely 0 Filed at: 10/06/2023 2252   HR >100 0 Filed at: 10/06/2023 2252   Immobilization at least 3 days or Surgery in the previous 4 weeks 0 Filed at: 10/06/2023 2252   Previous, objectively diagnosed PE or DVT 0 Filed at: 10/06/2023 2252   Hemoptysis 0 Filed at: 10/06/2023 2252   Malignancy with treatment within 6 months or palliative 1 Filed at: 10/06/2023 2252   Wells' Criteria Total 1 Filed at: 10/06/2023 Rajesh Saez is a 72year old female with diagnosis of lymphoma on chemotherapy, presenting for evaluation of elevated blood pressures, and associated palpitations and chest pain that are both resolved, continued head pressure. She does not have any significant cardiopulmonary history. No history of hypertension. No history of DVT or PE.   On exam, patient was not in any acute distress, she was hypotensive with systolic blood pressures in the 180s, otherwise vital signs were unremarkable. Physical exam was unremarkable. Given her hypertension, concern for hypertensive urgency versus emergency, concern for ACS. Also given that she does have a cancer, concerned that this could represent pulmonary embolism. Patient's blood work showed hyponatremia with sodium of 123, urine sodium, osmolality sent for evaluation. Delta troponin and ECG was not indicative of acute ischemia. D-dimer was significantly elevated and patient was sent for CT PE study. CT PE study was negative. Patient be admitted for further evaluation and treatment of this acute hyponatremia and hypertensive urgency. Hypertensive urgency: acute illness or injury  Hyponatremia: acute illness or injury  Palpitations: acute illness or injury  Amount and/or Complexity of Data Reviewed  Labs: ordered. Decision-making details documented in ED Course. Radiology: ordered and independent interpretation performed. Risk  Prescription drug management. Disposition  Final diagnoses:   Hyponatremia   Palpitations   Hypertensive urgency     Time reflects when diagnosis was documented in both MDM as applicable and the Disposition within this note     Time User Action Codes Description Comment    10/6/2023 11:03 PM Gonzalez Mayo Add [E87.1] Hyponatremia     10/6/2023 11:03 PM Gonzalez Mayo Add [R00.2] Palpitations     10/6/2023 11:03 PM Gonzalez Mayo Add [I16.0] Hypertensive urgency       ED Disposition     ED Disposition   Admit    Condition   Stable    Date/Time   Sat Oct 7, 2023  2:08 AM    Comment   Case was discussed with [facility] resident and the patient's admission status was agreed to be Admission Status: inpatient status to the service of Dr. Cindy Jamil. Follow-up Information    None         Patient's Medications   Discharge Prescriptions    No medications on file     No discharge procedures on file.     PDMP Review     None           ED Provider  Attending physically available and evaluated Lonita Hidden. I managed the patient along with the ED Attending.     Electronically Signed by         Radha Turk DO  10/07/23 9773

## 2023-10-07 NOTE — ED ATTENDING ATTESTATION
10/6/2023  I, Cherelle Mccullough MD, saw and evaluated the patient. I have discussed the patient with the resident/non-physician practitioner and agree with the resident's/non-physician practitioner's findings, Plan of Care, and MDM as documented in the resident's/non-physician practitioner's note, except where noted. All available labs and Radiology studies were reviewed. I was present for key portions of any procedure(s) performed by the resident/non-physician practitioner and I was immediately available to provide assistance. At this point I agree with the current assessment done in the Emergency Department. I have conducted an independent evaluation of this patient a history and physical is as follows:    22-year-old female presented for evaluation after developing some palpitations this evening, chest pain as well as feeling a pressure/pulsation in her head and an episode of diaphoresis. She was recently diagnosed with lymphoma, started on chemotherapy 2 weeks ago. She noted that her blood pressure was elevated at home today with diastolic greater than 145. Pressure somewhat improved on arrival here. She has no focal neurologic deficits. Denies any current chest pain but still feeling pulsations in her head. No geraldine headache, dizziness, visual changes, nausea, vomiting. ED Course  ED Course as of 10/11/23 1623   Fri Oct 06, 2023   2304 Sodium(!): 123  Acute on chronic hyponatremia. 125 earlier today, 133 two weeks ago. 2304 Chloride(!): 94   2305 WBC(!): 23.19  Chronic leukocytosis. 2356 D-Dimer, Quant(!): 3.59   Sat Oct 07, 2023   0155 CT shows:    No acute pulmonary embolus. Slight increased size of right middle lobe consolidation. Correlate for pneumonia. Similar appearance of patchy groundglass consolidations and small cysts throughout the lungs, which could be related to LIP     Admitted to medical service for further work-up and management in stable condition.     Critical Care Time  Procedures

## 2023-10-07 NOTE — H&P
[address] [facility]  H&P- Radha Carter 1950, 72 y.o. female MRN: [id number]  Unit/Bed#: S -Aletha Encounter: [id number]  Primary Care Provider: Carli Phan MD   Date and time admitted to hospital: 10/6/2023  9:10 PM      Assessment/Plan:  * Hypertensive urgency  Assessment & Plan  Patient was feeling chest pain, fluttery feeling in her chest, as well as a pounding sensation in her head. She checked her blood pressure at this time and said that her diastolic was ranging between . She has a history of small lymphocytic lymphoma and is on Brukinsa chemotherapy which does have a side effect of hypertension. She just began this chemotherapy about 2 weeks ago. Prior to that she has no history of hypertension and is on no hypertensive meds. Patient's cardiac work-up came back negative with negative troponins from 3>5>4. Patient has elevated D-dimer but does have history of malignancy, CT PE chest did not show any PE but did show right middle lobe consolidation, however the patient does not have any clinical signs of pneumonia no sputum production no cough no fevers no and no systemic signs of infection. She does have elevated white count at 23.19 but this is due to her lymphoma. At admission the patient had elevated blood pressures in the 631V systolic. Patient received 5 mg of hydralazine in the ED. Blood pressures have decreased to the 159W systolic at this point. Plan:  -Monitor BP  -As needed hydralazine 5 mg for systolic blood pressure above 180. Hyponatremia  Assessment & Plan  Lab Results   Component Value Date    SODIUM 123 (L) 10/06/2023    SODIUM 125 (L) 10/06/2023    SODIUM 133 (L) 08/04/2023       Patient has a history of chronic hyponatremia in the 130s. She does state that she has been drinking more water recently now at 64 ounces a day for the last week or as for she was drinking maybe around 8 to 16 ounces a day.   She also endorses decreased appetite over the last week and does not eat much sodium on a regular basis. Patient received a 1 L bolus of normal saline in the ED. Impression: Patient appears to be euvolemic and has increased urine sodium at 49. 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 Plan:  -I's and O's  -Daily weights  -Fluid restriction 1.5 L a day. -BMP twice a day  -Goal of 8 mmol correction in 24 hours maximum no more than 131      VTE Prophylaxis: High Risk (Score >/= 5) - Pharmacological DVT Prophylaxis Ordered: enoxaparin (Lovenox). Sequential Compression Devices Ordered. Code Status: Level 1 - Full Code   Discussion with Patient/Family: Updated patient/declined call to . Anticipated Length of Stay: Inpatient. Patient will be admitted on an inpatient basis with an anticipated length of stay of greater than 2 midnights secondary to  Hypertensive urgency. Chief Complaint: " My head felt really full and was pulsating, and the bottom blood pressure number was between . ."    History of Present Illness:  Caitlin Hutchinson is a 72 y.o. female with a PMH of lupus, Sjogren's, status post gastric bypass, CKD stage IIIb, bipolar 1, chronic hyponatremia, lymphoma on chemotherapy Brukinsa who presents with heading pulsating sensation, new hypertension, and fluttering sensation in her chest.  She states she had some chest pain when she first arrived at the hospital but did not last long. She describe the CP as achy and above her left breast.  Never had this pain before. Pain did not get worse with exertion. She denies any SOB. No n/v/d/c or abdominal pain. No f/c. She has been on her chemotherapy for 2 weeks. She did not have a history of hypertension prior to today. Now she does not have any CP or palpitations at present. She received 5 mg of Hydralazine in the ED and her BP has gone down to the 140s SBP. She was also found to have low Na at 123.   The patient states that she was drinking more water than usually now about 64 oz a day where before she was drinking around 8 oz a day for the past week. She also states a poor appetite in the same time period. Source/Reliability: the patient who is a reliable historian. Past Medical and Surgical History: PMHx:   Past Medical History:   Diagnosis Date   • Acute kidney injury superimposed on chronic kidney disease  09/20/2021   • Age-related osteoporosis without current pathological fracture 06/01/2023   • Allergic rhinitis    • Anemia    • Anxiety    • Bipolar 1 disorder ([address])    • Constipation    • Deaf     Pt lost all hearing in right ear after having [place] measles   • Depression    • Diarrhea    • Fatigue    • GERD (gastroesophageal reflux disease) 08/01/2020   • Hard of hearing     Pt wears a hearing in left ear. • HL (hearing loss)    • Hypertension 10/7/2023   • Hypothyroidism    • Lung nodule    • Nasal congestion    • Osteoporosis    • Pneumonia 02/10/2017   • Pneumonia due to Streptococcus ([address]) 09/19/2021   • Psychiatric disorder    • Sjogren's syndrome (HCC)    • Small lymphocytic lymphoma ([address]) 9/18/2023   • Systemic lupus erythematosus ([address])    • Wears glasses    • Wears partial dentures      PSHx:   Past Surgical History:   Procedure Laterality Date   • BREAST BIOPSY Right    • COLONOSCOPY     • EYE SURGERY     • FRACTURE SURGERY Right     elbow   • GASTRIC BYPASS     • HYSTERECTOMY Bilateral 1975   • IR BIOPSY LYMPH NODE  10/08/2020   • LUNG BIOPSY     • LYMPH NODE BIOPSY  09/18/2020   • LYMPH NODE BIOPSY Left 8/28/2023    Procedure: EXCISION BIOPSY LYMPH NODE LEFT AXILLARY;  Surgeon: Glenys Nieves MD;  Location:  MAIN OR;  Service: Thoracic   •  [address] INCL FLUOR GDNCE DX W/CELL WASHG [address] [facility] N/A 12/08/2017    Procedure: BRONCHOSCOPY;  Surgeon: Eliu Ivory MD;  Location: [facility] GI LAB;   Service: Pulmonary   • NE BX/EXC LYMPH NODE NEEDLE SUPERFICIAL Left 02/25/2021    Procedure: EXCISION BIOPSY LYMPH NODE: left axillary lymph node biopsy;  Surgeon: Andres Juarez MD;  Location: [facility] MAIN OR;  Service: Thoracic   • TONSILLECTOMY       PTA Meds/Allergies: I have personally reviewed all medications and allergies. Prior to Admission medications    Medication Sig Start Date End Date Taking? Authorizing Provider   acetaminophen (TYLENOL) 325 mg tablet Take 2 tablets (650 mg total) by mouth every 6 (six) hours as needed for mild pain 9/24/21   Gilda Jasmine PA-C   buPROPion [facility]) 100 mg tablet Take 100 mg by mouth 3 (three) times a day. Historical Provider, MD   calcium carbonate (Calcium 600) 600 MG tablet  7/24/23   Historical Provider, MD   cephalexin (KEFLEX) 500 mg capsule Take 1 capsule (500 mg total) by mouth every 12 (twelve) hours for 7 days 10/4/23 10/11/23  Muriel Andersen MD   Cholecalciferol (D3-50) 1.25 MG (73627 UT) capsule  7/31/23   Historical Provider, MD   cycloSPORINE (RESTASIS) 0.05 % ophthalmic emulsion instill 1 drop into both eyes twice a day 4/21/23   Historical Provider, MD   ferrous sulfate 325 (65 Fe) mg tablet Take 1 tablet (325 mg total) by mouth 2 (two) times a day with meals 9/24/21   Gilda Jasmine PA-C   hydroxychloroquine (PLAQUENIL) 200 mg tablet Take 1 tablet (200 mg total) by mouth 2 (two) times a day with meals 4/21/22 10/4/23  Cassandra Oswald MD   LORazepam (ATIVAN) 1 mg tablet if needed 10/20/22   Historical Provider, MD   Multiple Vitamins-Minerals (CENTRUM MINIS WOMEN 50+ PO)  8/8/23   Historical Provider, MD   pilocarpine (SALAGEN) 5 mg tablet Take 1 tablet (5 mg total) by mouth 3 (three) times a day 6/1/23   Cassandra Oswald MD   QUEtiapine (SEROquel) 300 mg tablet Take 300 mg by mouth daily at bedtime.     Historical Provider, MD   QUEtiapine (SEROquel) 50 mg tablet  9/29/23   Historical Provider, MD   temazepam (RESTORIL) 30 mg capsule Take 2 capsules by mouth daily at bedtime as needed     Historical Provider, MD   Zanubrutinib (Brukinsa) 80 MG CAPS Take 4 capsules (320 mg total) by mouth daily 9/18/23   Ortiz Patel, DO      Allergies   Allergen Reactions   • Ciprofloxacin Hives and Swelling     Pt reports that lips and mouth and face swelled. • Cymbalta [Duloxetine Hcl] Other (See Comments)     Hallucinations, fatigue, faints   • Nirmatrelvir-Ritonavir Diarrhea, Nausea Only, Other (See Comments), Dizziness and Lightheadedness     "passed out"-nausea   • Percocet [Oxycodone-Acetaminophen] Other (See Comments)     Dry mouth - pt states it kept her up all night. States had to continually drink fluids       Family History:   Family History   Problem Relation Age of Onset   • Heart disease Mother    • Diabetes Mother    • Kidney cancer Mother    • Cancer Mother         Kidney   • Hypertension Mother    • Heart disease Father    • Stroke Father    • Diabetes Father    • Cancer Father         Rectal Cancer   • Hypertension Father    • No Known Problems Maternal Grandmother    • No Known Problems Maternal Grandfather    • No Known Problems Paternal Grandmother    • No Known Problems Paternal Grandfather    • Leukemia Half-Sister 48   • No Known Problems Half-Sister    • No Known Problems Half-Sister    • No Known Problems Half-Sister    • BRCA2 Positive Neg Hx    • BRCA2 Negative Neg Hx    • BRCA1 Negative Neg Hx    • Endometrial cancer Neg Hx    • Breast cancer Neg Hx    • Breast cancer additional onset Neg Hx    • Colon cancer Neg Hx    • Ovarian cancer Neg Hx    • BRCA1 Positive Neg Hx    • BRCA 1/2 Neg Hx         Personal and Social History:  Social History     Tobacco Use   • Smoking status: Never   • Smokeless tobacco: Never   Vaping Use   • Vaping Use: Never used   Substance Use Topics   • Alcohol use: No   • Drug use: No       Review of Systems:   Review of Systems   Constitutional: Positive for appetite change. Negative for chills, fatigue and fever. HENT: Negative for ear pain and sore throat. Eyes: Negative for pain and visual disturbance. Respiratory: Negative for cough, chest tightness, shortness of breath, wheezing and stridor. Cardiovascular: Negative for chest pain, palpitations and leg swelling. Gastrointestinal: Negative for abdominal pain, constipation, diarrhea, nausea and vomiting. Endocrine: Positive for polydipsia. Genitourinary: Negative for dysuria and hematuria. Musculoskeletal: Negative for arthralgias and back pain. Skin: Negative for color change and rash. Neurological: Negative for dizziness, tremors, seizures, syncope, facial asymmetry, speech difficulty, weakness, light-headedness, numbness and headaches. Psychiatric/Behavioral: Negative for confusion. All other systems reviewed and are negative. Objective:   Vitals: Blood Pressure: 148/86 (10/07/23 0320)  Pulse: 65 (10/07/23 0320)  Temperature: 98.1 °F (36.7 °C) (10/07/23 0320)  Temp Source: Oral (10/07/23 0320)  Respirations: 18 (10/07/23 0320)  SpO2: 94 % (10/07/23 0320)    Physical Exam  Vitals reviewed. Constitutional:       General: She is not in acute distress. Appearance: She is well-developed. She is not ill-appearing, toxic-appearing or diaphoretic. HENT:      Head: Normocephalic and atraumatic. Right Ear: External ear normal.      Left Ear: External ear normal.      Nose: Nose normal.   Eyes:      Conjunctiva/sclera: Conjunctivae normal.   Cardiovascular:      Rate and Rhythm: Normal rate and regular rhythm. Pulses: Normal pulses. Heart sounds: Normal heart sounds. No murmur heard. No friction rub. No gallop. Pulmonary:      Effort: Pulmonary effort is normal. No respiratory distress. Breath sounds: Normal breath sounds. No stridor. No wheezing, rhonchi or rales. Chest:      Chest wall: No tenderness. Abdominal:      General: Abdomen is flat. Bowel sounds are normal. There is no distension. Palpations: Abdomen is soft. Tenderness: There is no abdominal tenderness. There is no guarding or rebound. Musculoskeletal:         General: No swelling. Cervical back: Neck supple. Right lower leg: No edema. Left lower leg: No edema. Skin:     General: Skin is warm and dry. Capillary Refill: Capillary refill takes less than 2 seconds. Neurological:      General: No focal deficit present. Mental Status: She is alert. Psychiatric:         Mood and Affect: Mood normal.          Lab Results: I have reviewed all pertinent results listed below. Results from last 7 days   Lab Units 10/06/23  2051   WBC Thousand/uL 23.19*   HEMOGLOBIN g/dL 12.6   HEMATOCRIT % 38.0   PLATELETS Thousands/uL 303   NEUTROS PCT % 16*   LYMPHS PCT % 79*   MONOS PCT % 4   EOS PCT % 0     Results from last 7 days   Lab Units 10/06/23  2051   POTASSIUM mmol/L 4.4   CHLORIDE mmol/L 94*   CO2 mmol/L 23   BUN mg/dL 16   CREATININE mg/dL 0.89   CALCIUM mg/dL 9.1   ALK PHOS U/L 70   ALT U/L 13   AST U/L 29            Micro:         ECG, Imaging and Other Studies Reviewed on Admission:   · ECG: No EKG obtained. · Imaging: Reviewed radiology reports from this admission including: chest CT scan and Personally reviewed the following imaging: chest xray     ** Please Note: This note has been constructed using a voice recognition system. **

## 2023-10-08 VITALS
DIASTOLIC BLOOD PRESSURE: 70 MMHG | WEIGHT: 139.99 LBS | SYSTOLIC BLOOD PRESSURE: 132 MMHG | TEMPERATURE: 97.6 F | BODY MASS INDEX: 27.34 KG/M2 | OXYGEN SATURATION: 96 % | RESPIRATION RATE: 16 BRPM | HEART RATE: 73 BPM

## 2023-10-08 LAB
ANION GAP SERPL CALCULATED.3IONS-SCNC: 5 MMOL/L
ATRIAL RATE: 62 BPM
BUN SERPL-MCNC: 13 MG/DL (ref 5–25)
CALCIUM SERPL-MCNC: 8.6 MG/DL (ref 8.4–10.2)
CHLORIDE SERPL-SCNC: 104 MMOL/L (ref 96–108)
CO2 SERPL-SCNC: 22 MMOL/L (ref 21–32)
CREAT SERPL-MCNC: 0.71 MG/DL (ref 0.6–1.3)
GFR SERPL CREATININE-BSD FRML MDRD: 85 ML/MIN/1.73SQ M
GLUCOSE SERPL-MCNC: 70 MG/DL (ref 65–140)
P AXIS: 33 DEGREES
POTASSIUM SERPL-SCNC: 3.7 MMOL/L (ref 3.5–5.3)
PR INTERVAL: 204 MS
QRS AXIS: -37 DEGREES
QRSD INTERVAL: 94 MS
QT INTERVAL: 410 MS
QTC INTERVAL: 416 MS
SODIUM SERPL-SCNC: 131 MMOL/L (ref 135–147)
T WAVE AXIS: 0 DEGREES
VENTRICULAR RATE: 62 BPM

## 2023-10-08 PROCEDURE — 99239 HOSP IP/OBS DSCHRG MGMT >30: CPT | Performed by: HOSPITALIST

## 2023-10-08 PROCEDURE — 93010 ELECTROCARDIOGRAM REPORT: CPT | Performed by: INTERNAL MEDICINE

## 2023-10-08 PROCEDURE — 80048 BASIC METABOLIC PNL TOTAL CA: CPT

## 2023-10-08 RX ORDER — SODIUM CHLORIDE 1 G/1
1 TABLET ORAL
Qty: 90 TABLET | Refills: 0 | Status: SHIPPED | OUTPATIENT
Start: 2023-10-08 | End: 2023-10-13 | Stop reason: SDUPTHER

## 2023-10-08 RX ADMIN — ACETAMINOPHEN 650 MG: 325 TABLET, FILM COATED ORAL at 10:43

## 2023-10-08 RX ADMIN — CALCIUM CARBONATE (ANTACID) CHEW TAB 500 MG 500 MG: 500 CHEW TAB at 08:58

## 2023-10-08 RX ADMIN — CEPHALEXIN 500 MG: 500 CAPSULE ORAL at 08:58

## 2023-10-08 RX ADMIN — ENOXAPARIN SODIUM 40 MG: 40 INJECTION SUBCUTANEOUS at 08:58

## 2023-10-08 RX ADMIN — FERROUS SULFATE TAB 325 MG (65 MG ELEMENTAL FE) 325 MG: 325 (65 FE) TAB at 08:58

## 2023-10-08 RX ADMIN — BUPROPION HYDROCHLORIDE 100 MG: 100 TABLET, FILM COATED ORAL at 08:59

## 2023-10-08 RX ADMIN — SODIUM CHLORIDE 1 G: 1 TABLET ORAL at 11:32

## 2023-10-08 RX ADMIN — MULTIPLE VITAMINS W/ MINERALS TAB 1 TABLET: TAB ORAL at 08:58

## 2023-10-08 RX ADMIN — ZANUBRUTINIB 160 MG: 80 CAPSULE, GELATIN COATED ORAL at 08:59

## 2023-10-08 RX ADMIN — SODIUM CHLORIDE 1 G: 1 TABLET ORAL at 08:58

## 2023-10-08 RX ADMIN — PILOCARPINE HYDROCHLORIDE 5 MG: 5 TABLET, FILM COATED ORAL at 08:58

## 2023-10-08 NOTE — UTILIZATION REVIEW
Initial Clinical Review  Date: 10/8/23   Day 3: Has surpassed a 2nd midnight with active treatments and services, which include BP monitoring, BMP and on salt tabs and fluid restriction. .    Admission: Date/Time/Statement:   Admission Orders (From admission, onward)     Ordered        10/07/23 0208  INPATIENT ADMISSION  Once                      Orders Placed This Encounter   Procedures   • INPATIENT ADMISSION     Standing Status:   Standing     Number of Occurrences:   1     Order Specific Question:   Level of Care     Answer:   Med Surg [16]     Order Specific Question:   Estimated length of stay     Answer:   More than 2 Midnights     Order Specific Question:   Certification     Answer:   I certify that inpatient services are medically necessary for this patient for a duration of greater than two midnights. See H&P and MD Progress Notes for additional information about the patient's course of treatment. ED Arrival Information     Expected   -    Arrival   10/6/2023 20:36    Acuity   Urgent            Means of arrival   Walk-In    Escorted by   Spouse    Service   Hospitalist    Admission type   Emergency            Arrival complaint   HBP / Deion Miracle / Deneice Sitter / RAPID HR           Chief Complaint   Patient presents with   • Palpitations     Pt recently started taking chemo for lymphoma. Tonight pt BP has been fluctuating and is now having palpitations, chest pain, head pressure, and sweating. Initial Presentation: 72 y.o. female from home to ED on 10/6/23 and inpatient order placed 10/7/23 due to hypertensive urgency/hyponatremia. PMH of lupus, Sjogren's, status post gastric bypass, CKD stage IIIb, bipolar 1, chronic hyponatremia, lymphoma on chemotherapy. Presented due to elevated BP with palpitations and chest pain, head pressure starting evening of arrival.   On exam: hypertensive. D dimer 3.59. Na 123. Wbc 23.19.  Cta chest showed slight increase in consolidation.   Similar appearance of ground glass opacities. In the ED given 1 liter IVF bolus,  Hydralazine. Plan is monitor BP, As needed hydralazine 5 mg for systolic blood pressure above 180. Fluid restriction,  BMP twice a day. Goal of 8 mmol correction in 24 hours maximum no more than 131     Date: 10/7/23    Day 2: admits to increase thirst and drinking more fluids recently. Chest discomfort resolved. On exam no focal deficits. Na 127. Continue to monitor BP and IV hydralazine as needed. Add salt tabs and continue fluid restriction. Trend BMP. ED Triage Vitals   Temperature Pulse Respirations Blood Pressure SpO2   10/06/23 2043 10/06/23 2043 10/06/23 2043 10/06/23 2043 10/06/23 2043   98.1 °F (36.7 °C) 74 18 (!) 174/83 98 %      Temp Source Heart Rate Source Patient Position - Orthostatic VS BP Location FiO2 (%)   10/06/23 2043 10/06/23 2043 10/06/23 2245 10/06/23 2043 --   Oral Monitor Lying Right arm       Pain Score       10/07/23 0332       No Pain          Wt Readings from Last 1 Encounters:   10/08/23 63.5 kg (139 lb 15.9 oz)     Additional Vital Signs:   10/08/23 0802 97.6 °F (36.4 °C) 73 16 132/70 -- 96 % None (Room air) Lying   10/08/23 0711 97.8 °F (36.6 °C) 71 17 145/67 97 95 % None (Room air) Lying   10/07/23 2210 97.6 °F (36.4 °C) 87 18 125/61 86 96 % None (Room air) Lying   10/07/23 1524 -- 70 18 112/57 78 94 % None (Room air) Lying   10/07/23 1332 -- 81 -- 110/66 82 -- -- --   10/07/23 0826 98.1 °F (36.7 °C) 90 16 116/59 83 97 % None (Room air) Lying   10/07/23 0320 98.1 °F (36.7 °C) 65 18 148/86 108 94 % None (Room air) Lying   10/07/23 0200 -- 66 18 169/80 115 97 % None (Room air) --   10/06/23 2330 -- 64 18 173/81 Abnormal  116 97 % None (Room air) --   10/06/23 2245 -- 64 18 187/102 Abnormal  136 96 % None (Room air)      Pertinent Labs/Diagnostic Test Results:   CTA ED chest PE study   Final Result by Low Spann DO (10/07 0153)      No acute pulmonary embolus.       Slight increased size of right middle lobe consolidation. Correlate for pneumonia. Similar appearance of patchy groundglass consolidations and small cysts throughout the lungs, which could be related to LIP. Decreased size of axillary and mediastinal lymph nodes compared to most recent CT. Workstation performed: [id number]         XR chest 1 view portable   ED Interpretation by Barry Gauthier DO (10/06 2248)   No acute cardiopulmonary abnormalities visualized      Final Result by Wolf Santizo MD (10/07 1816)      No acute disease with subtle nodularity, groundglass opacity, and cysts better shown on CT.                Workstation performed: [id number]             Results from last 7 days   Lab Units 10/06/23  2051 10/06/23  0948   WBC Thousand/uL 23.19* 20.59*   HEMOGLOBIN g/dL 12.6 11.8   HEMATOCRIT % 38.0 35.9   PLATELETS Thousands/uL 303 276   NEUTROS ABS Thousands/µL 3.77  --      Results from last 7 days   Lab Units 10/08/23  0447 10/07/23  1434 10/07/23  0918 10/06/23  2051 10/06/23  0948   SODIUM mmol/L 131* 128* 127* 123* 125*   POTASSIUM mmol/L 3.7 3.9 3.8 4.4 4.2   CHLORIDE mmol/L 104 102 102 94* 96   CO2 mmol/L 22 20* 19* 23 24   ANION GAP mmol/L 5 6 6 6 5   BUN mg/dL 13 15 13 16 15   CREATININE mg/dL 0.71 0.79 0.77 0.89 0.86   EGFR ml/min/1.73sq m 85 75 77 65 68   CALCIUM mg/dL 8.6 8.7 8.5 9.1 9.0   MAGNESIUM mg/dL  --   --   --  2.0  --      Results from last 7 days   Lab Units 10/06/23  2051 10/06/23  0948   AST U/L 29 24   ALT U/L 13 13   ALK PHOS U/L 70 68   TOTAL PROTEIN g/dL 10.2* 9.8*   ALBUMIN g/dL 4.0 3.8   TOTAL BILIRUBIN mg/dL 0.61 0.59     Results from last 7 days   Lab Units 10/08/23  0447 10/07/23  1434 10/07/23  0918 10/06/23  2051   GLUCOSE RANDOM mg/dL 70 150* 234* 109     Results from last 7 days   Lab Units 10/07/23  0049 10/06/23  2234 10/06/23  2051   HS TNI 0HR ng/L  --   --  3   HS TNI 2HR ng/L  --  5  --    HSTNI D2 ng/L  --  2  --    HS TNI 4HR ng/L 4  --   -- HSTNI D4 ng/L 1  --   --      Results from last 7 days   Lab Units 10/06/23  2051   D-DIMER QUANTITATIVE ug/ml FEU 3.59*     Results from last 7 days   Lab Units 10/06/23  0948   FERRITIN ng/mL 266   IRON SATURATION % 58*   IRON ug/dL 114   TIBC ug/dL 196*     Results from last 7 days   Lab Units 10/07/23  1221 10/06/23  2300   OSMO UR mmol/ 304     Results from last 7 days   Lab Units 10/07/23  1221 10/06/23  2300 10/04/23  1445   CLARITY UA   --   --  clear   COLOR UA   --   --  yellow   GLUCOSE UA   --   --  norm   KETONES UA   --   --  neg   BLOOD UA   --   --  tr   PROTEIN UA   --   --  neg   NITRITE UA   --   --  neg   BILIRUBIN UA POC   --   --  neg   UROBILINOGEN UA   --   --  norm   LEUKOCYTES UA   --   --  500   SODIUM UR  69 49  --    CREATININE UR mg/dL  --  48.2  --    PROTEIN UR mg/dL  --  12  --    PROT/CREAT RATIO UR   --  0.25*  --      ED Treatment:   Medication Administration from 10/06/2023 2035 to 10/07/2023 1190       Date/Time Order Dose Route Action Comments     10/06/2023 2246 EDT sodium chloride 0.9 % bolus 1,000 mL 1,000 mL Intravenous New Bag --     10/06/2023 2350 EDT QUEtiapine (SEROquel) tablet 300 mg 300 mg Oral Given --     10/06/2023 2350 EDT temazepam (RESTORIL) capsule 30 mg 30 mg Oral Given --     10/06/2023 2355 EDT hydrALAZINE (APRESOLINE) injection 5 mg 5 mg Intravenous Given --        Past Medical History:   Diagnosis Date   • Acute kidney injury superimposed on chronic kidney disease  09/20/2021   • Age-related osteoporosis without current pathological fracture 06/01/2023   • Allergic rhinitis    • Anemia    • Anxiety    • Bipolar 1 disorder (HCC)    • Constipation    • Deaf     Pt lost all hearing in right ear after having [place] measles   • Depression    • Diarrhea    • Fatigue    • GERD (gastroesophageal reflux disease) 08/01/2020   • Hard of hearing     Pt wears a hearing in left ear.    • HL (hearing loss)    • Hypertension 10/7/2023   • Hypothyroidism    • Lung nodule    • Nasal congestion    • Osteoporosis    • Pneumonia 02/10/2017   • Pneumonia due to Streptococcus [facility]) 09/19/2021   • Psychiatric disorder    • Sjogren's syndrome ([address] [facility])    • Small lymphocytic lymphoma ([address] [facility]) 9/18/2023   • Systemic lupus erythematosus (HCC)    • Wears glasses    • Wears partial dentures      Present on Admission:  **None**      Admitting Diagnosis: Palpitations [R00.2]  Hyponatremia [E87.1]  Hypertensive urgency [I16.0]  Age/Sex: 72 y.o. female  Admission Orders:  10/07/23 0208 inpatient   Scheduled Medications:  buPROPion, 100 mg, Oral, TID  calcium carbonate, 500 mg, Oral, Daily  cephalexin, 500 mg, Oral, Q12H CARLOS  enoxaparin, 40 mg, Subcutaneous, Daily  ferrous sulfate, 325 mg, Oral, BID With Meals  multivitamin-minerals, 1 tablet, Oral, Daily  pilocarpine, 5 mg, Oral, TID  QUEtiapine, 300 mg, Oral, HS  sodium chloride, 1 g, Oral, TID With Meals  temazepam, 30 mg, Oral, HS  Zanubrutinib, 160 mg, Oral, BID    Continuous IV Infusions: none      PRN Meds:  acetaminophen, 650 mg, Oral, Q6H PRN x 1 10/7, x 1 10/8  hydrALAZINE, 5 mg, Intravenous, Q6H PRN    Fluid restriction     None    Network Utilization Review Department  ATTENTION: Please call with any questions or concerns to [phone] and carefully listen to the prompts so that you are directed to the right person. All voicemails are confidential.   For Discharge needs, contact Care Management DC Support Team at [phone] opt. 2  Send all requests for admission clinical reviews, approved or denied determinations and any other requests to dedicated fax number below belonging to the campus where the patient is receiving treatment.  List of dedicated fax numbers for the Facilities:  [place] DENIALS (Administrative/Medical Necessity) [phone]   DISCHARGE SUPPORT TEAM (NETWORK) [id number] Maninder Basurto (Maternity/NICU/Pediatrics) [address] [place] [address] [address] [facility] [phone] 1505 [facility] [address] [address] [facility] [phone]   [address] [phone]

## 2023-10-08 NOTE — PLAN OF CARE
Problem: Nutrition/Hydration-ADULT  Goal: Nutrient/Hydration intake appropriate for improving, restoring or maintaining nutritional needs  Description: Monitor and assess patient's nutrition/hydration status for malnutrition. Collaborate with interdisciplinary team and initiate plan and interventions as ordered. Monitor patient's weight and dietary intake as ordered or per policy. Utilize nutrition screening tool and intervene as necessary. Determine patient's food preferences and provide high-protein, high-caloric foods as appropriate.      INTERVENTIONS:  - Monitor oral intake, urinary output, labs, and treatment plans  - Assess nutrition and hydration status and recommend course of action  - Evaluate amount of meals eaten  - Assist patient with eating if necessary   - Allow adequate time for meals  - Recommend/ encourage appropriate diets, oral nutritional supplements, and vitamin/mineral supplements  - Order, calculate, and assess calorie counts as needed  - Recommend, monitor, and adjust tube feedings and TPN/PPN based on assessed needs  - Assess need for intravenous fluids  - Provide specific nutrition/hydration education as appropriate  - Include patient/family/caregiver in decisions related to nutrition  10/8/2023 1117 by Alessandra Reich RN  Outcome: Adequate for Discharge  10/8/2023 0742 by Alessandra Reich RN  Outcome: Progressing     Problem: PAIN - ADULT  Goal: Verbalizes/displays adequate comfort level or baseline comfort level  Description: Interventions:  - Encourage patient to monitor pain and request assistance  - Assess pain using appropriate pain scale  - Administer analgesics based on type and severity of pain and evaluate response  - Implement non-pharmacological measures as appropriate and evaluate response  - Consider cultural and social influences on pain and pain management  - Notify physician/advanced practitioner if interventions unsuccessful or patient reports new pain  10/8/2023 1117 by Trista Coombs RN  Outcome: Adequate for Discharge  10/8/2023 0742 by Trista Coombs RN  Outcome: Progressing     Problem: INFECTION - ADULT  Goal: Absence or prevention of progression during hospitalization  Description: INTERVENTIONS:  - Assess and monitor for signs and symptoms of infection  - Monitor lab/diagnostic results  - Monitor all insertion sites, i.e. indwelling lines, tubes, and drains  - Monitor endotracheal if appropriate and nasal secretions for changes in amount and color  - [place] appropriate cooling/warming therapies per order  - Administer medications as ordered  - Instruct and encourage patient and family to use good hand hygiene technique  - Identify and instruct in appropriate isolation precautions for identified infection/condition  10/8/2023 1117 by Trista Coombs RN  Outcome: Adequate for Discharge  10/8/2023 0742 by Trista Coombs RN  Outcome: Progressing  Goal: Absence of fever/infection during neutropenic period  Description: INTERVENTIONS:  - Monitor WBC    10/8/2023 1117 by Trista Coombs RN  Outcome: Adequate for Discharge  10/8/2023 0742 by Trista Coombs RN  Outcome: Progressing     Problem: SAFETY ADULT  Goal: Patient will remain free of falls  Description: INTERVENTIONS:  - Educate patient/family on patient safety including physical limitations  - Instruct patient to call for assistance with activity   - Consult OT/PT to assist with strengthening/mobility   - Keep Call bell within reach  - Keep bed low and locked with side rails adjusted as appropriate  - Keep care items and personal belongings within reach  - Initiate and maintain comfort rounds  - Make Fall Risk Sign visible to staff  - Apply yellow socks and bracelet for high fall risk patients  - Consider moving patient to room near nurses station  10/8/2023 1117 by Trista Coombs RN  Outcome: Adequate for Discharge  10/8/2023 0742 by Christiano Rivas RN  Outcome: Progressing  Goal: Maintain or return to baseline ADL function  Description: INTERVENTIONS:  -  Assess patient's ability to carry out ADLs; assess patient's baseline for ADL function and identify physical deficits which impact ability to perform ADLs (bathing, care of mouth/teeth, toileting, grooming, dressing, etc.)  - Assess/evaluate cause of self-care deficits   - Assess range of motion  - Assess patient's mobility; develop plan if impaired  - Assess patient's need for assistive devices and provide as appropriate  - Encourage maximum independence but intervene and supervise when necessary  - Involve family in performance of ADLs  - Assess for home care needs following discharge   - Consider OT consult to assist with ADL evaluation and planning for discharge  - Provide patient education as appropriate  10/8/2023 1117 by Christiano Rivas RN  Outcome: Adequate for Discharge  10/8/2023 0742 by Christiano Rivas RN  Outcome: Progressing  Goal: Maintains/Returns to pre admission functional level  Description: INTERVENTIONS:  - Perform BMAT or MOVE assessment daily.   - Set and communicate daily mobility goal to care team and patient/family/caregiver.    - Collaborate with rehabilitation services on mobility goals if consulted  - Out of bed for toileting  - Record patient progress and toleration of activity level   10/8/2023 1117 by Christiano Rivas RN  Outcome: Adequate for Discharge  10/8/2023 0742 by Christiano Rivas RN  Outcome: Progressing     Problem: DISCHARGE PLANNING  Goal: Discharge to home or other facility with appropriate resources  Description: INTERVENTIONS:  - Identify barriers to discharge w/patient and caregiver  - Arrange for needed discharge resources and transportation as appropriate  - Identify discharge learning needs (meds, wound care, etc.)  - Arrange for interpretive services to assist at discharge as needed  - Refer to Case Management Department for coordinating discharge planning if the patient needs post-hospital services based on physician/advanced practitioner order or complex needs related to functional status, cognitive ability, or social support system  10/8/2023 1117 by Yamileth Romero RN  Outcome: Adequate for Discharge  10/8/2023 0742 by Yamileth Romero RN  Outcome: Progressing     Problem: Knowledge Deficit  Goal: Patient/family/caregiver demonstrates understanding of disease process, treatment plan, medications, and discharge instructions  Description: Complete learning assessment and assess knowledge base.   Interventions:  - Provide teaching at level of understanding  - Provide teaching via preferred learning methods  10/8/2023 1117 by Yamileth Romero RN  Outcome: Adequate for Discharge  10/8/2023 0742 by Yamileth Romero RN  Outcome: Progressing

## 2023-10-08 NOTE — DISCHARGE SUMMARY
[facility]  Discharge- Edith Deep 1950, 72 y.o. female MRN: [id number]  Unit/Bed#: S -01 Encounter: [id number]  Primary Care Provider: Abraham Lacy MD   Date and time admitted to hospital: 10/6/2023  9:10 PM    * Hypertensive urgency  Assessment & Plan  Patient was feeling chest pain, fluttery feeling in her chest, as well as a pounding sensation in her head. She checked her blood pressure at this time and said that her diastolic was ranging between . She has a history of small lymphocytic lymphoma and is on Brukinsa chemotherapy which does have a side effect of hypertension. She just began this chemotherapy about 2 weeks ago. Prior to that she has no history of hypertension and is on no hypertensive meds. Patient's cardiac work-up came back negative with negative troponins from 3>5>4. Patient has elevated D-dimer but does have history of malignancy, CT PE chest did not show any PE but did show right middle lobe consolidation, however the patient does not have any clinical signs of pneumonia no sputum production no cough no fevers no and no systemic signs of infection. She does have elevated white count at 23.19 but this is due to her lymphoma. At admission the patient had elevated blood pressures in the 372E systolic. Patient received 5 mg of hydralazine in the ED. Blood pressures have decreased to the 480A systolic at this point. Patient well controlled    Plan:  Blood pressure stable no indication for any antihypertensive at the moment  Encouraged to measure blood pressure daily  Follow-up with PCP    Hyponatremia  Assessment & Plan  Lab Results   Component Value Date    SODIUM 131 (L) 10/08/2023    SODIUM 128 (L) 10/07/2023    SODIUM 127 (L) 10/07/2023       Patient has a history of chronic hyponatremia in the 130s.   She does state that she has been drinking more water recently now at 64 ounces a day for the last week or as for she was drinking maybe around 8 to 16 ounces a day. She also endorses decreased appetite over the last week and does not eat much sodium on a regular basis. Patient received a 1 L bolus of normal saline in the ED. Impression: Patient appears to be euvolemic and has increased urine sodium at 49. 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 Sodium 131 today almost back to baseline    Plan:  Stable for discharge  Can continue sodium chloride 1 g 3 times daily  Fluid restriction 1500  She will follow-up with PCP and get BMP check on Monday for adjustments of salt tabs      Medical Problems     Resolved Problems  Date Reviewed: 10/4/2023   None       Discharging Resident: Margret Perez MD  Discharging Attending: Joshua Jacob MD  PCP: Judson Tello MD  Admission Date:   Admission Orders (From admission, onward)     Ordered        10/07/23 0208  INPATIENT ADMISSION  Once                      Discharge Date: 10/08/23    Consultations During Hospital Stay:  · None    Procedures Performed:   · None    Significant Findings / Test Results:   · CTA PE study 10/7  No acute pulmonary embolus. Slight increased size of right middle lobe consolidation. Correlate for pneumonia. Similar appearance of patchy groundglass consolidations and small cysts throughout the lungs, which could be related to LIP.   Decreased size of axillary and mediastinal lymph nodes compared to most recent CT. · Chest x-ray 10/6  No acute disease with subtle nodularity, groundglass opacity, and cysts better shown on CT. · BMP-sodium 125--123--127 128 -->131 at discharge    Incidental Findings:   · None     Test Results Pending at Discharge (will require follow up):   · None     Outpatient Tests Requested:  · BMP    Complications: None    Reason for Admission: Chest discomfort, elevated blood pressure    Hospital Course:   Fabiola Rodriguez is a 72 y.o. female with a PMH of lupus, Sjogren's, status post gastric bypass, CKD stage IIIb, bipolar 1, chronic hyponatremia, lymphoma on chemotherapy Brukinsa who presented to the hospital on 10/6/2023 due to headache, elevated blood pressure and fluttering sensation in her chest.  Elevated blood pressures have been noted in the setting of new chemotherapy medication Brukinsa. At the ED patient found to be hypertensive blood work showed hyponatremia. .  Received a dose of 5 mg IV hydralazine which returned blood pressure to baseline. CTA chest ruled out pulmonary embolism. For hyponatremia,  chronic history with a baseline of 133, exacerbated by excess water intake, patient started on salt tabs 1 g 3 times daily in addition to fluid restriction. Responded adequately with a sodium level of 131. Patient's blood pressure has been controlled without further medications. Asymptomatic on evaluation this morning. Stable for discharge we will continue salt tablets 1 g 3 times daily, follow-up with PCP for repeat BMP in 3 to 4 days. She will need to measure her blood pressure daily and notify her PCP or go to the ED if alarming symptoms as discussed. Can consideration for antihypertensive medication if persistence of blood pressure the outpatient setting. Also courage have a blood pressure log        Please see above list of diagnoses and related plan for additional information. Condition at Discharge: stable    Discharge Day Visit / Exam:   Subjective: Patient feels better no events overnight. Denies headache shortness of breath or chest  Vitals: Blood Pressure: 132/70 (10/08/23 0802)  Pulse: 73 (10/08/23 0802)  Temperature: 97.6 °F (36.4 °C) (10/08/23 0802)  Temp Source: Oral (10/08/23 0802)  Respirations: 16 (10/08/23 0802)  Weight - Scale: 63.5 kg (139 lb 15.9 oz) (10/08/23 0600)  SpO2: 96 % (10/08/23 0802)  Exam:   Physical Exam  Vitals and nursing note reviewed. Constitutional:       General: She is not in acute distress. Appearance: She is well-developed.    HENT:      Head: Normocephalic and atraumatic. Eyes:      Conjunctiva/sclera: Conjunctivae normal.   Cardiovascular:      Rate and Rhythm: Normal rate and regular rhythm. Heart sounds: No murmur heard. Pulmonary:      Effort: Pulmonary effort is normal. No respiratory distress. Breath sounds: Normal breath sounds. Abdominal:      Palpations: Abdomen is soft. Tenderness: There is no abdominal tenderness. Musculoskeletal:         General: No swelling. Cervical back: Neck supple. Skin:     General: Skin is warm and dry. Capillary Refill: Capillary refill takes less than 2 seconds. Neurological:      Mental Status: She is alert. Psychiatric:         Mood and Affect: Mood normal.          Discussion with Family: Updated  () at bedside. Discharge instructions/Information to patient and family:   See after visit summary for information provided to patient and family. Provisions for Follow-Up Care:  See after visit summary for information related to follow-up care and any pertinent home health orders. Disposition:   Home    Planned Readmission: no    Discharge Medications:  See after visit summary for reconciled discharge medications provided to patient and/or family.       **Please Note: This note may have been constructed using a voice recognition system**

## 2023-10-08 NOTE — DISCHARGE INSTR - AVS FIRST PAGE
Dear Nicole Lee,     It was our pleasure to care for you here at [facility], [facility]. It is our hope that we were always able to exceed the expected standards for your care during your stay. You were hospitalized due to elevated blood pressure and low sodium level. You were cared for on the [facility] third floor by Chantel Cerda MD under the service of Nguyễn Sommer MD with the [facility] who covers for your primary care physician (PCP), Daryn Elizondo MD, while you were hospitalized. If you have any questions or concerns related to this hospitalization, you may contact us at [phone]. For follow up as well as any medication refills, we recommend that you follow up with your primary care physician. A registered nurse will reach out to you by phone within a few days after your discharge to answer any additional questions that you may have after going home. However, at this time we provide for you here, the most important instructions / recommendations at discharge:     Notable Medication Adjustments -   Start taking salt tablets 1 g 3 times a day  Continue taking other medications as prescribed  Testing Required after Discharge -   Please see your PCP and get blood work to check your sodium levels  Important follow up information -   Please follow-up with your PCP within 3 to 4 days  Other Instructions -   Please measure your blood pressure daily, call your doctor or go to the ED if your blood pressure is above 180/90  Please go to the ED if any headaches, blurred vision, chest pain, shortness of breath with elevated blood pressure  Please review this entire after visit summary as additional general instructions including medication list, appointments, activity, diet, any pertinent wound care, and other additional recommendations from your care team that may be provided for you.       Sincerely,     Chantel Cerda MD

## 2023-10-08 NOTE — PLAN OF CARE
Problem: Nutrition/Hydration-ADULT  Goal: Nutrient/Hydration intake appropriate for improving, restoring or maintaining nutritional needs  Description: Monitor and assess patient's nutrition/hydration status for malnutrition. Collaborate with interdisciplinary team and initiate plan and interventions as ordered. Monitor patient's weight and dietary intake as ordered or per policy. Utilize nutrition screening tool and intervene as necessary. Determine patient's food preferences and provide high-protein, high-caloric foods as appropriate.      INTERVENTIONS:  - Monitor oral intake, urinary output, labs, and treatment plans  - Assess nutrition and hydration status and recommend course of action  - Evaluate amount of meals eaten  - Assist patient with eating if necessary   - Allow adequate time for meals  - Recommend/ encourage appropriate diets, oral nutritional supplements, and vitamin/mineral supplements  - Order, calculate, and assess calorie counts as needed  - Recommend, monitor, and adjust tube feedings and TPN/PPN based on assessed needs  - Assess need for intravenous fluids  - Provide specific nutrition/hydration education as appropriate  - Include patient/family/caregiver in decisions related to nutrition  Outcome: Progressing     Problem: PAIN - ADULT  Goal: Verbalizes/displays adequate comfort level or baseline comfort level  Description: Interventions:  - Encourage patient to monitor pain and request assistance  - Assess pain using appropriate pain scale  - Administer analgesics based on type and severity of pain and evaluate response  - Implement non-pharmacological measures as appropriate and evaluate response  - Consider cultural and social influences on pain and pain management  - Notify physician/advanced practitioner if interventions unsuccessful or patient reports new pain  Outcome: Progressing     Problem: INFECTION - ADULT  Goal: Absence or prevention of progression during hospitalization  Description: INTERVENTIONS:  - Assess and monitor for signs and symptoms of infection  - Monitor lab/diagnostic results  - Monitor all insertion sites, i.e. indwelling lines, tubes, and drains  - Monitor endotracheal if appropriate and nasal secretions for changes in amount and color  - [place] appropriate cooling/warming therapies per order  - Administer medications as ordered  - Instruct and encourage patient and family to use good hand hygiene technique  - Identify and instruct in appropriate isolation precautions for identified infection/condition  Outcome: Progressing  Goal: Absence of fever/infection during neutropenic period  Description: INTERVENTIONS:  - Monitor WBC    Outcome: Progressing     Problem: SAFETY ADULT  Goal: Patient will remain free of falls  Description: INTERVENTIONS:  - Educate patient/family on patient safety including physical limitations  - Instruct patient to call for assistance with activity   - Consult OT/PT to assist with strengthening/mobility   - Keep Call bell within reach  - Keep bed low and locked with side rails adjusted as appropriate  - Keep care items and personal belongings within reach  - Initiate and maintain comfort rounds  - Make Fall Risk Sign visible to staff  - Apply yellow socks and bracelet for high fall risk patients  - Consider moving patient to room near nurses station  Outcome: Progressing  Goal: Maintain or return to baseline ADL function  Description: INTERVENTIONS:  -  Assess patient's ability to carry out ADLs; assess patient's baseline for ADL function and identify physical deficits which impact ability to perform ADLs (bathing, care of mouth/teeth, toileting, grooming, dressing, etc.)  - Assess/evaluate cause of self-care deficits   - Assess range of motion  - Assess patient's mobility; develop plan if impaired  - Assess patient's need for assistive devices and provide as appropriate  - Encourage maximum independence but intervene and supervise when necessary  - Involve family in performance of ADLs  - Assess for home care needs following discharge   - Consider OT consult to assist with ADL evaluation and planning for discharge  - Provide patient education as appropriate  Outcome: Progressing  Goal: Maintains/Returns to pre admission functional level  Description: INTERVENTIONS:  - Perform BMAT or MOVE assessment daily.   - Set and communicate daily mobility goal to care team and patient/family/caregiver. - Collaborate with rehabilitation services on mobility goals if consulted  - Out of bed for toileting  - Record patient progress and toleration of activity level   Outcome: Progressing     Problem: DISCHARGE PLANNING  Goal: Discharge to home or other facility with appropriate resources  Description: INTERVENTIONS:  - Identify barriers to discharge w/patient and caregiver  - Arrange for needed discharge resources and transportation as appropriate  - Identify discharge learning needs (meds, wound care, etc.)  - Arrange for interpretive services to assist at discharge as needed  - Refer to Case Management Department for coordinating discharge planning if the patient needs post-hospital services based on physician/advanced practitioner order or complex needs related to functional status, cognitive ability, or social support system  Outcome: Progressing     Problem: Knowledge Deficit  Goal: Patient/family/caregiver demonstrates understanding of disease process, treatment plan, medications, and discharge instructions  Description: Complete learning assessment and assess knowledge base.   Interventions:  - Provide teaching at level of understanding  - Provide teaching via preferred learning methods  Outcome: Progressing

## 2023-10-09 ENCOUNTER — TELEPHONE (OUTPATIENT)
Age: 73
End: 2023-10-09

## 2023-10-09 ENCOUNTER — TRANSITIONAL CARE MANAGEMENT (OUTPATIENT)
Dept: FAMILY MEDICINE CLINIC | Facility: CLINIC | Age: 73
End: 2023-10-09

## 2023-10-09 DIAGNOSIS — M32.9 SYSTEMIC LUPUS ERYTHEMATOSUS, UNSPECIFIED SLE TYPE, UNSPECIFIED ORGAN INVOLVEMENT STATUS (HCC): ICD-10-CM

## 2023-10-09 RX ORDER — HYDROXYCHLOROQUINE SULFATE 200 MG/1
200 TABLET, FILM COATED ORAL 2 TIMES DAILY WITH MEALS
Qty: 180 TABLET | Refills: 0 | Status: SHIPPED | OUTPATIENT
Start: 2023-10-09 | End: 2024-10-03

## 2023-10-09 NOTE — TELEPHONE ENCOUNTER
Patient was discharged from [address] on 10/8/23   I scheduled her for Wednesday   She only wants to see dr Sydni Sims   She was told she is low on salt  They gave her salt tablets the patient went into the hospital with high blood pressure and flutters    Her ekg was abnormal  She would like to discuss this all with dr Sydni Sims   She is very nervous  Is there any way she could be seen sooner please call the patient thank you

## 2023-10-10 ENCOUNTER — RA CDI HCC (OUTPATIENT)
Dept: OTHER | Facility: HOSPITAL | Age: 73
End: 2023-10-10

## 2023-10-10 NOTE — PROGRESS NOTES
[address] [facility] coding opportunities       Chart reviewed, no opportunity found: CHART REVIEWED, NO OPPORTUNITY FOUND        Patients Insurance     [facility]ic[facility]

## 2023-10-11 ENCOUNTER — TELEPHONE (OUTPATIENT)
Age: 73
End: 2023-10-11

## 2023-10-11 ENCOUNTER — OFFICE VISIT (OUTPATIENT)
Dept: FAMILY MEDICINE CLINIC | Facility: CLINIC | Age: 73
End: 2023-10-11
Payer: COMMERCIAL

## 2023-10-11 VITALS
HEIGHT: 60 IN | OXYGEN SATURATION: 97 % | DIASTOLIC BLOOD PRESSURE: 100 MMHG | WEIGHT: 139 LBS | SYSTOLIC BLOOD PRESSURE: 138 MMHG | HEART RATE: 81 BPM | RESPIRATION RATE: 16 BRPM | BODY MASS INDEX: 27.29 KG/M2

## 2023-10-11 DIAGNOSIS — N18.32 STAGE 3B CHRONIC KIDNEY DISEASE (HCC): ICD-10-CM

## 2023-10-11 DIAGNOSIS — C83.00 SMALL LYMPHOCYTIC LYMPHOMA (HCC): ICD-10-CM

## 2023-10-11 DIAGNOSIS — E87.1 HYPONATREMIA: Primary | ICD-10-CM

## 2023-10-11 DIAGNOSIS — M32.9 SYSTEMIC LUPUS ERYTHEMATOSUS, UNSPECIFIED SLE TYPE, UNSPECIFIED ORGAN INVOLVEMENT STATUS (HCC): ICD-10-CM

## 2023-10-11 DIAGNOSIS — M35.00 SJOGREN'S SYNDROME, WITH UNSPECIFIED ORGAN INVOLVEMENT (HCC): ICD-10-CM

## 2023-10-11 PROCEDURE — 99496 TRANSJ CARE MGMT HIGH F2F 7D: CPT | Performed by: FAMILY MEDICINE

## 2023-10-11 RX ORDER — DOXAZOSIN 2 MG/1
2 TABLET ORAL
Qty: 30 TABLET | Refills: 0 | Status: SHIPPED | OUTPATIENT
Start: 2023-10-11

## 2023-10-11 NOTE — TELEPHONE ENCOUNTER
Patient called requesting lab BMP to be ordered once a week for a month to check sodium level. Please advise.

## 2023-10-11 NOTE — PROGRESS NOTES
Assessment/Plan:  1. Hyponatremia  Assessment & Plan:  We discussed symptoms and treatment options. Continue fluid restriction and increase salt intake. I referred her to Dr. Morena Flores, a nephrologist.  Laly Harp sodium levels on 10/12/2023. Contact Dr. Cristopher Meehan if he does not call in 7 days. Orders:  -     doxazosin (CARDURA) 2 mg tablet; Take 1 tablet (2 mg total) by mouth daily at bedtime  -     Ambulatory Referral to Nephrology; Future    2. Sjogren's syndrome, with unspecified organ involvement ([address])    3. Small lymphocytic lymphoma ([address])    4. Stage 3b chronic kidney disease ([address])    5. Systemic lupus erythematosus, unspecified SLE type, unspecified organ involvement status ([address])      Subjective:      Patient ID: Anette Valdes is a 72 y.o. female. Anette Valdes is a 68-year-old female who presents for a follow-up. She consents to using [facility] services. He is accompanied by an adult male. UTI  The patient was seen  for an UTI. She noticed excessive thirst 4 days prior to her hospitalization. Her series of blood tests taken every 2 hours showed normal results. Her glucose levels were normal.    Low sodium levels. She reports edematous ankles, likely due to salt intake, but they have improved. She drinks only 51 ounces a day. Her sodium levels have always been low; she inquires if this causes the throbbing sensation in her forehead. The throbbing sensation in her head has improved, but her head "feels funny". The last time she had a normal sodium level was in 09/2021. She eats normal food, but she has to undergo a soft diet. She reports a loss of appetite due to dental changes. She was informed to improve her potassium intake. She uses a wrist blood pressure monitor. Current medication:  She inquires if prescribed hypertension medication can be taken with lupinza. She treats her dry mouth with Salagen. She took Vicodin for shoulder pain. She has taken Seroquel for at least 10 years.   She inquired about her salt tablet medication. TCM Call     Date and time call was made  10/9/2023  8:45 AM    Hospital care reviewed  Records reviewed    Patient was hospitialized at  [address] [place] [address]    Date of Admission  10/06/23    Date of discharge  10/08/23    Diagnosis  Hypertensive urgency    Disposition  Home    Were the patients medications reviewed and updated  Yes    Current Symptoms  None      TCM Call     Post hospital issues  None    Should patient be enrolled in anticoag monitoring? No    Scheduled for follow up? Yes    Did you obtain your prescribed medications  Yes    Do you need help managing your prescriptions or medications  No    Is transportation to your appointment needed  No    I have advised the patient to call PCP with any new or worsening symptoms  Raul Lomas MA    Living Arrangements  Spouse or Significiant other    Support System  Partner    The type of support provided  None    Do you have social support  Yes, as much as I need    Are you recieving any outpatient services  No    Are you recieving home care services  No    Are you using any community resources  No    Current waiver services  No    Have you fallen in the last 12 months  No    Interperter language line needed  No    Counseling  Patient          The following portions of the patient's history were reviewed and updated as appropriate: allergies, current medications, past family history, past medical history, past social history, past surgical history and problem list.    Review of Systems   Constitutional: Positive for loss of appetite. Negative for fever and unexpected weight change. HENT: Positive for throbbing sensation on the forehead. Negative for nosebleeds and trouble swallowing. Eyes: Negative for visual disturbance. Respiratory: Negative for chest tightness and shortness of breath. Cardiovascular: Negative for chest pain, palpitations and leg swelling.   Gastrointestinal: Negative for abdominal pain, constipation, diarrhea and nausea. Endocrine: Negative for cold intolerance. Genitourinary: Negative for dysuria and urgency. Musculoskeletal: Negative for joint swelling and myalgias. Skin: Negative for rash. Neurological: Negative for tremors, seizures and syncope. Hematological: Does not bruise/bleed easily. Psychiatric/Behavioral: Negative for hallucinations and suicidal ideas. Objective:     /100 (BP Location: Right arm, Patient Position: Sitting, Cuff Size: Standard)   Pulse 81   Resp 16   Ht 5' (1.524 m)   Wt 63 kg (139 lb)   LMP  (LMP Unknown)   SpO2 97%   BMI 27.15 kg/m²    Physical Exam  Vitals and nursing note reviewed. Blood pressure: 138/100 mmHg. Constitutional:     Appearance: Well-developed. HENT:     Head: Normocephalic and atraumatic. Cardiovascular:     Rate and Rhythm: Normal rate and regular rhythm. Heart sounds: Normal heart sounds. No murmur heard. Pulmonary:     Effort: Pulmonary effort is normal.     Breath sounds: Normal breath sounds. No wheezing or rales. Abdominal:     General: Bowel sounds are normal. There is no distension. Palpations: Abdomen is soft. Tenderness: There is no abdominal tenderness. Musculoskeletal:     General: No tenderness. Normal range of motion. Cervical back: Normal range of motion and neck supple. Lymphadenopathy:     Cervical: No cervical adenopathy. Skin:     General: Skin is warm and dry. Capillary Refill: Capillary refill takes less than 2 seconds. Findings: No rash. Neurological:     Mental Status: Alert and oriented to person, place, and time. Cranial Nerves: No cranial nerve deficit. Sensory: No sensory deficit. Motor: No abnormal muscle tone. Psychiatric:     Behavior: Behavior normal.     Thought Content:  Thought content normal.     Judgment: Judgment normal.      Admission on 10/06/2023, Discharged on 10/08/2023   Component Date Value   • WBC 10/06/2023 23.19 (H)    • RBC 10/06/2023 4.09    • Hemoglobin 10/06/2023 12.6    • Hematocrit 10/06/2023 38.0    • MCV 10/06/2023 93    • MCH 10/06/2023 30.8    • MCHC 10/06/2023 33.2    • RDW 10/06/2023 15.9 (H)    • MPV 10/06/2023 10.3    • Platelets 91/88/2140 303    • nRBC 10/06/2023 0    • Neutrophils Relative 10/06/2023 16 (L)    • Immat GRANS % 10/06/2023 0    • Lymphocytes Relative 10/06/2023 79 (H)    • Monocytes Relative 10/06/2023 4    • Eosinophils Relative 10/06/2023 0    • Basophils Relative 10/06/2023 1    • Neutrophils Absolute 10/06/2023 3.77    • Immature Grans Absolute 10/06/2023 0.05    • Lymphocytes Absolute 10/06/2023 18.27 (H)    • Monocytes Absolute 10/06/2023 0.85    • Eosinophils Absolute 10/06/2023 0.09    • Basophils Absolute 10/06/2023 0.16 (H)    • Sodium 10/06/2023 123 (L)    • Potassium 10/06/2023 4.4    • Chloride 10/06/2023 94 (L)    • CO2 10/06/2023 23    • ANION GAP 10/06/2023 6    • BUN 10/06/2023 16    • Creatinine 10/06/2023 0.89    • Glucose 10/06/2023 109    • Calcium 10/06/2023 9.1    • AST 10/06/2023 29    • ALT 10/06/2023 13    • Alkaline Phosphatase 10/06/2023 70    • Total Protein 10/06/2023 10.2 (H)    • Albumin 10/06/2023 4.0    • Total Bilirubin 10/06/2023 0.61    • eGFR 10/06/2023 65    • hs TnI 0hr 10/06/2023 3    • hs TnI 2hr 10/06/2023 5    • Delta 2hr hsTnI 10/06/2023 2    • Magnesium 10/06/2023 2.0    • hs TnI 4hr 10/07/2023 4    • Delta 4hr hsTnI 10/07/2023 1    • Sodium, Ur 10/06/2023 49    • Creatinine, Ur 10/06/2023 48.2    • Protein Urine Random 10/06/2023 12    • Prot/Creat Ratio, Ur 10/06/2023 0.25 (H)    • Osmolality, Ur 10/06/2023 304    • D-Dimer, Quant 10/06/2023 3.59 (H)    • Sodium 10/07/2023 127 (L)    • Potassium 10/07/2023 3.8    • Chloride 10/07/2023 102    • CO2 10/07/2023 19 (L)    • ANION GAP 10/07/2023 6    • BUN 10/07/2023 13    • Creatinine 10/07/2023 0.77    • Glucose 10/07/2023 234 (H)    • Calcium 10/07/2023 8.5    • eGFR 10/07/2023 77    • Uric Acid 10/07/2023 4.4 • Sodium, Ur 10/07/2023 69    • Osmolality, Ur 10/07/2023 312    • Sodium 10/07/2023 128 (L)    • Potassium 10/07/2023 3.9    • Chloride 10/07/2023 102    • CO2 10/07/2023 20 (L)    • ANION GAP 10/07/2023 6    • BUN 10/07/2023 15    • Creatinine 10/07/2023 0.79    • Glucose 10/07/2023 150 (H)    • Calcium 10/07/2023 8.7    • eGFR 10/07/2023 75    • Sodium 10/08/2023 131 (L)    • Potassium 10/08/2023 3.7    • Chloride 10/08/2023 104    • CO2 10/08/2023 22    • ANION GAP 10/08/2023 5    • BUN 10/08/2023 13    • Creatinine 10/08/2023 0.71    • Glucose 10/08/2023 70    • Calcium 10/08/2023 8.6    • eGFR 10/08/2023 85    • Ventricular Rate 10/07/2023 62    • Atrial Rate 10/07/2023 62    • DE Interval 10/07/2023 204    • QRSD Interval 10/07/2023 94    • QT Interval 10/07/2023 410    • QTC Interval 10/07/2023 416    • P Axis 10/07/2023 33    • QRS Axis 10/07/2023 -37    • T Wave [place] 10/07/2023 0    Appointment on 10/06/2023   Component Date Value   • Iron Saturation 10/06/2023 58 (H)    • TIBC 10/06/2023 196 (L)    • Iron 10/06/2023 114    • UIBC 10/06/2023 82 (L)    • Ferritin 10/06/2023 266    Orders Only on 10/04/2023   Component Date Value   • Urine Culture Result 10/04/2023  (A)    Office Visit on 10/04/2023   Component Date Value   • LEUKOCYTE ESTERASE,UA 10/04/2023 500    • Saida Idol 10/04/2023 neg    • SL AMB POCT UROBILINOGEN 10/04/2023 norm    • POCT URINE PROTEIN 10/04/2023 neg    •  PH,UA 10/04/2023 7    • BLOOD,UA 10/04/2023 tr    • SPECIFIC GRAVITY,UA 10/04/2023 1.005    • KETONES,UA 10/04/2023 neg    • BILIRUBIN,UA 10/04/2023 neg    • GLUCOSE, UA 10/04/2023 norm    •  COLOR,UA 10/04/2023 yellow    • CLARITY,UA 10/04/2023 clear    Ancillary Orders on 09/29/2023   Component Date Value   • WBC 10/06/2023 20.59 (H)    • RBC 10/06/2023 3.81    • Hemoglobin 10/06/2023 11.8    • Hematocrit 10/06/2023 35.9    • MCV 10/06/2023 94    • MCH 10/06/2023 31.0    • MCHC 10/06/2023 32.9    • RDW 10/06/2023 16.2 (H)    • MPV 10/06/2023 10.7    • Platelets 76/48/2438 276    • Sodium 10/06/2023 125 (L)    • Potassium 10/06/2023 4.2    • Chloride 10/06/2023 96    • CO2 10/06/2023 24    • ANION GAP 10/06/2023 5    • BUN 10/06/2023 15    • Creatinine 10/06/2023 0.86    • Glucose, Fasting 10/06/2023 97    • Calcium 10/06/2023 9.0    • AST 10/06/2023 24    • ALT 10/06/2023 13    • Alkaline Phosphatase 10/06/2023 68    • Total Protein 10/06/2023 9.8 (H)    • Albumin 10/06/2023 3.8    • Total Bilirubin 10/06/2023 0.59    • eGFR 10/06/2023 68    • Segmented % 10/06/2023 24 (L)    • Lymphocytes % 10/06/2023 67 (H)    • Monocytes % 10/06/2023 7    • Eosinophils, % 10/06/2023 0    • Basophils % 10/06/2023 0    • Atypical Lymphocytes % 10/06/2023 2 (H)    • Absolute Neutrophils 10/06/2023 4.94    • Lymphocytes Absolute 10/06/2023 14.21 (H)    • Monocytes Absolute 10/06/2023 1.44 (H)    • Eosinophils Absolute 10/06/2023 0.00    • Basophils Absolute 10/06/2023 0.00    • RBC Morphology 10/06/2023 Normal    • Platelet Estimate 18/43/9511 Adequate      I have personally reviewed results with the patient. Her blood test 3 days ago showed slightly increased sodium of 131 mEq/L. Melva Scott MD   [facility]     Transcribed for Melva Scott MD, by [facility] Gilbert on 10/12/23 at 10:36 AM. Powered by [employer].

## 2023-10-11 NOTE — ASSESSMENT & PLAN NOTE
We discussed symptoms and treatment options. Continue fluid restriction and increase salt intake. I referred her to Dr. Frank Verde, a nephrologist.  Kendrick Camiloer sodium levels on 10/12/2023. Contact Dr. Zackary Phillip if he does not call in 7 days.

## 2023-10-12 ENCOUNTER — APPOINTMENT (OUTPATIENT)
Dept: LAB | Facility: CLINIC | Age: 73
End: 2023-10-12
Payer: COMMERCIAL

## 2023-10-12 DIAGNOSIS — E87.1 HYPONATREMIA: Primary | ICD-10-CM

## 2023-10-12 LAB
ANION GAP SERPL CALCULATED.3IONS-SCNC: 4 MMOL/L
BUN SERPL-MCNC: 13 MG/DL (ref 5–25)
CALCIUM SERPL-MCNC: 8.9 MG/DL (ref 8.4–10.2)
CHLORIDE SERPL-SCNC: 101 MMOL/L (ref 96–108)
CO2 SERPL-SCNC: 24 MMOL/L (ref 21–32)
CREAT SERPL-MCNC: 0.74 MG/DL (ref 0.6–1.3)
GFR SERPL CREATININE-BSD FRML MDRD: 81 ML/MIN/1.73SQ M
GLUCOSE SERPL-MCNC: 108 MG/DL (ref 65–140)
POTASSIUM SERPL-SCNC: 4.2 MMOL/L (ref 3.5–5.3)
SODIUM SERPL-SCNC: 129 MMOL/L (ref 135–147)

## 2023-10-12 PROCEDURE — 80048 BASIC METABOLIC PNL TOTAL CA: CPT | Performed by: FAMILY MEDICINE

## 2023-10-12 PROCEDURE — 36415 COLL VENOUS BLD VENIPUNCTURE: CPT | Performed by: FAMILY MEDICINE

## 2023-10-13 ENCOUNTER — TELEPHONE (OUTPATIENT)
Dept: FAMILY MEDICINE CLINIC | Facility: CLINIC | Age: 73
End: 2023-10-13

## 2023-10-13 DIAGNOSIS — E87.1 HYPONATREMIA: ICD-10-CM

## 2023-10-13 RX ORDER — SODIUM CHLORIDE 1 G/1
TABLET ORAL
Qty: 120 TABLET | Refills: 1 | Status: SHIPPED | OUTPATIENT
Start: 2023-10-13

## 2023-10-13 NOTE — TELEPHONE ENCOUNTER
----- Message from Christoph Chambers MD sent at 10/13/2023  5:39 AM EDT -----  Your sodium level dropped again please maintain that fluid restriction and we should increase your salt tablet  To 2 of them in the morning 1 in the afternoon and 1 in the evening

## 2023-10-13 NOTE — TELEPHONE ENCOUNTER
I sent over the new dirrections     And I mean if it was right right before maybe     Just repeat it in 1 week please

## 2023-10-13 NOTE — TELEPHONE ENCOUNTER
Patient is asking if her drinking 16 oz prior to the blood draw would make a difference in her low sodium. Also are you willing to refill her sodium chloride until she Dr Luan Christian office Nov 22   .

## 2023-10-15 LAB
ATRIAL RATE: 70 BPM
P AXIS: 2 DEGREES
PR INTERVAL: 162 MS
QRS AXIS: -43 DEGREES
QRSD INTERVAL: 90 MS
QT INTERVAL: 390 MS
QTC INTERVAL: 421 MS
T WAVE AXIS: -5 DEGREES
VENTRICULAR RATE: 70 BPM

## 2023-10-15 PROCEDURE — 93010 ELECTROCARDIOGRAM REPORT: CPT | Performed by: INTERNAL MEDICINE

## 2023-10-16 ENCOUNTER — HOSPITAL ENCOUNTER (OUTPATIENT)
Dept: INFUSION CENTER | Facility: CLINIC | Age: 73
Discharge: HOME | End: 2023-10-16
Payer: COMMERCIAL

## 2023-10-16 VITALS
SYSTOLIC BLOOD PRESSURE: 125 MMHG | TEMPERATURE: 96.7 F | HEART RATE: 90 BPM | OXYGEN SATURATION: 98 % | DIASTOLIC BLOOD PRESSURE: 79 MMHG | RESPIRATION RATE: 18 BRPM

## 2023-10-16 DIAGNOSIS — D50.8 IRON DEFICIENCY ANEMIA FOLLOWING BARIATRIC SURGERY: Primary | ICD-10-CM

## 2023-10-16 DIAGNOSIS — I15.9 SECONDARY HYPERTENSION: Primary | ICD-10-CM

## 2023-10-16 DIAGNOSIS — K95.89 IRON DEFICIENCY ANEMIA FOLLOWING BARIATRIC SURGERY: Primary | ICD-10-CM

## 2023-10-16 RX ORDER — AMLODIPINE BESYLATE 5 MG/1
5 TABLET ORAL
Qty: 30 TABLET | Refills: 0 | Status: SHIPPED | OUTPATIENT
Start: 2023-10-16 | End: 2023-10-17 | Stop reason: ALTCHOICE

## 2023-10-16 RX ORDER — SODIUM CHLORIDE 9 MG/ML
20 INJECTION, SOLUTION INTRAVENOUS ONCE
OUTPATIENT
Start: 2023-10-30

## 2023-10-16 RX ORDER — SODIUM CHLORIDE 9 MG/ML
20 INJECTION, SOLUTION INTRAVENOUS ONCE
Status: COMPLETED | OUTPATIENT
Start: 2023-10-16 | End: 2023-10-16

## 2023-10-16 RX ADMIN — SODIUM CHLORIDE 20 ML/HR: 0.9 INJECTION, SOLUTION INTRAVENOUS at 12:32

## 2023-10-16 RX ADMIN — SODIUM CHLORIDE 200 MG: 9 INJECTION, SOLUTION INTRAVENOUS at 12:32

## 2023-10-16 NOTE — PROGRESS NOTES
Patient here for venofer dosing and is doing ok, no c/o offered,  Medicaitons up to date.
Patient tolerated treatment today and was discharged post by Cliff Mccloud. She offered no c/o and will RTO for same 10/30/23.
yes

## 2023-10-16 NOTE — PATIENT INSTRUCTIONS
October 2023 Sunday Monday Tuesday Wednesday Thursday Friday Saturday   1     2    INF THERAPY PLAN  10:00 AM   (120 min.)   AN INF CHAIR [facility] 3     4    SAME DAY PG   2:25 PM   (20 min.)   Ishmael Portillo MD   [address] 5     6    LAB WALK IN   9:40 AM   (5 min.)   AN MOB PHLEB CHAIR 2   [facility] MOB    Admission   9:10 PM   Dinora Carrillo MD   [facility] 3rd  Floor Med Surg Unit   (Discharge: 10/8/2023)    XR GENERAL PROCEDURE   9:15 PM   (15 min.)   AN XR PORT 1   [facility] CTR Radiology    CTA ED CHEST PE STUDY  11:40 PM   (5 min.)   AN CT ED   [facility] CTR CAT Scan 7        Cycle 1, Treatment 2        8     9     10     11    TRANSITIONAL CARE MGMT PG   4:25 PM   (40 min.)   Ishmael Portillo MD   [address] 12    LAB WALK IN  10:05 AM   (5 min.)   AN MOB PHLEB CHAIR 1   [facility] MOB 13     14                15     16    INF THERAPY PLAN  12:30 PM   (120 min.)   AN INF CHAIR [address] [facility] 17     18     19     20     21        Cycle 1, Treatment 3        22     23     24    NURSE VISIT PG   1:45 PM   (20 min.)   Nurse [facility] 25    FOLLOW UP PG   9:45 AM   (20 min.)   Nance Severance, DO [facility] [place] 26     27     28                29     30    INF THERAPY PLAN  11:30 AM   (120 min.)   AN INF CHAIR [address] [facility] 31                            Cycle 1, Treatment 4               Treatment Details         10/2/2023 - Cycle 1, Treatment 2      Supportive Care: APPT 17, ONCBCN NURSE [id number], sodium chloride, iron sucrose (VENOFER), MONITOR NADEGE, ONCBCN NURSE COMMUNICATION7    10/16/2023 - Cycle 1, Treatment 3      Supportive Care: APPT 17, ONCBCN NURSE [id number], sodium chloride, iron sucrose (VENOFER), MONITOR NADEGE, ONCBCN NURSE COMMUNICATION7    10/30/2023 - Cycle 1, Treatment 4      Supportive Care: APPT 17 November 2023 Sunday Monday Tuesday Wednesday Thursday Friday Saturday                  1     2     3     4                5     6     7     8     9     10     11                12     13    CIERA SCREEN BILAT W DBT & CAD   2:30 PM   (30 min.)   AN MAMMO 1   [facility] Mammography 14     15     16     17     18                19     20     21     22    CONSULT PG  12:45 PM   (60 min.)   MD Steven Ochoa Nephrology [facility] 23     24     25                26     27    NURSE VISIT PG   1:05 PM   (20 min.)   Nurse [address] 98     67     08

## 2023-10-18 ENCOUNTER — TELEPHONE (OUTPATIENT)
Dept: NEPHROLOGY | Facility: CLINIC | Age: 73
End: 2023-10-18

## 2023-10-19 ENCOUNTER — APPOINTMENT (OUTPATIENT)
Dept: LAB | Facility: CLINIC | Age: 73
End: 2023-10-19
Payer: COMMERCIAL

## 2023-10-19 LAB
ALBUMIN SERPL BCP-MCNC: 3.6 G/DL (ref 3.5–5)
ALP SERPL-CCNC: 60 U/L (ref 34–104)
ALT SERPL W P-5'-P-CCNC: 22 U/L (ref 7–52)
ANION GAP SERPL CALCULATED.3IONS-SCNC: 3 MMOL/L
ANISOCYTOSIS BLD QL SMEAR: PRESENT
AST SERPL W P-5'-P-CCNC: 34 U/L (ref 13–39)
BASOPHILS # BLD MANUAL: 0 THOUSAND/UL (ref 0–0.1)
BASOPHILS NFR MAR MANUAL: 0 % (ref 0–1)
BILIRUB SERPL-MCNC: 0.58 MG/DL (ref 0.2–1)
BUN SERPL-MCNC: 14 MG/DL (ref 5–25)
CALCIUM SERPL-MCNC: 8.8 MG/DL (ref 8.4–10.2)
CHLORIDE SERPL-SCNC: 101 MMOL/L (ref 96–108)
CO2 SERPL-SCNC: 26 MMOL/L (ref 21–32)
CREAT SERPL-MCNC: 0.81 MG/DL (ref 0.6–1.3)
EOSINOPHIL # BLD MANUAL: 0 THOUSAND/UL (ref 0–0.4)
EOSINOPHIL NFR BLD MANUAL: 0 % (ref 0–6)
ERYTHROCYTE [DISTWIDTH] IN BLOOD BY AUTOMATED COUNT: 16 % (ref 11.6–15.1)
GFR SERPL CREATININE-BSD FRML MDRD: 73 ML/MIN/1.73SQ M
GLUCOSE P FAST SERPL-MCNC: 101 MG/DL (ref 65–99)
HCT VFR BLD AUTO: 35.7 % (ref 34.8–46.1)
HGB BLD-MCNC: 11.8 G/DL (ref 11.5–15.4)
LYMPHOCYTES # BLD AUTO: 68 % (ref 14–44)
LYMPHOCYTES # BLD AUTO: 8.49 THOUSAND/UL (ref 0.6–4.47)
MCH RBC QN AUTO: 31.6 PG (ref 26.8–34.3)
MCHC RBC AUTO-ENTMCNC: 33.1 G/DL (ref 31.4–37.4)
MCV RBC AUTO: 96 FL (ref 82–98)
MONOCYTES # BLD AUTO: 0.37 THOUSAND/UL (ref 0–1.22)
MONOCYTES NFR BLD: 3 % (ref 4–12)
NEUTROPHILS # BLD MANUAL: 3.62 THOUSAND/UL (ref 1.85–7.62)
NEUTS SEG NFR BLD AUTO: 29 % (ref 43–75)
PLATELET # BLD AUTO: 252 THOUSANDS/UL (ref 149–390)
PLATELET BLD QL SMEAR: ADEQUATE
PMV BLD AUTO: 10.5 FL (ref 8.9–12.7)
POTASSIUM SERPL-SCNC: 4.5 MMOL/L (ref 3.5–5.3)
PROT SERPL-MCNC: 8.9 G/DL (ref 6.4–8.4)
RBC # BLD AUTO: 3.74 MILLION/UL (ref 3.81–5.12)
RBC MORPH BLD: PRESENT
SODIUM SERPL-SCNC: 130 MMOL/L (ref 135–147)
WBC # BLD AUTO: 12.49 THOUSAND/UL (ref 4.31–10.16)

## 2023-10-22 ENCOUNTER — PATIENT MESSAGE (OUTPATIENT)
Dept: FAMILY MEDICINE CLINIC | Facility: CLINIC | Age: 73
End: 2023-10-22

## 2023-10-23 DIAGNOSIS — E87.1 HYPONATREMIA: ICD-10-CM

## 2023-10-23 RX ORDER — DOXAZOSIN 2 MG/1
2 TABLET ORAL 2 TIMES DAILY
Qty: 180 TABLET | Refills: 1 | Status: SHIPPED | OUTPATIENT
Start: 2023-10-23

## 2023-10-23 NOTE — TELEPHONE ENCOUNTER
Patient calling in for refill on Doxazosin 2 mg tablet- She states the signature on medication has recently changed- and she is taking it BID. Needs qty to reflect 60. Patient requesting refills. Reason for call:   [x] Refill   [] Prior Auth  [] Other:     Office:   [x] PCP/Provider - Dr. Edward Casas  [] Specialty/Provider -     Medication: Doxazosin 2 mg tablet-Take 1 tablet (2 mg total) by mouth BID. Quantity: 180    Pharmacy: [facility]    Does the patient have enough for 3 days?    [x] Yes   [] No - Send as HP to POD

## 2023-10-24 ENCOUNTER — CLINICAL SUPPORT (OUTPATIENT)
Dept: FAMILY MEDICINE CLINIC | Facility: CLINIC | Age: 73
End: 2023-10-24
Payer: COMMERCIAL

## 2023-10-24 DIAGNOSIS — E53.8 VITAMIN B12 DEFICIENCY: Primary | ICD-10-CM

## 2023-10-24 PROCEDURE — 96372 THER/PROPH/DIAG INJ SC/IM: CPT

## 2023-10-24 RX ADMIN — CYANOCOBALAMIN 1000 MCG: 1000 INJECTION, SOLUTION INTRAMUSCULAR; SUBCUTANEOUS at 12:58

## 2023-10-25 ENCOUNTER — OFFICE VISIT (OUTPATIENT)
Dept: HEMATOLOGY ONCOLOGY | Facility: CLINIC | Age: 73
End: 2023-10-25
Payer: COMMERCIAL

## 2023-10-25 VITALS
OXYGEN SATURATION: 99 % | HEIGHT: 60 IN | RESPIRATION RATE: 14 BRPM | BODY MASS INDEX: 27.64 KG/M2 | TEMPERATURE: 97.5 F | DIASTOLIC BLOOD PRESSURE: 82 MMHG | HEART RATE: 87 BPM | WEIGHT: 140.8 LBS | SYSTOLIC BLOOD PRESSURE: 124 MMHG

## 2023-10-25 DIAGNOSIS — C83.00 SMALL LYMPHOCYTIC LYMPHOMA (HCC): Primary | ICD-10-CM

## 2023-10-25 PROCEDURE — 99215 OFFICE O/P EST HI 40 MIN: CPT | Performed by: INTERNAL MEDICINE

## 2023-10-25 NOTE — LETTER
October 25, 2023     Shanae Merlos MD  [address] [facility]  [address]  [address] [facility] [address]    Patient: Khoa Esteves   YOB: 1950   Date of Visit: 10/25/2023       Dear Dr. Edward Casas: Thank you for referring Khoa Esteves to me for evaluation. Below are my notes for this consultation. If you have questions, please do not hesitate to call me. I look forward to following your patient along with you. Sincerely,        Philippe May DO        CC: MD Philippe Blakely DO  10/25/2023 10:49 AM  Sign when Signing Visit  [address] [address] [phone]  [address] [place] [address], [id number]  10/25/23    Discussion:   In summary, this is a 68-year-old female with a history of CLL/SLL. She started Brukinsa in late September 2023. She developed hypertension, currently on doxazosin with good control and tolerance. She also developed constipation, controlled with fiber supplementation. She notes that her appetite is starting to improve. Additionally, 2 weeks ago she was in the emergency room for hypertensive urgency. CT chest showed decreased size and axillary lymph nodes. White count has been trending toward normal.  Based on all of this, I would say it is highly likely that she is starting to respond to [address]. Continuation of treatment is recommended, therefore. We reviewed that optimal duration of therapy is not known. FDA approval was based on a study using the drug until disease progression or intolerance. CBC, CMP every 4 weeks. Follow-up in 3 months. I discussed the above with the patient. The patient and her  voiced understanding and agreement.  ______________________________________________________________________    No chief complaint on file. HPI:  Oncology History    No history exists. Interval History: Clinically stable/improved.   ECOG-  1 - Symptomatic but completely ambulatory    Review of Systems   Constitutional:  Positive for fatigue. Negative for appetite change, diaphoresis and fever. HENT:  Negative for sinus pain. Eyes:  Negative for discharge. Respiratory:  Negative for cough and shortness of breath. Cardiovascular:  Negative for chest pain. Gastrointestinal:  Positive for constipation. Negative for abdominal pain and diarrhea. Endocrine: Negative for cold intolerance. Genitourinary:  Negative for difficulty urinating and hematuria. Musculoskeletal:  Negative for joint swelling. Skin:  Negative for rash. Allergic/Immunologic: Negative for environmental allergies. Neurological:  Negative for dizziness and headaches. Hematological:  Negative for adenopathy. Psychiatric/Behavioral:  Negative for agitation. Past Medical History:   Diagnosis Date   • Acute kidney injury superimposed on chronic kidney disease  09/20/2021   • Age-related osteoporosis without current pathological fracture 06/01/2023   • Allergic rhinitis    • Anemia    • Anxiety    • Bipolar 1 disorder ([address])    • Constipation    • Deaf     Pt lost all hearing in right ear after having [place] measles   • Depression    • Diarrhea    • Fatigue    • GERD (gastroesophageal reflux disease) 08/01/2020   • Hard of hearing     Pt wears a hearing in left ear.    • HL (hearing loss)    • Hypertension 10/7/2023   • Hypothyroidism    • Lung nodule    • Nasal congestion    • Osteoporosis    • Pneumonia 02/10/2017   • Pneumonia due to Streptococcus ([address]) 09/19/2021   • Psychiatric disorder    • Sjogren's syndrome ([address])    • Small lymphocytic lymphoma ([address]) 9/18/2023   • Systemic lupus erythematosus ([address])    • Wears glasses    • Wears partial dentures      Patient Active Problem List   Diagnosis   • Bipolar I disorder, single manic episode ([address])   • Hypothyroidism   • Change in bowel habits   • Systemic lupus erythematosus ([address])   • Acid reflux disease   • Anemia   • Depression   • Generalized osteoarthritis   • Heart burn   • Obesity   • Periorbital edema   • Polyarthralgia   • Postgastrectomy malabsorption   • Sjogren's syndrome (HCC)   • Vaginal atrophy   • Vitamin D deficiency   • Closed fracture of base of fifth metatarsal bone of left foot   • Lymphadenopathy   • Iron deficiency anemia following bariatric surgery   • On belimumab therapy   • S/P gastric bypass   • Overweight   • Encounter for surgical aftercare following surgery of digestive system   • H/O bariatric surgery - bypass   • Petechial rash   • Hyponatremia   • Stage 3b chronic kidney disease (HCC)   • Age-related osteoporosis without current pathological fracture   • Recurrent UTI   • Monoclonal B-cell lymphocytosis   • Pulmonary nodule   • Small lymphocytic lymphoma (HCC)   • Hypertensive urgency       Current Outpatient Medications:   •  acetaminophen (TYLENOL) 325 mg tablet, Take 2 tablets (650 mg total) by mouth every 6 (six) hours as needed for mild pain, Disp: , Rfl: 0  •  amLODIPine (NORVASC) 5 mg tablet, , Disp: , Rfl:   •  buPROPion (WELLBUTRIN) 100 mg tablet, Take 100 mg by mouth 3 (three) times a day., Disp: , Rfl:   •  calcium carbonate (Calcium 600) 600 MG tablet, , Disp: , Rfl:   •  Cholecalciferol (D3-50) 1.25 MG (89755 UT) capsule, , Disp: , Rfl:   •  cycloSPORINE (RESTASIS) 0.05 % ophthalmic emulsion, instill 1 drop into both eyes twice a day, Disp: , Rfl:   •  doxazosin (CARDURA) 2 mg tablet, Take 1 tablet (2 mg total) by mouth 2 (two) times a day, Disp: 180 tablet, Rfl: 1  •  ferrous sulfate 325 (65 Fe) mg tablet, Take 1 tablet (325 mg total) by mouth 2 (two) times a day with meals, Disp: 60 tablet, Rfl: 0  •  hydroxychloroquine (PLAQUENIL) 200 mg tablet, Take 1 tablet (200 mg total) by mouth 2 (two) times a day with meals, Disp: 180 tablet, Rfl: 0  •  Multiple Vitamins-Minerals (CENTRUM MINIS WOMEN 50+ PO), , Disp: , Rfl:   •  pilocarpine (SALAGEN) 5 mg tablet, Take 1 tablet (5 mg total) by mouth 3 (three) times a day, Disp: 90 tablet, Rfl: 5  •  QUEtiapine (SEROquel) 300 mg tablet, Take 300 mg by mouth daily at bedtime. (Patient not taking: Reported on 10/19/2023), Disp: , Rfl:   •  QUEtiapine (SEROquel) 50 mg tablet, , Disp: , Rfl:   •  sodium chloride 1 g tablet, Take 2 in the am 1 in the afternoon and 1 in the evening, Disp: 120 tablet, Rfl: 1  •  temazepam (RESTORIL) 30 mg capsule, Take 2 capsules by mouth daily at bedtime as needed , Disp: , Rfl:   •  Zanubrutinib (Brukinsa) 80 MG CAPS, Take 4 capsules (320 mg total) by mouth daily, Disp: 120 capsule, Rfl: 11    Current Facility-Administered Medications:   •  cyanocobalamin injection 1,000 mcg, 1,000 mcg, Intramuscular, Q30 Days, Melva Scott MD, 1,000 mcg at 10/24/23 1258  Allergies   Allergen Reactions   • Ciprofloxacin Hives and Swelling     Pt reports that lips and mouth and face swelled. • Cymbalta [Duloxetine Hcl] Other (See Comments)     Hallucinations, fatigue, faints   • Nirmatrelvir-Ritonavir Diarrhea, Nausea Only, Other (See Comments), Dizziness and Lightheadedness     "passed out"-nausea   • Percocet [Oxycodone-Acetaminophen] Other (See Comments)     Dry mouth - pt states it kept her up all night. States had to continually drink fluids     Past Surgical History:   Procedure Laterality Date   • BREAST BIOPSY Right    • COLONOSCOPY     • EYE SURGERY     • FRACTURE SURGERY Right     elbow   • GASTRIC BYPASS     • HYSTERECTOMY Bilateral 1975   • IR BIOPSY LYMPH NODE  10/08/2020   • LUNG BIOPSY     • LYMPH NODE BIOPSY  09/18/2020   • LYMPH NODE BIOPSY Left 8/28/2023    Procedure: EXCISION BIOPSY LYMPH NODE LEFT AXILLARY;  Surgeon: Natanael Kramer MD;  [facility];  Service: Thoracic   •  [address] INCL FLUOR GDNCE DX W/CELL WASHG [facility] N/A 12/08/2017    Procedure: BRONCHOSCOPY;  Surgeon: Celeste Donald MD;  [facility]: [facility];   Service: Pulmonary   • PA BX/EXC LYMPH NODE NEEDLE SUPERFICIAL Left 02/25/2021    Procedure: EXCISION BIOPSY LYMPH NODE: left axillary lymph node biopsy;  Surgeon: Darrius Chan MD;  Location: [facility];  Service: Thoracic   • TONSILLECTOMY       Social History    Objective: There were no vitals filed for this visit. Physical Exam  Constitutional:       Appearance: She is well-developed. HENT:      Head: Normocephalic and atraumatic. Eyes:      Pupils: Pupils are equal, round, and reactive to light. Cardiovascular:      Rate and Rhythm: Normal rate. Heart sounds: No murmur heard. Pulmonary:      Effort: No respiratory distress. Breath sounds: No wheezing or rales. Abdominal:      General: There is no distension. Palpations: Abdomen is soft. Tenderness: There is no abdominal tenderness. There is no rebound. Musculoskeletal:         General: No tenderness. Cervical back: Neck supple. Lymphadenopathy:      Cervical: No cervical adenopathy. Skin:     General: Skin is warm. Findings: No rash. Neurological:      Mental Status: She is alert and oriented to person, place, and time. Deep Tendon Reflexes: Reflexes normal.   Psychiatric:         Thought Content: Thought content normal.           Labs: I personally reviewed the labs and imaging pertinent to this patient care.

## 2023-10-25 NOTE — PROGRESS NOTES
[facility] [address] [facility] [address]  [phone]  [facility] [address] [place], [id number]  10/25/23    Discussion:   In summary, this is a 65-year-old female with a history of CLL/SLL. She started Brukinsa in late September 2023. She developed hypertension, currently on doxazosin with good control and tolerance. She also developed constipation, controlled with fiber supplementation. She notes that her appetite is starting to improve. Additionally, 2 weeks ago she was in the emergency room for hypertensive urgency. CT chest showed decreased size and axillary lymph nodes. White count has been trending toward normal.  Based on all of this, I would say it is highly likely that she is starting to respond to [address]. Continuation of treatment is recommended, therefore. We reviewed that optimal duration of therapy is not known. FDA approval was based on a study using the drug until disease progression or intolerance. CBC, CMP every 4 weeks. Follow-up in 3 months. I discussed the above with the patient. The patient and her  voiced understanding and agreement.  ______________________________________________________________________    No chief complaint on file. HPI:  Oncology History    No history exists. Interval History: Clinically stable/improved. ECOG-  1 - Symptomatic but completely ambulatory    Review of Systems   Constitutional:  Positive for fatigue. Negative for appetite change, diaphoresis and fever. HENT:  Negative for sinus pain. Eyes:  Negative for discharge. Respiratory:  Negative for cough and shortness of breath. Cardiovascular:  Negative for chest pain. Gastrointestinal:  Positive for constipation. Negative for abdominal pain and diarrhea. Endocrine: Negative for cold intolerance. Genitourinary:  Negative for difficulty urinating and hematuria. Musculoskeletal:  Negative for joint swelling. Skin:  Negative for rash. Allergic/Immunologic: Negative for environmental allergies. Neurological:  Negative for dizziness and headaches. Hematological:  Negative for adenopathy. Psychiatric/Behavioral:  Negative for agitation. Past Medical History:   Diagnosis Date    Acute kidney injury superimposed on chronic kidney disease  09/20/2021    Age-related osteoporosis without current pathological fracture 06/01/2023    Allergic rhinitis     Anemia     Anxiety     Bipolar 1 disorder ([address])     Constipation     Deaf     Pt lost all hearing in right ear after having [place] measles    Depression     Diarrhea     Fatigue     GERD (gastroesophageal reflux disease) 08/01/2020    Hard of hearing     Pt wears a hearing in left ear.     HL (hearing loss)     Hypertension 10/7/2023    Hypothyroidism     Lung nodule     Nasal congestion     Osteoporosis     Pneumonia 02/10/2017    Pneumonia due to Streptococcus ([address]) 09/19/2021    Psychiatric disorder     Sjogren's syndrome ([address])     Small lymphocytic lymphoma ([address]) 9/18/2023    Systemic lupus erythematosus ([address])     Wears glasses     Wears partial dentures      Patient Active Problem List   Diagnosis    Bipolar I disorder, single manic episode ([address])    Hypothyroidism    Change in bowel habits    Systemic lupus erythematosus (HCC)    Acid reflux disease    Anemia    Depression    Generalized osteoarthritis    Heart burn    Obesity    Periorbital edema    Polyarthralgia    Postgastrectomy malabsorption    Sjogren's syndrome ([address])    Vaginal atrophy    Vitamin D deficiency    Closed fracture of base of fifth metatarsal bone of left foot    Lymphadenopathy    Iron deficiency anemia following bariatric surgery    On belimumab therapy    S/P gastric bypass    Overweight    Encounter for surgical aftercare following surgery of digestive system    H/O bariatric surgery - bypass    Petechial rash    Hyponatremia    Stage 3b chronic kidney disease ([address])    Age-related osteoporosis without current pathological fracture    Recurrent UTI    Monoclonal B-cell lymphocytosis    Pulmonary nodule    Small lymphocytic lymphoma (HCC)    Hypertensive urgency       Current Outpatient Medications:     acetaminophen (TYLENOL) 325 mg tablet, Take 2 tablets (650 mg total) by mouth every 6 (six) hours as needed for mild pain, Disp: , Rfl: 0    amLODIPine (NORVASC) 5 mg tablet, , Disp: , Rfl:     buPROPion (WELLBUTRIN) 100 mg tablet, Take 100 mg by mouth 3 (three) times a day., Disp: , Rfl:     calcium carbonate (Calcium 600) 600 MG tablet, , Disp: , Rfl:     Cholecalciferol (D3-50) 1.25 MG (24017 UT) capsule, , Disp: , Rfl:     cycloSPORINE (RESTASIS) 0.05 % ophthalmic emulsion, instill 1 drop into both eyes twice a day, Disp: , Rfl:     doxazosin (CARDURA) 2 mg tablet, Take 1 tablet (2 mg total) by mouth 2 (two) times a day, Disp: 180 tablet, Rfl: 1    ferrous sulfate 325 (65 Fe) mg tablet, Take 1 tablet (325 mg total) by mouth 2 (two) times a day with meals, Disp: 60 tablet, Rfl: 0    hydroxychloroquine (PLAQUENIL) 200 mg tablet, Take 1 tablet (200 mg total) by mouth 2 (two) times a day with meals, Disp: 180 tablet, Rfl: 0    Multiple Vitamins-Minerals (CENTRUM MINIS WOMEN 50+ PO), , Disp: , Rfl:     pilocarpine (SALAGEN) 5 mg tablet, Take 1 tablet (5 mg total) by mouth 3 (three) times a day, Disp: 90 tablet, Rfl: 5    QUEtiapine (SEROquel) 300 mg tablet, Take 300 mg by mouth daily at bedtime.  (Patient not taking: Reported on 10/19/2023), Disp: , Rfl:     QUEtiapine (SEROquel) 50 mg tablet, , Disp: , Rfl:     sodium chloride 1 g tablet, Take 2 in the am 1 in the afternoon and 1 in the evening, Disp: 120 tablet, Rfl: 1    temazepam (RESTORIL) 30 mg capsule, Take 2 capsules by mouth daily at bedtime as needed , Disp: , Rfl:     Zanubrutinib (Brukinsa) 80 MG CAPS, Take 4 capsules (320 mg total) by mouth daily, Disp: 120 capsule, Rfl: 11    Current Facility-Administered Medications: cyanocobalamin injection 1,000 mcg, 1,000 mcg, Intramuscular, Q30 Days, Brittany Flores MD, 1,000 mcg at 10/24/23 1258  Allergies   Allergen Reactions    Ciprofloxacin Hives and Swelling     Pt reports that lips and mouth and face swelled. Cymbalta [Duloxetine Hcl] Other (See Comments)     Hallucinations, fatigue, faints    Nirmatrelvir-Ritonavir Diarrhea, Nausea Only, Other (See Comments), Dizziness and Lightheadedness     "passed out"-nausea    Percocet [Oxycodone-Acetaminophen] Other (See Comments)     Dry mouth - pt states it kept her up all night. States had to continually drink fluids     Past Surgical History:   Procedure Laterality Date    BREAST BIOPSY Right     COLONOSCOPY      EYE SURGERY      FRACTURE SURGERY Right     elbow    GASTRIC BYPASS      HYSTERECTOMY Bilateral 1975    IR BIOPSY LYMPH NODE  10/08/2020    LUNG BIOPSY      LYMPH NODE BIOPSY  09/18/2020    LYMPH NODE BIOPSY Left 8/28/2023    Procedure: EXCISION BIOPSY LYMPH NODE LEFT AXILLARY;  Surgeon: Steven Minaya MD;  [facility];  Service: Thoracic     [facility] INCL FLUOR GDNCE DX W/CELL Rosan Satish [address] [facility] N/A 12/08/2017    Procedure: BRONCHOSCOPY;  Surgeon: Desi Jackson MD;  [facility]; Service: Pulmonary    [place] BX/EXC LYMPH NODE NEEDLE SUPERFICIAL Left 02/25/2021    Procedure: EXCISION BIOPSY LYMPH NODE: left axillary lymph node biopsy;  Surgeon: Mendel Hoes, MD;  Location[facility];  Service: Thoracic    TONSILLECTOMY       Social History     Objective: There were no vitals filed for this visit. Physical Exam  Constitutional:       Appearance: She is well-developed. HENT:      Head: Normocephalic and atraumatic. Eyes:      Pupils: Pupils are equal, round, and reactive to light. Cardiovascular:      Rate and Rhythm: Normal rate. Heart sounds: No murmur heard. Pulmonary:      Effort: No respiratory distress. Breath sounds: No wheezing or rales. Abdominal:      General: There is no distension. Palpations: Abdomen is soft. Tenderness: There is no abdominal tenderness. There is no rebound. Musculoskeletal:         General: No tenderness. Cervical back: Neck supple. Lymphadenopathy:      Cervical: No cervical adenopathy. Skin:     General: Skin is warm. Findings: No rash. Neurological:      Mental Status: She is alert and oriented to person, place, and time. Deep Tendon Reflexes: Reflexes normal.   Psychiatric:         Thought Content: Thought content normal.           Labs: I personally reviewed the labs and imaging pertinent to this patient care.

## 2023-10-30 ENCOUNTER — HOSPITAL ENCOUNTER (OUTPATIENT)
Dept: INFUSION CENTER | Facility: CLINIC | Age: 73
Discharge: HOME | End: 2023-10-30
Payer: COMMERCIAL

## 2023-10-30 VITALS
SYSTOLIC BLOOD PRESSURE: 105 MMHG | DIASTOLIC BLOOD PRESSURE: 69 MMHG | TEMPERATURE: 96.8 F | HEART RATE: 95 BPM | RESPIRATION RATE: 18 BRPM | OXYGEN SATURATION: 98 %

## 2023-10-30 DIAGNOSIS — D50.8 IRON DEFICIENCY ANEMIA FOLLOWING BARIATRIC SURGERY: Primary | ICD-10-CM

## 2023-10-30 DIAGNOSIS — K95.89 IRON DEFICIENCY ANEMIA FOLLOWING BARIATRIC SURGERY: Primary | ICD-10-CM

## 2023-10-30 PROCEDURE — 96365 THER/PROPH/DIAG IV INF INIT: CPT

## 2023-10-30 RX ORDER — SODIUM CHLORIDE 9 MG/ML
20 INJECTION, SOLUTION INTRAVENOUS ONCE
Status: CANCELLED | OUTPATIENT
Start: 2023-10-30

## 2023-10-30 RX ORDER — SODIUM CHLORIDE 9 MG/ML
20 INJECTION, SOLUTION INTRAVENOUS ONCE
Status: COMPLETED | OUTPATIENT
Start: 2023-10-30 | End: 2023-10-30

## 2023-10-30 RX ADMIN — SODIUM CHLORIDE 200 MG: 9 INJECTION, SOLUTION INTRAVENOUS at 11:52

## 2023-10-30 RX ADMIN — SODIUM CHLORIDE 20 ML/HR: 0.9 INJECTION, SOLUTION INTRAVENOUS at 11:53

## 2023-10-30 NOTE — PROGRESS NOTES
Patient to infusion for venofer. She offers no complaints. She tolerated treatment today. She has no other order venofer treatments.   She declined AVS

## 2023-11-03 ENCOUNTER — APPOINTMENT (OUTPATIENT)
Dept: LAB | Facility: CLINIC | Age: 73
End: 2023-11-03
Payer: COMMERCIAL

## 2023-11-03 LAB
ALBUMIN SERPL BCP-MCNC: 3.5 G/DL (ref 3.5–5)
ALP SERPL-CCNC: 79 U/L (ref 34–104)
ALT SERPL W P-5'-P-CCNC: 28 U/L (ref 7–52)
ANION GAP SERPL CALCULATED.3IONS-SCNC: 5 MMOL/L
AST SERPL W P-5'-P-CCNC: 30 U/L (ref 13–39)
BASOPHILS # BLD MANUAL: 0.17 THOUSAND/UL (ref 0–0.1)
BASOPHILS NFR MAR MANUAL: 1 % (ref 0–1)
BILIRUB SERPL-MCNC: 0.48 MG/DL (ref 0.2–1)
BUN SERPL-MCNC: 12 MG/DL (ref 5–25)
CALCIUM SERPL-MCNC: 8.5 MG/DL (ref 8.4–10.2)
CHLORIDE SERPL-SCNC: 105 MMOL/L (ref 96–108)
CO2 SERPL-SCNC: 25 MMOL/L (ref 21–32)
CREAT SERPL-MCNC: 0.75 MG/DL (ref 0.6–1.3)
EOSINOPHIL # BLD MANUAL: 0.51 THOUSAND/UL (ref 0–0.4)
EOSINOPHIL NFR BLD MANUAL: 3 % (ref 0–6)
ERYTHROCYTE [DISTWIDTH] IN BLOOD BY AUTOMATED COUNT: 16 % (ref 11.6–15.1)
GFR SERPL CREATININE-BSD FRML MDRD: 80 ML/MIN/1.73SQ M
GLUCOSE P FAST SERPL-MCNC: 105 MG/DL (ref 65–99)
HCT VFR BLD AUTO: 34.6 % (ref 34.8–46.1)
HGB BLD-MCNC: 11.6 G/DL (ref 11.5–15.4)
LYMPHOCYTES # BLD AUTO: 11.22 THOUSAND/UL (ref 0.6–4.47)
LYMPHOCYTES # BLD AUTO: 65 % (ref 14–44)
MCH RBC QN AUTO: 32.3 PG (ref 26.8–34.3)
MCHC RBC AUTO-ENTMCNC: 33.5 G/DL (ref 31.4–37.4)
MCV RBC AUTO: 96 FL (ref 82–98)
MONOCYTES # BLD AUTO: 1.7 THOUSAND/UL (ref 0–1.22)
MONOCYTES NFR BLD: 10 % (ref 4–12)
NEUTROPHILS # BLD MANUAL: 3.4 THOUSAND/UL (ref 1.85–7.62)
NEUTS BAND NFR BLD MANUAL: 1 % (ref 0–8)
NEUTS SEG NFR BLD AUTO: 19 % (ref 43–75)
PLATELET # BLD AUTO: 257 THOUSANDS/UL (ref 149–390)
PLATELET BLD QL SMEAR: ADEQUATE
PMV BLD AUTO: 9.9 FL (ref 8.9–12.7)
POTASSIUM SERPL-SCNC: 3.9 MMOL/L (ref 3.5–5.3)
PROT SERPL-MCNC: 8.3 G/DL (ref 6.4–8.4)
RBC # BLD AUTO: 3.59 MILLION/UL (ref 3.81–5.12)
RBC MORPH BLD: NORMAL
SODIUM SERPL-SCNC: 135 MMOL/L (ref 135–147)
VARIANT LYMPHS # BLD AUTO: 1 %
WBC # BLD AUTO: 17 THOUSAND/UL (ref 4.31–10.16)

## 2023-11-03 PROCEDURE — 85007 BL SMEAR W/DIFF WBC COUNT: CPT | Performed by: INTERNAL MEDICINE

## 2023-11-03 PROCEDURE — 80053 COMPREHEN METABOLIC PANEL: CPT | Performed by: INTERNAL MEDICINE

## 2023-11-03 PROCEDURE — 36415 COLL VENOUS BLD VENIPUNCTURE: CPT | Performed by: INTERNAL MEDICINE

## 2023-11-03 PROCEDURE — 85027 COMPLETE CBC AUTOMATED: CPT | Performed by: INTERNAL MEDICINE

## 2023-11-08 ENCOUNTER — CONSULT (OUTPATIENT)
Dept: NEPHROLOGY | Facility: CLINIC | Age: 73
End: 2023-11-08

## 2023-11-08 VITALS
OXYGEN SATURATION: 99 % | BODY MASS INDEX: 27.48 KG/M2 | HEIGHT: 60 IN | WEIGHT: 140 LBS | DIASTOLIC BLOOD PRESSURE: 74 MMHG | HEART RATE: 84 BPM | SYSTOLIC BLOOD PRESSURE: 100 MMHG

## 2023-11-08 DIAGNOSIS — R77.8 ELEVATED TOTAL PROTEIN: ICD-10-CM

## 2023-11-08 DIAGNOSIS — N18.32 STAGE 3B CHRONIC KIDNEY DISEASE (HCC): ICD-10-CM

## 2023-11-08 DIAGNOSIS — E87.1 HYPONATREMIA: Primary | ICD-10-CM

## 2023-11-08 NOTE — PATIENT INSTRUCTIONS
Your most recent sodium level is normal at 135. Please continue with the fluid restriction of 51 oz per day. The protein supplements do not count against the fluid restriction. Decrease the salt tablets to 1 tablet twice a day. Check blood work in 10 days. Follow up in 4 months.

## 2023-11-08 NOTE — PROGRESS NOTES
NEPHROLOGY OUTPATIENT CONSULTATION   Carlene Mays 72 y.o. female MRN: [id number]  Date: 11/08/23  Reason for consultation: Initial evaluation of hyponatremia    ASSESSMENT and PLAN:  Hyponatremia:  Baseline Na was low 130s prior to October 2023. Na was 133 on 8/4/23 and 7/14/23 while protein was 10.1 and 9.4 respectively. It is possible that baseline hyponatremia is pseudohyponatremia. Recent acute hyponatremia during hospitalization on 10/6/23 - Na was 125 on admission. Work-up in the hospital: Urine osmolality 312 and 304, Urine Na 69 and 49. No serum osm done. Na improved to 131 on discharge and is now up to 135. Urine studies indicate SIADH as possible diagnosis but cannot be truly sure if she is hypoosmolar. For now, I advised her to decrease salt tabs to 1 gm BID and take protein supplements BID. For work up, we will check a repeat CMP, serum osm, urine osm, urine Na, uric acid, TSH and cortisol level. Continue with fluid restriction of 1500 cc/24 hours. Hypertension  Likely due to Zanubrutinib. BP at goal.   Rx: Doxazosin 2 mg BID. No changes. Elevated total protein  Check SPEP, UPEP, free light chains. Renal:  Chart indicates dx of CKD III  Her current eGFR does not support this diagnosis. Monitor for now. CLL/SLL  Followed by Dr. Carmel Vee. She is on Zanubrutinib. Patient Instructions   Your most recent sodium level is normal at 135. Please continue with the fluid restriction of 51 oz per day. The protein supplements do not count against the fluid restriction. Decrease the salt tablets to 1 tablet twice a day. Check blood work in 10 days. Follow up in 4 months. HISTORY OF PRESENT ILLNESS:  Requesting Physician: Leann Jacobson MD    Carlene Mays is a 72 y.o. female who was referred for initial evaluation of hyponatremia.     Huan Napier has a history of CLL/SLL, recent development of hypertension, systemic lupus erythematosus, Sjogren's disease, bipolar disease, and hyponatremia. She is now being referred for initial evaluation of hyponatremia. Based on my personal review of the record, I note that she has a history of hyponatremia with sodium levels ranging between 130 and 134 based on blood work between October 2021 and August 2023. Her sodium level was 133 on August 4, 2023. She was recently admitted to [address] [place] [address] between October 6 and October 8, 2023 after she presented with high blood pressure. On presentation, she was found to have a sodium level of 125 which went down to 123 during the first hospital day. She was treated with salt tablets on the presumption of SIADH with improvement in her sodium levels. Her sodium was up to 131 on the day of discharge. She has had repeat lab work since being discharged and her most recent sodium from November 3, 2023 is now up to 135. She reports that she is compliant with a 51 ounce per day fluid restriction and takes protein supplementation. She developed recent hypertension after starting Zanubrutinib in Sep 2023. She is now on doxazosin 2 mg twice a day. Home BPs in the 120s to 130s over 70s to 80s. She denies any chest pain, shortness of breath, or lower extremity edema. In terms of her renal function, the chart indicates the diagnosis of CKD 3. Based on my review of the record, I note that her creatinine has been between 0.7 and 1.0 since October 2022. In September 2021 while she was in the hospital, her creatinine was around 1.0-1.3. PAST MEDICAL HISTORY:  CLL/SLL: She is currently under the care of Dr. Norma Lau and is being treated with Zanubrutinib which started in Sep 2023. HTN: She developed HTN after starting Zanubrutinib in Sep 2023. SLE: Followed by Dr. Connor Garay. She is on Hydroxychloroquine. Sjogren's disease:  Bipolar disease: Well controlled on Wellbutrin and Seroquel. Follows with psych.    Hyponatremia  Pneumonia    No known history of DM, HLP, CAD, CHF, CVA, PAD, COPD, CANDELARIA, asthma, thromboembolism, liver disease, GERD, nephrolithiasis, degenerative joint or disc disease. PAST SURGICAL HISTORY:  Hysterectomy and oophorectomy. R elbow surgery  LN excisional biopsy. ALLERGIES:  Allergies   Allergen Reactions    Ciprofloxacin Hives and Swelling     Pt reports that lips and mouth and face swelled. Cymbalta [Duloxetine Hcl] Other (See Comments)     Hallucinations, fatigue, faints    Nirmatrelvir-Ritonavir Diarrhea, Nausea Only, Other (See Comments), Dizziness and Lightheadedness     "passed out"-nausea    Percocet [Oxycodone-Acetaminophen] Other (See Comments)     Dry mouth - pt states it kept her up all night. States had to continually drink fluids     SOCIAL HISTORY:  Non smoker  No alcohol abuse. No IVDA. FAMILY HISTORY:  Negative for ESRD.      MEDICATIONS:    Current Outpatient Medications:     buPROPion (WELLBUTRIN) 100 mg tablet, Take 100 mg by mouth 3 (three) times a day., Disp: , Rfl:     calcium carbonate (Calcium 600) 600 MG tablet, , Disp: , Rfl:     Cholecalciferol (D3-50) 1.25 MG (94351 UT) capsule, 5,000 Units daily, Disp: , Rfl:     doxazosin (CARDURA) 2 mg tablet, Take 1 tablet (2 mg total) by mouth 2 (two) times a day, Disp: 180 tablet, Rfl: 1    ferrous sulfate 325 (65 Fe) mg tablet, Take 1 tablet (325 mg total) by mouth 2 (two) times a day with meals, Disp: 60 tablet, Rfl: 0    hydroxychloroquine (PLAQUENIL) 200 mg tablet, Take 1 tablet (200 mg total) by mouth 2 (two) times a day with meals, Disp: 180 tablet, Rfl: 0    Multiple Vitamins-Minerals (CENTRUM MINIS WOMEN 50+ PO), , Disp: , Rfl:     pilocarpine (SALAGEN) 5 mg tablet, Take 1 tablet (5 mg total) by mouth 3 (three) times a day, Disp: 90 tablet, Rfl: 5    QUEtiapine (SEROquel) 300 mg tablet, Take 300 mg by mouth daily at bedtime, Disp: , Rfl:     QUEtiapine (SEROquel) 50 mg tablet, 50 mg daily at bedtime, Disp: , Rfl:     sodium chloride 1 g tablet, Take 2 in the am 1 in the afternoon and 1 in the evening, Disp: 120 tablet, Rfl: 1    temazepam (RESTORIL) 30 mg capsule, Take 2 capsules by mouth daily at bedtime as needed , Disp: , Rfl:     Zanubrutinib (Brukinsa) 80 MG CAPS, Take 4 capsules (320 mg total) by mouth daily, Disp: 120 capsule, Rfl: 11    acetaminophen (TYLENOL) 325 mg tablet, Take 2 tablets (650 mg total) by mouth every 6 (six) hours as needed for mild pain, Disp: , Rfl: 0    amLODIPine (NORVASC) 5 mg tablet, , Disp: , Rfl:     cycloSPORINE (RESTASIS) 0.05 % ophthalmic emulsion, instill 1 drop into both eyes twice a day, Disp: , Rfl:     Current Facility-Administered Medications:     cyanocobalamin injection 1,000 mcg, 1,000 mcg, Intramuscular, Q30 Days, Little Carpenter MD, 1,000 mcg at 10/24/23 1258    REVIEW OF SYSTEMS:  Review of Systems   Constitutional:  Positive for appetite change and chills. Negative for fatigue and fever. HENT:  Positive for hearing loss. Negative for sinus pressure and sinus pain. Eyes:  Positive for visual disturbance. Respiratory:  Negative for cough and shortness of breath. Cardiovascular:  Negative for chest pain and leg swelling. Gastrointestinal:  Negative for abdominal pain, diarrhea, nausea and vomiting. Genitourinary:  Negative for dysuria and hematuria. Musculoskeletal:  Negative for arthralgias and back pain. Skin:  Negative for rash. Allergic/Immunologic: Positive for immunocompromised state. Neurological:  Negative for dizziness and light-headedness. Hematological:  Bruises/bleeds easily. Psychiatric/Behavioral:  Negative for dysphoric mood. The patient is not nervous/anxious. All the systems were reviewed and were negative except as documented on the HPI.     PHYSICAL EXAMINATION:  /74 (BP Location: Left arm, Patient Position: Sitting, Cuff Size: Standard)   Pulse 84   Ht 5' (1.524 m)   Wt 63.5 kg (140 lb)   LMP  (LMP Unknown)   SpO2 99%   BMI 27.34 kg/m²   Current Weight: Weight - Scale: 63.5 kg (140 lb) Body mass index is 27.34 kg/m². General: conscious, coherent, cooperative, not in distress. Skin: warm, dry, good turgor. Eyes: pink conjunctivae, no scleral icterus. ENT: moist lips and mucous membranes. Respiratory: equal chest expansion, clear breath sounds. Cardiovascular: distinct heart sounds, normal rate, regular rhythm, no rub  Abdomen: soft, non-tender, non-distended, normoactive bowel sounds  Extremities: no edema. Genitourinary: no almanzar catheter. Neuro: awake, alert, oriented to time, place and person. Psych: appropriate affect.      LABORATORY RESULTS:  Results for orders placed or performed in visit on 10/25/23   CBC and differential   Result Value Ref Range    WBC 17.00 (H) 4.31 - 10.16 Thousand/uL    RBC 3.59 (L) 3.81 - 5.12 Million/uL    Hemoglobin 11.6 11.5 - 15.4 g/dL    Hematocrit 34.6 (L) 34.8 - 46.1 %    MCV 96 82 - 98 fL    MCH 32.3 26.8 - 34.3 pg    MCHC 33.5 31.4 - 37.4 g/dL    RDW 16.0 (H) 11.6 - 15.1 %    MPV 9.9 8.9 - 12.7 fL    Platelets 021 235 - 867 Thousands/uL   Comprehensive metabolic panel   Result Value Ref Range    Sodium 135 135 - 147 mmol/L    Potassium 3.9 3.5 - 5.3 mmol/L    Chloride 105 96 - 108 mmol/L    CO2 25 21 - 32 mmol/L    ANION GAP 5 mmol/L    BUN 12 5 - 25 mg/dL    Creatinine 0.75 0.60 - 1.30 mg/dL    Glucose, Fasting 105 (H) 65 - 99 mg/dL    Calcium 8.5 8.4 - 10.2 mg/dL    AST 30 13 - 39 U/L    ALT 28 7 - 52 U/L    Alkaline Phosphatase 79 34 - 104 U/L    Total Protein 8.3 6.4 - 8.4 g/dL    Albumin 3.5 3.5 - 5.0 g/dL    Total Bilirubin 0.48 0.20 - 1.00 mg/dL    eGFR 80 ml/min/1.73sq m   Manual Differential(PHLEBS Do Not Order)   Result Value Ref Range    Segmented % 19 (L) 43 - 75 %    Bands % 1 0 - 8 %    Lymphocytes % 65 (H) 14 - 44 %    Monocytes % 10 4 - 12 %    Eosinophils, % 3 0 - 6 %    Basophils % 1 0 - 1 %    Atypical Lymphocytes % 1 (H) <=0 %    Absolute Neutrophils 3.40 1.85 - 7.62 Thousand/uL    Lymphocytes Absolute 11.22 (H) 0.60 - 4.47 Thousand/uL    Monocytes Absolute 1.70 (H) 0.00 - 1.22 Thousand/uL    Eosinophils Absolute 0.51 (H) 0.00 - 0.40 Thousand/uL    Basophils Absolute 0.17 (H) 0.00 - 0.10 Thousand/uL    Total Counted      RBC Morphology Normal     Platelet Estimate Adequate Adequate     IMAGING:   10/7/23: CTA: No PE. Increased size of right middle lobe consolidation. Patchy groundglass consolidations and small cysts throughout the lungs. Decrease in axillary and mediastinal lymph nodes.

## 2023-11-13 ENCOUNTER — HOSPITAL ENCOUNTER (OUTPATIENT)
Dept: MAMMOGRAPHY | Facility: HOSPITAL | Age: 73
Discharge: HOME | End: 2023-11-13
Attending: FAMILY MEDICINE
Payer: COMMERCIAL

## 2023-11-13 VITALS — HEIGHT: 60 IN | WEIGHT: 140 LBS | BODY MASS INDEX: 27.48 KG/M2

## 2023-11-13 DIAGNOSIS — Z12.31 ENCOUNTER FOR SCREENING MAMMOGRAM FOR BREAST CANCER: ICD-10-CM

## 2023-11-13 PROCEDURE — 77063 BREAST TOMOSYNTHESIS BI: CPT

## 2023-11-13 PROCEDURE — 77067 SCR MAMMO BI INCL CAD: CPT

## 2023-11-17 ENCOUNTER — APPOINTMENT (OUTPATIENT)
Dept: LAB | Facility: CLINIC | Age: 73
End: 2023-11-17
Payer: COMMERCIAL

## 2023-11-17 DIAGNOSIS — M35.00 SJOGREN'S SYNDROME, WITH UNSPECIFIED ORGAN INVOLVEMENT (HCC): ICD-10-CM

## 2023-11-17 DIAGNOSIS — M32.9 SYSTEMIC LUPUS ERYTHEMATOSUS, UNSPECIFIED SLE TYPE, UNSPECIFIED ORGAN INVOLVEMENT STATUS (HCC): ICD-10-CM

## 2023-11-17 DIAGNOSIS — E87.1 HYPONATREMIA: ICD-10-CM

## 2023-11-17 DIAGNOSIS — R77.8 ELEVATED TOTAL PROTEIN: ICD-10-CM

## 2023-11-17 DIAGNOSIS — M81.0 AGE-RELATED OSTEOPOROSIS WITHOUT CURRENT PATHOLOGICAL FRACTURE: ICD-10-CM

## 2023-11-17 LAB
CORTIS AM PEAK SERPL-MCNC: 16.3 UG/DL (ref 6.7–22.6)
OSMOLALITY UR/SERPL-RTO: 298 MMOL/KG (ref 282–298)
OSMOLALITY UR: 454 MMOL/KG
SODIUM 24H UR-SCNC: 80 MOL/L
T4 FREE SERPL-MCNC: 0.74 NG/DL (ref 0.61–1.12)
TSH SERPL DL<=0.05 MIU/L-ACNC: 6.34 UIU/ML (ref 0.45–4.5)
URATE SERPL-MCNC: 3.5 MG/DL (ref 2–7.5)

## 2023-11-17 PROCEDURE — 84300 ASSAY OF URINE SODIUM: CPT

## 2023-11-17 PROCEDURE — 84166 PROTEIN E-PHORESIS/URINE/CSF: CPT

## 2023-11-17 PROCEDURE — 84165 PROTEIN E-PHORESIS SERUM: CPT

## 2023-11-17 PROCEDURE — 84443 ASSAY THYROID STIM HORMONE: CPT

## 2023-11-17 PROCEDURE — 36415 COLL VENOUS BLD VENIPUNCTURE: CPT

## 2023-11-17 PROCEDURE — 83935 ASSAY OF URINE OSMOLALITY: CPT

## 2023-11-17 PROCEDURE — 83930 ASSAY OF BLOOD OSMOLALITY: CPT

## 2023-11-17 PROCEDURE — 84550 ASSAY OF BLOOD/URIC ACID: CPT

## 2023-11-17 PROCEDURE — 83521 IG LIGHT CHAINS FREE EACH: CPT

## 2023-11-17 PROCEDURE — 82533 TOTAL CORTISOL: CPT

## 2023-11-17 PROCEDURE — 84439 ASSAY OF FREE THYROXINE: CPT

## 2023-11-18 LAB
KAPPA LC FREE SER-MCNC: 133.6 MG/L (ref 3.3–19.4)
KAPPA LC FREE/LAMBDA FREE SER: 1.86 {RATIO} (ref 0.26–1.65)
LAMBDA LC FREE SERPL-MCNC: 71.9 MG/L (ref 5.7–26.3)

## 2023-11-20 ENCOUNTER — TELEPHONE (OUTPATIENT)
Dept: FAMILY MEDICINE CLINIC | Facility: CLINIC | Age: 73
End: 2023-11-20

## 2023-11-20 NOTE — TELEPHONE ENCOUNTER
----- Message from Barbara Preciado MD sent at 11/20/2023  7:51 AM EST -----  Is she symptomatic ?    I can send her bactrim which covers both

## 2023-11-20 NOTE — TELEPHONE ENCOUNTER
Patient advised she would like to have the antibiotic please to [facility]

## 2023-11-21 DIAGNOSIS — N30.00 ACUTE CYSTITIS WITHOUT HEMATURIA: Primary | ICD-10-CM

## 2023-11-21 LAB
ALBUMIN SERPL ELPH-MCNC: 3.69 G/DL (ref 3.2–5.1)
ALBUMIN SERPL ELPH-MCNC: 42.4 % (ref 48–70)
ALBUMIN UR ELPH-MCNC: 100 %
ALPHA1 GLOB MFR UR ELPH: 0 %
ALPHA1 GLOB SERPL ELPH-MCNC: 0.3 G/DL (ref 0.15–0.47)
ALPHA1 GLOB SERPL ELPH-MCNC: 3.4 % (ref 1.8–7)
ALPHA2 GLOB MFR UR ELPH: 0 %
ALPHA2 GLOB SERPL ELPH-MCNC: 0.72 G/DL (ref 0.42–1.04)
ALPHA2 GLOB SERPL ELPH-MCNC: 8.3 % (ref 5.9–14.9)
B-GLOBULIN MFR UR ELPH: 0 %
BETA GLOB ABNORMAL SERPL ELPH-MCNC: 0.36 G/DL (ref 0.31–0.57)
BETA1 GLOB SERPL ELPH-MCNC: 4.1 % (ref 4.7–7.7)
BETA2 GLOB SERPL ELPH-MCNC: 5.2 % (ref 3.1–7.9)
BETA2+GAMMA GLOB SERPL ELPH-MCNC: 0.45 G/DL (ref 0.2–0.58)
GAMMA GLOB ABNORMAL SERPL ELPH-MCNC: 3.18 G/DL (ref 0.4–1.66)
GAMMA GLOB MFR UR ELPH: 0 %
GAMMA GLOB SERPL ELPH-MCNC: 36.6 % (ref 6.9–22.3)
IGG/ALB SER: 0.74 {RATIO} (ref 1.1–1.8)
PROT PATTERN SERPL ELPH-IMP: ABNORMAL
PROT PATTERN UR ELPH-IMP: NORMAL
PROT SERPL-MCNC: 8.7 G/DL (ref 6.4–8.2)
PROT UR-MCNC: 19.5 MG/DL

## 2023-11-21 PROCEDURE — 84166 PROTEIN E-PHORESIS/URINE/CSF: CPT | Performed by: STUDENT IN AN ORGANIZED HEALTH CARE EDUCATION/TRAINING PROGRAM

## 2023-11-21 PROCEDURE — 84165 PROTEIN E-PHORESIS SERUM: CPT | Performed by: STUDENT IN AN ORGANIZED HEALTH CARE EDUCATION/TRAINING PROGRAM

## 2023-11-21 RX ORDER — SULFAMETHOXAZOLE AND TRIMETHOPRIM 800; 160 MG/1; MG/1
1 TABLET ORAL EVERY 12 HOURS SCHEDULED
Qty: 20 TABLET | Refills: 0 | Status: SHIPPED | OUTPATIENT
Start: 2023-11-21 | End: 2023-11-21 | Stop reason: SDUPTHER

## 2023-11-21 RX ORDER — SULFAMETHOXAZOLE AND TRIMETHOPRIM 800; 160 MG/1; MG/1
1 TABLET ORAL EVERY 12 HOURS SCHEDULED
Qty: 20 TABLET | Refills: 0 | Status: SHIPPED | OUTPATIENT
Start: 2023-11-21 | End: 2023-12-01

## 2023-11-27 ENCOUNTER — CLINICAL SUPPORT (OUTPATIENT)
Dept: FAMILY MEDICINE CLINIC | Facility: CLINIC | Age: 73
End: 2023-11-27
Payer: COMMERCIAL

## 2023-11-27 DIAGNOSIS — E53.8 VITAMIN B12 DEFICIENCY: Primary | ICD-10-CM

## 2023-11-27 PROCEDURE — 96372 THER/PROPH/DIAG INJ SC/IM: CPT

## 2023-11-27 RX ADMIN — CYANOCOBALAMIN 1000 MCG: 1000 INJECTION, SOLUTION INTRAMUSCULAR; SUBCUTANEOUS at 13:05

## 2023-12-01 ENCOUNTER — APPOINTMENT (OUTPATIENT)
Dept: LAB | Facility: CLINIC | Age: 73
End: 2023-12-01
Payer: COMMERCIAL

## 2023-12-01 DIAGNOSIS — C83.00 SMALL LYMPHOCYTIC LYMPHOMA (HCC): ICD-10-CM

## 2023-12-01 LAB
ALBUMIN SERPL BCP-MCNC: 3.8 G/DL (ref 3.5–5)
ALP SERPL-CCNC: 61 U/L (ref 34–104)
ALT SERPL W P-5'-P-CCNC: 24 U/L (ref 7–52)
ANION GAP SERPL CALCULATED.3IONS-SCNC: 4 MMOL/L
AST SERPL W P-5'-P-CCNC: 29 U/L (ref 13–39)
BASOPHILS # BLD MANUAL: 0 THOUSAND/UL (ref 0–0.1)
BASOPHILS NFR MAR MANUAL: 0 % (ref 0–1)
BILIRUB SERPL-MCNC: 0.63 MG/DL (ref 0.2–1)
BUN SERPL-MCNC: 20 MG/DL (ref 5–25)
CALCIUM SERPL-MCNC: 8.8 MG/DL (ref 8.4–10.2)
CHLORIDE SERPL-SCNC: 103 MMOL/L (ref 96–108)
CO2 SERPL-SCNC: 24 MMOL/L (ref 21–32)
CREAT SERPL-MCNC: 0.98 MG/DL (ref 0.6–1.3)
EOSINOPHIL # BLD MANUAL: 0.26 THOUSAND/UL (ref 0–0.4)
EOSINOPHIL NFR BLD MANUAL: 2 % (ref 0–6)
ERYTHROCYTE [DISTWIDTH] IN BLOOD BY AUTOMATED COUNT: 15.3 % (ref 11.6–15.1)
GFR SERPL CREATININE-BSD FRML MDRD: 58 ML/MIN/1.73SQ M
GLUCOSE P FAST SERPL-MCNC: 98 MG/DL (ref 65–99)
HCT VFR BLD AUTO: 37.7 % (ref 34.8–46.1)
HGB BLD-MCNC: 12.1 G/DL (ref 11.5–15.4)
LYMPHOCYTES # BLD AUTO: 66 % (ref 14–44)
LYMPHOCYTES # BLD AUTO: 9.23 THOUSAND/UL (ref 0.6–4.47)
MCH RBC QN AUTO: 32.2 PG (ref 26.8–34.3)
MCHC RBC AUTO-ENTMCNC: 32.1 G/DL (ref 31.4–37.4)
MCV RBC AUTO: 100 FL (ref 82–98)
MONOCYTES # BLD AUTO: 0.92 THOUSAND/UL (ref 0–1.22)
MONOCYTES NFR BLD: 7 % (ref 4–12)
NEUTROPHILS # BLD MANUAL: 2.77 THOUSAND/UL (ref 1.85–7.62)
NEUTS SEG NFR BLD AUTO: 21 % (ref 43–75)
PLATELET # BLD AUTO: 272 THOUSANDS/UL (ref 149–390)
PLATELET BLD QL SMEAR: ADEQUATE
PMV BLD AUTO: 10.4 FL (ref 8.9–12.7)
POTASSIUM SERPL-SCNC: 4.5 MMOL/L (ref 3.5–5.3)
PROT SERPL-MCNC: 9.3 G/DL (ref 6.4–8.4)
RBC # BLD AUTO: 3.76 MILLION/UL (ref 3.81–5.12)
RBC MORPH BLD: NORMAL
SODIUM SERPL-SCNC: 131 MMOL/L (ref 135–147)
VARIANT LYMPHS # BLD AUTO: 4 %
WBC # BLD AUTO: 13.18 THOUSAND/UL (ref 4.31–10.16)

## 2023-12-01 PROCEDURE — 85027 COMPLETE CBC AUTOMATED: CPT

## 2023-12-01 PROCEDURE — 36415 COLL VENOUS BLD VENIPUNCTURE: CPT

## 2023-12-01 PROCEDURE — 85007 BL SMEAR W/DIFF WBC COUNT: CPT

## 2023-12-01 PROCEDURE — 80053 COMPREHEN METABOLIC PANEL: CPT

## 2023-12-04 ENCOUNTER — OFFICE VISIT (OUTPATIENT)
Dept: UROLOGY | Facility: CLINIC | Age: 73
End: 2023-12-04
Payer: COMMERCIAL

## 2023-12-04 VITALS
OXYGEN SATURATION: 95 % | DIASTOLIC BLOOD PRESSURE: 60 MMHG | SYSTOLIC BLOOD PRESSURE: 100 MMHG | HEIGHT: 60 IN | BODY MASS INDEX: 27.09 KG/M2 | HEART RATE: 85 BPM | WEIGHT: 138 LBS

## 2023-12-04 DIAGNOSIS — N39.0 RECURRENT UTI: Primary | ICD-10-CM

## 2023-12-04 PROCEDURE — 99213 OFFICE O/P EST LOW 20 MIN: CPT | Performed by: PHYSICIAN ASSISTANT

## 2023-12-05 NOTE — PROGRESS NOTES
UROLOGY PROGRESS NOTE   Patient Identifiers: Mary Murillo (MRN [id number])  Date of Service: 12/5/2023    Subjective:   62-year-old female with history of urinary tract infection. Her most recent urine culture was no growth. Prior to that she had a urine culture showing Klebsiella and E. coli. She has no significant incontinence.     Reason for visit: UTI follow-up    Objective:     VITALS:    Vitals:    12/04/23 1038   BP: 100/60   Pulse: 85   SpO2: 95%           LABS:  Lab Results   Component Value Date    HGB 12.1 12/01/2023    HCT 37.7 12/01/2023    WBC 13.18 (H) 12/01/2023     12/01/2023   ]    Lab Results   Component Value Date    K 4.5 12/01/2023     12/01/2023    CO2 24 12/01/2023    BUN 20 12/01/2023    CREATININE 0.98 12/01/2023    CALCIUM 8.8 12/01/2023   ]        INPATIENT MEDS:    Current Outpatient Medications:     acetaminophen (TYLENOL) 325 mg tablet, Take 2 tablets (650 mg total) by mouth every 6 (six) hours as needed for mild pain, Disp: , Rfl: 0    buPROPion (WELLBUTRIN) 100 mg tablet, Take 100 mg by mouth 3 (three) times a day., Disp: , Rfl:     Cholecalciferol (D3-50) 1.25 MG (31579 UT) capsule, 5,000 Units daily, Disp: , Rfl:     cycloSPORINE (RESTASIS) 0.05 % ophthalmic emulsion, instill 1 drop into both eyes twice a day, Disp: , Rfl:     doxazosin (CARDURA) 2 mg tablet, Take 1 tablet (2 mg total) by mouth 2 (two) times a day, Disp: 180 tablet, Rfl: 1    ferrous sulfate 325 (65 Fe) mg tablet, Take 1 tablet (325 mg total) by mouth 2 (two) times a day with meals, Disp: 60 tablet, Rfl: 0    hydroxychloroquine (PLAQUENIL) 200 mg tablet, Take 1 tablet (200 mg total) by mouth 2 (two) times a day with meals, Disp: 180 tablet, Rfl: 0    Multiple Vitamins-Minerals (CENTRUM MINIS WOMEN 50+ PO), , Disp: , Rfl:     pilocarpine (SALAGEN) 5 mg tablet, Take 1 tablet (5 mg total) by mouth 3 (three) times a day, Disp: 90 tablet, Rfl: 5    QUEtiapine (SEROquel) 300 mg tablet, Take 300 mg by mouth daily at bedtime, Disp: , Rfl:     QUEtiapine (SEROquel) 50 mg tablet, 50 mg daily at bedtime, Disp: , Rfl:     sodium chloride 1 g tablet, Take 2 in the am 1 in the afternoon and 1 in the evening, Disp: 120 tablet, Rfl: 1    temazepam (RESTORIL) 30 mg capsule, Take 2 capsules by mouth daily at bedtime as needed , Disp: , Rfl:     Zanubrutinib (Brukinsa) 80 MG CAPS, Take 4 capsules (320 mg total) by mouth daily, Disp: 120 capsule, Rfl: 11    amLODIPine (NORVASC) 5 mg tablet, , Disp: , Rfl:     calcium carbonate (Calcium 600) 600 MG tablet, , Disp: , Rfl:     Current Facility-Administered Medications:     cyanocobalamin injection 1,000 mcg, 1,000 mcg, Intramuscular, Q30 Days, Carli Pahn MD, 1,000 mcg at 11/27/23 1305      Physical Exam:   /60 (BP Location: Left arm, Patient Position: Sitting, Cuff Size: Standard)   Pulse 85   Ht 5' (1.524 m)   Wt 62.6 kg (138 lb)   LMP  (LMP Unknown)   SpO2 95%   BMI 26.95 kg/m²   GEN: no acute distress    RESP: breathing comfortably with no accessory muscle use    ABD: soft, non-tender, non-distended   INCISION:    EXT: no significant peripheral edema       RADIOLOGY:   None recent     Assessment:   Recurrent UTI     Plan:   -follow up one year for annual exam  -  -  -

## 2023-12-15 ENCOUNTER — APPOINTMENT (OUTPATIENT)
Dept: LAB | Facility: CLINIC | Age: 73
End: 2023-12-15
Payer: COMMERCIAL

## 2023-12-15 DIAGNOSIS — C83.00 SMALL LYMPHOCYTIC LYMPHOMA (HCC): ICD-10-CM

## 2023-12-15 DIAGNOSIS — N39.0 CHRONIC UTI: ICD-10-CM

## 2023-12-15 DIAGNOSIS — C83.00 SMALL LYMPHOCYTIC LYMPHOMA (HCC): Primary | ICD-10-CM

## 2023-12-15 LAB
ALBUMIN SERPL BCP-MCNC: 3.5 G/DL (ref 3.5–5)
ALP SERPL-CCNC: 70 U/L (ref 34–104)
ALT SERPL W P-5'-P-CCNC: 48 U/L (ref 7–52)
ANION GAP SERPL CALCULATED.3IONS-SCNC: 4 MMOL/L
AST SERPL W P-5'-P-CCNC: 48 U/L (ref 13–39)
BASOPHILS # BLD MANUAL: 0.18 THOUSAND/UL (ref 0–0.1)
BASOPHILS NFR MAR MANUAL: 2 % (ref 0–1)
BILIRUB SERPL-MCNC: 0.53 MG/DL (ref 0.2–1)
BUN SERPL-MCNC: 14 MG/DL (ref 5–25)
CALCIUM SERPL-MCNC: 8.6 MG/DL (ref 8.4–10.2)
CHLORIDE SERPL-SCNC: 104 MMOL/L (ref 96–108)
CO2 SERPL-SCNC: 25 MMOL/L (ref 21–32)
CREAT SERPL-MCNC: 0.8 MG/DL (ref 0.6–1.3)
EOSINOPHIL # BLD MANUAL: 0 THOUSAND/UL (ref 0–0.4)
EOSINOPHIL NFR BLD MANUAL: 0 % (ref 0–6)
ERYTHROCYTE [DISTWIDTH] IN BLOOD BY AUTOMATED COUNT: 14.7 % (ref 11.6–15.1)
GFR SERPL CREATININE-BSD FRML MDRD: 74 ML/MIN/1.73SQ M
GLUCOSE P FAST SERPL-MCNC: 90 MG/DL (ref 65–99)
HCT VFR BLD AUTO: 35.3 % (ref 34.8–46.1)
HGB BLD-MCNC: 11.6 G/DL (ref 11.5–15.4)
LYMPHOCYTES # BLD AUTO: 6.09 THOUSAND/UL (ref 0.6–4.47)
LYMPHOCYTES # BLD AUTO: 66 % (ref 14–44)
MCH RBC QN AUTO: 33.2 PG (ref 26.8–34.3)
MCHC RBC AUTO-ENTMCNC: 32.9 G/DL (ref 31.4–37.4)
MCV RBC AUTO: 101 FL (ref 82–98)
MONOCYTES # BLD AUTO: 0.74 THOUSAND/UL (ref 0–1.22)
MONOCYTES NFR BLD: 8 % (ref 4–12)
NEUTROPHILS # BLD MANUAL: 2.21 THOUSAND/UL (ref 1.85–7.62)
NEUTS SEG NFR BLD AUTO: 24 % (ref 43–75)
PLATELET # BLD AUTO: 235 THOUSANDS/UL (ref 149–390)
PLATELET BLD QL SMEAR: ADEQUATE
PMV BLD AUTO: 10.4 FL (ref 8.9–12.7)
POTASSIUM SERPL-SCNC: 4 MMOL/L (ref 3.5–5.3)
PROT SERPL-MCNC: 8.8 G/DL (ref 6.4–8.4)
RBC # BLD AUTO: 3.49 MILLION/UL (ref 3.81–5.12)
RBC MORPH BLD: NORMAL
SODIUM SERPL-SCNC: 133 MMOL/L (ref 135–147)
WBC # BLD AUTO: 9.22 THOUSAND/UL (ref 4.31–10.16)

## 2023-12-15 PROCEDURE — 36415 COLL VENOUS BLD VENIPUNCTURE: CPT

## 2023-12-15 PROCEDURE — 87077 CULTURE AEROBIC IDENTIFY: CPT

## 2023-12-15 PROCEDURE — 87186 SC STD MICRODIL/AGAR DIL: CPT

## 2023-12-15 PROCEDURE — 85007 BL SMEAR W/DIFF WBC COUNT: CPT

## 2023-12-15 PROCEDURE — 87086 URINE CULTURE/COLONY COUNT: CPT

## 2023-12-15 PROCEDURE — 87147 CULTURE TYPE IMMUNOLOGIC: CPT

## 2023-12-15 PROCEDURE — 85027 COMPLETE CBC AUTOMATED: CPT

## 2023-12-15 PROCEDURE — 80053 COMPREHEN METABOLIC PANEL: CPT

## 2023-12-17 LAB — BACTERIA UR CULT: ABNORMAL

## 2023-12-22 ENCOUNTER — TELEPHONE (OUTPATIENT)
Age: 73
End: 2023-12-22

## 2023-12-22 DIAGNOSIS — N39.0 RECURRENT UTI: Primary | ICD-10-CM

## 2023-12-22 RX ORDER — AMOXICILLIN AND CLAVULANATE POTASSIUM 875; 125 MG/1; MG/1
1 TABLET, FILM COATED ORAL EVERY 12 HOURS SCHEDULED
Qty: 14 TABLET | Refills: 0 | Status: SHIPPED | OUTPATIENT
Start: 2023-12-22 | End: 2023-12-29

## 2023-12-22 NOTE — TELEPHONE ENCOUNTER
Pt called requested to speak with someone regarding urine test results.     Pt called PCP and was told to call urology. PCP sent a msg to Jeovany regarding test results.     Please review

## 2023-12-22 NOTE — TELEPHONE ENCOUNTER
Inpatient Psych Follow up  Date of Visit:  6/29/2020  Code 22439     Subjective:  Patient was seen, chart reviewed, case discussed with staff, patient reported to be doing better and looking forward to going to residential treatment.  Withdrawal symptoms are gone denying any suicidal nor homicidal ideation.  She also reported that she sees ghosts only at night.  Discussed with patient other concern.    Mental Status Examination:  She is poorly built, fairly nourished, poorly groomed, alert, oriented, mood was better, no paranoia, episode of hallucination.  No suicidal ideation.    Vital Signs:   Blood pressure (!) 150/95, pulse 96, temperature 98 °F (36.7 °C), temperature source Oral, resp. rate 16, height 5' 3\" (1.6 m), weight 60.5 kg, SpO2 100 %.    Review of Systems:  No chills and fever, no chest pain, no abdominal discomfort, no diarrhea no running nose, no dysphagia, no sore throat.    Diagnosis:  Heroin withdrawal resolved    Depressive disorder unspecified    Polysubstance abuse    Recommendation:  Continue to monitor med compliance, continue to involved in multidisciplinary approach and the patient continued to do well will discharge tomorrow.    Tee Hastings MD   Patient advised of medication sent and directions for taking. Confirmed pharmacy sent to. Patient verbalized understanding and knows to call office with any further concerns

## 2023-12-28 ENCOUNTER — CLINICAL SUPPORT (OUTPATIENT)
Dept: FAMILY MEDICINE CLINIC | Facility: CLINIC | Age: 73
End: 2023-12-28
Payer: COMMERCIAL

## 2023-12-28 DIAGNOSIS — E53.8 VITAMIN B12 DEFICIENCY: Primary | ICD-10-CM

## 2023-12-28 PROCEDURE — 96372 THER/PROPH/DIAG INJ SC/IM: CPT

## 2023-12-28 RX ADMIN — CYANOCOBALAMIN 1000 MCG: 1000 INJECTION, SOLUTION INTRAMUSCULAR; SUBCUTANEOUS at 12:42

## 2023-12-29 ENCOUNTER — TELEPHONE (OUTPATIENT)
Dept: HEMATOLOGY ONCOLOGY | Facility: CLINIC | Age: 73
End: 2023-12-29

## 2023-12-29 ENCOUNTER — APPOINTMENT (OUTPATIENT)
Dept: LAB | Facility: CLINIC | Age: 73
End: 2023-12-29
Payer: COMMERCIAL

## 2023-12-29 DIAGNOSIS — C83.00 SMALL LYMPHOCYTIC LYMPHOMA (HCC): Primary | ICD-10-CM

## 2023-12-29 NOTE — TELEPHONE ENCOUNTER
Received voicemail from pt:  Alfredo Teague this is Cassia Tam. My YOB: 1950. I'm at the lab. Doctor Sawyer wanted me to have labs done every two weeks. Apparently it  on the . Can you ask Doctor Sawyer to put the order back in so I can go back in and have my electron? If not, then let me know if he doesn't want to do it anymore. Again, Captdimitri Turcios 54. I'm looking. I'm at the lab to have my blood work done. I had it done two weeks ago and I thought Doctor Sawyer wanted it done every two weeks. Apparently the order is no longer in the system. Thank you and have a good day. My number is [phone].    Left voicemail for pt letting her know per 10/25 note labs can be completed every 4 weeks. Orders placed.

## 2024-01-10 ENCOUNTER — DOCUMENTATION (OUTPATIENT)
Dept: HEMATOLOGY ONCOLOGY | Facility: CLINIC | Age: 74
End: 2024-01-10

## 2024-01-10 NOTE — PROGRESS NOTES
[facility] email requesting additional funding for patients brukinsa OOP copay  Patient Cassia Turcios [id number]   Has been enrolled with [employer]  Member ID: [id number]  Group ID: [id number]  RxBin ID: [id number]  PCN: [id number]  Eligibility Start Date: 10/12/2023  Eligibility End Date: 01/09/2025  Assistance Amount: $3,250.00

## 2024-01-12 ENCOUNTER — APPOINTMENT (OUTPATIENT)
Dept: LAB | Facility: CLINIC | Age: 74
End: 2024-01-12
Payer: COMMERCIAL

## 2024-01-12 LAB
ALBUMIN SERPL BCP-MCNC: 3.6 G/DL (ref 3.5–5)
ALP SERPL-CCNC: 69 U/L (ref 34–104)
ALT SERPL W P-5'-P-CCNC: 45 U/L (ref 7–52)
ANION GAP SERPL CALCULATED.3IONS-SCNC: 6 MMOL/L
AST SERPL W P-5'-P-CCNC: 37 U/L (ref 13–39)
BASOPHILS # BLD AUTO: 0.1 THOUSANDS/ÂΜL (ref 0–0.1)
BASOPHILS NFR BLD AUTO: 1 % (ref 0–1)
BILIRUB SERPL-MCNC: 0.53 MG/DL (ref 0.2–1)
BUN SERPL-MCNC: 21 MG/DL (ref 5–25)
CALCIUM SERPL-MCNC: 8.5 MG/DL (ref 8.4–10.2)
CHLORIDE SERPL-SCNC: 102 MMOL/L (ref 96–108)
CO2 SERPL-SCNC: 24 MMOL/L (ref 21–32)
CREAT SERPL-MCNC: 0.88 MG/DL (ref 0.6–1.3)
EOSINOPHIL # BLD AUTO: 0.12 THOUSAND/ÂΜL (ref 0–0.61)
EOSINOPHIL NFR BLD AUTO: 2 % (ref 0–6)
ERYTHROCYTE [DISTWIDTH] IN BLOOD BY AUTOMATED COUNT: 13.5 % (ref 11.6–15.1)
GFR SERPL CREATININE-BSD FRML MDRD: 66 ML/MIN/1.73SQ M
GLUCOSE P FAST SERPL-MCNC: 100 MG/DL (ref 65–99)
HCT VFR BLD AUTO: 36.8 % (ref 34.8–46.1)
HGB BLD-MCNC: 12 G/DL (ref 11.5–15.4)
IMM GRANULOCYTES # BLD AUTO: 0.03 THOUSAND/UL (ref 0–0.2)
IMM GRANULOCYTES NFR BLD AUTO: 0 % (ref 0–2)
LYMPHOCYTES # BLD AUTO: 4.91 THOUSANDS/ÂΜL (ref 0.6–4.47)
LYMPHOCYTES NFR BLD AUTO: 63 % (ref 14–44)
MCH RBC QN AUTO: 33.1 PG (ref 26.8–34.3)
MCHC RBC AUTO-ENTMCNC: 32.6 G/DL (ref 31.4–37.4)
MCV RBC AUTO: 102 FL (ref 82–98)
MONOCYTES # BLD AUTO: 0.61 THOUSAND/ÂΜL (ref 0.17–1.22)
MONOCYTES NFR BLD AUTO: 8 % (ref 4–12)
NEUTROPHILS # BLD AUTO: 2.07 THOUSANDS/ÂΜL (ref 1.85–7.62)
NEUTS SEG NFR BLD AUTO: 26 % (ref 43–75)
NRBC BLD AUTO-RTO: 0 /100 WBCS
PLATELET # BLD AUTO: 263 THOUSANDS/UL (ref 149–390)
PMV BLD AUTO: 10.4 FL (ref 8.9–12.7)
POTASSIUM SERPL-SCNC: 4.2 MMOL/L (ref 3.5–5.3)
PROT SERPL-MCNC: 8.3 G/DL (ref 6.4–8.4)
RBC # BLD AUTO: 3.62 MILLION/UL (ref 3.81–5.12)
SODIUM SERPL-SCNC: 132 MMOL/L (ref 135–147)
WBC # BLD AUTO: 7.84 THOUSAND/UL (ref 4.31–10.16)

## 2024-01-15 ENCOUNTER — HOSPITAL ENCOUNTER (OUTPATIENT)
Dept: CT IMAGING | Facility: HOSPITAL | Age: 74
Discharge: HOME | End: 2024-01-15
Attending: INTERNAL MEDICINE
Payer: COMMERCIAL

## 2024-01-15 DIAGNOSIS — C83.00 SMALL LYMPHOCYTIC LYMPHOMA (HCC): ICD-10-CM

## 2024-01-15 PROCEDURE — 74160 CT ABDOMEN W/CONTRAST: CPT

## 2024-01-15 PROCEDURE — 71260 CT THORAX DX C+: CPT

## 2024-01-15 PROCEDURE — G1004 CDSM NDSC: HCPCS

## 2024-01-15 RX ADMIN — IOHEXOL 85 ML: 350 INJECTION, SOLUTION INTRAVENOUS at 14:29

## 2024-01-16 ENCOUNTER — TELEPHONE (OUTPATIENT)
Dept: FAMILY MEDICINE CLINIC | Facility: CLINIC | Age: 74
End: 2024-01-16

## 2024-01-16 NOTE — TELEPHONE ENCOUNTER
----- Message from Julio Bhat MD sent at 1/15/2024  6:29 PM EST -----  Delma are you  symptomatic ?  As you know we do not treat all of these

## 2024-01-17 ENCOUNTER — RA CDI HCC (OUTPATIENT)
Dept: OTHER | Facility: HOSPITAL | Age: 74
End: 2024-01-17

## 2024-01-17 NOTE — PROGRESS NOTES
HCC coding opportunities          Chart Reviewed number of suggestions sent to Provider: 1     Patients Insurance   M32.9  Medicare Insurance: [employer] Medicare Complete

## 2024-01-22 DIAGNOSIS — M32.9 SYSTEMIC LUPUS ERYTHEMATOSUS, UNSPECIFIED SLE TYPE, UNSPECIFIED ORGAN INVOLVEMENT STATUS (HCC): ICD-10-CM

## 2024-01-22 RX ORDER — HYDROXYCHLOROQUINE SULFATE 200 MG/1
200 TABLET, FILM COATED ORAL 2 TIMES DAILY WITH MEALS
Qty: 180 TABLET | Refills: 0 | Status: SHIPPED | OUTPATIENT
Start: 2024-01-22 | End: 2025-01-16

## 2024-01-23 ENCOUNTER — OFFICE VISIT (OUTPATIENT)
Dept: FAMILY MEDICINE CLINIC | Facility: CLINIC | Age: 74
End: 2024-01-23
Payer: COMMERCIAL

## 2024-01-23 VITALS
HEART RATE: 78 BPM | RESPIRATION RATE: 16 BRPM | OXYGEN SATURATION: 98 % | DIASTOLIC BLOOD PRESSURE: 72 MMHG | SYSTOLIC BLOOD PRESSURE: 116 MMHG | HEIGHT: 60 IN | BODY MASS INDEX: 28.07 KG/M2 | WEIGHT: 143 LBS

## 2024-01-23 DIAGNOSIS — E03.9 HYPOTHYROIDISM, UNSPECIFIED TYPE: ICD-10-CM

## 2024-01-23 DIAGNOSIS — E87.1 HYPONATREMIA: Primary | ICD-10-CM

## 2024-01-23 DIAGNOSIS — E78.2 MIXED HYPERLIPIDEMIA: ICD-10-CM

## 2024-01-23 DIAGNOSIS — K95.89 IRON DEFICIENCY ANEMIA FOLLOWING BARIATRIC SURGERY: ICD-10-CM

## 2024-01-23 DIAGNOSIS — N39.0 RECURRENT UTI: ICD-10-CM

## 2024-01-23 DIAGNOSIS — N18.32 STAGE 3B CHRONIC KIDNEY DISEASE (HCC): ICD-10-CM

## 2024-01-23 DIAGNOSIS — D17.9 ANGIOMYOLIPOMA: ICD-10-CM

## 2024-01-23 DIAGNOSIS — E55.9 VITAMIN D DEFICIENCY: ICD-10-CM

## 2024-01-23 DIAGNOSIS — E61.1 IRON DEFICIENCY: ICD-10-CM

## 2024-01-23 DIAGNOSIS — D50.8 IRON DEFICIENCY ANEMIA FOLLOWING BARIATRIC SURGERY: ICD-10-CM

## 2024-01-23 DIAGNOSIS — M32.9 SYSTEMIC LUPUS ERYTHEMATOSUS, UNSPECIFIED SLE TYPE, UNSPECIFIED ORGAN INVOLVEMENT STATUS (HCC): ICD-10-CM

## 2024-01-23 DIAGNOSIS — F31.9 BIPOLAR 1 DISORDER (HCC): ICD-10-CM

## 2024-01-23 DIAGNOSIS — E53.8 B12 DEFICIENCY: ICD-10-CM

## 2024-01-23 DIAGNOSIS — M35.00 SJOGREN'S SYNDROME, WITH UNSPECIFIED ORGAN INVOLVEMENT (HCC): ICD-10-CM

## 2024-01-23 PROCEDURE — 99214 OFFICE O/P EST MOD 30 MIN: CPT | Performed by: FAMILY MEDICINE

## 2024-01-23 RX ORDER — SODIUM CHLORIDE 1 G/1
1 TABLET ORAL 2 TIMES DAILY
Qty: 180 TABLET | Refills: 0 | Status: SHIPPED | OUTPATIENT
Start: 2024-01-23 | End: 2024-04-22

## 2024-01-23 RX ORDER — PREDNISOLONE ACETATE 10 MG/ML
SUSPENSION/ DROPS OPHTHALMIC
COMMUNITY
Start: 2023-11-29

## 2024-01-23 RX ORDER — QUETIAPINE FUMARATE 25 MG/1
TABLET, FILM COATED ORAL
COMMUNITY
Start: 2018-01-02 | End: 2024-01-26 | Stop reason: ALTCHOICE

## 2024-01-23 NOTE — PROGRESS NOTES
"Assessment/Plan:  1. Encounter for immunization    2. Hyponatremia  -     sodium chloride 1 g tablet; Take 1 tablet (1 g total) by mouth 2 (two) times a day    3. Sjogren's syndrome, with unspecified organ involvement (HCC)  Assessment & Plan:  -stable/controlled, continue same medication. Will evaluate again next visit         4. Bipolar 1 disorder (HCC)  Assessment & Plan:  -stable/controlled, continue same medication. Will evaluate again next visit         5. Stage 3b chronic kidney disease (HCC)    6. Iron deficiency  -     Iron Panel (Includes Ferritin, Iron Sat%, Iron, and TIBC)    7. B12 deficiency  -     Vitamin B12    8. Vitamin D deficiency  -     Vitamin D 25 hydroxy    9. Mixed hyperlipidemia  -     Lipid Panel with Direct LDL reflex       Angiomyolipoma.  She was reassured that the masses are benign.    Hypertension.  Her blood pressure is currently low.   I instructed her to  morning dose of Doxazosin will be withheld.   I instructed her to monitor her blood pressure 3 times daily.  I educate her how important to routinely monitor blood pressure.    Congestion of ears   I advised her to apply baby oil or lubricant for alleviating sensations of dryness or fullness.   I instructed her to utilize a \"neti pot\" in the morning and administer Flonase.    Blurry vision.  This has resolved.   We will continue to monitor her.    Follow-up  The patient will follow up in 4 months.    Subjective:      Patient ID: Cassia Turcios is a 73 y.o. female who presents for evaluation of multiple medical concerns.  She consents to the use of ROCK.    Current cancer treatment regimen.  She reports overall sense of well-being.  She has lymphoma and has been using Lynparza.   She is scheduled to see Dr. Sawyer, her oncologist, on Thursday.   Her blood count is better, but her red blood cells are still low, which they have always been low.   She has completed all of her infusions.   Her last infusion was in 12/2023.   She has " added vitamin D, vitamin C, and B12 to her vitamins.    Renal issue.  She saw Dr. Carr, her nephrologist, for her recurrent UTIs.   Her urine culture was positive approximately 1 week ago.   She denies any symptoms.   She saw Dr. Peterson, her urologist, who told her to follow up in 1 year with her annual exam.   She was on 2 salt tablets in the morning, 1 in the afternoon, and 1 at night in the hospital, but her doctor changed it to 1 in the morning and 1 at night.   Her sodium levels have always been on the low side.   She has been taking 1 in the morning and 1 at night for approximately 2 months.    Blood pressure issue.  She discontinued her antihypertensive medication last week and has been monitoring her blood pressure 1 time or 2 times per day.   She ceased morning administration due to episodes of lightheadedness.   She is curious about potential reductions in her blood pressure.   She is on a regimen of doxazosin 2 mg, administered twice daily.   She omitted her blood pressure medication both this morning and the previous day, but continues to check her blood pressure 3 times daily.    Ear issue.  She would like to have her ears checked.   Her ears feel very clogged. She has an appointment with her ENT on Thursday for her nose. She has been using a neti pot 3 times a day, which makes her head feel better.   She is on over 300 mg of Seroquel, but she still finds herself wide awake when she wakes up.   Some nights are better than others.    Laboratory studies.  Her blood count is better, but her red blood cells are still low.   Her red blood count has always been low.   She has added vitamin D, vitamin C, and B12 to her vitamins.    Cardiovascular concern.  She was on 2 salt tablets in the morning, 1 in the afternoon, and 1 at night in the hospital, but her doctor changed it to 1 in the morning and 1 at night.   Her sodium levels have always been on the low side.   Her heart rate has gone down when she does  not take her second pill in the morning.   She is on Brilinta.  Her blood count is better.   Her red blood cells are still low, but they have always been low.    Health maintenance.  She has recurrent pneumonia.  She lost approximately 11.5 pounds, but she has gained back 3.5 pounds. She is leaning on bread and pasta.  She has a fatty mass by her kidney.  She has blurry vision. She had a ring on her eye for 3 days, which was removed, and she was able to see well, but it did not last long for 2 to 3 weeks.   She is going to see a cornea specialist tomorrow.   She has an appointment with the retina specialist for a follow-up.    The following portions of the patient's history were reviewed and updated as appropriate: allergies, current medications, past family history, past medical history, past social history, past surgical history and problem list.    Review of Systems   Constitutional: Negative for fever and unexpected weight change.  HENT: Negative for nosebleeds and trouble swallowing.  Eyes: Negative for visual disturbance.  Respiratory: Negative for chest tightness and shortness of breath.  Cardiovascular: Negative for chest pain, palpitations and leg swelling.  Gastrointestinal: Negative for abdominal pain, constipation, diarrhea and nausea.  Endocrine: Negative for cold intolerance.  Genitourinary: Negative for dysuria and urgency.  Musculoskeletal: Negative for joint swelling and myalgias.  Skin: Negative for rash.  Neurological: Negative for tremors, seizures and syncope.  Hematological: Does not bruise/bleed easily.  Psychiatric/Behavioral: Negative for hallucinations and suicidal ideas.        Objective:     /72 (BP Location: Left arm, Patient Position: Sitting, Cuff Size: Standard)   Pulse 78   Resp 16   Ht 5' (1.524 m)   Wt 64.9 kg (143 lb)   LMP  (LMP Unknown)   SpO2 98%   BMI 27.93 kg/m²    Physical Exam  Vitals and nursing note reviewed.  Constitutional:     Appearance:  Well-developed.  HENT:     Head: Normocephalic and atraumatic.  Cardiovascular:     Rate and Rhythm: Normal rate and regular rhythm.     Heart sounds: Normal heart sounds. No murmur heard.  Pulmonary:     Effort: Pulmonary effort is normal.     Breath sounds: Normal breath sounds. No wheezing or rales.  Abdominal:     General: Bowel sounds are normal. There is no distension.     Palpations: Abdomen is soft.     Tenderness: There is no abdominal tenderness.  Musculoskeletal:     General: No tenderness. Normal range of motion.     Cervical back: Normal range of motion and neck supple.  Lymphadenopathy:     Cervical: No cervical adenopathy.  Skin:     General: Skin is warm and dry.     Capillary Refill: Capillary refill takes less than 2 seconds.     Findings: No rash.  Neurological:     Mental Status: Alert and oriented to person, place, and time.     Cranial Nerves: No cranial nerve deficit.     Sensory: No sensory deficit.     Motor: No abnormal muscle tone.  Psychiatric:     Behavior: Behavior normal.     Thought Content: Thought content normal.     Judgment: Judgment normal.      No visits with results within 2 Week(s) from this visit.   Latest known visit with results is:   Ancillary Orders on 01/05/2024   Component Date Value    Urine Culture 01/12/2024 >100,000 cfu/ml Escherichia coli (A)      I have personally reviewed results with the patient.    Imaging  CAT scan with contrast showed multiple stable pulmonary nodules of 3 mm or smaller were observed, along with near complete resolution of ground glass opacities throughout the lungs.   No lymphadenopathy was detected.   A left lower pole renal lesion containing microscopic fat was noted, which has shown enlargement over 2018, consistent with an angiomyolipoma.          Julio Bhat MD   [facility]     Transcribed for Julio Bhat MD, by Dedrick Villagomez on 01/26/24 at 10:17 AM. Powered by Dragon [employer].

## 2024-01-25 ENCOUNTER — OFFICE VISIT (OUTPATIENT)
Dept: HEMATOLOGY ONCOLOGY | Facility: CLINIC | Age: 74
End: 2024-01-25
Payer: COMMERCIAL

## 2024-01-25 VITALS
OXYGEN SATURATION: 99 % | TEMPERATURE: 97.8 F | RESPIRATION RATE: 17 BRPM | DIASTOLIC BLOOD PRESSURE: 82 MMHG | SYSTOLIC BLOOD PRESSURE: 120 MMHG | HEIGHT: 60 IN | HEART RATE: 83 BPM | BODY MASS INDEX: 27.48 KG/M2 | WEIGHT: 140 LBS

## 2024-01-25 DIAGNOSIS — Z98.84 H/O BARIATRIC SURGERY: ICD-10-CM

## 2024-01-25 DIAGNOSIS — D52.0 DIETARY FOLATE DEFICIENCY ANEMIA: ICD-10-CM

## 2024-01-25 DIAGNOSIS — C83.00 SMALL LYMPHOCYTIC LYMPHOMA (HCC): Primary | ICD-10-CM

## 2024-01-25 PROCEDURE — 99214 OFFICE O/P EST MOD 30 MIN: CPT | Performed by: INTERNAL MEDICINE

## 2024-01-26 PROBLEM — R19.4 CHANGE IN BOWEL HABITS: Status: RESOLVED | Noted: 2017-02-25 | Resolved: 2024-01-26

## 2024-01-26 PROBLEM — R60.0 PERIORBITAL EDEMA: Status: RESOLVED | Noted: 2017-03-20 | Resolved: 2024-01-26

## 2024-01-26 PROBLEM — S92.352A CLOSED FRACTURE OF BASE OF FIFTH METATARSAL BONE OF LEFT FOOT: Status: RESOLVED | Noted: 2018-05-30 | Resolved: 2024-01-26

## 2024-01-26 PROBLEM — Z98.84 H/O BARIATRIC SURGERY: Status: RESOLVED | Noted: 2020-08-06 | Resolved: 2024-01-26

## 2024-01-26 PROBLEM — R77.8 ELEVATED TOTAL PROTEIN: Status: RESOLVED | Noted: 2023-11-08 | Resolved: 2024-01-26

## 2024-01-26 PROBLEM — E66.3 OVERWEIGHT: Status: RESOLVED | Noted: 2020-08-06 | Resolved: 2024-01-26

## 2024-01-26 PROBLEM — D64.9 ANEMIA: Status: RESOLVED | Noted: 2017-09-14 | Resolved: 2024-01-26

## 2024-01-26 PROBLEM — D17.9 ANGIOMYOLIPOMA: Status: ACTIVE | Noted: 2024-01-26

## 2024-01-26 PROBLEM — I16.0 HYPERTENSIVE URGENCY: Status: RESOLVED | Noted: 2023-10-07 | Resolved: 2024-01-26

## 2024-01-26 PROBLEM — R23.3 PETECHIAL RASH: Status: RESOLVED | Noted: 2021-09-19 | Resolved: 2024-01-26

## 2024-01-29 ENCOUNTER — TELEPHONE (OUTPATIENT)
Dept: NEPHROLOGY | Facility: CLINIC | Age: 74
End: 2024-01-29

## 2024-01-29 NOTE — TELEPHONE ENCOUNTER
I called the patient and we spoke over the phone.   She had questions regarding her recent abnormal CT findings.   She was referred to see urology by Dr. Schilling.   I explained to her the difference between nephrology and urology.   I explained that I agree with Dr. Schilling with seeing urology since there was an increase in the size of the L lower pole AML.   She will contact urology and make the appt.

## 2024-01-30 ENCOUNTER — TELEPHONE (OUTPATIENT)
Dept: UROLOGY | Facility: CLINIC | Age: 74
End: 2024-01-30

## 2024-01-30 ENCOUNTER — OFFICE VISIT (OUTPATIENT)
Dept: UROLOGY | Facility: CLINIC | Age: 74
End: 2024-01-30
Payer: COMMERCIAL

## 2024-01-30 VITALS
HEIGHT: 60 IN | SYSTOLIC BLOOD PRESSURE: 118 MMHG | BODY MASS INDEX: 28.27 KG/M2 | WEIGHT: 144 LBS | DIASTOLIC BLOOD PRESSURE: 70 MMHG | HEART RATE: 73 BPM | RESPIRATION RATE: 14 BRPM | OXYGEN SATURATION: 96 %

## 2024-01-30 DIAGNOSIS — N39.0 RECURRENT UTI: Primary | ICD-10-CM

## 2024-01-30 DIAGNOSIS — D17.9 ANGIOMYOLIPOMA: ICD-10-CM

## 2024-01-30 PROCEDURE — 99214 OFFICE O/P EST MOD 30 MIN: CPT | Performed by: UROLOGY

## 2024-01-30 NOTE — PROGRESS NOTES
Assessment/Plan:    Angiomyolipoma  The patient has a very likely left lower pole AML.  Based on the small size I think this can be observed.  We did discuss there is a risk for bleeding but usually not until lesions are greater than 4 cm in size.  Radiology suggested role for MRI but I do not think this is necessary especially as she will get repeat imaging in 6 months for her history of lymphoma.  Will see her back after the imaging to review and if the lesion is stable then plan for follow-up in a year    Recurrent UTI  The patient has had several asymptomatic urinary tract infections.  We discussed this at length.  I do not think she should have asymptomatic infections treated could lead to antibiotic resistance and she already has a significant allergy to fluoroquinolones.    I do recommend she increase her fluid intake which started as a pretty good job with but could perhaps improve further.    We have previously discussed the role for a vaginal estrogen cream to help reduce the risk for infections occurring but she wants to hold off          Subjective:      Patient ID: Cassia Turcios is a 73 y.o. female.    HPI     Cassia Turcios is a 73 y.o. female with recurrent but essentially asymptomatic UTIs.      Patient reports that she has had several urine cultures performed which show evidence of infection although she does not have symptoms associated with these clearing no fevers or chills or pain.  Unclear why she was having urine cultures performed.     She has had imaging in the form of a renal bladder ultrasound in December 2020 was unremarkable.    Bladder scan in November 2022 was 0 cc.     Jan 24: e coli  May 23: Klebsiella  Nov 22: Klebsiella  Feb 21: E Coli     Denies any prior  surgical manipulation.    The patient had a CT scan January 2020 for which showed a 2 cm left lower pole lesion with macroscopic fat which has grown in size since 2018 consistent with AML.     Medical comorbidities include  Lupus, GERD, hypothyroidism, pneumonia, prior gastric bypass, bipolar.      She has a significant allergy to fluoroquinolones    Past Surgical History:   Procedure Laterality Date    BREAST BIOPSY Right     many years ago at [facility]    COLONOSCOPY      EYE SURGERY      FRACTURE SURGERY Right     elbow    GASTRIC BYPASS      HYSTERECTOMY Bilateral 1975    IR BIOPSY LYMPH NODE  10/08/2020    LUNG BIOPSY      LYMPH NODE BIOPSY  09/18/2020    LYMPH NODE BIOPSY Left 08/28/2023    Procedure: EXCISION BIOPSY LYMPH NODE LEFT AXILLARY;  Surgeon: Scott Seaman MD;  Location: [facility];  Service: Thoracic    AK [facility] INCL FLUOR GDNCE DX W/CELL WASHG SPX N/A 12/08/2017    Procedure: BRONCHOSCOPY;  Surgeon: Chepe Luna MD;  Location: [facility];  Service: Pulmonary    AK BX/EXC LYMPH NODE NEEDLE SUPERFICIAL Left 02/25/2021    Procedure: EXCISION BIOPSY LYMPH NODE: left axillary lymph node biopsy;  Surgeon: Dakota Heard MD;  Location: [facility];  Service: Thoracic    TONSILLECTOMY          Past Medical History:   Diagnosis Date    Acute kidney injury superimposed on chronic kidney disease  09/20/2021    Age-related osteoporosis without current pathological fracture 06/01/2023    Allergic rhinitis     Anemia     Anxiety     Bipolar 1 disorder (HCC)     Constipation     Deaf     Pt lost all hearing in right ear after having [place] measles    Depression     Diarrhea     Fatigue     GERD (gastroesophageal reflux disease) 08/01/2020    Hard of hearing     Pt wears a hearing in left ear.    HL (hearing loss)     Hypertension 10/07/2023    Hypothyroidism     Lung nodule     Nasal congestion     Osteoporosis     Pneumonia 02/10/2017    Pneumonia due to Streptococcus (HCC) 09/19/2021    Psychiatric disorder     Recurrent UTI 06/05/2023    Sjogren's syndrome (HCC)     Small lymphocytic lymphoma (HCC) 09/18/2023    Systemic lupus erythematosus (HCC)     Wears glasses     Wears partial dentures              Review  "of Systems   Constitutional:  Negative for chills and fever.   HENT:  Negative for ear pain and sore throat.    Eyes:  Negative for pain and visual disturbance.   Respiratory:  Negative for cough and shortness of breath.    Cardiovascular:  Negative for chest pain and palpitations.   Gastrointestinal:  Negative for abdominal pain and vomiting.   Genitourinary:  Negative for dysuria and hematuria.   Musculoskeletal:  Negative for arthralgias and back pain.   Skin:  Negative for color change and rash.   Neurological:  Negative for seizures and syncope.   All other systems reviewed and are negative.        Objective:      /70 (BP Location: Left arm, Patient Position: Sitting, Cuff Size: Standard)   Pulse 73   Resp 14   Ht 5' (1.524 m)   Wt 65.3 kg (144 lb)   LMP  (LMP Unknown)   SpO2 96%   BMI 28.12 kg/m²     No results found for: \"PSA\"       Physical Exam  Vitals reviewed.   Constitutional:       General: She is not in acute distress.     Appearance: Normal appearance. She is not ill-appearing, toxic-appearing or diaphoretic.   HENT:      Head: Normocephalic and atraumatic.   Eyes:      Extraocular Movements: Extraocular movements intact.      Pupils: Pupils are equal, round, and reactive to light.   Pulmonary:      Effort: Pulmonary effort is normal.   Abdominal:      General: Abdomen is flat. There is no distension.      Palpations: Abdomen is soft. There is no mass.      Tenderness: There is no abdominal tenderness. There is no right CVA tenderness, left CVA tenderness, guarding or rebound.      Hernia: No hernia is present.   Musculoskeletal:      Right lower leg: No edema.      Left lower leg: No edema.   Skin:     General: Skin is warm.   Neurological:      General: No focal deficit present.      Mental Status: She is alert and oriented to person, place, and time. Mental status is at baseline.   Psychiatric:         Mood and Affect: Mood normal.         Behavior: Behavior normal.         Thought " Content: Thought content normal.         I personally reviewed the patient's CT scan images.  I appreciate a 2 cm left lower pole posterior lesion almost entirely fat filled consistent with AML.    CT CHEST AND ABDOMEN WITH IV CONTRAST     INDICATION: C83.00: Small cell B-cell lymphoma, unspecified site. .     COMPARISON: 10/7/2023     TECHNIQUE: CT examination of the chest and abdomen was performed. Multiplanar 2D reformatted images were created from the source data.     This examination, like all CT scans performed in the [facility], was performed utilizing techniques to minimize radiation dose exposure, including the use of iterative reconstruction and automated exposure control. Radiation dose length   product (DLP) for this visit: 366 mGy-cm     IV Contrast: 85 mL of iohexol (OMNIPAQUE)  Enteric Contrast: Administered.     FINDINGS:     CHEST     LUNGS: Multiple bilateral pulmonary nodules measuring up to 3 mm are stable from the prior study. The previous right middle lobe consolidation has nearly resolved. The remaining scattered groundglass opacities have largely resolved. Stable linear   atelectasis in the lingula. Scattered thin-walled cysts throughout the lungs.     PLEURA: Unremarkable.     HEART/GREAT VESSELS: Heart is unremarkable for patient's age. No thoracic aortic aneurysm.     MEDIASTINUM AND KACEY: Scattered nonpathologically enlarged paratracheal and subcarinal lymph nodes.     CHEST WALL AND LOWER NECK: Small bilateral axillary lymph nodes with the largest in the right measuring 15 x 9 mm with a fatty hilum.     ABDOMEN     LIVER/BILIARY TREE: Unremarkable.     GALLBLADDER: No calcified gallstones. No pericholecystic inflammatory change.     SPLEEN: Unremarkable.     PANCREAS: Unremarkable.     ADRENAL GLANDS: Unremarkable.     KIDNEYS/VISUALIZED URETERS: Simple renal cyst(s). Otherwise unremarkable kidneys. No hydronephrosis. Stable bilateral renal cysts. 22 x 22 mm exophytic  left lower pole lesion with Hounsfield units of -34 consistent with fat and an angiomyolipoma. This   was present in retrospect on prior images, but not well-defined. The lesion measured 12 x 11 mm on the study from 6/17/2018.     STOMACH AND VISUALIZED BOWEL: Postoperative changes from gastric surgery.     ABDOMINAL CAVITY: Fritz hepatis lymph nodes measuring 8 mm in short axis diameter. Scattered small retroperitoneal lymph nodes.     VESSELS: Unremarkable for patient's age.     BONES: No acute fracture or suspicious osseous lesion. Stable hemangioma in T7.     IMPRESSION:     Multiple stable 3 mm or smaller pulmonary nodules. Based on current Fleischner Society 2017 Guidelines on incidental pulmonary nodule, patients with a known malignancy are at increased risk of metastasis and should receive follow-up CT at intervals   appropriate for the type of cancer and its risk of pulmonary metastases.     Near complete resolution of the previously described groundglass densities throughout the lungs.     No lymphadenopathy.     22 x 22 mm left lower pole renal lesion containing macroscopic fat which has demonstrated enlargement since 2018 and was not present on the study from 11/10/2005 consistent with an angiomyolipoma. Given its enlargement over time, consider MRI with   gadolinium for further evaluation.    Orders  No orders of the defined types were placed in this encounter.

## 2024-01-30 NOTE — TELEPHONE ENCOUNTER
Please call patient in April if we have a new AP. Wants to go with someone new if possible.         Provider note:      Return for fu with AP in oct 2024.

## 2024-01-30 NOTE — ASSESSMENT & PLAN NOTE
The patient has had several asymptomatic urinary tract infections.  We discussed this at length.  I do not think she should have asymptomatic infections treated could lead to antibiotic resistance and she already has a significant allergy to fluoroquinolones.    I do recommend she increase her fluid intake which started as a pretty good job with but could perhaps improve further.    We have previously discussed the role for a vaginal estrogen cream to help reduce the risk for infections occurring but she wants to hold off

## 2024-01-30 NOTE — ASSESSMENT & PLAN NOTE
The patient has a very likely left lower pole AML.  Based on the small size I think this can be observed.  We did discuss there is a risk for bleeding but usually not until lesions are greater than 4 cm in size.  Radiology suggested role for MRI but I do not think this is necessary especially as she will get repeat imaging in 6 months for her history of lymphoma.  Will see her back after the imaging to review and if the lesion is stable then plan for follow-up in a year

## 2024-02-09 ENCOUNTER — APPOINTMENT (OUTPATIENT)
Dept: LAB | Facility: CLINIC | Age: 74
End: 2024-02-09
Payer: COMMERCIAL

## 2024-02-09 DIAGNOSIS — C83.00 SMALL LYMPHOCYTIC LYMPHOMA (HCC): ICD-10-CM

## 2024-02-09 DIAGNOSIS — Z98.84 H/O BARIATRIC SURGERY: ICD-10-CM

## 2024-02-09 DIAGNOSIS — D52.0 DIETARY FOLATE DEFICIENCY ANEMIA: ICD-10-CM

## 2024-02-09 LAB
25(OH)D3 SERPL-MCNC: 31.9 NG/ML (ref 30–100)
CHOLEST SERPL-MCNC: 149 MG/DL
FERRITIN SERPL-MCNC: 175 NG/ML (ref 11–307)
FOLATE SERPL-MCNC: >22.3 NG/ML
HDLC SERPL-MCNC: 52 MG/DL
IRON SATN MFR SERPL: 69 % (ref 15–50)
IRON SERPL-MCNC: 148 UG/DL (ref 50–212)
LDLC SERPL CALC-MCNC: 81 MG/DL (ref 0–100)
TIBC SERPL-MCNC: 214 UG/DL (ref 250–450)
TRIGL SERPL-MCNC: 78 MG/DL
UIBC SERPL-MCNC: 66 UG/DL (ref 155–355)
VIT B12 SERPL-MCNC: 429 PG/ML (ref 180–914)

## 2024-02-09 PROCEDURE — 82746 ASSAY OF FOLIC ACID SERUM: CPT

## 2024-02-14 ENCOUNTER — TELEPHONE (OUTPATIENT)
Dept: FAMILY MEDICINE CLINIC | Facility: CLINIC | Age: 74
End: 2024-02-14

## 2024-02-14 NOTE — TELEPHONE ENCOUNTER
----- Message from Julio Bhat MD sent at 2/14/2024  8:47 AM EST -----  I believe this is really just a colonization as opposed to this being an infection I would advise not treating at this point as we would not want to create any resistance we already have some resistance with Bactrim tetracycline Levaquin and ampicillin already seen so far we do not really want to create more problems by treating with antibiotics again

## 2024-02-21 PROBLEM — N39.0 RECURRENT UTI: Status: RESOLVED | Noted: 2023-06-05 | Resolved: 2024-02-21

## 2024-03-04 ENCOUNTER — TELEPHONE (OUTPATIENT)
Dept: NEPHROLOGY | Facility: CLINIC | Age: 74
End: 2024-03-04

## 2024-03-04 ENCOUNTER — APPOINTMENT (OUTPATIENT)
Dept: LAB | Facility: CLINIC | Age: 74
End: 2024-03-04
Payer: COMMERCIAL

## 2024-03-04 DIAGNOSIS — C83.00 SMALL LYMPHOCYTIC LYMPHOMA (HCC): ICD-10-CM

## 2024-03-04 LAB
ALBUMIN SERPL BCP-MCNC: 3.7 G/DL (ref 3.5–5)
ALP SERPL-CCNC: 67 U/L (ref 34–104)
ALT SERPL W P-5'-P-CCNC: 37 U/L (ref 7–52)
ANION GAP SERPL CALCULATED.3IONS-SCNC: 2 MMOL/L
AST SERPL W P-5'-P-CCNC: 35 U/L (ref 13–39)
BASOPHILS # BLD AUTO: 0.11 THOUSANDS/ÂΜL (ref 0–0.1)
BASOPHILS NFR BLD AUTO: 2 % (ref 0–1)
BILIRUB SERPL-MCNC: 0.59 MG/DL (ref 0.2–1)
BUN SERPL-MCNC: 12 MG/DL (ref 5–25)
C3 SERPL-MCNC: 96 MG/DL (ref 87–200)
C4 SERPL-MCNC: 16 MG/DL (ref 19–52)
CALCIUM SERPL-MCNC: 8 MG/DL (ref 8.4–10.2)
CHLORIDE SERPL-SCNC: 101 MMOL/L (ref 96–108)
CO2 SERPL-SCNC: 28 MMOL/L (ref 21–32)
CREAT SERPL-MCNC: 0.77 MG/DL (ref 0.6–1.3)
CRP SERPL QL: 1.5 MG/L
EOSINOPHIL # BLD AUTO: 0.09 THOUSAND/ÂΜL (ref 0–0.61)
EOSINOPHIL NFR BLD AUTO: 2 % (ref 0–6)
ERYTHROCYTE [DISTWIDTH] IN BLOOD BY AUTOMATED COUNT: 12.6 % (ref 11.6–15.1)
ERYTHROCYTE [SEDIMENTATION RATE] IN BLOOD: 77 MM/HOUR (ref 0–29)
GFR SERPL CREATININE-BSD FRML MDRD: 77 ML/MIN/1.73SQ M
GLUCOSE P FAST SERPL-MCNC: 88 MG/DL (ref 65–99)
HCT VFR BLD AUTO: 37.4 % (ref 34.8–46.1)
HGB BLD-MCNC: 12.2 G/DL (ref 11.5–15.4)
IGA SERPL-MCNC: 484 MG/DL (ref 66–433)
IGG SERPL-MCNC: 3303 MG/DL (ref 635–1741)
IGM SERPL-MCNC: 239 MG/DL (ref 45–281)
IMM GRANULOCYTES # BLD AUTO: 0.01 THOUSAND/UL (ref 0–0.2)
IMM GRANULOCYTES NFR BLD AUTO: 0 % (ref 0–2)
LYMPHOCYTES # BLD AUTO: 3.59 THOUSANDS/ÂΜL (ref 0.6–4.47)
LYMPHOCYTES NFR BLD AUTO: 59 % (ref 14–44)
MCH RBC QN AUTO: 32.6 PG (ref 26.8–34.3)
MCHC RBC AUTO-ENTMCNC: 32.6 G/DL (ref 31.4–37.4)
MCV RBC AUTO: 100 FL (ref 82–98)
MONOCYTES # BLD AUTO: 0.45 THOUSAND/ÂΜL (ref 0.17–1.22)
MONOCYTES NFR BLD AUTO: 8 % (ref 4–12)
NEUTROPHILS # BLD AUTO: 1.72 THOUSANDS/ÂΜL (ref 1.85–7.62)
NEUTS SEG NFR BLD AUTO: 29 % (ref 43–75)
NRBC BLD AUTO-RTO: 0 /100 WBCS
PLATELET # BLD AUTO: 230 THOUSANDS/UL (ref 149–390)
PMV BLD AUTO: 10.5 FL (ref 8.9–12.7)
POTASSIUM SERPL-SCNC: 3.9 MMOL/L (ref 3.5–5.3)
PROT SERPL-MCNC: 8.6 G/DL (ref 6.4–8.4)
RBC # BLD AUTO: 3.74 MILLION/UL (ref 3.81–5.12)
SODIUM SERPL-SCNC: 131 MMOL/L (ref 135–147)
WBC # BLD AUTO: 5.97 THOUSAND/UL (ref 4.31–10.16)

## 2024-03-04 NOTE — PROGRESS NOTES
Assessment and Plan:   Ms. Turcios is a 73-year-old female with history significant for systemic lupus erythematosus and Sjogren's syndrome diagnosed in her early 40s [place]ology [place] who presents for a follow-up.  She is currently on hydroxychloroquine 200 mg twice daily and pilocarpine 5 mg twice daily.     # SLE and Sjogren's syndrome  # Sjogren's related ILD  - Cassia presents today for a follow-up of systemic lupus erythematosus and Sjogren's syndrome which has primarily presented with chronic fatigue, sicca complaints, the recurrent lower extremity skin rash (stable recently) and likely the Sjogren's syndrome related interstitial lung disease.  She has been maintained on hydroxychloroquine 200 mg twice daily and I advised her to continue regular follow ups with Ophthalmology for visual field testing.  She will also continue the pilocarpine 5 mg twice daily for the progressively worsening dry eyes/mouth and follow up regularly with her ophthalmologist and dentist for local measures.      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  As she has not experienced progressive respiratory complaints we discussed holding off on any specific treatment for the interstitial lung disease.    - In regards to the lower extremity skin rash which may be hypergammaglobulinemic purpura related to the Sjogren's syndrome she was advised by Dermatology that this can be a benign skin rash and does not need to be treated.  We will monitor for now as the rash usually resolves spontaneously and has not occurred recently.    # Osteoporosis  - She was started on IV zoledronic acid infusions on 7/7/2023.  She will continue this on an annual basis  for a total of 3-6 doses.  Oral bisphosphonates will be avoided given a history of GERD and postgastrectomy malabsorption.  She should continue with daily calcium and vitamin D supplements and attempt weightbearing exercises.  A DEXA scan can be updated in 3/2025.    # CLL/SLL  - This was diagnosed on a left axillary lymph node biopsy performed in 8/2023 as it was progressively enlarging.  She is following regularly with hematology/oncology and was started on Brukinsa.      Plan:  Diagnoses and all orders for this visit:    Systemic lupus erythematosus, unspecified SLE type, unspecified organ involvement status (HCC)  -     CBC and differential; Future  -     Comprehensive metabolic panel; Future  -     C-reactive protein; Future  -     Sedimentation rate, automated; Future  -     C4 complement; Future  -     C3 complement; Future  -     Anti-DNA antibody, double-stranded; Future  -     Urinalysis with microscopic; Future  -     Protein / creatinine ratio, urine; Future    Sjogren's syndrome, with unspecified organ involvement (HCC)  -     CBC and differential; Future  -     Comprehensive metabolic panel; Future  -     C-reactive protein; Future  -     Sedimentation rate, automated; Future  -     C4 complement; Future  -     C3 complement; Future  -     Anti-DNA antibody, double-stranded; Future  -     Urinalysis with microscopic; Future  -     Protein / creatinine ratio, urine; Future    Lymphocytic interstitial pneumonia (HCC)    Long-term use of Plaquenil    CLL (chronic lymphocytic leukemia) (HCC)    Bilateral primary osteoarthritis of knee    Age-related osteoporosis without current pathological fracture    Long term use of bisphosphonates      Activities as tolerated.   Exercise: try to maintain a low impact exercise regimen as much as possible.   Continue other medications as prescribed by PCP and other specialists.       RTC in 9 months.        HPI    INITIAL VISIT NOTE (10/2020):  Ms. Turcios is a  70-year-old female with history significant for systemic lupus erythematosus and Sjogren's syndrome diagnosed in her early 40s by Rheumatology in [place], who presents to reestablish with [facility].  She is currently not on DMARDs.  She is referred today by Dr. Bhat for a rheumatology consult.     Patient reports in her early 40s she started to develop various joint pains which eventually led to the diagnosis of systemic lupus erythematosus and Sjogren's syndrome.  She reports surrounding the time of her diagnosis is when the joint pains were most prevalent, but appear to have spontaneously resolved.  She did experience joint pains again a few years later but states that this also resolved and recently she has not had any joint related issues.  At the time of her diagnosis it appears like she was treated with steroids as well as hydroxychloroquine.  She was on the hydroxychloroquine for less than a year at which time it was discontinued due to nausea and other side effects which she cannot recall.  She has never been restarted on the hydroxychloroquine following that.     The timeline of her rheumatology visits is unclear as we do not have her complete prior records.  Based on chart review as well as patient's history, she has been on multiple DMARDs including methotrexate, azathioprine and Benlysta.  She states that the methotrexate and azathioprine were prescribed in the past 10-15 years.  It is unclear what these medications for specifically prescribed for and if they helped with any of her symptoms.  Most recently she was on Benlysta approximately 3 years ago when she was being seen by Dr. Liu.  She states that this helped significantly with how she was feeling overall and her general well-being, as well as significantly helped with the fatigue.  She was on this for about 7-8 months and unfortunately it was discontinued when she turned 67 as her insurance no longer covered it.  I do not  see any of her lupus related labs in our system, but on review of her prior rheumatologist's note from 2015 she appeared to have a positive MALATHI at 1:1280, with positive SSA, SSB and RNP antibodies.  A rheumatoid factor was also elevated at 105 with an ESR of 84.  Her antiphospholipid antibodies were negative.       She states over the years it has primarily been the excessive fatigue that has bothered her and only recently has she been dealing with chronic respiratory symptoms as well as intrathoracic and abdominal lymphadenopathy that has been monitored by Oncology.  In terms of her respiratory symptoms she reports shortness of breath with exertion, usually with walking about a block, but at times she may experience shortness of breath with day-to-day activities.  She also has a chronic productive cough.  On review of her CT chest and abdomen as far back as 2017, she has had abnormal findings concerning for interstitial lung disease as well as the extensive lymphadenopathy.  On her most recent CT chest from September 2020 this showed asymmetric bilateral areas of ground-glass opacities and lung cysts likely representing a spectrum of lymphocytic interstitial pneumonia as well as worsening adenopathy in the mediastinum and bilateral hilar regions.  She did undergo an IR guided biopsy of an axillary lymph node on 10/08/2020 which did not show any evidence of a lymphoproliferative disorder.  The biopsy was not diagnostic.  On her most recent follow-up with Hematology/Oncology, as there are no clear features to suggest an underlying malignancy the plan will be to continue monitoring.  She has not seen pulmonology yet and is actually establishing for her initial appointment tomorrow.  She did undergo a bronchoscopy in December 2017 with cytology negative for malignancy and showing an abundant mixed but predominantly chronic inflammatory infiltrate.  Flow cytometry as well as cultures at that time were unrevealing.    "  As mentioned, she is primarily dealing with the abnormal CT chest and abdomen findings at this time.  Symptom wise she overall appears to be stable.  She does report chronic dry eyes, dry nose and dry mouth which she manages with topical lubricants.  Due to the dry nose as well as recent face masking she has been noticing nose bleeds and feels like there may be a \"hole\" in her nasal septum.  She has not yet established with ENT.  On occasion she may notice a \"butterfly rash\" and states that she is photosensitive but strictly avoids sun exposure and utilizes daily sunscreen.  She has also been experiencing blister-like skin lesions scattered on her extremities, which heal spontaneously.  She has not had any mouth ulcerations in years.  She denies fevers, chills, night sweats, unintentional weight loss, focal alopecia, inflammatory eye disease, psoriasis, swollen glands, pleuritic chest pain, hemoptysis, abdominal pain, vomiting, diarrhea, blood in stools (is up-to-date with a screening colonoscopy), blood clots, miscarriages, Raynaud's, joint pain/swelling/stiffness, renal disease, neurological disease/seizures or family history of autoimmune disease.        2/10/2021:  Patient presents for a follow-up of systemic lupus erythematosus and Sjogren's syndrome.  She is currently on hydroxychloroquine 200 mg twice daily that was started at the last office visit in October 2020.  We reviewed her testing done following the last visit which showed a positive MALATHI 1:1280 speckled pattern with an SSA and SSB antibody greater than 8.  An RNP antibody was 6.8.  A rheumatoid factor was elevated at 80.  An SPEP showed a possible monoclonal gammopathy but this was not clearly determined on serum immunofixation.  An immunoglobulin assay showed hypergammaglobulinemia.  A peripheral flow cytometry showed 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.  A vitamin-D level was low at 13.5.  An ESR and CRP were elevated at 130 and 5.4, " respectively.  The remainder of the MALATHI specificity, C3, C4, antiphospholipid antibody testing, hepatitis panel, HIV, urinalysis, urine protein creatinine ratio, angiotensin-converting enzyme, anti neutrophilic cytoplasmic antibody, CK and anti CCP antibody were unremarkable.  She had a CT chest done yesterday with the results still pending.  She is going to be establishing with thoracic surgery soon to consider the mediastinal lymphadenopathy biopsy.     She reports overall she has been stable in terms of her symptoms.  She mostly describes the dry eyes worse on the right side and unfortunately the Restasis has not been approved by her insurance.  She also reports dry nose and dry mouth.  She will have nose bleeds due to the dryness.  She reports chronic fatigue without any improvement seen with starting the hydroxychloroquine.  She also reports within the past year she has had approximately 4 episodes of a pinpoint, red and itchy skin rash arise on her bilateral lower extremities.  At times she may use topical steroids but has never been prescribed oral steroids for this.  The rash will usually resolve spontaneously within 3-4 days.  She has not been seen by Dermatology for this.  She was recently seen in the emergency room for an occurrence of the skin rash after receiving the COVID vaccine without any specific treatment and states that the rash eventually resolved spontaneously.  There is no rash today.  No other complaints that she describes today.        6/8/2021:  Patient presents for a follow-up of systemic lupus erythematosus and Sjogren's syndrome.  She temporarily suspended the hydroxychloroquine as she thought that this was causing joint pains in her knees and ankles which did subside with holding the hydroxychloroquine.  I did review her labs done following the last office visit which showed an unremarkable CBC, CMP, C3 and C4.  The ESR and CRP were elevated at 81 and 4.2, respectively.  Since the last  office visit she also underwent a left axillary lymph node biopsy which showed an atypical lymphoproliferative disorder.  She is followed up with Hematology/Oncology without any concerns for an underlying malignant process and the plan is to continue monitoring for now.     She continues to experience an intermittent skin rash affecting her bilateral lower extremities which she thought may be related to an allergic reaction from dog fur.  She states that this will happen irrespective of pet exposure.  The rash is usually itchy and at times she may use topical hydrocortisone to help with her symptoms but has not been on oral steroids.  She mentions that the rash will appear and remit spontaneously.  She continues to report the dry eyes and dry mouth which she is able to manage with the pilocarpine.  Other than this no complaints today.        9/8/2021:  Patient presents for a follow-up of systemic lupus erythematosus and Sjogren's syndrome.  She is currently on hydroxychloroquine 200 mg twice daily that she restarted in July.  I reviewed her recently done labs which showed an elevated ESR and CRP of 76 and 6.1, respectively.  A urine protein creatinine ratio was stable at 0.24.  A urinalysis showed concerns for a urinary tract infection and she is following up with her primary care physician for this.  A CBC, CMP, C3, C4 and double-stranded DNA antibody were unremarkable.     She reports overall she has been stable from the last office visit and not reporting any new complaints.  She does continue to feel fatigued and this is her main symptom.  No flare-ups of joint pains, swelling or stiffness.  She reports that the rash on her legs has intermittently appeared and she is scheduled to see Dermatology.     She does follow with Ophthalmology routinely as she is on the hydroxychloroquine.  She is managing the dry eyes and dry mouth with pilocarpine 5 mg 3 times daily and states that this helps.      4/20/2022:  Patient  presents for a follow-up of systemic lupus erythematosus and Sjogren's syndrome.  She is currently on hydroxychloroquine 200 mg twice daily.  I reviewed her recently done labs which showed an elevated ESR and CRP of 105 and 3.9, respectively.  A urine protein creatinine ratio was stable at 0.22.  A urinalysis showed concerns for a urinary tract infection and she is following up with her primary care physician for this.  A CBC, CMP, C3, C4 and double-stranded DNA antibody were unremarkable.    She did also see Dermatology due to the recurrent skin rash on her lower extremities and had a skin biopsy done on 09/21 which showed:  Perivascular mixed inflammatory infiltrate with numerous neutrophils, scattered eosinophils, and focal erythrocyte extravasation (see note).     Note: The histopathologic findings are non-specific. Definite vasculitis is not seen (early or resolving vasculitis cannot be fully excluded; correlation with immuofluorescence studies is advised). In the appropriate clinical context, the histopathologic findings may be compatible with hypergammaglobulinemic purpura of [place]strom, though the histopathologic differential diagnosis includes a hypersensitivity reaction (e.g., to a drug). Clinical pathological correlation is essential. Pathogenic microorganisms are not seen on PAS stain. Multiple levels examined.    Direct immunofluorescence showed weak epidermal particulate staining seen with IgG as can be seen in subacute lupus erythematosus, Sjogren's or mixed connective tissue disease but the overall granular immune deposits at the basement membrane zone are insufficient for a diagnosis of lupus.  The overall vascular staining is weak and indeterminate for vasculitis.  Nonspecific vascular standing can occur at anatomically dependent areas.    She was advised by Dermatology that this could be a benign skin rash related to the Sjogren's syndrome and as it occurs and remits spontaneously no treatment  would be required.    She was also hospitalized with sepsis and pneumonia in September 2021.    She reports over the past few weeks she has noticed more prominent joint pains.  She takes Tylenol once a day which does help her but the effect is not long lasting.    She does follow with Ophthalmology routinely as she is on the hydroxychloroquine.  She reports the pilocarpine 5 mg 3 times daily did help with the dryness initially but is no longer effective.  She is using Systane eye drops and Biotene products but is still experiencing significant dryness issues.    No fevers, unintentional weight loss, ongoing skin rashes, mouth/nose ulcers, swollen glands or pleuritic chest pain.      10/26/2022:  Patient presents for a follow-up of systemic lupus erythematosus and Sjogren's syndrome.  She is currently on hydroxychloroquine 200 mg twice daily.  She is also on prednisone 10 mg once daily as prescribed by pulmonology.  I reviewed her recent labs which showed an elevated ESR of 89.  A C-reactive protein was minimally elevated at 4.3.  A CBC, CMP, C3, C4, double-stranded DNA antibody and urine protein creatinine ratio were unremarkable.      After the last office visit we did try to start her on Benlysta but due to the high cost this was not done.  She will be looking into this again.  She reports that the fatigue continues.  No worsening joint issues and this is manageable in her hands and knees.  Her only new issue is that she was diagnosed with COVID-19 infection on 10/15 but seems to have recovered well.  She was started on Paxlovid but on day 3 developed an allergic reaction so this was discontinued.  She also follows with pulmonology and was started on prednisone 10 mg once daily due to the chronic cough she was experiencing.  This has helped.  She sees Hematology/Oncology annually for monitoring of the atypical lymphoproliferative disorder and the plan is for continued monitoring.    At the last office visit I also  discontinued the pilocarpine and started her on Evoxac which she states has helped.  She is up-to-date with her annual eye exams since she is on the hydroxychloroquine.      5/31/2023:  Patient presents for a follow-up of systemic lupus erythematosus and Sjogren's syndrome.  She is currently on hydroxychloroquine 200 mg twice daily.  I reviewed her recent labs which showed an elevated ESR of 72.  An SPEP showed the possibility of a monoclonal protein so it was sent to [facility] for further evaluation.  An immunoglobulin assay showed hypergammaglobulinemia.  A CBC, CMP, C3, C4, double-stranded DNA antibody and urine protein creatinine ratio were unremarkable.  She also underwent a DEXA scan in March 2023 which showed osteoporosis.  Compared to March 2020 there was a statistically significant decrease in the bone mineral density of the left total hip.  The 10-year risk of hip fracture was 6.4% with a 10-year risk of major osteoporotic fracture of 28%.    She mentions since the last visit she has experienced additional joint pains in her hands, wrists and knees.  Her symptoms are intermittent and she is able to manage them more or less with Tylenol as needed.  She continues to report chronic dry eyes and dry mouth.  She was started on Restasis by her ophthalmologist.  She had been taking the Evoxac 30 mg 3 times daily up until the last visit but she is unsure why recently she has not been taking this.    She is up-to-date with her annual eye exams since she is on the hydroxychloroquine.      3/6/2024:  Patient presents for a follow-up of systemic lupus erythematosus and Sjogren's syndrome.  She is currently on hydroxychloroquine 200 mg twice daily and pilocarpine 5 mg twice daily.  I reviewed her recent labs which continues to show an elevated ESR of 77.  A C4 complement was slightly reduced at 16.  And immunoglobulin assay showed hypergammaglobulinemia.  A CMP, SPEP, cryoglobulins, C3, C-reactive protein and CBC were  unremarkable.    In terms of the Sjogren's syndrome she reports symptoms primarily pertaining to left eye dryness.  The pilocarpine has not helped with this.  The Restasis was discontinued and she was advised to use over-the-counter refresh eyedrops, a compounding eyedrop through her ophthalmologist as well as an ointment to help with the symptoms.  There may be plans in the future to retry intraocular amniotic infusions.  Otherwise she does not report symptoms such as fevers, unintentional weight loss [other than related to starting Brukinsa and this has also improved], skin rash, pleuritic chest pain or progressive joint pains/swelling/stiffness.  She is up-to-date with her annual eye exam since she is on the hydroxychloroquine.    In the interim she was found to have an enlarging left axillary lymph node so she underwent a biopsy of this which showed CLL/SLL.  In view of this she was started on Brukinsa which she initially had difficulty tolerating but reports she is no longer experiencing any prominent side effects.    The following portions of the patient's history were reviewed and updated as appropriate: allergies, current medications, past family history, past medical history, past social history, past surgical history and problem list.      Review of Systems  Constitutional: Negative for weight change, fevers, chills, night sweats.  Positive for fatigue.  ENT/Mouth: Negative for hearing changes, ear pain, nasal congestion, sinus pain, hoarseness, sore throat, rhinorrhea, swallowing difficulty.   Eyes: Negative for pain, redness, discharge, vision changes.   Cardiovascular: Negative for chest pain, palpitations.   Respiratory: Negative for sputum, wheezing.  Positive for shortness of breath and cough.  Gastrointestinal: Negative for nausea, vomiting, diarrhea, constipation, pain, heartburn.  Genitourinary: Negative for dysuria, urinary frequency, hematuria.   Musculoskeletal: As per HPI.  Skin: Negative for  skin rash, color changes.   Neuro: Negative for weakness, numbness, tingling, loss of consciousness.   Psych: Negative for anxiety, depression.   Heme/Lymph: Negative for easy bruising, bleeding, lymphadenopathy.        Past Medical History:   Diagnosis Date    Acute kidney injury superimposed on chronic kidney disease  09/20/2021    Age-related osteoporosis without current pathological fracture 06/01/2023    Allergic rhinitis     Anemia     Anxiety     Bipolar 1 disorder (HCC)     Constipation     Deaf     Pt lost all hearing in right ear after having Ben[place] measles    Depression     Diarrhea     Fatigue     GERD (gastroesophageal reflux disease) 08/01/2020    Hard of hearing     Pt wears a hearing in left ear.    HL (hearing loss)     Hypertension 10/07/2023    Hypothyroidism     Lung nodule     Nasal congestion     Osteoporosis     Pneumonia 02/10/2017    Pneumonia due to Streptococcus (HCC) 09/19/2021    Psychiatric disorder     Recurrent UTI 06/05/2023    Sjogren's syndrome (HCC)     Small lymphocytic lymphoma (HCC) 09/18/2023    Systemic lupus erythematosus (HCC)     Wears glasses     Wears partial dentures        Past Surgical History:   Procedure Laterality Date    BREAST BIOPSY Right     many years ago at [facility]    COLONOSCOPY      EYE SURGERY      FRACTURE SURGERY Right     elbow    GASTRIC BYPASS      HYSTERECTOMY Bilateral 1975    IR BIOPSY LYMPH NODE  10/08/2020    LUNG BIOPSY      LYMPH NODE BIOPSY  09/18/2020    LYMPH NODE BIOPSY Left 08/28/2023    Procedure: EXCISION BIOPSY LYMPH NODE LEFT AXILLARY;  Surgeon: Scott Seaman MD;  Location: [facility];  Service: Thoracic    MD [facility] INCL FLUOR GDNCE DX W/CELL WASHG SPX N/A 12/08/2017    Procedure: BRONCHOSCOPY;  Surgeon: Chepe Luna MD;  Location: [facility];  Service: Pulmonary    MD BX/EXC LYMPH NODE NEEDLE SUPERFICIAL Left 02/25/2021    Procedure: EXCISION BIOPSY LYMPH NODE: left axillary lymph node biopsy;  Surgeon: Dakota  MD Félix;  Location: [facility];  Service: Thoracic    TONSILLECTOMY         Social History     Socioeconomic History    Marital status: /Civil Union     Spouse name: Not on file    Number of children: Not on file    Years of education: Not on file    Highest education level: Not on file   Occupational History    Not on file   Tobacco Use    Smoking status: Never    Smokeless tobacco: Never   Vaping Use    Vaping status: Never Used   Substance and Sexual Activity    Alcohol use: No    Drug use: No    Sexual activity: Not Currently     Partners: Male     Birth control/protection: Female Sterilization     Comment:    Other Topics Concern    Not on file   Social History Narrative    Not on file     Social Determinants of Health     Financial Resource Strain: Low Risk  (7/14/2023)    Overall Financial Resource Strain (CARDIA)     Difficulty of Paying Living Expenses: Not very hard   Food Insecurity: Not on file   Transportation Needs: No Transportation Needs (7/14/2023)    PRAPARE - Transportation     Lack of Transportation (Medical): No     Lack of Transportation (Non-Medical): No   Physical Activity: Not on file   Stress: Not on file   Social Connections: Not on file   Intimate Partner Violence: Not on file   Housing Stability: Not on file       Family History   Problem Relation Age of Onset    Heart disease Mother     Diabetes Mother     Kidney cancer Mother     Cancer Mother         Kidney    Hypertension Mother     Heart disease Father     Stroke Father     Diabetes Father     Cancer Father         Rectal Cancer    Hypertension Father     No Known Problems Maternal Grandmother     No Known Problems Maternal Grandfather     No Known Problems Paternal Grandmother     No Known Problems Paternal Grandfather     Leukemia Half-Sister 50    No Known Problems Half-Sister     No Known Problems Half-Sister     No Known Problems Half-Sister     BRCA2 Positive Neg Hx     BRCA2 Negative Neg Hx     BRCA1  "Negative Neg Hx     Endometrial cancer Neg Hx     Breast cancer Neg Hx     Breast cancer additional onset Neg Hx     Colon cancer Neg Hx     Ovarian cancer Neg Hx     BRCA1 Positive Neg Hx     BRCA 1/2 Neg Hx        Allergies   Allergen Reactions    Ciprofloxacin Hives and Swelling     Pt reports that lips and mouth and face swelled.    Cymbalta [Duloxetine Hcl] Other (See Comments)     Hallucinations, fatigue, faints    Nirmatrelvir-Ritonavir Diarrhea, Nausea Only, Other (See Comments), Dizziness and Lightheadedness     \"passed out\"-nausea    Percocet [Oxycodone-Acetaminophen] Other (See Comments)     Dry mouth - pt states it kept her up all night. States had to continually drink fluids       Current Outpatient Medications:     doxazosin (CARDURA) 2 mg tablet, Take 1 tablet (2 mg total) by mouth 2 (two) times a day (Patient taking differently: Take 2 mg by mouth daily at bedtime), Disp: 180 tablet, Rfl: 1    acetaminophen (TYLENOL) 325 mg tablet, Take 2 tablets (650 mg total) by mouth every 6 (six) hours as needed for mild pain, Disp: , Rfl: 0    buPROPion (WELLBUTRIN) 100 mg tablet, Take 100 mg by mouth 3 (three) times a day., Disp: , Rfl:     Cholecalciferol (D3-50) 1.25 MG (87316 UT) capsule, 5,000 Units daily, Disp: , Rfl:     ferrous sulfate 325 (65 Fe) mg tablet, Take 1 tablet (325 mg total) by mouth 2 (two) times a day with meals, Disp: 60 tablet, Rfl: 0    hydroxychloroquine (PLAQUENIL) 200 mg tablet, Take 1 tablet (200 mg total) by mouth 2 (two) times a day with meals, Disp: 180 tablet, Rfl: 0    Multiple Vitamins-Minerals (CENTRUM MINIS WOMEN 50+ PO), , Disp: , Rfl:     pilocarpine (SALAGEN) 5 mg tablet, Take 1 tablet (5 mg total) by mouth 3 (three) times a day, Disp: 90 tablet, Rfl: 5    QUEtiapine (SEROquel) 300 mg tablet, Take 300 mg by mouth daily at bedtime, Disp: , Rfl:     QUEtiapine (SEROquel) 50 mg tablet, 50 mg daily at bedtime, Disp: , Rfl:     sodium chloride 1 g tablet, Take 1 tablet (1 g " total) by mouth 2 (two) times a day, Disp: 180 tablet, Rfl: 0    temazepam (RESTORIL) 30 mg capsule, Take 2 capsules by mouth daily at bedtime as needed , Disp: , Rfl:     Zanubrutinib (Brukinsa) 80 MG CAPS, Take 4 capsules (320 mg total) by mouth daily, Disp: 120 capsule, Rfl: 11    Current Facility-Administered Medications:     cyanocobalamin injection 1,000 mcg, 1,000 mcg, Intramuscular, Q30 Days, Julio Bhat MD, 1,000 mcg at 12/28/23 1242      Objective:    Vitals:    03/06/24 1458   BP: 118/78   Weight: 66.2 kg (146 lb)   Height: 5' (1.524 m)         Physical Exam  General: Well appearing, well nourished, in no distress. Oriented x 3, normal mood and affect.  Ambulating without difficulty.  Skin: Good turgor, no rash today, unusual bruising or prominent lesions.  Hair: Normal texture and distribution.  Nails: Normal color, no deformities.  HEENT:  Head: Normocephalic, atraumatic.  Eyes: Conjunctiva clear, sclera non-icteric, EOM intact.  Extremities: No amputations or deformities, cyanosis, edema.  Neurologic: Alert and oriented. No focal neurological deficits appreciated.   Psychiatric: Normal mood and affect.       Melanie Chavez M.D.  Rheumatology

## 2024-03-04 NOTE — TELEPHONE ENCOUNTER
Pt advised that sodium level and kidney function of 3/4/24 are stable per Dr. Carr.    ----- Message from Stephane Carr MD sent at 3/4/2024  3:31 PM EST -----  Please inform patient that most recent labs (3/4/24) show that sodium level and kidney function are stable.

## 2024-03-05 LAB
ALBUMIN SERPL ELPH-MCNC: 3.89 G/DL (ref 3.2–5.1)
ALBUMIN SERPL ELPH-MCNC: 45.8 % (ref 48–70)
ALPHA1 GLOB SERPL ELPH-MCNC: 0.25 G/DL (ref 0.15–0.47)
ALPHA1 GLOB SERPL ELPH-MCNC: 2.9 % (ref 1.8–7)
ALPHA2 GLOB SERPL ELPH-MCNC: 0.59 G/DL (ref 0.42–1.04)
ALPHA2 GLOB SERPL ELPH-MCNC: 6.9 % (ref 5.9–14.9)
BETA GLOB ABNORMAL SERPL ELPH-MCNC: 0.37 G/DL (ref 0.31–0.57)
BETA1 GLOB SERPL ELPH-MCNC: 4.4 % (ref 4.7–7.7)
BETA2 GLOB SERPL ELPH-MCNC: 4.5 % (ref 3.1–7.9)
BETA2+GAMMA GLOB SERPL ELPH-MCNC: 0.38 G/DL (ref 0.2–0.58)
GAMMA GLOB ABNORMAL SERPL ELPH-MCNC: 3.02 G/DL (ref 0.4–1.66)
GAMMA GLOB SERPL ELPH-MCNC: 35.5 % (ref 6.9–22.3)
IGG/ALB SER: 0.85 {RATIO} (ref 1.1–1.8)
PROT PATTERN SERPL ELPH-IMP: ABNORMAL
PROT SERPL-MCNC: 8.5 G/DL (ref 6.4–8.2)

## 2024-03-05 PROCEDURE — 84165 PROTEIN E-PHORESIS SERUM: CPT | Performed by: STUDENT IN AN ORGANIZED HEALTH CARE EDUCATION/TRAINING PROGRAM

## 2024-03-06 ENCOUNTER — APPOINTMENT (OUTPATIENT)
Dept: LAB | Facility: CLINIC | Age: 74
End: 2024-03-06
Payer: COMMERCIAL

## 2024-03-06 ENCOUNTER — OFFICE VISIT (OUTPATIENT)
Dept: RHEUMATOLOGY | Facility: CLINIC | Age: 74
End: 2024-03-06

## 2024-03-06 VITALS
DIASTOLIC BLOOD PRESSURE: 78 MMHG | SYSTOLIC BLOOD PRESSURE: 118 MMHG | BODY MASS INDEX: 28.66 KG/M2 | WEIGHT: 146 LBS | HEIGHT: 60 IN

## 2024-03-06 DIAGNOSIS — M32.9 SYSTEMIC LUPUS ERYTHEMATOSUS, UNSPECIFIED SLE TYPE, UNSPECIFIED ORGAN INVOLVEMENT STATUS (HCC): Primary | ICD-10-CM

## 2024-03-06 DIAGNOSIS — M17.0 BILATERAL PRIMARY OSTEOARTHRITIS OF KNEE: ICD-10-CM

## 2024-03-06 DIAGNOSIS — M81.0 AGE-RELATED OSTEOPOROSIS WITHOUT CURRENT PATHOLOGICAL FRACTURE: ICD-10-CM

## 2024-03-06 DIAGNOSIS — J84.2 LYMPHOCYTIC INTERSTITIAL PNEUMONIA (HCC): ICD-10-CM

## 2024-03-06 DIAGNOSIS — C91.10 CLL (CHRONIC LYMPHOCYTIC LEUKEMIA) (HCC): ICD-10-CM

## 2024-03-06 DIAGNOSIS — M35.00 SJOGREN'S SYNDROME, WITH UNSPECIFIED ORGAN INVOLVEMENT (HCC): ICD-10-CM

## 2024-03-06 DIAGNOSIS — Z79.899 LONG-TERM USE OF PLAQUENIL: ICD-10-CM

## 2024-03-06 DIAGNOSIS — Z00.6 ENCOUNTER FOR EXAMINATION FOR NORMAL COMPARISON OR CONTROL IN CLINICAL RESEARCH PROGRAM: ICD-10-CM

## 2024-03-06 DIAGNOSIS — Z79.83 LONG TERM USE OF BISPHOSPHONATES: ICD-10-CM

## 2024-03-08 ENCOUNTER — APPOINTMENT (OUTPATIENT)
Dept: LAB | Facility: CLINIC | Age: 74
End: 2024-03-08

## 2024-03-08 DIAGNOSIS — M35.00 SJOGREN'S SYNDROME, WITH UNSPECIFIED ORGAN INVOLVEMENT (HCC): ICD-10-CM

## 2024-03-08 DIAGNOSIS — Z00.6 ENCOUNTER FOR EXAMINATION FOR NORMAL COMPARISON OR CONTROL IN CLINICAL RESEARCH PROGRAM: ICD-10-CM

## 2024-03-08 DIAGNOSIS — M32.9 SYSTEMIC LUPUS ERYTHEMATOSUS, UNSPECIFIED SLE TYPE, UNSPECIFIED ORGAN INVOLVEMENT STATUS (HCC): ICD-10-CM

## 2024-03-08 LAB — CRYOGLOB SER QL 1D COLD INC: NORMAL

## 2024-03-08 PROCEDURE — 36415 COLL VENOUS BLD VENIPUNCTURE: CPT

## 2024-03-11 DIAGNOSIS — E87.1 HYPONATREMIA: ICD-10-CM

## 2024-03-11 RX ORDER — SODIUM CHLORIDE 1 G/1
1 TABLET ORAL 2 TIMES DAILY
Qty: 180 TABLET | Refills: 0 | Status: SHIPPED | OUTPATIENT
Start: 2024-03-11 | End: 2024-06-09

## 2024-03-28 ENCOUNTER — TELEPHONE (OUTPATIENT)
Dept: HEMATOLOGY ONCOLOGY | Facility: CLINIC | Age: 74
End: 2024-03-28

## 2024-03-28 LAB
APOB+LDLR+PCSK9 GENE MUT ANL BLD/T: NOT DETECTED
BRCA1+BRCA2 DEL+DUP + FULL MUT ANL BLD/T: NOT DETECTED
MLH1+MSH2+MSH6+PMS2 GN DEL+DUP+FUL M: NOT DETECTED

## 2024-03-28 NOTE — TELEPHONE ENCOUNTER
Medical Records Request   Who are you speaking with? Insurance Company   If it is not the patient, are they listed on an active communication consent form? Yes   What is the purpose of this call? Requesting medical records   What records are being requested? Reports   Details/ Additional Info 6/21/2023   Which provider does the patient see? Dr. Schilling   Fax Number   Person's name (Attention:)   [phone]   Facility call back number [phone]    Patient call back number na

## 2024-03-29 ENCOUNTER — TELEPHONE (OUTPATIENT)
Dept: HEMATOLOGY ONCOLOGY | Facility: CLINIC | Age: 74
End: 2024-03-29

## 2024-03-29 NOTE — TELEPHONE ENCOUNTER
Returned call to Radha at [facility].  Requested patient's office note for June of 2023 for her CLL test.  Faxed via iTO[employer] to [phone].  Advised to call back if not received.

## 2024-04-04 ENCOUNTER — OFFICE VISIT (OUTPATIENT)
Dept: NEPHROLOGY | Facility: CLINIC | Age: 74
End: 2024-04-04

## 2024-04-04 VITALS
HEIGHT: 60 IN | SYSTOLIC BLOOD PRESSURE: 120 MMHG | HEART RATE: 75 BPM | OXYGEN SATURATION: 98 % | BODY MASS INDEX: 28.86 KG/M2 | DIASTOLIC BLOOD PRESSURE: 86 MMHG | WEIGHT: 147 LBS

## 2024-04-04 DIAGNOSIS — R80.9 NON-NEPHROTIC RANGE PROTEINURIA: ICD-10-CM

## 2024-04-04 DIAGNOSIS — E87.1 HYPONATREMIA: Primary | ICD-10-CM

## 2024-04-04 NOTE — PROGRESS NOTES
NEPHROLOGY OFFICE PROGRESS NOTE   Cassia Turcios 73 y.o. female MRN: [id number]  DATE: 04/04/24  Reason for visit: Continued evaluation and management of hyponatremia    ASSESSMENT & PLAN:  Hyponatremia:  Baseline Na was low 130s prior to October 2023.   Serum osm is normal indicating iso-osmolar hyponatremia possibly from elevated total protein levels.   However, urine Osm shows significant diluting defect so she could also have a component of hypoosmolar hyponatremia.   Na level has been stable around 131 to 133 over the past 4 months.   Continue salt tabs 1 gm BID.   Continue FR 1500 cc/24 hours.   Continue Ensure 1 can daily.   Continue monthly CMP (ordered by Dr. Schilling)    Hypertension  BP is at goal (<130/80)  Rx: Doxazosin 2 mg OD  No changes.     Elevated total protein  No monoclonal gammopathy on testing.     Proteinuria  Check urine ACR.     CLL/SLL  Followed by Dr. Schilling.   She is on Zanubrutinib.     Patient Instructions   Your sodium level is stable.  I recommend no changes to your current medications.  Continue monthly blood work.  Continue salt tablets and fluid restriction.  Follow-up in 6 months.    SUBJECTIVE / INTERVAL HISTORY:  Cassia was last seen in the office in Nov 2023.   Since that time, there have been no recent hospitalizations or ER visits.  She has been doing fairly well.  She continues to take salt tablets twice a day and is following a 1500 cc fluid restriction.  She denies any acute complaints at this time.  She is able to tolerate 1 can of Ensure a day. She has diarrhea with 2 cans.   Doxazosin is down to 2 mg at bedtime.     PMH/PSH: CLL/SLL, HTN, SLE, Sjogren's disease, bipolar disease, hyponatremia, pneumonia, hysterectomy and oophorectomy, R elbow surgery, LN excisional biopsy.     Previous work up:   11/17/23 SOsm 298, UOsm 454, Zen 80, TSH 6.341, uric acid 3.5, cortisol 16.3, SPEP no monoclonal gammopathy, UPEP no M spike, KL ratio 1.86.     ALLERGIES:   Allergies  "  Allergen Reactions    Ciprofloxacin Hives and Swelling     Pt reports that lips and mouth and face swelled.    Cymbalta [Duloxetine Hcl] Other (See Comments)     Hallucinations, fatigue, faints    Nirmatrelvir-Ritonavir Diarrhea, Nausea Only, Other (See Comments), Dizziness and Lightheadedness     \"passed out\"-nausea    Percocet [Oxycodone-Acetaminophen] Other (See Comments)     Dry mouth - pt states it kept her up all night. States had to continually drink fluids     REVIEW OF SYSTEMS:  Review of Systems   Constitutional:  Negative for appetite change, chills, fatigue and fever.        Gaining weight.    Respiratory:  Negative for cough and shortness of breath.    Cardiovascular:  Negative for chest pain and leg swelling.   Gastrointestinal:  Negative for abdominal pain, diarrhea, nausea and vomiting.   Genitourinary:  Negative for hematuria.   Musculoskeletal:  Negative for arthralgias and back pain.   Allergic/Immunologic: Positive for immunocompromised state.   Neurological:  Positive for light-headedness.     OBJECTIVE:  /86 (BP Location: Left arm, Patient Position: Sitting, Cuff Size: Standard)   Pulse 75   Ht 5' (1.524 m)   Wt 66.7 kg (147 lb)   LMP  (LMP Unknown)   SpO2 98%   BMI 28.71 kg/m²   Current Weight:   There is no height or weight on file to calculate BMI.  Physical Exam  Constitutional:       General: She is not in acute distress.     Appearance: Normal appearance. She is well-developed. She is not ill-appearing or toxic-appearing.   HENT:      Head: Normocephalic and atraumatic.   Eyes:      General: No scleral icterus.     Conjunctiva/sclera: Conjunctivae normal.   Neck:      Vascular: No JVD.   Cardiovascular:      Rate and Rhythm: Normal rate and regular rhythm.      Heart sounds: Normal heart sounds.   Pulmonary:      Effort: Pulmonary effort is normal.      Breath sounds: Normal breath sounds.   Abdominal:      General: Bowel sounds are normal.      Palpations: Abdomen is soft. "   Musculoskeletal:      Right lower leg: No edema.      Left lower leg: No edema.   Skin:     General: Skin is warm and dry.   Neurological:      Mental Status: She is alert and oriented to person, place, and time.   Psychiatric:         Mood and Affect: Mood normal.         Behavior: Behavior normal. Behavior is cooperative.         Judgment: Judgment normal.       Medications:  Current Outpatient Medications:     acetaminophen (TYLENOL) 325 mg tablet, Take 2 tablets (650 mg total) by mouth every 6 (six) hours as needed for mild pain, Disp: , Rfl: 0    buPROPion (WELLBUTRIN) 100 mg tablet, Take 100 mg by mouth 3 (three) times a day., Disp: , Rfl:     Cholecalciferol (D3-50) 1.25 MG (99480 UT) capsule, 5,000 Units daily, Disp: , Rfl:     doxazosin (CARDURA) 2 mg tablet, Take 1 tablet (2 mg total) by mouth 2 (two) times a day (Patient taking differently: Take 2 mg by mouth daily at bedtime), Disp: 180 tablet, Rfl: 1    ferrous sulfate 325 (65 Fe) mg tablet, Take 1 tablet (325 mg total) by mouth 2 (two) times a day with meals, Disp: 60 tablet, Rfl: 0    hydroxychloroquine (PLAQUENIL) 200 mg tablet, Take 1 tablet (200 mg total) by mouth 2 (two) times a day with meals, Disp: 180 tablet, Rfl: 0    Multiple Vitamins-Minerals (CENTRUM MINIS WOMEN 50+ PO), , Disp: , Rfl:     pilocarpine (SALAGEN) 5 mg tablet, Take 1 tablet (5 mg total) by mouth 3 (three) times a day, Disp: 90 tablet, Rfl: 5    QUEtiapine (SEROquel) 300 mg tablet, Take 300 mg by mouth daily at bedtime, Disp: , Rfl:     QUEtiapine (SEROquel) 50 mg tablet, 50 mg daily at bedtime, Disp: , Rfl:     sodium chloride 1 g tablet, Take 1 tablet (1 g total) by mouth 2 (two) times a day, Disp: 180 tablet, Rfl: 0    temazepam (RESTORIL) 30 mg capsule, Take 2 capsules by mouth daily at bedtime as needed , Disp: , Rfl:     Zanubrutinib (Brukinsa) 80 MG CAPS, Take 4 capsules (320 mg total) by mouth daily, Disp: 120 capsule, Rfl: 11    Current Facility-Administered  Medications:     cyanocobalamin injection 1,000 mcg, 1,000 mcg, Intramuscular, Q30 Days, Julio Bhat MD, 1,000 mcg at 12/28/23 1242    Laboratory Results:  Lab Results   Component Value Date    SODIUM 131 (L) 03/04/2024    K 3.9 03/04/2024     03/04/2024    CO2 28 03/04/2024    BUN 12 03/04/2024    CREATININE 0.77 03/04/2024    GLUC 108 10/12/2023    CALCIUM 8.0 (L) 03/04/2024

## 2024-04-04 NOTE — PATIENT INSTRUCTIONS
Your sodium level is stable.  I recommend no changes to your current medications.  Continue monthly blood work.  Continue salt tablets and fluid restriction.  Follow-up in 6 months.

## 2024-04-05 ENCOUNTER — APPOINTMENT (OUTPATIENT)
Dept: LAB | Facility: CLINIC | Age: 74
End: 2024-04-05
Payer: COMMERCIAL

## 2024-04-05 DIAGNOSIS — R80.9 NON-NEPHROTIC RANGE PROTEINURIA: ICD-10-CM

## 2024-04-05 DIAGNOSIS — C83.00 SMALL LYMPHOCYTIC LYMPHOMA (HCC): ICD-10-CM

## 2024-04-05 LAB
BACTERIA UR QL AUTO: ABNORMAL /HPF
BILIRUB UR QL STRIP: NEGATIVE
CLARITY UR: CLEAR
COLOR UR: COLORLESS
CREAT UR-MCNC: 27.7 MG/DL
GLUCOSE UR STRIP-MCNC: NEGATIVE MG/DL
HGB UR QL STRIP.AUTO: NEGATIVE
KETONES UR STRIP-MCNC: NEGATIVE MG/DL
LEUKOCYTE ESTERASE UR QL STRIP: ABNORMAL
MICROALBUMIN UR-MCNC: 8.8 MG/L
MICROALBUMIN/CREAT 24H UR: 32 MG/G CREATININE (ref 0–30)
NITRITE UR QL STRIP: POSITIVE
NON-SQ EPI CELLS URNS QL MICRO: ABNORMAL /HPF
PH UR STRIP.AUTO: 6.5 [PH]
PROT UR STRIP-MCNC: NEGATIVE MG/DL
RBC #/AREA URNS AUTO: ABNORMAL /HPF
SP GR UR STRIP.AUTO: 1.01 (ref 1–1.03)
UROBILINOGEN UR STRIP-ACNC: <2 MG/DL
WBC #/AREA URNS AUTO: ABNORMAL /HPF

## 2024-04-05 PROCEDURE — 81001 URINALYSIS AUTO W/SCOPE: CPT

## 2024-04-05 PROCEDURE — 82570 ASSAY OF URINE CREATININE: CPT

## 2024-04-05 PROCEDURE — 82043 UR ALBUMIN QUANTITATIVE: CPT

## 2024-04-08 ENCOUNTER — TELEPHONE (OUTPATIENT)
Dept: NEPHROLOGY | Facility: CLINIC | Age: 74
End: 2024-04-08

## 2024-04-08 NOTE — TELEPHONE ENCOUNTER
Message left on patient's VM that labns of  4/5/24 show stable urine protein per  Dr. Carr.    ----- Message from Stephane Carr MD sent at 4/8/2024 12:29 AM EDT -----  Please inform patient that most recent labs (4/5/24) show that urinary protein is stable.

## 2024-04-10 ENCOUNTER — TELEPHONE (OUTPATIENT)
Dept: FAMILY MEDICINE CLINIC | Facility: CLINIC | Age: 74
End: 2024-04-10

## 2024-04-10 NOTE — TELEPHONE ENCOUNTER
Pt states she is having no symptoms and usually don't treat unless she has symptoms  She said she has a standing order to just check urine

## 2024-04-10 NOTE — TELEPHONE ENCOUNTER
----- Message from KRYSTIN Cadena sent at 4/10/2024  8:57 AM EDT -----    ----- Message -----  From: Abiola Muller  Sent: 4/10/2024   8:46 AM EDT  To: KRYSTIN Cadena      ----- Message -----  From: Julio Bhat MD  Sent: 4/9/2024  10:11 PM EDT  To: [facility]    Cassia are you going to do a urine culture because you have symptoms?  Or are you going because there is just in order to do one?

## 2024-04-26 ENCOUNTER — APPOINTMENT (OUTPATIENT)
Dept: LAB | Facility: CLINIC | Age: 74
End: 2024-04-26
Payer: COMMERCIAL

## 2024-04-26 DIAGNOSIS — C83.00 SMALL LYMPHOCYTIC LYMPHOMA (HCC): ICD-10-CM

## 2024-04-26 DIAGNOSIS — C83.00 SMALL LYMPHOCYTIC LYMPHOMA (HCC): Primary | ICD-10-CM

## 2024-04-26 LAB
ALBUMIN SERPL BCP-MCNC: 3.8 G/DL (ref 3.5–5)
ALP SERPL-CCNC: 73 U/L (ref 34–104)
ALT SERPL W P-5'-P-CCNC: 23 U/L (ref 7–52)
ANION GAP SERPL CALCULATED.3IONS-SCNC: 6 MMOL/L (ref 4–13)
AST SERPL W P-5'-P-CCNC: 30 U/L (ref 13–39)
BASOPHILS # BLD AUTO: 0.09 THOUSANDS/ÂΜL (ref 0–0.1)
BASOPHILS NFR BLD AUTO: 1 % (ref 0–1)
BILIRUB SERPL-MCNC: 0.6 MG/DL (ref 0.2–1)
BUN SERPL-MCNC: 19 MG/DL (ref 5–25)
CALCIUM SERPL-MCNC: 8.9 MG/DL (ref 8.4–10.2)
CHLORIDE SERPL-SCNC: 103 MMOL/L (ref 96–108)
CO2 SERPL-SCNC: 27 MMOL/L (ref 21–32)
CREAT SERPL-MCNC: 0.88 MG/DL (ref 0.6–1.3)
EOSINOPHIL # BLD AUTO: 0.1 THOUSAND/ÂΜL (ref 0–0.61)
EOSINOPHIL NFR BLD AUTO: 2 % (ref 0–6)
ERYTHROCYTE [DISTWIDTH] IN BLOOD BY AUTOMATED COUNT: 13.4 % (ref 11.6–15.1)
GFR SERPL CREATININE-BSD FRML MDRD: 66 ML/MIN/1.73SQ M
GLUCOSE P FAST SERPL-MCNC: 88 MG/DL (ref 65–99)
HCT VFR BLD AUTO: 37.9 % (ref 34.8–46.1)
HGB BLD-MCNC: 12.2 G/DL (ref 11.5–15.4)
IMM GRANULOCYTES # BLD AUTO: 0.02 THOUSAND/UL (ref 0–0.2)
IMM GRANULOCYTES NFR BLD AUTO: 0 % (ref 0–2)
LYMPHOCYTES # BLD AUTO: 3.47 THOUSANDS/ÂΜL (ref 0.6–4.47)
LYMPHOCYTES NFR BLD AUTO: 51 % (ref 14–44)
MCH RBC QN AUTO: 32.3 PG (ref 26.8–34.3)
MCHC RBC AUTO-ENTMCNC: 32.2 G/DL (ref 31.4–37.4)
MCV RBC AUTO: 100 FL (ref 82–98)
MONOCYTES # BLD AUTO: 0.45 THOUSAND/ÂΜL (ref 0.17–1.22)
MONOCYTES NFR BLD AUTO: 7 % (ref 4–12)
NEUTROPHILS # BLD AUTO: 2.59 THOUSANDS/ÂΜL (ref 1.85–7.62)
NEUTS SEG NFR BLD AUTO: 39 % (ref 43–75)
NRBC BLD AUTO-RTO: 0 /100 WBCS
PLATELET # BLD AUTO: 288 THOUSANDS/UL (ref 149–390)
PMV BLD AUTO: 10.9 FL (ref 8.9–12.7)
POTASSIUM SERPL-SCNC: 4.4 MMOL/L (ref 3.5–5.3)
PROT SERPL-MCNC: 9.1 G/DL (ref 6.4–8.4)
RBC # BLD AUTO: 3.78 MILLION/UL (ref 3.81–5.12)
SODIUM SERPL-SCNC: 136 MMOL/L (ref 135–147)
WBC # BLD AUTO: 6.72 THOUSAND/UL (ref 4.31–10.16)

## 2024-04-26 PROCEDURE — 85025 COMPLETE CBC W/AUTO DIFF WBC: CPT

## 2024-04-26 PROCEDURE — 80053 COMPREHEN METABOLIC PANEL: CPT

## 2024-04-26 PROCEDURE — 36415 COLL VENOUS BLD VENIPUNCTURE: CPT

## 2024-05-02 ENCOUNTER — OFFICE VISIT (OUTPATIENT)
Dept: HEMATOLOGY ONCOLOGY | Facility: CLINIC | Age: 74
End: 2024-05-02
Payer: COMMERCIAL

## 2024-05-02 VITALS
RESPIRATION RATE: 17 BRPM | SYSTOLIC BLOOD PRESSURE: 110 MMHG | HEIGHT: 60 IN | WEIGHT: 150.2 LBS | HEART RATE: 97 BPM | BODY MASS INDEX: 29.49 KG/M2 | TEMPERATURE: 97.8 F | DIASTOLIC BLOOD PRESSURE: 72 MMHG | OXYGEN SATURATION: 84 %

## 2024-05-02 DIAGNOSIS — C83.00 SMALL LYMPHOCYTIC LYMPHOMA (HCC): Primary | ICD-10-CM

## 2024-05-02 PROCEDURE — 99214 OFFICE O/P EST MOD 30 MIN: CPT | Performed by: INTERNAL MEDICINE

## 2024-05-02 PROCEDURE — G2211 COMPLEX E/M VISIT ADD ON: HCPCS | Performed by: INTERNAL MEDICINE

## 2024-05-02 NOTE — PROGRESS NOTES
[facility] [place]  [address] [facility] [address]  [facility] [address]  [phone]    Cassia Turcios,1950, [id number]  05/02/24    Discussion:   In summary, this is a 73-year-old female history of CLL/SLL.  Brukinsa 320 mg p.o. daily since September 2023.  Recent WBC 6.7, hemoglobin 12.2, , platelet count 288.  39 neutrophils, 51 lymphocytes, otherwise normal differential.  CMP normal except total protein 9.1.  Clinically, she is doing well.  She generally functions without restriction.  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  No evidence of progressive disease.  Most recent imaging in January 2024 showed clearance of previous pulmonary infiltrate and groundglass opacities.  Stable subcentimeter pulmonary nodules.  No evidence of disease progression.  Repeat imaging anticipated late 2020/early 2025.  I discussed the above with the patient.  The patient and her  voiced understanding and agreement.  ______________________________________________________________________    Chief Complaint   Patient presents with    Follow-up       HPI:  Oncology History    No history exists.       Interval History: Clinically stable.  ECOG -  0 - Asymptomatic    Review of Systems   Constitutional:  Negative for appetite change, diaphoresis, fatigue and fever.   HENT:  Negative for sinus pain.    Eyes:  Negative for discharge.   Respiratory:  Negative for cough and shortness of breath.    Cardiovascular:  Negative for chest pain.   Gastrointestinal:  Negative for abdominal pain, constipation and diarrhea.   Endocrine: Negative for cold intolerance.   Genitourinary:  Negative for difficulty urinating and hematuria.   Musculoskeletal:  Negative for joint swelling.   Skin:  Negative for rash.   Allergic/Immunologic: Negative for environmental allergies.   Neurological:  Negative for dizziness and headaches.   Hematological:  Negative for adenopathy.    Psychiatric/Behavioral:  Negative for agitation.        Past Medical History:   Diagnosis Date    Acute kidney injury superimposed on chronic kidney disease  (HCC) 09/20/2021    Age-related osteoporosis without current pathological fracture 06/01/2023    Allergic rhinitis     Anemia     Anxiety     Bipolar 1 disorder (HCC)     Constipation     Deaf     Pt lost all hearing in right ear after having [place] measles    Depression     Diarrhea     Fatigue     GERD (gastroesophageal reflux disease) 08/01/2020    Hard of hearing     Pt wears a hearing in left ear.    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    Systemic lupus erythematosus ([facility])     Wears glasses     Wears partial dentures      Patient Active Problem List   Diagnosis    Bipolar I disorder, single manic episode (HCC)    Hypothyroidism    Systemic lupus erythematosus (HCC)    Acid reflux disease    Depression    Obesity    Polyarthralgia    Postgastrectomy malabsorption    Sjogren's syndrome (HCC)    Vaginal atrophy    Vitamin D deficiency    Lymphadenopathy    Iron deficiency anemia following bariatric surgery    On belimumab therapy    S/P gastric bypass    Encounter for surgical aftercare following surgery of digestive system    Hyponatremia    Age-related osteoporosis without current pathological fracture    Pulmonary nodule    Small lymphocytic lymphoma (HCC)    Angiomyolipoma    Non-nephrotic range proteinuria       Current Outpatient Medications:     buPROPion (WELLBUTRIN) 100 mg tablet, Take 100 mg by mouth 3 (three) times a day., Disp: , Rfl:     Cholecalciferol (D3-50) 1.25 MG (85523 UT) capsule, 5,000 Units daily, Disp: , Rfl:     doxazosin (CARDURA) 2 mg tablet, Take 1  "tablet (2 mg total) by mouth 2 (two) times a day (Patient taking differently: Take 2 mg by mouth daily at bedtime), Disp: 180 tablet, Rfl: 1    ferrous sulfate 325 (65 Fe) mg tablet, Take 1 tablet (325 mg total) by mouth 2 (two) times a day with meals, Disp: 60 tablet, Rfl: 0    hydroxychloroquine (PLAQUENIL) 200 mg tablet, Take 1 tablet (200 mg total) by mouth 2 (two) times a day with meals, Disp: 180 tablet, Rfl: 0    Multiple Vitamins-Minerals (CENTRUM MINIS WOMEN 50+ PO), , Disp: , Rfl:     pilocarpine (SALAGEN) 5 mg tablet, Take 1 tablet (5 mg total) by mouth 3 (three) times a day, Disp: 90 tablet, Rfl: 5    QUEtiapine (SEROquel) 300 mg tablet, Take 300 mg by mouth daily at bedtime, Disp: , Rfl:     QUEtiapine (SEROquel) 50 mg tablet, 50 mg daily at bedtime, Disp: , Rfl:     sodium chloride 1 g tablet, Take 1 tablet (1 g total) by mouth 2 (two) times a day, Disp: 180 tablet, Rfl: 0    temazepam (RESTORIL) 30 mg capsule, Take 2 capsules by mouth daily at bedtime as needed , Disp: , Rfl:     Zanubrutinib (Brukinsa) 80 MG CAPS, Take 4 capsules (320 mg total) by mouth daily, Disp: 120 capsule, Rfl: 11    acetaminophen (TYLENOL) 325 mg tablet, Take 2 tablets (650 mg total) by mouth every 6 (six) hours as needed for mild pain (Patient not taking: Reported on 4/25/2024), Disp: , Rfl: 0    Current Facility-Administered Medications:     cyanocobalamin injection 1,000 mcg, 1,000 mcg, Intramuscular, Q30 Days, Julio Bhat MD, 1,000 mcg at 12/28/23 1242  Allergies   Allergen Reactions    Ciprofloxacin Hives and Swelling     Pt reports that lips and mouth and face swelled.    Cymbalta [Duloxetine Hcl] Other (See Comments)     Hallucinations, fatigue, faints    Nirmatrelvir-Ritonavir Diarrhea, Nausea Only, Other (See Comments), Dizziness and Lightheadedness     \"passed out\"-nausea    Percocet [Oxycodone-Acetaminophen] Other (See Comments)     Dry mouth - pt states it kept her up all night. States had to continually drink " fluids     Past Surgical History:   Procedure Laterality Date    BREAST BIOPSY Right     many years ago at [facility]    COLONOSCOPY      EYE SURGERY      FRACTURE SURGERY Right     elbow    GASTRIC BYPASS      HYSTERECTOMY Bilateral 1975    IR BIOPSY LYMPH NODE  10/08/2020    LUNG BIOPSY      LYMPH NODE BIOPSY  09/18/2020    LYMPH NODE BIOPSY Left 08/28/2023    Procedure: EXCISION BIOPSY LYMPH NODE LEFT AXILLARY;  Surgeon: Scott Seaman MD;  Location: [facility];  Service: Thoracic    [facility] INCL FLUOR GDNCE DX W/CELL WASHG SPX N/A 12/08/2017    Procedure: BRONCHOSCOPY;  Surgeon: Chepe Luna MD;  Location: [facility];  Service: Pulmonary    [facility] BX/EXC LYMPH NODE NEEDLE SUPERFICIAL Left 02/25/2021    Procedure: EXCISION BIOPSY LYMPH NODE: left axillary lymph node biopsy;  Surgeon: Dakota Heard MD;  Location: [facility];  Service: Thoracic    TONSILLECTOMY       Social History     Objective:  Vitals:    05/02/24 1431   BP: 110/72   BP Location: Left arm   Patient Position: Sitting   Cuff Size: Adult   Pulse: 97   Resp: 17   Temp: 97.8 °F (36.6 °C)   SpO2: (!) 84%   Weight: 68.1 kg (150 lb 3.2 oz)   Height: 5' (1.524 m)     Physical Exam  Constitutional:       Appearance: She is well-developed.   HENT:      Head: Normocephalic and atraumatic.   Eyes:      Pupils: Pupils are equal, round, and reactive to light.   Cardiovascular:      Rate and Rhythm: Normal rate.      Heart sounds: No murmur heard.  Pulmonary:      Effort: No respiratory distress.      Breath sounds: No wheezing or rales.   Abdominal:      General: There is no distension.      Palpations: Abdomen is soft.      Tenderness: There is no abdominal tenderness. There is no rebound.   Musculoskeletal:         General: No tenderness.      Cervical back: Neck supple.   Lymphadenopathy:      Cervical: No cervical adenopathy.   Skin:     General: Skin is warm.      Findings: No rash.   Neurological:      Mental Status: She is alert and  oriented to person, place, and time.      Deep Tendon Reflexes: Reflexes normal.   Psychiatric:         Thought Content: Thought content normal.           Labs:  I personally reviewed the labs and imaging pertinent to this patient care.

## 2024-05-23 ENCOUNTER — OFFICE VISIT (OUTPATIENT)
Dept: FAMILY MEDICINE CLINIC | Facility: CLINIC | Age: 74
End: 2024-05-23
Payer: COMMERCIAL

## 2024-05-23 VITALS
WEIGHT: 151 LBS | HEIGHT: 60 IN | BODY MASS INDEX: 29.64 KG/M2 | OXYGEN SATURATION: 97 % | RESPIRATION RATE: 16 BRPM | DIASTOLIC BLOOD PRESSURE: 74 MMHG | SYSTOLIC BLOOD PRESSURE: 116 MMHG | HEART RATE: 75 BPM

## 2024-05-23 DIAGNOSIS — E53.8 B12 DEFICIENCY: ICD-10-CM

## 2024-05-23 DIAGNOSIS — K91.2 POSTGASTRECTOMY MALABSORPTION: ICD-10-CM

## 2024-05-23 DIAGNOSIS — G89.29 CHRONIC PAIN OF RIGHT KNEE: ICD-10-CM

## 2024-05-23 DIAGNOSIS — C83.00 SMALL LYMPHOCYTIC LYMPHOMA (HCC): ICD-10-CM

## 2024-05-23 DIAGNOSIS — E03.9 HYPOTHYROIDISM, UNSPECIFIED TYPE: ICD-10-CM

## 2024-05-23 DIAGNOSIS — Z90.3 POSTGASTRECTOMY MALABSORPTION: ICD-10-CM

## 2024-05-23 DIAGNOSIS — M35.00 SJOGREN'S SYNDROME, WITH UNSPECIFIED ORGAN INVOLVEMENT (HCC): ICD-10-CM

## 2024-05-23 DIAGNOSIS — M25.50 POLYARTHRALGIA: ICD-10-CM

## 2024-05-23 DIAGNOSIS — E61.1 IRON DEFICIENCY: Primary | ICD-10-CM

## 2024-05-23 DIAGNOSIS — H04.123 DRY EYE SYNDROME OF BOTH EYES: ICD-10-CM

## 2024-05-23 DIAGNOSIS — M25.561 CHRONIC PAIN OF RIGHT KNEE: ICD-10-CM

## 2024-05-23 DIAGNOSIS — L30.9 ECZEMA, UNSPECIFIED TYPE: ICD-10-CM

## 2024-05-23 DIAGNOSIS — M32.9 SYSTEMIC LUPUS ERYTHEMATOSUS, UNSPECIFIED SLE TYPE, UNSPECIFIED ORGAN INVOLVEMENT STATUS (HCC): ICD-10-CM

## 2024-05-23 PROCEDURE — G2211 COMPLEX E/M VISIT ADD ON: HCPCS | Performed by: FAMILY MEDICINE

## 2024-05-23 PROCEDURE — 99214 OFFICE O/P EST MOD 30 MIN: CPT | Performed by: FAMILY MEDICINE

## 2024-05-23 RX ORDER — OLOPATADINE HYDROCHLORIDE 2 MG/ML
1 SOLUTION/ DROPS OPHTHALMIC DAILY
Qty: 2.5 ML | Refills: 1 | Status: SHIPPED | OUTPATIENT
Start: 2024-05-23

## 2024-05-23 RX ORDER — CLOTRIMAZOLE AND BETAMETHASONE DIPROPIONATE 10; .64 MG/G; MG/G
CREAM TOPICAL 2 TIMES DAILY
Qty: 45 G | Refills: 0 | Status: SHIPPED | OUTPATIENT
Start: 2024-05-23

## 2024-05-23 NOTE — PROGRESS NOTES
Ambulatory Visit  Name: Cassia Turcios      : 1950      MRN: [id number]  Encounter Provider: Julio Bhat MD  Encounter Date: 2024   Encounter department: [facility]    Assessment & Plan  1. Weight gain.  The patient was counseled to persist with her weight loss efforts.    2. Health maintenance.  The patient's next pneumonia vaccination is scheduled for . Laboratory tests will be conducted to assess her iron and B12 levels. Additionally, her thyroid function will be assessed.    3. Right knee pain.  An x-ray of the right knee will be ordered. The patient was informed that chemotherapy-induced knee pain could be a contributing factor. She was encouraged to perform home exercises for knee pain.    4. Eczema.  The patient was informed that her dry skin is likely age-related. A prescription for clotrimazole and betamethasone will be provided, to be applied twice daily for a period of 2 weeks. The patient was advised to reduce sun exposure. Should there be no improvement in 2 weeks, a referral to dermatology will be made.    5. Redness of the eyes.  The patient was advised to use Refresh Tears for dry eyes. Pataday will be provided, to be administered as 1 drop in both eyes in the morning.    Follow-up  The patient is scheduled for a follow-up visit in 2024.    Orders Placed This Encounter   Procedures    XR knee 3 vw right non injury    Vitamin B12    TSH, 3rd generation with Free T4 reflex      1. Iron deficiency  -     Iron Panel (Includes Ferritin, Iron Sat%, Iron, and TIBC)  2. Chronic pain of right knee  -     XR knee 3 vw right non injury; Future; Expected date: 2024  3. Eczema, unspecified type  -     clotrimazole-betamethasone (LOTRISONE) 1-0.05 % cream; Apply topically 2 (two) times a day  4. B12 deficiency  -     Vitamin B12  5. Systemic lupus erythematosus, unspecified SLE type, unspecified organ involvement status (HCC)  6. Hypothyroidism, unspecified  type  -     TSH, 3rd generation with Free T4 reflex; Future; Expected date: 08/23/2024  7. Sjogren's syndrome, with unspecified organ involvement (HCC)  8. Postgastrectomy malabsorption  9. Polyarthralgia  10. Small lymphocytic lymphoma (HCC)  11. Dry eye syndrome of both eyes  -     olopatadine HCl (PATADAY) 0.2 % opth drops; Administer 1 drop to both eyes daily        History of Present Illness     History of Present Illness  The patient is a 72-year-old female who presents for evaluation of multiple medical concerns.    The patient has recently regained the weight she previously lost, attributing it to dietary indiscretions following her gastric bypass surgery 12 years ago. Her primary weight loss goal at the time of her gastric bypass was 145 pounds. Despite her weight loss, she did not suffer from hypertension, hyperlipidemia, cardiac issues, or diabetes.    The patient maintains regular ear and nose cleaning by an ENT specialist. Her mastoid cavity, a condition attributed to her lupus, is broken, resulting in a dry nose and ear. She consulted with Dr. Aceves, who informed her that her chemotherapy medication was effective, necessitating bi-monthly blood work. Her CT scan results were normal. She has received B12 injections and is scheduled to receive Prolia in 06/2024. Her current regimen includes multivitamins, B12, and iron. Her most recent blood work was conducted in the end of 04/2024.    The patient is experiencing issues with her right knee, which is particularly severe upon waking and occasionally upon standing after prolonged sitting. She is contemplating whether she should consult with an orthopedic doctor.    The patient experiences dry skin and has recently noticed a new spot on her face. She has been advised against sun exposure due to her lupus condition.    The patient's eyes are significantly red, and she is seeking advice on over-the-counter remedies. She has been using pink eye drops, which  have not alleviated her dry eyes. The onset of these symptoms coincided with the onset of allergy season.     Review of Systems   Constitutional:  Negative for fever and unexpected weight change.   HENT:  Negative for nosebleeds and trouble swallowing.    Eyes:  Negative for visual disturbance.   Respiratory:  Negative for chest tightness and shortness of breath.    Cardiovascular:  Negative for chest pain, palpitations and leg swelling.   Gastrointestinal:  Negative for abdominal pain, constipation, diarrhea and nausea.   Endocrine: Negative for cold intolerance.   Genitourinary:  Negative for dysuria and urgency.   Musculoskeletal:  Negative for joint swelling and myalgias.   Skin:  Positive for rash.   Neurological:  Negative for tremors, seizures and syncope.   Hematological:  Does not bruise/bleed easily.   Psychiatric/Behavioral:  Negative for hallucinations and suicidal ideas.      Objective     /74 (BP Location: Left arm, Patient Position: Sitting, Cuff Size: Standard)   Pulse 75   Resp 16   Ht 5' (1.524 m)   Wt 68.5 kg (151 lb)   LMP  (LMP Unknown)   SpO2 97%   BMI 29.49 kg/m²     Physical Exam  Vital Signs  Patient's weight is 151.  Physical Exam  Vitals and nursing note reviewed.   Constitutional:       Appearance: She is well-developed.   HENT:      Head: Normocephalic and atraumatic.      Right Ear: External ear normal.      Left Ear: External ear normal.      Nose: Nose normal.   Eyes:      Conjunctiva/sclera: Conjunctivae normal.      Pupils: Pupils are equal, round, and reactive to light.   Cardiovascular:      Rate and Rhythm: Normal rate and regular rhythm.      Heart sounds: Normal heart sounds. No murmur heard.  Pulmonary:      Effort: Pulmonary effort is normal.      Breath sounds: Normal breath sounds. No wheezing.   Abdominal:      General: Bowel sounds are normal.      Palpations: Abdomen is soft.   Musculoskeletal:         General: No tenderness. Normal range of motion.       Cervical back: Normal range of motion and neck supple.   Lymphadenopathy:      Cervical: No cervical adenopathy.   Skin:     General: Skin is warm and dry.      Capillary Refill: Capillary refill takes less than 2 seconds.      Findings: Rash present.   Neurological:      Mental Status: She is alert and oriented to person, place, and time.   Psychiatric:         Behavior: Behavior normal.         Thought Content: Thought content normal.         Judgment: Judgment normal.     Administrative Statements

## 2024-06-11 ENCOUNTER — APPOINTMENT (OUTPATIENT)
Dept: RADIOLOGY | Facility: CLINIC | Age: 74
End: 2024-06-11
Payer: COMMERCIAL

## 2024-06-11 DIAGNOSIS — G89.29 CHRONIC PAIN OF RIGHT KNEE: ICD-10-CM

## 2024-06-11 DIAGNOSIS — M25.561 CHRONIC PAIN OF RIGHT KNEE: ICD-10-CM

## 2024-06-11 PROCEDURE — 73562 X-RAY EXAM OF KNEE 3: CPT

## 2024-07-02 ENCOUNTER — APPOINTMENT (OUTPATIENT)
Dept: LAB | Facility: CLINIC | Age: 74
End: 2024-07-02
Payer: COMMERCIAL

## 2024-07-02 DIAGNOSIS — C83.00 SMALL LYMPHOCYTIC LYMPHOMA (HCC): ICD-10-CM

## 2024-07-02 LAB
BASOPHILS # BLD AUTO: 0.1 THOUSANDS/ÂΜL (ref 0–0.1)
BASOPHILS NFR BLD AUTO: 1 % (ref 0–1)
EOSINOPHIL # BLD AUTO: 0.09 THOUSAND/ÂΜL (ref 0–0.61)
EOSINOPHIL NFR BLD AUTO: 1 % (ref 0–6)
ERYTHROCYTE [DISTWIDTH] IN BLOOD BY AUTOMATED COUNT: 13.4 % (ref 11.6–15.1)
FERRITIN SERPL-MCNC: 188 NG/ML (ref 11–307)
HCT VFR BLD AUTO: 39.6 % (ref 34.8–46.1)
HGB BLD-MCNC: 13.3 G/DL (ref 11.5–15.4)
IMM GRANULOCYTES # BLD AUTO: 0.03 THOUSAND/UL (ref 0–0.2)
IMM GRANULOCYTES NFR BLD AUTO: 0 % (ref 0–2)
LYMPHOCYTES # BLD AUTO: 3.77 THOUSANDS/ÂΜL (ref 0.6–4.47)
LYMPHOCYTES NFR BLD AUTO: 54 % (ref 14–44)
MCH RBC QN AUTO: 33.3 PG (ref 26.8–34.3)
MCHC RBC AUTO-ENTMCNC: 33.6 G/DL (ref 31.4–37.4)
MCV RBC AUTO: 99 FL (ref 82–98)
MONOCYTES # BLD AUTO: 0.65 THOUSAND/ÂΜL (ref 0.17–1.22)
MONOCYTES NFR BLD AUTO: 9 % (ref 4–12)
NEUTROPHILS # BLD AUTO: 2.49 THOUSANDS/ÂΜL (ref 1.85–7.62)
NEUTS SEG NFR BLD AUTO: 35 % (ref 43–75)
NRBC BLD AUTO-RTO: 0 /100 WBCS
PLATELET # BLD AUTO: 293 THOUSANDS/UL (ref 149–390)
PMV BLD AUTO: 10.8 FL (ref 8.9–12.7)
RBC # BLD AUTO: 3.99 MILLION/UL (ref 3.81–5.12)
VIT B12 SERPL-MCNC: 945 PG/ML (ref 180–914)
WBC # BLD AUTO: 7.13 THOUSAND/UL (ref 4.31–10.16)

## 2024-07-02 PROCEDURE — 36415 COLL VENOUS BLD VENIPUNCTURE: CPT

## 2024-07-02 PROCEDURE — 85025 COMPLETE CBC W/AUTO DIFF WBC: CPT

## 2024-07-02 PROCEDURE — 80053 COMPREHEN METABOLIC PANEL: CPT

## 2024-07-03 LAB
ALBUMIN SERPL BCG-MCNC: 3.8 G/DL (ref 3.5–5)
ALP SERPL-CCNC: 74 U/L (ref 34–104)
ALT SERPL W P-5'-P-CCNC: 26 U/L (ref 7–52)
ANION GAP SERPL CALCULATED.3IONS-SCNC: 10 MMOL/L (ref 4–13)
AST SERPL W P-5'-P-CCNC: 41 U/L (ref 13–39)
BILIRUB SERPL-MCNC: 0.5 MG/DL (ref 0.2–1)
BUN SERPL-MCNC: 13 MG/DL (ref 5–25)
CALCIUM SERPL-MCNC: 8.6 MG/DL (ref 8.4–10.2)
CHLORIDE SERPL-SCNC: 99 MMOL/L (ref 96–108)
CO2 SERPL-SCNC: 23 MMOL/L (ref 21–32)
CREAT SERPL-MCNC: 0.84 MG/DL (ref 0.6–1.3)
GFR SERPL CREATININE-BSD FRML MDRD: 69 ML/MIN/1.73SQ M
GLUCOSE P FAST SERPL-MCNC: 67 MG/DL (ref 65–99)
IRON SATN MFR SERPL: 60 % (ref 15–50)
IRON SERPL-MCNC: 137 UG/DL (ref 50–212)
POTASSIUM SERPL-SCNC: 4.5 MMOL/L (ref 3.5–5.3)
PROT SERPL-MCNC: 8.8 G/DL (ref 6.4–8.4)
SODIUM SERPL-SCNC: 132 MMOL/L (ref 135–147)
TIBC SERPL-MCNC: 229 UG/DL (ref 250–450)
UIBC SERPL-MCNC: 92 UG/DL (ref 155–355)

## 2024-07-05 ENCOUNTER — TELEPHONE (OUTPATIENT)
Dept: FAMILY MEDICINE CLINIC | Facility: CLINIC | Age: 74
End: 2024-07-05

## 2024-07-05 DIAGNOSIS — N30.00 ACUTE CYSTITIS WITHOUT HEMATURIA: Primary | ICD-10-CM

## 2024-07-05 RX ORDER — CEPHALEXIN 500 MG/1
500 CAPSULE ORAL 2 TIMES DAILY
Qty: 20 CAPSULE | Refills: 0 | Status: SHIPPED | OUTPATIENT
Start: 2024-07-05 | End: 2024-07-15

## 2024-07-05 NOTE — TELEPHONE ENCOUNTER
----- Message from Julio Bhat MD sent at 7/5/2024  6:47 AM EDT -----  Okay I am going to send over the antibiotic this time take it twice a day for 10 days wait a couple days after go back and repeat the urine culture I like to see if it is actually treatable or if this is just more colonization which I suspect

## 2024-07-08 ENCOUNTER — HOSPITAL ENCOUNTER (OUTPATIENT)
Dept: INFUSION CENTER | Facility: CLINIC | Age: 74
Discharge: HOME | End: 2024-07-08
Payer: COMMERCIAL

## 2024-07-08 VITALS — BODY MASS INDEX: 30.23 KG/M2 | WEIGHT: 154 LBS | HEIGHT: 60 IN

## 2024-07-08 DIAGNOSIS — M25.561 ACUTE PAIN OF RIGHT KNEE: Primary | ICD-10-CM

## 2024-07-08 DIAGNOSIS — M81.0 AGE-RELATED OSTEOPOROSIS WITHOUT CURRENT PATHOLOGICAL FRACTURE: Primary | ICD-10-CM

## 2024-07-08 PROCEDURE — 96374 THER/PROPH/DIAG INJ IV PUSH: CPT

## 2024-07-08 RX ORDER — SODIUM CHLORIDE 9 MG/ML
20 INJECTION, SOLUTION INTRAVENOUS ONCE
Status: COMPLETED | OUTPATIENT
Start: 2024-07-08 | End: 2024-07-08

## 2024-07-08 RX ORDER — ZOLEDRONIC ACID 5 MG/100ML
5 INJECTION, SOLUTION INTRAVENOUS ONCE
OUTPATIENT
Start: 2025-07-08

## 2024-07-08 RX ORDER — ZOLEDRONIC ACID 5 MG/100ML
5 INJECTION, SOLUTION INTRAVENOUS ONCE
Status: COMPLETED | OUTPATIENT
Start: 2024-07-08 | End: 2024-07-08

## 2024-07-08 RX ORDER — SODIUM CHLORIDE 9 MG/ML
20 INJECTION, SOLUTION INTRAVENOUS ONCE
OUTPATIENT
Start: 2025-07-08

## 2024-07-08 RX ADMIN — ZOLEDRONIC ACID 5 MG: 5 INJECTION, SOLUTION INTRAVENOUS at 15:09

## 2024-07-08 RX ADMIN — SODIUM CHLORIDE 20 ML/HR: 0.9 INJECTION, SOLUTION INTRAVENOUS at 15:09

## 2024-07-08 NOTE — PROGRESS NOTES
Patient arrives to infusion center for Reclast today. Patient offers no acute complaints, patient denies any recent/upcoming dental procedures. Labs reviewed from 7/2, calcium 8.6 and CrCl 46.1 - within parameters for treatment today. PIV placed without issue, patient tolerated well. Patient resting on recliner chair, call bell within reach.

## 2024-07-08 NOTE — PROGRESS NOTES
Patient tolerated Reclast without issue. PIV removed intact, coban wrap in place. Patient aware to schedule next appt with  staff upon DC.

## 2024-08-05 DIAGNOSIS — C83.00 SMALL LYMPHOCYTIC LYMPHOMA (HCC): ICD-10-CM

## 2024-08-06 RX ORDER — ZANUBRUTINIB 80 MG/1
CAPSULE, GELATIN COATED ORAL
Qty: 120 CAPSULE | Refills: 10 | Status: SHIPPED | OUTPATIENT
Start: 2024-08-06

## 2024-08-14 ENCOUNTER — HOSPITAL ENCOUNTER (OUTPATIENT)
Dept: CT IMAGING | Facility: HOSPITAL | Age: 74
Discharge: HOME | End: 2024-08-14
Payer: COMMERCIAL

## 2024-08-14 DIAGNOSIS — R91.1 LUNG NODULE: ICD-10-CM

## 2024-08-14 PROCEDURE — 71250 CT THORAX DX C-: CPT

## 2024-08-14 PROCEDURE — G1004 CDSM NDSC: HCPCS

## 2024-08-27 ENCOUNTER — OFFICE VISIT (OUTPATIENT)
Dept: URGENT CARE | Facility: CLINIC | Age: 74
End: 2024-08-27
Payer: COMMERCIAL

## 2024-08-27 VITALS
HEIGHT: 60 IN | DIASTOLIC BLOOD PRESSURE: 72 MMHG | WEIGHT: 150 LBS | OXYGEN SATURATION: 96 % | SYSTOLIC BLOOD PRESSURE: 125 MMHG | HEART RATE: 86 BPM | BODY MASS INDEX: 29.45 KG/M2 | TEMPERATURE: 97.2 F | RESPIRATION RATE: 18 BRPM

## 2024-08-27 DIAGNOSIS — L03.116 CELLULITIS OF LEFT LOWER EXTREMITY: Primary | ICD-10-CM

## 2024-08-27 DIAGNOSIS — S80.862A INSECT BITE OF LEFT LOWER EXTREMITY, INITIAL ENCOUNTER: ICD-10-CM
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
## 2024-08-27 PROCEDURE — 99213 OFFICE O/P EST LOW 20 MIN: CPT | Performed by: PHYSICIAN ASSISTANT

## 2024-08-27 RX ORDER — HYDROCORTISONE 2.5 %
CREAM (GRAM) TOPICAL 2 TIMES DAILY
Qty: 20 G | Refills: 0 | Status: SHIPPED | OUTPATIENT
Start: 2024-08-27

## 2024-08-27 RX ORDER — CEPHALEXIN 500 MG/1
500 CAPSULE ORAL EVERY 8 HOURS SCHEDULED
Qty: 30 CAPSULE | Refills: 0 | Status: SHIPPED | OUTPATIENT
Start: 2024-08-27 | End: 2024-09-06

## 2024-08-27 NOTE — PROGRESS NOTES
[facility]        NAME: Cassia Turcios is a 74 y.o. female  : 1950    MRN: [id number]  DATE: 2024  TIME: 2:47 PM    /72 (BP Location: Right arm, Patient Position: Sitting, Cuff Size: Standard)   Pulse 86   Temp (!) 97.2 °F (36.2 °C) (Temporal)   Resp 18   Ht 5' (1.524 m)   Wt 68 kg (150 lb)   LMP  (LMP Unknown)   SpO2 96%   BMI 29.29 kg/m²     Assessment and Plan   Cellulitis of left lower extremity [L03.116]  1. Cellulitis of left lower extremity  cephalexin (KEFLEX) 500 mg capsule    hydrocortisone 2.5 % cream      2. Insect bite of left lower extremity, initial encounter              Patient Instructions       Follow up with PCP in 3-5 days.  Proceed to  ER if symptoms worsen.    Chief Complaint     Chief Complaint   Patient presents with    Insect Bite     Last Monday pt was cleaning her basement and got a bug bite on her left leg. Says it is warm to touch, painful, and itches. Applied bacitracin to help with inflammation.         History of Present Illness       Pt with 1 week left lower leg insect bite,with redness and itching and pain     Insect Bite        Review of Systems   Review of Systems   Constitutional: Negative.    HENT: Negative.     Eyes: Negative.    Respiratory: Negative.     Cardiovascular: Negative.    Gastrointestinal: Negative.    Endocrine: Negative.    Genitourinary: Negative.    Musculoskeletal: Negative.    Skin: Negative.    Allergic/Immunologic: Negative.    Neurological: Negative.    Hematological: Negative.    Psychiatric/Behavioral: Negative.     All other systems reviewed and are negative.        Current Medications       Current Outpatient Medications:     buPROPion (WELLBUTRIN) 100 mg tablet, Take 100 mg by mouth 3 (three) times a day., Disp: , Rfl:     cephalexin (KEFLEX) 500 mg capsule, Take 1 capsule (500 mg total) by mouth every 8 (eight) hours for 10 days, Disp: 30 capsule, Rfl: 0    Cholecalciferol (D3-50) 1.25 MG (66064 UT)  capsule, 5,000 Units daily, Disp: , Rfl:     clotrimazole-betamethasone (LOTRISONE) 1-0.05 % cream, Apply topically 2 (two) times a day, Disp: 45 g, Rfl: 0    ferrous sulfate 325 (65 Fe) mg tablet, Take 1 tablet (325 mg total) by mouth 2 (two) times a day with meals, Disp: 60 tablet, Rfl: 0    hydrocortisone 2.5 % cream, Apply topically 2 (two) times a day, Disp: 20 g, Rfl: 0    Multiple Vitamins-Minerals (CENTRUM MINIS WOMEN 50+ PO), , Disp: , Rfl:     olopatadine HCl (PATADAY) 0.2 % opth drops, Administer 1 drop to both eyes daily, Disp: 2.5 mL, Rfl: 1    pilocarpine (SALAGEN) 5 mg tablet, Take 1 tablet (5 mg total) by mouth 3 (three) times a day, Disp: 90 tablet, Rfl: 5    QUEtiapine (SEROquel) 300 mg tablet, Take 300 mg by mouth daily at bedtime, Disp: , Rfl:     QUEtiapine (SEROquel) 50 mg tablet, 50 mg daily at bedtime, Disp: , Rfl:     temazepam (RESTORIL) 30 mg capsule, Take 2 capsules by mouth daily at bedtime as needed , Disp: , Rfl:     Zanubrutinib (Brukinsa) 80 MG CAPS, Take 4 capsules (320 mg total) by mouth once daily., Disp: 120 capsule, Rfl: 10    doxazosin (CARDURA) 2 mg tablet, Take 1 tablet (2 mg total) by mouth 2 (two) times a day (Patient taking differently: Take 2 mg by mouth daily at bedtime), Disp: 180 tablet, Rfl: 1    hydroxychloroquine (PLAQUENIL) 200 mg tablet, Take 1 tablet (200 mg total) by mouth 2 (two) times a day with meals, Disp: 180 tablet, Rfl: 0    sodium chloride 1 g tablet, Take 1 tablet (1 g total) by mouth 2 (two) times a day, Disp: 180 tablet, Rfl: 0    Current Facility-Administered Medications:     cyanocobalamin injection 1,000 mcg, 1,000 mcg, Intramuscular, Q30 Days, Julio Bhat MD, 1,000 mcg at 12/28/23 1242    Current Allergies     Allergies as of 08/27/2024 - Reviewed 08/27/2024   Allergen Reaction Noted    Ciprofloxacin Hives and Swelling 08/23/2016    Cymbalta [duloxetine hcl] Other (See Comments) 08/23/2016    Nirmatrelvir-ritonavir Diarrhea, Nausea Only, Other  (See Comments), Dizziness, and Lightheadedness 10/15/2022    Percocet [oxycodone-acetaminophen] Other (See Comments) 08/22/2020            The following portions of the patient's history were reviewed and updated as appropriate: allergies, current medications, past family history, past medical history, past social history, past surgical history and problem list.     Past Medical History:   Diagnosis Date    Acute kidney injury superimposed on chronic kidney disease  (HCC) 09/20/2021    Age-related osteoporosis without current pathological fracture 06/01/2023    Allergic rhinitis     Anemia     Anxiety     Bipolar 1 disorder (HCC)     Constipation     Deaf     Pt lost all hearing in right ear after having [place] measles    Depression     Diarrhea     Fatigue     GERD (gastroesophageal reflux disease) 08/01/2020    Hard of hearing     Pt wears a hearing in left ear.    HL (hearing loss)     Hypertension 10/07/2023    Hypothyroidism     Lung nodule     Nasal congestion     Osteoporosis     Pneumonia 02/10/2017    Pneumonia due to Streptococcus ([facility]) 09/19/2021    Psychiatric disorder     Recurrent UTI 06/05/2023    Sjogren's syndrome ([facility])     Small lymphocytic lymphoma (HCC) 09/18/2023    Systemic lupus erythematosus (HCC)     Wears glasses     Wears partial dentures        Past Surgical History:   Procedure Laterality Date    BREAST BIOPSY Right     many years ago at [facility]    COLONOSCOPY      EYE SURGERY      FRACTURE SURGERY Right     elbow    GASTRIC BYPASS      HYSTERECTOMY Bilateral 1975    IR BIOPSY LYMPH NODE  10/08/2020    LUNG BIOPSY      LYMPH NODE BIOPSY  09/18/2020    LYMPH NODE BIOPSY Left 08/28/2023    Procedure: EXCISION BIOPSY LYMPH NODE LEFT AXILLARY;  Surgeon: Scott Seaman MD;  Location: [facility];  Service: Thoracic    MD [facility] INCL FLUOR GDNCE DX W/CELL WASHG SPX N/A 12/08/2017    Procedure: BRONCHOSCOPY;  Surgeon: Chepe Luna MD;  Location: [facility];  Service: Pulmonary     FL BX/EXC LYMPH NODE NEEDLE SUPERFICIAL Left 02/25/2021    Procedure: EXCISION BIOPSY LYMPH NODE: left axillary lymph node biopsy;  Surgeon: Dakota Heard MD;  Location: [facility];  Service: Thoracic    TONSILLECTOMY         Family History   Problem Relation Age of Onset    Heart disease Mother     Diabetes Mother     Kidney cancer Mother     Cancer Mother         Kidney    Hypertension Mother     Heart disease Father     Stroke Father     Diabetes Father     Cancer Father         Rectal Cancer    Hypertension Father     No Known Problems Maternal Grandmother     No Known Problems Maternal Grandfather     No Known Problems Paternal Grandmother     No Known Problems Paternal Grandfather     Leukemia Half-Sister 50    No Known Problems Half-Sister     No Known Problems Half-Sister     No Known Problems Half-Sister     BRCA2 Positive Neg Hx     BRCA2 Negative Neg Hx     BRCA1 Negative Neg Hx     Endometrial cancer Neg Hx     Breast cancer Neg Hx     Breast cancer additional onset Neg Hx     Colon cancer Neg Hx     Ovarian cancer Neg Hx     BRCA1 Positive Neg Hx     BRCA 1/2 Neg Hx          Medications have been verified.        Objective   /72 (BP Location: Right arm, Patient Position: Sitting, Cuff Size: Standard)   Pulse 86   Temp (!) 97.2 °F (36.2 °C) (Temporal)   Resp 18   Ht 5' (1.524 m)   Wt 68 kg (150 lb)   LMP  (LMP Unknown)   SpO2 96%   BMI 29.29 kg/m²        Physical Exam     Physical Exam  Vitals and nursing note reviewed.   Constitutional:       Appearance: Normal appearance. She is normal weight.   HENT:      Head: Normocephalic and atraumatic.      Right Ear: Tympanic membrane, ear canal and external ear normal.      Left Ear: Tympanic membrane, ear canal and external ear normal.      Nose: Nose normal.      Mouth/Throat:      Mouth: Mucous membranes are moist.      Pharynx: Oropharynx is clear.   Eyes:      Extraocular Movements: Extraocular movements intact.       Conjunctiva/sclera: Conjunctivae normal.      Pupils: Pupils are equal, round, and reactive to light.   Cardiovascular:      Rate and Rhythm: Normal rate and regular rhythm.      Pulses: Normal pulses.      Heart sounds: Normal heart sounds.   Pulmonary:      Effort: Pulmonary effort is normal.      Breath sounds: Normal breath sounds.   Abdominal:      General: Abdomen is flat. Bowel sounds are normal.      Palpations: Abdomen is soft.   Musculoskeletal:         General: Normal range of motion.      Cervical back: Normal range of motion and neck supple.      Comments: Left lower leg lateral insect bite,  3cm slight erythema and tenderness  distal neuro and vascular wnl    Skin:     General: Skin is warm.      Capillary Refill: Capillary refill takes less than 2 seconds.   Neurological:      Mental Status: She is alert and oriented to person, place, and time.   Psychiatric:         Behavior: Behavior normal.

## 2024-08-30 ENCOUNTER — TELEPHONE (OUTPATIENT)
Dept: NEPHROLOGY | Facility: CLINIC | Age: 74
End: 2024-08-30

## 2024-09-06 ENCOUNTER — APPOINTMENT (OUTPATIENT)
Dept: LAB | Facility: CLINIC | Age: 74
End: 2024-09-06
Payer: COMMERCIAL

## 2024-09-06 DIAGNOSIS — C83.00 SMALL LYMPHOCYTIC LYMPHOMA (HCC): ICD-10-CM

## 2024-09-06 LAB
ALBUMIN SERPL BCG-MCNC: 3.8 G/DL (ref 3.5–5)
ALP SERPL-CCNC: 79 U/L (ref 34–104)
ALT SERPL W P-5'-P-CCNC: 20 U/L (ref 7–52)
ANION GAP SERPL CALCULATED.3IONS-SCNC: 5 MMOL/L (ref 4–13)
AST SERPL W P-5'-P-CCNC: 32 U/L (ref 13–39)
BASOPHILS # BLD AUTO: 0.09 THOUSANDS/ÂΜL (ref 0–0.1)
BASOPHILS NFR BLD AUTO: 2 % (ref 0–1)
BILIRUB SERPL-MCNC: 0.5 MG/DL (ref 0.2–1)
BUN SERPL-MCNC: 17 MG/DL (ref 5–25)
CALCIUM SERPL-MCNC: 8.9 MG/DL (ref 8.4–10.2)
CHLORIDE SERPL-SCNC: 101 MMOL/L (ref 96–108)
CO2 SERPL-SCNC: 27 MMOL/L (ref 21–32)
CREAT SERPL-MCNC: 0.78 MG/DL (ref 0.6–1.3)
EOSINOPHIL # BLD AUTO: 0.09 THOUSAND/ÂΜL (ref 0–0.61)
EOSINOPHIL NFR BLD AUTO: 2 % (ref 0–6)
ERYTHROCYTE [DISTWIDTH] IN BLOOD BY AUTOMATED COUNT: 13.3 % (ref 11.6–15.1)
GFR SERPL CREATININE-BSD FRML MDRD: 76 ML/MIN/1.73SQ M
GLUCOSE P FAST SERPL-MCNC: 84 MG/DL (ref 65–99)
HCT VFR BLD AUTO: 40.3 % (ref 34.8–46.1)
HGB BLD-MCNC: 13.1 G/DL (ref 11.5–15.4)
IMM GRANULOCYTES # BLD AUTO: 0.03 THOUSAND/UL (ref 0–0.2)
IMM GRANULOCYTES NFR BLD AUTO: 1 % (ref 0–2)
LYMPHOCYTES # BLD AUTO: 2.81 THOUSANDS/ÂΜL (ref 0.6–4.47)
LYMPHOCYTES NFR BLD AUTO: 48 % (ref 14–44)
MCH RBC QN AUTO: 31.7 PG (ref 26.8–34.3)
MCHC RBC AUTO-ENTMCNC: 32.5 G/DL (ref 31.4–37.4)
MCV RBC AUTO: 98 FL (ref 82–98)
MONOCYTES # BLD AUTO: 0.58 THOUSAND/ÂΜL (ref 0.17–1.22)
MONOCYTES NFR BLD AUTO: 10 % (ref 4–12)
NEUTROPHILS # BLD AUTO: 2.08 THOUSANDS/ÂΜL (ref 1.85–7.62)
NEUTS SEG NFR BLD AUTO: 37 % (ref 43–75)
NRBC BLD AUTO-RTO: 0 /100 WBCS
PLATELET # BLD AUTO: 259 THOUSANDS/UL (ref 149–390)
PMV BLD AUTO: 11.1 FL (ref 8.9–12.7)
POTASSIUM SERPL-SCNC: 4 MMOL/L (ref 3.5–5.3)
PROT SERPL-MCNC: 8.5 G/DL (ref 6.4–8.4)
RBC # BLD AUTO: 4.13 MILLION/UL (ref 3.81–5.12)
SODIUM SERPL-SCNC: 133 MMOL/L (ref 135–147)
WBC # BLD AUTO: 5.68 THOUSAND/UL (ref 4.31–10.16)

## 2024-09-06 PROCEDURE — 85025 COMPLETE CBC W/AUTO DIFF WBC: CPT

## 2024-09-06 PROCEDURE — 36415 COLL VENOUS BLD VENIPUNCTURE: CPT

## 2024-09-06 PROCEDURE — 80053 COMPREHEN METABOLIC PANEL: CPT

## 2024-09-10 ENCOUNTER — OFFICE VISIT (OUTPATIENT)
Dept: FAMILY MEDICINE CLINIC | Facility: CLINIC | Age: 74
End: 2024-09-10
Payer: COMMERCIAL

## 2024-09-10 VITALS
RESPIRATION RATE: 16 BRPM | HEIGHT: 60 IN | WEIGHT: 156 LBS | OXYGEN SATURATION: 97 % | HEART RATE: 82 BPM | BODY MASS INDEX: 30.63 KG/M2 | DIASTOLIC BLOOD PRESSURE: 80 MMHG | SYSTOLIC BLOOD PRESSURE: 116 MMHG

## 2024-09-10 DIAGNOSIS — M35.01 SJOGREN'S SYNDROME WITH KERATOCONJUNCTIVITIS SICCA (HCC): ICD-10-CM

## 2024-09-10 DIAGNOSIS — C83.00 SMALL LYMPHOCYTIC LYMPHOMA (HCC): ICD-10-CM
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
## 2024-09-10 DIAGNOSIS — E66.09 CLASS 1 OBESITY DUE TO EXCESS CALORIES WITH SERIOUS COMORBIDITY AND BODY MASS INDEX (BMI) OF 30.0 TO 30.9 IN ADULT: ICD-10-CM

## 2024-09-10 PROCEDURE — G2211 COMPLEX E/M VISIT ADD ON: HCPCS | Performed by: FAMILY MEDICINE

## 2024-09-10 PROCEDURE — 99214 OFFICE O/P EST MOD 30 MIN: CPT | Performed by: FAMILY MEDICINE

## 2024-09-10 NOTE — PROGRESS NOTES
Ambulatory Visit  Name: Cassia Turcios      : 1950      MRN: [id number]  Encounter Provider: Julio Bhat MD  Encounter Date: 9/10/2024   Encounter department: [facility]    Assessment & Plan  Bug bite, initial encounter         Sjogren's syndrome with keratoconjunctivitis sicca (HCC)         Small lymphocytic lymphoma (HCC)         Class 1 obesity due to excess calories with serious comorbidity and body mass index (BMI) of 30.0 to 30.9 in adult                  Assessment & Plan  1. Cellulitis of left lower extremity.  Symptoms and physical examination findings are consistent with cellulitis of the left lower extremity. Continuation of Keflex is advised. Application of Vaseline and a Band-Aid on each lesion is recommended. The use of bug spray in the basement is also suggested.    2. Dry eyes.  Symptoms and physical examination findings are consistent with dry eyes. Continuation of Refresh eye drops is recommended. She is advised to consult her ophthalmologist regarding the possibility of laser treatment for dry eyes. The use of Refresh eye drops in the yellow box, which contains flaxseed oil, is recommended for better lubrication.    3. Medication Management.  She reports not taking blood pressure medication anymore, with current blood pressure at 116/80. Continuation of vitamin B12, iron, and vitamin D supplements is advised. Sodium tablets are to be taken as prescribed, one in the morning and one at night.    4. Lymphocytic interstitial pneumonia.  Nodules are stable with no change in the lungs. Continuation of current management is advised.    5. Sjogren's syndrome.  Symptoms include dry eyes, nose, and mouth. She is advised to continue using Biotene for dry mouth. Discussion about alternative medications for Sjogren's syndrome will be initiated with her rheumatologist in December.    6. Weight gain.  She reports weight gain instead of losing weight. A diet with a calorie  restriction of 0301-7928 calories is recommended. Consultation with Dr. Stuart for a specialized diet plan is advised.          History of Present Illness     History of Present Illness  The patient presents for evaluation of multiple medical concerns.    She reports a recent visit to the basement where she believes she was bitten by spiders. She has completed a course of Keflex and is currently using hydrocortisone cream. She also mentions having sores on her feet, which she attributes to her chemotherapy medication. These sores are painful to touch. She reports no presence of bed bugs and confirms that her  does not have similar symptoms. She typically wears long pants and socks when descending to the basement.    She has discontinued her blood pressure medication. She experienced elevated blood pressure as a side effect of her chemotherapy medication, glucan. She is under the care of Dr. Bhat for this issue. She also reports weight gain, which she finds concerning given her history of gastric bypass surgery performed by Dr. Edward Cortes. She plans to consult with him regarding a suitable diet while managing lymphoma and taking glucan. She is currently supplementing her diet with vitamin B12, iron, multivitamins, and vitamin D, but not vitamin C. She continues to take sodium tablets twice daily, as prescribed by Dr. Carr, due to her consistently low sodium levels. She has no urinary complaints and has rescheduled her annual visit to ensure she sees her urologist and  beforehand.    She continues to experience dry eyes. She has tried Refresh eye drops from both the yellow and pink boxes, but still experiences discomfort. Her ophthalmologist, Dr. Angelo, recommended the pink box, but she accidentally used the yellow one. She has been seeing Dr. Angelo every three weeks and has had imaging of her eyes done. She has not undergone laser treatment for her dry eyes and is unsure if she is a candidate for  it. She was advised to consult an ophthalmologist.    She has Sjogren's syndrome and is on Xeljanz. She stopped taking hydroxychloroquine and Plaquenil due to eye issues. She will see Dr. Scanlon in 12/2024.     Review of Systems   Constitutional:  Negative for fever and unexpected weight change.   HENT:  Negative for nosebleeds and trouble swallowing.    Eyes:  Negative for visual disturbance.   Respiratory:  Negative for chest tightness and shortness of breath.    Cardiovascular:  Negative for chest pain, palpitations and leg swelling.   Gastrointestinal:  Negative for abdominal pain, constipation, diarrhea and nausea.   Endocrine: Negative for cold intolerance.   Genitourinary:  Negative for dysuria and urgency.   Musculoskeletal:  Negative for joint swelling and myalgias.   Skin:  Positive for rash and wound.   Neurological:  Negative for tremors, seizures and syncope.   Hematological:  Does not bruise/bleed easily.   Psychiatric/Behavioral:  Negative for hallucinations and suicidal ideas.      Objective     /80 (BP Location: Left arm, Patient Position: Sitting, Cuff Size: Standard)   Pulse 82   Resp 16   Ht 5' (1.524 m)   Wt 70.8 kg (156 lb)   LMP  (LMP Unknown)   SpO2 97%   BMI 30.47 kg/m²     Physical Exam  Vital Signs  Blood pressure reading is 116/80.  Physical Exam  Vitals and nursing note reviewed.   Constitutional:       Appearance: She is well-developed.   HENT:      Head: Normocephalic and atraumatic.      Right Ear: External ear normal.      Left Ear: External ear normal.      Nose: Nose normal.   Eyes:      Conjunctiva/sclera: Conjunctivae normal.      Pupils: Pupils are equal, round, and reactive to light.   Cardiovascular:      Rate and Rhythm: Normal rate and regular rhythm.      Heart sounds: Normal heart sounds. No murmur heard.  Pulmonary:      Effort: Pulmonary effort is normal.      Breath sounds: Normal breath sounds. No wheezing.   Abdominal:      General: Bowel sounds are  normal.      Palpations: Abdomen is soft.   Musculoskeletal:         General: No tenderness. Normal range of motion.      Cervical back: Normal range of motion and neck supple.   Lymphadenopathy:      Cervical: No cervical adenopathy.   Skin:     General: Skin is warm and dry.      Capillary Refill: Capillary refill takes less than 2 seconds.      Findings: Rash present.   Neurological:      Mental Status: She is alert and oriented to person, place, and time.   Psychiatric:         Behavior: Behavior normal.         Thought Content: Thought content normal.         Judgment: Judgment normal.

## 2024-09-12 ENCOUNTER — OFFICE VISIT (OUTPATIENT)
Dept: HEMATOLOGY ONCOLOGY | Facility: CLINIC | Age: 74
End: 2024-09-12
Payer: COMMERCIAL

## 2024-09-12 VITALS
RESPIRATION RATE: 17 BRPM | SYSTOLIC BLOOD PRESSURE: 118 MMHG | OXYGEN SATURATION: 97 % | DIASTOLIC BLOOD PRESSURE: 86 MMHG | BODY MASS INDEX: 30.82 KG/M2 | WEIGHT: 157 LBS | HEART RATE: 79 BPM | TEMPERATURE: 97.7 F | HEIGHT: 60 IN

## 2024-09-12 DIAGNOSIS — M35.01 SJOGREN'S SYNDROME WITH KERATOCONJUNCTIVITIS SICCA (HCC): ICD-10-CM

## 2024-09-12 DIAGNOSIS — C83.00 SMALL LYMPHOCYTIC LYMPHOMA (HCC): Primary | ICD-10-CM

## 2024-09-12 PROCEDURE — G2211 COMPLEX E/M VISIT ADD ON: HCPCS | Performed by: INTERNAL MEDICINE

## 2024-09-12 PROCEDURE — 99214 OFFICE O/P EST MOD 30 MIN: CPT | Performed by: INTERNAL MEDICINE

## 2024-09-12 NOTE — PROGRESS NOTES
[facility] [place]  [address]  [place] [address]  [phone]    Cassia Turcios,1950, [id number]  09/12/24    Discussion:   In summary, this is a 74-year-old female history of CLL/SLL.  Brukinsa 320 mg p.o. daily since September 2023.  Recent WBC 5.6, hemoglobin 13.1, platelet count 259.  48% lymphocytes.  ALC 2.8.  CMP is normal except sodium 133.  Clinically, she is stable she is able to walk in the mall every day.  Blood pressure has been normal with decreased blood pressure medication.  She has infections 3-4 times a year, primarily upper respiratory.  Quantitative immunoglobulins showed no deficiencies.  In fact, IgG was elevated.  She has had increase in Sjogren's symptoms, possibly contributing to infectious problems.  Follow-up in rheumatology as anticipated.  If all is well I will see her back in 3 months for review with repeat imaging and blood work.  I discussed the above with the patient.  The patient  voiced understanding and agreement.  ______________________________________________________________________    Chief Complaint   Patient presents with    Follow-up       HPI:  Oncology History    No history exists.       Interval History: Clinically stable.  ECOG-  1 - Symptomatic but completely ambulatory    Review of Systems   Constitutional:  Positive for fatigue. Negative for appetite change, diaphoresis and fever.   HENT:  Positive for rhinorrhea and sinus pressure. Negative for sinus pain.    Eyes:  Negative for discharge.   Respiratory:  Negative for cough and shortness of breath.    Cardiovascular:  Negative for chest pain.   Gastrointestinal:  Negative for abdominal pain, constipation and diarrhea.   Endocrine: Negative for cold intolerance.   Genitourinary:  Negative for difficulty urinating and hematuria.   Musculoskeletal:  Negative for joint swelling.   Skin:  Negative for rash.   Allergic/Immunologic: Negative for  environmental allergies.   Neurological:  Negative for dizziness and headaches.   Hematological:  Negative for adenopathy.   Psychiatric/Behavioral:  Negative for agitation.        Past Medical History:   Diagnosis Date    Acute kidney injury superimposed on chronic kidney disease  (HCC) 09/20/2021    Age-related osteoporosis without current pathological fracture 06/01/2023    Allergic rhinitis     Anemia     Anxiety     Bipolar 1 disorder (HCC)     Constipation     Deaf     Pt lost all hearing in right ear after having [place] measles    Depression     Diarrhea     Fatigue     GERD (gastroesophageal reflux disease) 08/01/2020    Hard of hearing     Pt wears a hearing in left ear.    HL (hearing loss)     Hypertension 10/07/2023    Hypothyroidism     Lung nodule     Nasal congestion     Osteoporosis     Pneumonia 02/10/2017    Pneumonia due to Streptococcus (HCC) 09/19/2021    Psychiatric disorder     Recurrent UTI 06/05/2023    Sjogren's syndrome (HCC)     Small lymphocytic lymphoma (HCC) 09/18/2023    Systemic lupus erythematosus ([facility])     Wears glasses     Wears partial dentures      Patient Active Problem List   Diagnosis    Bipolar I disorder, single manic episode (HCC)    Hypothyroidism    Systemic lupus erythematosus (HCC)    Acid reflux disease    Depression    Obesity    Polyarthralgia    Postgastrectomy malabsorption    Sjogren's syndrome (HCC)    Vaginal atrophy    Vitamin D deficiency    Lymphadenopathy    Iron deficiency anemia following bariatric surgery    On belimumab therapy    S/P gastric bypass    Encounter for surgical aftercare following surgery of digestive system    Hyponatremia    Age-related osteoporosis without current pathological fracture    Pulmonary nodule    Small lymphocytic lymphoma (HCC)    Angiomyolipoma    Non-nephrotic range proteinuria       Current Outpatient Medications:     buPROPion (WELLBUTRIN) 100 mg tablet, Take 100 mg by mouth 3 (three) times a day., Disp: , Rfl:      "Cholecalciferol (D3-50) 1.25 MG (38233 UT) capsule, 5,000 Units daily, Disp: , Rfl:     clotrimazole-betamethasone (LOTRISONE) 1-0.05 % cream, Apply topically 2 (two) times a day, Disp: 45 g, Rfl: 0    ferrous sulfate 325 (65 Fe) mg tablet, Take 1 tablet (325 mg total) by mouth 2 (two) times a day with meals, Disp: 60 tablet, Rfl: 0    Multiple Vitamins-Minerals (CENTRUM MINIS WOMEN 50+ PO), , Disp: , Rfl:     olopatadine HCl (PATADAY) 0.2 % opth drops, Administer 1 drop to both eyes daily, Disp: 2.5 mL, Rfl: 1    pilocarpine (SALAGEN) 5 mg tablet, Take 1 tablet (5 mg total) by mouth 3 (three) times a day, Disp: 90 tablet, Rfl: 5    QUEtiapine (SEROquel) 300 mg tablet, Take 300 mg by mouth daily at bedtime, Disp: , Rfl:     QUEtiapine (SEROquel) 50 mg tablet, 50 mg daily at bedtime, Disp: , Rfl:     sodium chloride 1 g tablet, Take 1 tablet (1 g total) by mouth 2 (two) times a day, Disp: 180 tablet, Rfl: 0    temazepam (RESTORIL) 30 mg capsule, Take 2 capsules by mouth daily at bedtime as needed , Disp: , Rfl:     Zanubrutinib (Brukinsa) 80 MG CAPS, Take 4 capsules (320 mg total) by mouth once daily., Disp: 120 capsule, Rfl: 10    Current Facility-Administered Medications:     cyanocobalamin injection 1,000 mcg, 1,000 mcg, Intramuscular, Q30 Days, Julio Bhat MD, 1,000 mcg at 12/28/23 1242  Allergies   Allergen Reactions    Ciprofloxacin Hives and Swelling     Pt reports that lips and mouth and face swelled.    Cymbalta [Duloxetine Hcl] Other (See Comments)     Hallucinations, fatigue, faints    Nirmatrelvir-Ritonavir Diarrhea, Nausea Only, Other (See Comments), Dizziness and Lightheadedness     \"passed out\"-nausea    Percocet [Oxycodone-Acetaminophen] Other (See Comments)     Dry mouth - pt states it kept her up all night. States had to continually drink fluids     Past Surgical History:   Procedure Laterality Date    BREAST BIOPSY Right     many years ago at [facility]    COLONOSCOPY      EYE SURGERY      " FRACTURE SURGERY Right     elbow    GASTRIC BYPASS      HYSTERECTOMY Bilateral 1975    IR BIOPSY LYMPH NODE  10/08/2020    LUNG BIOPSY      LYMPH NODE BIOPSY  09/18/2020    LYMPH NODE BIOPSY Left 08/28/2023    Procedure: EXCISION BIOPSY LYMPH NODE LEFT AXILLARY;  Surgeon: Scott Seaman MD;  Location: [facility];  Service: Thoracic    [place] [facility] INCL FLUOR GDNCE DX W/CELL WASHG SPX N/A 12/08/2017    Procedure: BRONCHOSCOPY;  Surgeon: Chepe Luna MD;  Location: [facility];  Service: Pulmonary    [place] BX/EXC LYMPH NODE NEEDLE SUPERFICIAL Left 02/25/2021    Procedure: EXCISION BIOPSY LYMPH NODE: left axillary lymph node biopsy;  Surgeon: Dakota Heard MD;  Location: [facility];  Service: Thoracic    TONSILLECTOMY       Social History     Objective:  Vitals:    09/12/24 0837   BP: 118/86   BP Location: Left arm   Patient Position: Sitting   Cuff Size: Adult   Pulse: 79   Resp: 17   Temp: 97.7 °F (36.5 °C)   SpO2: 97%   Weight: 71.2 kg (157 lb)   Height: 5' (1.524 m)     Physical Exam  Constitutional:       Appearance: She is well-developed.   HENT:      Head: Normocephalic and atraumatic.   Eyes:      Pupils: Pupils are equal, round, and reactive to light.   Cardiovascular:      Rate and Rhythm: Normal rate.      Heart sounds: No murmur heard.  Pulmonary:      Effort: No respiratory distress.      Breath sounds: No wheezing or rales.   Abdominal:      General: There is no distension.      Palpations: Abdomen is soft.      Tenderness: There is no abdominal tenderness. There is no rebound.   Musculoskeletal:         General: No tenderness.      Cervical back: Neck supple.   Lymphadenopathy:      Cervical: No cervical adenopathy.   Skin:     General: Skin is warm.      Findings: No rash.   Neurological:      Mental Status: She is alert and oriented to person, place, and time.      Deep Tendon Reflexes: Reflexes normal.   Psychiatric:         Thought Content: Thought content normal.           Labs:  I personally  reviewed the labs and imaging pertinent to this patient care.

## 2024-09-16 ENCOUNTER — TELEPHONE (OUTPATIENT)
Age: 74
End: 2024-09-16

## 2024-09-16 PROCEDURE — 88307 TISSUE EXAM BY PATHOLOGIST: CPT | Performed by: STUDENT IN AN ORGANIZED HEALTH CARE EDUCATION/TRAINING PROGRAM

## 2024-09-16 PROCEDURE — 88312 SPECIAL STAINS GROUP 1: CPT | Performed by: STUDENT IN AN ORGANIZED HEALTH CARE EDUCATION/TRAINING PROGRAM

## 2024-09-16 PROCEDURE — 88341 IMHCHEM/IMCYTCHM EA ADD ANTB: CPT | Performed by: STUDENT IN AN ORGANIZED HEALTH CARE EDUCATION/TRAINING PROGRAM

## 2024-09-16 PROCEDURE — 88342 IMHCHEM/IMCYTCHM 1ST ANTB: CPT | Performed by: STUDENT IN AN ORGANIZED HEALTH CARE EDUCATION/TRAINING PROGRAM

## 2024-09-17 DIAGNOSIS — L30.9 ECZEMA, UNSPECIFIED TYPE: ICD-10-CM

## 2024-09-17 NOTE — TELEPHONE ENCOUNTER
Patient requested to see Dr. CHICHI Jackson specifically and the decision tree provides a message to transfer patient to the practice if they request to see the surgeon. I was unable to reach office. Please contact this patient to schedule accordingly with the surgeon as the POD is not able to.

## 2024-09-18 ENCOUNTER — LAB REQUISITION (OUTPATIENT)
Dept: LAB | Facility: HOSPITAL | Age: 74
End: 2024-09-18
Payer: COMMERCIAL

## 2024-09-18 DIAGNOSIS — L30.9 DERMATITIS, UNSPECIFIED: ICD-10-CM

## 2024-09-18 RX ORDER — CLOTRIMAZOLE AND BETAMETHASONE DIPROPIONATE 10; .64 MG/G; MG/G
CREAM TOPICAL 2 TIMES DAILY
Qty: 45 G | Refills: 0 | Status: SHIPPED | OUTPATIENT
Start: 2024-09-18

## 2024-09-24 PROCEDURE — 88312 SPECIAL STAINS GROUP 1: CPT | Performed by: STUDENT IN AN ORGANIZED HEALTH CARE EDUCATION/TRAINING PROGRAM

## 2024-09-24 PROCEDURE — 88307 TISSUE EXAM BY PATHOLOGIST: CPT | Performed by: STUDENT IN AN ORGANIZED HEALTH CARE EDUCATION/TRAINING PROGRAM

## 2024-09-24 PROCEDURE — 88342 IMHCHEM/IMCYTCHM 1ST ANTB: CPT | Performed by: STUDENT IN AN ORGANIZED HEALTH CARE EDUCATION/TRAINING PROGRAM

## 2024-09-24 PROCEDURE — 88341 IMHCHEM/IMCYTCHM EA ADD ANTB: CPT | Performed by: STUDENT IN AN ORGANIZED HEALTH CARE EDUCATION/TRAINING PROGRAM

## 2024-10-09 ENCOUNTER — PATIENT MESSAGE (OUTPATIENT)
Dept: NEPHROLOGY | Facility: CLINIC | Age: 74
End: 2024-10-09

## 2024-10-09 ENCOUNTER — TELEPHONE (OUTPATIENT)
Dept: RHEUMATOLOGY | Facility: CLINIC | Age: 74
End: 2024-10-09

## 2024-10-09 DIAGNOSIS — I77.6 VASCULITIS (HCC): ICD-10-CM

## 2024-10-09 DIAGNOSIS — M35.00 SJOGREN'S SYNDROME, WITH UNSPECIFIED ORGAN INVOLVEMENT (HCC): ICD-10-CM

## 2024-10-09 DIAGNOSIS — M32.9 SYSTEMIC LUPUS ERYTHEMATOSUS, UNSPECIFIED SLE TYPE, UNSPECIFIED ORGAN INVOLVEMENT STATUS (HCC): Primary | ICD-10-CM

## 2024-10-09 DIAGNOSIS — R77.8 ELEVATED TOTAL PROTEIN: Primary | ICD-10-CM

## 2024-10-11 ENCOUNTER — TELEPHONE (OUTPATIENT)
Age: 74
End: 2024-10-11

## 2024-10-11 NOTE — TELEPHONE ENCOUNTER
Patient calling back stating she received a message from Dr. Chavez that the doctor will see the patient in the office on 10/16/24 @ 9am, someone from the office is supposed to call to confirm that appointment, but patient hasn't received a confirmation yet.    Patient requesting a call back

## 2024-10-12 ENCOUNTER — APPOINTMENT (OUTPATIENT)
Dept: LAB | Facility: HOSPITAL | Age: 74
End: 2024-10-12
Payer: COMMERCIAL

## 2024-10-12 DIAGNOSIS — E03.9 HYPOTHYROIDISM, UNSPECIFIED TYPE: ICD-10-CM

## 2024-10-12 DIAGNOSIS — I77.6 VASCULITIS (HCC): ICD-10-CM

## 2024-10-12 DIAGNOSIS — M32.9 SYSTEMIC LUPUS ERYTHEMATOSUS, UNSPECIFIED SLE TYPE, UNSPECIFIED ORGAN INVOLVEMENT STATUS (HCC): ICD-10-CM

## 2024-10-12 DIAGNOSIS — R77.8 ELEVATED TOTAL PROTEIN: ICD-10-CM

## 2024-10-12 DIAGNOSIS — M35.00 SJOGREN'S SYNDROME, WITH UNSPECIFIED ORGAN INVOLVEMENT (HCC): ICD-10-CM

## 2024-10-12 LAB
ALBUMIN SERPL BCG-MCNC: 3.9 G/DL (ref 3.5–5)
ALP SERPL-CCNC: 97 U/L (ref 34–104)
ALT SERPL W P-5'-P-CCNC: 37 U/L (ref 7–52)
ANION GAP SERPL CALCULATED.3IONS-SCNC: 6 MMOL/L (ref 4–13)
AST SERPL W P-5'-P-CCNC: 41 U/L (ref 13–39)
BACTERIA UR QL AUTO: ABNORMAL /HPF
BILIRUB SERPL-MCNC: 0.75 MG/DL (ref 0.2–1)
BILIRUB UR QL STRIP: NEGATIVE
BUN SERPL-MCNC: 24 MG/DL (ref 5–25)
C3 SERPL-MCNC: 106 MG/DL (ref 87–200)
C4 SERPL-MCNC: 18 MG/DL (ref 19–52)
CALCIUM SERPL-MCNC: 9.5 MG/DL (ref 8.4–10.2)
CHLORIDE SERPL-SCNC: 100 MMOL/L (ref 96–108)
CK SERPL-CCNC: 27 U/L (ref 26–192)
CLARITY UR: CLEAR
CO2 SERPL-SCNC: 26 MMOL/L (ref 21–32)
COLOR UR: YELLOW
CREAT SERPL-MCNC: 0.85 MG/DL (ref 0.6–1.3)
CREAT UR-MCNC: 49.1 MG/DL
CRP SERPL QL: 5.3 MG/L
ERYTHROCYTE [DISTWIDTH] IN BLOOD BY AUTOMATED COUNT: 13.7 % (ref 11.6–15.1)
ERYTHROCYTE [SEDIMENTATION RATE] IN BLOOD: 107 MM/HOUR (ref 0–29)
GFR SERPL CREATININE-BSD FRML MDRD: 68 ML/MIN/1.73SQ M
GLUCOSE P FAST SERPL-MCNC: 87 MG/DL (ref 65–99)
GLUCOSE UR STRIP-MCNC: NEGATIVE MG/DL
HCT VFR BLD AUTO: 40.2 % (ref 34.8–46.1)
HGB BLD-MCNC: 13.1 G/DL (ref 11.5–15.4)
HGB UR QL STRIP.AUTO: ABNORMAL
KETONES UR STRIP-MCNC: NEGATIVE MG/DL
LEUKOCYTE ESTERASE UR QL STRIP: ABNORMAL
MCH RBC QN AUTO: 30.8 PG (ref 26.8–34.3)
MCHC RBC AUTO-ENTMCNC: 32.6 G/DL (ref 31.4–37.4)
MCV RBC AUTO: 95 FL (ref 82–98)
NITRITE UR QL STRIP: NEGATIVE
NON-SQ EPI CELLS URNS QL MICRO: ABNORMAL /HPF
PH UR STRIP.AUTO: 6.5 [PH]
PLATELET # BLD AUTO: 297 THOUSANDS/UL (ref 149–390)
PMV BLD AUTO: 10.7 FL (ref 8.9–12.7)
POTASSIUM SERPL-SCNC: 4 MMOL/L (ref 3.5–5.3)
PROT SERPL-MCNC: 9.1 G/DL (ref 6.4–8.4)
PROT UR STRIP-MCNC: NEGATIVE MG/DL
PROT UR-MCNC: 9.6 MG/DL
PROT/CREAT UR: 0.2 MG/G{CREAT} (ref 0–0.1)
RBC # BLD AUTO: 4.25 MILLION/UL (ref 3.81–5.12)
RBC #/AREA URNS AUTO: ABNORMAL /HPF
SODIUM SERPL-SCNC: 132 MMOL/L (ref 135–147)
SP GR UR STRIP.AUTO: 1.01 (ref 1–1.03)
T4 FREE SERPL-MCNC: 0.56 NG/DL (ref 0.61–1.12)
TSH SERPL DL<=0.05 MIU/L-ACNC: 5.24 UIU/ML (ref 0.45–4.5)
UROBILINOGEN UR QL STRIP.AUTO: 0.2 E.U./DL
WBC # BLD AUTO: 6.05 THOUSAND/UL (ref 4.31–10.16)
WBC #/AREA URNS AUTO: ABNORMAL /HPF

## 2024-10-12 PROCEDURE — 86147 CARDIOLIPIN ANTIBODY EA IG: CPT

## 2024-10-12 PROCEDURE — 82570 ASSAY OF URINE CREATININE: CPT

## 2024-10-12 PROCEDURE — 82595 ASSAY OF CRYOGLOBULIN: CPT

## 2024-10-12 PROCEDURE — 85705 THROMBOPLASTIN INHIBITION: CPT

## 2024-10-12 PROCEDURE — 85613 RUSSELL VIPER VENOM DILUTED: CPT

## 2024-10-12 PROCEDURE — 80053 COMPREHEN METABOLIC PANEL: CPT

## 2024-10-12 PROCEDURE — 86160 COMPLEMENT ANTIGEN: CPT

## 2024-10-12 PROCEDURE — 82550 ASSAY OF CK (CPK): CPT

## 2024-10-12 PROCEDURE — 84443 ASSAY THYROID STIM HORMONE: CPT

## 2024-10-12 PROCEDURE — 36415 COLL VENOUS BLD VENIPUNCTURE: CPT

## 2024-10-12 PROCEDURE — 86140 C-REACTIVE PROTEIN: CPT

## 2024-10-12 PROCEDURE — 84156 ASSAY OF PROTEIN URINE: CPT

## 2024-10-12 PROCEDURE — 83520 IMMUNOASSAY QUANT NOS NONAB: CPT

## 2024-10-12 PROCEDURE — 84439 ASSAY OF FREE THYROXINE: CPT

## 2024-10-12 PROCEDURE — 85652 RBC SED RATE AUTOMATED: CPT

## 2024-10-12 PROCEDURE — 86037 ANCA TITER EACH ANTIBODY: CPT

## 2024-10-12 PROCEDURE — 81001 URINALYSIS AUTO W/SCOPE: CPT

## 2024-10-12 PROCEDURE — 85670 THROMBIN TIME PLASMA: CPT

## 2024-10-12 PROCEDURE — 86225 DNA ANTIBODY NATIVE: CPT

## 2024-10-12 PROCEDURE — 85027 COMPLETE CBC AUTOMATED: CPT

## 2024-10-12 PROCEDURE — 85732 THROMBOPLASTIN TIME PARTIAL: CPT

## 2024-10-12 PROCEDURE — 86146 BETA-2 GLYCOPROTEIN ANTIBODY: CPT

## 2024-10-14 LAB
APTT SCREEN TO CONFIRM RATIO: 1.07 RATIO (ref 0–1.34)
CONFIRM APTT/NORMAL: 36.9 SEC (ref 0–47.6)
DSDNA AB SER-ACNC: 1 IU/ML (ref 0–9)
LA PPP-IMP: NORMAL
SCREEN APTT: 34.3 SEC (ref 0–43.5)
SCREEN DRVVT: 35.3 SEC (ref 0–47)
THROMBIN TIME: 17.5 SEC (ref 0–23)

## 2024-10-15 ENCOUNTER — OFFICE VISIT (OUTPATIENT)
Dept: UROLOGY | Facility: CLINIC | Age: 74
End: 2024-10-15
Payer: COMMERCIAL

## 2024-10-15 ENCOUNTER — APPOINTMENT (OUTPATIENT)
Dept: LAB | Facility: CLINIC | Age: 74
End: 2024-10-15
Payer: COMMERCIAL

## 2024-10-15 ENCOUNTER — PATIENT MESSAGE (OUTPATIENT)
Dept: FAMILY MEDICINE CLINIC | Facility: CLINIC | Age: 74
End: 2024-10-15

## 2024-10-15 VITALS
WEIGHT: 163 LBS | DIASTOLIC BLOOD PRESSURE: 80 MMHG | BODY MASS INDEX: 32 KG/M2 | HEIGHT: 60 IN | OXYGEN SATURATION: 98 % | SYSTOLIC BLOOD PRESSURE: 110 MMHG | HEART RATE: 67 BPM

## 2024-10-15 DIAGNOSIS — N39.0 RECURRENT UTI: Primary | ICD-10-CM

## 2024-10-15 PROBLEM — N18.32 STAGE 3B CHRONIC KIDNEY DISEASE (HCC): Status: ACTIVE | Noted: 2024-10-15

## 2024-10-15 PROBLEM — C91.10 CLL (CHRONIC LYMPHOCYTIC LEUKEMIA) (HCC): Status: ACTIVE | Noted: 2024-10-15

## 2024-10-15 PROBLEM — Z79.83 LONG TERM USE OF BISPHOSPHONATES: Status: ACTIVE | Noted: 2024-10-15

## 2024-10-15 PROBLEM — I77.6 VASCULITIS (HCC): Status: ACTIVE | Noted: 2024-10-15

## 2024-10-15 PROBLEM — M17.0 BILATERAL PRIMARY OSTEOARTHRITIS OF KNEE: Status: ACTIVE | Noted: 2024-10-15

## 2024-10-15 LAB
B2 GLYCOPROT1 IGA SERPL IA-ACNC: 1.5
B2 GLYCOPROT1 IGG SERPL IA-ACNC: 1
B2 GLYCOPROT1 IGM SERPL IA-ACNC: 3.2
C-ANCA TITR SER IF: NORMAL TITER
CARDIOLIPIN IGA SER IA-ACNC: 2.8
CARDIOLIPIN IGG SER IA-ACNC: 3
CARDIOLIPIN IGM SER IA-ACNC: 5.8
MYELOPEROXIDASE AB SER IA-ACNC: <0.2 UNITS (ref 0–0.9)
P-ANCA ATYPICAL TITR SER IF: NORMAL TITER
P-ANCA TITR SER IF: NORMAL TITER
PROTEINASE3 AB SER IA-ACNC: <0.2 UNITS (ref 0–0.9)

## 2024-10-15 PROCEDURE — 99214 OFFICE O/P EST MOD 30 MIN: CPT

## 2024-10-15 RX ORDER — ESTRADIOL 0.1 MG/G
1 CREAM VAGINAL 2 TIMES WEEKLY
Qty: 42.5 G | Refills: 1 | Status: SHIPPED | OUTPATIENT
Start: 2024-10-17

## 2024-10-15 RX ORDER — TRIAMCINOLONE ACETONIDE 1 MG/G
CREAM TOPICAL
COMMUNITY
Start: 2024-09-16

## 2024-10-15 RX ORDER — CEPHALEXIN 500 MG/1
500 CAPSULE ORAL EVERY 8 HOURS SCHEDULED
Qty: 21 CAPSULE | Refills: 0 | Status: SHIPPED | OUTPATIENT
Start: 2024-10-15 | End: 2024-10-22

## 2024-10-15 RX ORDER — MUPIROCIN 20 MG/G
OINTMENT TOPICAL
COMMUNITY
Start: 2024-09-30

## 2024-10-15 NOTE — PROGRESS NOTES
10/15/2024      No chief complaint on file.        Assessment and Plan    74 y.o. female managed by Dr. Barnard      Angiomyolipoma  -left lower pole AML.  -scheduled for CT abd pelvis w/contrast on 12/5/24  -We will plan to see her back in January to review.      2. Recurrent UTI  -urine culture pending  -abx sent to pharmacy. Patient is aware that we will notify her of her urine culture regarding sensitivities.  -Topical estrace sent to pharmacy. Application use discussed.        History of Present Illness  Cassia Turcios is a 74 y.o. female here for follow-up of angiomyolipoma and recurrent UTIs. She was last seen by Dr. Barnard in January. At that time, he wanted patient to have follow-up imaging in 6 months. Patient has lymphoma and follows closely with hem onc. Patient has scheduled CT abd pelvis with contrast scheduled on 12/5.     Patient states that she feels like she has a UTI. She did drop off a urine sample at the lab today and urine culture is pending. She states over the last 4-5 days she has been having dysuria, frequency and urgency. She denies flank pain and gross hematuria. No fevers or chills. I did start her on abx, we will be in contact with her when sensitivities are back. We also discussed topical vaginal estrace for UTIs which patient will start using 2x week.     CT chest and abd w/contrast (1/15/24) showing 22 x 22 mm left lower pole renal lesion containing macroscopic fat which has demonstrated enlargement since 2018 and was not present on the study from 11/10/2005 consistent with an angiomyolipoma.     Review of Systems   Constitutional:  Negative for chills and fever.   HENT:  Negative for ear pain and sore throat.    Eyes:  Negative for pain and visual disturbance.   Respiratory:  Negative for cough and shortness of breath.    Cardiovascular:  Negative for chest pain and palpitations.   Gastrointestinal:  Negative for abdominal pain, nausea and vomiting.   Genitourinary:  Positive for  dysuria, frequency and urgency. Negative for difficulty urinating, flank pain and hematuria.   Musculoskeletal:  Negative for arthralgias and back pain.   Skin:  Negative for color change and rash.   Neurological:  Negative for seizures and syncope.   All other systems reviewed and are negative.               Vitals  Vitals:    10/15/24 1510   BP: 110/80   BP Location: Left arm   Patient Position: Sitting   Cuff Size: Adult   Pulse: 67   SpO2: 98%   Weight: 73.9 kg (163 lb)   Height: 5' (1.524 m)       Physical Exam  Constitutional:       Appearance: Normal appearance.   HENT:      Head: Normocephalic.   Eyes:      Extraocular Movements: Extraocular movements intact.      Pupils: Pupils are equal, round, and reactive to light.   Pulmonary:      Effort: Pulmonary effort is normal. No respiratory distress.   Musculoskeletal:         General: Normal range of motion.      Cervical back: Normal range of motion.   Neurological:      Mental Status: She is alert and oriented to person, place, and time.   Psychiatric:         Mood and Affect: Mood normal.         Behavior: Behavior normal.         Thought Content: Thought content normal.         Judgment: Judgment normal.           Past History  Past Medical History:   Diagnosis Date    Acute kidney injury superimposed on chronic kidney disease  ([facility]) 09/20/2021    Age-related osteoporosis without current pathological fracture 06/01/2023    Allergic rhinitis     Anemia     Anxiety     Bipolar 1 disorder ([facility])     Constipation     Deaf     Pt lost all hearing in right ear after having [place] measles    Depression     Diarrhea     Fatigue     GERD (gastroesophageal reflux disease) 08/01/2020    Hard of hearing     Pt wears a hearing in left ear.    HL (hearing loss)     Hypertension 10/07/2023    Hypothyroidism     Lung nodule     Nasal congestion     Osteoporosis     Pneumonia 02/10/2017    Pneumonia due to Streptococcus ([facility]) 09/19/2021    Psychiatric disorder     Recurrent  UTI 06/05/2023    Sjogren's syndrome (HCC)     Small lymphocytic lymphoma (HCC) 09/18/2023    Systemic lupus erythematosus (HCC)     Wears glasses     Wears partial dentures      Social History     Socioeconomic History    Marital status: /Civil Union     Spouse name: None    Number of children: None    Years of education: None    Highest education level: None   Occupational History    None   Tobacco Use    Smoking status: Never    Smokeless tobacco: Never   Vaping Use    Vaping status: Never Used   Substance and Sexual Activity    Alcohol use: No    Drug use: No    Sexual activity: Not Currently     Partners: Male     Birth control/protection: Female Sterilization     Comment:    Other Topics Concern    None   Social History Narrative    None     Social Determinants of Health     Financial Resource Strain: Low Risk  (7/14/2023)    Overall Financial Resource Strain (CARDIA)     Difficulty of Paying Living Expenses: Not very hard   Food Insecurity: Not on file   Transportation Needs: No Transportation Needs (7/14/2023)    PRAPARE - Transportation     Lack of Transportation (Medical): No     Lack of Transportation (Non-Medical): No   Physical Activity: Not on file   Stress: Not on file   Social Connections: Not on file   Intimate Partner Violence: Not on file   Housing Stability: Not on file     Social History     Tobacco Use   Smoking Status Never   Smokeless Tobacco Never     Family History   Problem Relation Age of Onset    Heart disease Mother     Diabetes Mother     Kidney cancer Mother     Cancer Mother         Kidney    Hypertension Mother     Heart disease Father     Stroke Father     Diabetes Father     Cancer Father         Rectal Cancer    Hypertension Father     No Known Problems Maternal Grandmother     No Known Problems Maternal Grandfather     No Known Problems Paternal Grandmother     No Known Problems Paternal Grandfather     Leukemia Half-Sister 50    No Known Problems Half-Sister      "No Known Problems Half-Sister     No Known Problems Half-Sister     BRCA2 Positive Neg Hx     BRCA2 Negative Neg Hx     BRCA1 Negative Neg Hx     Endometrial cancer Neg Hx     Breast cancer Neg Hx     Breast cancer additional onset Neg Hx     Colon cancer Neg Hx     Ovarian cancer Neg Hx     BRCA1 Positive Neg Hx     BRCA 1/2 Neg Hx        The following portions of the patient's history were reviewed and updated as appropriate: allergies, current medications, past medical history, past social history, past surgical history and problem list.    Results  No results found for this or any previous visit (from the past 1 hour(s)).]  No results found for: \"PSA\"  Lab Results   Component Value Date    CALCIUM 9.5 10/12/2024    K 4.0 10/12/2024    CO2 26 10/12/2024     10/12/2024    BUN 24 10/12/2024    CREATININE 0.85 10/12/2024     Lab Results   Component Value Date    WBC 6.05 10/12/2024    HGB 13.1 10/12/2024    HCT 40.2 10/12/2024    MCV 95 10/12/2024     10/12/2024       KRYSTIN Hobbs  "

## 2024-10-16 ENCOUNTER — TELEPHONE (OUTPATIENT)
Age: 74
End: 2024-10-16

## 2024-10-16 ENCOUNTER — OFFICE VISIT (OUTPATIENT)
Dept: RHEUMATOLOGY | Facility: CLINIC | Age: 74
End: 2024-10-16
Payer: COMMERCIAL

## 2024-10-16 VITALS
SYSTOLIC BLOOD PRESSURE: 116 MMHG | HEIGHT: 61 IN | WEIGHT: 163 LBS | BODY MASS INDEX: 30.78 KG/M2 | HEART RATE: 66 BPM | RESPIRATION RATE: 16 BRPM | DIASTOLIC BLOOD PRESSURE: 80 MMHG | OXYGEN SATURATION: 98 %

## 2024-10-16 DIAGNOSIS — I77.6 VASCULITIS (HCC): ICD-10-CM

## 2024-10-16 DIAGNOSIS — C91.10 CLL (CHRONIC LYMPHOCYTIC LEUKEMIA) (HCC): ICD-10-CM

## 2024-10-16 DIAGNOSIS — M32.9 SYSTEMIC LUPUS ERYTHEMATOSUS, UNSPECIFIED SLE TYPE, UNSPECIFIED ORGAN INVOLVEMENT STATUS (HCC): Primary | ICD-10-CM

## 2024-10-16 DIAGNOSIS — J84.2 LYMPHOCYTIC INTERSTITIAL PNEUMONIA (HCC): ICD-10-CM

## 2024-10-16 DIAGNOSIS — M81.0 AGE-RELATED OSTEOPOROSIS WITHOUT CURRENT PATHOLOGICAL FRACTURE: ICD-10-CM

## 2024-10-16 DIAGNOSIS — Z79.899 LONG-TERM USE OF PLAQUENIL: ICD-10-CM

## 2024-10-16 DIAGNOSIS — N18.32 STAGE 3B CHRONIC KIDNEY DISEASE (HCC): ICD-10-CM

## 2024-10-16 DIAGNOSIS — Z79.83 LONG TERM USE OF BISPHOSPHONATES: ICD-10-CM

## 2024-10-16 DIAGNOSIS — M17.0 BILATERAL PRIMARY OSTEOARTHRITIS OF KNEE: ICD-10-CM

## 2024-10-16 DIAGNOSIS — M35.00 SJOGREN'S SYNDROME, WITH UNSPECIFIED ORGAN INVOLVEMENT (HCC): ICD-10-CM

## 2024-10-16 PROCEDURE — G2211 COMPLEX E/M VISIT ADD ON: HCPCS | Performed by: INTERNAL MEDICINE

## 2024-10-16 PROCEDURE — 99215 OFFICE O/P EST HI 40 MIN: CPT | Performed by: INTERNAL MEDICINE

## 2024-10-16 NOTE — PROGRESS NOTES
Assessment and Plan:   Ms. Turcios is a 74-year-old female with history significant for systemic lupus erythematosus and Sjogren's syndrome diagnosed in her early 40s by Rheumatology in [place] who presents for a follow-up.  She is currently on pilocarpine 5 mg twice daily and discontinued the hydroxychloroquine in 7/24 as she was concerned this was contributing to dry eyes.     # Skin rash, with concerns for vasculitis  - Cassia presents for an acute visit today as she has been experiencing a scattered skin rash with histopathological evidence of early/evolving vasculitis over the past 6 weeks.  Differentials to consider would include a vasculitic rash secondary to her autoimmune diagnoses, especially with the timeline of discontinuing the hydroxychloroquine in July with onset of rash approximately 2 months later], possibly related to the Brukinsa as she was advised by hematology, versus other.  From my perspective I would like her to restart the hydroxychloroquine at 200 mg twice daily and obtain clarification from dermatology and hematology in regards to a possible etiology and if this might be a side effect of the Brukinsa.  For now I requested she contact dermatology for local management of the skin rash and consideration for topical steroids.  I will set her up in the rheumatology dermatology clinic for an evaluation.  If this is determined to be secondary to her underlying rheumatic diseases then we will need to discuss immunosuppressive therapies.    # SLE and Sjogren's syndrome  # Sjogren's related ILD  - Cassia presents today for a follow-up of systemic lupus erythematosus and Sjogren's syndrome which has primarily presented with chronic fatigue, sicca complaints, recurrent lower extremity skin rash and likely the Sjogren's syndrome related interstitial lung disease.  She had been maintained on hydroxychloroquine 200 mg twice daily up until 7/24 which she self discontinued as she was concerned this was  causing dry eyes.  I requested she restart it and continue regular follow ups with Ophthalmology for visual field testing.  She will also continue the pilocarpine 5 mg twice daily for the progressively worsening dry eyes/mouth and follow up regularly with her ophthalmologist and dentist for local measures.      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  As she has not experienced progressive respiratory complaints we discussed holding off on any specific treatment for the interstitial lung disease.    - In regards to the prior, recurrent lower extremity skin rash which may be hypergammaglobulinemic purpura related to the Sjogren's syndrome she was advised by Dermatology that this can be a benign skin rash and does not need to be treated.  We will monitor for now as the rash usually resolves spontaneously and has not occurred recently.    # Osteoporosis  - She was started on IV zoledronic acid infusions on 7/7/2023.  She will continue this on an annual basis for a total of 3-6 doses.  Oral bisphosphonates will be avoided given a history of GERD and postgastrectomy malabsorption.  She should continue with daily calcium and vitamin D supplements and attempt weightbearing exercises.  A DEXA scan can be updated in 3/2025.    # CLL/SLL  - This was diagnosed on a left axillary lymph node biopsy performed in 8/2023 as it was progressively enlarging.  She is following regularly with hematology/oncology and was started on Brukinsa.      Patient's rheumatologic disease(s) threaten long-term function if not appropriately treated.      Plan:  Diagnoses and all orders for this visit:    Systemic lupus erythematosus, unspecified SLE type, unspecified  organ involvement status (HCC)  -     CBC and differential; Future  -     Comprehensive metabolic panel; Future  -     C-reactive protein; Future  -     Sedimentation rate, automated; Future  -     C4 complement; Future  -     C3 complement; Future  -     Anti-DNA antibody, double-stranded; Future  -     Urinalysis with microscopic; Future  -     Protein / creatinine ratio, urine; Future    Sjogren's syndrome, with unspecified organ involvement (HCC)    Lymphocytic interstitial pneumonia (HCC)    Vasculitis (HCC)    Long-term use of Plaquenil    CLL (chronic lymphocytic leukemia) (HCC)    Bilateral primary osteoarthritis of knee    Age-related osteoporosis without current pathological fracture    Long term use of bisphosphonates    Stage 3b chronic kidney disease (HCC)      Activities as tolerated.   Exercise: try to maintain a low impact exercise regimen as much as possible.   Continue other medications as prescribed by PCP and other specialists.       RTC in December.       Rash  This is a new problem. The current episode started more than 1 month ago. The problem has been gradually improving since onset. The rash is characterized by blistering and itchiness. She was exposed to a new medication. Associated symptoms include fatigue. Pertinent negatives include no anorexia, congestion, cough, diarrhea, facial edema, fever, joint pain, rhinorrhea, shortness of breath, sore throat or vomiting.       INITIAL VISIT NOTE (10/2020):  Ms. Turcios is a 70-year-old female with history significant for systemic lupus erythematosus and Sjogren's syndrome diagnosed in her early 40s by Rheumatology in [place], who presents to reestablish with [facility].  She is currently not on DMARDs.  She is referred today by Dr. Bhat for a rheumatology consult.     Patient reports in her early 40s she started to develop various joint pains which eventually led to the diagnosis of systemic lupus erythematosus and Sjogren's  syndrome.  She reports surrounding the time of her diagnosis is when the joint pains were most prevalent, but appear to have spontaneously resolved.  She did experience joint pains again a few years later but states that this also resolved and recently she has not had any joint related issues.  At the time of her diagnosis it appears like she was treated with steroids as well as hydroxychloroquine.  She was on the hydroxychloroquine for less than a year at which time it was discontinued due to nausea and other side effects which she cannot recall.  She has never been restarted on the hydroxychloroquine following that.     The timeline of her rheumatology visits is unclear as we do not have her complete prior records.  Based on chart review as well as patient's history, she has been on multiple DMARDs including methotrexate, azathioprine and Benlysta.  She states that the methotrexate and azathioprine were prescribed in the past 10-15 years.  It is unclear what these medications for specifically prescribed for and if they helped with any of her symptoms.  Most recently she was on Benlysta approximately 3 years ago when she was being seen by Dr. Liu.  She states that this helped significantly with how she was feeling overall and her general well-being, as well as significantly helped with the fatigue.  She was on this for about 7-8 months and unfortunately it was discontinued when she turned 67 as her insurance no longer covered it.  I do not see any of her lupus related labs in our system, but on review of her prior rheumatologist's note from 2015 she appeared to have a positive MALATHI at 1:1280, with positive SSA, SSB and RNP antibodies.  A rheumatoid factor was also elevated at 105 with an ESR of 84.  Her antiphospholipid antibodies were negative.       She states over the years it has primarily been the excessive fatigue that has bothered her and only recently has she been dealing with chronic respiratory  "symptoms as well as intrathoracic and abdominal lymphadenopathy that has been monitored by Oncology.  In terms of her respiratory symptoms she reports shortness of breath with exertion, usually with walking about a block, but at times she may experience shortness of breath with day-to-day activities.  She also has a chronic productive cough.  On review of her CT chest and abdomen as far back as 2017, she has had abnormal findings concerning for interstitial lung disease as well as the extensive lymphadenopathy.  On her most recent CT chest from September 2020 this showed asymmetric bilateral areas of ground-glass opacities and lung cysts likely representing a spectrum of lymphocytic interstitial pneumonia as well as worsening adenopathy in the mediastinum and bilateral hilar regions.  She did undergo an IR guided biopsy of an axillary lymph node on 10/08/2020 which did not show any evidence of a lymphoproliferative disorder.  The biopsy was not diagnostic.  On her most recent follow-up with Hematology/Oncology, as there are no clear features to suggest an underlying malignancy the plan will be to continue monitoring.  She has not seen pulmonology yet and is actually establishing for her initial appointment tomorrow.  She did undergo a bronchoscopy in December 2017 with cytology negative for malignancy and showing an abundant mixed but predominantly chronic inflammatory infiltrate.  Flow cytometry as well as cultures at that time were unrevealing.     As mentioned, she is primarily dealing with the abnormal CT chest and abdomen findings at this time.  Symptom wise she overall appears to be stable.  She does report chronic dry eyes, dry nose and dry mouth which she manages with topical lubricants.  Due to the dry nose as well as recent face masking she has been noticing nose bleeds and feels like there may be a \"hole\" in her nasal septum.  She has not yet established with ENT.  On occasion she may notice a \"butterfly " "rash\" and states that she is photosensitive but strictly avoids sun exposure and utilizes daily sunscreen.  She has also been experiencing blister-like skin lesions scattered on her extremities, which heal spontaneously.  She has not had any mouth ulcerations in years.  She denies fevers, chills, night sweats, unintentional weight loss, focal alopecia, inflammatory eye disease, psoriasis, swollen glands, pleuritic chest pain, hemoptysis, abdominal pain, vomiting, diarrhea, blood in stools (is up-to-date with a screening colonoscopy), blood clots, miscarriages, Raynaud's, joint pain/swelling/stiffness, renal disease, neurological disease/seizures or family history of autoimmune disease.        2/10/2021:  Patient presents for a follow-up of systemic lupus erythematosus and Sjogren's syndrome.  She is currently on hydroxychloroquine 200 mg twice daily that was started at the last office visit in October 2020.  We reviewed her testing done following the last visit which showed a positive MALATHI 1:1280 speckled pattern with an SSA and SSB antibody greater than 8.  An RNP antibody was 6.8.  A rheumatoid factor was elevated at 80.  An SPEP showed a possible monoclonal gammopathy but this was not clearly determined on serum immunofixation.  An immunoglobulin assay showed hypergammaglobulinemia.  A peripheral flow cytometry showed 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.  A vitamin-D level was low at 13.5.  An ESR and CRP were elevated at 130 and 5.4, respectively.  The remainder of the MALATHI specificity, C3, C4, antiphospholipid antibody testing, hepatitis panel, HIV, urinalysis, urine protein creatinine ratio, angiotensin-converting enzyme, anti neutrophilic cytoplasmic antibody, CK and anti CCP antibody were unremarkable.  She had a CT chest done yesterday with the results still pending.  She is going to be establishing with thoracic surgery soon to consider the mediastinal lymphadenopathy biopsy.     She reports overall " she has been stable in terms of her symptoms.  She mostly describes the dry eyes worse on the right side and unfortunately the Restasis has not been approved by her insurance.  She also reports dry nose and dry mouth.  She will have nose bleeds due to the dryness.  She reports chronic fatigue without any improvement seen with starting the hydroxychloroquine.  She also reports within the past year she has had approximately 4 episodes of a pinpoint, red and itchy skin rash arise on her bilateral lower extremities.  At times she may use topical steroids but has never been prescribed oral steroids for this.  The rash will usually resolve spontaneously within 3-4 days.  She has not been seen by Dermatology for this.  She was recently seen in the emergency room for an occurrence of the skin rash after receiving the COVID vaccine without any specific treatment and states that the rash eventually resolved spontaneously.  There is no rash today.  No other complaints that she describes today.        6/8/2021:  Patient presents for a follow-up of systemic lupus erythematosus and Sjogren's syndrome.  She temporarily suspended the hydroxychloroquine as she thought that this was causing joint pains in her knees and ankles which did subside with holding the hydroxychloroquine.  I did review her labs done following the last office visit which showed an unremarkable CBC, CMP, C3 and C4.  The ESR and CRP were elevated at 81 and 4.2, respectively.  Since the last office visit she also underwent a left axillary lymph node biopsy which showed an atypical lymphoproliferative disorder.  She is followed up with Hematology/Oncology without any concerns for an underlying malignant process and the plan is to continue monitoring for now.     She continues to experience an intermittent skin rash affecting her bilateral lower extremities which she thought may be related to an allergic reaction from dog fur.  She states that this will happen  irrespective of pet exposure.  The rash is usually itchy and at times she may use topical hydrocortisone to help with her symptoms but has not been on oral steroids.  She mentions that the rash will appear and remit spontaneously.  She continues to report the dry eyes and dry mouth which she is able to manage with the pilocarpine.  Other than this no complaints today.        9/8/2021:  Patient presents for a follow-up of systemic lupus erythematosus and Sjogren's syndrome.  She is currently on hydroxychloroquine 200 mg twice daily that she restarted in July.  I reviewed her recently done labs which showed an elevated ESR and CRP of 76 and 6.1, respectively.  A urine protein creatinine ratio was stable at 0.24.  A urinalysis showed concerns for a urinary tract infection and she is following up with her primary care physician for this.  A CBC, CMP, C3, C4 and double-stranded DNA antibody were unremarkable.     She reports overall she has been stable from the last office visit and not reporting any new complaints.  She does continue to feel fatigued and this is her main symptom.  No flare-ups of joint pains, swelling or stiffness.  She reports that the rash on her legs has intermittently appeared and she is scheduled to see Dermatology.     She does follow with Ophthalmology routinely as she is on the hydroxychloroquine.  She is managing the dry eyes and dry mouth with pilocarpine 5 mg 3 times daily and states that this helps.      4/20/2022:  Patient presents for a follow-up of systemic lupus erythematosus and Sjogren's syndrome.  She is currently on hydroxychloroquine 200 mg twice daily.  I reviewed her recently done labs which showed an elevated ESR and CRP of 105 and 3.9, respectively.  A urine protein creatinine ratio was stable at 0.22.  A urinalysis showed concerns for a urinary tract infection and she is following up with her primary care physician for this.  A CBC, CMP, C3, C4 and double-stranded DNA antibody  were unremarkable.    She did also see Dermatology due to the recurrent skin rash on her lower extremities and had a skin biopsy done on 09/21 which showed:  Perivascular mixed inflammatory infiltrate with numerous neutrophils, scattered eosinophils, and focal erythrocyte extravasation (see note).     Note: The histopathologic findings are non-specific. Definite vasculitis is not seen (early or resolving vasculitis cannot be fully excluded; correlation with immuofluorescence studies is advised). In the appropriate clinical context, the histopathologic findings may be compatible with hypergammaglobulinemic purpura of [place]strom, though the histopathologic differential diagnosis includes a hypersensitivity reaction (e.g., to a drug). Clinical pathological correlation is essential. Pathogenic microorganisms are not seen on PAS stain. Multiple levels examined.    Direct immunofluorescence showed weak epidermal particulate staining seen with IgG as can be seen in subacute lupus erythematosus, Sjogren's or mixed connective tissue disease but the overall granular immune deposits at the basement membrane zone are insufficient for a diagnosis of lupus.  The overall vascular staining is weak and indeterminate for vasculitis.  Nonspecific vascular standing can occur at anatomically dependent areas.    She was advised by Dermatology that this could be a benign skin rash related to the Sjogren's syndrome and as it occurs and remits spontaneously no treatment would be required.    She was also hospitalized with sepsis and pneumonia in September 2021.    She reports over the past few weeks she has noticed more prominent joint pains.  She takes Tylenol once a day which does help her but the effect is not long lasting.    She does follow with Ophthalmology routinely as she is on the hydroxychloroquine.  She reports the pilocarpine 5 mg 3 times daily did help with the dryness initially but is no longer effective.  She is using  Systane eye drops and Biotene products but is still experiencing significant dryness issues.    No fevers, unintentional weight loss, ongoing skin rashes, mouth/nose ulcers, swollen glands or pleuritic chest pain.      10/26/2022:  Patient presents for a follow-up of systemic lupus erythematosus and Sjogren's syndrome.  She is currently on hydroxychloroquine 200 mg twice daily.  She is also on prednisone 10 mg once daily as prescribed by pulmonology.  I reviewed her recent labs which showed an elevated ESR of 89.  A C-reactive protein was minimally elevated at 4.3.  A CBC, CMP, C3, C4, double-stranded DNA antibody and urine protein creatinine ratio were unremarkable.      After the last office visit we did try to start her on Benlysta but due to the high cost this was not done.  She will be looking into this again.  She reports that the fatigue continues.  No worsening joint issues and this is manageable in her hands and knees.  Her only new issue is that she was diagnosed with COVID-19 infection on 10/15 but seems to have recovered well.  She was started on Paxlovid but on day 3 developed an allergic reaction so this was discontinued.  She also follows with pulmonology and was started on prednisone 10 mg once daily due to the chronic cough she was experiencing.  This has helped.  She sees Hematology/Oncology annually for monitoring of the atypical lymphoproliferative disorder and the plan is for continued monitoring.    At the last office visit I also discontinued the pilocarpine and started her on Evoxac which she states has helped.  She is up-to-date with her annual eye exams since she is on the hydroxychloroquine.      5/31/2023:  Patient presents for a follow-up of systemic lupus erythematosus and Sjogren's syndrome.  She is currently on hydroxychloroquine 200 mg twice daily.  I reviewed her recent labs which showed an elevated ESR of 72.  An SPEP showed the possibility of a monoclonal protein so it was sent to  [facility] for further evaluation.  An immunoglobulin assay showed hypergammaglobulinemia.  A CBC, CMP, C3, C4, double-stranded DNA antibody and urine protein creatinine ratio were unremarkable.  She also underwent a DEXA scan in March 2023 which showed osteoporosis.  Compared to March 2020 there was a statistically significant decrease in the bone mineral density of the left total hip.  The 10-year risk of hip fracture was 6.4% with a 10-year risk of major osteoporotic fracture of 28%.    She mentions since the last visit she has experienced additional joint pains in her hands, wrists and knees.  Her symptoms are intermittent and she is able to manage them more or less with Tylenol as needed.  She continues to report chronic dry eyes and dry mouth.  She was started on Restasis by her ophthalmologist.  She had been taking the Evoxac 30 mg 3 times daily up until the last visit but she is unsure why recently she has not been taking this.    She is up-to-date with her annual eye exams since she is on the hydroxychloroquine.      3/6/2024:  Patient presents for a follow-up of systemic lupus erythematosus and Sjogren's syndrome.  She is currently on hydroxychloroquine 200 mg twice daily and pilocarpine 5 mg twice daily.  I reviewed her recent labs which continues to show an elevated ESR of 77.  A C4 complement was slightly reduced at 16.  And immunoglobulin assay showed hypergammaglobulinemia.  A CMP, SPEP, cryoglobulins, C3, C-reactive protein and CBC were unremarkable.    In terms of the Sjogren's syndrome she reports symptoms primarily pertaining to left eye dryness.  The pilocarpine has not helped with this.  The Restasis was discontinued and she was advised to use over-the-counter refresh eyedrops, a compounding eyedrop through her ophthalmologist as well as an ointment to help with the symptoms.  There may be plans in the future to retry intraocular amniotic infusions.  Otherwise she does not report symptoms such as  fevers, unintentional weight loss [other than related to starting Brukinsa and this has also improved], skin rash, pleuritic chest pain or progressive joint pains/swelling/stiffness.  She is up-to-date with her annual eye exam since she is on the hydroxychloroquine.    In the interim she was found to have an enlarging left axillary lymph node so she underwent a biopsy of this which showed CLL/SLL.  In view of this she was started on Brukinsa which she initially had difficulty tolerating but reports she is no longer experiencing any prominent side effects.      10/16/2024:  Patient presents for a follow-up of systemic lupus erythematosus and Sjogren's syndrome.  She self discontinued the hydroxychloroquine at the end of July as she thought this was contributing to dry eyes.  I reviewed her recent testing which shows an elevated ESR of 107 with a C-reactive protein of 5.3.  A C4 complement was slightly reduced at 18.  A C3, antineutrophil cytoplasmic antibodies, cryoglobulin, antiphospholipid antibodies, double-stranded DNA antibody and CK were unremarkable.    She contacted me recently as she was at the dermatologist's office for her 's care and after Dr. Wayne visualized Cassia to have various skin rashes he scheduled her for an appointment and proceeded with a skin biopsy from the left forearm which showed:    Superficial to deep perivascular/periadnexal/interstitial mixed inflammatory infiltrate with numerous neutrophils, scattered eosinophils, and focal vascular damage with fibrin deposition and hemorrhage (see note).     Note: In the appropriate clinical context, the histopathologic findings are compatible with early/evolving vasculitis, including hypersensitivity reaction-associated vasculitis (e.g., due to a drug, other). Clinical pathologic correlation is advised. Pathogenic microorganisms are not seen with PAS, Gram, and AFB stains. CD3, CD20, CD30, , , and CD68 immunostains were  reviewed; findings diagnostic of a hematologic malignancy are not appreciated. Multiple levels examined. If the rash were to progress/persist despite therapy, additional sampling should be sought to exclude development of a more significant disease.    She has not been started on any specific treatment for this.  She reports the rash has been ongoing for the past 6 weeks and presents as superficial ulcers/blister type lesions scattered on her arms, leg, forehead and a few lesions on her chest.  It is itchy.  They have not really been healing spontaneously.  She was also advised by hematology that a skin rash may occur as a result of Brukinsa use and she has a follow-up scheduled with hematology in November.    The following portions of the patient's history were reviewed and updated as appropriate: allergies, current medications, past family history, past medical history, past social history, past surgical history and problem list.      Review of Systems   Constitutional:  Positive for fatigue. Negative for fever.   HENT:  Negative for congestion, rhinorrhea and sore throat.    Eyes:  Negative for pain.   Respiratory:  Negative for cough and shortness of breath.    Gastrointestinal:  Negative for anorexia, diarrhea and vomiting.   Musculoskeletal:  Negative for joint pain.   Skin:  Positive for rash.     Constitutional: Negative for weight change, fevers, chills, night sweats.  Positive for fatigue.  ENT/Mouth: Negative for hearing changes, ear pain, nasal congestion, sinus pain, hoarseness, sore throat, rhinorrhea, swallowing difficulty.   Eyes: Negative for pain, redness, discharge, vision changes.   Cardiovascular: Negative for chest pain, palpitations.   Respiratory: Negative for sputum, wheezing.  Positive for shortness of breath and cough.  Gastrointestinal: Negative for nausea, vomiting, diarrhea, constipation, pain, heartburn.  Genitourinary: Negative for dysuria, urinary frequency, hematuria.    Musculoskeletal: As per HPI.  Skin: Negative for color changes.  Positive for skin rash.  Neuro: Negative for weakness, numbness, tingling, loss of consciousness.   Psych: Negative for anxiety, depression.   Heme/Lymph: Negative for easy bruising, bleeding, lymphadenopathy.        Past Medical History:   Diagnosis Date    Acute kidney injury superimposed on chronic kidney disease  (HCC) 09/20/2021    Age-related osteoporosis without current pathological fracture 06/01/2023    Allergic rhinitis     Anemia     Anxiety     Bipolar 1 disorder ([facility])     Constipation     Deaf     Pt lost all hearing in right ear after having [place] measles    Depression     Diarrhea     Fatigue     GERD (gastroesophageal reflux disease) 08/01/2020    Hard of hearing     Pt wears a hearing in left ear.    HL (hearing loss)     Hypertension 10/07/2023    Hypothyroidism     Lung nodule     Nasal congestion     Osteoporosis     Pneumonia 02/10/2017    Pneumonia due to Streptococcus ([facility]) 09/19/2021    Psychiatric disorder     Recurrent UTI 06/05/2023    Sjogren's syndrome (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)     Wears glasses     Wears partial dentures        Past Surgical History:   Procedure Laterality Date    BREAST BIOPSY Right     many years ago at [facility]    COLONOSCOPY      EYE SURGERY      FRACTURE SURGERY Right     elbow    GASTRIC BYPASS      HYSTERECTOMY Bilateral 1975    IR BIOPSY LYMPH NODE  10/08/2020    LUNG BIOPSY      LYMPH NODE BIOPSY  09/18/2020    LYMPH NODE BIOPSY Left 08/28/2023    Procedure: EXCISION BIOPSY LYMPH NODE LEFT AXILLARY;  Surgeon: Scott Seaman MD;  [facility]: [facility];  Service: Thoracic    [facility] W/CELL WASHG SPX N/A 12/08/2017    Procedure: BRONCHOSCOPY;  Surgeon: Chepe Luna MD;  Location: [facility];  Service: Pulmonary    [place] BX/EXC LYMPH NODE NEEDLE SUPERFICIAL Left 02/25/2021    Procedure: EXCISION  BIOPSY LYMPH NODE: left axillary lymph node biopsy;  Surgeon: Dakota Heard MD;  Location: [facility];  Service: Thoracic    TONSILLECTOMY         Social History     Socioeconomic History    Marital status: /Civil Union     Spouse name: Not on file    Number of children: Not on file    Years of education: Not on file    Highest education level: Not on file   Occupational History    Not on file   Tobacco Use    Smoking status: Never    Smokeless tobacco: Never   Vaping Use    Vaping status: Never Used   Substance and Sexual Activity    Alcohol use: No    Drug use: No    Sexual activity: Not Currently     Partners: Male     Birth control/protection: Female Sterilization     Comment:    Other Topics Concern    Not on file   Social History Narrative    Not on file     Social Determinants of Health     Financial Resource Strain: Low Risk  (7/14/2023)    Overall Financial Resource Strain (CARDIA)     Difficulty of Paying Living Expenses: Not very hard   Food Insecurity: Not on file   Transportation Needs: No Transportation Needs (7/14/2023)    PRAPARE - Transportation     Lack of Transportation (Medical): No     Lack of Transportation (Non-Medical): No   Physical Activity: Not on file   Stress: Not on file   Social Connections: Not on file   Intimate Partner Violence: Not on file   Housing Stability: Not on file       Family History   Problem Relation Age of Onset    Heart disease Mother     Diabetes Mother     Kidney cancer Mother     Cancer Mother         Kidney    Hypertension Mother     Heart disease Father     Stroke Father     Diabetes Father     Cancer Father         Rectal Cancer    Hypertension Father     No Known Problems Maternal Grandmother     No Known Problems Maternal Grandfather     No Known Problems Paternal Grandmother     No Known Problems Paternal Grandfather     Leukemia Half-Sister 50    No Known Problems Half-Sister     No Known Problems Half-Sister     No Known Problems  "Half-Sister     BRCA2 Positive Neg Hx     BRCA2 Negative Neg Hx     BRCA1 Negative Neg Hx     Endometrial cancer Neg Hx     Breast cancer Neg Hx     Breast cancer additional onset Neg Hx     Colon cancer Neg Hx     Ovarian cancer Neg Hx     BRCA1 Positive Neg Hx     BRCA 1/2 Neg Hx        Allergies   Allergen Reactions    Ciprofloxacin Hives and Swelling     Pt reports that lips and mouth and face swelled.    Cymbalta [Duloxetine Hcl] Other (See Comments)     Hallucinations, fatigue, faints    Nirmatrelvir-Ritonavir Diarrhea, Nausea Only, Other (See Comments), Dizziness and Lightheadedness     \"passed out\"-nausea    Percocet [Oxycodone-Acetaminophen] Other (See Comments)     Dry mouth - pt states it kept her up all night. States had to continually drink fluids       Current Outpatient Medications:     buPROPion (WELLBUTRIN) 100 mg tablet, Take 100 mg by mouth 3 (three) times a day., Disp: , Rfl:     cephalexin (KEFLEX) 500 mg capsule, Take 1 capsule (500 mg total) by mouth every 8 (eight) hours for 7 days, Disp: 21 capsule, Rfl: 0    Cholecalciferol (D3-50) 1.25 MG (28811 UT) capsule, 5,000 Units daily, Disp: , Rfl:     clotrimazole-betamethasone (LOTRISONE) 1-0.05 % cream, Apply topically 2 (two) times a day, Disp: 45 g, Rfl: 0    [START ON 10/17/2024] estradiol (ESTRACE VAGINAL) 0.1 mg/g vaginal cream, Insert 1 g into the vagina 2 (two) times a week, Disp: 42.5 g, Rfl: 1    ferrous sulfate 325 (65 Fe) mg tablet, Take 1 tablet (325 mg total) by mouth 2 (two) times a day with meals, Disp: 60 tablet, Rfl: 0    Multiple Vitamins-Minerals (CENTRUM MINIS WOMEN 50+ PO), , Disp: , Rfl:     mupirocin (BACTROBAN) 2 % ointment, , Disp: , Rfl:     olopatadine HCl (PATADAY) 0.2 % opth drops, Administer 1 drop to both eyes daily, Disp: 2.5 mL, Rfl: 1    pilocarpine (SALAGEN) 5 mg tablet, Take 1 tablet (5 mg total) by mouth 3 (three) times a day, Disp: 90 tablet, Rfl: 5    QUEtiapine (SEROquel) 300 mg tablet, Take 300 mg by " "mouth daily at bedtime, Disp: , Rfl:     QUEtiapine (SEROquel) 50 mg tablet, 50 mg daily at bedtime, Disp: , Rfl:     temazepam (RESTORIL) 30 mg capsule, Take 2 capsules by mouth daily at bedtime as needed , Disp: , Rfl:     triamcinolone (KENALOG) 0.1 % cream, , Disp: , Rfl:     Zanubrutinib (Brukinsa) 80 MG CAPS, Take 4 capsules (320 mg total) by mouth once daily., Disp: 120 capsule, Rfl: 10    sodium chloride 1 g tablet, Take 1 tablet (1 g total) by mouth 2 (two) times a day, Disp: 180 tablet, Rfl: 0    Current Facility-Administered Medications:     cyanocobalamin injection 1,000 mcg, 1,000 mcg, Intramuscular, Q30 Days, Julio Bhat MD, 1,000 mcg at 12/28/23 1242      Objective:    Vitals:    10/16/24 1407   BP: 116/80   BP Location: Left arm   Patient Position: Sitting   Cuff Size: Standard   Pulse: 66   Resp: 16   SpO2: 98%   Weight: 73.9 kg (163 lb)   Height: 5' 1\" (1.549 m)       Physical Exam  General: Well appearing, well nourished, in no distress. Oriented x 3, normal mood and affect.  Ambulating without difficulty.  Skin: Good turgor, no unusual bruising or prominent lesions.  Superficial ulceration/blister type lesions with surrounding erythema noted scattered on her extremities, chest and forehead.  Hair: Normal texture and distribution.  Nails: Normal color, no deformities.  HEENT:  Head: Normocephalic, atraumatic.  Eyes: Conjunctiva clear, sclera non-icteric, EOM intact.  Extremities: No amputations or deformities, cyanosis, edema.  Neurologic: Alert and oriented. No focal neurological deficits appreciated.   Psychiatric: Normal mood and affect.       Melanie Chavez M.D.  Rheumatology  "

## 2024-10-17 ENCOUNTER — TELEPHONE (OUTPATIENT)
Dept: DERMATOLOGY | Facility: CLINIC | Age: 74
End: 2024-10-17

## 2024-10-17 ENCOUNTER — TELEPHONE (OUTPATIENT)
Dept: FAMILY MEDICINE CLINIC | Facility: CLINIC | Age: 74
End: 2024-10-17

## 2024-10-17 ENCOUNTER — TELEPHONE (OUTPATIENT)
Dept: RHEUMATOLOGY | Facility: CLINIC | Age: 74
End: 2024-10-17

## 2024-10-17 DIAGNOSIS — E03.9 HYPOTHYROIDISM, UNSPECIFIED TYPE: Primary | ICD-10-CM

## 2024-10-17 LAB — CRYOGLOB SER QL 1D COLD INC: NORMAL

## 2024-10-17 RX ORDER — LEVOTHYROXINE SODIUM 25 UG/1
25 TABLET ORAL DAILY
Qty: 90 TABLET | Refills: 1 | Status: SHIPPED | OUTPATIENT
Start: 2024-10-17

## 2024-10-17 NOTE — TELEPHONE ENCOUNTER
----- Message from Julio Bhat MD sent at 10/17/2024  8:34 AM EDT -----  Well this does show that the thyroid is for sure underactive I am going to start you on Synthroid 25 mcg daily please take it on empty stomach separate from your other medicines first thing in the morning try to separate that by about an hour or so if you can  We retest the level in about 6 weeks orders already in the system

## 2024-10-17 NOTE — TELEPHONE ENCOUNTER
Attempted to contact pt twice regarding appt in our speciality clinic next Friday 10/25 referred by rheumatology Dr. Chavez. Left a partial vm than phone cut off, attempted to contact pt again with no answer and vm full. Will attempt to contact  patient again tomorrow.

## 2024-10-19 ENCOUNTER — RA CDI HCC (OUTPATIENT)
Dept: OTHER | Facility: HOSPITAL | Age: 74
End: 2024-10-19

## 2024-10-23 ENCOUNTER — OFFICE VISIT (OUTPATIENT)
Dept: NEPHROLOGY | Facility: CLINIC | Age: 74
End: 2024-10-23
Payer: COMMERCIAL

## 2024-10-23 VITALS
WEIGHT: 160 LBS | DIASTOLIC BLOOD PRESSURE: 94 MMHG | HEIGHT: 61 IN | BODY MASS INDEX: 30.21 KG/M2 | SYSTOLIC BLOOD PRESSURE: 135 MMHG | HEART RATE: 83 BPM

## 2024-10-23 DIAGNOSIS — I10 PRIMARY HYPERTENSION: ICD-10-CM

## 2024-10-23 DIAGNOSIS — E87.1 HYPONATREMIA: Primary | ICD-10-CM

## 2024-10-23 PROCEDURE — 99213 OFFICE O/P EST LOW 20 MIN: CPT | Performed by: INTERNAL MEDICINE

## 2024-10-23 RX ORDER — SODIUM CHLORIDE 1 G/1
1 TABLET ORAL DAILY
Start: 2024-10-23 | End: 2025-01-21

## 2024-10-23 NOTE — ASSESSMENT & PLAN NOTE
Baseline Na was low 130s prior to October 2023.   Serum osm is normal indicating iso-osmolar hyponatremia possibly from elevated total protein levels. However, urine Osm shows significant diluting defect so she could also have a component of hypoosmolar hyponatremia.   Na level has been around 131 to 136 over the past year.   Rx: salt tabs 1 gm BID, fluid restriction 1500 cc/24 hours, Ensure 1 can daily.   Plan: Decrease salt tabs to 1 gm daily due to elevated BP.   Recheck BMP and SOsm in 1 month.   Continue CMP (ordered by Dr. Schilling) every 2 months.     Orders:    Basic metabolic panel; Future    sodium chloride 1 g tablet; Take 1 tablet (1 g total) by mouth in the morning    Osmolality-If this is regarding a toxic alcohol, STOP. Test is not routinely indicated. Please consult medical  on call for further guidance.; Future

## 2024-10-23 NOTE — PROGRESS NOTES
NEPHROLOGY OFFICE PROGRESS NOTE   Cassia Turcios 74 y.o. female MRN: [id number]  DATE: 10/23/24  Reason for visit: Continued evaluation and management of hyponatremia    Assessment & Plan  Hyponatremia  Baseline Na was low 130s prior to October 2023.   Serum osm is normal indicating iso-osmolar hyponatremia possibly from elevated total protein levels. However, urine Osm shows significant diluting defect so she could also have a component of hypoosmolar hyponatremia.   Na level has been around 131 to 136 over the past year.   Rx: salt tabs 1 gm BID, fluid restriction 1500 cc/24 hours, Ensure 1 can daily.   Plan: Decrease salt tabs to 1 gm daily due to elevated BP.   Recheck BMP and SOsm in 1 month.   Continue CMP (ordered by Dr. Schilling) every 2 months.     Orders:    Basic metabolic panel; Future    sodium chloride 1 g tablet; Take 1 tablet (1 g total) by mouth in the morning    Osmolality-If this is regarding a toxic alcohol, STOP. Test is not routinely indicated. Please consult medical  on call for further guidance.; Future    Primary hypertension  BP at home is at goal (<130/80)  Office BP is high.   Rx: None.   Plan: Decrease salt tabs for now.       OTHER MEDICAL ISSUES:  Skin rash, concern for vasculitis:  Follow-up with rheumatology and dermatology.    Elevated total protein  No evidence of myeloma in the past.    Proteinuria  This is mild and stable.  UPC ratio 0.2.    CLL/SLL  Followed by Dr. Schilling.   She is on Zanubrutinib.     Patient Instructions   Your sodium level is stable at 132.  You do not have chronic kidney disease.  I took this off of your diagnosis list.  Because your BP is a little bit high, lets cut back on your salt tablets to 1 tablet daily.  Check a BMP in 1 month.  Continue blood work with Dr. Schilling every 2 months.  Follow up in 6 months.     SUBJECTIVE / INTERVAL HISTORY:  Cassia was last seen in the office in April 2024.   Over the past 6 months, there have been no recent  "hospitalizations or ER visits.  During a recent visit with Dr. Vaughn as a  to her , she was noted to have skin lesions which led to a biopsy which was done in September 2024.  The biopsy revealed an early/evolving vasculitis and this was brought to the attention of Dr. Chavez and Dr. Bhat.   She is now back on Hydroxychloroquine and has dermatology follow up.   She continues to take salt tablets 1 tablet BID and is following a fluid restriction 54 oz. She is doing 1 ensure a day.   Home BP is in the 110s to 120s/70s to 80s when checked everyday.   She is currently no longer on Doxazosin since her BP was good and this was stopped by Dr. Bhat.   She had a fever on Sunday which is now resolved.    The following laboratory values were discussed with the patient today:  Creatinine 0.85  Na 132  eGFR 68    PMH/PSH: CLL/SLL, HTN, SLE, Sjogren's disease, bipolar disease, hyponatremia, pneumonia, hysterectomy and oophorectomy, R elbow surgery, LN excisional biopsy.     Previous work up:   11/17/23 SOsm 298, UOsm 454, Zen 80, TSH 6.341, uric acid 3.5, cortisol 16.3, SPEP no monoclonal gammopathy, UPEP no M spike, KL ratio 1.86.     ALLERGIES:   Allergies   Allergen Reactions    Ciprofloxacin Hives and Swelling     Pt reports that lips and mouth and face swelled.    Cymbalta [Duloxetine Hcl] Other (See Comments)     Hallucinations, fatigue, faints    Nirmatrelvir-Ritonavir Diarrhea, Nausea Only, Other (See Comments), Dizziness and Lightheadedness     \"passed out\"-nausea    Percocet [Oxycodone-Acetaminophen] Other (See Comments)     Dry mouth - pt states it kept her up all night. States had to continually drink fluids     REVIEW OF SYSTEMS:  Review of Systems   Constitutional:  Positive for fever (Resolved.). Negative for appetite change, chills and fatigue.   Respiratory:  Negative for cough and shortness of breath.    Cardiovascular:  Negative for chest pain and leg swelling.   Gastrointestinal:  " "Negative for abdominal pain, diarrhea, nausea and vomiting.   Genitourinary:  Negative for dysuria and hematuria.   Musculoskeletal:  Positive for back pain. Negative for arthralgias.   Skin:  Positive for rash.   Neurological:  Negative for dizziness and light-headedness.     OBJECTIVE:  /94 (BP Location: Left arm, Patient Position: Sitting, Cuff Size: Large)   Pulse 83   Ht 5' 1\" (1.549 m)   Wt 72.6 kg (160 lb)   LMP  (LMP Unknown)   BMI 30.23 kg/m²   Current Weight:   Body mass index is 30.23 kg/m².  Physical Exam  Constitutional:       General: She is not in acute distress.     Appearance: Normal appearance. She is well-developed. She is not ill-appearing, toxic-appearing or diaphoretic.   HENT:      Head: Normocephalic and atraumatic.   Eyes:      General: No scleral icterus.     Conjunctiva/sclera: Conjunctivae normal.   Neck:      Vascular: No JVD.   Cardiovascular:      Rate and Rhythm: Normal rate and regular rhythm.      Heart sounds: Normal heart sounds.   Pulmonary:      Effort: Pulmonary effort is normal.      Breath sounds: Normal breath sounds.   Abdominal:      General: Bowel sounds are normal.      Palpations: Abdomen is soft.   Musculoskeletal:      Right lower leg: No edema.      Left lower leg: No edema.   Skin:     General: Skin is warm and dry.      Findings: Rash present.   Neurological:      Mental Status: She is alert and oriented to person, place, and time.   Psychiatric:         Mood and Affect: Mood normal.         Behavior: Behavior normal. Behavior is cooperative.         Judgment: Judgment normal.       Medications:  Current Outpatient Medications:     buPROPion (WELLBUTRIN) 100 mg tablet, Take 100 mg by mouth 3 (three) times a day., Disp: , Rfl:     Cholecalciferol (D3-50) 1.25 MG (28411 UT) capsule, 5,000 Units daily, Disp: , Rfl:     clotrimazole-betamethasone (LOTRISONE) 1-0.05 % cream, Apply topically 2 (two) times a day, Disp: 45 g, Rfl: 0    estradiol (ESTRACE " VAGINAL) 0.1 mg/g vaginal cream, Insert 1 g into the vagina 2 (two) times a week, Disp: 42.5 g, Rfl: 1    ferrous sulfate 325 (65 Fe) mg tablet, Take 1 tablet (325 mg total) by mouth 2 (two) times a day with meals, Disp: 60 tablet, Rfl: 0    levothyroxine (Synthroid) 25 mcg tablet, Take 1 tablet (25 mcg total) by mouth daily, Disp: 90 tablet, Rfl: 1    Multiple Vitamins-Minerals (CENTRUM MINIS WOMEN 50+ PO), , Disp: , Rfl:     mupirocin (BACTROBAN) 2 % ointment, , Disp: , Rfl:     olopatadine HCl (PATADAY) 0.2 % opth drops, Administer 1 drop to both eyes daily, Disp: 2.5 mL, Rfl: 1    pilocarpine (SALAGEN) 5 mg tablet, Take 1 tablet (5 mg total) by mouth 3 (three) times a day, Disp: 90 tablet, Rfl: 5    QUEtiapine (SEROquel) 300 mg tablet, Take 300 mg by mouth daily at bedtime, Disp: , Rfl:     QUEtiapine (SEROquel) 50 mg tablet, 50 mg daily at bedtime, Disp: , Rfl:     sodium chloride 1 g tablet, Take 1 tablet (1 g total) by mouth 2 (two) times a day, Disp: 180 tablet, Rfl: 0    temazepam (RESTORIL) 30 mg capsule, Take 2 capsules by mouth daily at bedtime as needed , Disp: , Rfl:     triamcinolone (KENALOG) 0.1 % cream, , Disp: , Rfl:     Zanubrutinib (Brukinsa) 80 MG CAPS, Take 4 capsules (320 mg total) by mouth once daily., Disp: 120 capsule, Rfl: 10    Current Facility-Administered Medications:     cyanocobalamin injection 1,000 mcg, 1,000 mcg, Intramuscular, Q30 Days, Julio Bhat MD, 1,000 mcg at 12/28/23 1242    Laboratory Results:  Lab Results   Component Value Date    SODIUM 132 (L) 10/12/2024    K 4.0 10/12/2024     10/12/2024    CO2 26 10/12/2024    BUN 24 10/12/2024    CREATININE 0.85 10/12/2024    GLUC 108 10/12/2023    CALCIUM 9.5 10/12/2024     REVIEW OF PERTINENT INFORMATION:  I reviewed the pertinent lab work done 10/12/24.   I reviewed the office notes of rheumatology.   I reviewed the skin biopsy report. .

## 2024-10-23 NOTE — PATIENT INSTRUCTIONS
Your sodium level is stable at 132.  You do not have chronic kidney disease.  I took this off of your diagnosis list.  Because your BP is a little bit high, lets cut back on your salt tablets to 1 tablet daily.  Check a BMP in 1 month.  Continue blood work with Dr. Schilling every 2 months.  Follow up in 6 months.

## 2024-10-25 ENCOUNTER — OFFICE VISIT (OUTPATIENT)
Dept: FAMILY MEDICINE CLINIC | Facility: CLINIC | Age: 74
End: 2024-10-25

## 2024-10-25 VITALS
SYSTOLIC BLOOD PRESSURE: 118 MMHG | BODY MASS INDEX: 29.83 KG/M2 | HEART RATE: 81 BPM | DIASTOLIC BLOOD PRESSURE: 78 MMHG | RESPIRATION RATE: 16 BRPM | HEIGHT: 61 IN | WEIGHT: 158 LBS | OXYGEN SATURATION: 99 %

## 2024-10-25 DIAGNOSIS — E03.9 HYPOTHYROIDISM, UNSPECIFIED TYPE: Primary | ICD-10-CM

## 2024-10-25 DIAGNOSIS — E87.1 HYPONATREMIA: ICD-10-CM

## 2024-10-25 DIAGNOSIS — Z12.31 ENCOUNTER FOR SCREENING MAMMOGRAM FOR BREAST CANCER: ICD-10-CM

## 2024-10-25 DIAGNOSIS — I77.6 VASCULITIS (HCC): ICD-10-CM

## 2024-10-25 PROBLEM — J84.2: Status: RESOLVED | Noted: 2023-01-19 | Resolved: 2024-10-25

## 2024-10-25 PROBLEM — R80.9 NON-NEPHROTIC RANGE PROTEINURIA: Status: RESOLVED | Noted: 2024-04-04 | Resolved: 2024-10-25

## 2024-10-25 PROBLEM — Z48.815 ENCOUNTER FOR SURGICAL AFTERCARE FOLLOWING SURGERY OF DIGESTIVE SYSTEM: Status: RESOLVED | Noted: 2020-08-06 | Resolved: 2024-10-25

## 2024-10-25 PROBLEM — R59.1 LYMPHADENOPATHY: Status: RESOLVED | Noted: 2018-06-21 | Resolved: 2024-10-25

## 2024-10-25 PROCEDURE — 88342 IMHCHEM/IMCYTCHM 1ST ANTB: CPT | Performed by: STUDENT IN AN ORGANIZED HEALTH CARE EDUCATION/TRAINING PROGRAM

## 2024-10-25 PROCEDURE — 88360 TUMOR IMMUNOHISTOCHEM/MANUAL: CPT | Performed by: STUDENT IN AN ORGANIZED HEALTH CARE EDUCATION/TRAINING PROGRAM

## 2024-10-25 PROCEDURE — 88346 IMFLUOR 1ST 1ANTB STAIN PX: CPT | Performed by: STUDENT IN AN ORGANIZED HEALTH CARE EDUCATION/TRAINING PROGRAM

## 2024-10-25 PROCEDURE — 88350 IMFLUOR EA ADDL 1ANTB STN PX: CPT | Performed by: STUDENT IN AN ORGANIZED HEALTH CARE EDUCATION/TRAINING PROGRAM

## 2024-10-25 PROCEDURE — 88344 IMHCHEM/IMCYTCHM EA MLT ANTB: CPT | Performed by: STUDENT IN AN ORGANIZED HEALTH CARE EDUCATION/TRAINING PROGRAM

## 2024-10-25 PROCEDURE — 88312 SPECIAL STAINS GROUP 1: CPT | Performed by: STUDENT IN AN ORGANIZED HEALTH CARE EDUCATION/TRAINING PROGRAM

## 2024-10-25 PROCEDURE — 88305 TISSUE EXAM BY PATHOLOGIST: CPT | Performed by: STUDENT IN AN ORGANIZED HEALTH CARE EDUCATION/TRAINING PROGRAM

## 2024-10-25 PROCEDURE — 88307 TISSUE EXAM BY PATHOLOGIST: CPT | Performed by: STUDENT IN AN ORGANIZED HEALTH CARE EDUCATION/TRAINING PROGRAM

## 2024-10-25 PROCEDURE — 88341 IMHCHEM/IMCYTCHM EA ADD ANTB: CPT | Performed by: STUDENT IN AN ORGANIZED HEALTH CARE EDUCATION/TRAINING PROGRAM

## 2024-10-25 NOTE — PROGRESS NOTES
Ambulatory Visit  Name: Cassia Turcios      : 1950      MRN: [id number]  Encounter Provider: Julio Bhat MD  Encounter Date: 10/25/2024   Encounter department: [facility]    Assessment & Plan  Encounter for screening mammogram for breast cancer    Orders:    Mammo screening bilateral w 3d and cad; Future    Hypothyroidism, unspecified type         Hyponatremia         Vasculitis (HCC)              Assessment & Plan  1. IgA vasculitis.  A rash, likely indicative of vasculitis, was observed. A rebiopsy is planned to determine the specific type, including IgA, to guide treatment. A vasculitis workup has already been completed. Clobetasol ointment has been prescribed for application on the arms as needed.    2. Hyponatremia.  She is advised to continue with her current regimen of 1 salt tablet daily. The condition is stable with sodium levels trending between 131-132.    3. Suspected melanoma.  A 3 cm x 1 cm brown macule on her back was biopsied by the dermatologist, who suspects it might be melanoma. The patient will follow up with the dermatologist for biopsy results.    4. Elevated blood pressure.  She reported that her diastolic blood pressure occasionally reaches into the 90s. She is advised to monitor her blood pressure and inform the provider if it remains elevated (90-94) for 4 out of 7 days.    5. Fever.  She experienced a fever of 102.2°F recently, which she managed with Tylenol. She is advised to monitor for any recurrent fever or other symptoms and seek medical attention if necessary.    6. Submandibular gland swelling.  She reported swelling in her left neck submandibular gland without any associated sore throat or dental issues. She is advised to monitor the swelling and report any changes.    7. Iron deficiency.  Her iron panel was done over the summer and looked satisfactory. No repeat test is necessary at this time.    8. Hypothyroidism.  Her thyroid levels were slightly  low. She is to continue with Synthroid 25 mcg. A follow-up thyroid test is scheduled in 6 weeks.    9. Health Maintenance.  A mammogram has been ordered as her last one was in November 2023. She is on zoledronic acid infusions for osteoporosis and is advised to continue daily calcium and vitamin D. A DEXA scan is scheduled for March 2025.        Preventive health issues were discussed with patient, and age appropriate screening tests were ordered as noted in patient's After Visit Summary. Personalized health advice and appropriate referrals for health education or preventive services given if needed, as noted in patient's After Visit Summary.    History of Present Illness     HPI     History of Present Illness  The patient is a 74-year-old female who presents for evaluation of multiple medical concerns.    She recently had a biopsy taken from her back by a dermatologist, suspecting vasculitis. The condition started with a small spot that eventually formed a scab on her leg. Once the scab fell off, it left behind a pus-filled area. She also mentions that her skin is extremely dry.    She has been diagnosed with stage 3 kidney disease. Her nephrologist has advised her to continue taking one salt tablet daily, which she reports has helped stabilize her condition. She is scheduled for a DEXA scan in March 2025. She has recently joined a gym and can now walk a mile on the treadmill in less than 15 minutes. She is currently receiving zoledronic acid infusions for osteoporosis and is also taking calcium and vitamin D supplements.    She experienced a fever of 102.2 degrees on Sunday, which she managed with Tylenol. She also had chills throughout the day. A few days prior, she noticed swelling in her left neck. She reports no sore throat or difficulty swallowing. She also reports congestion, as evidenced by yellow mucus when she blows her nose. She occasionally experiences pain under her left arm, where lymph nodes were  previously removed. She is currently taking thyroid medication.    She has been diagnosed with non-nephrotic range proteinuria. She reports a sensation after urination, which she discussed with her urologist. She was prescribed Keflex, but she has not started the medication. She also mentions that her diastolic blood pressure sometimes reaches the 90s.      Patient Care Team:  Julio Bhat MD as PCP - General (Family Medicine)  Julio Bhat MD as PCP - PCP-[facility] ([facility])  MD Юлия Fernandez MD Sherilyn T McCollum, DO Abhishek Singh, MD Marissa M Pultz, PA-C Narpinder Singh, MD Douglas Scott Corwin, MD Jose Albert Avila, MD (Nephrology)    Review of Systems   Constitutional:  Negative for fever and unexpected weight change.   HENT:  Negative for nosebleeds and trouble swallowing.    Eyes:  Negative for visual disturbance.   Respiratory:  Negative for chest tightness and shortness of breath.    Cardiovascular:  Negative for chest pain, palpitations and leg swelling.   Gastrointestinal:  Negative for abdominal pain, constipation, diarrhea and nausea.   Endocrine: Negative for cold intolerance.   Genitourinary:  Negative for dysuria and urgency.   Musculoskeletal:  Negative for joint swelling and myalgias.   Skin:  Positive for wound. Negative for rash.   Neurological:  Negative for tremors, seizures and syncope.   Hematological:  Does not bruise/bleed easily.   Psychiatric/Behavioral:  Negative for hallucinations and suicidal ideas.      Medical History Reviewed by provider this encounter:       Annual Wellness Visit Questionnaire   Cassia is here for her Subsequent Wellness visit. Last Medicare Wellness visit information reviewed, patient interviewed and updates made to the record today.      Health Risk Assessment:   Patient rates overall health as good. Patient feels that their physical health rating is same. Patient is satisfied with their life. Eyesight was rated as  same. Hearing was rated as slightly worse. Patient feels that their emotional and mental health rating is same. Patients states they are never, rarely angry. Patient states they are often unusually tired/fatigued. Pain experienced in the last 7 days has been some. Patient's pain rating has been 3/10. Patient states that she has experienced no weight loss or gain in last 6 months.     Depression Screening:   PHQ-9 Score: 0      Fall Risk Screening:   In the past year, patient has experienced: no history of falling in past year      Urinary Incontinence Screening:   Patient has not leaked urine accidently in the last six months.     Home Safety:  Patient does not have trouble with stairs inside or outside of their home. Patient has working smoke alarms and has working carbon monoxide detector. Home safety hazards include: none.     Nutrition:   Current diet is Regular.     Medications:   Patient is currently taking over-the-counter supplements. OTC medications include: Multivitamin. Patient is able to manage medications.     Activities of Daily Living (ADLs)/Instrumental Activities of Daily Living (IADLs):   Walk and transfer into and out of bed and chair?: Yes  Dress and groom yourself?: Yes    Bathe or shower yourself?: Yes    Feed yourself? Yes  Do your laundry/housekeeping?: Yes  Manage your money, pay your bills and track your expenses?: Yes  Make your own meals?: Yes    Do your own shopping?: Yes    Previous Hospitalizations:   Any hospitalizations or ED visits within the last 12 months?: No      Advance Care Planning:   Living will: Yes    Durable POA for healthcare: Yes    Advanced directive: Yes      Cognitive Screening:   Provider or family/friend/caregiver concerned regarding cognition?: No    PREVENTIVE SCREENINGS      Cardiovascular Screening:    General: Screening Current      Diabetes Screening:     General: Screening Current      Colorectal Cancer Screening:     General: Screening Current      Breast  Cancer Screening:     General: Screening Current      Cervical Cancer Screening:    General: Screening Not Indicated      Osteoporosis Screening:    General: Screening Not Indicated and History Osteoporosis      Abdominal Aortic Aneurysm (AAA) Screening:        General: Screening Not Indicated      Lung Cancer Screening:     General: Screening Not Indicated      Hepatitis C Screening:    General: Screening Current    Screening, Brief Intervention, and Referral to Treatment (SBIRT)    Screening  Typical number of drinks in a day: 0  Typical number of drinks in a week: 0  Interpretation: Low risk drinking behavior.    AUDIT-C Screenin) How often did you have a drink containing alcohol in the past year? never  2) How many drinks did you have on a typical day when you were drinking in the past year? 0  3) How often did you have 6 or more drinks on one occasion in the past year? never    AUDIT-C Score: 0  Interpretation: Score 0-2 (female): Negative screen for alcohol misuse    Single Item Drug Screening:  How often have you used an illegal drug (including marijuana) or a prescription medication for non-medical reasons in the past year? never    Single Item Drug Screen Score: 0  Interpretation: Negative screen for possible drug use disorder    Brief Intervention  Alcohol & drug use screenings were reviewed. No concerns regarding substance use disorder identified.     Social Determinants of Health     Financial Resource Strain: Low Risk  (2023)    Overall Financial Resource Strain (CARDIA)     Difficulty of Paying Living Expenses: Not very hard   Food Insecurity: No Food Insecurity (10/25/2024)    Hunger Vital Sign     Worried About Running Out of Food in the Last Year: Never true     Ran Out of Food in the Last Year: Never true   Transportation Needs: No Transportation Needs (10/25/2024)    PRAPARE - Transportation     Lack of Transportation (Medical): No     Lack of Transportation (Non-Medical): No   Housing  "Stability: Low Risk  (10/25/2024)    Housing Stability Vital Sign     Unable to Pay for Housing in the Last Year: No     Number of Times Moved in the Last Year: 0     Homeless in the Last Year: No   Utilities: Not At Risk (10/25/2024)    [facility] Utilities     Threatened with loss of utilities: No     No results found.    Objective     /78   Pulse 81   Resp 16   Ht 5' 1\" (1.549 m)   Wt 71.7 kg (158 lb)   LMP  (LMP Unknown)   SpO2 99%   BMI 29.85 kg/m²     Physical Exam  Vitals and nursing note reviewed.   Constitutional:       Appearance: She is well-developed.   HENT:      Head: Normocephalic and atraumatic.      Right Ear: External ear normal.      Left Ear: External ear normal.      Nose: Nose normal.   Eyes:      Conjunctiva/sclera: Conjunctivae normal.      Pupils: Pupils are equal, round, and reactive to light.   Cardiovascular:      Rate and Rhythm: Normal rate and regular rhythm.      Heart sounds: Normal heart sounds. No murmur heard.  Pulmonary:      Effort: Pulmonary effort is normal.      Breath sounds: Normal breath sounds. No wheezing.   Abdominal:      General: Bowel sounds are normal.      Palpations: Abdomen is soft.   Musculoskeletal:         General: No tenderness. Normal range of motion.      Cervical back: Normal range of motion and neck supple.   Lymphadenopathy:      Cervical: No cervical adenopathy.   Skin:     General: Skin is warm and dry.      Capillary Refill: Capillary refill takes less than 2 seconds.   Neurological:      Mental Status: She is alert and oriented to person, place, and time.   Psychiatric:         Behavior: Behavior normal.         Thought Content: Thought content normal.         Judgment: Judgment normal.         "

## 2024-10-25 NOTE — PATIENT INSTRUCTIONS
Medicare Preventive Visit Patient Instructions  Thank you for completing your Welcome to Medicare Visit or Medicare Annual Wellness Visit today. Your next wellness visit will be due in one year (10/26/2025).  The screening/preventive services that you may require over the next 5-10 years are detailed below. Some tests may not apply to you based off risk factors and/or age. Screening tests ordered at today's visit but not completed yet may show as past due. Also, please note that scanned in results may not display below.  Preventive Screenings:  Service Recommendations Previous Testing/Comments   Colorectal Cancer Screening  * Colonoscopy    * Fecal Occult Blood Test (FOBT)/Fecal Immunochemical Test (FIT)  * Fecal DNA/Cologuard Test  * Flexible Sigmoidoscopy Age: 45-75 years old   Colonoscopy: every 10 years (may be performed more frequently if at higher risk)  OR  FOBT/FIT: every 1 year  OR  Cologuard: every 3 years  OR  Sigmoidoscopy: every 5 years  Screening may be recommended earlier than age 45 if at higher risk for colorectal cancer. Also, an individualized decision between you and your healthcare provider will decide whether screening between the ages of 76-85 would be appropriate. Colonoscopy: 06/19/2023  FOBT/FIT: Not on file  Cologuard: Not on file  Sigmoidoscopy: Not on file    Screening Current     Breast Cancer Screening Age: 40+ years old  Frequency: every 1-2 years  Not required if history of left and right mastectomy Mammogram: 11/13/2023    Screening Current   Cervical Cancer Screening Between the ages of 21-29, pap smear recommended once every 3 years.   Between the ages of 30-65, can perform pap smear with HPV co-testing every 5 years.   Recommendations may differ for women with a history of total hysterectomy, cervical cancer, or abnormal pap smears in past. Pap Smear: Not on file    Screening Not Indicated   Hepatitis C Screening Once for adults born between 1945 and 1965  More frequently in  patients at high risk for Hepatitis C Hep C Antibody: Not on file    Screening Current   Diabetes Screening 1-2 times per year if you're at risk for diabetes or have pre-diabetes Fasting glucose: 87 mg/dL (10/12/2024)  A1C: No results in last 5 years (No results in last 5 years)  Screening Current   Cholesterol Screening Once every 5 years if you don't have a lipid disorder. May order more often based on risk factors. Lipid panel: 02/09/2024    Screening Current     Other Preventive Screenings Covered by Medicare:  Abdominal Aortic Aneurysm (AAA) Screening: covered once if your at risk. You're considered to be at risk if you have a family history of AAA.  Lung Cancer Screening: covers low dose CT scan once per year if you meet all of the following conditions: (1) Age 55-77; (2) No signs or symptoms of lung cancer; (3) Current smoker or have quit smoking within the last 15 years; (4) You have a tobacco smoking history of at least 20 pack years (packs per day multiplied by number of years you smoked); (5) You get a written order from a healthcare provider.  Glaucoma Screening: covered annually if you're considered high risk: (1) You have diabetes OR (2) Family history of glaucoma OR (3)  aged 50 and older OR (4)  American aged 65 and older  Osteoporosis Screening: covered every 2 years if you meet one of the following conditions: (1) You're estrogen deficient and at risk for osteoporosis based off medical history and other findings; (2) Have a vertebral abnormality; (3) On glucocorticoid therapy for more than 3 months; (4) Have primary hyperparathyroidism; (5) On osteoporosis medications and need to assess response to drug therapy.   Last bone density test (DXA Scan): 03/15/2023.  HIV Screening: covered annually if you're between the age of 15-65. Also covered annually if you are younger than 15 and older than 65 with risk factors for HIV infection. For pregnant patients, it is covered up to 3  times per pregnancy.    Immunizations:  Immunization Recommendations   Influenza Vaccine Annual influenza vaccination during flu season is recommended for all persons aged >= 6 months who do not have contraindications   Pneumococcal Vaccine   * Pneumococcal conjugate vaccine = PCV13 (Prevnar 13), PCV15 (Vaxneuvance), PCV20 (Prevnar 20)  * Pneumococcal polysaccharide vaccine = PPSV23 (Pneumovax) Adults 19-63 yo with certain risk factors or if 65+ yo  If never received any pneumonia vaccine: recommend Prevnar 20 (PCV20)  Give PCV20 if previously received 1 dose of PCV13 or PPSV23   Hepatitis B Vaccine 3 dose series if at intermediate or high risk (ex: diabetes, end stage renal disease, liver disease)   Respiratory syncytial virus (RSV) Vaccine - COVERED BY MEDICARE PART D  * RSVPreF3 (Arexvy) CDC recommends that adults 60 years of age and older may receive a single dose of RSV vaccine using shared clinical decision-making (SCDM)   Tetanus (Td) Vaccine - COST NOT COVERED BY MEDICARE PART B Following completion of primary series, a booster dose should be given every 10 years to maintain immunity against tetanus. Td may also be given as tetanus wound prophylaxis.   Tdap Vaccine - COST NOT COVERED BY MEDICARE PART B Recommended at least once for all adults. For pregnant patients, recommended with each pregnancy.   Shingles Vaccine (Shingrix) - COST NOT COVERED BY MEDICARE PART B  2 shot series recommended in those 19 years and older who have or will have weakened immune systems or those 50 years and older     Health Maintenance Due:      Topic Date Due   • Breast Cancer Screening: Mammogram  11/13/2024   • Colorectal Cancer Screening  06/17/2028   • Hepatitis C Screening  Completed     Immunizations Due:      Topic Date Due   • Pneumococcal Vaccine: 65+ Years (2 of 2 - PCV) 11/04/2022   • Influenza Vaccine (1) 09/01/2024   • COVID-19 Vaccine (7 - 2023-24 season) 09/01/2024     Advance Directives   What are advance  directives?  Advance directives are legal documents that state your wishes and plans for medical care. These plans are made ahead of time in case you lose your ability to make decisions for yourself. Advance directives can apply to any medical decision, such as the treatments you want, and if you want to donate organs.   What are the types of advance directives?  There are many types of advance directives, and each state has rules about how to use them. You may choose a combination of any of the following:  Living will:  This is a written record of the treatment you want. You can also choose which treatments you do not want, which to limit, and which to stop at a certain time. This includes surgery, medicine, IV fluid, and tube feedings.   Durable power of  for healthcare (DPAHC):  This is a written record that states who you want to make healthcare choices for you when you are unable to make them for yourself. This person, called a proxy, is usually a family member or a friend. You may choose more than 1 proxy.  Do not resuscitate (DNR) order:  A DNR order is used in case your heart stops beating or you stop breathing. It is a request not to have certain forms of treatment, such as CPR. A DNR order may be included in other types of advance directives.  Medical directive:  This covers the care that you want if you are in a coma, near death, or unable to make decisions for yourself. You can list the treatments you want for each condition. Treatment may include pain medicine, surgery, blood transfusions, dialysis, IV or tube feedings, and a ventilator (breathing machine).  Values history:  This document has questions about your views, beliefs, and how you feel and think about life. This information can help others choose the care that you would choose.  Why are advance directives important?  An advance directive helps you control your care. Although spoken wishes may be used, it is better to have your wishes  written down. Spoken wishes can be misunderstood, or not followed. Treatments may be given even if you do not want them. An advance directive may make it easier for your family to make difficult choices about your care.   Weight Management   Why it is important to manage your weight:  Being overweight increases your risk of health conditions such as heart disease, high blood pressure, type 2 diabetes, and certain types of cancer. It can also increase your risk for osteoarthritis, sleep apnea, and other respiratory problems. Aim for a slow, steady weight loss. Even a small amount of weight loss can lower your risk of health problems.  How to lose weight safely:  A safe and healthy way to lose weight is to eat fewer calories and get regular exercise. You can lose up about 1 pound a week by decreasing the number of calories you eat by 500 calories each day.   Healthy meal plan for weight management:  A healthy meal plan includes a variety of foods, contains fewer calories, and helps you stay healthy. A healthy meal plan includes the following:  Eat whole-grain foods more often.  A healthy meal plan should contain fiber. Fiber is the part of grains, fruits, and vegetables that is not broken down by your body. Whole-grain foods are healthy and provide extra fiber in your diet. Some examples of whole-grain foods are whole-wheat breads and pastas, oatmeal, brown rice, and bulgur.  Eat a variety of vegetables every day.  Include dark, leafy greens such as spinach, kale, kavon greens, and mustard greens. Eat yellow and orange vegetables such as carrots, sweet potatoes, and winter squash.   Eat a variety of fruits every day.  Choose fresh or canned fruit (canned in its own juice or light syrup) instead of juice. Fruit juice has very little or no fiber.  Eat low-fat dairy foods.  Drink fat-free (skim) milk or 1% milk. Eat fat-free yogurt and low-fat cottage cheese. Try low-fat cheeses such as mozzarella and other reduced-fat  cheeses.  Choose meat and other protein foods that are low in fat.  Choose beans or other legumes such as split peas or lentils. Choose fish, skinless poultry (chicken or turkey), or lean cuts of red meat (beef or pork). Before you cook meat or poultry, cut off any visible fat.   Use less fat and oil.  Try baking foods instead of frying them. Add less fat, such as margarine, sour cream, regular salad dressing and mayonnaise to foods. Eat fewer high-fat foods. Some examples of high-fat foods include french fries, doughnuts, ice cream, and cakes.  Eat fewer sweets.  Limit foods and drinks that are high in sugar. This includes candy, cookies, regular soda, and sweetened drinks.  Exercise:  Exercise at least 30 minutes per day on most days of the week. Some examples of exercise include walking, biking, dancing, and swimming. You can also fit in more physical activity by taking the stairs instead of the elevator or parking farther away from stores. Ask your healthcare provider about the best exercise plan for you.      © Copyright [employer] 2018 Information is for End User's use only and may not be sold, redistributed or otherwise used for commercial purposes. All illustrations and images included in CareNotes® are the copyrighted property of [employer] or [employer]

## 2024-10-26 ENCOUNTER — HOSPITAL ENCOUNTER (EMERGENCY)
Facility: HOSPITAL | Age: 74
Discharge: HOME | End: 2024-10-26
Attending: EMERGENCY MEDICINE | Admitting: EMERGENCY MEDICINE
Payer: COMMERCIAL

## 2024-10-26 ENCOUNTER — APPOINTMENT (EMERGENCY)
Dept: RADIOLOGY | Facility: HOSPITAL | Age: 74
End: 2024-10-26
Payer: COMMERCIAL

## 2024-10-26 VITALS
WEIGHT: 169.09 LBS | SYSTOLIC BLOOD PRESSURE: 124 MMHG | RESPIRATION RATE: 16 BRPM | OXYGEN SATURATION: 93 % | DIASTOLIC BLOOD PRESSURE: 60 MMHG | TEMPERATURE: 98 F | HEART RATE: 100 BPM | BODY MASS INDEX: 31.95 KG/M2

## 2024-10-26 DIAGNOSIS — U07.1 COVID-19: ICD-10-CM

## 2024-10-26 DIAGNOSIS — J06.9 VIRAL URI: Primary | ICD-10-CM

## 2024-10-26 DIAGNOSIS — J11.1 FLU: ICD-10-CM

## 2024-10-26 DIAGNOSIS — E87.1 HYPONATREMIA: ICD-10-CM

## 2024-10-26 LAB
ALBUMIN SERPL BCG-MCNC: 3.6 G/DL (ref 3.5–5)
ALP SERPL-CCNC: 84 U/L (ref 34–104)
ALT SERPL W P-5'-P-CCNC: 23 U/L (ref 7–52)
ANION GAP SERPL CALCULATED.3IONS-SCNC: 6 MMOL/L (ref 4–13)
AST SERPL W P-5'-P-CCNC: 31 U/L (ref 13–39)
BASOPHILS # BLD AUTO: 0.07 THOUSANDS/ΜL (ref 0–0.1)
BASOPHILS NFR BLD AUTO: 1 % (ref 0–1)
BILIRUB SERPL-MCNC: 0.75 MG/DL (ref 0.2–1)
BUN SERPL-MCNC: 18 MG/DL (ref 5–25)
CALCIUM SERPL-MCNC: 8.5 MG/DL (ref 8.4–10.2)
CHLORIDE SERPL-SCNC: 100 MMOL/L (ref 96–108)
CO2 SERPL-SCNC: 23 MMOL/L (ref 21–32)
CREAT SERPL-MCNC: 0.91 MG/DL (ref 0.6–1.3)
EOSINOPHIL # BLD AUTO: 0.01 THOUSAND/ΜL (ref 0–0.61)
EOSINOPHIL NFR BLD AUTO: 0 % (ref 0–6)
ERYTHROCYTE [DISTWIDTH] IN BLOOD BY AUTOMATED COUNT: 13.7 % (ref 11.6–15.1)
FLUAV AG UPPER RESP QL IA.RAPID: NEGATIVE
FLUBV AG UPPER RESP QL IA.RAPID: POSITIVE
GFR SERPL CREATININE-BSD FRML MDRD: 63 ML/MIN/1.73SQ M
GLUCOSE SERPL-MCNC: 104 MG/DL (ref 65–140)
HCT VFR BLD AUTO: 36.1 % (ref 34.8–46.1)
HGB BLD-MCNC: 12.2 G/DL (ref 11.5–15.4)
IMM GRANULOCYTES # BLD AUTO: 0.08 THOUSAND/UL (ref 0–0.2)
IMM GRANULOCYTES NFR BLD AUTO: 1 % (ref 0–2)
LYMPHOCYTES # BLD AUTO: 0.78 THOUSANDS/ΜL (ref 0.6–4.47)
LYMPHOCYTES NFR BLD AUTO: 10 % (ref 14–44)
MCH RBC QN AUTO: 31.4 PG (ref 26.8–34.3)
MCHC RBC AUTO-ENTMCNC: 33.8 G/DL (ref 31.4–37.4)
MCV RBC AUTO: 93 FL (ref 82–98)
MONOCYTES # BLD AUTO: 0.58 THOUSAND/ΜL (ref 0.17–1.22)
MONOCYTES NFR BLD AUTO: 7 % (ref 4–12)
NEUTROPHILS # BLD AUTO: 6.57 THOUSANDS/ΜL (ref 1.85–7.62)
NEUTS SEG NFR BLD AUTO: 81 % (ref 43–75)
NRBC BLD AUTO-RTO: 0 /100 WBCS
PLATELET # BLD AUTO: 268 THOUSANDS/UL (ref 149–390)
PMV BLD AUTO: 9.9 FL (ref 8.9–12.7)
POTASSIUM SERPL-SCNC: 4.5 MMOL/L (ref 3.5–5.3)
PROT SERPL-MCNC: 8.5 G/DL (ref 6.4–8.4)
RBC # BLD AUTO: 3.88 MILLION/UL (ref 3.81–5.12)
SARS-COV+SARS-COV-2 AG RESP QL IA.RAPID: POSITIVE
SODIUM SERPL-SCNC: 129 MMOL/L (ref 135–147)
WBC # BLD AUTO: 8.09 THOUSAND/UL (ref 4.31–10.16)

## 2024-10-26 PROCEDURE — 85025 COMPLETE CBC W/AUTO DIFF WBC: CPT | Performed by: EMERGENCY MEDICINE

## 2024-10-26 PROCEDURE — 99284 EMERGENCY DEPT VISIT MOD MDM: CPT

## 2024-10-26 PROCEDURE — 87804 INFLUENZA ASSAY W/OPTIC: CPT | Performed by: EMERGENCY MEDICINE

## 2024-10-26 PROCEDURE — 71045 X-RAY EXAM CHEST 1 VIEW: CPT

## 2024-10-26 PROCEDURE — 87811 SARS-COV-2 COVID19 W/OPTIC: CPT | Performed by: EMERGENCY MEDICINE

## 2024-10-26 PROCEDURE — 99284 EMERGENCY DEPT VISIT MOD MDM: CPT | Performed by: EMERGENCY MEDICINE

## 2024-10-26 PROCEDURE — 36415 COLL VENOUS BLD VENIPUNCTURE: CPT | Performed by: EMERGENCY MEDICINE

## 2024-10-26 PROCEDURE — 80053 COMPREHEN METABOLIC PANEL: CPT | Performed by: EMERGENCY MEDICINE

## 2024-10-26 NOTE — DISCHARGE INSTRUCTIONS
Continue 500mg tylenol every 4 hours as needed.     Consider over the counter Mucinex (not 'DM' version), dosing as recommended.     Continue use of your salt tablets.

## 2024-10-26 NOTE — ED PROVIDER NOTES
Time reflects when diagnosis was documented in both MDM as applicable and the Disposition within this note       Time User Action Codes Description Comment    10/26/2024  9:10 AM Walt Mcmanus [J06.9] Viral URI     10/26/2024  9:10 AM Walt Mcmanus [E87.1] Hyponatremia     10/26/2024  9:10 AM Walt Mcmanus [U07.1] COVID-19     10/26/2024  9:10 AM Walt Mcmanus [J11.1] Flu           ED Disposition       ED Disposition   Discharge    Condition   Stable    Date/Time   Sat Oct 26, 2024  9:10 AM    Comment   Cassia Turcios discharge to home/self care.                   Assessment & Plan       Medical Decision Making  74yoF presenting with viral URI signs/sx and testing positive for COVID and Flu. Pt does endorse viral illness 1-2 weeks prior which could have been either. CBC, CMP, CXR without acute patho. Discussed both Paxlovid and Tamiflu given her immunocompromised status and risk vs rewards of treatment and she declined noting poor tolerance to both prior. Supportive care discussed (tylenol, mucinex, etc) and requested f/u with PCP this week for hyponatremia. Pt did have decrease in salt tabs a few days ago and I recommended she re-initiate prior dosing until f/u. Reviewed all findings both relevant and incidental with the patient at bedside. Pt verbalized understanding of findings, neccesary follow up, return to ED precautions. Pt agreed to review today's findings with their primary care provider. Pt non-toxic appearing upon discharge.        Amount and/or Complexity of Data Reviewed  Independent Historian: spouse  External Data Reviewed: notes.  Labs: ordered.  Radiology: ordered and independent interpretation performed.    Risk  OTC drugs.             Medications - No data to display    ED Risk Strat Scores                           SBIRT 22yo+      Flowsheet Row Most Recent Value   Initial Alcohol Screen: US AUDIT-C     1. How often do you have a drink containing alcohol? 0 Filed at: 10/26/2024 0757    2. How many drinks containing alcohol do you have on a typical day you are drinking?  0 Filed at: 10/26/2024 0757   3a. Male UNDER 65: How often do you have five or more drinks on one occasion? 0 Filed at: 10/26/2024 0757   3b. FEMALE Any Age, or MALE 65+: How often do you have 4 or more drinks on one occassion? 0 Filed at: 10/26/2024 0757   Audit-C Score 0 Filed at: 10/26/2024 0757   BLADIMIR: How many times in the past year have you...    Used an illegal drug or used a prescription medication for non-medical reasons? Never Filed at: 10/26/2024 0757                            History of Present Illness       Chief Complaint   Patient presents with    Fever     Pt reports she woke up with a fever of 102, states received covid shot last night at 1830. Took 2 ES Tylenol approx 30 minutes ago.       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    HL (hearing loss)     Hypertension 10/07/2023    Hypothyroidism     Lung nodule     Nasal congestion     Osteoporosis     Pneumonia 02/10/2017    Pneumonia due to Streptococcus ([facility]) 09/19/2021    Psychiatric disorder     Recurrent UTI 06/05/2023    Sjogren's syndrome ([facility])     Small lymphocytic lymphoma ([facility]) 09/18/2023    Systemic lupus erythematosus ([facility])     Wears glasses     Wears partial dentures       Past Surgical History:   Procedure Laterality Date    BREAST BIOPSY Right     many years ago at [facility]    COLONOSCOPY      EYE SURGERY      FRACTURE SURGERY Right     elbow    GASTRIC BYPASS      HYSTERECTOMY  Bilateral 1975    IR BIOPSY LYMPH NODE  10/08/2020    LUNG BIOPSY      LYMPH NODE BIOPSY  09/18/2020    LYMPH NODE BIOPSY Left 08/28/2023    Procedure: EXCISION BIOPSY LYMPH NODE LEFT AXILLARY;  Surgeon: Scott Seaman MD;  Location: [facility];  Service: Thoracic    AR [facility] INCL FLUOR GDNCE DX W/CELL WASHG SPX N/A 12/08/2017    Procedure: BRONCHOSCOPY;  Surgeon: Chepe Luna MD;  Location: [facility];  Service: Pulmonary    AR BX/EXC LYMPH NODE NEEDLE SUPERFICIAL Left 02/25/2021    Procedure: EXCISION BIOPSY LYMPH NODE: left axillary lymph node biopsy;  Surgeon: Dakota Heard MD;  Location: [facility];  Service: Thoracic    TONSILLECTOMY        Family History   Problem Relation Age of Onset    Heart disease Mother     Diabetes Mother     Kidney cancer Mother     Cancer Mother         Kidney    Hypertension Mother     Heart disease Father     Stroke Father     Diabetes Father     Cancer Father         Rectal Cancer    Hypertension Father     No Known Problems Maternal Grandmother     No Known Problems Maternal Grandfather     No Known Problems Paternal Grandmother     No Known Problems Paternal Grandfather     Leukemia Half-Sister 50    No Known Problems Half-Sister     No Known Problems Half-Sister     No Known Problems Half-Sister     BRCA2 Positive Neg Hx     BRCA2 Negative Neg Hx     BRCA1 Negative Neg Hx     Endometrial cancer Neg Hx     Breast cancer Neg Hx     Breast cancer additional onset Neg Hx     Colon cancer Neg Hx     Ovarian cancer Neg Hx     BRCA1 Positive Neg Hx     BRCA 1/2 Neg Hx       Social History     Tobacco Use    Smoking status: Never    Smokeless tobacco: Never   Vaping Use    Vaping status: Never Used   Substance Use Topics    Alcohol use: No    Drug use: No      E-Cigarette/Vaping    E-Cigarette Use Never User       E-Cigarette/Vaping Substances    Nicotine No     THC No     CBD No     Flavoring No     Other No     Unknown No       I have reviewed and agree with the history  as documented.     74yoF, pmhx per chart, presenting to ER with few days rhinorrhea, non-productive cough, and last PM fever 103. She denies CP, SOB, abd pain, change in bowel or bladder, generalized fatigue, weakness.  at bedside.       Fever  Associated symptoms: cough, fever and rhinorrhea    Associated symptoms: no abdominal pain, no chest pain, no ear pain, no rash, no shortness of breath, no sore throat and no vomiting        Review of Systems   Constitutional:  Positive for chills and fever.   HENT:  Positive for rhinorrhea. Negative for ear pain and sore throat.    Eyes:  Negative for pain and visual disturbance.   Respiratory:  Positive for cough. Negative for shortness of breath.    Cardiovascular:  Negative for chest pain and palpitations.   Gastrointestinal:  Negative for abdominal pain and vomiting.   Genitourinary:  Negative for dysuria and hematuria.   Musculoskeletal:  Negative for arthralgias and back pain.   Skin:  Negative for color change and rash.   Neurological:  Negative for seizures and syncope.   All other systems reviewed and are negative.          Objective       ED Triage Vitals   Temperature Pulse Blood Pressure Respirations SpO2 Patient Position - Orthostatic VS   10/26/24 0756 10/26/24 0756 10/26/24 0758 10/26/24 0756 10/26/24 0756 --   98 °F (36.7 °C) 100 124/60 16 93 %       Temp Source Heart Rate Source BP Location FiO2 (%) Pain Score    10/26/24 0756 10/26/24 0756 -- -- --    Oral Monitor         Vitals      Date and Time Temp Pulse SpO2 Resp BP Pain Score FACES Pain Rating User   10/26/24 0758 -- -- -- -- 124/60 -- -- TLB   10/26/24 0756 98 °F (36.7 °C) 100 93 % 16 -- -- -- TLB            Physical Exam  Vitals and nursing note reviewed.   Constitutional:       General: She is not in acute distress.     Appearance: She is well-developed.   HENT:      Head: Normocephalic and atraumatic.   Eyes:      Conjunctiva/sclera: Conjunctivae normal.   Cardiovascular:      Rate and  Rhythm: Normal rate and regular rhythm.      Heart sounds: No murmur heard.  Pulmonary:      Effort: Pulmonary effort is normal. No respiratory distress.      Breath sounds: Normal breath sounds.   Abdominal:      Palpations: Abdomen is soft.      Tenderness: There is no abdominal tenderness.   Musculoskeletal:         General: No swelling.      Cervical back: Neck supple.   Skin:     General: Skin is warm and dry.      Capillary Refill: Capillary refill takes less than 2 seconds.   Neurological:      Mental Status: She is alert.   Psychiatric:         Mood and Affect: Mood normal.         Results Reviewed       Procedure Component Value Units Date/Time    Comprehensive metabolic panel [[id number]]  (Abnormal) Collected: 10/26/24 0827    Lab Status: Final result Specimen: Blood from Arm, Right Updated: 10/26/24 0900     Sodium 129 mmol/L      Potassium 4.5 mmol/L      Chloride 100 mmol/L      CO2 23 mmol/L      ANION GAP 6 mmol/L      BUN 18 mg/dL      Creatinine 0.91 mg/dL      Glucose 104 mg/dL      Calcium 8.5 mg/dL      AST 31 U/L      ALT 23 U/L      Alkaline Phosphatase 84 U/L      Total Protein 8.5 g/dL      Albumin 3.6 g/dL      Total Bilirubin 0.75 mg/dL      eGFR 63 ml/min/1.73sq m     Narrative:      National Kidney Disease Foundation guidelines for Chronic Kidney Disease (CKD):     Stage 1 with normal or high GFR (GFR > 90 mL/min/1.73 square meters)    Stage 2 Mild CKD (GFR = 60-89 mL/min/1.73 square meters)    Stage 3A Moderate CKD (GFR = 45-59 mL/min/1.73 square meters)    Stage 3B Moderate CKD (GFR = 30-44 mL/min/1.73 square meters)    Stage 4 Severe CKD (GFR = 15-29 mL/min/1.73 square meters)    Stage 5 End Stage CKD (GFR <15 mL/min/1.73 square meters)  Note: GFR calculation is accurate only with a steady state creatinine    FLU/COVID Rapid Antigen (30 min. TAT) - Preferred screening test in ED [[id number]]  (Abnormal) Collected: 10/26/24 0827    Lab Status: Final result Specimen: Nares from Nose  Updated: 10/26/24 0852     SARS COV Rapid Antigen Positive     Influenza A Rapid Antigen Negative     Influenza B Rapid Antigen Positive    Narrative:      This test has been performed using the [employer] Britt[employer] 2 FLU+SARS Antigen test under the Emergency Use Authorization (EUA). This test has been validated by the  and verified by the performing laboratory. The Brittany uses lateral flow immunofluorescent sandwich assay to detect SARS-COV, Influenza A and Influenza B Antigen.     The [employer] Brittany 2 SARS Antigen test does not differentiate between SARS-CoV and SARS-CoV-2.     Negative results are presumptive and may be confirmed with a molecular assay, if necessary, for patient management. Negative results do not rule out SARS-CoV-2 or influenza infection and should not be used as the sole basis for treatment or patient management decisions. A negative test result may occur if the level of antigen in a sample is below the limit of detection of this test.     Positive results are indicative of the presence of viral antigens, but do not rule out bacterial infection or co-infection with other viruses.     All test results should be used as an adjunct to clinical observations and other information available to the provider.    FOR PEDIATRIC PATIENTS - copy/paste COVID Guidelines URL to browser: [email or web address]    CBC and differential [[id number]]  (Abnormal) Collected: 10/26/24 0827    Lab Status: Final result Specimen: Blood from Arm, Right Updated: 10/26/24 0835     WBC 8.09 Thousand/uL      RBC 3.88 Million/uL      Hemoglobin 12.2 g/dL      Hematocrit 36.1 %      MCV 93 fL      MCH 31.4 pg      MCHC 33.8 g/dL      RDW 13.7 %      MPV 9.9 fL      Platelets 268 Thousands/uL      nRBC 0 /100 WBCs      Segmented % 81 %      Immature Grans % 1 %      Lymphocytes % 10 %      Monocytes % 7 %      Eosinophils Relative 0 %      Basophils Relative 1 %      Absolute  Neutrophils 6.57 Thousands/µL      Absolute Immature Grans 0.08 Thousand/uL      Absolute Lymphocytes 0.78 Thousands/µL      Absolute Monocytes 0.58 Thousand/µL      Eosinophils Absolute 0.01 Thousand/µL      Basophils Absolute 0.07 Thousands/µL             XR chest 1 view portable    (Results Pending)       Procedures    ED Medication and Procedure Management   Prior to Admission Medications   Prescriptions Last Dose Informant Patient Reported? Taking?   Cholecalciferol (D3-50) 1.25 MG (80123 UT) capsule  Self Yes No   Si,000 Units daily   Multiple Vitamins-Minerals (CENTRUM MINIS WOMEN 50+ PO)  Self Yes No   QUEtiapine (SEROquel) 300 mg tablet  Self Yes No   Sig: Take 300 mg by mouth daily at bedtime   QUEtiapine (SEROquel) 50 mg tablet  Self Yes No   Si mg daily at bedtime   Zanubrutinib (Brukinsa) 80 MG CAPS  Self No No   Sig: Take 4 capsules (320 mg total) by mouth once daily.   buPROPion (WELLBUTRIN) 100 mg tablet  Self Yes No   Sig: Take 100 mg by mouth 3 (three) times a day.   clobetasol (TEMOVATE) 0.05 % ointment   No No   Sig: Use twice a day.   clotrimazole-betamethasone (LOTRISONE) 1-0.05 % cream  Self No No   Sig: Apply topically 2 (two) times a day   estradiol (ESTRACE VAGINAL) 0.1 mg/g vaginal cream  Self No No   Sig: Insert 1 g into the vagina 2 (two) times a week   ferrous sulfate 325 (65 Fe) mg tablet  Self No No   Sig: Take 1 tablet (325 mg total) by mouth 2 (two) times a day with meals   levothyroxine (Synthroid) 25 mcg tablet  Self No No   Sig: Take 1 tablet (25 mcg total) by mouth daily   mupirocin (BACTROBAN) 2 % ointment  Self Yes No   olopatadine HCl (PATADAY) 0.2 % opth drops  Self No No   Sig: Administer 1 drop to both eyes daily   pilocarpine (SALAGEN) 5 mg tablet  Self No No   Sig: Take 1 tablet (5 mg total) by mouth 3 (three) times a day   sodium chloride 1 g tablet   No No   Sig: Take 1 tablet (1 g total) by mouth in the morning   temazepam (RESTORIL) 30 mg capsule  Self Yes  No   Sig: Take 2 capsules by mouth daily at bedtime as needed    triamcinolone (KENALOG) 0.1 % cream  Self Yes No      Facility-Administered Medications Last Administration Doses Remaining   cyanocobalamin injection 1,000 mcg 12/28/2023 12:42 PM         Discharge Medication List as of 10/26/2024  9:12 AM        CONTINUE these medications which have NOT CHANGED    Details   buPROPion (WELLBUTRIN) 100 mg tablet Take 100 mg by mouth 3 (three) times a day., Historical Med      Cholecalciferol (D3-50) 1.25 MG (66431 UT) capsule 5,000 Units daily, Starting Mon 7/31/2023, Historical Med      clobetasol (TEMOVATE) 0.05 % ointment Use twice a day., Normal      clotrimazole-betamethasone (LOTRISONE) 1-0.05 % cream Apply topically 2 (two) times a day, Starting Wed 9/18/2024, Normal      estradiol (ESTRACE VAGINAL) 0.1 mg/g vaginal cream Insert 1 g into the vagina 2 (two) times a week, Starting Thu 10/17/2024, Normal      ferrous sulfate 325 (65 Fe) mg tablet Take 1 tablet (325 mg total) by mouth 2 (two) times a day with meals, Starting Fri 9/24/2021, Normal      levothyroxine (Synthroid) 25 mcg tablet Take 1 tablet (25 mcg total) by mouth daily, Starting Thu 10/17/2024, Normal      Multiple Vitamins-Minerals (CENTRUM MINIS WOMEN 50+ PO) Starting Tue 8/8/2023, Historical Med      mupirocin (BACTROBAN) 2 % ointment Historical Med      olopatadine HCl (PATADAY) 0.2 % opth drops Administer 1 drop to both eyes daily, Starting Thu 5/23/2024, Normal      pilocarpine (SALAGEN) 5 mg tablet Take 1 tablet (5 mg total) by mouth 3 (three) times a day, Starting Thu 6/1/2023, Normal      !! QUEtiapine (SEROquel) 300 mg tablet Take 300 mg by mouth daily at bedtime, Historical Med      !! QUEtiapine (SEROquel) 50 mg tablet 50 mg daily at bedtime, Starting Fri 9/29/2023, Historical Med      sodium chloride 1 g tablet Take 1 tablet (1 g total) by mouth in the morning, Starting Wed 10/23/2024, Until Tue 1/21/2025, No Print      temazepam  (RESTORIL) 30 mg capsule Take 2 capsules by mouth daily at bedtime as needed , Historical Med      triamcinolone (KENALOG) 0.1 % cream Historical Med      Zanubrutinib (Brukinsa) 80 MG CAPS Take 4 capsules (320 mg total) by mouth once daily., Normal       !! - Potential duplicate medications found. Please discuss with provider.        No discharge procedures on file.  ED SEPSIS DOCUMENTATION   Time reflects when diagnosis was documented in both MDM as applicable and the Disposition within this note       Time User Action Codes Description Comment    10/26/2024  9:10 AM Walt Mcmanus [J06.9] Viral URI     10/26/2024  9:10 AM Walt Mcmanus [E87.1] Hyponatremia     10/26/2024  9:10 AM Walt Mcmanus [U07.1] COVID-19     10/26/2024  9:10 AM Walt Mcmanus [J11.1] Flu                  Walt Mcmanus, DO  10/26/24 1045

## 2024-11-01 ENCOUNTER — TELEPHONE (OUTPATIENT)
Age: 74
End: 2024-11-01

## 2024-11-01 PROCEDURE — 88312 SPECIAL STAINS GROUP 1: CPT | Performed by: STUDENT IN AN ORGANIZED HEALTH CARE EDUCATION/TRAINING PROGRAM

## 2024-11-01 PROCEDURE — 88307 TISSUE EXAM BY PATHOLOGIST: CPT | Performed by: STUDENT IN AN ORGANIZED HEALTH CARE EDUCATION/TRAINING PROGRAM

## 2024-11-01 PROCEDURE — 88344 IMHCHEM/IMCYTCHM EA MLT ANTB: CPT | Performed by: STUDENT IN AN ORGANIZED HEALTH CARE EDUCATION/TRAINING PROGRAM

## 2024-11-01 PROCEDURE — 88350 IMFLUOR EA ADDL 1ANTB STN PX: CPT | Performed by: STUDENT IN AN ORGANIZED HEALTH CARE EDUCATION/TRAINING PROGRAM

## 2024-11-01 PROCEDURE — 88305 TISSUE EXAM BY PATHOLOGIST: CPT | Performed by: STUDENT IN AN ORGANIZED HEALTH CARE EDUCATION/TRAINING PROGRAM

## 2024-11-01 PROCEDURE — 88360 TUMOR IMMUNOHISTOCHEM/MANUAL: CPT | Performed by: STUDENT IN AN ORGANIZED HEALTH CARE EDUCATION/TRAINING PROGRAM

## 2024-11-01 PROCEDURE — 88346 IMFLUOR 1ST 1ANTB STAIN PX: CPT | Performed by: STUDENT IN AN ORGANIZED HEALTH CARE EDUCATION/TRAINING PROGRAM

## 2024-11-01 PROCEDURE — 88341 IMHCHEM/IMCYTCHM EA ADD ANTB: CPT | Performed by: STUDENT IN AN ORGANIZED HEALTH CARE EDUCATION/TRAINING PROGRAM

## 2024-11-01 PROCEDURE — 88342 IMHCHEM/IMCYTCHM 1ST ANTB: CPT | Performed by: STUDENT IN AN ORGANIZED HEALTH CARE EDUCATION/TRAINING PROGRAM

## 2024-11-01 NOTE — TELEPHONE ENCOUNTER
Patient was called to discuss test results and was told to call us back to schedule   Ok to overbook with Dr Díaz Nov 8th at 1 for urgent excision melanoma  Sent info to manager to schedule patient because I don't have access.

## 2024-11-01 NOTE — TELEPHONE ENCOUNTER
(Procedure scheduled.)      Rec'd call from patient questioning why she was scheduled in [place]. I explained that Dr. Díaz doesn't have procedure time at [facility] and since she was being overbooked to get expedited appointment, we had to utilize his [place] schedule.    Patient verbalized understanding.

## 2024-11-06 DIAGNOSIS — D69.0 IGA MEDIATED LEUKOCYTOCLASTIC VASCULITIS (HCC): Primary | ICD-10-CM

## 2024-11-06 NOTE — PROGRESS NOTES
Discussed results with patient. She is following with nephrology. Ordered UA and CMP weekly x 2 months which she is agreeable to. Discussed calling office if she develops worsening skin symptoms, new respiratory symptoms, blood in urine or bowel movements, severe joint pain, or abdominal pain. Patient understands and will discuss with nephrology to see if they would like to add additional orders.

## 2024-11-08 ENCOUNTER — APPOINTMENT (OUTPATIENT)
Dept: LAB | Facility: CLINIC | Age: 74
End: 2024-11-08
Payer: COMMERCIAL

## 2024-11-08 ENCOUNTER — PROCEDURE VISIT (OUTPATIENT)
Dept: DERMATOLOGY | Facility: CLINIC | Age: 74
End: 2024-11-08
Payer: COMMERCIAL

## 2024-11-08 ENCOUNTER — CLINICAL SUPPORT (OUTPATIENT)
Dept: DERMATOLOGY | Facility: CLINIC | Age: 74
End: 2024-11-08

## 2024-11-08 VITALS — TEMPERATURE: 97.9 F

## 2024-11-08 DIAGNOSIS — C43.59 MALIGNANT MELANOMA OF TORSO EXCLUDING BREAST (HCC): Primary | ICD-10-CM

## 2024-11-08 DIAGNOSIS — Z48.02 ENCOUNTER FOR REMOVAL OF SUTURES: Primary | ICD-10-CM

## 2024-11-08 DIAGNOSIS — D69.0 IGA MEDIATED LEUKOCYTOCLASTIC VASCULITIS (HCC): ICD-10-CM

## 2024-11-08 LAB
ALBUMIN SERPL BCG-MCNC: 3.8 G/DL (ref 3.5–5)
ALP SERPL-CCNC: 93 U/L (ref 34–104)
ALT SERPL W P-5'-P-CCNC: 21 U/L (ref 7–52)
AMORPH URATE CRY URNS QL MICRO: ABNORMAL
ANION GAP SERPL CALCULATED.3IONS-SCNC: 14 MMOL/L (ref 4–13)
AST SERPL W P-5'-P-CCNC: 37 U/L (ref 13–39)
BACTERIA UR QL AUTO: ABNORMAL /HPF
BILIRUB SERPL-MCNC: 0.4 MG/DL (ref 0.2–1)
BILIRUB UR QL STRIP: NEGATIVE
BUN SERPL-MCNC: 17 MG/DL (ref 5–25)
CALCIUM SERPL-MCNC: 9.6 MG/DL (ref 8.4–10.2)
CHLORIDE SERPL-SCNC: 103 MMOL/L (ref 96–108)
CLARITY UR: CLEAR
CO2 SERPL-SCNC: 18 MMOL/L (ref 21–32)
COLOR UR: ABNORMAL
CREAT SERPL-MCNC: 0.89 MG/DL (ref 0.6–1.3)
GFR SERPL CREATININE-BSD FRML MDRD: 64 ML/MIN/1.73SQ M
GLUCOSE P FAST SERPL-MCNC: 123 MG/DL (ref 65–99)
GLUCOSE UR STRIP-MCNC: NEGATIVE MG/DL
HGB UR QL STRIP.AUTO: NEGATIVE
KETONES UR STRIP-MCNC: NEGATIVE MG/DL
LEUKOCYTE ESTERASE UR QL STRIP: ABNORMAL
NITRITE UR QL STRIP: POSITIVE
NON-SQ EPI CELLS URNS QL MICRO: ABNORMAL /HPF
PH UR STRIP.AUTO: 6.5 [PH]
POTASSIUM SERPL-SCNC: 4.3 MMOL/L (ref 3.5–5.3)
PROT SERPL-MCNC: 8.7 G/DL (ref 6.4–8.4)
PROT UR STRIP-MCNC: ABNORMAL MG/DL
RBC #/AREA URNS AUTO: ABNORMAL /HPF
SODIUM SERPL-SCNC: 135 MMOL/L (ref 135–147)
SP GR UR STRIP.AUTO: 1.01 (ref 1–1.03)
UROBILINOGEN UR STRIP-ACNC: <2 MG/DL
WBC #/AREA URNS AUTO: ABNORMAL /HPF

## 2024-11-08 PROCEDURE — 88341 IMHCHEM/IMCYTCHM EA ADD ANTB: CPT | Performed by: STUDENT IN AN ORGANIZED HEALTH CARE EDUCATION/TRAINING PROGRAM

## 2024-11-08 PROCEDURE — RECHECK

## 2024-11-08 PROCEDURE — 36415 COLL VENOUS BLD VENIPUNCTURE: CPT

## 2024-11-08 PROCEDURE — 11606 EXC TR-EXT MAL+MARG >4 CM: CPT | Performed by: STUDENT IN AN ORGANIZED HEALTH CARE EDUCATION/TRAINING PROGRAM

## 2024-11-08 PROCEDURE — 88305 TISSUE EXAM BY PATHOLOGIST: CPT | Performed by: STUDENT IN AN ORGANIZED HEALTH CARE EDUCATION/TRAINING PROGRAM

## 2024-11-08 PROCEDURE — 12034 INTMD RPR S/TR/EXT 7.6-12.5: CPT | Performed by: STUDENT IN AN ORGANIZED HEALTH CARE EDUCATION/TRAINING PROGRAM

## 2024-11-08 PROCEDURE — 80053 COMPREHEN METABOLIC PANEL: CPT

## 2024-11-08 PROCEDURE — 81001 URINALYSIS AUTO W/SCOPE: CPT

## 2024-11-08 PROCEDURE — 88342 IMHCHEM/IMCYTCHM 1ST ANTB: CPT | Performed by: STUDENT IN AN ORGANIZED HEALTH CARE EDUCATION/TRAINING PROGRAM

## 2024-11-08 NOTE — PROGRESS NOTES
"[facility] Note     Patient Name: Cassia Turcios  Encounter Date: 11/8/24     Have you been cared for by a [facility] Dermatologist in the last 3 years and, if so, which description applies to you?    Yes.  I have been here within the last 3 years, and my medical history has NOT changed since that time.  I am FEMALE/of child-bearing potential.    REVIEW OF SYSTEMS:  Have you recently had or currently have any of the following? No changes in my recent health.   PAST MEDICAL HISTORY:  Have you personally ever had or currently have any of the following?  If \"YES,\" then please provide more detail. No changes in my medical history.   HISTORY OF IMMUNOSUPPRESSION: Do you have a history of any of the following:  Systemic Immunosuppression such as Diabetes, Biologic or Immunotherapy, Chemotherapy, Organ Transplantation, Bone Marrow Transplantation or Prednisone?  YES, currently on Brukinsa for lymphoma     Answering \"YES\" requires the addition of the dotphrase \"IMMUNOSUPPRESSED\" as the first diagnosis of the patient's visit.   FAMILY HISTORY:  Any \"first degree relatives\" (parent, brother, sister, or child) with the following?    No changes in my family's known health.   PATIENT EXPERIENCE:    Do you want the Dermatologist to perform a COMPLETE skin exam today including a clinical examination under the \"bra and underwear\" areas?  NO  If necessary, do we have your permission to call and leave a detailed message on your Preferred Phone number that includes your specific medical information?  Yes      Allergies   Allergen Reactions    Ciprofloxacin Hives and Swelling     Pt reports that lips and mouth and face swelled.    Cymbalta [Duloxetine Hcl] Other (See Comments)     Hallucinations, fatigue, faints    Nirmatrelvir-Ritonavir Diarrhea, Nausea Only, Other (See Comments), Dizziness and Lightheadedness     \"passed out\"-nausea    Percocet [Oxycodone-Acetaminophen] Other (See Comments)     Dry mouth - pt " states it kept her up all night. States had to continually drink fluids      Current Outpatient Medications:     buPROPion (WELLBUTRIN) 100 mg tablet, Take 100 mg by mouth 3 (three) times a day., Disp: , Rfl:     Cholecalciferol (D3-50) 1.25 MG (06525 UT) capsule, 5,000 Units daily, Disp: , Rfl:     clobetasol (TEMOVATE) 0.05 % ointment, Use twice a day., Disp: 60 g, Rfl: 2    clotrimazole-betamethasone (LOTRISONE) 1-0.05 % cream, Apply topically 2 (two) times a day, Disp: 45 g, Rfl: 0    estradiol (ESTRACE VAGINAL) 0.1 mg/g vaginal cream, Insert 1 g into the vagina 2 (two) times a week, Disp: 42.5 g, Rfl: 1    ferrous sulfate 325 (65 Fe) mg tablet, Take 1 tablet (325 mg total) by mouth 2 (two) times a day with meals, Disp: 60 tablet, Rfl: 0    levothyroxine (Synthroid) 25 mcg tablet, Take 1 tablet (25 mcg total) by mouth daily, Disp: 90 tablet, Rfl: 1    Multiple Vitamins-Minerals (CENTRUM MINIS WOMEN 50+ PO), , Disp: , Rfl:     mupirocin (BACTROBAN) 2 % ointment, , Disp: , Rfl:     olopatadine HCl (PATADAY) 0.2 % opth drops, Administer 1 drop to both eyes daily, Disp: 2.5 mL, Rfl: 1    pilocarpine (SALAGEN) 5 mg tablet, Take 1 tablet (5 mg total) by mouth 3 (three) times a day, Disp: 90 tablet, Rfl: 5    QUEtiapine (SEROquel) 300 mg tablet, Take 300 mg by mouth daily at bedtime, Disp: , Rfl:     QUEtiapine (SEROquel) 50 mg tablet, 50 mg daily at bedtime, Disp: , Rfl:     sodium chloride 1 g tablet, Take 1 tablet (1 g total) by mouth in the morning, Disp: , Rfl:     temazepam (RESTORIL) 30 mg capsule, Take 2 capsules by mouth daily at bedtime as needed , Disp: , Rfl:     triamcinolone (KENALOG) 0.1 % cream, , Disp: , Rfl:     Zanubrutinib (Brukinsa) 80 MG CAPS, Take 4 capsules (320 mg total) by mouth once daily., Disp: 120 capsule, Rfl: 10    Current Facility-Administered Medications:     cyanocobalamin injection 1,000 mcg, 1,000 mcg, Intramuscular, Q30 Days, Julio Bhat MD, 1,000 mcg at 12/28/23 1242     "      Whom besides the patient is providing clinical information about today's encounter?   NO ADDITIONAL HISTORIAN (patient alone provided history)    Physical Exam and Assessment/Plan by Diagnosis:      PROCEDURE:  EXCISION NOTE     Procedural Plan:     Attending:   Assistant:  Tomás NEWSOME  Lesion Anatomic Location (use description from previous biopsy if applicable): back  Pre-Op Diagnosis: melanoma  Lesion is being treated as \"benign\" or \"MALIGNANT\": MALIGNANT; final PATHOLOGICAL STAGING (use \"N/A\" if not reported in Path Report) :  pT1a  Accession Number of any associated previous biopsy/excision: [id number]     Written and verbal (witnessed) informed consent was obtained. We discussed that \"excision\" is a method of removing lesions, both benign and malignant lesions.  A portion of normal skin is often taken to ensure completeness of removal.  I reviewed that this procedure will include numbing the area, cutting around and under the skin lesion, undermining (\"freeing up\") surrounding tissue, and closing the wound with sutures (stitches) both inside and out.  Risks include and are not limited to the following:  Bleeding, pain, infection, scarring, recurrence, more required treatment, no additional information, numbness and/or loss of function (if nerves are damaged).  These risks were considered against the benefits that we discussed, and the patient opted to continue with the procedure. It was discussed with patient that every effort is made to minimize scarring, but scarring is influenced also by extrinsic factor such as location, age and genetics.     Procedural Time Out:      Correct patient? yes  Correct site per Clinic Report? yes  Correct site per previous Path Report? yes  Correct site per Patient's recollection? yes    Anesthesia:      Local anesthesia: 1% xylocaine with epi     Excision Description:      Post-Op Diagnosis: Same as Pre-Op Diagnosis (above)  Pre-op Size: 3 x 1 cm  Margins " "(enter \"0\" for lipoma/cyst or similar diagnosis): 1 cm  TOTAL POSTOPERATIVE DEFECT SIZE (I.e., Pre-op Size + Margins on both sides):  5 cm x 3 cm    The patient was seated in the procedure/exam room, anesthetized locally, prepped and draped in the usual fashion. Using a #15 blade with a scalpel, the lesion was excised in elliptical fashion.     REQUIRED Mallorie MELANOMA DATA      Wide Local Excision for Primary Cutaneous Melanoma - Excision 1 (Back)  Operation performed with curative intent Yes   Original Breslow thickness of the lesion 0.3 mm (to the tenth of a millimeter)   Clinical margin width   (measured from the edge of the lesion or the prior excision scar) 1 cm   Depth of excision Full-thickness skin/subcutaneous tissue down to fascia (melanoma)         Closure Description:      The specific type of closure that was utilized:     INTERMEDIATE Closure  INTERMEDIATE CLOSURE     The patient was brought back into the procedure room, anesthetized locally, prepped and draped in the usual fashion. Using a #15 blade with a scalpel, the lesion was excised in elliptical fashion. The wound was  undermined in the fascial plane.  Purpose of undermining was to decrease wound tension and facilitate closure. Hemostasis was achieved with light electrocoagulation.    The wound was closed with subcutaneous sutures as follows:    Deep Suture Throw, Size, and Type (select all that apply):  Interrupted Deeps; 3-0; vicryl     Epidermal edge closure was accomplished with superficial sutures as follows:    Superficial Suture Throw, Size and Type (select all that apply):  Simple Interrupted; 4-0; Prolene    FINAL LENGTH OF CLOSURE (please enter a length even for lipoma/cyst or similar diagnosis): 9.5 cm       Postoperative Care:      The wound was cleaned with sterile saline, dried off, surgical vaseline ointment was applied, and the wound was covered. A pressure dressing was applied for stabilization and light pressure.    Estimated " blood loss:  Less than 3ml.  Complications: none  Post-op medications: none  Additional notes: none    Discharge Plans:      Discharge plans: Plan for return to us for suture removal, as scheduled in 14 days.   Patient condition at discharge: STABLE    The patient was given detailed oral and written instructions on postoperative care as detailed in consent. We urged the patient to call us if any problems or question should arise.         Scribe Attestation      I,:  Tomás Shankar MA am acting as a scribe while in the presence of the attending physician.:       I,:  Ignacio Díaz DO personally performed the services described in this documentation    as scribed in my presence.:

## 2024-11-08 NOTE — PROGRESS NOTES
"Suture removal    Date/Time: 11/8/2024 10:15 AM    Performed by: Briana Elder RN  Authorized by: Lilian Lai MD  Universal Protocol:  procedure performed by consultantConsent: Verbal consent obtained. Written consent not obtained.  Risks and benefits: risks, benefits and alternatives were discussed  Consent given by: patient  Time out: Immediately prior to procedure a \"time out\" was called to verify the correct patient, procedure, equipment, support staff and site/side marked as required.  Timeout called at: 11/8/2024 10:07 AM.  Patient understanding: patient states understanding of the procedure being performed  Patient consent: the patient's understanding of the procedure matches consent given  Procedure consent: procedure consent matches procedure scheduled  Relevant documents: relevant documents not present or verified  Test results: test results not available  Site marked: the operative site was not marked  Radiology Images displayed and confirmed. If images not available, report reviewed: imaging studies not available  Patient identity confirmed: verbally with patient      Patient location:  Clinic  Location:     Laterality:  Right    Location:  Lower extremity    Lower extremity location: anterior shin.  Procedure details:     Tools used:  Suture removal kit    Wound appearance:  No sign(s) of infection, good wound healing, clean, moist and pink    Number of sutures removed:  2  Post-procedure details:     Post-removal:  Band-Aid applied (Vaseline applied)    Patient tolerance of procedure:  Tolerated well, no immediate complications      Before suture removal     After suture removal   "

## 2024-11-08 NOTE — PATIENT INSTRUCTIONS
"   YOUR \"AFTER SURGERY\" REVIEW & INSTRUCTIONS    What to Know About Your Procedure  An \"excision\" was performed today to allow the dermatologist to remove a skin lesion. The procedure involves a local numbing medication and removing the entire lesion (or as much as possible). Typically, the lesion is being removed because it does not look \"normal,\" because it is becoming irritated and traumatized, or for significant appearance reasons.  The skin was cut deeply and then repaired - usually with sutures (stitches).  The removed tissue has been sent to the pathologist who will confirm the diagnosis and verify if the lesion has been completely removed.  Surgical “Vaseline-type” ointment has been applied after the procedure to help create a barrier between your wound and the outside world.     The advantage of using sutures (stitches) to repair a skin excision is that it allows the lesion to heal as quickly as possible with the least amount of scarring and risk of infection, Still, there are some risks and potential complications that you should watch out for that include but are not limited to the following:    Some bleeding is normal at the time of procedure and some bleeding on the gauze bandage after the procedure is normal for the first few days after surgery.  Profuse bleeding or bleeding with swelling and pain is NOT normal and should be reported as detailed below.  Infection is uncommon after skin surgery.  Infection should be reported and is indicated by pain, redness, and discharge of purulent material.  Some pain may occur initially the day after surgery.  Persistent pain or increasing pain days after surgery is not expected and should be reported.  Every effort is made to minimize scar, but location, size, and genetics do play a role in scar appearance.  A surgical wound does not achieve its optimal appearance until 6 months.  There are several treatments available if scarring would be problematic including " "scar creams, silicone pad, laser and scar revision.  Skin discoloration can occur especially in people of color.  Its important to avoid sun on wound in first 6 months after surgery.  Treatment is available if pigment is problematic.  Incomplete removal of the lesion or recurrence of lesion can occur and - depending on the lesion - this would then require further treatment and more surgery.  Nerve damage/numbness and/or loss of function is very rare, but is most likely to occur if the lesion being removed is large or if it is in a \"high risk\" location.  Allergic reaction to lidocaine is rare.  More commonly, epinephrine is used with the lidocaine, and, occasionally, epinephrine (a.k.a., adrenalin) may cause a brief feeling of anxiety or jitteriness.  The person at the microscope (pathologist) may provide additional information that was unexpected. This unexpected finding could prompt the need for additional treatment or evaluation.    At-Home Wound Care  Try NOT to remove the pressure bandage for 48 hours. Keep the area clean and dry while this bandage is on.   After removing the bandage for the first time, gently clean the area with soap and water. If the bandage is difficult to remove, getting the bandage wet in the shower will sometimes help soften the adhesive and allow it to be removed more easily.   You will now need to cleanse this area daily in the shower with gentle soap. There is no need to scrub the area. You will need to apply plain Vaseline ointment (this is over the counter and not a prescription) to the site for up to 2 weeks followed by a clean appropriately sized bandage to area.  Non stick dressing and paper tape (or Hypafix) are recommended for sensitive skin but a bandaid is fine if it covers the area well.  In general, sutures (stitches) are removed in about 5-7 days for face wounds and in about 12-14 days for the body wounds.  Your dermatologist wants you to return for suture removal in 14 " "DAYS.     General Restrictions  For TWO (2) DAYS:  You will need to take it very easy as this time is highest risk for bleeding. Being a \"couch potato\" during these two days is generally recommended.   For surgeries on the face/neck/scalp: Avoid leaning down to pick things up off the floor as this brings blood up to your head. Instead, squat down to pick things up.     For TWO WEEKS (14 DAYS):   No heavy lifting (anything greater than 10 pounds)   You can start to do slow, gentle activities such as slow walking but nothing to increase your heart rate and blood pressure too much (such as cardiovascular exercise).  It is important to take it easy as there is still a risk for bleeding and a high risk popping of stitches open during this time.     Site Specific Restrictions  If we did surgery near your eyes (including the nose, forehead, front part of your scalp, cheeks): It is VERY common to get a large amount of swelling around your eyes (puffy eyes). Although less frequent, this can be enough to swell your eyes shut and can also come along with bruising. This should not hurt and is very expected and normal. It is typically worst at ~ 3 days out from your surgery and dramatically better 1 week post-operatively.   If we did surgery around your nose: No blowing your nose as this puts you at higher risk of popping stitches durign this time. Instead dab under your nose with a tissue or use a Q-tip inside your nose.  If we did surgery on the skin above or below your lip or your lip itself: No sipping from straws as this uses a lot of the muscles around your mouth and increases the risk of popping stitches during this time.    Managing Your Pain After Surgery  You can expect to have some pain after surgery. This is normal. The pain is typically worse the first two days after surgery, and quickly begins to get better.   You can use heating pads or ice packs on your incisions to help reduce your pain.   The best strategy for " "controlling your pain after surgery is \"around the clock\" pain control. You can take \"over-the-counter\" (non-prescription) Acetaminophen (Tylenol) for discomfort, unless you have been told not to by your physician.  If you are taking Tylenol at the maximum dose, you can alternate Tylenol with Advil/Motrin (ibuprofen) as long as there are no contraindications.  Alternating these medications with each other (I.e., Tylenol followed by Motrin/Advil) allows you to maximize your pain control.  To alternate these medications properly, you will take a dose of pain medication every three hours, alternating Tylenol (acetaminophen) and Advil/Motrin (ibuprofen).  Start by taking 650 mg of Tylenol (2 pills of 325 mg)  3 hours later take 600 mg of Motrin (3 pills of 200 mg)  3 hours after taking the Motrin take 650 mg of Tylenol  3 hours after that take 600 mg of Motrin.    As an example, if your first dose of Tylenol (acetaminophen) is at 12:00 PM, then you would alternate with Motrin as directed below, continuing usually for no more than a total of 48 hours straight:     12:00 PM  Tylenol 650 mg (2 pills of 325 mg)    3:00 PM  Motrin 600 mg (3 pills of 200 mg)    6:00 PM  Tylenol 650 mg (2 pills of 325 mg)    9:00 PM  Motrin 600 mg (3 pills of 200 mg)      WARNING:  Do NOT take more than 4000 mg of Tylenol or 3200 mg of Motrin in a \"24-hour\" period.       What if you still have pain?    If you have pain that is not controlled with the over-the-counter pain medications (Tylenol and Motrin/Advil), do not hesitate to call our staff using the number provided. We will help make sure you are managing your pain in the best way possible, and if necessary, we can provide a prescription for additional pain medication.     Call our office IMMEDIATELY with any signs of wound infection.  This includes fever, chills, increasing redness, warmth, tenderness, severe discomfort/pain, or pus or foul smell coming from the wound. [facility] can be directly at [phone] (SKIN) and ask for the on-call Dermatologist covering surgery/Mohs.    If Bleeding is Noticed  If bleeding is soaking through the bandage, remove the bandage and see where the bleeding is coming from.  Place a clean cloth over the area and apply firm pressure directly to the area that is bleeding for thirty minutes.    Check the wound ONLY after 30 minutes of direct pressure; do not cheat and sneak a peak, as that does not count (i.e., resets the clock back to zero).  If bleeding persists after 30 minutes of legitimate direct pressure, then try one more round of direct pressure to the area.    Should bleeding become heavier or not stop after the second application of direct pressure for 30 minutes, then call [facility] directly at [phone] (SKIN) and ask to speak with the on-call Dermatologist covering surgery/Mohs.  If after hours, go to your nearest Emergency Room or Urgent Care and have the team call [facility] directly at [phone] (SKIN); you will be connected to our after hours team.

## 2024-11-15 ENCOUNTER — APPOINTMENT (OUTPATIENT)
Dept: LAB | Facility: CLINIC | Age: 74
End: 2024-11-15
Payer: COMMERCIAL

## 2024-11-15 DIAGNOSIS — D69.0 IGA MEDIATED LEUKOCYTOCLASTIC VASCULITIS (HCC): ICD-10-CM

## 2024-11-15 DIAGNOSIS — I77.6 VASCULITIS (HCC): ICD-10-CM

## 2024-11-15 LAB
ALBUMIN SERPL BCG-MCNC: 3.6 G/DL (ref 3.5–5)
ALP SERPL-CCNC: 90 U/L (ref 34–104)
ALT SERPL W P-5'-P-CCNC: 16 U/L (ref 7–52)
ANION GAP SERPL CALCULATED.3IONS-SCNC: 6 MMOL/L (ref 4–13)
ASO AB TITR SER LA: NORMAL {TITER}
AST SERPL W P-5'-P-CCNC: 26 U/L (ref 13–39)
BACTERIA UR QL AUTO: ABNORMAL /HPF
BILIRUB SERPL-MCNC: 0.65 MG/DL (ref 0.2–1)
BILIRUB UR QL STRIP: NEGATIVE
BUN SERPL-MCNC: 12 MG/DL (ref 5–25)
CALCIUM SERPL-MCNC: 8.5 MG/DL (ref 8.4–10.2)
CHLORIDE SERPL-SCNC: 102 MMOL/L (ref 96–108)
CLARITY UR: ABNORMAL
CO2 SERPL-SCNC: 27 MMOL/L (ref 21–32)
COLOR UR: ABNORMAL
CREAT SERPL-MCNC: 0.73 MG/DL (ref 0.6–1.3)
GFR SERPL CREATININE-BSD FRML MDRD: 82 ML/MIN/1.73SQ M
GLUCOSE P FAST SERPL-MCNC: 87 MG/DL (ref 65–99)
GLUCOSE UR STRIP-MCNC: NEGATIVE MG/DL
HGB UR QL STRIP.AUTO: ABNORMAL
KETONES UR STRIP-MCNC: NEGATIVE MG/DL
LEUKOCYTE ESTERASE UR QL STRIP: ABNORMAL
NITRITE UR QL STRIP: POSITIVE
NON-SQ EPI CELLS URNS QL MICRO: ABNORMAL /HPF
PH UR STRIP.AUTO: 6.5 [PH]
POTASSIUM SERPL-SCNC: 4.1 MMOL/L (ref 3.5–5.3)
PROT SERPL-MCNC: 8.5 G/DL (ref 6.4–8.4)
PROT UR STRIP-MCNC: ABNORMAL MG/DL
RBC #/AREA URNS AUTO: ABNORMAL /HPF
SODIUM SERPL-SCNC: 135 MMOL/L (ref 135–147)
SP GR UR STRIP.AUTO: 1.01 (ref 1–1.03)
UROBILINOGEN UR STRIP-ACNC: <2 MG/DL
WBC #/AREA URNS AUTO: ABNORMAL /HPF
WBC CLUMPS # UR AUTO: PRESENT /UL

## 2024-11-15 PROCEDURE — 88342 IMHCHEM/IMCYTCHM 1ST ANTB: CPT | Performed by: STUDENT IN AN ORGANIZED HEALTH CARE EDUCATION/TRAINING PROGRAM

## 2024-11-15 PROCEDURE — 88341 IMHCHEM/IMCYTCHM EA ADD ANTB: CPT | Performed by: STUDENT IN AN ORGANIZED HEALTH CARE EDUCATION/TRAINING PROGRAM

## 2024-11-15 PROCEDURE — 86063 ANTISTREPTOLYSIN O SCREEN: CPT

## 2024-11-15 PROCEDURE — 81001 URINALYSIS AUTO W/SCOPE: CPT

## 2024-11-15 PROCEDURE — 36415 COLL VENOUS BLD VENIPUNCTURE: CPT

## 2024-11-15 PROCEDURE — 80053 COMPREHEN METABOLIC PANEL: CPT

## 2024-11-15 PROCEDURE — 88305 TISSUE EXAM BY PATHOLOGIST: CPT | Performed by: STUDENT IN AN ORGANIZED HEALTH CARE EDUCATION/TRAINING PROGRAM

## 2024-11-18 ENCOUNTER — RESULTS FOLLOW-UP (OUTPATIENT)
Dept: DERMATOLOGY | Facility: CLINIC | Age: 74
End: 2024-11-18

## 2024-11-18 ENCOUNTER — TELEPHONE (OUTPATIENT)
Dept: NEPHROLOGY | Facility: CLINIC | Age: 74
End: 2024-11-18

## 2024-11-18 NOTE — TELEPHONE ENCOUNTER
I called the patient and we spoke over the phone.   Recent laboratory data were reviewed.     Lab Results   Component Value Date    SODIUM 135 11/15/2024    K 4.1 11/15/2024     11/15/2024    CO2 27 11/15/2024    BUN 12 11/15/2024    CREATININE 0.73 11/15/2024    GLUC 104 10/26/2024    CALCIUM 8.5 11/15/2024     Renal function stable.   Na stable at 135.   She has not started on lower dose of salt tabs - still taking 1 gm BID.   UA with microhematuria but TNTC WBC.     Plan:  Decrease salt tabs to 1 gm OD.   Continue weekly labs and UA.

## 2024-11-22 ENCOUNTER — CLINICAL SUPPORT (OUTPATIENT)
Dept: DERMATOLOGY | Facility: CLINIC | Age: 74
End: 2024-11-22

## 2024-11-22 ENCOUNTER — APPOINTMENT (OUTPATIENT)
Dept: LAB | Facility: CLINIC | Age: 74
End: 2024-11-22
Payer: COMMERCIAL

## 2024-11-22 DIAGNOSIS — E87.1 HYPONATREMIA: ICD-10-CM

## 2024-11-22 DIAGNOSIS — Z48.02 ENCOUNTER FOR REMOVAL OF SUTURES: Primary | ICD-10-CM

## 2024-11-22 DIAGNOSIS — D69.0 IGA MEDIATED LEUKOCYTOCLASTIC VASCULITIS (HCC): ICD-10-CM

## 2024-11-22 LAB
ALBUMIN SERPL BCG-MCNC: 3.9 G/DL (ref 3.5–5)
ALP SERPL-CCNC: 98 U/L (ref 34–104)
ALT SERPL W P-5'-P-CCNC: 19 U/L (ref 7–52)
ANION GAP SERPL CALCULATED.3IONS-SCNC: 8 MMOL/L (ref 4–13)
AST SERPL W P-5'-P-CCNC: 30 U/L (ref 13–39)
BACTERIA UR QL AUTO: ABNORMAL /HPF
BILIRUB SERPL-MCNC: 0.62 MG/DL (ref 0.2–1)
BILIRUB UR QL STRIP: NEGATIVE
BUN SERPL-MCNC: 15 MG/DL (ref 5–25)
CALCIUM SERPL-MCNC: 9.1 MG/DL (ref 8.4–10.2)
CHLORIDE SERPL-SCNC: 100 MMOL/L (ref 96–108)
CLARITY UR: ABNORMAL
CO2 SERPL-SCNC: 26 MMOL/L (ref 21–32)
COLOR UR: ABNORMAL
CREAT SERPL-MCNC: 0.83 MG/DL (ref 0.6–1.3)
GFR SERPL CREATININE-BSD FRML MDRD: 70 ML/MIN/1.73SQ M
GLUCOSE P FAST SERPL-MCNC: 81 MG/DL (ref 65–99)
GLUCOSE UR STRIP-MCNC: NEGATIVE MG/DL
HGB UR QL STRIP.AUTO: ABNORMAL
KETONES UR STRIP-MCNC: NEGATIVE MG/DL
LEUKOCYTE ESTERASE UR QL STRIP: ABNORMAL
NITRITE UR QL STRIP: NEGATIVE
NON-SQ EPI CELLS URNS QL MICRO: ABNORMAL /HPF
OSMOLALITY UR/SERPL-RTO: 296 MMOL/KG (ref 282–298)
PH UR STRIP.AUTO: 6.5 [PH]
POTASSIUM SERPL-SCNC: 3.8 MMOL/L (ref 3.5–5.3)
PROT SERPL-MCNC: 9.2 G/DL (ref 6.4–8.4)
PROT UR STRIP-MCNC: NEGATIVE MG/DL
RBC #/AREA URNS AUTO: ABNORMAL /HPF
SODIUM SERPL-SCNC: 134 MMOL/L (ref 135–147)
SP GR UR STRIP.AUTO: 1.01 (ref 1–1.03)
UROBILINOGEN UR STRIP-ACNC: <2 MG/DL
WBC #/AREA URNS AUTO: ABNORMAL /HPF

## 2024-11-22 PROCEDURE — RECHECK

## 2024-11-22 PROCEDURE — 83930 ASSAY OF BLOOD OSMOLALITY: CPT

## 2024-11-22 PROCEDURE — 80053 COMPREHEN METABOLIC PANEL: CPT

## 2024-11-22 PROCEDURE — 81001 URINALYSIS AUTO W/SCOPE: CPT

## 2024-11-22 PROCEDURE — 36415 COLL VENOUS BLD VENIPUNCTURE: CPT

## 2024-11-22 NOTE — PROGRESS NOTES
"Suture removal    Date/Time: 11/22/2024 2:30 PM    Performed by: Briana Elder RN  Authorized by: Ileana Gray MD  Universal Protocol:  procedure performed by consultantConsent: Verbal consent obtained. Written consent not obtained.  Risks and benefits: risks, benefits and alternatives were discussed  Consent given by: patient  Time out: Immediately prior to procedure a \"time out\" was called to verify the correct patient, procedure, equipment, support staff and site/side marked as required.  Timeout called at: 11/22/2024 2:19 PM.  Patient understanding: patient states understanding of the procedure being performed  Patient consent: the patient's understanding of the procedure matches consent given  Procedure consent: procedure consent matches procedure scheduled  Relevant documents: relevant documents not present or verified  Test results: test results not available  Site marked: the operative site was not marked  Radiology Images displayed and confirmed. If images not available, report reviewed: imaging studies not available  Patient identity confirmed: verbally with patient      Patient location:  Clinic  Location:     Location:  Trunk    Trunk location:  Back  Procedure details:     Tools used:  Suture removal kit    Wound appearance:  No sign(s) of infection, good wound healing, clean, moist and pink  Post-procedure details:     Post-removal:  Band-Aid applied (Vaseline applied)    Patient tolerance of procedure:  Tolerated well, no immediate complications    Before suture removal     After suture removal   "

## 2024-11-27 ENCOUNTER — APPOINTMENT (OUTPATIENT)
Dept: LAB | Facility: CLINIC | Age: 74
End: 2024-11-27
Payer: COMMERCIAL

## 2024-11-27 DIAGNOSIS — D69.0 IGA MEDIATED LEUKOCYTOCLASTIC VASCULITIS (HCC): ICD-10-CM

## 2024-11-27 LAB
ALBUMIN SERPL BCG-MCNC: 3.8 G/DL (ref 3.5–5)
ALP SERPL-CCNC: 91 U/L (ref 34–104)
ALT SERPL W P-5'-P-CCNC: 14 U/L (ref 7–52)
ANION GAP SERPL CALCULATED.3IONS-SCNC: 7 MMOL/L (ref 4–13)
AST SERPL W P-5'-P-CCNC: 25 U/L (ref 13–39)
BACTERIA UR QL AUTO: ABNORMAL /HPF
BILIRUB SERPL-MCNC: 0.71 MG/DL (ref 0.2–1)
BILIRUB UR QL STRIP: NEGATIVE
BUN SERPL-MCNC: 12 MG/DL (ref 5–25)
CALCIUM SERPL-MCNC: 9.1 MG/DL (ref 8.4–10.2)
CHLORIDE SERPL-SCNC: 101 MMOL/L (ref 96–108)
CLARITY UR: ABNORMAL
CO2 SERPL-SCNC: 28 MMOL/L (ref 21–32)
COLOR UR: ABNORMAL
CREAT SERPL-MCNC: 0.73 MG/DL (ref 0.6–1.3)
GFR SERPL CREATININE-BSD FRML MDRD: 82 ML/MIN/1.73SQ M
GLUCOSE P FAST SERPL-MCNC: 80 MG/DL (ref 65–99)
GLUCOSE UR STRIP-MCNC: NEGATIVE MG/DL
HGB UR QL STRIP.AUTO: ABNORMAL
KETONES UR STRIP-MCNC: NEGATIVE MG/DL
LEUKOCYTE ESTERASE UR QL STRIP: ABNORMAL
NITRITE UR QL STRIP: POSITIVE
NON-SQ EPI CELLS URNS QL MICRO: ABNORMAL /HPF
PH UR STRIP.AUTO: 6.5 [PH]
POTASSIUM SERPL-SCNC: 4.5 MMOL/L (ref 3.5–5.3)
PROT SERPL-MCNC: 8.6 G/DL (ref 6.4–8.4)
PROT UR STRIP-MCNC: NEGATIVE MG/DL
RBC #/AREA URNS AUTO: ABNORMAL /HPF
SODIUM SERPL-SCNC: 136 MMOL/L (ref 135–147)
SP GR UR STRIP.AUTO: 1.01 (ref 1–1.03)
UROBILINOGEN UR STRIP-ACNC: <2 MG/DL
WBC #/AREA URNS AUTO: ABNORMAL /HPF
WBC CLUMPS # UR AUTO: PRESENT /UL

## 2024-11-27 PROCEDURE — 80053 COMPREHEN METABOLIC PANEL: CPT

## 2024-11-27 PROCEDURE — 36415 COLL VENOUS BLD VENIPUNCTURE: CPT

## 2024-11-27 PROCEDURE — 81001 URINALYSIS AUTO W/SCOPE: CPT

## 2024-12-04 ENCOUNTER — APPOINTMENT (OUTPATIENT)
Dept: LAB | Facility: CLINIC | Age: 74
End: 2024-12-04
Payer: COMMERCIAL

## 2024-12-04 ENCOUNTER — RESULTS FOLLOW-UP (OUTPATIENT)
Dept: FAMILY MEDICINE CLINIC | Facility: CLINIC | Age: 74
End: 2024-12-04

## 2024-12-04 DIAGNOSIS — D69.0 IGA MEDIATED LEUKOCYTOCLASTIC VASCULITIS (HCC): ICD-10-CM

## 2024-12-04 DIAGNOSIS — C83.00 SMALL LYMPHOCYTIC LYMPHOMA (HCC): ICD-10-CM

## 2024-12-04 DIAGNOSIS — E03.9 HYPOTHYROIDISM, UNSPECIFIED TYPE: ICD-10-CM

## 2024-12-04 LAB
ALBUMIN SERPL BCG-MCNC: 3.8 G/DL (ref 3.5–5)
ALP SERPL-CCNC: 86 U/L (ref 34–104)
ALT SERPL W P-5'-P-CCNC: 15 U/L (ref 7–52)
ANION GAP SERPL CALCULATED.3IONS-SCNC: 7 MMOL/L (ref 4–13)
AST SERPL W P-5'-P-CCNC: 25 U/L (ref 13–39)
BACTERIA UR QL AUTO: ABNORMAL /HPF
BASOPHILS # BLD AUTO: 0.12 THOUSANDS/ÂΜL (ref 0–0.1)
BASOPHILS NFR BLD AUTO: 2 % (ref 0–1)
BILIRUB SERPL-MCNC: 0.6 MG/DL (ref 0.2–1)
BILIRUB UR QL STRIP: NEGATIVE
BUN SERPL-MCNC: 12 MG/DL (ref 5–25)
CALCIUM SERPL-MCNC: 9.2 MG/DL (ref 8.4–10.2)
CHLORIDE SERPL-SCNC: 103 MMOL/L (ref 96–108)
CLARITY UR: CLEAR
CO2 SERPL-SCNC: 26 MMOL/L (ref 21–32)
COLOR UR: ABNORMAL
CREAT SERPL-MCNC: 0.81 MG/DL (ref 0.6–1.3)
EOSINOPHIL # BLD AUTO: 0.13 THOUSAND/ÂΜL (ref 0–0.61)
EOSINOPHIL NFR BLD AUTO: 2 % (ref 0–6)
ERYTHROCYTE [DISTWIDTH] IN BLOOD BY AUTOMATED COUNT: 14.3 % (ref 11.6–15.1)
GFR SERPL CREATININE-BSD FRML MDRD: 72 ML/MIN/1.73SQ M
GLUCOSE P FAST SERPL-MCNC: 79 MG/DL (ref 65–99)
GLUCOSE UR STRIP-MCNC: NEGATIVE MG/DL
HCT VFR BLD AUTO: 39.7 % (ref 34.8–46.1)
HGB BLD-MCNC: 13 G/DL (ref 11.5–15.4)
HGB UR QL STRIP.AUTO: NEGATIVE
IMM GRANULOCYTES # BLD AUTO: 0.05 THOUSAND/UL (ref 0–0.2)
IMM GRANULOCYTES NFR BLD AUTO: 1 % (ref 0–2)
KETONES UR STRIP-MCNC: NEGATIVE MG/DL
LEUKOCYTE ESTERASE UR QL STRIP: ABNORMAL
LYMPHOCYTES # BLD AUTO: 4.48 THOUSANDS/ÂΜL (ref 0.6–4.47)
LYMPHOCYTES NFR BLD AUTO: 53 % (ref 14–44)
MCH RBC QN AUTO: 31.7 PG (ref 26.8–34.3)
MCHC RBC AUTO-ENTMCNC: 32.7 G/DL (ref 31.4–37.4)
MCV RBC AUTO: 97 FL (ref 82–98)
MONOCYTES # BLD AUTO: 0.74 THOUSAND/ÂΜL (ref 0.17–1.22)
MONOCYTES NFR BLD AUTO: 9 % (ref 4–12)
NEUTROPHILS # BLD AUTO: 2.69 THOUSANDS/ÂΜL (ref 1.85–7.62)
NEUTS SEG NFR BLD AUTO: 33 % (ref 43–75)
NITRITE UR QL STRIP: NEGATIVE
NON-SQ EPI CELLS URNS QL MICRO: ABNORMAL /HPF
NRBC BLD AUTO-RTO: 0 /100 WBCS
PH UR STRIP.AUTO: 6.5 [PH]
PLATELET # BLD AUTO: 297 THOUSANDS/UL (ref 149–390)
PMV BLD AUTO: 11 FL (ref 8.9–12.7)
POTASSIUM SERPL-SCNC: 4.2 MMOL/L (ref 3.5–5.3)
PROT SERPL-MCNC: 8.9 G/DL (ref 6.4–8.4)
PROT UR STRIP-MCNC: NEGATIVE MG/DL
RBC # BLD AUTO: 4.1 MILLION/UL (ref 3.81–5.12)
RBC #/AREA URNS AUTO: ABNORMAL /HPF
SODIUM SERPL-SCNC: 136 MMOL/L (ref 135–147)
SP GR UR STRIP.AUTO: 1.01 (ref 1–1.03)
TSH SERPL DL<=0.05 MIU/L-ACNC: 3.6 UIU/ML (ref 0.45–4.5)
UROBILINOGEN UR STRIP-ACNC: <2 MG/DL
WBC # BLD AUTO: 8.21 THOUSAND/UL (ref 4.31–10.16)
WBC #/AREA URNS AUTO: ABNORMAL /HPF

## 2024-12-04 PROCEDURE — 85025 COMPLETE CBC W/AUTO DIFF WBC: CPT

## 2024-12-04 PROCEDURE — 81001 URINALYSIS AUTO W/SCOPE: CPT

## 2024-12-04 PROCEDURE — 36415 COLL VENOUS BLD VENIPUNCTURE: CPT

## 2024-12-04 PROCEDURE — 84443 ASSAY THYROID STIM HORMONE: CPT

## 2024-12-04 PROCEDURE — 80053 COMPREHEN METABOLIC PANEL: CPT

## 2024-12-05 ENCOUNTER — HOSPITAL ENCOUNTER (OUTPATIENT)
Dept: CT IMAGING | Facility: HOSPITAL | Age: 74
Discharge: HOME | End: 2024-12-05
Attending: INTERNAL MEDICINE
Payer: COMMERCIAL

## 2024-12-05 DIAGNOSIS — C83.00 SMALL LYMPHOCYTIC LYMPHOMA (HCC): ICD-10-CM

## 2024-12-05 PROCEDURE — 74177 CT ABD & PELVIS W/CONTRAST: CPT

## 2024-12-05 PROCEDURE — 71260 CT THORAX DX C+: CPT

## 2024-12-05 RX ADMIN — IOHEXOL 100 ML: 350 INJECTION, SOLUTION INTRAVENOUS at 11:37

## 2024-12-06 ENCOUNTER — TELEPHONE (OUTPATIENT)
Dept: NEPHROLOGY | Facility: CLINIC | Age: 74
End: 2024-12-06

## 2024-12-06 ENCOUNTER — PATIENT MESSAGE (OUTPATIENT)
Dept: NEPHROLOGY | Facility: CLINIC | Age: 74
End: 2024-12-06

## 2024-12-06 NOTE — TELEPHONE ENCOUNTER
I called the patient and we spoke over the phone.   Recent laboratory data were reviewed.     Lab Results   Component Value Date    SODIUM 136 12/04/2024    K 4.2 12/04/2024     12/04/2024    CO2 26 12/04/2024    BUN 12 12/04/2024    CREATININE 0.81 12/04/2024    GLUC 104 10/26/2024    CALCIUM 9.2 12/04/2024     Renal function is stable.   UA without hematuria or significant proteinuria.   No evidence of vasculitis at this time.    Plan:  No need for weekly labs.   Check CMP and UA monthly x 3 more months.

## 2024-12-06 NOTE — PATIENT COMMUNICATION
Patient called in stating that she just received a call to clarify exactly what she is asking:     She explained that yesterday she had a CT with contrast and the results showed that she has a renal cyst so she wants to know if this is something she should be concerned about?    She then explained that she had vasculitits and they did a biopsy so her Dermatologist - Dr. Perry wanted her to have blood work every week for 8 weeks because she was concerned about her kidneys.    Now if Dr. De La Torre doesn't feel like she needs to continue getting the lab test for two more weeks then she doesnt need to get them done anymore since the labs havent changed in the past 6 weeks but to run it by Dr. Bruno chase for his approval.     Patient would like a call back with an update.

## 2024-12-09 ENCOUNTER — HOSPITAL ENCOUNTER (OUTPATIENT)
Dept: MAMMOGRAPHY | Facility: HOSPITAL | Age: 74
Discharge: HOME | End: 2024-12-09
Payer: COMMERCIAL

## 2024-12-09 VITALS — HEIGHT: 61 IN | WEIGHT: 160 LBS | BODY MASS INDEX: 30.21 KG/M2

## 2024-12-09 DIAGNOSIS — Z12.31 ENCOUNTER FOR SCREENING MAMMOGRAM FOR BREAST CANCER: ICD-10-CM

## 2024-12-09 DIAGNOSIS — E87.1 HYPONATREMIA: ICD-10-CM

## 2024-12-09 PROCEDURE — 77063 BREAST TOMOSYNTHESIS BI: CPT

## 2024-12-09 PROCEDURE — 77067 SCR MAMMO BI INCL CAD: CPT

## 2024-12-10 RX ORDER — SODIUM CHLORIDE 1 G/1
1 TABLET ORAL DAILY
Qty: 90 TABLET | Refills: 1 | Status: SHIPPED | OUTPATIENT
Start: 2024-12-10 | End: 2025-03-10

## 2024-12-12 ENCOUNTER — OFFICE VISIT (OUTPATIENT)
Dept: HEMATOLOGY ONCOLOGY | Facility: CLINIC | Age: 74
End: 2024-12-12
Payer: COMMERCIAL

## 2024-12-12 ENCOUNTER — PATIENT OUTREACH (OUTPATIENT)
Dept: CASE MANAGEMENT | Facility: OTHER | Age: 74
End: 2024-12-12

## 2024-12-12 VITALS
DIASTOLIC BLOOD PRESSURE: 88 MMHG | OXYGEN SATURATION: 95 % | RESPIRATION RATE: 17 BRPM | SYSTOLIC BLOOD PRESSURE: 118 MMHG | WEIGHT: 164 LBS | HEIGHT: 61 IN | BODY MASS INDEX: 30.96 KG/M2 | HEART RATE: 81 BPM | TEMPERATURE: 97.5 F

## 2024-12-12 DIAGNOSIS — C91.10 CLL (CHRONIC LYMPHOCYTIC LEUKEMIA) (HCC): Primary | ICD-10-CM

## 2024-12-12 PROCEDURE — 99215 OFFICE O/P EST HI 40 MIN: CPT | Performed by: INTERNAL MEDICINE

## 2024-12-12 PROCEDURE — G2211 COMPLEX E/M VISIT ADD ON: HCPCS | Performed by: INTERNAL MEDICINE

## 2024-12-12 NOTE — PROGRESS NOTES
[facility]  [address] [facility] [address]  [facility] [address]  [phone]    Cassia Turcios,1950, [id number]  12/12/24    Discussion:   This is a 74-year-old female history of CLL/SLL.  September 2023 started Zanubrutinib 320 mg p.o. daily.  Recent CBC is normal.  33% neutrophils, 1 band, 53% lymphocytes, 9% monocytes, 2% eosinophils, 2% basophils.  CMP shows total protein 8.9, otherwise normal.  TSH normal.  March 2024 IgG 3300.  SPEP showed no monoclonal bands.  Recent CT CAP showed stable sub-3 mm pulmonary nodules.  GGO's bilateral.  No adenopathy or organomegaly.  She developed some skin lesions.  Biopsy showed vasculitis.  She has had some improvement with topical steroids.  Additionally, she was found to have an early stage melanoma, posterior thorax.  Resected.  She reports fatigue.  She has intermittent sleep disturbance.  Some of this could be related to medications.  On further questioning.  She has anxiety.  She does see psychiatry with medication adjustments.  Counseling is recommended.  I discussed the above with the patient.  The patient and her  voiced understanding and agreement.  ______________________________________________________________________    Chief Complaint   Patient presents with    Follow-up       HPI:  Oncology History    No history exists.       Interval History: Clinically stable.  ECOG-  1 - Symptomatic but completely ambulatory    Review of Systems   Constitutional:  Positive for fatigue. Negative for appetite change, diaphoresis and fever.   HENT:  Negative for sinus pain.    Eyes:  Negative for discharge.   Respiratory:  Negative for cough and shortness of breath.    Cardiovascular:  Negative for chest pain.   Gastrointestinal:  Negative for abdominal pain, constipation and diarrhea.   Endocrine: Negative for cold intolerance.   Genitourinary:  Negative for difficulty urinating and hematuria.   Musculoskeletal:   Negative for joint swelling.   Skin:  Positive for rash.   Allergic/Immunologic: Negative for environmental allergies.   Neurological:  Negative for dizziness and headaches.   Hematological:  Negative for adenopathy.   Psychiatric/Behavioral:  Negative for agitation.        Past Medical History:   Diagnosis Date    Acute kidney injury superimposed on chronic kidney disease  (HCC) 09/20/2021    Age-related osteoporosis without current pathological fracture 06/01/2023    Allergic rhinitis     Anemia     Anxiety     Bipolar 1 disorder ([facility])     Constipation     Deaf     Pt lost all hearing in right ear after having [place] measles    Depression     Diarrhea     Fatigue     GERD (gastroesophageal reflux disease) 08/01/2020    Hard of hearing     Pt wears a hearing in left ear.    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    Systemic lupus erythematosus ([facility])     Wears glasses     Wears partial dentures      Patient Active Problem List   Diagnosis    Bipolar I disorder, single manic episode ([facility])    Hypothyroidism    Systemic lupus erythematosus ([facility])    Depression    Obesity    Postgastrectomy malabsorption    Sjogren's syndrome ([facility])    Vaginal atrophy    Vitamin D deficiency    Iron deficiency anemia following bariatric surgery    Long-term use of Plaquenil    S/P gastric bypass    Hyponatremia    Age-related osteoporosis without current pathological fracture    Pulmonary nodule    Small lymphocytic lymphoma (HCC)    Angiomyolipoma    Vasculitis ([facility])    CLL (chronic lymphocytic leukemia) ([facility])    Bilateral primary osteoarthritis of knee    Long term use of bisphosphonates       Current Outpatient Medications:     buPROPion (WELLBUTRIN) 100 mg  tablet, Take 100 mg by mouth 3 (three) times a day., Disp: , Rfl:     Cholecalciferol (D3-50) 1.25 MG (86103 UT) capsule, 5,000 Units daily, Disp: , Rfl:     clobetasol (TEMOVATE) 0.05 % ointment, Use twice a day., Disp: 60 g, Rfl: 2    clotrimazole-betamethasone (LOTRISONE) 1-0.05 % cream, Apply topically 2 (two) times a day, Disp: 45 g, Rfl: 0    estradiol (ESTRACE VAGINAL) 0.1 mg/g vaginal cream, Insert 1 g into the vagina 2 (two) times a week, Disp: 42.5 g, Rfl: 1    ferrous sulfate 325 (65 Fe) mg tablet, Take 1 tablet (325 mg total) by mouth 2 (two) times a day with meals, Disp: 60 tablet, Rfl: 0    levothyroxine (Synthroid) 25 mcg tablet, Take 1 tablet (25 mcg total) by mouth daily, Disp: 90 tablet, Rfl: 1    Multiple Vitamins-Minerals (CENTRUM MINIS WOMEN 50+ PO), , Disp: , Rfl:     mupirocin (BACTROBAN) 2 % ointment, , Disp: , Rfl:     olopatadine HCl (PATADAY) 0.2 % opth drops, Administer 1 drop to both eyes daily, Disp: 2.5 mL, Rfl: 1    pilocarpine (SALAGEN) 5 mg tablet, Take 1 tablet (5 mg total) by mouth 3 (three) times a day, Disp: 90 tablet, Rfl: 5    QUEtiapine (SEROquel) 300 mg tablet, Take 300 mg by mouth daily at bedtime, Disp: , Rfl:     QUEtiapine (SEROquel) 50 mg tablet, 50 mg daily at bedtime, Disp: , Rfl:     sodium chloride 1 g tablet, Take 1 tablet (1 g total) by mouth in the morning, Disp: 90 tablet, Rfl: 1    temazepam (RESTORIL) 30 mg capsule, Take 2 capsules by mouth daily at bedtime as needed , Disp: , Rfl:     triamcinolone (KENALOG) 0.1 % cream, , Disp: , Rfl:     Zanubrutinib (Brukinsa) 80 MG CAPS, Take 4 capsules (320 mg total) by mouth once daily., Disp: 120 capsule, Rfl: 10    Current Facility-Administered Medications:     cyanocobalamin injection 1,000 mcg, 1,000 mcg, Intramuscular, Q30 Days, Julio Bhat MD, 1,000 mcg at 12/28/23 1242  Allergies   Allergen Reactions    Ciprofloxacin Hives and Swelling     Pt reports that lips and mouth and face swelled.    Cymbalta [Duloxetine  "Hcl] Other (See Comments)     Hallucinations, fatigue, faints    Nirmatrelvir-Ritonavir Diarrhea, Nausea Only, Other (See Comments), Dizziness and Lightheadedness     \"passed out\"-nausea    Percocet [Oxycodone-Acetaminophen] Other (See Comments)     Dry mouth - pt states it kept her up all night. States had to continually drink fluids     Past Surgical History:   Procedure Laterality Date    BREAST BIOPSY Right     many years ago at [facility]    COLONOSCOPY      EYE SURGERY      FRACTURE SURGERY Right     elbow    GASTRIC BYPASS      HYSTERECTOMY Bilateral 1975    IR BIOPSY LYMPH NODE  10/08/2020    LUNG BIOPSY      LYMPH NODE BIOPSY  09/18/2020    LYMPH NODE BIOPSY Left 08/28/2023    Procedure: EXCISION BIOPSY LYMPH NODE LEFT AXILLARY;  Surgeon: Scott Seaman MD;  Location: [facility];  Service: Thoracic    AL [facility] INCL FLUOR GDNCE DX W/CELL WASHG SPX N/A 12/08/2017    Procedure: BRONCHOSCOPY;  Surgeon: Chepe Luna MD;  Location: [facility];  Service: Pulmonary    AL BX/EXC LYMPH NODE NEEDLE SUPERFICIAL Left 02/25/2021    Procedure: EXCISION BIOPSY LYMPH NODE: left axillary lymph node biopsy;  Surgeon: Dakota Heard MD;  Location: [facility];  Service: Thoracic    TONSILLECTOMY       Social History     Objective:  Vitals:    12/12/24 0812   BP: 118/88   BP Location: Right arm   Patient Position: Sitting   Cuff Size: Standard   Pulse: 81   Resp: 17   Temp: 97.5 °F (36.4 °C)   TempSrc: Temporal   SpO2: 95%   Weight: 74.4 kg (164 lb)   Height: 5' 1\" (1.549 m)     Physical Exam  Constitutional:       Appearance: She is well-developed.   HENT:      Head: Normocephalic and atraumatic.   Eyes:      Pupils: Pupils are equal, round, and reactive to light.   Cardiovascular:      Rate and Rhythm: Normal rate.      Heart sounds: No murmur heard.  Pulmonary:      Effort: No respiratory distress.      Breath sounds: No wheezing or rales.   Abdominal:      General: There is no distension.      Palpations: " Abdomen is soft.      Tenderness: There is no abdominal tenderness. There is no rebound.   Musculoskeletal:         General: No tenderness.      Cervical back: Neck supple.   Lymphadenopathy:      Cervical: No cervical adenopathy.   Skin:     General: Skin is warm.      Findings: No rash.   Neurological:      Mental Status: She is alert and oriented to person, place, and time.      Deep Tendon Reflexes: Reflexes normal.   Psychiatric:         Thought Content: Thought content normal.           Labs:  I personally reviewed the labs and imaging pertinent to this patient care.

## 2024-12-13 ENCOUNTER — PATIENT OUTREACH (OUTPATIENT)
Dept: CASE MANAGEMENT | Facility: OTHER | Age: 74
End: 2024-12-13

## 2024-12-13 NOTE — PROGRESS NOTES
OSW placed initial call to Mrs. Turcios today. She did not answer, and a recording stated the VM was full and a message could not be left. Will attempt at another time.

## 2024-12-17 ENCOUNTER — OFFICE VISIT (OUTPATIENT)
Dept: DERMATOLOGY | Facility: CLINIC | Age: 74
End: 2024-12-17
Payer: COMMERCIAL

## 2024-12-17 VITALS — TEMPERATURE: 98.6 F | BODY MASS INDEX: 31.33 KG/M2 | WEIGHT: 165.8 LBS

## 2024-12-17 DIAGNOSIS — L85.3 XEROSIS OF SKIN: ICD-10-CM

## 2024-12-17 DIAGNOSIS — L81.4 SOLAR LENTIGO: ICD-10-CM

## 2024-12-17 DIAGNOSIS — D22.72 MULTIPLE BENIGN MELANOCYTIC NEVI OF BOTH UPPER EXTREMITIES, BOTH LOWER EXTREMITIES, AND TRUNK: ICD-10-CM

## 2024-12-17 DIAGNOSIS — D22.61 MULTIPLE BENIGN MELANOCYTIC NEVI OF BOTH UPPER EXTREMITIES, BOTH LOWER EXTREMITIES, AND TRUNK: ICD-10-CM

## 2024-12-17 DIAGNOSIS — D18.01 CHERRY ANGIOMA: ICD-10-CM

## 2024-12-17 DIAGNOSIS — D22.5 MULTIPLE BENIGN MELANOCYTIC NEVI OF BOTH UPPER EXTREMITIES, BOTH LOWER EXTREMITIES, AND TRUNK: ICD-10-CM

## 2024-12-17 DIAGNOSIS — Z85.820 HISTORY OF MELANOMA: Primary | ICD-10-CM

## 2024-12-17 DIAGNOSIS — D22.71 MULTIPLE BENIGN MELANOCYTIC NEVI OF BOTH UPPER EXTREMITIES, BOTH LOWER EXTREMITIES, AND TRUNK: ICD-10-CM

## 2024-12-17 DIAGNOSIS — L82.1 SEBORRHEIC KERATOSES: ICD-10-CM

## 2024-12-17 DIAGNOSIS — L57.0 KERATOSIS, ACTINIC: ICD-10-CM

## 2024-12-17 DIAGNOSIS — I77.6 VASCULITIS (HCC): ICD-10-CM

## 2024-12-17 DIAGNOSIS — D22.62 MULTIPLE BENIGN MELANOCYTIC NEVI OF BOTH UPPER EXTREMITIES, BOTH LOWER EXTREMITIES, AND TRUNK: ICD-10-CM

## 2024-12-17 PROCEDURE — 17003 DESTRUCT PREMALG LES 2-14: CPT | Performed by: REGISTERED NURSE

## 2024-12-17 PROCEDURE — 99214 OFFICE O/P EST MOD 30 MIN: CPT | Performed by: REGISTERED NURSE

## 2024-12-17 PROCEDURE — 17000 DESTRUCT PREMALG LESION: CPT | Performed by: REGISTERED NURSE

## 2024-12-17 RX ORDER — LORAZEPAM 1 MG/1
1 TABLET ORAL 2 TIMES DAILY
COMMUNITY

## 2024-12-17 RX ORDER — CLOBETASOL PROPIONATE 0.5 MG/G
OINTMENT TOPICAL
Qty: 60 G | Refills: 2 | Status: SHIPPED | OUTPATIENT
Start: 2024-12-17

## 2024-12-17 RX ORDER — HYDROXYCHLOROQUINE SULFATE 200 MG/1
200 TABLET, FILM COATED ORAL 2 TIMES DAILY WITH MEALS
COMMUNITY

## 2024-12-17 NOTE — PROGRESS NOTES
"[facility] Note     Patient Name: Cassia Turcios  Encounter Date: 12/17/24     Have you been cared for by a [facility] in the last 3 years and, if so, which description applies to you?    Yes.  I have been here within the last 3 years, and my medical history has NOT changed since that time.  I am FEMALE/of child-bearing potential.    REVIEW OF SYSTEMS:  Have you recently had or currently have any of the following? No changes in my recent health.   PAST MEDICAL HISTORY:  Have you personally ever had or currently have any of the following?  If \"YES,\" then please provide more detail. No changes in my medical history.   HISTORY OF IMMUNOSUPPRESSION: Do you have a history of any of the following:  Systemic Immunosuppression such as Diabetes, Biologic or Immunotherapy, Chemotherapy, Organ Transplantation, Bone Marrow Transplantation or Prednisone?  YES, chemotherapy (Brukinsa) for lymphoma     Answering \"YES\" requires the addition of the dotphrase \"IMMUNOSUPPRESSED\" as the first diagnosis of the patient's visit.   FAMILY HISTORY:  Any \"first degree relatives\" (parent, brother, sister, or child) with the following?    No changes in my family's known health.   PATIENT EXPERIENCE:    Do you want the Dermatologist to perform a COMPLETE skin exam today including a clinical examination under the \"bra and underwear\" areas?  Yes  If necessary, do we have your permission to call and leave a detailed message on your Preferred Phone number that includes your specific medical information?  Yes      Allergies   Allergen Reactions    Ciprofloxacin Hives and Swelling     Pt reports that lips and mouth and face swelled.    Cymbalta [Duloxetine Hcl] Other (See Comments)     Hallucinations, fatigue, faints    Nirmatrelvir-Ritonavir Diarrhea, Nausea Only, Other (See Comments), Dizziness and Lightheadedness     \"passed out\"-nausea    Percocet [Oxycodone-Acetaminophen] Other (See Comments)     Dry mouth - pt " states it kept her up all night. States had to continually drink fluids      Current Outpatient Medications:     buPROPion (WELLBUTRIN) 100 mg tablet, Take 100 mg by mouth 3 (three) times a day., Disp: , Rfl:     Cholecalciferol (D3-50) 1.25 MG (59012 UT) capsule, 5,000 Units daily, Disp: , Rfl:     clobetasol (TEMOVATE) 0.05 % ointment, Use twice a day., Disp: 60 g, Rfl: 2    clotrimazole-betamethasone (LOTRISONE) 1-0.05 % cream, Apply topically 2 (two) times a day, Disp: 45 g, Rfl: 0    estradiol (ESTRACE VAGINAL) 0.1 mg/g vaginal cream, Insert 1 g into the vagina 2 (two) times a week, Disp: 42.5 g, Rfl: 1    ferrous sulfate 325 (65 Fe) mg tablet, Take 1 tablet (325 mg total) by mouth 2 (two) times a day with meals, Disp: 60 tablet, Rfl: 0    levothyroxine (Synthroid) 25 mcg tablet, Take 1 tablet (25 mcg total) by mouth daily, Disp: 90 tablet, Rfl: 1    LORazepam (ATIVAN) 1 mg tablet, Take 1 mg by mouth 2 (two) times a day As needed, Disp: , Rfl:     Multiple Vitamins-Minerals (CENTRUM MINIS WOMEN 50+ PO), , Disp: , Rfl:     mupirocin (BACTROBAN) 2 % ointment, , Disp: , Rfl:     olopatadine HCl (PATADAY) 0.2 % opth drops, Administer 1 drop to both eyes daily, Disp: 2.5 mL, Rfl: 1    pilocarpine (SALAGEN) 5 mg tablet, Take 1 tablet (5 mg total) by mouth 3 (three) times a day, Disp: 90 tablet, Rfl: 5    QUEtiapine (SEROquel) 300 mg tablet, Take 300 mg by mouth daily at bedtime, Disp: , Rfl:     QUEtiapine (SEROquel) 50 mg tablet, 50 mg daily at bedtime, Disp: , Rfl:     sodium chloride 1 g tablet, Take 1 tablet (1 g total) by mouth in the morning, Disp: 90 tablet, Rfl: 1    temazepam (RESTORIL) 30 mg capsule, Take 2 capsules by mouth daily at bedtime as needed , Disp: , Rfl:     triamcinolone (KENALOG) 0.1 % cream, , Disp: , Rfl:     Zanubrutinib (Brukinsa) 80 MG CAPS, Take 4 capsules (320 mg total) by mouth once daily., Disp: 120 capsule, Rfl: 10    Current Facility-Administered Medications:     cyanocobalamin  injection 1,000 mcg, 1,000 mcg, Intramuscular, Q30 Days, Julio Bhat MD, 1,000 mcg at 12/28/23 1242          Whom besides the patient is providing clinical information about today's encounter?   NO ADDITIONAL HISTORIAN (patient alone provided history)    Physical Exam and Assessment/Plan by Diagnosis:  HISTORY OF MELANOMA    Physical Exam:  Anatomic Location Affected:  back  Morphological Description of Scar:  well healed with some area of central crusting   Year Treated: 2024  TNM Classification: pT1a  Suspected Recurrence: no  Regional adenopathy: no    Additional History of Present Condition:  Excision with Dr. Díaz on 11/8/24.    Tissue Exam: [id number]  Order: [id number]   Collected 10/25/2024 11:44 AM       Status: Final result       Dx: Neoplasm of uncertain behavior    Test Result Released: Yes (seen)    3 Result Notes      Component  Ref Range & Units (hover)    Case Report   Surgical Pathology Report                         Case: [id number]                                   Authorizing Provider:  Diaz Perry MD            Collected:           10/25/2024 1144               Ordering Location:     [facility]      Received:            10/25/2024 [address] [place] [address]                                                                       Pathologist:           Ceferino Martinez MD                                                           Specimens:   A) - Skin, Other, Specimen A :Right anterior shin                                                    B) - Skin, Other, Specimen B: right anterior shin                                                    C) - Skin, Other, Specimen C: back                                                        Final Diagnosis   A. Skin, right anterior shin, punch biopsy:     Epidermal hyperplasia, dermal fibrosis, hemorrhage, and perivascular lymphohistiocytic infiltrate with scattered eosinophils (see note).     Note: Findings of  trauma/irritation and venous stasis are seen. Otherwise, the histopathologic findings are relatively non-specific; dermal hypersensitivity reaction (e.g., to a drug, other) cannot be excluded. Findings diagnostic of vasculitis are not seen in this specimen. Clinical pathological correlation to ensure that this biopsy is representative of the underlying rash is recommended. Pathogenic microorganisms are not seen with PAS and Gram stains. CD3, CD20, CD30, , , and CD68 immunostains were reviewed; findings diagnostic of a hematologic malignancy or a lymphoproliferative disorder are not appreciated. Multiple levels examined. If the rash were to progress/persist despite therapy, additional sampling should be sought to exclude development of a more significant disease.            B. 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 Clinical pathologic correlation is advised.        C. Skin, back, shave biopsy:     MELANOMA (thickness: 0.3 mm) (see note).     Note: SOX10, MART-1/Ki-67, HMB45, p16, and PRAME immunostains were reviewed; significant junctional melanocytic architectural disorder is seen, and the lesional cells retain expression of p16 and are approximately 80% positive for PRAME. Please see synoptic report for additional details. The results were emailed to Dr. Perry on 11/1/2024.     Synoptic report for melanoma of the skin  Thickness: 0.3 mm  Ulceration: not seen  Anatomic ([place]acakry) level: II  Type: superficial spreading melanoma  Mitoses: 0/mm2  Microsatellites: not  "seen  Lymphovascular invasion: not seen  Neurotropism: not seen  Tumor regression: present  Margin assessment: melanoma in situ extends to peripheral specimen margin  Pathologic stage: pT1a  Associated nevus: not seen        Assessment and Plan:  Based on a thorough discussion of this condition and the management approach to it (including a comprehensive discussion of the known risks, side effects and potential benefits of treatment), the patient (family) agrees to implement the following specific plan:  Apply Aquaphor to excision site    Regular skin checks - next one in 6 months    What happens at follow-up?  The main purpose of follow-up is to detect recurrences early (metastatic melanoma), but it also offers an opportunity to diagnose a new primary melanoma at the first possible opportunity. A second invasive melanoma occurs in 5-10% of melanoma patients and a new melanoma in situ is diagnosed in more than 20% of melanoma patients.    Our practice makes the following recommendations for follow-up for patients with invasive melanoma.  At-least \"monthly\" self-skin examinations   Routine skin checks by a board certified dermatologist  Follow-up intervals are \"every 3 months\" within 2 years of a new melanoma diagnosis; \"every 6 months\" between 2-4 years of a new melanoma diagnosis; and \"annually\" after 4 years of a new melanoma diagnosis  Individual patient's needs should be considered before an appropriate follow-up is offered  Provide education and support to help the patient adjust to their illness    Follow-up appointments should include:  A check of the scar where the primary melanoma was removed  Checking the regional lymph nodes  A general skin examination  A full physical examination at least annually by your primary care physician    In those with more advanced primary disease, follow-up may include:  Blood tests  Imaging: ultrasound, X-ray, CT, MRI and PET scan.    Most tests are not worthwhile for " patients with stage 1 or 2 melanoma unless there are signs or symptoms of disease recurrence or metastasis. No tests are necessary for healthy patients who have remained well for five years or longer after removal of their melanoma.    What is the outlook for patients with melanoma?  Melanoma in situ is cured by excision because it has no potential to spread around the body.  The risk of spread and ultimate death from invasive melanoma depends on several factors, but the main one is the Breslow thickness of the melanoma at the time it was surgically removed.  Metastases are rare for melanomas < 0.75 mm and the risk for tumours 0.75-1 mm thick is about 5%. The risk steadily increases with thickness so that melanomas > 4 mm have a risk of metastasis of about 40%.    Melanoma is a potentially serious type of skin cancer, in which there is uncontrolled growth of melanocytes (pigment cells). Melanoma is sometimes called malignant melanoma.  Normal melanocytes are found in the basal layer of the epidermis (the outer layer of skin). Melanocytes produce a protein called melanin, which protects skin cells by absorbing ultraviolet (UV) radiation. Melanocytes are found in equal numbers in black and white skin, but melanocytes in black skin produce much more melanin. People with dark brown or black skin are very much less likely to be damaged by UV radiation than those with white skin.      RASH - non-specific cutaneous eruption - resolving   Physical Exam:  Anatomic Location Affected:  right arm, right lower leg  Morphological Description:  healing purpuric macules of right lower extremity, upper extremities with post inflammatory hyperpigmentation   Pertinent Positives:  Pertinent Negatives:    Additional History of Present Condition:  She has been applying clobetasol to spots on her right arm and right lower leg twice daily. Spots are not itchy. This started in September 2024. A previous biopsy was performed by Dr. Vaughn  "in September 2024 on her left arm which found early/evolving vasculitis. Rash was initially itchy and began as ulcers/pustules. Punch biopsy of her right anterior shin was performed at our office on 10/25/24, which showed epidermal hyperplasia, dermal fibrosis, hemorrhage, and perivascular lymphohistiocytic infiltrate with scattered eosinophils. Direct immunofluorescence findings were compatible with IgA vasculitis. She is following with nephrology.    Today, on further evaluation of available lab reports and clinic presentation, we feel this eruption likely does not represent a true IgA vasculitis. The positive DIF could be in the setting of lower extremity biopsy causing a false positive result.     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 She was previously diagnosed with systemic lupus erythematous and Sjogren's syndrome. She is taking plaquenil. Patient also has CLL and is being treated with Brukinsa.    Assessment and Plan:  Based on a thorough discussion of this condition and the management approach to it (including a comprehensive discussion of the known risks, side effects and potential benefits of treatment), the patient (family) agrees to implement the following specific plan:  Recommend compression socks to help with leg swelling   Continue following with nephrology   Continue applying clobetasol twice daily for the next 2 weeks to the affected spots of the legs only, then transition to applying once daily 3-4 times a week  Follow up in 6 months.     XEROSIS (\"DRY SKIN\")    Physical Exam:  Anatomic Location Affected:  trunk, back, chest, arms  Morphological Description:  xerotic papules and plaques  Pertinent Positives:  Pertinent Negatives:    Additional History of Present Condition:    - Current treatments: Using cerave moisturizer twice a day    Assessment and Plan:  - History and physical consistent with xerosis  - Educated " "that dry skin is often exacerbated by low humidity conditions and during change of seasons  - Educated that gentle skin care and consistent use of emollients are the mainstay of treatment. Recommended use of occlusive emollient, such as vaseline, aquaphor or aveeno eczema balm. If unable to tolerate, noted that thick white cream is next best.   Based on a thorough discussion of this condition and the management approach to it (including a comprehensive discussion of the known risks, side effects and potential benefits of treatment), the patient (family) agrees to implement the following specific plan:  Recommend cream based moisturizers,   Start daily emollient to moist skin.  Take luke warm showers (not hot)  Avoid scratching.   Transition to fragrance free, non-irritating skin care as possible.      Tip for relief: If itchy or uncomfortable, placing emollient (like vaseline) in the fridge is a great trick as the cool sensation is soothing.            VALENZUELA ANGIOMAS     Physical Exam:  Anatomic Location Affected:  Trunk and extremities  Morphological Description:  Scattered cherry red papules  Denies pain, itch, bleeding. No treatments tried. Present for years. Present constantly; no modifying factors which make it worse or better.     Assessment and Plan:  Based on a thorough discussion of this condition and the management approach to it (including a comprehensive discussion of the known risks, side effects and potential benefits of treatment), the patient (family) agrees to implement the following specific plan:  Reassure benign        SEBORRHEIC KERATOSIS; NON-INFLAMED     Physical Exam:  Anatomic Location Affected:  Trunk and extremities  Morphological Description:  Waxy, smooth to warty textured, yellow to brownish-grey to dark brown to blackish, discrete, \"stuck-on\" appearing papules.  Present for years. Denies pain, itch, bleeding.      Additional History of Present Condition:  Present constantly; no " "modifying factors which make it worse or better. No prior treatment.       Assessment and Plan:  Based on a thorough discussion of this condition and the management approach to it (including a comprehensive discussion of the known risks, side effects and potential benefits of treatment), the patient (family) agrees to implement the following specific plan:  Reassure benign  Use sun protection.  Apply SPF 30 or higher at least three times a day.  Wear sun protecting clothing and hats.        SOLAR LENTIGINES   OTHER SKIN CHANGES DUE TO CHRONIC EXPOSURE TO NONIONIZING RADIATION     Physical Exam:  Anatomic Location Affected:  Sun exposed areas of back, chest, arms, legs  Morphological Description:  Multiple scattered brown to tan evenly pigmented macules   Denies pain, itch, bleeding. No treatments tried. Present for months - years. Reports getting newer lesions with sun exposure.         Assessment and Plan:  Based on a thorough discussion of this condition and the management approach to it (including a comprehensive discussion of the known risks, side effects and potential benefits of treatment), the patient (family) agrees to implement the following specific plan:  Reassure benign  Use sun protection.  Apply SPF 30 or higher at least three times a day.  Wear sun protecting clothing and hats.         MULTIPLE MELANOCYTIC NEVI (\"Moles\")     Physical Exam:  Anatomic Location Affected: Trunk and extremities  Morphological Description:  Scattered, round to ovoid, symmetrical-appearing, even bordered, skin colored to dark brown macules/papules  Denies pain, itch, bleeding. No treatments tried. Present for years. Present constantly; no modifying factors which make it worse or better. Denies actively changing or growing moles.      Assessment and Plan:  Based on a thorough discussion of this condition and the management approach to it (including a comprehensive discussion of the known risks, side effects and potential " benefits of treatment), the patient (family) agrees to implement the following specific plan:  Reassure benign  Monitor for changes  Use sun protection.  Apply SPF 30 or higher at least three times a day.  Wear sun protecting clothing and hats.       Worrisome signs of skin malignancy discussed, questions answered. Regular self-skin check discussed. Advised to call or return to office if patient notices any spots of concern, rapidly growing/changing lesions, bleeding lesions, non-healing lesions. Advised regular SPF use.      ACTINIC KERATOSES  Physical Exam:  Anatomic Location Affected:  left cheek x1, left forehead x1, right forehead x1  Morphological Description:  Thin pink papule(s) with gritty scale       Assessment and Plan:  Based on a thorough discussion of this condition and the management approach to it (including a comprehensive discussion of the known risks, side effects and potential benefits of treatment), the patient (family) agrees to implement the following specific plan:  Treated with cryotherapy today; written and verbal consent obtained     PROCEDURE:  DESTRUCTION OF PRE-MALIGNANT LESIONS  After a thorough discussion of treatment options and risk/benefits/alternatives (including but not limited to local pain, scarring, dyspigmentation, blistering, and possible superinfection), verbal and written consent were obtained and the aforementioned lesions were treated on with cryotherapy using liquid nitrogen x 2 cycles for 5-10 seconds. The patient tolerated the procedure well, and after-care instructions were provided.     TOTAL NUMBER of 3 pre-malignant lesions were treated today on the ANATOMIC LOCATION: left cheek x1, left forehead x1, right forehead x1.       Patient instructions:  Your pre-cancerous lesions (called actinic keratosis) were treated with liquid nitrogen today. The treated areas will get more red, crusted over the next few days. There might be some blistering. Apply vaseline to the  treated area for the next week to help it heal fully. Do not pick at the area. Return in 3-4 weeks for another round of liquid nitrogen treatment if lesion(s)  fails to fully resolve.        Scribe Attestation      I,:  Melyssa Pop am acting as a scribe while in the presence of the attending physician.:       I,:  Kristina Carnes MD personally performed the services described in this documentation    as scribed in my presence.:

## 2024-12-17 NOTE — PATIENT INSTRUCTIONS
"HISTORY OF MELANOMA  Assessment and Plan:  Based on a thorough discussion of this condition and the management approach to it (including a comprehensive discussion of the known risks, side effects and potential benefits of treatment), the patient (family) agrees to implement the following specific plan:  Apply Aquaphor to excision site    Regular skin checks - next one in 6 months    What happens at follow-up?  The main purpose of follow-up is to detect recurrences early (metastatic melanoma), but it also offers an opportunity to diagnose a new primary melanoma at the first possible opportunity. A second invasive melanoma occurs in 5-10% of melanoma patients and a new melanoma in situ is diagnosed in more than 20% of melanoma patients.    Our practice makes the following recommendations for follow-up for patients with invasive melanoma.  At-least \"monthly\" self-skin examinations   Routine skin checks by a board certified dermatologist  Follow-up intervals are \"every 3 months\" within 2 years of a new melanoma diagnosis; \"every 6 months\" between 2-4 years of a new melanoma diagnosis; and \"annually\" after 4 years of a new melanoma diagnosis  Individual patient's needs should be considered before an appropriate follow-up is offered  Provide education and support to help the patient adjust to their illness    Follow-up appointments should include:  A check of the scar where the primary melanoma was removed  Checking the regional lymph nodes  A general skin examination  A full physical examination at least annually by your primary care physician    In those with more advanced primary disease, follow-up may include:  Blood tests  Imaging: ultrasound, X-ray, CT, MRI and PET scan.    Most tests are not worthwhile for patients with stage 1 or 2 melanoma unless there are signs or symptoms of disease recurrence or metastasis. No tests are necessary for healthy patients who have remained well for five years or longer after " "removal of their melanoma.    What is the outlook for patients with melanoma?  Melanoma in situ is cured by excision because it has no potential to spread around the body.  The risk of spread and ultimate death from invasive melanoma depends on several factors, but the main one is the Breslow thickness of the melanoma at the time it was surgically removed.  Metastases are rare for melanomas < 0.75 mm and the risk for tumours 0.75-1 mm thick is about 5%. The risk steadily increases with thickness so that melanomas > 4 mm have a risk of metastasis of about 40%.    Melanoma is a potentially serious type of skin cancer, in which there is uncontrolled growth of melanocytes (pigment cells). Melanoma is sometimes called malignant melanoma.  Normal melanocytes are found in the basal layer of the epidermis (the outer layer of skin). Melanocytes produce a protein called melanin, which protects skin cells by absorbing ultraviolet (UV) radiation. Melanocytes are found in equal numbers in black and white skin, but melanocytes in black skin produce much more melanin. People with dark brown or black skin are very much less likely to be damaged by UV radiation than those with white skin.    Vasculitis  Assessment and Plan:  Based on a thorough discussion of this condition and the management approach to it (including a comprehensive discussion of the known risks, side effects and potential benefits of treatment), the patient (family) agrees to implement the following specific plan:  Recommend compression socks to help with leg swelling   Continue following with nephrology   Continue applying clobetasol twice daily for the next 2 weeks then transition to applying 3-4 times a week       XEROSIS (\"DRY SKIN\")  Assessment and Plan:  - History and physical consistent with xerosis  - Educated that dry skin is often exacerbated by low humidity conditions and during change of seasons  - Educated that gentle skin care and consistent use of " emollients are the mainstay of treatment. Recommended use of occlusive emollient, such as vaseline, aquaphor or aveeno eczema balm. If unable to tolerate, noted that thick white cream is next best.   Based on a thorough discussion of this condition and the management approach to it (including a comprehensive discussion of the known risks, side effects and potential benefits of treatment), the patient (family) agrees to implement the following specific plan:  Recommend cream based moisturizers,   Start daily emollient to moist skin.  Take luke warm showers (not hot)  Avoid scratching.   Transition to fragrance free, non-irritating skin care as possible.      Tip for relief: If itchy or uncomfortable, placing emollient (like vaseline) in the fridge is a great trick as the cool sensation is soothing.            CHERRY ANGIOMAS  Assessment and Plan:  Based on a thorough discussion of this condition and the management approach to it (including a comprehensive discussion of the known risks, side effects and potential benefits of treatment), the patient (family) agrees to implement the following specific plan:  Reassure benign        SEBORRHEIC KERATOSIS; NON-INFLAMED  Assessment and Plan:  Based on a thorough discussion of this condition and the management approach to it (including a comprehensive discussion of the known risks, side effects and potential benefits of treatment), the patient (family) agrees to implement the following specific plan:  Reassure benign  Use sun protection.  Apply SPF 30 or higher at least three times a day.  Wear sun protecting clothing and hats.        SOLAR LENTIGINES   OTHER SKIN CHANGES DUE TO CHRONIC EXPOSURE TO NONIONIZING RADIATION   Assessment and Plan:  Based on a thorough discussion of this condition and the management approach to it (including a comprehensive discussion of the known risks, side effects and potential benefits of treatment), the patient (family) agrees to implement the  "following specific plan:  Reassure benign  Use sun protection.  Apply SPF 30 or higher at least three times a day.  Wear sun protecting clothing and hats.         MULTIPLE MELANOCYTIC NEVI (\"Moles\")  Assessment and Plan:  Based on a thorough discussion of this condition and the management approach to it (including a comprehensive discussion of the known risks, side effects and potential benefits of treatment), the patient (family) agrees to implement the following specific plan:  Reassure benign  Monitor for changes  Use sun protection.  Apply SPF 30 or higher at least three times a day.  Wear sun protecting clothing and hats.       Worrisome signs of skin malignancy discussed, questions answered. Regular self-skin check discussed. Advised to call or return to office if patient notices any spots of concern, rapidly growing/changing lesions, bleeding lesions, non-healing lesions. Advised regular SPF use.      ACTINIC KERATOSES  Patient instructions:  Your pre-cancerous lesions (called actinic keratosis) were treated with liquid nitrogen today. The treated areas will get more red, crusted over the next few days. There might be some blistering. Apply vaseline to the treated area for the next week to help it heal fully. Do not pick at the area. Return in 3-4 weeks for another round of liquid nitrogen treatment if lesion(s)  fails to fully resolve.  "

## 2024-12-20 ENCOUNTER — PATIENT OUTREACH (OUTPATIENT)
Dept: CASE MANAGEMENT | Facility: OTHER | Age: 74
End: 2024-12-20

## 2024-12-20 NOTE — PROGRESS NOTES
"OSW called pt today, introduced self and explained reason for call. Mrs. Turcios is a very pleasant 73 y/o woman with CLL/SLL and a h/o melanoma. Mrs. Turcios stated she is struggling with living with cancer and that she rarely says \"the C word\" to others during conversations.  She is very worried about showing her true feelings to her family. She explained that her son, and extended family members live in [place]. She will be traveling to [place] on Sunday for the week of Christmas and is feeling a lot of anxiety about \"keeping a brave face\" in front of everyone. She stated last year she became tearful to her son and he responded in a dismissive, \"pull yourself up\" way to her. She is worried that her grandchildren will see her in a vulnerable way and she does not want them to feel upset or sad. She mentioned her sister told her that she invited more people to her home than usual.  She describes having intrusive questions like, \"Do they think this will be my last Christmas?\"     Mrs. Turcios disclosed she has BPD, depression and anxiety. She sees an outpatient psychiatrist who prescribed her anti-anxiety medication to use for this upcoming week for moments of uncontrolled anxiety.     OSW explained this writer offers short term emotional support, but does not offer regular therapy. OSW explored the resources from LLS (Leukemia/Lymphoma Society), [facility] of , and offered help looking into outpatient BH with a therapist. Explained [facility] has a support group \"People living with cancer\" and also offer mindfulness groups and activities to help with anxiety reduction. She stated she did not know about these resources and will take time to think about them this upcoming week and f/u with OSW on 12/30. OSW stressed importance of self-care next week, and to take time for herself as able to recharge. She understood same, and shared after they arrive to [place] on Sunday she will excuse herself to rest while her  gathers with the men and her " TEREZA will be busy with a cookie swap. Will follow up with Mrs. Turcios after Li week.

## 2025-01-08 ENCOUNTER — PATIENT OUTREACH (OUTPATIENT)
Dept: CASE MANAGEMENT | Facility: OTHER | Age: 75
End: 2025-01-08

## 2025-01-08 NOTE — PROGRESS NOTES
"OSW called Mrs. Turcios today. She shared she had an \"ok\" holiday with her family, but noticed that her oldest grandson asked her how she was feeling and that \"she looked better before\" during her previous visit. She also mentioned that once they arrived in [place], she stayed in her room and watched a [employer] movie while the rest of the family gathered. She stated she did not have any desire to engage with them or feel like she was missing out on anything. She did have a heartfelt conversation with her sister, who is in remission from leukemia. Her sister validated some of the thoughts and feelings she is experiencing. After these few days away, she stated she realized she needs to talk to a therapist. OSW offered to gather a list of possible in-network therapists and she is very agreeable. Will f/u with her once this information is available. She requested it be emailed to her, and confirmed her email address.   "

## 2025-01-10 ENCOUNTER — PATIENT OUTREACH (OUTPATIENT)
Dept: CASE MANAGEMENT | Facility: OTHER | Age: 75
End: 2025-01-10

## 2025-01-10 NOTE — PROGRESS NOTES
OSW sent list of in-network therapy providers to her email after search was completed on [employer] website. Will follow up with her to assist with any additional questions.

## 2025-01-14 DIAGNOSIS — M32.9 SYSTEMIC LUPUS ERYTHEMATOSUS, UNSPECIFIED SLE TYPE, UNSPECIFIED ORGAN INVOLVEMENT STATUS (HCC): Primary | ICD-10-CM

## 2025-01-14 RX ORDER — HYDROXYCHLOROQUINE SULFATE 200 MG/1
TABLET, FILM COATED ORAL
Qty: 180 TABLET | Refills: 1 | Status: SHIPPED | OUTPATIENT
Start: 2025-01-14 | End: 2026-01-14

## 2025-01-17 ENCOUNTER — TELEPHONE (OUTPATIENT)
Dept: NEPHROLOGY | Facility: CLINIC | Age: 75
End: 2025-01-17

## 2025-01-17 ENCOUNTER — RESULTS FOLLOW-UP (OUTPATIENT)
Dept: RHEUMATOLOGY | Facility: CLINIC | Age: 75
End: 2025-01-17

## 2025-01-17 ENCOUNTER — APPOINTMENT (OUTPATIENT)
Dept: LAB | Facility: CLINIC | Age: 75
End: 2025-01-17
Payer: COMMERCIAL

## 2025-01-17 DIAGNOSIS — M32.9 SYSTEMIC LUPUS ERYTHEMATOSUS, UNSPECIFIED SLE TYPE, UNSPECIFIED ORGAN INVOLVEMENT STATUS (HCC): ICD-10-CM

## 2025-01-17 LAB
ALBUMIN SERPL BCG-MCNC: 3.8 G/DL (ref 3.5–5)
ALP SERPL-CCNC: 103 U/L (ref 34–104)
ALT SERPL W P-5'-P-CCNC: 22 U/L (ref 7–52)
ANION GAP SERPL CALCULATED.3IONS-SCNC: 6 MMOL/L (ref 4–13)
AST SERPL W P-5'-P-CCNC: 36 U/L (ref 13–39)
BACTERIA UR QL AUTO: ABNORMAL /HPF
BASOPHILS # BLD AUTO: 0.08 THOUSANDS/ΜL (ref 0–0.1)
BASOPHILS NFR BLD AUTO: 1 % (ref 0–1)
BILIRUB SERPL-MCNC: 0.7 MG/DL (ref 0.2–1)
BILIRUB UR QL STRIP: NEGATIVE
BUN SERPL-MCNC: 23 MG/DL (ref 5–25)
C3 SERPL-MCNC: 119 MG/DL (ref 87–200)
C4 SERPL-MCNC: 17 MG/DL (ref 19–52)
CALCIUM SERPL-MCNC: 9.3 MG/DL (ref 8.4–10.2)
CHLORIDE SERPL-SCNC: 99 MMOL/L (ref 96–108)
CLARITY UR: CLEAR
CO2 SERPL-SCNC: 27 MMOL/L (ref 21–32)
COLOR UR: ABNORMAL
CREAT SERPL-MCNC: 0.83 MG/DL (ref 0.6–1.3)
CREAT UR-MCNC: 69.9 MG/DL
CRP SERPL QL: 6 MG/L
EOSINOPHIL # BLD AUTO: 0.09 THOUSAND/ΜL (ref 0–0.61)
EOSINOPHIL NFR BLD AUTO: 1 % (ref 0–6)
ERYTHROCYTE [DISTWIDTH] IN BLOOD BY AUTOMATED COUNT: 13.9 % (ref 11.6–15.1)
ERYTHROCYTE [SEDIMENTATION RATE] IN BLOOD: 70 MM/HOUR (ref 0–29)
GFR SERPL CREATININE-BSD FRML MDRD: 70 ML/MIN/1.73SQ M
GLUCOSE P FAST SERPL-MCNC: 90 MG/DL (ref 65–99)
GLUCOSE UR STRIP-MCNC: NEGATIVE MG/DL
HCT VFR BLD AUTO: 40.4 % (ref 34.8–46.1)
HGB BLD-MCNC: 12.8 G/DL (ref 11.5–15.4)
HGB UR QL STRIP.AUTO: NEGATIVE
IMM GRANULOCYTES # BLD AUTO: 0.05 THOUSAND/UL (ref 0–0.2)
IMM GRANULOCYTES NFR BLD AUTO: 1 % (ref 0–2)
KETONES UR STRIP-MCNC: NEGATIVE MG/DL
LEUKOCYTE ESTERASE UR QL STRIP: ABNORMAL
LYMPHOCYTES # BLD AUTO: 2.83 THOUSANDS/ΜL (ref 0.6–4.47)
LYMPHOCYTES NFR BLD AUTO: 39 % (ref 14–44)
MCH RBC QN AUTO: 30.8 PG (ref 26.8–34.3)
MCHC RBC AUTO-ENTMCNC: 31.7 G/DL (ref 31.4–37.4)
MCV RBC AUTO: 97 FL (ref 82–98)
MONOCYTES # BLD AUTO: 0.77 THOUSAND/ΜL (ref 0.17–1.22)
MONOCYTES NFR BLD AUTO: 11 % (ref 4–12)
NEUTROPHILS # BLD AUTO: 3.51 THOUSANDS/ΜL (ref 1.85–7.62)
NEUTS SEG NFR BLD AUTO: 47 % (ref 43–75)
NITRITE UR QL STRIP: POSITIVE
NON-SQ EPI CELLS URNS QL MICRO: ABNORMAL /HPF
NRBC BLD AUTO-RTO: 0 /100 WBCS
PH UR STRIP.AUTO: 6 [PH]
PLATELET # BLD AUTO: 316 THOUSANDS/UL (ref 149–390)
PMV BLD AUTO: 10.8 FL (ref 8.9–12.7)
POTASSIUM SERPL-SCNC: 4.2 MMOL/L (ref 3.5–5.3)
PROT SERPL-MCNC: 8.9 G/DL (ref 6.4–8.4)
PROT UR STRIP-MCNC: ABNORMAL MG/DL
PROT UR-MCNC: 9 MG/DL
PROT/CREAT UR: 0.1 MG/G{CREAT} (ref 0–0.1)
RBC # BLD AUTO: 4.15 MILLION/UL (ref 3.81–5.12)
RBC #/AREA URNS AUTO: ABNORMAL /HPF
SODIUM SERPL-SCNC: 132 MMOL/L (ref 135–147)
SP GR UR STRIP.AUTO: 1.01 (ref 1–1.03)
UROBILINOGEN UR STRIP-ACNC: <2 MG/DL
WBC # BLD AUTO: 7.33 THOUSAND/UL (ref 4.31–10.16)
WBC #/AREA URNS AUTO: ABNORMAL /HPF

## 2025-01-17 PROCEDURE — 81001 URINALYSIS AUTO W/SCOPE: CPT

## 2025-01-17 PROCEDURE — 82570 ASSAY OF URINE CREATININE: CPT

## 2025-01-17 PROCEDURE — 84156 ASSAY OF PROTEIN URINE: CPT

## 2025-01-17 NOTE — TELEPHONE ENCOUNTER
I called the patient and we spoke over the phone.   Recent laboratory data were reviewed.     Lab Results   Component Value Date    SODIUM 132 (L) 01/17/2025    K 4.2 01/17/2025    CL 99 01/17/2025    CO2 27 01/17/2025    BUN 23 01/17/2025    CREATININE 0.83 01/17/2025    GLUC 104 10/26/2024    CALCIUM 9.3 01/17/2025     Renal function stable.   UA with pyuria. Patient without symptoms.     Plan:  No changes or treatment.

## 2025-01-18 LAB — DSDNA IGG SERPL IA-ACNC: 4.2 IU/ML (ref ?–15)

## 2025-01-21 ENCOUNTER — PATIENT OUTREACH (OUTPATIENT)
Dept: CASE MANAGEMENT | Facility: OTHER | Age: 75
End: 2025-01-21

## 2025-01-21 NOTE — PROGRESS NOTES
"OSW placed outreach call to Mrs. Turcios today. She stated she received this writer's email and has 3 outpatient therapists who look like they might be a good fit. She is planning on calling them tomorrow to see if they can take her on.   She talked about her cancer, and that she felt disappointed that her provider told her she still has cancer. She shared she was anticipating and hoping to hear she is in remission. Despite the positive news that her cancer is very stable, she still feels \"stuck\" that it is still there and was not cured or is gone. She stated her hope with outpatient therapy is that she can move on from feeling stuck. She also talked about her h/o BPD and the loss of her son. Emotional support provided as well as validation of her feelings.   She will see her psychiatrist next month.   Mrs. Turcios stated she will contact OSW to update on her progress, and this writer will continue to schedule monthly outreach.   "

## 2025-01-27 ENCOUNTER — OFFICE VISIT (OUTPATIENT)
Dept: UROLOGY | Facility: CLINIC | Age: 75
End: 2025-01-27
Payer: COMMERCIAL

## 2025-01-27 VITALS
SYSTOLIC BLOOD PRESSURE: 142 MMHG | WEIGHT: 163 LBS | HEART RATE: 71 BPM | OXYGEN SATURATION: 95 % | DIASTOLIC BLOOD PRESSURE: 86 MMHG | BODY MASS INDEX: 30.78 KG/M2 | HEIGHT: 61 IN

## 2025-01-27 DIAGNOSIS — D17.9 ANGIOMYOLIPOMA: Primary | ICD-10-CM

## 2025-01-27 PROCEDURE — 99213 OFFICE O/P EST LOW 20 MIN: CPT

## 2025-01-27 NOTE — PROGRESS NOTES
1/27/2025      No chief complaint on file.        Assessment and Plan    74 y.o. female managed by Dr. Barnard        Angiomyolipoma  -left lower pole AML.  -CT chest and abd w/contrast (1/15/24) showing 22 x 22 mm left lower pole renal lesion containing macroscopic fat which has demonstrated enlargement since 2018 and was not present on the study from 11/10/2005 consistent with an angiomyolipoma.  -Most recent imaging- CT abd pelvis w/contrast on 12/5/24 showing small,bilateral renal cysts. 2.4 cm exophytic left lower pole angiomyolipoma. No hydronephrosis.   -We discussed that due to small size (<4 cm) we can safely continue to observe.       2. Recurrent UTI  -urinalysis (1/17/25) showed large leukocytes, positive nitrites. No rbcs. No culture sent; patient states she was asymptomatic and had urine test ordered by her rheumatologist. Patient states she has not had any recent UTIs.  -Patient denies voiding complaints. Patient denies fever/chills, dysuria, flank pain, frequency, urgency, and gross hematuria.    -Patient will return in 1 year for follow-up with renal US PTV.  -All questions addressed.  -Please do not hesitate to reach out with further questions or concerns.       History of Present Illness  Cassia Turcios is a 74 y.o. female here with hx of angiomyolipoma, recurrent UTIs, lymphoma, CLL, sjogren's syndrome, Lupus, vasculitis presenting today for follow-up. Patient previously seen by Dr. Barnard in January 2024. At that time, he wanted patient to have follow-up imaging in 6 months. Patient has lymphoma and follows closely with hem onc. Patient had scheduled CT abd pelvis with contrast on 12/5/24. CT abd pelvis w/contrast on 12/5/24 showing small,bilateral renal cysts. 2.4 cm exophytic left lower pole angiomyolipoma. No hydronephrosis.      Previous CT chest and abd w/contrast (1/15/24) showed a 22 x 22 mm left lower pole renal lesion containing macroscopic fat which has demonstrated enlargement since  2018 and was not present on the study from 11/10/2005 consistent with an angiomyolipoma.    Patient denies voiding complaints. Patient denies dysuria, flank pain, frequency, urgency, and gross hematuria. Patient denies fevers and chills. Her last positive urine culture was positive for >100,000 E.coli on 7/02/24.       Review of Systems   Constitutional:  Negative for chills and fever.   HENT:  Negative for ear pain and sore throat.    Eyes:  Negative for pain and visual disturbance.   Respiratory:  Negative for cough and shortness of breath.    Cardiovascular:  Negative for chest pain and palpitations.   Gastrointestinal:  Negative for abdominal pain, nausea and vomiting.   Genitourinary:  Negative for difficulty urinating, dysuria, flank pain, frequency, hematuria and urgency.   Musculoskeletal:  Negative for arthralgias and back pain.   Skin:  Negative for color change and rash.   Neurological:  Negative for seizures and syncope.   All other systems reviewed and are negative.               Vitals  There were no vitals filed for this visit.    Physical Exam  Constitutional:       Appearance: Normal appearance.   HENT:      Head: Normocephalic.   Eyes:      Extraocular Movements: Extraocular movements intact.      Pupils: Pupils are equal, round, and reactive to light.   Pulmonary:      Effort: Pulmonary effort is normal. No respiratory distress.   Musculoskeletal:         General: Normal range of motion.      Cervical back: Normal range of motion.   Neurological:      Mental Status: She is alert and oriented to person, place, and time.   Psychiatric:         Mood and Affect: Mood normal.         Behavior: Behavior normal.         Thought Content: Thought content normal.         Judgment: Judgment normal.           Past History  Past Medical History:   Diagnosis Date    Acute kidney injury superimposed on chronic kidney disease  (HCC) 09/20/2021    Age-related osteoporosis without current pathological fracture  06/01/2023    Allergic rhinitis     Anemia     Anxiety     Bipolar 1 disorder (HCC)     Constipation     Deaf     Pt lost all hearing in right ear after having [place] measles    Depression     Diarrhea     Fatigue     GERD (gastroesophageal reflux disease) 08/01/2020    Hard of hearing     Pt wears a hearing in left ear.    HL (hearing loss)     Hypertension 10/07/2023    Hypothyroidism     Lung nodule     Nasal congestion     Osteoporosis     Pneumonia 02/10/2017    Pneumonia due to Streptococcus (HCC) 09/19/2021    Psychiatric disorder     Recurrent UTI 06/05/2023    Sjogren's syndrome (HCC)     Small lymphocytic lymphoma (HCC) 09/18/2023    Systemic lupus erythematosus (HCC)     Wears glasses     Wears partial dentures      Social History     Socioeconomic History    Marital status: /Civil Union     Spouse name: Not on file    Number of children: Not on file    Years of education: Not on file    Highest education level: Not on file   Occupational History    Not on file   Tobacco Use    Smoking status: Never    Smokeless tobacco: Never   Vaping Use    Vaping status: Never Used   Substance and Sexual Activity    Alcohol use: No    Drug use: No    Sexual activity: Not Currently     Partners: Male     Birth control/protection: Female Sterilization     Comment:    Other Topics Concern    Not on file   Social History Narrative    Not on file     Social Drivers of Health     Financial Resource Strain: Low Risk  (7/14/2023)    Overall Financial Resource Strain (CARDIA)     Difficulty of Paying Living Expenses: Not very hard   Food Insecurity: No Food Insecurity (10/25/2024)    Hunger Vital Sign     Worried About Running Out of Food in the Last Year: Never true     Ran Out of Food in the Last Year: Never true   Transportation Needs: No Transportation Needs (10/25/2024)    PRAPARE - Transportation     Lack of Transportation (Medical): No     Lack of Transportation (Non-Medical): No   Physical Activity: Not on  "file   Stress: Not on file   Social Connections: Not on file   Intimate Partner Violence: Not on file   Housing Stability: Low Risk  (10/25/2024)    Housing Stability Vital Sign     Unable to Pay for Housing in the Last Year: No     Number of Times Moved in the Last Year: 0     Homeless in the Last Year: No     Social History     Tobacco Use   Smoking Status Never   Smokeless Tobacco Never     Family History   Problem Relation Age of Onset    Heart disease Mother     Diabetes Mother     Kidney cancer Mother     Cancer Mother         Kidney    Hypertension Mother     Heart disease Father     Stroke Father     Diabetes Father     Cancer Father         Rectal Cancer    Hypertension Father     No Known Problems Maternal Grandmother     No Known Problems Maternal Grandfather     No Known Problems Paternal Grandmother     No Known Problems Paternal Grandfather     Leukemia Half-Sister 50    No Known Problems Half-Sister     No Known Problems Half-Sister     No Known Problems Half-Sister     BRCA2 Positive Neg Hx     BRCA2 Negative Neg Hx     BRCA1 Negative Neg Hx     Endometrial cancer Neg Hx     Breast cancer Neg Hx     Breast cancer additional onset Neg Hx     Colon cancer Neg Hx     Ovarian cancer Neg Hx     BRCA1 Positive Neg Hx     BRCA 1/2 Neg Hx        The following portions of the patient's history were reviewed and updated as appropriate: allergies, current medications, past medical history, past social history, past surgical history and problem list.    Results  No results found for this or any previous visit (from the past hour).]  No results found for: \"PSA\"  Lab Results   Component Value Date    CALCIUM 9.3 01/17/2025    K 4.2 01/17/2025    CO2 27 01/17/2025    CL 99 01/17/2025    BUN 23 01/17/2025    CREATININE 0.83 01/17/2025     Lab Results   Component Value Date    WBC 7.33 01/17/2025    HGB 12.8 01/17/2025    HCT 40.4 01/17/2025    MCV 97 01/17/2025     01/17/2025       KRYSTIN Hobbs  "

## 2025-01-29 ENCOUNTER — OFFICE VISIT (OUTPATIENT)
Dept: RHEUMATOLOGY | Facility: CLINIC | Age: 75
End: 2025-01-29
Payer: COMMERCIAL

## 2025-01-29 VITALS
HEART RATE: 83 BPM | HEIGHT: 61 IN | SYSTOLIC BLOOD PRESSURE: 132 MMHG | OXYGEN SATURATION: 95 % | BODY MASS INDEX: 30.8 KG/M2 | DIASTOLIC BLOOD PRESSURE: 92 MMHG

## 2025-01-29 DIAGNOSIS — I77.6 VASCULITIS (HCC): ICD-10-CM

## 2025-01-29 DIAGNOSIS — M17.0 BILATERAL PRIMARY OSTEOARTHRITIS OF KNEE: ICD-10-CM

## 2025-01-29 DIAGNOSIS — M81.0 AGE-RELATED OSTEOPOROSIS WITHOUT CURRENT PATHOLOGICAL FRACTURE: ICD-10-CM

## 2025-01-29 DIAGNOSIS — Z79.899 LONG-TERM USE OF PLAQUENIL: ICD-10-CM

## 2025-01-29 DIAGNOSIS — M32.9 SYSTEMIC LUPUS ERYTHEMATOSUS, UNSPECIFIED SLE TYPE, UNSPECIFIED ORGAN INVOLVEMENT STATUS (HCC): Primary | ICD-10-CM

## 2025-01-29 DIAGNOSIS — Z79.83 LONG TERM USE OF BISPHOSPHONATES: ICD-10-CM

## 2025-01-29 DIAGNOSIS — M35.00 SJOGREN'S SYNDROME, WITH UNSPECIFIED ORGAN INVOLVEMENT (HCC): ICD-10-CM

## 2025-01-29 DIAGNOSIS — J84.2 LYMPHOCYTIC INTERSTITIAL PNEUMONIA (HCC): ICD-10-CM

## 2025-01-29 DIAGNOSIS — E55.9 VITAMIN D DEFICIENCY: ICD-10-CM

## 2025-01-29 DIAGNOSIS — C91.10 CLL (CHRONIC LYMPHOCYTIC LEUKEMIA) (HCC): ICD-10-CM

## 2025-01-29 PROCEDURE — G2211 COMPLEX E/M VISIT ADD ON: HCPCS | Performed by: INTERNAL MEDICINE

## 2025-01-29 PROCEDURE — 99215 OFFICE O/P EST HI 40 MIN: CPT | Performed by: INTERNAL MEDICINE

## 2025-01-29 RX ORDER — PILOCARPINE HYDROCHLORIDE 5 MG/1
5 TABLET, FILM COATED ORAL 2 TIMES DAILY
Qty: 180 TABLET | Refills: 1 | Status: SHIPPED | OUTPATIENT
Start: 2025-01-29

## 2025-01-30 NOTE — ASSESSMENT & PLAN NOTE
- This was diagnosed on a left axillary lymph node biopsy performed in 8/2023 as it was progressively enlarging.  She is following regularly with hematology/oncology and is on Brukinsa.

## 2025-01-30 NOTE — ASSESSMENT & PLAN NOTE
Orders:    pilocarpine (SALAGEN) 5 mg tablet; Take 1 tablet (5 mg total) by mouth 2 (two) times a day

## 2025-01-30 NOTE — ASSESSMENT & PLAN NOTE
- She was started on IV zoledronic acid infusions on 7/7/2023.  She will continue this on an annual basis for a total of 3-6 doses.  Oral bisphosphonates will be avoided given a history of GERD and postgastrectomy malabsorption.  She should continue with daily calcium and vitamin D supplements and attempt weightbearing exercises.  A DEXA scan can be updated in 10/2025 after she receives her next Reclast infusion.    Orders:    DXA bone density spine hip and pelvis; Future

## 2025-01-30 NOTE — ASSESSMENT & PLAN NOTE
Ms. Turcios is a 74-year-old female with history significant for systemic lupus erythematosus and Sjogren's syndrome diagnosed in her early 40s by Rheumatology in [place] who presents for a follow-up.  She is currently on pilocarpine 5 mg twice daily and hydroxychloroquine 200 mg twice daily.     # SLE and Sjogren's syndrome  # Sjogren's related ILD  - Cassia presents today for a follow-up of systemic lupus erythematosus and Sjogren's syndrome which has primarily presented with chronic fatigue, sicca complaints, recurrent lower extremity skin rash and likely the Sjogren's syndrome related interstitial lung disease.  She has been maintained on hydroxychloroquine 200 mg twice daily and will continue regular follow ups with Ophthalmology for visual field testing.  She will also continue the pilocarpine 5 mg twice daily for the progressively worsening dry eyes/mouth and follow up regularly with her ophthalmologist and dentist for local measures.       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  As she has not experienced progressive respiratory complaints we discussed holding off on any specific treatment for the interstitial lung disease.     - In regards to the prior, recurrent lower extremity skin rash which may be hypergammaglobulinemic purpura related to the Sjogren's syndrome she was advised by Dermatology that this can be a benign skin rash and does not need to be treated.  We will monitor for now as the rash usually resolves spontaneously and has not occurred recently.    Orders:    CBC and differential; Future    Comprehensive metabolic panel; Future    C-reactive protein; Future    Sedimentation rate, automated; Future    C3  complement; Future    C4 complement; Future    Urinalysis with microscopic; Future    Protein / creatinine ratio, urine; Future    Anti-DNA antibody, double-stranded; Future    DXA bone density spine hip and pelvis; Future

## 2025-01-30 NOTE — PROGRESS NOTES
Name: Cassia Turcios      : 1950      MRN: [id number]  Encounter Provider: Melanie Chavez MD  Encounter Date: 2025   Encounter department: [facility] RHEUMATOLOGY [facility]  :  Assessment & Plan  Systemic lupus erythematosus, unspecified SLE type, unspecified organ involvement status (HCC)  Ms. Turcios is a 74-year-old female with history significant for systemic lupus erythematosus and Sjogren's syndrome diagnosed in her early 40s by Rheumatology in [place] who presents for a follow-up.  She is currently on pilocarpine 5 mg twice daily and hydroxychloroquine 200 mg twice daily.     # SLE and Sjogren's syndrome  # Sjogren's related ILD  - Cassia presents today for a follow-up of systemic lupus erythematosus and Sjogren's syndrome which has primarily presented with chronic fatigue, sicca complaints, recurrent lower extremity skin rash and likely the Sjogren's syndrome related interstitial lung disease.  She has been maintained on hydroxychloroquine 200 mg twice daily and will continue regular follow ups with Ophthalmology for visual field testing.  She will also continue the pilocarpine 5 mg twice daily for the progressively worsening dry eyes/mouth and follow up regularly with her ophthalmologist and dentist for local measures.       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  As she has not experienced progressive respiratory complaints we discussed holding off on any specific treatment for the interstitial lung disease.     - In regards to the prior, recurrent lower extremity skin rash which may be hypergammaglobulinemic purpura related to the Sjogren's syndrome she was advised by Dermatology that this can be  a benign skin rash and does not need to be treated.  We will monitor for now as the rash usually resolves spontaneously and has not occurred recently.    Orders:    CBC and differential; Future    Comprehensive metabolic panel; Future    C-reactive protein; Future    Sedimentation rate, automated; Future    C3 complement; Future    C4 complement; Future    Urinalysis with microscopic; Future    Protein / creatinine ratio, urine; Future    Anti-DNA antibody, double-stranded; Future    DXA bone density spine hip and pelvis; Future    Sjogren's syndrome, with unspecified organ involvement (HCC)    Orders:    pilocarpine (SALAGEN) 5 mg tablet; Take 1 tablet (5 mg total) by mouth 2 (two) times a day    Lymphocytic interstitial pneumonia (HCC)         Vasculitis (HCC)  - This has reassuringly not occurred recently but there was a prior consideration for IgA vasculitis or possibly secondary to the underlying Sjogren's syndrome.  As her symptoms are very limited she discussed with dermatology managing them as it arises and utilizing topical steroids as needed.         Long-term use of Plaquenil         CLL (chronic lymphocytic leukemia) (HCC)  - This was diagnosed on a left axillary lymph node biopsy performed in 8/2023 as it was progressively enlarging.  She is following regularly with hematology/oncology and is on Brukinsa.         Bilateral primary osteoarthritis of knee         Age-related osteoporosis without current pathological fracture  - She was started on IV zoledronic acid infusions on 7/7/2023.  She will continue this on an annual basis for a total of 3-6 doses.  Oral bisphosphonates will be avoided given a history of GERD and postgastrectomy malabsorption.  She should continue with daily calcium and vitamin D supplements and attempt weightbearing exercises.  A DEXA scan can be updated in 10/2025 after she receives her next Reclast infusion.    Orders:    DXA bone density spine hip and pelvis; Future    Long  term use of bisphosphonates         Vitamin D deficiency    Orders:    Vitamin D 25 hydroxy; Future      Patient's rheumatologic disease(s) threaten long-term function if not appropriately managed.      History of Present Illness   HPI    INITIAL VISIT NOTE (10/2020):  Ms. Turcios is a 70-year-old female with history significant for systemic lupus erythematosus and Sjogren's syndrome diagnosed in her early 40s by Rheumatology in [place], who [facility].  She is currently not on DMARDs.  She is referred today by Dr. Bhat for a rheumatology consult.     Patient reports in her early 40s she started to develop various joint pains which eventually led to the diagnosis of systemic lupus erythematosus and Sjogren's syndrome.  She reports surrounding the time of her diagnosis is when the joint pains were most prevalent, but appear to have spontaneously resolved.  She did experience joint pains again a few years later but states that this also resolved and recently she has not had any joint related issues.  At the time of her diagnosis it appears like she was treated with steroids as well as hydroxychloroquine.  She was on the hydroxychloroquine for less than a year at which time it was discontinued due to nausea and other side effects which she cannot recall.  She has never been restarted on the hydroxychloroquine following that.     The timeline of her rheumatology visits is unclear as we do not have her complete prior records.  Based on chart review as well as patient's history, she has been on multiple DMARDs including methotrexate, azathioprine and Benlysta.  She states that the methotrexate and azathioprine were prescribed in the past 10-15 years.  It is unclear what these medications for specifically prescribed for and if they helped with any of her symptoms.  Most recently she was on Benlysta approximately 3 years ago when she was being seen by Dr. Liu.  She states  that this helped significantly with how she was feeling overall and her general well-being, as well as significantly helped with the fatigue.  She was on this for about 7-8 months and unfortunately it was discontinued when she turned 67 as her insurance no longer covered it.  I do not see any of her lupus related labs in our system, but on review of her prior rheumatologist's note from 2015 she appeared to have a positive MALATHI at 1:1280, with positive SSA, SSB and RNP antibodies.  A rheumatoid factor was also elevated at 105 with an ESR of 84.  Her antiphospholipid antibodies were negative.       She states over the years it has primarily been the excessive fatigue that has bothered her and only recently has she been dealing with chronic respiratory symptoms as well as intrathoracic and abdominal lymphadenopathy that has been monitored by Oncology.  In terms of her respiratory symptoms she reports shortness of breath with exertion, usually with walking about a block, but at times she may experience shortness of breath with day-to-day activities.  She also has a chronic productive cough.  On review of her CT chest and abdomen as far back as 2017, she has had abnormal findings concerning for interstitial lung disease as well as the extensive lymphadenopathy.  On her most recent CT chest from September 2020 this showed asymmetric bilateral areas of ground-glass opacities and lung cysts likely representing a spectrum of lymphocytic interstitial pneumonia as well as worsening adenopathy in the mediastinum and bilateral hilar regions.  She did undergo an IR guided biopsy of an axillary lymph node on 10/08/2020 which did not show any evidence of a lymphoproliferative disorder.  The biopsy was not diagnostic.  On her most recent follow-up with Hematology/Oncology, as there are no clear features to suggest an underlying malignancy the plan will be to continue monitoring.  She has not seen pulmonology yet and is actually  "establishing for her initial appointment tomorrow.  She did undergo a bronchoscopy in December 2017 with cytology negative for malignancy and showing an abundant mixed but predominantly chronic inflammatory infiltrate.  Flow cytometry as well as cultures at that time were unrevealing.     As mentioned, she is primarily dealing with the abnormal CT chest and abdomen findings at this time.  Symptom wise she overall appears to be stable.  She does report chronic dry eyes, dry nose and dry mouth which she manages with topical lubricants.  Due to the dry nose as well as recent face masking she has been noticing nose bleeds and feels like there may be a \"hole\" in her nasal septum.  She has not yet established with ENT.  On occasion she may notice a \"butterfly rash\" and states that she is photosensitive but strictly avoids sun exposure and utilizes daily sunscreen.  She has also been experiencing blister-like skin lesions scattered on her extremities, which heal spontaneously.  She has not had any mouth ulcerations in years.  She denies fevers, chills, night sweats, unintentional weight loss, focal alopecia, inflammatory eye disease, psoriasis, swollen glands, pleuritic chest pain, hemoptysis, abdominal pain, vomiting, diarrhea, blood in stools (is up-to-date with a screening colonoscopy), blood clots, miscarriages, Raynaud's, joint pain/swelling/stiffness, renal disease, neurological disease/seizures or family history of autoimmune disease.        2/10/2021:  Patient presents for a follow-up of systemic lupus erythematosus and Sjogren's syndrome.  She is currently on hydroxychloroquine 200 mg twice daily that was started at the last office visit in October 2020.  We reviewed her testing done following the last visit which showed a positive MALATHI 1:1280 speckled pattern with an SSA and SSB antibody greater than 8.  An RNP antibody was 6.8.  A rheumatoid factor was elevated at 80.  An SPEP showed a possible monoclonal " gammopathy but this was not clearly determined on serum immunofixation.  An immunoglobulin assay showed hypergammaglobulinemia.  A peripheral flow cytometry showed 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.  A vitamin-D level was low at 13.5.  An ESR and CRP were elevated at 130 and 5.4, respectively.  The remainder of the MALATHI specificity, C3, C4, antiphospholipid antibody testing, hepatitis panel, HIV, urinalysis, urine protein creatinine ratio, angiotensin-converting enzyme, anti neutrophilic cytoplasmic antibody, CK and anti CCP antibody were unremarkable.  She had a CT chest done yesterday with the results still pending.  She is going to be establishing with thoracic surgery soon to consider the mediastinal lymphadenopathy biopsy.     She reports overall she has been stable in terms of her symptoms.  She mostly describes the dry eyes worse on the right side and unfortunately the Restasis has not been approved by her insurance.  She also reports dry nose and dry mouth.  She will have nose bleeds due to the dryness.  She reports chronic fatigue without any improvement seen with starting the hydroxychloroquine.  She also reports within the past year she has had approximately 4 episodes of a pinpoint, red and itchy skin rash arise on her bilateral lower extremities.  At times she may use topical steroids but has never been prescribed oral steroids for this.  The rash will usually resolve spontaneously within 3-4 days.  She has not been seen by Dermatology for this.  She was recently seen in the emergency room for an occurrence of the skin rash after receiving the COVID vaccine without any specific treatment and states that the rash eventually resolved spontaneously.  There is no rash today.  No other complaints that she describes today.        6/8/2021:  Patient presents for a follow-up of systemic lupus erythematosus and Sjogren's syndrome.  She temporarily suspended the hydroxychloroquine as she thought that  this was causing joint pains in her knees and ankles which did subside with holding the hydroxychloroquine.  I did review her labs done following the last office visit which showed an unremarkable CBC, CMP, C3 and C4.  The ESR and CRP were elevated at 81 and 4.2, respectively.  Since the last office visit she also underwent a left axillary lymph node biopsy which showed an atypical lymphoproliferative disorder.  She is followed up with Hematology/Oncology without any concerns for an underlying malignant process and the plan is to continue monitoring for now.     She continues to experience an intermittent skin rash affecting her bilateral lower extremities which she thought may be related to an allergic reaction from dog fur.  She states that this will happen irrespective of pet exposure.  The rash is usually itchy and at times she may use topical hydrocortisone to help with her symptoms but has not been on oral steroids.  She mentions that the rash will appear and remit spontaneously.  She continues to report the dry eyes and dry mouth which she is able to manage with the pilocarpine.  Other than this no complaints today.        9/8/2021:  Patient presents for a follow-up of systemic lupus erythematosus and Sjogren's syndrome.  She is currently on hydroxychloroquine 200 mg twice daily that she restarted in July.  I reviewed her recently done labs which showed an elevated ESR and CRP of 76 and 6.1, respectively.  A urine protein creatinine ratio was stable at 0.24.  A urinalysis showed concerns for a urinary tract infection and she is following up with her primary care physician for this.  A CBC, CMP, C3, C4 and double-stranded DNA antibody were unremarkable.     She reports overall she has been stable from the last office visit and not reporting any new complaints.  She does continue to feel fatigued and this is her main symptom.  No flare-ups of joint pains, swelling or stiffness.  She reports that the rash on her  legs has intermittently appeared and she is scheduled to see Dermatology.     She does follow with Ophthalmology routinely as she is on the hydroxychloroquine.  She is managing the dry eyes and dry mouth with pilocarpine 5 mg 3 times daily and states that this helps.        4/20/2022:  Patient presents for a follow-up of systemic lupus erythematosus and Sjogren's syndrome.  She is currently on hydroxychloroquine 200 mg twice daily.  I reviewed her recently done labs which showed an elevated ESR and CRP of 105 and 3.9, respectively.  A urine protein creatinine ratio was stable at 0.22.  A urinalysis showed concerns for a urinary tract infection and she is following up with her primary care physician for this.  A CBC, CMP, C3, C4 and double-stranded DNA antibody were unremarkable.     She did also see Dermatology due to the recurrent skin rash on her lower extremities and had a skin biopsy done on 09/21 which showed:  Perivascular mixed inflammatory infiltrate with numerous neutrophils, scattered eosinophils, and focal erythrocyte extravasation (see note).     Note: The histopathologic findings are non-specific. Definite vasculitis is not seen (early or resolving vasculitis cannot be fully excluded; correlation with immuofluorescence studies is advised). In the appropriate clinical context, the histopathologic findings may be compatible with hypergammaglobulinemic purpura of [place]strom, though the histopathologic differential diagnosis includes a hypersensitivity reaction (e.g., to a drug). Clinical pathological correlation is essential. Pathogenic microorganisms are not seen on PAS stain. Multiple levels examined.     Direct immunofluorescence showed weak epidermal particulate staining seen with IgG as can be seen in subacute lupus erythematosus, Sjogren's or mixed connective tissue disease but the overall granular immune deposits at the basement membrane zone are insufficient for a diagnosis of lupus.  The overall  vascular staining is weak and indeterminate for vasculitis.  Nonspecific vascular standing can occur at anatomically dependent areas.     She was advised by Dermatology that this could be a benign skin rash related to the Sjogren's syndrome and as it occurs and remits spontaneously no treatment would be required.     She was also hospitalized with sepsis and pneumonia in September 2021.     She reports over the past few weeks she has noticed more prominent joint pains.  She takes Tylenol once a day which does help her but the effect is not long lasting.     She does follow with Ophthalmology routinely as she is on the hydroxychloroquine.  She reports the pilocarpine 5 mg 3 times daily did help with the dryness initially but is no longer effective.  She is using Systane eye drops and Biotene products but is still experiencing significant dryness issues.     No fevers, unintentional weight loss, ongoing skin rashes, mouth/nose ulcers, swollen glands or pleuritic chest pain.        10/26/2022:  Patient presents for a follow-up of systemic lupus erythematosus and Sjogren's syndrome.  She is currently on hydroxychloroquine 200 mg twice daily.  She is also on prednisone 10 mg once daily as prescribed by pulmonology.  I reviewed her recent labs which showed an elevated ESR of 89.  A C-reactive protein was minimally elevated at 4.3.  A CBC, CMP, C3, C4, double-stranded DNA antibody and urine protein creatinine ratio were unremarkable.       After the last office visit we did try to start her on Benlysta but due to the high cost this was not done.  She will be looking into this again.  She reports that the fatigue continues.  No worsening joint issues and this is manageable in her hands and knees.  Her only new issue is that she was diagnosed with COVID-19 infection on 10/15 but seems to have recovered well.  She was started on Paxlovid but on day 3 developed an allergic reaction so this was discontinued.  She also follows  with pulmonology and was started on prednisone 10 mg once daily due to the chronic cough she was experiencing.  This has helped.  She sees Hematology/Oncology annually for monitoring of the atypical lymphoproliferative disorder and the plan is for continued monitoring.     At the last office visit I also discontinued the pilocarpine and started her on Evoxac which she states has helped.  She is up-to-date with her annual eye exams since she is on the hydroxychloroquine.        5/31/2023:  Patient presents for a follow-up of systemic lupus erythematosus and Sjogren's syndrome.  She is currently on hydroxychloroquine 200 mg twice daily.  I reviewed her recent labs which showed an elevated ESR of 72.  An SPEP showed the possibility of a monoclonal protein so it was sent to [facility] for further evaluation.  An immunoglobulin assay showed hypergammaglobulinemia.  A CBC, CMP, C3, C4, double-stranded DNA antibody and urine protein creatinine ratio were unremarkable.  She also underwent a DEXA scan in March 2023 which showed osteoporosis.  Compared to March 2020 there was a statistically significant decrease in the bone mineral density of the left total hip.  The 10-year risk of hip fracture was 6.4% with a 10-year risk of major osteoporotic fracture of 28%.     She mentions since the last visit she has experienced additional joint pains in her hands, wrists and knees.  Her symptoms are intermittent and she is able to manage them more or less with Tylenol as needed.  She continues to report chronic dry eyes and dry mouth.  She was started on Restasis by her ophthalmologist.  She had been taking the Evoxac 30 mg 3 times daily up until the last visit but she is unsure why recently she has not been taking this.     She is up-to-date with her annual eye exams since she is on the hydroxychloroquine.        3/6/2024:  Patient presents for a follow-up of systemic lupus erythematosus and Sjogren's syndrome.  She is currently on  hydroxychloroquine 200 mg twice daily and pilocarpine 5 mg twice daily.  I reviewed her recent labs which continues to show an elevated ESR of 77.  A C4 complement was slightly reduced at 16.  And immunoglobulin assay showed hypergammaglobulinemia.  A CMP, SPEP, cryoglobulins, C3, C-reactive protein and CBC were unremarkable.     In terms of the Sjogren's syndrome she reports symptoms primarily pertaining to left eye dryness.  The pilocarpine has not helped with this.  The Restasis was discontinued and she was advised to use over-the-counter refresh eyedrops, a compounding eyedrop through her ophthalmologist as well as an ointment to help with the symptoms.  There may be plans in the future to retry intraocular amniotic infusions.  Otherwise she does not report symptoms such as fevers, unintentional weight loss [other than related to starting Brukinsa and this has also improved], skin rash, pleuritic chest pain or progressive joint pains/swelling/stiffness.  She is up-to-date with her annual eye exam since she is on the hydroxychloroquine.     In the interim she was found to have an enlarging left axillary lymph node so she underwent a biopsy of this which showed CLL/SLL.  In view of this she was started on Brukinsa which she initially had difficulty tolerating but reports she is no longer experiencing any prominent side effects.        10/16/2024:  Patient presents for a follow-up of systemic lupus erythematosus and Sjogren's syndrome.  She self discontinued the hydroxychloroquine at the end of July as she thought this was contributing to dry eyes.  I reviewed her recent testing which shows an elevated ESR of 107 with a C-reactive protein of 5.3.  A C4 complement was slightly reduced at 18.  A C3, antineutrophil cytoplasmic antibodies, cryoglobulin, antiphospholipid antibodies, double-stranded DNA antibody and CK were unremarkable.     She contacted me recently as she was at the dermatologist's office for her  's care and after Dr. Wayne visualized Cassia to have various skin rashes he scheduled her for an appointment and proceeded with a skin biopsy from the left forearm which showed:     Superficial to deep perivascular/periadnexal/interstitial mixed inflammatory infiltrate with numerous neutrophils, scattered eosinophils, and focal vascular damage with fibrin deposition and hemorrhage (see note).     Note: In the appropriate clinical context, the histopathologic findings are compatible with early/evolving vasculitis, including hypersensitivity reaction-associated vasculitis (e.g., due to a drug, other). Clinical pathologic correlation is advised. Pathogenic microorganisms are not seen with PAS, Gram, and AFB stains. CD3, CD20, CD30, , , and CD68 immunostains were reviewed; findings diagnostic of a hematologic malignancy are not appreciated. Multiple levels examined. If the rash were to progress/persist despite therapy, additional sampling should be sought to exclude development of a more significant disease.     She has not been started on any specific treatment for this.  She reports the rash has been ongoing for the past 6 weeks and presents as superficial ulcers/blister type lesions scattered on her arms, leg, forehead and a few lesions on her chest.  It is itchy.  They have not really been healing spontaneously.  She was also advised by hematology that a skin rash may occur as a result of Brukinsa use and she has a follow-up scheduled with hematology in November.      1/29/2025:  Patient presents for a follow-up of systemic lupus erythematosus and Sjogren's syndrome.  She is on hydroxychloroquine 200 mg twice daily that she restarted after our last visit.  She is also on pilocarpine 5 mg twice daily.  I reviewed her recent labs which shows a stable but elevated ESR and CRP of 70 and 6, respectively.  A C4 was slightly reduced at 17.  The urine analysis suggested a UTI but she did follow-up  with urology due to persistent such findings and was advised as she is asymptomatic this will not be managed as a UTI and the plan is to continue monitoring.  A CBC, CMP, C3, anti-double-stranded DNA antibody and urine protein creatinine ratio were unremarkable.    She mentions she has actually been feeling quite well and does not report any concerning symptoms today.  The skin rash has not recurred and if she notices small lesions she will use the topical steroids prescribed by dermatology which helps.  She continues to have dry eyes and will be seeing a new ophthalmologist to discuss any treatment options.  The pilocarpine does help with the dry mouth.    She will confirm with ophthalmology that she is up-to-date with her hydroxychloroquine eye exams.          Review of Systems  Constitutional: Negative for fevers, chills, night sweats, fatigue.  Positive for weight gain.  ENT/Mouth: Negative for hearing changes, ear pain, nasal congestion, sinus pain, hoarseness, sore throat, rhinorrhea, swallowing difficulty.   Eyes: Negative for pain, redness, discharge, vision changes.   Cardiovascular: Negative for chest pain, SOB, palpitations.   Respiratory: Negative for cough, sputum, wheezing, dyspnea.   Gastrointestinal: Negative for nausea, vomiting, diarrhea, constipation, pain, heartburn.  Genitourinary: Negative for dysuria, urinary frequency, hematuria.   Musculoskeletal: As per HPI.  Skin: Negative for color changes.  Positive for minimal skin rash.  Neuro: Negative for weakness, numbness, tingling, loss of consciousness.   Psych: Negative for anxiety, depression.   Heme/Lymph: Negative for easy bruising, bleeding, lymphadenopathy.      Past Medical History   Past Medical History:   Diagnosis Date    Acute kidney injury superimposed on chronic kidney disease  (HCC) 09/20/2021    Age-related osteoporosis without current pathological fracture 06/01/2023    Allergic rhinitis     Anemia     Anxiety     Bipolar 1  disorder (HCC)     Constipation     Deaf     Pt lost all hearing in right ear after having [place] measles    Depression     Diarrhea     Fatigue     GERD (gastroesophageal reflux disease) 08/01/2020    Hard of hearing     Pt wears a hearing in left ear.    HL (hearing loss)     Hypertension 10/07/2023    Hypothyroidism     Lung nodule     Nasal congestion     Osteoporosis     Pneumonia 02/10/2017    Pneumonia due to Streptococcus (HCC) 09/19/2021    Psychiatric disorder     Recurrent UTI 06/05/2023    Sjogren's syndrome (HCC)     Small lymphocytic lymphoma (HCC) 09/18/2023    Systemic lupus erythematosus (HCC)     Wears glasses     Wears partial dentures      Past Surgical History:   Procedure Laterality Date    BREAST BIOPSY Right     many years ago at [facility]    COLONOSCOPY      EYE SURGERY      FRACTURE SURGERY Right     elbow    GASTRIC BYPASS      HYSTERECTOMY Bilateral 1975    IR BIOPSY LYMPH NODE  10/08/2020    LUNG BIOPSY      LYMPH NODE BIOPSY  09/18/2020    LYMPH NODE BIOPSY Left 08/28/2023    Procedure: EXCISION BIOPSY LYMPH NODE LEFT AXILLARY;  Surgeon: Scott Seaman MD;  Location: [facility];  Service: Thoracic    CT [facility] INCL FLUOR GDNCE DX W/CELL WASHG SPX N/A 12/08/2017    Procedure: BRONCHOSCOPY;  Surgeon: Chepe Luna MD;  Location: [facility];  Service: Pulmonary    CT BX/EXC LYMPH NODE NEEDLE SUPERFICIAL Left 02/25/2021    Procedure: EXCISION BIOPSY LYMPH NODE: left axillary lymph node biopsy;  Surgeon: Dakota Heard MD;  Location: [facility];  Service: Thoracic    TONSILLECTOMY       Family History   Problem Relation Age of Onset    Heart disease Mother     Diabetes Mother     Kidney cancer Mother     Cancer Mother         Kidney    Hypertension Mother     Heart disease Father     Stroke Father     Diabetes Father     Cancer Father         Rectal Cancer    Hypertension Father     No Known Problems Maternal Grandmother     No Known Problems Maternal Grandfather     No  Known Problems Paternal Grandmother     No Known Problems Paternal Grandfather     Leukemia Half-Sister 50    No Known Problems Half-Sister     No Known Problems Half-Sister     No Known Problems Half-Sister     BRCA2 Positive Neg Hx     BRCA2 Negative Neg Hx     BRCA1 Negative Neg Hx     Endometrial cancer Neg Hx     Breast cancer Neg Hx     Breast cancer additional onset Neg Hx     Colon cancer Neg Hx     Ovarian cancer Neg Hx     BRCA1 Positive Neg Hx     BRCA 1/2 Neg Hx       reports that she has never smoked. She has never used smokeless tobacco. She reports that she does not drink alcohol and does not use drugs.  Current Outpatient Medications on File Prior to Visit   Medication Sig Dispense Refill    buPROPion (WELLBUTRIN) 100 mg tablet Take 100 mg by mouth 3 (three) times a day.      Cholecalciferol (D3-50) 1.25 MG (99142 UT) capsule 5,000 Units daily      clobetasol (TEMOVATE) 0.05 % ointment Use twice a day. (Patient taking differently: as needed Use twice a day.) 60 g 2    clotrimazole-betamethasone (LOTRISONE) 1-0.05 % cream Apply topically 2 (two) times a day (Patient taking differently: Apply topically as needed) 45 g 0    estradiol (ESTRACE VAGINAL) 0.1 mg/g vaginal cream Insert 1 g into the vagina 2 (two) times a week (Patient taking differently: Insert 1 g into the vagina as needed) 42.5 g 1    ferrous sulfate 325 (65 Fe) mg tablet Take 1 tablet (325 mg total) by mouth 2 (two) times a day with meals 60 tablet 0    hydroxychloroquine (PLAQUENIL) 200 mg tablet Take 1 tablet (200 mg total) by mouth 2 (two) times a day with meals 180 tablet 1    levothyroxine (Synthroid) 25 mcg tablet Take 1 tablet (25 mcg total) by mouth daily 90 tablet 1    LORazepam (ATIVAN) 1 mg tablet Take 1 mg by mouth 2 (two) times a day As needed (Patient taking differently: Take 1 mg by mouth as needed for anxiety As needed)      Multiple Vitamins-Minerals (CENTRUM MINIS WOMEN 50+ PO)       mupirocin (BACTROBAN) 2 % ointment   "(Patient taking differently: as needed)      olopatadine HCl (PATADAY) 0.2 % opth drops Administer 1 drop to both eyes daily 2.5 mL 1    QUEtiapine (SEROquel) 300 mg tablet Take 300 mg by mouth daily at bedtime      QUEtiapine (SEROquel) 50 mg tablet 50 mg daily at bedtime      sodium chloride 1 g tablet Take 1 tablet (1 g total) by mouth in the morning 90 tablet 1    temazepam (RESTORIL) 30 mg capsule Take 2 capsules by mouth daily at bedtime as needed       triamcinolone (KENALOG) 0.1 % cream  (Patient taking differently: as needed)      Zanubrutinib (Brukinsa) 80 MG CAPS Take 4 capsules (320 mg total) by mouth once daily. 120 capsule 10    [DISCONTINUED] pilocarpine (SALAGEN) 5 mg tablet Take 1 tablet (5 mg total) by mouth 3 (three) times a day 90 tablet 5     Current Facility-Administered Medications on File Prior to Visit   Medication Dose Route Frequency Provider Last Rate Last Admin    cyanocobalamin injection 1,000 mcg  1,000 mcg Intramuscular Q30 Days Julio Bhat MD   1,000 mcg at 12/28/23 1242     Allergies   Allergen Reactions    Ciprofloxacin Hives and Swelling     Pt reports that lips and mouth and face swelled.    Cymbalta [Duloxetine Hcl] Other (See Comments)     Hallucinations, fatigue, faints    Nirmatrelvir-Ritonavir Diarrhea, Nausea Only, Other (See Comments), Dizziness and Lightheadedness     \"passed out\"-nausea    Percocet [Oxycodone-Acetaminophen] Other (See Comments)     Dry mouth - pt states it kept her up all night. States had to continually drink fluids      Current Outpatient Medications on File Prior to Visit   Medication Sig Dispense Refill    buPROPion (WELLBUTRIN) 100 mg tablet Take 100 mg by mouth 3 (three) times a day.      Cholecalciferol (D3-50) 1.25 MG (18707 UT) capsule 5,000 Units daily      clobetasol (TEMOVATE) 0.05 % ointment Use twice a day. (Patient taking differently: as needed Use twice a day.) 60 g 2    clotrimazole-betamethasone (LOTRISONE) 1-0.05 % cream Apply " topically 2 (two) times a day (Patient taking differently: Apply topically as needed) 45 g 0    estradiol (ESTRACE VAGINAL) 0.1 mg/g vaginal cream Insert 1 g into the vagina 2 (two) times a week (Patient taking differently: Insert 1 g into the vagina as needed) 42.5 g 1    ferrous sulfate 325 (65 Fe) mg tablet Take 1 tablet (325 mg total) by mouth 2 (two) times a day with meals 60 tablet 0    hydroxychloroquine (PLAQUENIL) 200 mg tablet Take 1 tablet (200 mg total) by mouth 2 (two) times a day with meals 180 tablet 1    levothyroxine (Synthroid) 25 mcg tablet Take 1 tablet (25 mcg total) by mouth daily 90 tablet 1    LORazepam (ATIVAN) 1 mg tablet Take 1 mg by mouth 2 (two) times a day As needed (Patient taking differently: Take 1 mg by mouth as needed for anxiety As needed)      Multiple Vitamins-Minerals (CENTRUM MINIS WOMEN 50+ PO)       mupirocin (BACTROBAN) 2 % ointment  (Patient taking differently: as needed)      olopatadine HCl (PATADAY) 0.2 % opth drops Administer 1 drop to both eyes daily 2.5 mL 1    QUEtiapine (SEROquel) 300 mg tablet Take 300 mg by mouth daily at bedtime      QUEtiapine (SEROquel) 50 mg tablet 50 mg daily at bedtime      sodium chloride 1 g tablet Take 1 tablet (1 g total) by mouth in the morning 90 tablet 1    temazepam (RESTORIL) 30 mg capsule Take 2 capsules by mouth daily at bedtime as needed       triamcinolone (KENALOG) 0.1 % cream  (Patient taking differently: as needed)      Zanubrutinib (Brukinsa) 80 MG CAPS Take 4 capsules (320 mg total) by mouth once daily. 120 capsule 10    [DISCONTINUED] pilocarpine (SALAGEN) 5 mg tablet Take 1 tablet (5 mg total) by mouth 3 (three) times a day 90 tablet 5     Current Facility-Administered Medications on File Prior to Visit   Medication Dose Route Frequency Provider Last Rate Last Admin    cyanocobalamin injection 1,000 mcg  1,000 mcg Intramuscular Q30 Days Julio Bhat MD   1,000 mcg at 12/28/23 1242      Social History     Tobacco Use     "Smoking status: Never    Smokeless tobacco: Never   Vaping Use    Vaping status: Never Used   Substance and Sexual Activity    Alcohol use: No    Drug use: No    Sexual activity: Not Currently     Partners: Male     Birth control/protection: Female Sterilization     Comment:         Objective   /92   Pulse 83   Ht 5' 1\" (1.549 m)   LMP  (LMP Unknown)   SpO2 95%   BMI 30.80 kg/m²      Physical Exam  General: Well appearing, well nourished, in no distress. Oriented x 3, normal mood and affect.  Ambulating without difficulty.  Skin: Good turgor, no unusual bruising or prominent lesions.  Scattered hyperpigmented lesions noted on her upper and lower extremities [very faint on her upper extremities] from the previous vasculitic skin rash.  Hair: Normal texture and distribution.  Nails: Normal color, no deformities.  HEENT:  Head: Normocephalic, atraumatic.  Eyes: Conjunctiva clear, sclera non-icteric, EOM intact.  Nose: No external lesions.  Neck: Supple.  Neurologic: Alert and oriented. No focal neurological deficits appreciated.   Psychiatric: Normal mood and affect.       "

## 2025-01-30 NOTE — ASSESSMENT & PLAN NOTE
- This has reassuringly not occurred recently but there was a prior consideration for IgA vasculitis or possibly secondary to the underlying Sjogren's syndrome.  As her symptoms are very limited she discussed with dermatology managing them as it arises and utilizing topical steroids as needed.

## 2025-02-11 ENCOUNTER — OFFICE VISIT (OUTPATIENT)
Dept: BARIATRICS | Facility: CLINIC | Age: 75
End: 2025-02-11
Payer: COMMERCIAL

## 2025-02-11 VITALS
SYSTOLIC BLOOD PRESSURE: 126 MMHG | BODY MASS INDEX: 31.63 KG/M2 | OXYGEN SATURATION: 99 % | DIASTOLIC BLOOD PRESSURE: 78 MMHG | TEMPERATURE: 99.5 F | HEIGHT: 61 IN | WEIGHT: 167.5 LBS | HEART RATE: 84 BPM

## 2025-02-11 DIAGNOSIS — E66.811 OBESITY, CLASS I, BMI 30-34.9: ICD-10-CM

## 2025-02-11 DIAGNOSIS — Z48.815 ENCOUNTER FOR SURGICAL AFTERCARE FOLLOWING SURGERY OF DIGESTIVE SYSTEM: Primary | ICD-10-CM

## 2025-02-11 DIAGNOSIS — C91.10 CLL (CHRONIC LYMPHOCYTIC LEUKEMIA) (HCC): ICD-10-CM

## 2025-02-11 DIAGNOSIS — K21.9 GERD (GASTROESOPHAGEAL REFLUX DISEASE): ICD-10-CM

## 2025-02-11 DIAGNOSIS — Z98.84 BARIATRIC SURGERY STATUS: ICD-10-CM

## 2025-02-11 DIAGNOSIS — K91.2 POSTSURGICAL MALABSORPTION: ICD-10-CM

## 2025-02-11 PROCEDURE — 99204 OFFICE O/P NEW MOD 45 MIN: CPT | Performed by: NURSE PRACTITIONER

## 2025-02-11 NOTE — PROGRESS NOTES
Date of surgery: 5/1/2012  Procedure: RNY gastric bypass w/ PEHR  Performing surgeon: Dr. Edward Jackson    Initial Weight - 239.5 lbs.  Current Weight - 167.5 lbs.  Eliud Weight - 146.5 lbs.  Total Body Weight Loss (EWL) - 72.1 lbs.  EWL% - 67%  TWB% - 30%

## 2025-02-11 NOTE — PROGRESS NOTES
Assessment/Plan:     Patient ID: Cassia Turcios is a 74 y.o. female.    Obesity I/BMI 31  PLAN:  - Discussed role of weight loss medications.  - Initial weight loss goal of 5-10% weight loss for improved overall health  - patient has maintained/performed healthy dietary changes and increased physical activity for at least 6 months prior to initiation of AOMs.   - Reviewed Screening labs - Will need repeat lipid panel done  - Patient is interested in pursuing Zepbound VS NALTREXONE to mimic contrave with her current use of wellbutrin.   Discussed medication options  Recommend treatment with Zepbound - will reach out to Dr. Sawyer, patient's oncologist and give clearance to make sure no interaction with Brukinsa.   Medication Contract Signed   Discussed expected weight loss of approximately 20% along with lifestyle modifications  Discussed risks/side effects of medication and demonstrated pen device  Recommend small/low fat meals and stop eating when full to avoid side effects.  Discussed importance of adequate protein intake and strength training to reduce risk of muscle loss.   Discussed need to stop medication for at least 1 week prior to planned surgery/endoscopy  Denies hx Pancreatitis or FH of Medullary cell Thyroid CA/MEN2 syndrome    Start Zepbound  2.5mg weekly x 4 weeks and titrate  Advised to contact office in 2 weeks with update regarding tolerability and at that time will increase to 5mg dose if tolerating medication with no significant side effects.           - Follow up in approximately 3 months with Surgical Advanced Practitioner.    Goals:  Food log (ie.) [email or web address],Work [email or web address],etc. baritastic  No sugary beverages. At least 64oz of water daily.  Increase physical activity by 10 minutes daily. Gradually increase physical activity to a goal of 5 days per week for 30 minutes of MODERATE intensity PLUS 2 days per week of FULL BODY resistance training  5-10  servings of fruits and vegetables per day and 25-35 grams of dietary fiber per day, gradually increasing  Practice lesson plans 1-6 in bariatric manual , Practice 30/60 rule, Gradually increase physical activity to a goal of 5 days per week for 30 minutes of MODERATE intensity PLUS 2 days per week of FULL BODY resistance training, Continue with dietary and behavioral recommendations outlined in bariatric manual, Continue to take recommended bariatric vitamins as directed, Goal protein intake of 60-80 grams per day, 5-10 servings of fruits and vegetables per day, 25-35 grams of dietary fiber per day, and 1609-4369 calories per day      Bariatric Surgery Status/CLL    -s/p Misael-En-Y Gastric Bypass with Dr. Edward Jackson on 05/01/2012. Presents to the office today for OD annual with concerns of weight gain. She would like to start on liquid diet. Initially had concerns of heartburn but this has resolved on it's own. She struggles with weight gain from night time cravings. Unsure if it related to usage of Brukinsa - her chemo drug for CLL. She expresses associated fatigue with weight regain and depressive symptoms. She follows closely with oncology and PCP. PCP was monitoring her vitamin labs. She denies any current abdominal pain, N/V/D/C, regurgitation, reflux or dysphagia.      PLAN:     - Reviewed previous CT scan - no hiatal hernia noted. If symptoms reoccur would recommend UGI and EGD. GERD precautions for now  - Routine follow up in 3 MONTHS FOR MWM F/U. 1 YEAR FOR ANNUAL  - Continue with healthy lifestyle, adequate protein intake of 60 gm, fluid intake of at least 64 oz.   - Continue with MVI daily.   - Activity as tolerated.   - Labs ordered and will adjust accordingly if any deficiency.   - Follow up with RD and SW as needed.         Continued/Maintain healthy weight loss with good nutrition intakes.  Adequate hydration with at least 64oz. fluid intake.  Follow diet as discussed.  Follow vitamin and mineral  recommendations as reviewed with you.  Exercise as tolerated.    Colonoscopy referral made: UTD  Mammogram - UTD    Follow-up in 3 months for MWM f/u. We kindly ask that your arrive 15 minutes before your scheduled appointment time with your provider to allow our staff to room you, get your vital signs and update your chart.    Get lab work done prior to annual visit. Please call the office if you need a script.  It is recommended to check with your insurance BEFORE getting labs done to make sure they are covered by your policy.      Call our office if you have any problems with abdominal pain especially associated with fever, chills, nausea, vomiting or any other concerns.    All  Post-bariatric surgery patients should be aware that very small quantities of any alcohol can cause impairment and it is very possible not to feel the effect. The effect can be in the system for several hours.  It is also a stomach irritant.     It is advised to AVOID alcohol, Nonsteroidal antiinflammatory drugs (NSAIDS) and nicotine of all forms . Any of these can cause stomach irritation/pain.    Discussed the effects of alcohol on a bariatric patient and the increased impairment risk.     Keep up the good work!     Postsurgical Malabsorption   -At risk for malabsorption of vitamins/minerals secondary to malabsorption and restriction of intake from bariatric surgery  -NOT Currently taking adequate postop bariatric surgery vitamin supplementation  -Next set of bariatric labs ordered for approximately 2 weeks  -Patient received education about the importance of adhering to a lifelong supplementation regimen to avoid vitamin/mineral deficiencies      Diagnoses and all orders for this visit:    Encounter for surgical aftercare following surgery of digestive system  -     CBC; Future  -     Comprehensive metabolic panel; Future  -     Folate; Future  -     PTH, intact; Future  -     Vitamin A; Future  -     Vitamin B1, whole blood; Future  -      Vitamin B12; Future  -     Vitamin D 25 hydroxy; Future  -     Zinc; Future    Bariatric surgery status  -     CBC; Future  -     Comprehensive metabolic panel; Future  -     Folate; Future  -     PTH, intact; Future  -     Vitamin A; Future  -     Vitamin B1, whole blood; Future  -     Vitamin B12; Future  -     Vitamin D 25 hydroxy; Future  -     Zinc; Future    Postsurgical malabsorption  -     CBC; Future  -     Comprehensive metabolic panel; Future  -     Folate; Future  -     PTH, intact; Future  -     Vitamin A; Future  -     Vitamin B1, whole blood; Future  -     Vitamin B12; Future  -     Vitamin D 25 hydroxy; Future  -     Zinc; Future    Obesity, Class I, BMI 30-34.9  -     CBC; Future  -     Comprehensive metabolic panel; Future  -     Folate; Future  -     PTH, intact; Future  -     Vitamin A; Future  -     Vitamin B1, whole blood; Future  -     Vitamin B12; Future  -     Vitamin D 25 hydroxy; Future  -     Zinc; Future  -     Lipid panel; Future    BMI 31.0-31.9,adult  -     CBC; Future  -     Comprehensive metabolic panel; Future  -     Folate; Future  -     PTH, intact; Future  -     Vitamin A; Future  -     Vitamin B1, whole blood; Future  -     Vitamin B12; Future  -     Vitamin D 25 hydroxy; Future  -     Zinc; Future  -     Lipid panel; Future    GERD (gastroesophageal reflux disease)    CLL (chronic lymphocytic leukemia) ([facility])         Subjective:      Patient ID: Cassia Turcios is a 74 y.o. female.    -s/p Misael-En-Y Gastric Bypass with Dr. Edward Jackson on 05/01/2012. Presents to the office today for OD annual with concerns of weight gain. She would like to start on liquid diet. Initially had concerns of heartburn but this has resolved on it's own. She struggles with weight gain from night time cravings. Unsure if it related to usage of Brukinsa - her chemo drug for CLL. She expresses associated fatigue with weight regain and depressive symptoms. She follows closely with oncology and PCP. PCP  was monitoring her vitamin labs. She denies any current abdominal pain, N/V/D/C, regurgitation, reflux or dysphagia.      Initial: 239.5 lbs.  Current: 167.5 lbs  EWL: (Weight loss is ahead of schedule at this post surgical period.)  Eliud: 130s.   Current BMI is Body mass index is 31.65 kg/m².    Tolerating a regular diet-yes  Eating at least 60 grams of protein per day-yes  Following 30/60 minute rule with liquids-yes; doesn't really drink and eat at the same time.   Drinking at least 64 ounces of fluid per day-yes  Drinking carbonated beverages-no  Sufficient exercise-yes  Using NSAIDs regularly-no  Using nicotine-no  Using alcohol-no  Supplements: Multivitamins and vitamin C, Vitamin D3 5000 IU daily, Vitamin B12 500 mcg once daily     EWL is 67%, which places the patient ahead of schedule for expected post surgical weight loss at this time.       Obesity/Excess Weight:   BMI 31.65  Severity: Moderate  Onset:  lifelong but has been doing well after bariatric surgery now worsened since use of brukinsa. Having a lot of cravings - likely from emotional eating with dx of cancer.     Modifiers: Diet and Exercise and bariatric surgery   Contributing factors: Poor Food Choices, Stress/Emotional Eating, Lack of knowledge of appropriate lifestyle changes, Medications, Depression, and Insufficient time to make appropriate lifestyle changes  Associated symptoms: fatigue, decreased exercise capacity, body image issues, decreased self esteem, increased shortness of breath, and depression  Colonoscopy-Completed    Highest weight 239.5 lbs  Current Weight    Eliud : 130s  Goal - 130s -140s   5% weight loss - 8.4 LBS (159.1 lbs)  20% weight loss - 33.5 lbs (134 lbs)      Hydration: drinking about 54 oz of fluids per day, 1 protein shake   Alcohol: none   Exercise: gym 4 days per week - treadmill and circuit/strength training  Dining out: none  Occupation:  RETIRED  Sleep: 9-10 hrs per day      B: dole fruit cup  S: instant  "oatmeal with raisins and honey OR Rice cripsy cereal with low fat milk  L: protein shake   S: Crackers with cheese  D: protein - chicken, veggies.   S: salted caramel candy, rice pudding, malamars, outshine popsicle     Wt Readings from Last 3 Encounters:   02/11/25 76 kg (167 lb 8 oz)   01/27/25 73.9 kg (163 lb)   12/17/24 75.2 kg (165 lb 12.8 oz)         Patient denies personal and family history of  pancreatitis, thyroid cancer, MEN-2 tumors. (Consideration for GLP-1 Agonists)    The following portions of the patient's history were reviewed and updated as appropriate: allergies, current medications, past family history, past medical history, past social history, past surgical history and problem list.    Review of Systems   Constitutional:  Positive for appetite change, fatigue and unexpected weight change.   Respiratory: Negative.     Cardiovascular: Negative.    Gastrointestinal:         Heartburn but resolved.      Musculoskeletal:  Positive for arthralgias.   Neurological: Negative.    Psychiatric/Behavioral:  Positive for dysphoric mood. The patient is nervous/anxious.          Objective:    /78 (BP Location: Left arm, Patient Position: Sitting, Cuff Size: Large)   Pulse 84   Temp 99.5 °F (37.5 °C) (Tympanic)   Ht 5' 1\" (1.549 m)   Wt 76 kg (167 lb 8 oz)   LMP  (LMP Unknown)   SpO2 99%   BMI 31.65 kg/m²      Physical Exam  Vitals and nursing note reviewed.   Constitutional:       Appearance: Normal appearance. She is obese.   Cardiovascular:      Rate and Rhythm: Normal rate and regular rhythm.      Pulses: Normal pulses.      Heart sounds: Normal heart sounds.   Pulmonary:      Effort: Pulmonary effort is normal.      Breath sounds: Normal breath sounds.   Abdominal:      General: Bowel sounds are normal.      Palpations: Abdomen is soft.      Tenderness: There is no abdominal tenderness.   Musculoskeletal:         General: Normal range of motion.   Skin:     General: Skin is warm and dry. "   Neurological:      General: No focal deficit present.      Mental Status: She is alert and oriented to person, place, and time.   Psychiatric:         Mood and Affect: Mood normal.         Behavior: Behavior normal.         Thought Content: Thought content normal.         Judgment: Judgment normal.         I have spent a total time of 45 minutes in caring for this patient on the day of the visit/encounter including Diagnostic results, Prognosis, Risks and benefits of tx options, Instructions for management, Patient and family education, Importance of tx compliance, Risk factor reductions, Impressions, Counseling / Coordination of care, Documenting in the medical record, Reviewing / ordering tests, medicine, procedures  , Obtaining or reviewing history  , and Communicating with other healthcare professionals .

## 2025-02-12 ENCOUNTER — TELEPHONE (OUTPATIENT)
Age: 75
End: 2025-02-12

## 2025-02-12 NOTE — TELEPHONE ENCOUNTER
Patient calling in and stating Zepbound is not covered under her insurance but naltrexone is covered     Patient is asking for Naltrexone to be sent to  [facility] pharmacy

## 2025-02-19 RX ORDER — NALTREXONE HYDROCHLORIDE 50 MG/1
50 TABLET, FILM COATED ORAL DAILY
Qty: 30 TABLET | Refills: 2 | Status: SHIPPED | OUTPATIENT
Start: 2025-02-19

## 2025-02-20 ENCOUNTER — PATIENT OUTREACH (OUTPATIENT)
Dept: CASE MANAGEMENT | Facility: OTHER | Age: 75
End: 2025-02-20

## 2025-02-20 NOTE — PROGRESS NOTES
Outreach call placed to Mrs. Turcios today. She stated she is doing well, but the counselor she initially reached out to ended up changing and canceling her initial appointment which is disappointing, but she will try another possible option. She has some additional counselors from the list this writer provided and she will reach out. Mrs. Turcios thanked [facility] for call, but stated if she needs anything further she will reach out. Will close to ongoing outreach, however can be available for additional support in the future as needed.

## 2025-02-25 ENCOUNTER — OFFICE VISIT (OUTPATIENT)
Dept: FAMILY MEDICINE CLINIC | Facility: CLINIC | Age: 75
End: 2025-02-25
Payer: COMMERCIAL

## 2025-02-25 VITALS
OXYGEN SATURATION: 96 % | WEIGHT: 164 LBS | HEIGHT: 61 IN | DIASTOLIC BLOOD PRESSURE: 82 MMHG | BODY MASS INDEX: 30.96 KG/M2 | HEART RATE: 62 BPM | SYSTOLIC BLOOD PRESSURE: 124 MMHG

## 2025-02-25 DIAGNOSIS — K95.89 IRON DEFICIENCY ANEMIA FOLLOWING BARIATRIC SURGERY: ICD-10-CM

## 2025-02-25 DIAGNOSIS — M32.9 SYSTEMIC LUPUS ERYTHEMATOSUS, UNSPECIFIED SLE TYPE, UNSPECIFIED ORGAN INVOLVEMENT STATUS (HCC): ICD-10-CM

## 2025-02-25 DIAGNOSIS — F31.9 BIPOLAR 1 DISORDER (HCC): Primary | ICD-10-CM

## 2025-02-25 DIAGNOSIS — E66.811 CLASS 1 OBESITY DUE TO EXCESS CALORIES WITH SERIOUS COMORBIDITY AND BODY MASS INDEX (BMI) OF 30.0 TO 30.9 IN ADULT: ICD-10-CM

## 2025-02-25 DIAGNOSIS — N39.0 RECURRENT UTI: ICD-10-CM

## 2025-02-25 DIAGNOSIS — C83.00 SMALL LYMPHOCYTIC LYMPHOMA (HCC): ICD-10-CM

## 2025-02-25 DIAGNOSIS — M35.01 SJOGREN'S SYNDROME WITH KERATOCONJUNCTIVITIS SICCA (HCC): ICD-10-CM

## 2025-02-25 DIAGNOSIS — F51.04 PSYCHOPHYSIOLOGICAL INSOMNIA: ICD-10-CM

## 2025-02-25 DIAGNOSIS — D50.8 IRON DEFICIENCY ANEMIA FOLLOWING BARIATRIC SURGERY: ICD-10-CM

## 2025-02-25 DIAGNOSIS — Z98.84 S/P GASTRIC BYPASS: ICD-10-CM

## 2025-02-25 DIAGNOSIS — C91.10 CLL (CHRONIC LYMPHOCYTIC LEUKEMIA) (HCC): ICD-10-CM

## 2025-02-25 DIAGNOSIS — D17.9 ANGIOMYOLIPOMA: ICD-10-CM

## 2025-02-25 DIAGNOSIS — E66.09 CLASS 1 OBESITY DUE TO EXCESS CALORIES WITH SERIOUS COMORBIDITY AND BODY MASS INDEX (BMI) OF 30.0 TO 30.9 IN ADULT: ICD-10-CM

## 2025-02-25 DIAGNOSIS — E03.9 HYPOTHYROIDISM, UNSPECIFIED TYPE: ICD-10-CM

## 2025-02-25 PROCEDURE — G2211 COMPLEX E/M VISIT ADD ON: HCPCS | Performed by: FAMILY MEDICINE

## 2025-02-25 PROCEDURE — 99214 OFFICE O/P EST MOD 30 MIN: CPT | Performed by: FAMILY MEDICINE

## 2025-02-26 NOTE — ASSESSMENT & PLAN NOTE
Her chemotherapy is progressing well, and her recent CAT scan and blood work results were satisfactory. She was encouraged to maintain a positive outlook and to seek therapy to help manage her feelings about her cancer diagnosis.

## 2025-02-26 NOTE — ASSESSMENT & PLAN NOTE
She has been discussing with Dr. Kennedy the possibility of reducing her medication dosage. She was advised to adjust her medication regimen by taking Seroquel 300 mg and one tablet of temazepam at bedtime, and to take the additional dose of Seroquel 50 mg if she experiences awakenings during the night. She was also encouraged to experiment with reducing her medication dosage to assess its impact on her sleep quality.

## 2025-02-26 NOTE — ASSESSMENT & PLAN NOTE
Her blood pressure readings are within normal range, and her weight is satisfactory. She was informed about the availability of semaglutide at [facility], a medical spa located off [facility], which she can consider as an alternative treatment option.

## 2025-02-26 NOTE — ASSESSMENT & PLAN NOTE
She has a long-standing history of sleep disturbances, which may be attributed to her work schedule and travel commitments. It is possible that her sleep requirements have decreased with age. She was advised to adjust her medication regimen by taking Seroquel 300 mg and one tablet of temazepam at bedtime, and to take the additional dose of Seroquel 50 mg if she experiences awakenings during the night. She was also encouraged to experiment with reducing her medication dosage to assess its impact on her sleep quality.

## 2025-02-26 NOTE — ASSESSMENT & PLAN NOTE
She was advised to consult an ophthalmologist for further evaluation and potential treatment options, including steroid drops.

## 2025-02-26 NOTE — PROGRESS NOTES
Name: Cassia Turcios      : 1950      MRN: [id number]  Encounter Provider: Julio Bhat MD  Encounter Date: 2025   Encounter department: [facility]    Assessment & Plan  Bipolar 1 disorder (HCC)  She has been discussing with Dr. Kennedy the possibility of reducing her medication dosage. She was advised to adjust her medication regimen by taking Seroquel 300 mg and one tablet of temazepam at bedtime, and to take the additional dose of Seroquel 50 mg if she experiences awakenings during the night. She was also encouraged to experiment with reducing her medication dosage to assess its impact on her sleep quality.       Angiomyolipoma  Her angiomyolipoma is less than 4 cm and will continue to be observed.       CLL (chronic lymphocytic leukemia) (HCC)  Her chemotherapy is progressing well, and her recent CAT scan and blood work results were satisfactory. She was encouraged to maintain a positive outlook and to seek therapy to help manage her feelings about her cancer diagnosis.       Hypothyroidism, unspecified type         Iron deficiency anemia following bariatric surgery         S/P gastric bypass         Sjogren's syndrome with keratoconjunctivitis sicca (HCC)  She was advised to consult an ophthalmologist for further evaluation and potential treatment options, including steroid drops.       Systemic lupus erythematosus, unspecified SLE type, unspecified organ involvement status ([facility])         Class 1 obesity due to excess calories with serious comorbidity and body mass index (BMI) of 30.0 to 30.9 in adult      Her blood pressure readings are within normal range, and her weight is satisfactory. She was informed about the availability of semaglutide at [facility], a medical spa located off [facility], which she can consider as an alternative treatment option.         Psychophysiological insomnia  She has a long-standing history of sleep disturbances, which may be attributed  to her work schedule and travel commitments. It is possible that her sleep requirements have decreased with age. She was advised to adjust her medication regimen by taking Seroquel 300 mg and one tablet of temazepam at bedtime, and to take the additional dose of Seroquel 50 mg if she experiences awakenings during the night. She was also encouraged to experiment with reducing her medication dosage to assess its impact on her sleep quality.       Recurrent UTI         Small lymphocytic lymphoma (HCC)  Follows with heme            Assessment & Plan    5. Sjogren's-related interstitial lung disease.  There have been no significant changes in her condition.    6. Systemic lupus erythematosus.  There have been no significant changes in her condition.    7. Sjogren's syndrome.  There have been no significant changes in her condition.      10. Recurrent urinary tract infections.  Her recurrent UTIs are likely due to colonization rather than active infection.    Follow-up  The patient is scheduled for a follow-up visit in 4 months.       History of Present Illness     History of Present Illness  The patient presents for a 4-month follow-up.    She has been prescribed naltrexone by her bariatric specialist, which is expected to synergize with her existing Wellbutrin regimen in managing her appetite. She initiated the naltrexone yesterday, starting with a half dose of 50 mg as per her physician's advice to monitor for potential side effects. She reports no gastrointestinal disturbances thus far. She has been abstaining from sugar for the past 2.5 weeks, which has resulted in a slight weight reduction. She plans to gradually eliminate carbohydrates from her diet. She has declined the use of GLP-1 due to insurance coverage issues. She acknowledges that her chemotherapy medication may contribute to weight gain, but notes that she initially experienced weight loss during her hospital stay, weighing 139 pounds. Her current weight  is 164 pounds.    She has a history of sleep disturbances, despite being on a regimen of Seroquel 350 mg and temazepam 2 tablets nightly. She was prescribed lorazepam by Dr. Kennedy in December 2024 to manage her anxiety related to an upcoming CT scan, but she has only taken it four times due to its sedative effects. She has been advised to increase her Seroquel dose to 400 mg during periods of severe sleep disturbance, but she finds that this results in poorer sleep quality. She uses an [employer] device to monitor her sleep patterns and reports waking up three times a few days ago. She typically goes to bed at 9:30 PM and wakes up around 8:30 or 9:00 AM, but recently she has been waking up earlier, around 7:15 AM. She is uncertain if her fatigue is due to inadequate sleep or frequent nighttime awakenings. She has been discussing with Dr. Kennedy the possibility of reducing her medication dosage. She reports that her deep sleep phase lasts approximately 1.5 hours.    She has been experiencing dry eyes and has been using Refresh to manage the symptoms. She plans to consult an ophthalmologist for further evaluation.    She has been diagnosed with lymphoma and is currently undergoing chemotherapy, which she reports is progressing well. She has been informed that she does not need to see her oncologist for another 4 to 5 months. She is considering seeking therapy to help cope with her feelings about her cancer diagnosis.    She has been diagnosed with Sjogren's-related interstitial lung disease and is currently on a regimen of pilocarpine 5 mg twice daily and hydroxychloroquine 200 mg twice daily. She reports no significant changes in her condition.    She has been diagnosed with systemic lupus erythematosus and is currently on a regimen of pilocarpine 5 mg twice daily and hydroxychloroquine 200 mg twice daily. She reports no significant changes in her condition.    She has been diagnosed with Sjogren's syndrome and is  "currently on a regimen of pilocarpine 5 mg twice daily and hydroxychloroquine 200 mg twice daily. She reports no significant changes in her condition.    She has been diagnosed with bipolar disorder and is currently on a regimen of Seroquel 350 mg and temazepam 2 tablets nightly. She has been discussing with Dr. Kennedy the possibility of reducing her medication dosage.    She has been diagnosed with angiomyolipoma and is currently under observation. She has been informed that she does not need to see her urologist for another 4 to 5 months.    She has been diagnosed with recurrent urinary tract infections and is currently under observation. She has been informed that she does not need to see her urologist for another 4 to 5 months.    Supplemental Information  She had her ears cleaned out by her ENT doctor.    MEDICATIONS  - Current medications:    - Nexium    - Wellbutrin    - Pilocarpine    - Hydroxychloroquine    - Seroquel    - Temazepam    - Lorazepam     Review of Systems   Constitutional:  Negative for fever and unexpected weight change.   HENT:  Negative for nosebleeds and trouble swallowing.    Eyes:  Negative for visual disturbance.   Respiratory:  Negative for chest tightness and shortness of breath.    Cardiovascular:  Negative for chest pain, palpitations and leg swelling.   Gastrointestinal:  Negative for abdominal pain, constipation, diarrhea and nausea.   Endocrine: Negative for cold intolerance.   Genitourinary:  Negative for dysuria and urgency.   Musculoskeletal:  Negative for joint swelling and myalgias.   Skin:  Negative for rash.   Neurological:  Negative for tremors, seizures and syncope.   Hematological:  Does not bruise/bleed easily.   Psychiatric/Behavioral:  Negative for hallucinations and suicidal ideas.      Objective   /82   Pulse 62   Ht 5' 1\" (1.549 m)   Wt 74.4 kg (164 lb)   LMP  (LMP Unknown)   SpO2 96%   BMI 30.99 kg/m²     Physical Exam  - Vital Signs:    - Weight: 164 " pounds  Physical Exam  Vitals and nursing note reviewed.   Constitutional:       Appearance: She is well-developed.   HENT:      Head: Normocephalic and atraumatic.      Right Ear: External ear normal.      Left Ear: External ear normal.      Nose: Nose normal.   Eyes:      Conjunctiva/sclera: Conjunctivae normal.      Pupils: Pupils are equal, round, and reactive to light.   Cardiovascular:      Rate and Rhythm: Normal rate and regular rhythm.      Heart sounds: Normal heart sounds. No murmur heard.  Pulmonary:      Effort: Pulmonary effort is normal.      Breath sounds: Normal breath sounds. No wheezing.   Abdominal:      General: Bowel sounds are normal.      Palpations: Abdomen is soft.   Musculoskeletal:         General: No tenderness. Normal range of motion.      Cervical back: Normal range of motion and neck supple.   Lymphadenopathy:      Cervical: No cervical adenopathy.   Skin:     General: Skin is warm and dry.      Capillary Refill: Capillary refill takes less than 2 seconds.   Neurological:      Mental Status: She is alert and oriented to person, place, and time.   Psychiatric:         Behavior: Behavior normal.         Thought Content: Thought content normal.         Judgment: Judgment normal.

## 2025-03-07 ENCOUNTER — APPOINTMENT (OUTPATIENT)
Dept: LAB | Facility: CLINIC | Age: 75
End: 2025-03-07
Payer: COMMERCIAL

## 2025-03-07 DIAGNOSIS — Z98.84 BARIATRIC SURGERY STATUS: ICD-10-CM

## 2025-03-07 DIAGNOSIS — Z48.815 ENCOUNTER FOR SURGICAL AFTERCARE FOLLOWING SURGERY OF DIGESTIVE SYSTEM: ICD-10-CM

## 2025-03-07 DIAGNOSIS — E66.811 OBESITY, CLASS I, BMI 30-34.9: ICD-10-CM

## 2025-03-07 DIAGNOSIS — C91.10 CLL (CHRONIC LYMPHOCYTIC LEUKEMIA) (HCC): ICD-10-CM

## 2025-03-07 DIAGNOSIS — K91.2 POSTSURGICAL MALABSORPTION: ICD-10-CM

## 2025-03-07 LAB
25(OH)D3 SERPL-MCNC: 24.6 NG/ML (ref 30–100)
ALBUMIN SERPL BCG-MCNC: 4 G/DL (ref 3.5–5)
ALP SERPL-CCNC: 97 U/L (ref 34–104)
ALT SERPL W P-5'-P-CCNC: 61 U/L (ref 7–52)
ANION GAP SERPL CALCULATED.3IONS-SCNC: 4 MMOL/L (ref 4–13)
AST SERPL W P-5'-P-CCNC: 62 U/L (ref 13–39)
BASOPHILS # BLD AUTO: 0.1 THOUSANDS/ÂΜL (ref 0–0.1)
BASOPHILS NFR BLD AUTO: 1 % (ref 0–1)
BILIRUB SERPL-MCNC: 0.69 MG/DL (ref 0.2–1)
BUN SERPL-MCNC: 22 MG/DL (ref 5–25)
CALCIUM SERPL-MCNC: 9.7 MG/DL (ref 8.4–10.2)
CHLORIDE SERPL-SCNC: 101 MMOL/L (ref 96–108)
CHOLEST SERPL-MCNC: 161 MG/DL (ref ?–200)
CO2 SERPL-SCNC: 29 MMOL/L (ref 21–32)
CREAT SERPL-MCNC: 1.06 MG/DL (ref 0.6–1.3)
EOSINOPHIL # BLD AUTO: 0.11 THOUSAND/ÂΜL (ref 0–0.61)
EOSINOPHIL NFR BLD AUTO: 2 % (ref 0–6)
ERYTHROCYTE [DISTWIDTH] IN BLOOD BY AUTOMATED COUNT: 13.8 % (ref 11.6–15.1)
FOLATE SERPL-MCNC: >22.3 NG/ML
GFR SERPL CREATININE-BSD FRML MDRD: 52 ML/MIN/1.73SQ M
GLUCOSE P FAST SERPL-MCNC: 93 MG/DL (ref 65–99)
HCT VFR BLD AUTO: 40.4 % (ref 34.8–46.1)
HDLC SERPL-MCNC: 58 MG/DL
HGB BLD-MCNC: 13 G/DL (ref 11.5–15.4)
IMM GRANULOCYTES # BLD AUTO: 0.02 THOUSAND/UL (ref 0–0.2)
IMM GRANULOCYTES NFR BLD AUTO: 0 % (ref 0–2)
LDLC SERPL CALC-MCNC: 87 MG/DL (ref 0–100)
LYMPHOCYTES # BLD AUTO: 3.2 THOUSANDS/ÂΜL (ref 0.6–4.47)
LYMPHOCYTES NFR BLD AUTO: 46 % (ref 14–44)
MCH RBC QN AUTO: 30.9 PG (ref 26.8–34.3)
MCHC RBC AUTO-ENTMCNC: 32.2 G/DL (ref 31.4–37.4)
MCV RBC AUTO: 96 FL (ref 82–98)
MONOCYTES # BLD AUTO: 0.71 THOUSAND/ÂΜL (ref 0.17–1.22)
MONOCYTES NFR BLD AUTO: 10 % (ref 4–12)
NEUTROPHILS # BLD AUTO: 2.9 THOUSANDS/ÂΜL (ref 1.85–7.62)
NEUTS SEG NFR BLD AUTO: 41 % (ref 43–75)
NONHDLC SERPL-MCNC: 103 MG/DL
NRBC BLD AUTO-RTO: 0 /100 WBCS
PLATELET # BLD AUTO: 292 THOUSANDS/UL (ref 149–390)
PMV BLD AUTO: 11.4 FL (ref 8.9–12.7)
POTASSIUM SERPL-SCNC: 4.2 MMOL/L (ref 3.5–5.3)
PROT SERPL-MCNC: 9.2 G/DL (ref 6.4–8.4)
PTH-INTACT SERPL-MCNC: 28.7 PG/ML (ref 12–88)
RBC # BLD AUTO: 4.21 MILLION/UL (ref 3.81–5.12)
SODIUM SERPL-SCNC: 134 MMOL/L (ref 135–147)
TRIGL SERPL-MCNC: 81 MG/DL (ref ?–150)
VIT B12 SERPL-MCNC: 330 PG/ML (ref 180–914)
WBC # BLD AUTO: 7.04 THOUSAND/UL (ref 4.31–10.16)

## 2025-03-07 PROCEDURE — 83970 ASSAY OF PARATHORMONE: CPT

## 2025-03-07 PROCEDURE — 85025 COMPLETE CBC W/AUTO DIFF WBC: CPT

## 2025-03-07 PROCEDURE — 82746 ASSAY OF FOLIC ACID SERUM: CPT

## 2025-03-07 PROCEDURE — 80053 COMPREHEN METABOLIC PANEL: CPT

## 2025-03-07 PROCEDURE — 82607 VITAMIN B-12: CPT

## 2025-03-07 PROCEDURE — 82306 VITAMIN D 25 HYDROXY: CPT

## 2025-03-07 PROCEDURE — 80061 LIPID PANEL: CPT

## 2025-03-07 PROCEDURE — 36415 COLL VENOUS BLD VENIPUNCTURE: CPT

## 2025-03-07 PROCEDURE — 84425 ASSAY OF VITAMIN B-1: CPT

## 2025-03-07 PROCEDURE — 84590 ASSAY OF VITAMIN A: CPT

## 2025-03-07 PROCEDURE — 84630 ASSAY OF ZINC: CPT

## 2025-03-11 LAB
VIT A SERPL-MCNC: 41.7 UG/DL (ref 22–69.5)
VIT B1 BLD-SCNC: 153.5 NMOL/L (ref 66.5–200)
ZINC SERPL-MCNC: 61 UG/DL (ref 44–115)

## 2025-03-12 ENCOUNTER — RESULTS FOLLOW-UP (OUTPATIENT)
Dept: BARIATRICS | Facility: CLINIC | Age: 75
End: 2025-03-12

## 2025-03-12 DIAGNOSIS — E53.8 VITAMIN B12 DEFICIENCY: ICD-10-CM

## 2025-03-12 DIAGNOSIS — Z98.84 BARIATRIC SURGERY STATUS: Primary | ICD-10-CM

## 2025-03-12 DIAGNOSIS — E55.9 VITAMIN D DEFICIENCY: ICD-10-CM

## 2025-03-12 RX ORDER — ERGOCALCIFEROL 1.25 MG/1
50000 CAPSULE, LIQUID FILLED ORAL WEEKLY
Qty: 24 CAPSULE | Refills: 0 | Status: SHIPPED | OUTPATIENT
Start: 2025-03-12

## 2025-03-18 ENCOUNTER — TELEPHONE (OUTPATIENT)
Dept: DERMATOLOGY | Facility: CLINIC | Age: 75
End: 2025-03-18

## 2025-03-18 NOTE — TELEPHONE ENCOUNTER
Called patient and left detailed vm regarding scheduling 6m f/u with derm resident in JUNE. Provided pt my direct line, if pt calls back please assist with scheduling on Tues/Thursday at [facility]. Any questions reach out to me directly

## 2025-04-01 ENCOUNTER — RESULTS FOLLOW-UP (OUTPATIENT)
Dept: URGENT CARE | Facility: CLINIC | Age: 75
End: 2025-04-01

## 2025-04-01 ENCOUNTER — OFFICE VISIT (OUTPATIENT)
Dept: URGENT CARE | Facility: CLINIC | Age: 75
End: 2025-04-01
Payer: COMMERCIAL

## 2025-04-01 ENCOUNTER — APPOINTMENT (OUTPATIENT)
Dept: RADIOLOGY | Facility: CLINIC | Age: 75
End: 2025-04-01
Payer: COMMERCIAL

## 2025-04-01 VITALS
BODY MASS INDEX: 31.15 KG/M2 | OXYGEN SATURATION: 99 % | DIASTOLIC BLOOD PRESSURE: 98 MMHG | HEART RATE: 82 BPM | WEIGHT: 165 LBS | SYSTOLIC BLOOD PRESSURE: 137 MMHG | TEMPERATURE: 97.1 F | RESPIRATION RATE: 16 BRPM | HEIGHT: 61 IN

## 2025-04-01 DIAGNOSIS — S89.91XA INJURY OF RIGHT LOWER LEG, INITIAL ENCOUNTER: ICD-10-CM

## 2025-04-01 DIAGNOSIS — S89.91XA INJURY OF RIGHT KNEE, INITIAL ENCOUNTER: Primary | ICD-10-CM

## 2025-04-01 DIAGNOSIS — S89.91XA INJURY OF RIGHT KNEE, INITIAL ENCOUNTER: ICD-10-CM

## 2025-04-01 PROCEDURE — 73590 X-RAY EXAM OF LOWER LEG: CPT

## 2025-04-01 PROCEDURE — 73564 X-RAY EXAM KNEE 4 OR MORE: CPT

## 2025-04-01 PROCEDURE — 99214 OFFICE O/P EST MOD 30 MIN: CPT | Performed by: NURSE PRACTITIONER

## 2025-04-01 NOTE — PROGRESS NOTES
[facility]        NAME: Cassia Turcios is a 74 y.o. female  : 1950    MRN: [id number]  DATE: 2025  TIME: 11:25 AM    Assessment and Plan   Injury of right knee, initial encounter [S89.91XA]  1. Injury of right knee, initial encounter  XR knee 4+ vw right injury      2. Injury of right lower leg, initial encounter  XR tibia fibula 2 vw right            Patient Instructions     Patient Instructions   --Initial read of x-ray negative for fracture or other acute findings. Will contact you with final x-ray results (anticipate 1-12 hours) if any findings noted by radiologist.   --Ice, ACE wrap as needed  --Tylenol as needed for pain.  Alternative is OTC Voltaren gel.    --Minimize excess weight bearing  --Follow-up with [facility] for ongoing symptoms over the next 1-2 weeks.          If tests have been performed at [facility], our office will contact you with results if changes need to be made to the care plan discussed with you at the visit.  You can review your full results on [facility] MyChart.    Chief Complaint     Chief Complaint   Patient presents with    Knee Injury     Right knee injury occurred last week. Pt denies any other injuries.          History of Present Illness       Here with complaints of right knee and lower leg pain, bruising since injuring a week ago.    Lost footing and fell down 3 wood stairs in garage.  Landed on right knee/lower leg.   Initial pain, bruising, swelling which has been ongoing since then.    Pain felt lower anterior aspect of knee and anterior proximal half of lower leg.    Rates 4/10 at present.    NO increase in pain with weight bearing.   Taking Tylenol.  No ice.   No N/T/W.   Denies past right knee/leg injuries.    Prior x-ray of right knee, 2024, showing moderate tricompartmental arthritis.             Review of Systems   Review of Systems   Musculoskeletal:  Positive for arthralgias.   Skin:  Positive for color change.   Neurological:  Negative  for weakness and numbness.         Current Medications       Current Outpatient Medications:     buPROPion (WELLBUTRIN) 100 mg tablet, Take 100 mg by mouth 3 (three) times a day., Disp: , Rfl:     Cholecalciferol (D3-50) 1.25 MG (45032 UT) capsule, 5,000 Units daily, Disp: , Rfl:     clobetasol (TEMOVATE) 0.05 % ointment, Use twice a day., Disp: 60 g, Rfl: 2    clotrimazole-betamethasone (LOTRISONE) 1-0.05 % cream, Apply topically 2 (two) times a day, Disp: 45 g, Rfl: 0    ergocalciferol (VITAMIN D2) 50,000 units, Take 1 capsule (50,000 Units total) by mouth once a week, Disp: 24 capsule, Rfl: 0    ferrous sulfate 325 (65 Fe) mg tablet, Take 1 tablet (325 mg total) by mouth 2 (two) times a day with meals, Disp: 60 tablet, Rfl: 0    hydroxychloroquine (PLAQUENIL) 200 mg tablet, Take 1 tablet (200 mg total) by mouth 2 (two) times a day with meals, Disp: 180 tablet, Rfl: 1    levothyroxine (Synthroid) 25 mcg tablet, Take 1 tablet (25 mcg total) by mouth daily, Disp: 90 tablet, Rfl: 1    LORazepam (ATIVAN) 1 mg tablet, Take 1 mg by mouth 2 (two) times a day As needed, Disp: , Rfl:     Multiple Vitamins-Minerals (CENTRUM MINIS WOMEN 50+ PO), , Disp: , Rfl:     mupirocin (BACTROBAN) 2 % ointment, , Disp: , Rfl:     naltrexone (REVIA) 50 mg tablet, Take 1 tablet (50 mg total) by mouth daily, Disp: 30 tablet, Rfl: 2    olopatadine HCl (PATADAY) 0.2 % opth drops, Administer 1 drop to both eyes daily, Disp: 2.5 mL, Rfl: 1    pilocarpine (SALAGEN) 5 mg tablet, Take 1 tablet (5 mg total) by mouth 2 (two) times a day, Disp: 180 tablet, Rfl: 1    QUEtiapine (SEROquel) 300 mg tablet, Take 300 mg by mouth daily at bedtime, Disp: , Rfl:     QUEtiapine (SEROquel) 50 mg tablet, 50 mg daily at bedtime, Disp: , Rfl:     sodium chloride 1 g tablet, Take 1 tablet (1 g total) by mouth in the morning, Disp: 90 tablet, Rfl: 1    temazepam (RESTORIL) 30 mg capsule, Take 2 capsules by mouth daily at bedtime as needed , Disp: , Rfl:      triamcinolone (KENALOG) 0.1 % cream, , Disp: , Rfl:     Zanubrutinib (Brukinsa) 80 MG CAPS, Take 4 capsules (320 mg total) by mouth once daily., Disp: 120 capsule, Rfl: 10    Current Facility-Administered Medications:     cyanocobalamin injection 1,000 mcg, 1,000 mcg, Intramuscular, Q30 Days, Julio Bhat MD, 1,000 mcg at 12/28/23 1242    Current Allergies     Allergies as of 04/01/2025 - Reviewed 04/01/2025   Allergen Reaction Noted    Ciprofloxacin Hives and Swelling 08/23/2016    Cymbalta [duloxetine hcl] Other (See Comments) 08/23/2016    Nirmatrelvir-ritonavir Diarrhea, Nausea Only, Other (See Comments), Dizziness, and Lightheadedness 10/15/2022    Percocet [oxycodone-acetaminophen] Other (See Comments) 08/22/2020            The following portions of the patient's history were reviewed and updated as appropriate: allergies, current medications, past family history, past medical history, past social history, past surgical history and problem list.     Past Medical History:   Diagnosis Date    Acute kidney injury superimposed on chronic kidney disease  (HCC) 09/20/2021    Age-related osteoporosis without current pathological fracture 06/01/2023    Allergic rhinitis     Anemia     Anxiety     Bipolar 1 disorder (HCC)     Constipation     Deaf     Pt lost all hearing in right ear after having [place] measles    Depression     Diarrhea     Disease of thyroid gland 01/012000    Fatigue     GERD (gastroesophageal reflux disease) 08/01/2020    Hard of hearing     Pt wears a hearing in left ear.    History of chemotherapy 09/19/2023    HL (hearing loss)     Hypertension 09/19/2023    My BP went up aftet taking BRukinsachemo meficine    Hypothyroidism     Lung nodule     Nasal congestion     Osteoporosis     Pneumonia 02/10/2017    Pneumonia due to Streptococcus ([facility]) 09/19/2021    Psychiatric disorder     Recurrent UTI 06/05/2023    Sjogren's syndrome ([facility])     Small lymphocytic lymphoma ([facility]) 09/18/2023    Systemic  lupus erythematosus (HCC)     Urinary tract infection 01/01.2022    Every urine test powell out positive for UTI but i have no symptoms    Vasculitis (HCC)     Wears glasses     Wears partial dentures        Past Surgical History:   Procedure Laterality Date    BREAST BIOPSY Right     many years ago at [facility]    COLONOSCOPY      EYE SURGERY      FRACTURE SURGERY Right     elbow    GASTRIC BYPASS  05/12/2012    HYSTERECTOMY Bilateral 05/10/1975    IR BIOPSY LYMPH NODE  10/08/2020    LUNG BIOPSY      LYMPH NODE BIOPSY  09/18/2020    LYMPH NODE BIOPSY Left 08/28/2023    Procedure: EXCISION BIOPSY LYMPH NODE LEFT AXILLARY;  Surgeon: Scott Seaman MD;  Location: [facility];  Service: Thoracic    UT [facility] INCL FLUOR GDNCE DX W/CELL WASHG SPX N/A 12/08/2017    Procedure: BRONCHOSCOPY;  Surgeon: Chepe Luna MD;  Location: [facility];  Service: Pulmonary    UT BX/EXC LYMPH NODE NEEDLE SUPERFICIAL Left 02/25/2021    Procedure: EXCISION BIOPSY LYMPH NODE: left axillary lymph node biopsy;  Surgeon: Dakota Heard MD;  Location: [facility];  Service: Thoracic    TONSILLECTOMY         Family History   Problem Relation Age of Onset    Heart disease Mother     Diabetes Mother     Kidney cancer Mother     Cancer Mother         Kidney    Hypertension Mother     Heart disease Father     Stroke Father     Diabetes Father     Cancer Father         Rectal Cancer    Hypertension Father     No Known Problems Maternal Grandmother     No Known Problems Maternal Grandfather     No Known Problems Paternal Grandmother     No Known Problems Paternal Grandfather     Leukemia Half-Sister 50    No Known Problems Half-Sister     No Known Problems Half-Sister     No Known Problems Half-Sister     BRCA2 Positive Neg Hx     BRCA2 Negative Neg Hx     BRCA1 Negative Neg Hx     Endometrial cancer Neg Hx     Breast cancer Neg Hx     Breast cancer additional onset Neg Hx     Colon cancer Neg Hx     Ovarian cancer Neg Hx     BRCA1  "Positive Neg Hx     BRCA 1/2 Neg Hx          Medications have been verified.        Objective   /98   Pulse 82   Temp (!) 97.1 °F (36.2 °C)   Resp 16   Ht 5' 1\" (1.549 m)   Wt 74.8 kg (165 lb)   LMP  (LMP Unknown)   SpO2 99%   BMI 31.18 kg/m²   No LMP recorded (lmp unknown). Patient is postmenopausal.       Physical Exam     Physical Exam  Pulmonary:      Effort: Pulmonary effort is normal.   Musculoskeletal:         General: Tenderness and signs of injury present. No swelling or deformity. Normal range of motion.      Comments: Right knee and lower leg TTP overlying tibial tuberosity and upper half of tibial spine with associated bruising, mild swelling.  No deformity noted.    Remainder of knee and lower leg nontender with normal appearance including quad tendon, patella, retinaculum, medial joint line, lateral joint line, pes anserine bursa, fibular head, popliteal fossa.  Knee PROM normal in flexion and extension, largely painless.  No crepitus.  Negative patellar apprehension.  No increased varus or valgus laxity.  Negative anterior drawer, posterior drawer, Lachman. Negative Manuel.  5/5 strength flexion and extension.  No increased pain with resisted flexion, extension, abduction, adduction.  Non-antalgic gait.      Skin:     Findings: Bruising present.   Neurological:      General: No focal deficit present.      Mental Status: She is alert.      Gait: Gait normal.   Psychiatric:         Mood and Affect: Mood normal.                   "

## 2025-04-01 NOTE — PATIENT INSTRUCTIONS
--Initial read of x-ray negative for fracture or other acute findings. Will contact you with final x-ray results (anticipate 1-12 hours) if any findings noted by radiologist.   --Ice, ACE wrap as needed  --Tylenol as needed for pain.  Alternative is OTC Voltaren gel.    --Minimize excess weight bearing  --Follow-up with [facility] for ongoing symptoms over the next 1-2 weeks.

## 2025-04-11 ENCOUNTER — APPOINTMENT (OUTPATIENT)
Dept: RADIOLOGY | Facility: CLINIC | Age: 75
End: 2025-04-11
Payer: COMMERCIAL

## 2025-04-11 ENCOUNTER — OFFICE VISIT (OUTPATIENT)
Dept: URGENT CARE | Facility: CLINIC | Age: 75
End: 2025-04-11
Payer: COMMERCIAL

## 2025-04-11 VITALS
TEMPERATURE: 97 F | RESPIRATION RATE: 18 BRPM | SYSTOLIC BLOOD PRESSURE: 100 MMHG | DIASTOLIC BLOOD PRESSURE: 74 MMHG | HEART RATE: 78 BPM | OXYGEN SATURATION: 98 %

## 2025-04-11 DIAGNOSIS — M25.562 MEDIAL KNEE PAIN, LEFT: Primary | ICD-10-CM

## 2025-04-11 DIAGNOSIS — M25.562 ACUTE PAIN OF LEFT KNEE: ICD-10-CM

## 2025-04-11 PROCEDURE — 73564 X-RAY EXAM KNEE 4 OR MORE: CPT

## 2025-04-11 PROCEDURE — 99213 OFFICE O/P EST LOW 20 MIN: CPT

## 2025-04-11 NOTE — PROGRESS NOTES
Name: Cassia Turcios      : 1950      MRN: [id number]  Encounter Provider: DARRIUS EVANSEVANJAH  Encounter Date: 2025   Encounter department: [facility]  :  Assessment & Plan  Acute pain of left knee    Orders:    XR knee 4+ vw left injury; Future    Medial knee pain, left         Concern for MCL strain given tenderness to medial knee with no obvious fracture on xray. Will await radiologist read but advised rest, ice, elevation, and wrapping of both knees for support. Reviewed OTC tylenol use as needed for pain and f/u with PCP if symptoms do not improve or worsen.    History of Present Illness   HPI  Cassia Turcios is a 74 y.o. female who presents with 2 days of left knee pain after a fall 2 weeks ago. Has been favoring left knee after injuring right knee. Right knee has improved significantly.          Review of Systems   Musculoskeletal:  Positive for arthralgias and joint swelling.          Objective   /74 (BP Location: Right arm, Patient Position: Sitting, Cuff Size: Standard)   Pulse 78   Temp (!) 97 °F (36.1 °C) (Temporal)   Resp 18   LMP  (LMP Unknown)   SpO2 98%      Physical Exam  Constitutional:       Appearance: Normal appearance.   HENT:      Head: Normocephalic and atraumatic.   Musculoskeletal:         General: Swelling and tenderness present. Normal range of motion.      Comments: FROM. No joint weakness but significant tenderness with valgus stress and medial knee palpation.   Neurological:      Mental Status: She is alert.

## 2025-04-14 ENCOUNTER — OFFICE VISIT (OUTPATIENT)
Dept: NEPHROLOGY | Facility: CLINIC | Age: 75
End: 2025-04-14
Payer: COMMERCIAL

## 2025-04-14 VITALS
DIASTOLIC BLOOD PRESSURE: 80 MMHG | HEIGHT: 61 IN | BODY MASS INDEX: 31.72 KG/M2 | SYSTOLIC BLOOD PRESSURE: 118 MMHG | OXYGEN SATURATION: 97 % | WEIGHT: 168 LBS | HEART RATE: 72 BPM

## 2025-04-14 DIAGNOSIS — I10 PRIMARY HYPERTENSION: ICD-10-CM

## 2025-04-14 DIAGNOSIS — R77.8 ELEVATED TOTAL PROTEIN: ICD-10-CM

## 2025-04-14 DIAGNOSIS — E87.1 HYPONATREMIA: Primary | ICD-10-CM

## 2025-04-14 PROCEDURE — 99214 OFFICE O/P EST MOD 30 MIN: CPT | Performed by: INTERNAL MEDICINE

## 2025-04-14 NOTE — PROGRESS NOTES
NEPHROLOGY OFFICE PROGRESS NOTE   Cassia Turcios 74 y.o. female MRN: [id number]  DATE: 04/14/25  Reason for visit: Continued evaluation and management of hyponatremia      Assessment & Plan  Hyponatremia  Baseline Na was low 130s prior to October 2023.   Serum osm in Nov 2024 was 296 (normal) indicating iso-osmolar hyponatremia possibly from elevated total protein levels. However, urine Osm shows significant diluting defect so she could also have a component of hypoosmolar hyponatremia.   Na level has been 132 to 136 over the past year for the most part.   She is currently on salt tabs 1 gm OD.   Will stop salt tabs and recheck labs in 2 months, 4 months 8 months and 1 year.   Repeat serum osmolality and urine studies.  Continue fluid restriction and protein shakes daily.    Primary hypertension  BP is at goal (<130/80)  Not on meds.     Elevated total protein  No evidence of myeloma in the past.   Total protein has been 8.5 to 9.2 over the past year.       Patient Instructions   Your sodium level has been normal or close to normal.   Please stop the salt tablets.   Continue with the protein shakes and the fluid restriction of 54 ounces per day.  Check BMP and urine testing in 2 months.  Check BMP in 4 months, 8 months and 1 year.   Follow up in 1 year.     SUBJECTIVE / INTERVAL HISTORY:  Cassia was last seen in the office in Oct 2024.   No hospital stays since last visit.   She had an ER visit in late October 2024 and was diagnosed with COVID during that time.  Fortunately, she did not require hospitalization.  Sometime in November 2024, she had a skin lesion in her back which was excised by dermatology.  The pathology came back as malignant melanoma.  She continues to see Dr. Schilling, Dr. Chavez, Dr. Carnes (derm).  No new acute medical issues.   She is following an FR of 54 oz and does a protein shake daily.     BP meds/diuretics:  Salt tabs 1 gm OD.     Other renal pertinent meds:  Ergocalciferol 50,000 units  "weekly.     PMH/PSH: CLL/SLL, HTN, SLE, Sjogren's disease, bipolar disease, hyponatremia, pneumonia, hysterectomy and oophorectomy, R elbow surgery, LN excisional biopsy.     Previous work up:   11/17/23 SOsm 298, UOsm 454, Zen 80, TSH 6.341, uric acid 3.5, cortisol 16.3, SPEP no monoclonal gammopathy, UPEP no M spike, KL ratio 1.86.     ALLERGIES:   Allergies   Allergen Reactions    Ciprofloxacin Hives and Swelling     Pt reports that lips and mouth and face swelled.    Cymbalta [Duloxetine Hcl] Other (See Comments)     Hallucinations, fatigue, faints    Nirmatrelvir-Ritonavir Diarrhea, Nausea Only, Other (See Comments), Dizziness and Lightheadedness     \"passed out\"-nausea    Percocet [Oxycodone-Acetaminophen] Other (See Comments)     Dry mouth - pt states it kept her up all night. States had to continually drink fluids     REVIEW OF SYSTEMS:  Review of Systems   Constitutional:  Negative for chills and fever.   Respiratory:  Negative for cough and shortness of breath.    Cardiovascular:  Negative for chest pain and leg swelling.   Gastrointestinal:  Negative for diarrhea, nausea and vomiting.   Genitourinary:  Negative for hematuria.   Neurological:  Negative for dizziness and light-headedness.     OBJECTIVE:  /80 (BP Location: Left arm, Patient Position: Sitting, Cuff Size: Large)   Pulse 72   Ht 5' 1\" (1.549 m)   Wt 76.2 kg (168 lb)   LMP  (LMP Unknown)   SpO2 97%   BMI 31.74 kg/m²   Current Weight: Weight - Scale: 76.2 kg (168 lb) Body mass index is 31.74 kg/m².  Physical Exam  Constitutional:       General: She is not in acute distress.     Appearance: Normal appearance. She is not toxic-appearing.   HENT:      Head: Normocephalic and atraumatic.   Eyes:      Conjunctiva/sclera: Conjunctivae normal.   Cardiovascular:      Rate and Rhythm: Normal rate and regular rhythm.      Heart sounds: Normal heart sounds.   Pulmonary:      Effort: Pulmonary effort is normal.      Breath sounds: Normal breath " sounds.   Abdominal:      General: Bowel sounds are normal.      Palpations: Abdomen is soft.   Musculoskeletal:      Right lower leg: No edema.      Left lower leg: No edema.   Skin:     General: Skin is warm and dry.   Neurological:      Mental Status: She is alert. Mental status is at baseline.   Psychiatric:         Mood and Affect: Mood normal.         Behavior: Behavior normal.       Medications:  Current Outpatient Medications:     buPROPion (WELLBUTRIN) 100 mg tablet, Take 100 mg by mouth 3 (three) times a day., Disp: , Rfl:     ergocalciferol (VITAMIN D2) 50,000 units, Take 1 capsule (50,000 Units total) by mouth once a week, Disp: 24 capsule, Rfl: 0    ferrous sulfate 325 (65 Fe) mg tablet, Take 1 tablet (325 mg total) by mouth 2 (two) times a day with meals, Disp: 60 tablet, Rfl: 0    hydroxychloroquine (PLAQUENIL) 200 mg tablet, Take 1 tablet (200 mg total) by mouth 2 (two) times a day with meals, Disp: 180 tablet, Rfl: 1    levothyroxine (Synthroid) 25 mcg tablet, Take 1 tablet (25 mcg total) by mouth daily, Disp: 90 tablet, Rfl: 1    LORazepam (ATIVAN) 1 mg tablet, Take 1 mg by mouth 2 (two) times a day As needed, Disp: , Rfl:     Multiple Vitamins-Minerals (CENTRUM MINIS WOMEN 50+ PO), , Disp: , Rfl:     naltrexone (REVIA) 50 mg tablet, Take 1 tablet (50 mg total) by mouth daily, Disp: 30 tablet, Rfl: 2    olopatadine HCl (PATADAY) 0.2 % opth drops, Administer 1 drop to both eyes daily, Disp: 2.5 mL, Rfl: 1    pilocarpine (SALAGEN) 5 mg tablet, Take 1 tablet (5 mg total) by mouth 2 (two) times a day, Disp: 180 tablet, Rfl: 1    QUEtiapine (SEROquel) 300 mg tablet, Take 300 mg by mouth daily at bedtime, Disp: , Rfl:     QUEtiapine (SEROquel) 50 mg tablet, 50 mg daily at bedtime, Disp: , Rfl:     sodium chloride 1 g tablet, Take 1 tablet (1 g total) by mouth in the morning, Disp: 90 tablet, Rfl: 1    temazepam (RESTORIL) 30 mg capsule, Take 2 capsules by mouth daily at bedtime as needed , Disp: , Rfl:      Zanubrutinib (Brukinsa) 80 MG CAPS, Take 4 capsules (320 mg total) by mouth once daily., Disp: 120 capsule, Rfl: 10    Cholecalciferol (D3-50) 1.25 MG (78469 UT) capsule, 5,000 Units daily (Patient not taking: Reported on 4/14/2025), Disp: , Rfl:     clobetasol (TEMOVATE) 0.05 % ointment, Use twice a day., Disp: 60 g, Rfl: 2    clotrimazole-betamethasone (LOTRISONE) 1-0.05 % cream, Apply topically 2 (two) times a day (Patient not taking: Reported on 4/11/2025), Disp: 45 g, Rfl: 0    mupirocin (BACTROBAN) 2 % ointment, , Disp: , Rfl:     triamcinolone (KENALOG) 0.1 % cream, , Disp: , Rfl:     Current Facility-Administered Medications:     cyanocobalamin injection 1,000 mcg, 1,000 mcg, Intramuscular, Q30 Days, Julio Bhat MD, 1,000 mcg at 12/28/23 1242    Laboratory Results:  Lab Results   Component Value Date    SODIUM 134 (L) 03/07/2025    K 4.2 03/07/2025     03/07/2025    CO2 29 03/07/2025    BUN 22 03/07/2025    CREATININE 1.06 03/07/2025    GLUC 104 10/26/2024    CALCIUM 9.7 03/07/2025

## 2025-04-14 NOTE — ASSESSMENT & PLAN NOTE
Baseline Na was low 130s prior to October 2023.   Serum osm in Nov 2024 was 296 (normal) indicating iso-osmolar hyponatremia possibly from elevated total protein levels. However, urine Osm shows significant diluting defect so she could also have a component of hypoosmolar hyponatremia.   Na level has been 132 to 136 over the past year for the most part.   She is currently on salt tabs 1 gm OD.   Will stop salt tabs and recheck labs in 2 months, 4 months 8 months and 1 year.   Repeat serum osmolality and urine studies.  Continue fluid restriction and protein shakes daily.

## 2025-04-14 NOTE — PATIENT INSTRUCTIONS
Your sodium level has been normal or close to normal.   Please stop the salt tablets.   Continue with the protein shakes and the fluid restriction of 54 ounces per day.  Check BMP and urine testing in 2 months.  Check BMP in 4 months, 8 months and 1 year.   Follow up in 1 year.

## 2025-04-21 ENCOUNTER — PATIENT MESSAGE (OUTPATIENT)
Dept: FAMILY MEDICINE CLINIC | Facility: CLINIC | Age: 75
End: 2025-04-21

## 2025-04-21 DIAGNOSIS — M35.00 SJOGREN'S SYNDROME, WITH UNSPECIFIED ORGAN INVOLVEMENT (HCC): ICD-10-CM

## 2025-04-21 DIAGNOSIS — M25.562 ACUTE PAIN OF LEFT KNEE: Primary | ICD-10-CM

## 2025-04-21 RX ORDER — PILOCARPINE HYDROCHLORIDE 5 MG/1
5 TABLET, FILM COATED ORAL 2 TIMES DAILY
Qty: 180 TABLET | Refills: 0 | Status: SHIPPED | OUTPATIENT
Start: 2025-04-21

## 2025-04-28 ENCOUNTER — APPOINTMENT (OUTPATIENT)
Dept: RADIOLOGY | Facility: AMBULARY SURGERY CENTER | Age: 75
End: 2025-04-28
Attending: ORTHOPAEDIC SURGERY
Payer: COMMERCIAL

## 2025-04-28 ENCOUNTER — OFFICE VISIT (OUTPATIENT)
Dept: OBGYN CLINIC | Facility: CLINIC | Age: 75
End: 2025-04-28
Payer: COMMERCIAL

## 2025-04-28 VITALS — BODY MASS INDEX: 31.72 KG/M2 | HEIGHT: 61 IN | WEIGHT: 168 LBS

## 2025-04-28 DIAGNOSIS — M25.562 LEFT KNEE PAIN, UNSPECIFIED CHRONICITY: ICD-10-CM

## 2025-04-28 DIAGNOSIS — M25.562 LEFT KNEE PAIN, UNSPECIFIED CHRONICITY: Primary | ICD-10-CM

## 2025-04-28 DIAGNOSIS — M25.561 RIGHT KNEE PAIN, UNSPECIFIED CHRONICITY: ICD-10-CM

## 2025-04-28 DIAGNOSIS — M25.562 ACUTE PAIN OF LEFT KNEE: ICD-10-CM

## 2025-04-28 PROCEDURE — 99204 OFFICE O/P NEW MOD 45 MIN: CPT | Performed by: ORTHOPAEDIC SURGERY

## 2025-04-28 PROCEDURE — 73562 X-RAY EXAM OF KNEE 3: CPT

## 2025-04-28 NOTE — PROGRESS NOTES
Name: Cassia Turcios      : 1950      MRN: [id number]  Encounter Provider: Adarsh Padilla DO  Encounter Date: 2025   Encounter department: [facility]  :  Assessment & Plan  Left knee pain, unspecified chronicity    Orders:    XR knee 3 vw left non injury; Future    Acute pain of left knee    Orders:    Ambulatory Referral to Orthopedic Surgery    Right knee pain, unspecified chronicity    Orders:    XR knee 3 vw right non injury; Future      Plan:  Cassia Turcios is a 74 y.o. female with knee contusion, patellofemoral osteoarthritis  Physical exam, diagnostic imaging, and subjective history are all most consistent with the above diagnosis. The pathoanatomy and natural history of this diagnosis was explained to the patient in detail.   X-ray obtained today and reviewed with the patient demonstrating no acute osseous abnormalities, mild patellofemoral osteoarthritis  She is already experiencing 80% symptomatic relief at this time, and no further intervention is indicated at this time  Cortisone injections discussed today but deferred today  Apply Voltaren gel to painful area up to 4 times a day  May use ice or heat as needed for pain relief  May take NSAIDs/Tylenol as needed for pain control   May follow up as needed for re-evalaution, CSI of knee and/or pes anserine    Subjective:    Patient ID:  Cassia Turcios 74 y.o. female    Cassia Turcios is a 74 y.o. female who presents today for evaluation and treatment of bilateral knee pain that began in a fall down the stairs on . She did present to the Urgent Care 5 days later on 25 for the right knee and went back again on 25 because left knee pain developed, unsure if this was compensatory or injured in the fall. She denies knee pain prior to this fall. She has been wearing an ACE bandage for three days and also elevated her legs. She notes her pain is approximately 80% improved since the fall  and she almost cancelled this appointment. She notes a history of Lupus with no joint pains. She has been taking Tylenol with relief. She notes occasional clicking that is not very painful or consistent. Her knees did not swell or bruise after her recent fall. She has been able to go leg press machine at the gym.       The following portions of the patient's history were reviewed and updated as appropriate: allergies, current medications, past family history, past social history, past surgical history and problem list.      Social History     Socioeconomic History    Marital status: /Civil Union     Spouse name: Not on file    Number of children: Not on file    Years of education: Not on file    Highest education level: Not on file   Occupational History    Not on file   Tobacco Use    Smoking status: Never    Smokeless tobacco: Never   Vaping Use    Vaping status: Never Used   Substance and Sexual Activity    Alcohol use: No    Drug use: No    Sexual activity: Not Currently     Partners: Male     Birth control/protection: Female Sterilization     Comment:    Other Topics Concern    Not on file   Social History Narrative    Not on file     Social Drivers of Health     Financial Resource Strain: Low Risk  (7/14/2023)    Overall Financial Resource Strain (CARDIA)     Difficulty of Paying Living Expenses: Not very hard   Food Insecurity: No Food Insecurity (10/25/2024)    Hunger Vital Sign     Worried About Running Out of Food in the Last Year: Never true     Ran Out of Food in the Last Year: Never true   Transportation Needs: No Transportation Needs (10/25/2024)    PRAPARE - Transportation     Lack of Transportation (Medical): No     Lack of Transportation (Non-Medical): No   Physical Activity: Not on file   Stress: Not on file   Social Connections: Not on file   Intimate Partner Violence: Not on file   Housing Stability: Low Risk  (10/25/2024)    Housing Stability Vital Sign     Unable to Pay for  Housing in the Last Year: No     Number of Times Moved in the Last Year: 0     Homeless in the Last Year: No     Past Medical History:   Diagnosis Date    Acute kidney injury superimposed on chronic kidney disease  (HCC) 09/20/2021    Age-related osteoporosis without current pathological fracture 06/01/2023    Allergic rhinitis     Anemia     Anxiety     Bipolar 1 disorder (HCC)     Constipation     Deaf     Pt lost all hearing in right ear after having English measles    Depression     Diarrhea     Disease of thyroid gland 01/012000    Fatigue     GERD (gastroesophageal reflux disease) 08/01/2020    Hard of hearing     Pt wears a hearing in left ear.    History of chemotherapy 09/19/2023    HL (hearing loss)     Hypertension 09/19/2023    My BP went up aftet taking BRukinsachemo meficine    Hypothyroidism     Lung nodule     Nasal congestion     Osteoporosis     Pneumonia 02/10/2017    Pneumonia due to Streptococcus (HCC) 09/19/2021    Psychiatric disorder     Recurrent UTI 06/05/2023    Sjogren's syndrome (HCC)     Small lymphocytic lymphoma (HCC) 09/18/2023    Systemic lupus erythematosus (HCC)     Urinary tract infection 01/01.2022    Every urine test powell out positive for UTI but i have no symptoms    Vasculitis (HCC)     Wears glasses     Wears partial dentures      Past Surgical History:   Procedure Laterality Date    BREAST BIOPSY Right     many years ago at [facility]    COLONOSCOPY      EYE SURGERY      FRACTURE SURGERY Right     elbow    GASTRIC BYPASS  05/12/2012    HYSTERECTOMY Bilateral 05/10/1975    IR BIOPSY LYMPH NODE  10/08/2020    LUNG BIOPSY      LYMPH NODE BIOPSY  09/18/2020    LYMPH NODE BIOPSY Left 08/28/2023    Procedure: EXCISION BIOPSY LYMPH NODE LEFT AXILLARY;  Surgeon: Scott Seaman MD;  Location: [facility];  Service: Thoracic    NV BRNCC INCL FLUOR GDNCE DX W/CELL WASHG SPX N/A 12/08/2017    Procedure: BRONCHOSCOPY;  Surgeon: Chepe Luna MD;  Location: [facility];   "Service: Pulmonary    AR BX/EXC LYMPH NODE NEEDLE SUPERFICIAL Left 02/25/2021    Procedure: EXCISION BIOPSY LYMPH NODE: left axillary lymph node biopsy;  Surgeon: Dakota Heard MD;  Location: [facility];  Service: Thoracic    TONSILLECTOMY       Allergies   Allergen Reactions    Ciprofloxacin Hives and Swelling     Pt reports that lips and mouth and face swelled.    Cymbalta [Duloxetine Hcl] Other (See Comments)     Hallucinations, fatigue, faints    Nirmatrelvir-Ritonavir Diarrhea, Nausea Only, Other (See Comments), Dizziness and Lightheadedness     \"passed out\"-nausea    Percocet [Oxycodone-Acetaminophen] Other (See Comments)     Dry mouth - pt states it kept her up all night. States had to continually drink fluids     Current Outpatient Medications on File Prior to Visit   Medication Sig Dispense Refill    buPROPion (WELLBUTRIN) 100 mg tablet Take 100 mg by mouth 3 (three) times a day.      Cholecalciferol (D3-50) 1.25 MG (73458 UT) capsule 5,000 Units daily (Patient not taking: Reported on 4/14/2025)      clobetasol (TEMOVATE) 0.05 % ointment Use twice a day. 60 g 2    clotrimazole-betamethasone (LOTRISONE) 1-0.05 % cream Apply topically 2 (two) times a day (Patient not taking: Reported on 4/11/2025) 45 g 0    ergocalciferol (VITAMIN D2) 50,000 units Take 1 capsule (50,000 Units total) by mouth once a week 24 capsule 0    ferrous sulfate 325 (65 Fe) mg tablet Take 1 tablet (325 mg total) by mouth 2 (two) times a day with meals 60 tablet 0    hydroxychloroquine (PLAQUENIL) 200 mg tablet Take 1 tablet (200 mg total) by mouth 2 (two) times a day with meals 180 tablet 1    levothyroxine (Synthroid) 25 mcg tablet Take 1 tablet (25 mcg total) by mouth daily 90 tablet 1    LORazepam (ATIVAN) 1 mg tablet Take 1 mg by mouth 2 (two) times a day As needed      Multiple Vitamins-Minerals (CENTRUM MINIS WOMEN 50+ PO)       mupirocin (BACTROBAN) 2 % ointment  (Patient not taking: Reported on 4/11/2025)      naltrexone " "(REVIA) 50 mg tablet Take 1 tablet (50 mg total) by mouth daily 30 tablet 2    olopatadine HCl (PATADAY) 0.2 % opth drops Administer 1 drop to both eyes daily 2.5 mL 1    pilocarpine (SALAGEN) 5 mg tablet Take 1 tablet (5 mg total) by mouth 2 (two) times a day 180 tablet 0    QUEtiapine (SEROquel) 300 mg tablet Take 300 mg by mouth daily at bedtime      QUEtiapine (SEROquel) 50 mg tablet 50 mg daily at bedtime      sodium chloride 1 g tablet Take 1 tablet (1 g total) by mouth in the morning 90 tablet 1    temazepam (RESTORIL) 30 mg capsule Take 2 capsules by mouth daily at bedtime as needed       triamcinolone (KENALOG) 0.1 % cream  (Patient not taking: Reported on 4/11/2025)      Zanubrutinib (Brukinsa) 80 MG CAPS Take 4 capsules (320 mg total) by mouth once daily. 120 capsule 10     Current Facility-Administered Medications on File Prior to Visit   Medication Dose Route Frequency Provider Last Rate Last Admin    cyanocobalamin injection 1,000 mcg  1,000 mcg Intramuscular Q30 Days Julio Bhat MD   1,000 mcg at 12/28/23 1242            Objective:    Review of Systems  Pertinent items are noted in HPI.  All other systems were reviewed and are negative.    Physical Exam  Cons: Appears well.  No apparent distress.  Psych: Alert. Oriented x3.  Mood and affect normal.  Eyes: PERRLA, EOMI  Resp: Normal effort.  No audible wheezing or stridor.  CV: Palpable pulse.  No discernable arrhythmia.  No LE edema.  Lymph:  No palpable cervical, axillary, or inguinal lymphadenopathy.  Skin: Warm.  No palpable masses.  No visible lesions.  Neuro: Normal muscle tone.  Normal and symmetric DTR's.    BMI:   Estimated body mass index is 31.74 kg/m² as calculated from the following:    Height as of this encounter: 5' 1\" (1.549 m).    Weight as of this encounter: 76.2 kg (168 lb).  No results found for: \"HGBA1C\"      Right Knee Exam     Tenderness   The patient is experiencing tenderness in the medial joint line and pes " "anserinus.    Range of Motion   Extension:  0   Flexion:  120     Other   Erythema: absent  Scars: absent  Sensation: normal  Pulse: present  Swelling: none  Effusion: no effusion present    Comments:    TTP proximal third of anterior tibia  Knee stable at 0, 30, 90 degrees  + Patellar grind  + peripatellar crepitation  Skin intact and well perfused  Sensation intact in DP/SP/[place]/Sa/T nerve distributions  2+ DP & PT pulses  Brisk capillary refill in all toes        Left Knee Exam     Tenderness   The patient is experiencing tenderness in the medial joint line and pes anserinus.    Range of Motion   Extension:  0   Flexion:  120     Other   Erythema: absent  Scars: absent  Sensation: normal  Pulse: present  Swelling: none  Effusion: no effusion present    Comments:    TTP proximal third of anterior tibia  Knee stable at 0, 30, 90 degrees  + Patellar grind  + peripatellar crepitation  Skin intact and well perfused  Sensation intact in DP/SP/[place]/Sa/T nerve distributions  2+ DP & PT pulses  Brisk capillary refill in all toes              Procedures  Procedures  No Procedures performed today    Diagnostics, reviewed and taken today if performed as documented:  I have personally reviewed pertinent films in PACS and my interpretation is no medial and lateral compartment, mild patellofemoral compartment osteoarthritis. No evidence of acute injury..        Scribe Attestation      I,:  Lorena Zhou am acting as a scribe while in the presence of the attending physician.:       I,:  Adarsh Padilla DO personally performed the services described in this documentation    as scribed in my presence.:             Portions of the record may have been created with voice recognition software.  Occasional wrong word or \"sound a like\" substitutions may have occurred due to the inherent limitations of voice recognition software.  Read the chart carefully and recognize, using context, where substitutions have occurred.  "

## 2025-05-16 ENCOUNTER — APPOINTMENT (OUTPATIENT)
Dept: LAB | Facility: CLINIC | Age: 75
End: 2025-05-16
Payer: COMMERCIAL

## 2025-05-16 ENCOUNTER — PATIENT MESSAGE (OUTPATIENT)
Dept: UROLOGY | Facility: CLINIC | Age: 75
End: 2025-05-16

## 2025-05-16 DIAGNOSIS — M32.9 SYSTEMIC LUPUS ERYTHEMATOSUS, UNSPECIFIED SLE TYPE, UNSPECIFIED ORGAN INVOLVEMENT STATUS (HCC): ICD-10-CM

## 2025-05-16 DIAGNOSIS — E55.9 VITAMIN D DEFICIENCY: ICD-10-CM

## 2025-05-16 DIAGNOSIS — R39.9 UTI SYMPTOMS: Primary | ICD-10-CM

## 2025-05-16 DIAGNOSIS — C91.10 CLL (CHRONIC LYMPHOCYTIC LEUKEMIA) (HCC): ICD-10-CM

## 2025-05-16 LAB
25(OH)D3 SERPL-MCNC: 29.1 NG/ML (ref 30–100)
ALBUMIN SERPL BCG-MCNC: 4 G/DL (ref 3.5–5)
ALP SERPL-CCNC: 113 U/L (ref 34–104)
ALT SERPL W P-5'-P-CCNC: 16 U/L (ref 7–52)
ANION GAP SERPL CALCULATED.3IONS-SCNC: 7 MMOL/L (ref 4–13)
AST SERPL W P-5'-P-CCNC: 28 U/L (ref 13–39)
BACTERIA UR QL AUTO: ABNORMAL /HPF
BASOPHILS # BLD AUTO: 0.07 THOUSANDS/ÂΜL (ref 0–0.1)
BASOPHILS NFR BLD AUTO: 1 % (ref 0–1)
BILIRUB SERPL-MCNC: 0.66 MG/DL (ref 0.2–1)
BILIRUB UR QL STRIP: NEGATIVE
BUN SERPL-MCNC: 17 MG/DL (ref 5–25)
C3 SERPL-MCNC: 115 MG/DL (ref 87–200)
C4 SERPL-MCNC: 17 MG/DL (ref 19–52)
CALCIUM SERPL-MCNC: 9.3 MG/DL (ref 8.4–10.2)
CHLORIDE SERPL-SCNC: 101 MMOL/L (ref 96–108)
CLARITY UR: ABNORMAL
CO2 SERPL-SCNC: 26 MMOL/L (ref 21–32)
COLOR UR: ABNORMAL
CREAT SERPL-MCNC: 0.96 MG/DL (ref 0.6–1.3)
CREAT UR-MCNC: 38.8 MG/DL
CRP SERPL QL: 6.1 MG/L
EOSINOPHIL # BLD AUTO: 0.09 THOUSAND/ÂΜL (ref 0–0.61)
EOSINOPHIL NFR BLD AUTO: 1 % (ref 0–6)
ERYTHROCYTE [DISTWIDTH] IN BLOOD BY AUTOMATED COUNT: 14.2 % (ref 11.6–15.1)
ERYTHROCYTE [SEDIMENTATION RATE] IN BLOOD: 89 MM/HOUR (ref 0–29)
GFR SERPL CREATININE-BSD FRML MDRD: 58 ML/MIN/1.73SQ M
GLUCOSE P FAST SERPL-MCNC: 75 MG/DL (ref 65–99)
GLUCOSE UR STRIP-MCNC: NEGATIVE MG/DL
HCT VFR BLD AUTO: 39.9 % (ref 34.8–46.1)
HGB BLD-MCNC: 13.3 G/DL (ref 11.5–15.4)
HGB UR QL STRIP.AUTO: NEGATIVE
IMM GRANULOCYTES # BLD AUTO: 0.03 THOUSAND/UL (ref 0–0.2)
IMM GRANULOCYTES NFR BLD AUTO: 1 % (ref 0–2)
KETONES UR STRIP-MCNC: NEGATIVE MG/DL
LEUKOCYTE ESTERASE UR QL STRIP: ABNORMAL
LYMPHOCYTES # BLD AUTO: 2.26 THOUSANDS/ÂΜL (ref 0.6–4.47)
LYMPHOCYTES NFR BLD AUTO: 36 % (ref 14–44)
MCH RBC QN AUTO: 31.7 PG (ref 26.8–34.3)
MCHC RBC AUTO-ENTMCNC: 33.3 G/DL (ref 31.4–37.4)
MCV RBC AUTO: 95 FL (ref 82–98)
MONOCYTES # BLD AUTO: 0.66 THOUSAND/ÂΜL (ref 0.17–1.22)
MONOCYTES NFR BLD AUTO: 10 % (ref 4–12)
NEUTROPHILS # BLD AUTO: 3.24 THOUSANDS/ÂΜL (ref 1.85–7.62)
NEUTS SEG NFR BLD AUTO: 51 % (ref 43–75)
NITRITE UR QL STRIP: NEGATIVE
NON-SQ EPI CELLS URNS QL MICRO: ABNORMAL /HPF
NRBC BLD AUTO-RTO: 0 /100 WBCS
PH UR STRIP.AUTO: 6.5 [PH]
PLATELET # BLD AUTO: 337 THOUSANDS/UL (ref 149–390)
PMV BLD AUTO: 10.8 FL (ref 8.9–12.7)
POTASSIUM SERPL-SCNC: 4.2 MMOL/L (ref 3.5–5.3)
PROT SERPL-MCNC: 9.1 G/DL (ref 6.4–8.4)
PROT UR STRIP-MCNC: NEGATIVE MG/DL
PROT UR-MCNC: 6.9 MG/DL
PROT/CREAT UR: 0.2 MG/G{CREAT}
RBC # BLD AUTO: 4.19 MILLION/UL (ref 3.81–5.12)
RBC #/AREA URNS AUTO: ABNORMAL /HPF
SODIUM SERPL-SCNC: 134 MMOL/L (ref 135–147)
SP GR UR STRIP.AUTO: 1.01 (ref 1–1.03)
UROBILINOGEN UR STRIP-ACNC: <2 MG/DL
WBC # BLD AUTO: 6.35 THOUSAND/UL (ref 4.31–10.16)
WBC #/AREA URNS AUTO: ABNORMAL /HPF

## 2025-05-16 PROCEDURE — 85652 RBC SED RATE AUTOMATED: CPT

## 2025-05-16 PROCEDURE — 86160 COMPLEMENT ANTIGEN: CPT

## 2025-05-16 PROCEDURE — 85025 COMPLETE CBC W/AUTO DIFF WBC: CPT

## 2025-05-16 PROCEDURE — 86225 DNA ANTIBODY NATIVE: CPT

## 2025-05-16 PROCEDURE — 81001 URINALYSIS AUTO W/SCOPE: CPT

## 2025-05-16 PROCEDURE — 82570 ASSAY OF URINE CREATININE: CPT

## 2025-05-16 PROCEDURE — 82306 VITAMIN D 25 HYDROXY: CPT

## 2025-05-16 PROCEDURE — 84156 ASSAY OF PROTEIN URINE: CPT

## 2025-05-16 PROCEDURE — 86140 C-REACTIVE PROTEIN: CPT

## 2025-05-16 PROCEDURE — 36415 COLL VENOUS BLD VENIPUNCTURE: CPT

## 2025-05-16 PROCEDURE — 80053 COMPREHEN METABOLIC PANEL: CPT

## 2025-05-18 LAB — DSDNA IGG SERPL IA-ACNC: 2.8 IU/ML (ref ?–15)

## 2025-05-21 ENCOUNTER — OFFICE VISIT (OUTPATIENT)
Dept: RHEUMATOLOGY | Facility: CLINIC | Age: 75
End: 2025-05-21

## 2025-05-21 VITALS
HEIGHT: 61 IN | HEART RATE: 81 BPM | SYSTOLIC BLOOD PRESSURE: 122 MMHG | DIASTOLIC BLOOD PRESSURE: 80 MMHG | BODY MASS INDEX: 31.42 KG/M2 | WEIGHT: 166.4 LBS | TEMPERATURE: 97.6 F | OXYGEN SATURATION: 97 %

## 2025-05-21 DIAGNOSIS — J84.2 LYMPHOCYTIC INTERSTITIAL PNEUMONIA (HCC): ICD-10-CM

## 2025-05-21 DIAGNOSIS — C91.10 CLL (CHRONIC LYMPHOCYTIC LEUKEMIA) (HCC): ICD-10-CM

## 2025-05-21 DIAGNOSIS — M32.9 SYSTEMIC LUPUS ERYTHEMATOSUS, UNSPECIFIED SLE TYPE, UNSPECIFIED ORGAN INVOLVEMENT STATUS (HCC): Primary | ICD-10-CM

## 2025-05-21 DIAGNOSIS — Z79.83 LONG TERM USE OF BISPHOSPHONATES: ICD-10-CM

## 2025-05-21 DIAGNOSIS — M35.00 SJOGREN'S SYNDROME, WITH UNSPECIFIED ORGAN INVOLVEMENT (HCC): ICD-10-CM

## 2025-05-21 DIAGNOSIS — M81.0 AGE-RELATED OSTEOPOROSIS WITHOUT CURRENT PATHOLOGICAL FRACTURE: ICD-10-CM

## 2025-05-21 DIAGNOSIS — I77.6 VASCULITIS (HCC): ICD-10-CM

## 2025-05-21 DIAGNOSIS — Z79.899 LONG-TERM USE OF PLAQUENIL: ICD-10-CM

## 2025-05-21 DIAGNOSIS — M17.0 BILATERAL PRIMARY OSTEOARTHRITIS OF KNEE: ICD-10-CM

## 2025-05-21 NOTE — ASSESSMENT & PLAN NOTE
- She was started on IV zoledronic acid infusions on 7/7/2023.  She will continue this on an annual basis for a total of 3-6 doses.  Oral bisphosphonates will be avoided given a history of GERD and postgastrectomy malabsorption.  She should continue with daily calcium and vitamin D supplements and attempt weightbearing exercises.  A DEXA scan is due to be updated.

## 2025-05-21 NOTE — ASSESSMENT & PLAN NOTE
Orders:    CBC and differential; Future    Comprehensive metabolic panel; Future    C-reactive protein; Future    Sedimentation rate, automated; Future    C4 complement; Future    C3 complement; Future    Urinalysis with microscopic; Future    Protein / creatinine ratio, urine; Future    Anti-DNA antibody, double-stranded; Future    IgG, IgA, IgM; Future    Protein electrophoresis, serum; Future    Cryoglobulin; Future

## 2025-05-21 NOTE — ASSESSMENT & PLAN NOTE
Ms. Turcios is a 75-year-old female with history significant for systemic lupus erythematosus and Sjogren's syndrome diagnosed in her early 40s by Rheumatology in [place] who presents for a follow-up.  She is currently on pilocarpine 5 mg twice daily and hydroxychloroquine 200 mg twice daily.     # SLE and Sjogren's syndrome  # Sjogren's related ILD  - Cassia presents today for a follow-up of systemic lupus erythematosus and Sjogren's syndrome which has primarily presented with chronic fatigue, sicca complaints, recurrent lower extremity skin rash and likely the Sjogren's syndrome related interstitial lung disease.  She has been maintained on hydroxychloroquine 200 mg twice daily and will continue regular follow ups with Ophthalmology for visual field testing.  She will also continue the pilocarpine 5 mg twice daily for the progressively worsening dry eyes/mouth and follow up regularly with her ophthalmologist and dentist for local measures.       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  As she has not experienced progressive respiratory complaints we discussed holding off on any specific treatment for the interstitial lung disease.     - In regards to the prior, recurrent lower extremity skin rash which may be hypergammaglobulinemic purpura related to the Sjogren's syndrome she was advised by Dermatology that this can be a benign skin rash and does not need to be treated.  We will monitor for now as the rash usually resolves spontaneously and has not occurred recently.    Orders:    CBC and differential; Future    Comprehensive metabolic panel; Future    C-reactive protein; Future    Sedimentation rate, automated; Future    C4  complement; Future    C3 complement; Future    Urinalysis with microscopic; Future    Protein / creatinine ratio, urine; Future    Anti-DNA antibody, double-stranded; Future    IgG, IgA, IgM; Future    Protein electrophoresis, serum; Future    Cryoglobulin; Future

## 2025-05-21 NOTE — PROGRESS NOTES
Name: Cassia Turcios      : 1950      MRN: [id number]  Encounter Provider: Melanie Chavez MD  Encounter Date: 2025   Encounter department: [facility] RHEUMATOLOGY [facility]  :  Assessment & Plan  Systemic lupus erythematosus, unspecified SLE type, unspecified organ involvement status (HCC)  Ms. Turcios is a 75-year-old female with history significant for systemic lupus erythematosus and Sjogren's syndrome diagnosed in her early 40s by Rheumatology in [place] who presents for a follow-up.  She is currently on pilocarpine 5 mg twice daily and hydroxychloroquine 200 mg twice daily.     # SLE and Sjogren's syndrome  # Sjogren's related ILD  - Cassia presents today for a follow-up of systemic lupus erythematosus and Sjogren's syndrome which has primarily presented with chronic fatigue, sicca complaints, recurrent lower extremity skin rash and likely the Sjogren's syndrome related interstitial lung disease.  She has been maintained on hydroxychloroquine 200 mg twice daily and will continue regular follow ups with Ophthalmology for visual field testing.  She will also continue the pilocarpine 5 mg twice daily for the progressively worsening dry eyes/mouth and follow up regularly with her ophthalmologist and dentist for local measures.       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  As she has not experienced progressive respiratory complaints we discussed holding off on any specific treatment for the interstitial lung disease.     - In regards to the prior, recurrent lower extremity skin rash which may be hypergammaglobulinemic purpura related to the Sjogren's syndrome she was advised by Dermatology that this can be  a benign skin rash and does not need to be treated.  We will monitor for now as the rash usually resolves spontaneously and has not occurred recently.    Orders:    CBC and differential; Future    Comprehensive metabolic panel; Future    C-reactive protein; Future    Sedimentation rate, automated; Future    C4 complement; Future    C3 complement; Future    Urinalysis with microscopic; Future    Protein / creatinine ratio, urine; Future    Anti-DNA antibody, double-stranded; Future    IgG, IgA, IgM; Future    Protein electrophoresis, serum; Future    Cryoglobulin; Future    Sjogren's syndrome, with unspecified organ involvement (HCC)    Orders:    CBC and differential; Future    Comprehensive metabolic panel; Future    C-reactive protein; Future    Sedimentation rate, automated; Future    C4 complement; Future    C3 complement; Future    Urinalysis with microscopic; Future    Protein / creatinine ratio, urine; Future    Anti-DNA antibody, double-stranded; Future    IgG, IgA, IgM; Future    Protein electrophoresis, serum; Future    Cryoglobulin; Future    Lymphocytic interstitial pneumonia (HCC)         Vasculitis (HCC)  - This has reassuringly not occurred recently but there was a prior consideration for IgA vasculitis or possibly secondary to the underlying Sjogren's syndrome.  As her symptoms are very limited she discussed with dermatology managing them as it arises and utilizing topical steroids as needed.          Long-term use of Plaquenil         CLL (chronic lymphocytic leukemia) (HCC)         Bilateral primary osteoarthritis of knee         Age-related osteoporosis without current pathological fracture  - She was started on IV zoledronic acid infusions on 7/7/2023.  She will continue this on an annual basis for a total of 3-6 doses.  Oral bisphosphonates will be avoided given a history of GERD and postgastrectomy malabsorption.  She should continue with daily calcium and vitamin D supplements and attempt  weightbearing exercises.  A DEXA scan is due to be updated.         Long term use of bisphosphonates           Patient's rheumatologic disease(s) threaten long-term function if not appropriately managed.      History of Present Illness   HPI    INITIAL VISIT NOTE (10/2020):  Ms. Turcios is a 70-year-old female with history significant for systemic lupus erythematosus and Sjogren's syndrome diagnosed in her early 40s by Rheumatology in [place], who presents to reestablish with [facility].  She is currently not on DMARDs.  She is referred today by Dr. Bhat for a rheumatology consult.     Patient reports in her early 40s she started to develop various joint pains which eventually led to the diagnosis of systemic lupus erythematosus and Sjogren's syndrome.  She reports surrounding the time of her diagnosis is when the joint pains were most prevalent, but appear to have spontaneously resolved.  She did experience joint pains again a few years later but states that this also resolved and recently she has not had any joint related issues.  At the time of her diagnosis it appears like she was treated with steroids as well as hydroxychloroquine.  She was on the hydroxychloroquine for less than a year at which time it was discontinued due to nausea and other side effects which she cannot recall.  She has never been restarted on the hydroxychloroquine following that.     The timeline of her rheumatology visits is unclear as we do not have her complete prior records.  Based on chart review as well as patient's history, she has been on multiple DMARDs including methotrexate, azathioprine and Benlysta.  She states that the methotrexate and azathioprine were prescribed in the past 10-15 years.  It is unclear what these medications for specifically prescribed for and if they helped with any of her symptoms.  Most recently she was on Benlysta approximately 3 years ago when she was being seen by Dr. Liu.  She  states that this helped significantly with how she was feeling overall and her general well-being, as well as significantly helped with the fatigue.  She was on this for about 7-8 months and unfortunately it was discontinued when she turned 67 as her insurance no longer covered it.  I do not see any of her lupus related labs in our system, but on review of her prior rheumatologist's note from 2015 she appeared to have a positive MALATHI at 1:1280, with positive SSA, SSB and RNP antibodies.  A rheumatoid factor was also elevated at 105 with an ESR of 84.  Her antiphospholipid antibodies were negative.       She states over the years it has primarily been the excessive fatigue that has bothered her and only recently has she been dealing with chronic respiratory symptoms as well as intrathoracic and abdominal lymphadenopathy that has been monitored by Oncology.  In terms of her respiratory symptoms she reports shortness of breath with exertion, usually with walking about a block, but at times she may experience shortness of breath with day-to-day activities.  She also has a chronic productive cough.  On review of her CT chest and abdomen as far back as 2017, she has had abnormal findings concerning for interstitial lung disease as well as the extensive lymphadenopathy.  On her most recent CT chest from September 2020 this showed asymmetric bilateral areas of ground-glass opacities and lung cysts likely representing a spectrum of lymphocytic interstitial pneumonia as well as worsening adenopathy in the mediastinum and bilateral hilar regions.  She did undergo an IR guided biopsy of an axillary lymph node on 10/08/2020 which did not show any evidence of a lymphoproliferative disorder.  The biopsy was not diagnostic.  On her most recent follow-up with Hematology/Oncology, as there are no clear features to suggest an underlying malignancy the plan will be to continue monitoring.  She has not seen pulmonology yet and is  "actually establishing for her initial appointment tomorrow.  She did undergo a bronchoscopy in December 2017 with cytology negative for malignancy and showing an abundant mixed but predominantly chronic inflammatory infiltrate.  Flow cytometry as well as cultures at that time were unrevealing.     As mentioned, she is primarily dealing with the abnormal CT chest and abdomen findings at this time.  Symptom wise she overall appears to be stable.  She does report chronic dry eyes, dry nose and dry mouth which she manages with topical lubricants.  Due to the dry nose as well as recent face masking she has been noticing nose bleeds and feels like there may be a \"hole\" in her nasal septum.  She has not yet established with ENT.  On occasion she may notice a \"butterfly rash\" and states that she is photosensitive but strictly avoids sun exposure and utilizes daily sunscreen.  She has also been experiencing blister-like skin lesions scattered on her extremities, which heal spontaneously.  She has not had any mouth ulcerations in years.  She denies fevers, chills, night sweats, unintentional weight loss, focal alopecia, inflammatory eye disease, psoriasis, swollen glands, pleuritic chest pain, hemoptysis, abdominal pain, vomiting, diarrhea, blood in stools (is up-to-date with a screening colonoscopy), blood clots, miscarriages, Raynaud's, joint pain/swelling/stiffness, renal disease, neurological disease/seizures or family history of autoimmune disease.        2/10/2021:  Patient presents for a follow-up of systemic lupus erythematosus and Sjogren's syndrome.  She is currently on hydroxychloroquine 200 mg twice daily that was started at the last office visit in October 2020.  We reviewed her testing done following the last visit which showed a positive MALATHI 1:1280 speckled pattern with an SSA and SSB antibody greater than 8.  An RNP antibody was 6.8.  A rheumatoid factor was elevated at 80.  An SPEP showed a possible " monoclonal gammopathy but this was not clearly determined on serum immunofixation.  An immunoglobulin assay showed hypergammaglobulinemia.  A peripheral flow cytometry showed 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.  A vitamin-D level was low at 13.5.  An ESR and CRP were elevated at 130 and 5.4, respectively.  The remainder of the MALATHI specificity, C3, C4, antiphospholipid antibody testing, hepatitis panel, HIV, urinalysis, urine protein creatinine ratio, angiotensin-converting enzyme, anti neutrophilic cytoplasmic antibody, CK and anti CCP antibody were unremarkable.  She had a CT chest done yesterday with the results still pending.  She is going to be establishing with thoracic surgery soon to consider the mediastinal lymphadenopathy biopsy.     She reports overall she has been stable in terms of her symptoms.  She mostly describes the dry eyes worse on the right side and unfortunately the Restasis has not been approved by her insurance.  She also reports dry nose and dry mouth.  She will have nose bleeds due to the dryness.  She reports chronic fatigue without any improvement seen with starting the hydroxychloroquine.  She also reports within the past year she has had approximately 4 episodes of a pinpoint, red and itchy skin rash arise on her bilateral lower extremities.  At times she may use topical steroids but has never been prescribed oral steroids for this.  The rash will usually resolve spontaneously within 3-4 days.  She has not been seen by Dermatology for this.  She was recently seen in the emergency room for an occurrence of the skin rash after receiving the COVID vaccine without any specific treatment and states that the rash eventually resolved spontaneously.  There is no rash today.  No other complaints that she describes today.        6/8/2021:  Patient presents for a follow-up of systemic lupus erythematosus and Sjogren's syndrome.  She temporarily suspended the hydroxychloroquine as she  thought that this was causing joint pains in her knees and ankles which did subside with holding the hydroxychloroquine.  I did review her labs done following the last office visit which showed an unremarkable CBC, CMP, C3 and C4.  The ESR and CRP were elevated at 81 and 4.2, respectively.  Since the last office visit she also underwent a left axillary lymph node biopsy which showed an atypical lymphoproliferative disorder.  She is followed up with Hematology/Oncology without any concerns for an underlying malignant process and the plan is to continue monitoring for now.     She continues to experience an intermittent skin rash affecting her bilateral lower extremities which she thought may be related to an allergic reaction from dog fur.  She states that this will happen irrespective of pet exposure.  The rash is usually itchy and at times she may use topical hydrocortisone to help with her symptoms but has not been on oral steroids.  She mentions that the rash will appear and remit spontaneously.  She continues to report the dry eyes and dry mouth which she is able to manage with the pilocarpine.  Other than this no complaints today.        9/8/2021:  Patient presents for a follow-up of systemic lupus erythematosus and Sjogren's syndrome.  She is currently on hydroxychloroquine 200 mg twice daily that she restarted in July.  I reviewed her recently done labs which showed an elevated ESR and CRP of 76 and 6.1, respectively.  A urine protein creatinine ratio was stable at 0.24.  A urinalysis showed concerns for a urinary tract infection and she is following up with her primary care physician for this.  A CBC, CMP, C3, C4 and double-stranded DNA antibody were unremarkable.     She reports overall she has been stable from the last office visit and not reporting any new complaints.  She does continue to feel fatigued and this is her main symptom.  No flare-ups of joint pains, swelling or stiffness.  She reports that  the rash on her legs has intermittently appeared and she is scheduled to see Dermatology.     She does follow with Ophthalmology routinely as she is on the hydroxychloroquine.  She is managing the dry eyes and dry mouth with pilocarpine 5 mg 3 times daily and states that this helps.        4/20/2022:  Patient presents for a follow-up of systemic lupus erythematosus and Sjogren's syndrome.  She is currently on hydroxychloroquine 200 mg twice daily.  I reviewed her recently done labs which showed an elevated ESR and CRP of 105 and 3.9, respectively.  A urine protein creatinine ratio was stable at 0.22.  A urinalysis showed concerns for a urinary tract infection and she is following up with her primary care physician for this.  A CBC, CMP, C3, C4 and double-stranded DNA antibody were unremarkable.     She did also see Dermatology due to the recurrent skin rash on her lower extremities and had a skin biopsy done on 09/21 which showed:  Perivascular mixed inflammatory infiltrate with numerous neutrophils, scattered eosinophils, and focal erythrocyte extravasation (see note).     Note: The histopathologic findings are non-specific. Definite vasculitis is not seen (early or resolving vasculitis cannot be fully excluded; correlation with immuofluorescence studies is advised). In the appropriate clinical context, the histopathologic findings may be compatible with hypergammaglobulinemic purpura of [place]denstrom, though the histopathologic differential diagnosis includes a hypersensitivity reaction (e.g., to a drug). Clinical pathological correlation is essential. Pathogenic microorganisms are not seen on PAS stain. Multiple levels examined.     Direct immunofluorescence showed weak epidermal particulate staining seen with IgG as can be seen in subacute lupus erythematosus, Sjogren's or mixed connective tissue disease but the overall granular immune deposits at the basement membrane zone are insufficient for a diagnosis of  lupus.  The overall vascular staining is weak and indeterminate for vasculitis.  Nonspecific vascular standing can occur at anatomically dependent areas.     She was advised by Dermatology that this could be a benign skin rash related to the Sjogren's syndrome and as it occurs and remits spontaneously no treatment would be required.     She was also hospitalized with sepsis and pneumonia in September 2021.     She reports over the past few weeks she has noticed more prominent joint pains.  She takes Tylenol once a day which does help her but the effect is not long lasting.     She does follow with Ophthalmology routinely as she is on the hydroxychloroquine.  She reports the pilocarpine 5 mg 3 times daily did help with the dryness initially but is no longer effective.  She is using Systane eye drops and Biotene products but is still experiencing significant dryness issues.     No fevers, unintentional weight loss, ongoing skin rashes, mouth/nose ulcers, swollen glands or pleuritic chest pain.        10/26/2022:  Patient presents for a follow-up of systemic lupus erythematosus and Sjogren's syndrome.  She is currently on hydroxychloroquine 200 mg twice daily.  She is also on prednisone 10 mg once daily as prescribed by pulmonology.  I reviewed her recent labs which showed an elevated ESR of 89.  A C-reactive protein was minimally elevated at 4.3.  A CBC, CMP, C3, C4, double-stranded DNA antibody and urine protein creatinine ratio were unremarkable.       After the last office visit we did try to start her on Benlysta but due to the high cost this was not done.  She will be looking into this again.  She reports that the fatigue continues.  No worsening joint issues and this is manageable in her hands and knees.  Her only new issue is that she was diagnosed with COVID-19 infection on 10/15 but seems to have recovered well.  She was started on Paxlovid but on day 3 developed an allergic reaction so this was discontinued.   She also follows with pulmonology and was started on prednisone 10 mg once daily due to the chronic cough she was experiencing.  This has helped.  She sees Hematology/Oncology annually for monitoring of the atypical lymphoproliferative disorder and the plan is for continued monitoring.     At the last office visit I also discontinued the pilocarpine and started her on Evoxac which she states has helped.  She is up-to-date with her annual eye exams since she is on the hydroxychloroquine.        5/31/2023:  Patient presents for a follow-up of systemic lupus erythematosus and Sjogren's syndrome.  She is currently on hydroxychloroquine 200 mg twice daily.  I reviewed her recent labs which showed an elevated ESR of 72.  An SPEP showed the possibility of a monoclonal protein so it was sent to [facility] for further evaluation.  An immunoglobulin assay showed hypergammaglobulinemia.  A CBC, CMP, C3, C4, double-stranded DNA antibody and urine protein creatinine ratio were unremarkable.  She also underwent a DEXA scan in March 2023 which showed osteoporosis.  Compared to March 2020 there was a statistically significant decrease in the bone mineral density of the left total hip.  The 10-year risk of hip fracture was 6.4% with a 10-year risk of major osteoporotic fracture of 28%.     She mentions since the last visit she has experienced additional joint pains in her hands, wrists and knees.  Her symptoms are intermittent and she is able to manage them more or less with Tylenol as needed.  She continues to report chronic dry eyes and dry mouth.  She was started on Restasis by her ophthalmologist.  She had been taking the Evoxac 30 mg 3 times daily up until the last visit but she is unsure why recently she has not been taking this.     She is up-to-date with her annual eye exams since she is on the hydroxychloroquine.        3/6/2024:  Patient presents for a follow-up of systemic lupus erythematosus and Sjogren's syndrome.  She  is currently on hydroxychloroquine 200 mg twice daily and pilocarpine 5 mg twice daily.  I reviewed her recent labs which continues to show an elevated ESR of 77.  A C4 complement was slightly reduced at 16.  And immunoglobulin assay showed hypergammaglobulinemia.  A CMP, SPEP, cryoglobulins, C3, C-reactive protein and CBC were unremarkable.     In terms of the Sjogren's syndrome she reports symptoms primarily pertaining to left eye dryness.  The pilocarpine has not helped with this.  The Restasis was discontinued and she was advised to use over-the-counter refresh eyedrops, a compounding eyedrop through her ophthalmologist as well as an ointment to help with the symptoms.  There may be plans in the future to retry intraocular amniotic infusions.  Otherwise she does not report symptoms such as fevers, unintentional weight loss [other than related to starting Brukinsa and this has also improved], skin rash, pleuritic chest pain or progressive joint pains/swelling/stiffness.  She is up-to-date with her annual eye exam since she is on the hydroxychloroquine.     In the interim she was found to have an enlarging left axillary lymph node so she underwent a biopsy of this which showed CLL/SLL.  In view of this she was started on Brukinsa which she initially had difficulty tolerating but reports she is no longer experiencing any prominent side effects.        10/16/2024:  Patient presents for a follow-up of systemic lupus erythematosus and Sjogren's syndrome.  She self discontinued the hydroxychloroquine at the end of July as she thought this was contributing to dry eyes.  I reviewed her recent testing which shows an elevated ESR of 107 with a C-reactive protein of 5.3.  A C4 complement was slightly reduced at 18.  A C3, antineutrophil cytoplasmic antibodies, cryoglobulin, antiphospholipid antibodies, double-stranded DNA antibody and CK were unremarkable.     She contacted me recently as she was at the dermatologist's  office for her 's care and after Dr. Wayne visualized Cassia to have various skin rashes he scheduled her for an appointment and proceeded with a skin biopsy from the left forearm which showed:     Superficial to deep perivascular/periadnexal/interstitial mixed inflammatory infiltrate with numerous neutrophils, scattered eosinophils, and focal vascular damage with fibrin deposition and hemorrhage (see note).     Note: In the appropriate clinical context, the histopathologic findings are compatible with early/evolving vasculitis, including hypersensitivity reaction-associated vasculitis (e.g., due to a drug, other). Clinical pathologic correlation is advised. Pathogenic microorganisms are not seen with PAS, Gram, and AFB stains. CD3, CD20, CD30, , , and CD68 immunostains were reviewed; findings diagnostic of a hematologic malignancy are not appreciated. Multiple levels examined. If the rash were to progress/persist despite therapy, additional sampling should be sought to exclude development of a more significant disease.     She has not been started on any specific treatment for this.  She reports the rash has been ongoing for the past 6 weeks and presents as superficial ulcers/blister type lesions scattered on her arms, leg, forehead and a few lesions on her chest.  It is itchy.  They have not really been healing spontaneously.  She was also advised by hematology that a skin rash may occur as a result of Brukinsa use and she has a follow-up scheduled with hematology in November.      1/29/2025:  Patient presents for a follow-up of systemic lupus erythematosus and Sjogren's syndrome.  She is on hydroxychloroquine 200 mg twice daily that she restarted after our last visit.  She is also on pilocarpine 5 mg twice daily.  I reviewed her recent labs which shows a stable but elevated ESR and CRP of 70 and 6, respectively.  A C4 was slightly reduced at 17.  The urine analysis suggested a UTI but she  did follow-up with urology due to persistent such findings and was advised as she is asymptomatic this will not be managed as a UTI and the plan is to continue monitoring.  A CBC, CMP, C3, anti-double-stranded DNA antibody and urine protein creatinine ratio were unremarkable.    She mentions she has actually been feeling quite well and does not report any concerning symptoms today.  The skin rash has not recurred and if she notices small lesions she will use the topical steroids prescribed by dermatology which helps.  She continues to have dry eyes and will be seeing a new ophthalmologist to discuss any treatment options.  The pilocarpine does help with the dry mouth.    She will confirm with ophthalmology that she is up-to-date with her hydroxychloroquine eye exams.      5/21/2025:  Patient presents for a follow-up of systemic lupus erythematosus and Sjogren's syndrome.  She is on hydroxychloroquine 200 mg twice daily.  She is also on pilocarpine 5 mg twice daily.  I reviewed her recent labs which shows a stable but elevated ESR and CRP of 89 and 6.1, respectively.  A C4 was slightly reduced at 17.  A CBC, CMP, C3, anti-double-stranded DNA antibody and urine protein creatinine ratio were unremarkable.    She mentions she has actually been feeling quite well and does not report any concerning symptoms today.  The skin rash has not recurred and if she notices small lesions she will use the topical steroids prescribed by dermatology which helps.  She continues to have dry eyes and will be seeing [facility]'s ophthalmologist to discuss any treatment options.  The pilocarpine does help with the dry mouth.      Review of Systems  Constitutional: Negative for fevers, chills, night sweats.  Positive for intermittent fatigue.  ENT/Mouth: Negative for hearing changes, ear pain, nasal congestion, sinus pain, hoarseness, sore throat, rhinorrhea, swallowing difficulty.   Eyes: Negative for pain, redness, discharge, vision  changes.   Cardiovascular: Negative for chest pain, SOB, palpitations.   Respiratory: Negative for cough, sputum, wheezing, dyspnea.   Gastrointestinal: Negative for nausea, vomiting, diarrhea, constipation, pain, heartburn.  Genitourinary: Negative for dysuria, urinary frequency, hematuria.   Musculoskeletal: As per HPI.  Skin: Negative for color changes.    Neuro: Negative for weakness, numbness, tingling, loss of consciousness.   Psych: Negative for anxiety, depression.   Heme/Lymph: Negative for easy bruising, bleeding, lymphadenopathy.      Past Medical History   Past Medical History:   Diagnosis Date    Acute kidney injury superimposed on chronic kidney disease  (HCC) 09/20/2021    Age-related osteoporosis without current pathological fracture 06/01/2023    Allergic rhinitis     Anemia     Anxiety     Bipolar 1 disorder ([facility])     Constipation     Deaf     Pt lost all hearing in right ear after having [place] measles    Depression     Diarrhea     Disease of thyroid gland 01/012000    Fatigue     GERD (gastroesophageal reflux disease) 08/01/2020    Hard of hearing     Pt wears a hearing in left ear.    History of chemotherapy 09/19/2023    HL (hearing loss)     Hypertension 09/19/2023    My BP went up aftet taking BRukinsachemo meficine    Hypothyroidism     Lung nodule     Nasal congestion     Osteoporosis     Pneumonia 02/10/2017    Pneumonia due to Streptococcus ([facility]) 09/19/2021    Psychiatric disorder     Recurrent UTI 06/05/2023    Sjogren's syndrome ([facility])     Small lymphocytic lymphoma ([facility]) 09/18/2023    Systemic lupus erythematosus ([facility])     Urinary tract infection 01/01.2022    Every urine test powell out positive for UTI but i have no symptoms    Vasculitis ([facility])     Wears glasses     Wears partial dentures      Past Surgical History:   Procedure Laterality Date    BREAST BIOPSY Right     many years ago at [facility]    COLONOSCOPY      EYE SURGERY      FRACTURE SURGERY Right     elbow    GASTRIC  BYPASS  05/12/2012    HYSTERECTOMY Bilateral 05/10/1975    IR BIOPSY LYMPH NODE  10/08/2020    LUNG BIOPSY      LYMPH NODE BIOPSY  09/18/2020    LYMPH NODE BIOPSY Left 08/28/2023    Procedure: EXCISION BIOPSY LYMPH NODE LEFT AXILLARY;  Surgeon: Scott Seaman MD;  Location: [facility];  Service: Thoracic    IA [facility] INCL FLUOR GDNCE DX W/CELL WASHG SPX N/A 12/08/2017    Procedure: BRONCHOSCOPY;  Surgeon: Chepe Luna MD;  Location: [facility];  Service: Pulmonary    IA BX/EXC LYMPH NODE NEEDLE SUPERFICIAL Left 02/25/2021    Procedure: EXCISION BIOPSY LYMPH NODE: left axillary lymph node biopsy;  Surgeon: Dakota Heard MD;  Location: [facility];  Service: Thoracic    TONSILLECTOMY       Family History   Problem Relation Name Age of Onset    Heart disease Mother Tracie Gramajoreau     Diabetes Mother Tracie BeardMarleen     Kidney cancer Mother Tracie Hawley     Cancer Mother Tracie Hawley         Kidney    Hypertension Mother Tarcie Hawley     Heart disease Father Alex Cummings     Stroke Father Alex Cummings     Diabetes Father Alex Cummings     Cancer Father Alex Cummings         Rectal Cancer    Hypertension Father Alex Cummings     No Known Problems Maternal Grandmother      No Known Problems Maternal Grandfather      No Known Problems Paternal Grandmother      No Known Problems Paternal Grandfather      Leukemia Half-Sister  50    No Known Problems Half-Sister      No Known Problems Half-Sister      No Known Problems Half-Sister      BRCA2 Positive Neg Hx      BRCA2 Negative Neg Hx      BRCA1 Negative Neg Hx      Endometrial cancer Neg Hx      Breast cancer Neg Hx      Breast cancer additional onset Neg Hx      Colon cancer Neg Hx      Ovarian cancer Neg Hx      BRCA1 Positive Neg Hx      BRCA 1/2 Neg Hx        reports that she has never smoked. She has never used smokeless tobacco. She reports that she does not drink alcohol and does not use drugs.  Current Outpatient Medications on File  Prior to Visit   Medication Sig Dispense Refill    buPROPion (WELLBUTRIN) 100 mg tablet Take 100 mg by mouth in the morning and 100 mg in the evening and 100 mg before bedtime.      ergocalciferol (VITAMIN D2) 50,000 units Take 1 capsule (50,000 Units total) by mouth once a week 24 capsule 0    ferrous sulfate 325 (65 Fe) mg tablet Take 1 tablet (325 mg total) by mouth 2 (two) times a day with meals 60 tablet 0    hydroxychloroquine (PLAQUENIL) 200 mg tablet Take 1 tablet (200 mg total) by mouth 2 (two) times a day with meals 180 tablet 1    levothyroxine (Synthroid) 25 mcg tablet Take 1 tablet (25 mcg total) by mouth daily 90 tablet 1    LORazepam (ATIVAN) 1 mg tablet Take 1 mg by mouth in the morning and 1 mg in the evening. As needed.      Multiple Vitamins-Minerals (CENTRUM MINIS WOMEN 50+ PO)       naltrexone (REVIA) 50 mg tablet Take 1 tablet (50 mg total) by mouth daily 30 tablet 2    olopatadine HCl (PATADAY) 0.2 % opth drops Administer 1 drop to both eyes daily 2.5 mL 1    pilocarpine (SALAGEN) 5 mg tablet Take 1 tablet (5 mg total) by mouth 2 (two) times a day 180 tablet 0    QUEtiapine (SEROquel) 300 mg tablet Take 300 mg by mouth daily at bedtime      QUEtiapine (SEROquel) 50 mg tablet 50 mg daily at bedtime      temazepam (RESTORIL) 30 mg capsule Take 2 capsules by mouth daily at bedtime as needed      Zanubrutinib (Brukinsa) 80 MG CAPS Take 4 capsules (320 mg total) by mouth once daily. 120 capsule 10    Cholecalciferol (D3-50) 1.25 MG (68599 UT) capsule 5,000 Units daily (Patient not taking: Reported on 4/14/2025)      clobetasol (TEMOVATE) 0.05 % ointment Use twice a day. 60 g 2    clotrimazole-betamethasone (LOTRISONE) 1-0.05 % cream Apply topically 2 (two) times a day (Patient not taking: Reported on 4/11/2025) 45 g 0    mupirocin (BACTROBAN) 2 % ointment  (Patient not taking: Reported on 4/11/2025)      sodium chloride 1 g tablet Take 1 tablet (1 g total) by mouth in the morning 90 tablet 1     "triamcinolone (KENALOG) 0.1 % cream  (Patient not taking: Reported on 4/11/2025)       Current Facility-Administered Medications on File Prior to Visit   Medication Dose Route Frequency Provider Last Rate Last Admin    cyanocobalamin injection 1,000 mcg  1,000 mcg Intramuscular Q30 Days Julio Bhat MD   1,000 mcg at 12/28/23 1242     Allergies   Allergen Reactions    Ciprofloxacin Hives and Swelling     Pt reports that lips and mouth and face swelled.    Cymbalta [Duloxetine Hcl] Other (See Comments)     Hallucinations, fatigue, faints    Nirmatrelvir-Ritonavir Diarrhea, Nausea Only, Other (See Comments), Dizziness and Lightheadedness     \"passed out\"-nausea    Percocet [Oxycodone-Acetaminophen] Other (See Comments)     Dry mouth - pt states it kept her up all night. States had to continually drink fluids      Current Outpatient Medications on File Prior to Visit   Medication Sig Dispense Refill    buPROPion (WELLBUTRIN) 100 mg tablet Take 100 mg by mouth in the morning and 100 mg in the evening and 100 mg before bedtime.      ergocalciferol (VITAMIN D2) 50,000 units Take 1 capsule (50,000 Units total) by mouth once a week 24 capsule 0    ferrous sulfate 325 (65 Fe) mg tablet Take 1 tablet (325 mg total) by mouth 2 (two) times a day with meals 60 tablet 0    hydroxychloroquine (PLAQUENIL) 200 mg tablet Take 1 tablet (200 mg total) by mouth 2 (two) times a day with meals 180 tablet 1    levothyroxine (Synthroid) 25 mcg tablet Take 1 tablet (25 mcg total) by mouth daily 90 tablet 1    LORazepam (ATIVAN) 1 mg tablet Take 1 mg by mouth in the morning and 1 mg in the evening. As needed.      Multiple Vitamins-Minerals (CENTRUM MINIS WOMEN 50+ PO)       naltrexone (REVIA) 50 mg tablet Take 1 tablet (50 mg total) by mouth daily 30 tablet 2    olopatadine HCl (PATADAY) 0.2 % opth drops Administer 1 drop to both eyes daily 2.5 mL 1    pilocarpine (SALAGEN) 5 mg tablet Take 1 tablet (5 mg total) by mouth 2 (two) times a day " "180 tablet 0    QUEtiapine (SEROquel) 300 mg tablet Take 300 mg by mouth daily at bedtime      QUEtiapine (SEROquel) 50 mg tablet 50 mg daily at bedtime      temazepam (RESTORIL) 30 mg capsule Take 2 capsules by mouth daily at bedtime as needed      Zanubrutinib (Brukinsa) 80 MG CAPS Take 4 capsules (320 mg total) by mouth once daily. 120 capsule 10    Cholecalciferol (D3-50) 1.25 MG (94855 UT) capsule 5,000 Units daily (Patient not taking: Reported on 4/14/2025)      clobetasol (TEMOVATE) 0.05 % ointment Use twice a day. 60 g 2    clotrimazole-betamethasone (LOTRISONE) 1-0.05 % cream Apply topically 2 (two) times a day (Patient not taking: Reported on 4/11/2025) 45 g 0    mupirocin (BACTROBAN) 2 % ointment  (Patient not taking: Reported on 4/11/2025)      sodium chloride 1 g tablet Take 1 tablet (1 g total) by mouth in the morning 90 tablet 1    triamcinolone (KENALOG) 0.1 % cream  (Patient not taking: Reported on 4/11/2025)       Current Facility-Administered Medications on File Prior to Visit   Medication Dose Route Frequency Provider Last Rate Last Admin    cyanocobalamin injection 1,000 mcg  1,000 mcg Intramuscular Q30 Days Julio Bhat MD   1,000 mcg at 12/28/23 1242      Social History     Tobacco Use    Smoking status: Never    Smokeless tobacco: Never   Vaping Use    Vaping status: Never Used   Substance and Sexual Activity    Alcohol use: No    Drug use: No    Sexual activity: Not Currently     Partners: Male     Birth control/protection: Female Sterilization     Comment:         Objective   /80 (BP Location: Right arm, Patient Position: Sitting, Cuff Size: Large)   Pulse 81   Temp 97.6 °F (36.4 °C) (Tympanic)   Ht 5' 1\" (1.549 m)   Wt 75.5 kg (166 lb 6.4 oz)   LMP  (LMP Unknown)   SpO2 97%   BMI 31.44 kg/m²      Physical Exam  General: Well appearing, well nourished, in no distress. Oriented x 3, normal mood and affect.  Ambulating without difficulty.  Skin: Good turgor, no unusual " bruising or prominent lesions.    Hair: Normal texture and distribution.  Nails: Normal color, no deformities.  HEENT:  Head: Normocephalic, atraumatic.  Eyes: Conjunctiva clear, sclera non-icteric, EOM intact.  Nose: No external lesions.  Neck: Supple.  Neurologic: Alert and oriented. No focal neurological deficits appreciated.   Psychiatric: Normal mood and affect.

## 2025-05-23 ENCOUNTER — OFFICE VISIT (OUTPATIENT)
Age: 75
End: 2025-05-23

## 2025-05-23 DIAGNOSIS — H04.123 DRY EYE SYNDROME OF BOTH EYES: ICD-10-CM

## 2025-05-23 DIAGNOSIS — Z86.69 HISTORY OF RETINAL DETACHMENT: ICD-10-CM

## 2025-05-23 DIAGNOSIS — Z79.899 LONG-TERM USE OF HIGH-RISK MEDICATION: Primary | ICD-10-CM

## 2025-05-23 NOTE — PROGRESS NOTES
Name: Cassia Turcios      : 1950      MRN: [id number]  Encounter Provider: Anne Marie Coy MD  Encounter Date: 2025   Encounter department: [facility] OPHTHALMOLOGY  :  Assessment & Plan  Long-term use of high-risk medication  Patient is currently on Plaquenil 400 mg PO daily for 20 years. Pt weighs 75 kg.  Current guidelines recommend a maximum daily dose of 5 mg/kg/day to minimize the risk of retinal toxicity, so the patient may safely take a maximum dosage of 375 mg daily.      Will continue to follow annually with a combination of OCT, FAF, and HVF 10-2. Discussed low overall risk of retinal toxicity and the fact that it is irreversible if occurs. Stressed importance of adherence to screening regimen and patient instructed to call with any vision changes or new symptoms. I have assessed no contraindications to continuing the Plaquenil therapy at this time.   ** Pt has myopia and hx of retinal detachment surgery; She does have changes on OCT but could be related to her myopia and prev. Surgeries; Hard to determine if plaquenil toxicity at this time; Will get baseline HVF as well.    Orders:    OCT, Retina - OU - Both Eyes    Fundus Photos - OU - Both Eyes    History of retinal detachment  Monitor; doing well;        Dry eye syndrome of both eyes  Discussed with the patient the diagnosis, prognosis, and treatment of dry eyes. Treatment options include preservative-free artificial tears, topical restasis (cyclosporine ophthalmic), punctal plug placement, and dietary supplement with omega-3 fatty acid or flaxseed oil. All of these treatment options have potential side effects.           Cassia Turcios is a 75 y.o. female who presents for evaluation of long term use of Plaquenil.    Reports she has been on Plaquenil for the past 20 years. Current dose is 400mg daily.  Has previously seen MAR for monitoring.  Reports history of lasers OU for partial detachments.    ILIANA 3 weeks ago w/   Shannon.  Reports she does have Sjogren's and eyes are dry a lot OS>>OD.    History obtained from: patient    Review of Systems  Medical History Reviewed by provider this encounter:  Tobacco  Allergies  Meds  Problems  Med Hx  Surg Hx  Fam Hx     .     Past Ocular History: hx RD? Retinal lasers, plaquenil use, PCIOL OU  Ocular Meds/Drops:   Refresh 8-9x daily OU    Base Eye Exam       Visual Acuity (Snellen - Linear)         Right Left    Dist cc 20/30-2 20/70-    Dist ph cc  20/60+              Tonometry (Tonopen, 2:01 PM)         Right Left    Pressure 19 18              Pupils         Pupils    Right PERRL    Left PERRL              Visual Fields         Left Right     Full Full              Extraocular Movement         Right Left     Full Full              Neuro/Psych       Oriented x3: Yes    Mood/Affect: Normal              Dilation       Both eyes: 2.5% Phenylephrine, 1% Tropicamide @ 2:01 PM                  Slit Lamp and Fundus Exam       External Exam         Right Left    External Normal Normal              Slit Lamp Exam         Right Left    Lids/Lashes Normal Normal    Conjunctiva/Sclera White and quiet White and quiet    Cornea Clear Clear    Anterior Chamber Deep and quiet Deep and quiet    Iris Round and reactive Round and reactive    Lens PCIOL PCIOL    Anterior Vitreous surgically clear surgically clear              Fundus Exam         Right Left    Disc PPA; tilted PPA; tilted    C/D Ratio 0.4 0.4    Macula flat; RPE changes Flat; RPE changes    Vessels normal in caliber normal in caliber    Periphery flat, no retinal tear or detachment; peripheral pigmentation flat, no retinal tear or detachment; peripheral pigmentation; retinopexy scar                      IMAGING:  OCT, Retina - OU - Both Eyes            Right Eye  Findings include RPE changes.     Left Eye  Findings include RPE changes.     Notes    Loss of IS/OS junction but could be myopic changes         Fundus Photos - OU - Both  Eyes          Right Eye  Disc findings include thinning of rim. Macula findings include retinal pigment epithelium abnormalities. Vessel findings include normal observations. Periphery findings include (RPE changes).     Left Eye  Disc findings include thinning of rim. Macula findings include retinal pigment epithelium abnormalities. Vessel findings include normal observations. Periphery findings include (RPE changes).

## 2025-06-02 ENCOUNTER — TELEPHONE (OUTPATIENT)
Dept: BARIATRICS | Facility: CLINIC | Age: 75
End: 2025-06-02

## 2025-06-10 ENCOUNTER — OFFICE VISIT (OUTPATIENT)
Dept: BARIATRICS | Facility: CLINIC | Age: 75
End: 2025-06-10
Payer: COMMERCIAL

## 2025-06-10 VITALS
BODY MASS INDEX: 30.96 KG/M2 | HEART RATE: 86 BPM | TEMPERATURE: 98.3 F | HEIGHT: 61 IN | DIASTOLIC BLOOD PRESSURE: 80 MMHG | WEIGHT: 164 LBS | OXYGEN SATURATION: 98 % | SYSTOLIC BLOOD PRESSURE: 110 MMHG

## 2025-06-10 DIAGNOSIS — Z98.84 BARIATRIC SURGERY STATUS: ICD-10-CM

## 2025-06-10 DIAGNOSIS — Z48.815 ENCOUNTER FOR SURGICAL AFTERCARE FOLLOWING SURGERY OF DIGESTIVE SYSTEM: Primary | ICD-10-CM

## 2025-06-10 DIAGNOSIS — E66.811 OBESITY, CLASS I, BMI 30-34.9: ICD-10-CM

## 2025-06-10 PROCEDURE — 99214 OFFICE O/P EST MOD 30 MIN: CPT | Performed by: NURSE PRACTITIONER

## 2025-06-10 RX ORDER — NALTREXONE HYDROCHLORIDE 50 MG/1
50 TABLET, FILM COATED ORAL DAILY
Qty: 30 TABLET | Refills: 2 | Status: SHIPPED | OUTPATIENT
Start: 2025-06-10

## 2025-06-10 RX ORDER — SEMAGLUTIDE 2.4 MG/.75ML
2.4 INJECTION, SOLUTION SUBCUTANEOUS WEEKLY
COMMUNITY
Start: 2025-05-24 | End: 2025-06-10

## 2025-06-10 NOTE — PROGRESS NOTES
Assessment/Plan:    OBESITY I/BMI 30  - not at goal weight.   - since she has cravings more towards night time, recommended to try to split naltrexone to take 25 mg BID instead, or can switch her naltrexone closer to afternoon timeframe.   - patient also at times skipping lunch which is a contributing factor to her evening cravings. Recommended to stop skipping lunch.   - also on multiple weight gaining profile medications. We discussed to add metformin to cobat weight gaining profile medications. Reviewed side effects, kidney function and labs. She is agreeable to plan. Start with 500 mg once daily for two weeks then increase to twice a day after. She will notify the office if she has any concerns.   - Follow up with RD for post op support.     Highest weight 239.5 lbs   Initial weight on naltrexone 167.5 lbs - 02/11/2025  Current Weight 164 lbs (-3.5 lbs = -2% total weight loss)  Eliud 130s   Goal - 130s-140s  5% weight loss - 8.4 LBS (159.1 lbs)     Follow up in approximately 3 months with Surgical Advanced Practitioner.  Goals:  Food log (ie.) [email or web address],[email or web address],etc. baritastic  No sugary beverages. At least 64oz of water daily.  Increase physical activity by 10 minutes daily. Gradually increase physical activity to a goal of 5 days per week for 30 minutes of MODERATE intensity PLUS 2 days per week of FULL BODY resistance training  5-10 servings of fruits and vegetables per day and 25-35 grams of dietary fiber per day, gradually increasing  No sugary beverages. At least 64oz of water daily., Increase physical activity by 10 minutes daily, Practice lesson plans 1-6 in bariatric manual , Practice 30/60 rule, Gradually increase physical activity to a goal of 5 days per week for 30 minutes of MODERATE intensity PLUS 2 days per week of FULL BODY resistance training, Continue with dietary and behavioral recommendations outlined in bariatric manual, Continue to take  recommended bariatric vitamins as directed, Goal protein intake of 60-80 grams per day, 5-10 servings of fruits and vegetables per day, and [id number] calories per day    Bariatric surgery status - f/u as scheduled for annual. Continue with vitamins as is.     Diagnoses and all orders for this visit:    Encounter for surgical aftercare following surgery of digestive system    Bariatric surgery status    Obesity, Class I, BMI 30-34.9  -     metFORMIN (GLUCOPHAGE) 500 mg tablet; Take 1 tablet (500 mg total) by mouth 2 (two) times a day with meals  -     naltrexone (REVIA) 50 mg tablet; Take 1 tablet (50 mg total) by mouth daily    BMI 30.0-30.9,adult    Other orders  -     Discontinue: Wegovy 2.4 MG/0.75ML; Inject 2.4 mg under the skin once a week        Subjective:   Chief Complaint   Patient presents with    Follow-up     3 month follow-up     Patient ID: Cassia Turcios  is a 75 y.o. female with excess weight/obesity here to pursue medical weight management. -s/p Misael-En-Y Gastric Bypass with Dr. Edward Jackson on 05/01/2012. Here for [facility] follow up.    Past Medical History[1]    HPI:  Obesity/Excess Weight:   On naltrexone 50 mg once daily in the morning. She has been tolerating this medication well without any adverse effects. Still continues to struggle with cravings towards night time especially sweets.     Highest weight 239.5 lbs   Initial weight on naltrexone 167.5 lbs - 02/11/2025  Current Weight 164 lbs (-3.5 lbs = -2% total weight loss)  Eliud 130s   Goal - 130s-140s  5% weight loss - 8.4 LBS (159.1 lbs)        Hydration: drinking about 54 oz of fluids per day, 1 protein shake   Alcohol: none   Exercise: gym 4 days per week - treadmill and circuit/strength training; now hired a    Dining out: none  Occupation:  RETIRED  Sleep: 9-10 hrs per day        B: dole fruit cup  S: instant oatmeal with raisins and honey OR Rice cripsy cereal with low fat milk  L: protein shake   S: Crackers with cheese  D:  "protein - chicken, veggies.   S: salted caramel candy, rice pudding (160 Calories), malamars or outshine popsicle         Patient denies personal and family history of  pancreatitis, thyroid cancer, MEN-2 tumors. BUT HAS NO GLP-1 Coverage.   - currently on wellbutrin already   - on naltrexone - denies any chronic opioid use.   - due to age, would avoid phentermine.       The following portions of the patient's history were reviewed and updated as appropriate: allergies, current medications, past family history, past medical history, past social history, past surgical history, and problem list.    Past Medical History[2]  Past Surgical History[3]  Current Medications[4]    Review of Systems   Constitutional:  Positive for fatigue and unexpected weight change.   Respiratory: Negative.     Cardiovascular: Negative.    Gastrointestinal: Negative.    Musculoskeletal:  Positive for arthralgias.   Neurological:  Positive for numbness.   Psychiatric/Behavioral: Negative.         Objective:    /80 (BP Location: Left arm, Patient Position: Sitting, Cuff Size: Large)   Pulse 86   Temp 98.3 °F (36.8 °C) (Tympanic)   Ht 5' 1\" (1.549 m)   Wt 74.4 kg (164 lb)   LMP  (LMP Unknown)   SpO2 98%   BMI 30.99 kg/m²     Physical Exam  Vitals and nursing note reviewed.   Constitutional:       Appearance: Normal appearance. She is obese.     Cardiovascular:      Rate and Rhythm: Normal rate and regular rhythm.      Pulses: Normal pulses.      Heart sounds: Normal heart sounds.   Pulmonary:      Effort: Pulmonary effort is normal.      Breath sounds: Normal breath sounds.   Abdominal:      General: Bowel sounds are normal.      Palpations: Abdomen is soft.      Tenderness: There is no abdominal tenderness.     Musculoskeletal:         General: Normal range of motion.     Skin:     General: Skin is warm and dry.     Neurological:      General: No focal deficit present.      Mental Status: She is alert and oriented to person, " place, and time.     Psychiatric:         Mood and Affect: Mood normal.         Behavior: Behavior normal.         Thought Content: Thought content normal.         Judgment: Judgment normal.              [1]   Past Medical History:  Diagnosis Date    Acute kidney injury superimposed on chronic kidney disease  ([facility]) 09/20/2021    Age-related osteoporosis without current pathological fracture 06/01/2023    Allergic rhinitis     Anemia     Anxiety     Arthritis 04/5/2025    mild arthritis is knees    Bipolar 1 disorder (HCC)     Cancer ([facility]) 08/2023    lynphoma    Constipation     Deaf     Pt lost all hearing in right ear after having Azeri measles    Depression     Diarrhea     Disease of thyroid gland 01/012000    Dizziness 10/10/2022    Fatigue     GERD (gastroesophageal reflux disease) 08/01/2020    Hard of hearing     Pt wears a hearing in left ear.    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    Systemic lupus erythematosus ([facility])     Urinary tract infection 01/01.2022    Every urine test powell out positive for UTI but i have no symptoms    Vasculitis ([facility])     Wears glasses     Wears partial dentures    [2]   Past Medical History:  Diagnosis Date    Acute kidney injury superimposed on chronic kidney disease  ([facility]) 09/20/2021    Age-related osteoporosis without current pathological fracture 06/01/2023    Allergic rhinitis     Anemia     Anxiety     Arthritis 04/5/2025    mild arthritis is knees    Bipolar 1 disorder (HCC)     Cancer ([facility]) 08/2023    lynphoma    Constipation     Deaf     Pt lost all hearing in  right ear after having Slovak measles    Depression     Diarrhea     Disease of thyroid gland 01/012000    Dizziness 10/10/2022    Fatigue     GERD (gastroesophageal reflux disease) 08/01/2020    Hard of hearing     Pt wears a hearing in left ear.    History of chemotherapy 09/19/2023    HL (hearing loss)     Hypertension 09/19/2023    My BP went up aftet taking BRukinsachemo meficine    Hypothyroidism     Lung nodule     Nasal congestion     Osteoporosis     Otitis media     Pneumonia 02/10/2017    Pneumonia due to Streptococcus (HCC) 09/19/2021    Psychiatric disorder     Recurrent UTI 06/05/2023    Shingles 08/15/2020    Sjogren's syndrome (HCC)     Small lymphocytic lymphoma (HCC) 09/18/2023    Systemic lupus erythematosus (HCC)     Urinary tract infection 01/01.2022    Every urine test powell out positive for UTI but i have no symptoms    Vasculitis (HCC)     Wears glasses     Wears partial dentures    [3]   Past Surgical History:  Procedure Laterality Date    ANKLE SURGERY  12/15/2018    APPENDECTOMY  1975    BARIATRIC SURGERY  05/2012    BREAST BIOPSY Right     many years ago at [facility]    CATARACT EXTRACTION  9/2000    bith eyes were fone    COLONOSCOPY      ELBOW SURGERY  08/1983    fell broke [place]    EYE SURGERY      FOOT SURGERY  04/01/2018    FRACTURE SURGERY Right     elbow    GASTRIC BYPASS  05/12/2012    HYSTERECTOMY Bilateral 05/10/1975    IR BIOPSY LYMPH NODE  10/08/2020    LUNG BIOPSY      LYMPH NODE BIOPSY  09/18/2020    LYMPH NODE BIOPSY Left 08/28/2023    Procedure: EXCISION BIOPSY LYMPH NODE LEFT AXILLARY;  Surgeon: Scott Seaman MD;  Location: [facility];  Service: Thoracic    [facility]/CELL WASHG SPX N/A 12/08/2017    Procedure: BRONCHOSCOPY;  Surgeon: Chepe Luna MD;  Location: [facility];  Service: Pulmonary    [place] BX/EXC LYMPH NODE NEEDLE SUPERFICIAL Left 02/25/2021    Procedure: EXCISION BIOPSY LYMPH NODE: left axillary lymph node biopsy;  Surgeon:  Dakota Heard MD;  Location: [facility];  Service: Thoracic    RETINAL DETACHMENT SURGERY  1/20000    TONSILLECTOMY     [4]   Current Outpatient Medications:     buPROPion (WELLBUTRIN) 100 mg tablet, Take 100 mg by mouth in the morning and 100 mg in the evening and 100 mg before bedtime., Disp: , Rfl:     ergocalciferol (VITAMIN D2) 50,000 units, Take 1 capsule (50,000 Units total) by mouth once a week, Disp: 24 capsule, Rfl: 0    ferrous sulfate 325 (65 Fe) mg tablet, Take 1 tablet (325 mg total) by mouth 2 (two) times a day with meals, Disp: 60 tablet, Rfl: 0    hydroxychloroquine (PLAQUENIL) 200 mg tablet, Take 1 tablet (200 mg total) by mouth 2 (two) times a day with meals, Disp: 180 tablet, Rfl: 1    levothyroxine (Synthroid) 25 mcg tablet, Take 1 tablet (25 mcg total) by mouth daily, Disp: 90 tablet, Rfl: 1    LORazepam (ATIVAN) 1 mg tablet, Take 1 mg by mouth in the morning and 1 mg in the evening. As needed., Disp: , Rfl:     metFORMIN (GLUCOPHAGE) 500 mg tablet, Take 1 tablet (500 mg total) by mouth 2 (two) times a day with meals, Disp: 60 tablet, Rfl: 2    Multiple Vitamins-Minerals (CENTRUM MINIS WOMEN 50+ PO), , Disp: , Rfl:     naltrexone (REVIA) 50 mg tablet, Take 1 tablet (50 mg total) by mouth daily, Disp: 30 tablet, Rfl: 2    olopatadine HCl (PATADAY) 0.2 % opth drops, Administer 1 drop to both eyes daily, Disp: 2.5 mL, Rfl: 1    pilocarpine (SALAGEN) 5 mg tablet, Take 1 tablet (5 mg total) by mouth 2 (two) times a day, Disp: 180 tablet, Rfl: 0    QUEtiapine (SEROquel) 300 mg tablet, Take 300 mg by mouth daily at bedtime, Disp: , Rfl:     QUEtiapine (SEROquel) 50 mg tablet, 50 mg daily at bedtime, Disp: , Rfl:     temazepam (RESTORIL) 30 mg capsule, Take 2 capsules by mouth daily at bedtime as needed, Disp: , Rfl:     Zanubrutinib (Brukinsa) 80 MG CAPS, Take 4 capsules (320 mg total) by mouth once daily., Disp: 120 capsule, Rfl: 10    Cholecalciferol (D3-50) 1.25 MG (63361 UT) capsule, 5,000  Units daily (Patient not taking: Reported on 6/10/2025), Disp: , Rfl:     Current Facility-Administered Medications:     cyanocobalamin injection 1,000 mcg, 1,000 mcg, Intramuscular, Q30 Days, Julio Bhat MD, 1,000 mcg at 12/28/23 1247

## 2025-06-10 NOTE — PROGRESS NOTES
Date of surgery: 5/1/2012  Procedure: RNY gastric bypass w/ PEHR  Performing surgeon: Dr. Edward Jackson    Initial Weight - 239.5 lbs.  Current Weight - 164.0 lbs.  Eliud Weight - 146.5 lbs.  Total Body Weight Loss (EWL) - 75.6 lbs.  EWL% - 71%  TWB% - 32%    For *NEW/TRANSFER* patients only: Who referred the patient? Please provide name and specialty (PCP, GI, etc.).

## 2025-06-11 ENCOUNTER — APPOINTMENT (OUTPATIENT)
Dept: LAB | Facility: CLINIC | Age: 75
End: 2025-06-11
Payer: COMMERCIAL

## 2025-06-11 DIAGNOSIS — E87.1 HYPONATREMIA: ICD-10-CM

## 2025-06-11 LAB
ANION GAP SERPL CALCULATED.3IONS-SCNC: 7 MMOL/L (ref 4–13)
BUN SERPL-MCNC: 15 MG/DL (ref 5–25)
CALCIUM SERPL-MCNC: 9 MG/DL (ref 8.4–10.2)
CHLORIDE SERPL-SCNC: 102 MMOL/L (ref 96–108)
CO2 SERPL-SCNC: 25 MMOL/L (ref 21–32)
CREAT SERPL-MCNC: 0.91 MG/DL (ref 0.6–1.3)
GFR SERPL CREATININE-BSD FRML MDRD: 62 ML/MIN/1.73SQ M
GLUCOSE P FAST SERPL-MCNC: 71 MG/DL (ref 65–99)
OSMOLALITY UR/SERPL-RTO: 295 MMOL/KG (ref 282–298)
OSMOLALITY UR: 274 MMOL/KG (ref 250–900)
POTASSIUM SERPL-SCNC: 4 MMOL/L (ref 3.5–5.3)
SODIUM SERPL-SCNC: 134 MMOL/L (ref 135–147)
SODIUM UR-SCNC: 61 MMOL/L

## 2025-06-11 PROCEDURE — 36415 COLL VENOUS BLD VENIPUNCTURE: CPT

## 2025-06-11 PROCEDURE — 83935 ASSAY OF URINE OSMOLALITY: CPT

## 2025-06-11 PROCEDURE — 80048 BASIC METABOLIC PNL TOTAL CA: CPT

## 2025-06-11 PROCEDURE — 83930 ASSAY OF BLOOD OSMOLALITY: CPT

## 2025-06-11 PROCEDURE — 84300 ASSAY OF URINE SODIUM: CPT

## 2025-06-12 ENCOUNTER — TELEPHONE (OUTPATIENT)
Dept: NEPHROLOGY | Facility: CLINIC | Age: 75
End: 2025-06-12

## 2025-06-12 ENCOUNTER — OFFICE VISIT (OUTPATIENT)
Dept: HEMATOLOGY ONCOLOGY | Facility: CLINIC | Age: 75
End: 2025-06-12
Payer: COMMERCIAL

## 2025-06-12 VITALS
SYSTOLIC BLOOD PRESSURE: 122 MMHG | RESPIRATION RATE: 16 BRPM | HEART RATE: 89 BPM | OXYGEN SATURATION: 95 % | DIASTOLIC BLOOD PRESSURE: 80 MMHG | TEMPERATURE: 98 F | WEIGHT: 164 LBS | HEIGHT: 61 IN | BODY MASS INDEX: 30.96 KG/M2

## 2025-06-12 DIAGNOSIS — C91.10 CLL (CHRONIC LYMPHOCYTIC LEUKEMIA) (HCC): Primary | ICD-10-CM

## 2025-06-12 PROCEDURE — 99214 OFFICE O/P EST MOD 30 MIN: CPT | Performed by: INTERNAL MEDICINE

## 2025-06-12 NOTE — ASSESSMENT & PLAN NOTE
Zanubrutinib 320 mg p.o. daily.  Generally feels well.  Recent CBC and chemistry are normal.  She will be traveling to [place] in the near future.  Precautions regarding heat, hydration, etc.  Follow-up in 3 months with repeat imaging and blood work.  Orders:  •  CBC and differential; Future  •  Comprehensive metabolic panel; Future  •  CT chest abdomen pelvis w contrast; Future

## 2025-06-12 NOTE — PROGRESS NOTES
"Name: Cassia Turcios      : 1950      MRN: [id number]  Encounter Provider: Nate Schilling DO  Encounter Date: 2025   Encounter department: [facility] [place]  :  Assessment & Plan  CLL (chronic lymphocytic leukemia) (HCC)  Zanubrutinib 320 mg p.o. daily.  Generally feels well.  Recent CBC and chemistry are normal.  She will be traveling to [place] in the near future.  Precautions regarding heat, hydration, etc.  Follow-up in 3 months with repeat imaging and blood work.  Orders:  •  CBC and differential; Future  •  Comprehensive metabolic panel; Future  •  CT chest abdomen pelvis w contrast; Future        Return in about 3 months (around 2025) for Labs - See orders, Imaging - See orders.    History of Present Illness   Chief Complaint   Patient presents with   • Follow-up       Pertinent Medical History   SLL.  Started Zanubrutinib 2023 for progressive sweats, unintentional weight loss, progressive lymphadenopathy.     Review of Systems   Constitutional:  Negative for appetite change, diaphoresis, fatigue and fever.   HENT:  Negative for sinus pain.    Eyes:  Negative for discharge.   Respiratory:  Negative for cough and shortness of breath.    Cardiovascular:  Negative for chest pain.   Gastrointestinal:  Negative for abdominal pain, constipation and diarrhea.   Endocrine: Negative for cold intolerance.   Genitourinary:  Negative for difficulty urinating and hematuria.   Musculoskeletal:  Negative for joint swelling.   Skin:  Negative for rash.   Allergic/Immunologic: Negative for environmental allergies.   Neurological:  Negative for dizziness and headaches.   Hematological:  Negative for adenopathy.   Psychiatric/Behavioral:  Negative for agitation.            Objective   /80 (BP Location: Left arm, Patient Position: Sitting, Cuff Size: Adult)   Pulse 89   Temp 98 °F (36.7 °C) (Temporal)   Resp 16   Ht 5' 1\" (1.549 m)   Wt 74.4 kg (164 " lb)   LMP  (LMP Unknown)   SpO2 95%   BMI 30.99 kg/m²     Pain Screening:  Pain Score: 0-No pain  ECOG     Physical Exam  Constitutional:       Appearance: She is well-developed.   HENT:      Head: Normocephalic and atraumatic.     Eyes:      Pupils: Pupils are equal, round, and reactive to light.       Cardiovascular:      Rate and Rhythm: Normal rate.      Heart sounds: No murmur heard.  Pulmonary:      Effort: No respiratory distress.      Breath sounds: No wheezing or rales.   Abdominal:      General: There is no distension.      Palpations: Abdomen is soft.      Tenderness: There is no abdominal tenderness. There is no rebound.     Musculoskeletal:         General: No tenderness.      Cervical back: Neck supple.   Lymphadenopathy:      Cervical: No cervical adenopathy.     Skin:     General: Skin is warm.      Findings: No rash.     Neurological:      Mental Status: She is alert and oriented to person, place, and time.      Deep Tendon Reflexes: Reflexes normal.     Psychiatric:         Thought Content: Thought content normal.         Labs: I have reviewed the following labs:  Lab Results   Component Value Date/Time    WBC 6.35 05/16/2025 11:32 AM    RBC 4.19 05/16/2025 11:32 AM    Hemoglobin 13.3 05/16/2025 11:32 AM    Hematocrit 39.9 05/16/2025 11:32 AM    MCV 95 05/16/2025 11:32 AM    MCH 31.7 05/16/2025 11:32 AM    RDW 14.2 05/16/2025 11:32 AM    Platelets 337 05/16/2025 11:32 AM    Segmented % 51 05/16/2025 11:32 AM    Lymphocytes % 36 05/16/2025 11:32 AM    Monocytes % 10 05/16/2025 11:32 AM    Eosinophils Relative 1 05/16/2025 11:32 AM    Basophils Relative 1 05/16/2025 11:32 AM    Immature Grans % 1 05/16/2025 11:32 AM    Absolute Neutrophils 3.24 05/16/2025 11:32 AM     Lab Results   Component Value Date/Time    Potassium 4.0 06/11/2025 11:04 AM    Chloride 102 06/11/2025 11:04 AM    CO2 25 06/11/2025 11:04 AM    BUN 15 06/11/2025 11:04 AM    Creatinine 0.91 06/11/2025 11:04 AM    Glucose, Fasting  71 06/11/2025 11:04 AM    Calcium 9.0 06/11/2025 11:04 AM    AST 28 05/16/2025 11:32 AM    ALT 16 05/16/2025 11:32 AM    Alkaline Phosphatase 113 (H) 05/16/2025 11:32 AM    Total Protein 9.1 (H) 05/16/2025 11:32 AM    Albumin 4.0 05/16/2025 11:32 AM    Total Bilirubin 0.66 05/16/2025 11:32 AM    eGFR 62 06/11/2025 11:04 AM

## 2025-06-12 NOTE — TELEPHONE ENCOUNTER
Message left on patient's VM that labs of  6/11/25 show sodium level stable at 134.  Per Dr. Carr, continue to monitor off salt tablets.    ----- Message from Stephane Carr MD sent at 6/12/2025  7:45 AM EDT -----  Please inform patient that most recent labs (6/11/25) show that Na level is stable at 134. Continue to monitor off salt tabs.

## 2025-06-23 ENCOUNTER — RA CDI HCC (OUTPATIENT)
Dept: OTHER | Facility: HOSPITAL | Age: 75
End: 2025-06-23

## 2025-06-30 ENCOUNTER — OFFICE VISIT (OUTPATIENT)
Dept: FAMILY MEDICINE CLINIC | Facility: CLINIC | Age: 75
End: 2025-06-30
Payer: COMMERCIAL

## 2025-06-30 VITALS
DIASTOLIC BLOOD PRESSURE: 82 MMHG | WEIGHT: 164 LBS | BODY MASS INDEX: 30.96 KG/M2 | HEIGHT: 61 IN | HEART RATE: 80 BPM | OXYGEN SATURATION: 96 % | SYSTOLIC BLOOD PRESSURE: 122 MMHG

## 2025-06-30 DIAGNOSIS — L24.5 IRRITANT CONTACT DERMATITIS DUE TO OTHER CHEMICAL PRODUCTS: ICD-10-CM

## 2025-06-30 DIAGNOSIS — I77.6 VASCULITIS (HCC): Primary | ICD-10-CM

## 2025-06-30 PROCEDURE — 99213 OFFICE O/P EST LOW 20 MIN: CPT | Performed by: FAMILY MEDICINE

## 2025-06-30 RX ORDER — HYDROCORTISONE 25 MG/G
CREAM TOPICAL 3 TIMES DAILY
Qty: 28 G | Refills: 0 | Status: SHIPPED | OUTPATIENT
Start: 2025-06-30

## 2025-06-30 NOTE — PROGRESS NOTES
"Name: Cassia Turcios      : 1950      MRN: [id number]  Encounter Provider: Julio Bhat MD  Encounter Date: 2025   Encounter department: [facility]    :  Assessment & Plan  Vasculitis (HCC)         Irritant contact dermatitis due to other chemical products    Orders:    hydrocortisone 2.5 % cream; Apply topically 3 (three) times a day      Assessment & Plan  1. Rash.  - The rash on the face is likely due to vasculitis, similar to the condition affecting the arm.  - Sunblock usage may have inadvertently provided some relief.  - The rash could also be a result of eye drops dripping onto the skin and causing irritation.  - Advised to continue using sunblock and to be cautious with the application of eye drops to prevent them from dripping onto the skin.    2. Severe dry eye.  - Reports severe dry eye, which may be contributing to the rash on the face.  - Eye drops may be dripping onto the skin and causing irritation.  - Advised to continue using eye drops but to be cautious to prevent them from dripping onto the skin.  - Discussed the importance of proper application techniques to minimize skin irritation.           History of Present Illness     History of Present Illness  The patient presents for evaluation of a rash.    She reports an improvement in the rash on her arms, which she attributes to the use of sunblock. She has been applying Neutrogena sunblock 70 on her entire body, including her face, every 2.5 hours during sun exposure. Despite this, she notes that the rash on her face has not shown significant improvement. She also mentions that she has not developed any tan or sunburn.    Additionally, she reports severe dry eye, with occasional dripping of eye drops onto her skin.     Review of Systems   Skin:  Positive for rash.     Objective   /82   Pulse 80   Ht 5' 1\" (1.549 m)   Wt 74.4 kg (164 lb)   LMP  (LMP Unknown)   SpO2 96%   BMI 30.99 kg/m²     Physical " Exam  - Eyes: Severe dry eye noted in the right eye  - Skin: Rash noted on the face and arms, likely due to vasculitis and irritation from eye drops  Physical Exam

## 2025-07-09 ENCOUNTER — HOSPITAL ENCOUNTER (OUTPATIENT)
Dept: INFUSION CENTER | Facility: CLINIC | Age: 75
Discharge: HOME | End: 2025-07-09
Attending: INTERNAL MEDICINE
Payer: COMMERCIAL

## 2025-07-09 VITALS
BODY MASS INDEX: 31.27 KG/M2 | RESPIRATION RATE: 20 BRPM | DIASTOLIC BLOOD PRESSURE: 84 MMHG | SYSTOLIC BLOOD PRESSURE: 127 MMHG | HEART RATE: 81 BPM | TEMPERATURE: 97.5 F | WEIGHT: 165.5 LBS | OXYGEN SATURATION: 98 %

## 2025-07-09 DIAGNOSIS — M81.0 AGE-RELATED OSTEOPOROSIS WITHOUT CURRENT PATHOLOGICAL FRACTURE: Primary | ICD-10-CM

## 2025-07-09 PROCEDURE — 96374 THER/PROPH/DIAG INJ IV PUSH: CPT

## 2025-07-09 RX ORDER — ZOLEDRONIC ACID 0.05 MG/ML
5 INJECTION, SOLUTION INTRAVENOUS ONCE
Status: COMPLETED | OUTPATIENT
Start: 2025-07-09 | End: 2025-07-09

## 2025-07-09 RX ORDER — SODIUM CHLORIDE 9 MG/ML
20 INJECTION, SOLUTION INTRAVENOUS ONCE
Status: COMPLETED | OUTPATIENT
Start: 2025-07-09 | End: 2025-07-09

## 2025-07-09 RX ORDER — ZOLEDRONIC ACID 0.05 MG/ML
5 INJECTION, SOLUTION INTRAVENOUS ONCE
OUTPATIENT
Start: 2026-07-08

## 2025-07-09 RX ORDER — SODIUM CHLORIDE 9 MG/ML
20 INJECTION, SOLUTION INTRAVENOUS ONCE
OUTPATIENT
Start: 2026-07-08

## 2025-07-09 RX ADMIN — SODIUM CHLORIDE 20 ML/HR: 0.9 INJECTION, SOLUTION INTRAVENOUS at 15:44

## 2025-07-09 RX ADMIN — ZOLEDRONIC ACID 5 MG: 5 INJECTION, SOLUTION INTRAVENOUS at 15:44

## 2025-07-09 NOTE — PLAN OF CARE
Problem: Potential for Falls  Goal: Patient will remain free of falls  Description: INTERVENTIONS:  - Educate patient/family on patient safety including physical limitations  - Instruct patient to call for assistance with activity   - Consider consulting OT/PT to assist with strengthening/mobility based on AM PAC & JH-HLM score  - Consult OT/PT to assist with strengthening/mobility   - Keep Call bell within reach  - Keep bed low and locked with side rails adjusted as appropriate  - Keep care items and personal belongings within reach  - Initiate and maintain comfort rounds  - Make Fall Risk Sign visible to staff  -  Outcome: Completed     Problem: Knowledge Deficit  Goal: Patient/family/caregiver demonstrates understanding of disease process, treatment plan, medications, and discharge instructions  Description: Complete learning assessment and assess knowledge base.  Interventions:  - Provide teaching at level of understanding  - Provide teaching via preferred learning methods  Outcome: Completed

## 2025-07-09 NOTE — PROGRESS NOTES
Pt here for reclast, Ca 9 & CrCl 44.8, pt denies any recent or upcoming dental work. Pt tolerated tx well with no complaints. Next appt 7/16 at 1430 at [facility]. Declined AVS.

## 2025-07-29 DIAGNOSIS — C83.00 SMALL LYMPHOCYTIC LYMPHOMA (HCC): ICD-10-CM

## 2025-07-30 RX ORDER — ZANUBRUTINIB 80 MG/1
CAPSULE, GELATIN COATED ORAL
Qty: 120 CAPSULE | Refills: 10 | Status: SHIPPED | OUTPATIENT
Start: 2025-07-30

## 2025-08-08 ENCOUNTER — APPOINTMENT (OUTPATIENT)
Dept: LAB | Facility: CLINIC | Age: 75
End: 2025-08-08
Payer: COMMERCIAL

## 2025-08-10 LAB — BACTERIA UR CULT: ABNORMAL

## 2025-08-11 ENCOUNTER — RESULTS FOLLOW-UP (OUTPATIENT)
Dept: OTHER | Facility: HOSPITAL | Age: 75
End: 2025-08-11

## 2025-08-21 ENCOUNTER — HOSPITAL ENCOUNTER (OUTPATIENT)
Dept: RADIOLOGY | Age: 75
Discharge: HOME | End: 2025-08-21
Payer: COMMERCIAL

## 2025-08-21 VITALS — HEIGHT: 60 IN | WEIGHT: 165 LBS | BODY MASS INDEX: 32.39 KG/M2

## 2025-08-21 DIAGNOSIS — M81.0 AGE-RELATED OSTEOPOROSIS WITHOUT CURRENT PATHOLOGICAL FRACTURE: ICD-10-CM

## 2025-08-21 DIAGNOSIS — M32.9 SYSTEMIC LUPUS ERYTHEMATOSUS, UNSPECIFIED SLE TYPE, UNSPECIFIED ORGAN INVOLVEMENT STATUS (HCC): ICD-10-CM

## 2025-08-21 PROCEDURE — 77080 DXA BONE DENSITY AXIAL: CPT

## (undated) DEVICE — SUT VICRYL 3-0 SH 27 IN J416H

## (undated) DEVICE — TUBING SUCTION 5MM X 12 FT

## (undated) DEVICE — 3000CC GUARDIAN II: Brand: GUARDIAN

## (undated) DEVICE — ADHESIVE SKIN HIGH VISCOSITY EXOFIN 1ML

## (undated) DEVICE — SPECIMEN CONTAINER STERILE PEEL PACK

## (undated) DEVICE — GLOVE INDICATOR PI UNDERGLOVE SZ 8 BLUE

## (undated) DEVICE — SUT SILK 2-0 SH 30 IN K833H

## (undated) DEVICE — 3M™ STERI-STRIP™ REINFORCED ADHESIVE SKIN CLOSURES, R1547, 1/2 IN X 4 IN (12 MM X 100 MM), 6 STRIPS/ENVELOPE: Brand: 3M™ STERI-STRIP™

## (undated) DEVICE — INTENDED FOR TISSUE SEPARATION, AND OTHER PROCEDURES THAT REQUIRE A SHARP SURGICAL BLADE TO PUNCTURE OR CUT.: Brand: BARD-PARKER SAFETY BLADES SIZE 15, STERILE

## (undated) DEVICE — GLOVE SRG BIOGEL ECLIPSE 7.5

## (undated) DEVICE — SUT MONOCRYL 4-0 PS-2 18 IN Y496G

## (undated) DEVICE — TELFA NON-ADHERENT ABSORBENT DRESSING: Brand: TELFA

## (undated) DEVICE — DRAPE SURGIKIT SADDLE BAG

## (undated) DEVICE — PLUMEPEN PRO 10FT

## (undated) DEVICE — 3M™ TEGADERM™ TRANSPARENT FILM DRESSING FRAME STYLE, 1624W, 2-3/8 IN X 2-3/4 IN (6 CM X 7 CM), 100/CT 4CT/CASE: Brand: 3M™ TEGADERM™

## (undated) DEVICE — CHLORAPREP HI-LITE 26ML ORANGE

## (undated) DEVICE — ROSEBUD DISSECTORS: Brand: DEROYAL

## (undated) DEVICE — PENCIL ELECTROSURG E-Z CLEAN -0035H

## (undated) DEVICE — 2000CC GUARDIAN II: Brand: GUARDIAN

## (undated) DEVICE — PAD GROUNDING ADULT

## (undated) DEVICE — BETHLEHEM UNIVERSAL MINOR GEN: Brand: CARDINAL HEALTH

## (undated) DEVICE — MEDI-VAC YANK SUCT HNDL W/TPRD BULBOUS TIP: Brand: CARDINAL HEALTH

## (undated) DEVICE — 3M™ IOBAN™ 2 ANTIMICROBIAL INCISE DRAPE 6640EZ: Brand: IOBAN™ 2